# Patient Record
Sex: FEMALE | Race: WHITE | NOT HISPANIC OR LATINO
[De-identification: names, ages, dates, MRNs, and addresses within clinical notes are randomized per-mention and may not be internally consistent; named-entity substitution may affect disease eponyms.]

---

## 2021-12-06 PROBLEM — Z00.00 ENCOUNTER FOR PREVENTIVE HEALTH EXAMINATION: Status: ACTIVE | Noted: 2021-12-06

## 2021-12-14 ENCOUNTER — APPOINTMENT (OUTPATIENT)
Dept: OTOLARYNGOLOGY | Facility: CLINIC | Age: 56
End: 2021-12-14
Payer: COMMERCIAL

## 2021-12-14 VITALS
WEIGHT: 235 LBS | BODY MASS INDEX: 37.77 KG/M2 | OXYGEN SATURATION: 93 % | HEIGHT: 66 IN | HEART RATE: 108 BPM | SYSTOLIC BLOOD PRESSURE: 134 MMHG | DIASTOLIC BLOOD PRESSURE: 85 MMHG

## 2021-12-14 PROCEDURE — 99204 OFFICE O/P NEW MOD 45 MIN: CPT

## 2021-12-14 NOTE — PHYSICAL EXAM
[FreeTextEntry1] : ulcerated and endophytic lesion R post ced gingiva-post molar region and abuts 2nd premolar tooth and and base RMT post. There is some buccal induration w no lingual extent.  it is sl tender. no palp nodes [Normal] : no rashes

## 2021-12-14 NOTE — HISTORY OF PRESENT ILLNESS
[de-identified] : Pt rferered by Dr Julian Pinon.  Pt felt soreness R mandible about a month ago.  Worsened.  was referred to Dr Pinon who did a bx.  -well diff SCCA .  Pt is a smoker., 10-20 pk year.  No ETOH. no wt loss. no bleding\par \par Pt w high BP and cholesterol.  PCP is Dr Proctor. \par \par

## 2021-12-14 NOTE — CONSULT LETTER
[Dear  ___] : Dear  [unfilled], [Consult Letter:] : I had the pleasure of evaluating your patient, [unfilled]. [Please see my note below.] : Please see my note below. [Consult Closing:] : Thank you very much for allowing me to participate in the care of this patient.  If you have any questions, please do not hesitate to contact me. [Sincerely,] : Sincerely, [FreeTextEntry3] : Vic So MD, FACS\par \par    [facility]\par Associate Chair\par    Department of Otolaryngology\par \par Professor\par Otolaryngology & Molecular Medicine\par [employer]\par  [FreeTextEntry2] : Cristopher Pinon DDS ([place], [place])

## 2021-12-15 ENCOUNTER — NON-APPOINTMENT (OUTPATIENT)
Age: 56
End: 2021-12-15

## 2021-12-16 ENCOUNTER — RESULT REVIEW (OUTPATIENT)
Age: 56
End: 2021-12-16

## 2021-12-20 ENCOUNTER — APPOINTMENT (OUTPATIENT)
Dept: PLASTIC SURGERY | Facility: CLINIC | Age: 56
End: 2021-12-20
Payer: COMMERCIAL

## 2021-12-20 VITALS
WEIGHT: 230 LBS | HEIGHT: 65.5 IN | HEART RATE: 95 BPM | TEMPERATURE: 98.4 F | SYSTOLIC BLOOD PRESSURE: 143 MMHG | BODY MASS INDEX: 37.86 KG/M2 | DIASTOLIC BLOOD PRESSURE: 87 MMHG | OXYGEN SATURATION: 97 %

## 2021-12-20 PROCEDURE — 99215 OFFICE O/P EST HI 40 MIN: CPT

## 2021-12-21 NOTE — REASON FOR VISIT
[Consultation] : a consultation visit [FreeTextEntry1] : Pt is a 57 y/o F with PMHx of HTN and hypercholesterolemia who presents for evaluation of a right mandible lesion. Pt first noted soreness of her right mandible approximately 1 month ago that worsened over time. She had a bx, confirming SCC. Pt is a current every day smoker but has been trying to quit. Currently smoking 1-2 cigarettes daily.

## 2021-12-21 NOTE — HISTORY OF PRESENT ILLNESS
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 So.  Patient is a in the process of being completely evaluated but at this time potential for resection is either orbital floor with marginal mandibulectomy versus composite resection.  Patient presents today to discuss reconstruction options.

## 2021-12-21 NOTE — PHYSICAL EXAM
[NI] : Normal [de-identified] :  ulcerated and endophytic lesion R post ced gingiva-post molar region and abuts 2nd premolar tooth and and base RMT post. There is some buccal induration w no lingual extent. it is sl tender. no palp nodes.  [de-identified] : Right forearm marcia test positive

## 2021-12-22 ENCOUNTER — APPOINTMENT (OUTPATIENT)
Dept: RADIATION ONCOLOGY | Facility: CLINIC | Age: 56
End: 2021-12-22
Payer: COMMERCIAL

## 2021-12-22 VITALS
RESPIRATION RATE: 17 BRPM | DIASTOLIC BLOOD PRESSURE: 84 MMHG | SYSTOLIC BLOOD PRESSURE: 127 MMHG | HEIGHT: 65 IN | HEART RATE: 97 BPM | OXYGEN SATURATION: 93 % | TEMPERATURE: 97 F | BODY MASS INDEX: 38.32 KG/M2 | WEIGHT: 230 LBS

## 2021-12-22 DIAGNOSIS — Z63.5 DISRUPTION OF FAMILY BY SEPARATION AND DIVORCE: ICD-10-CM

## 2021-12-22 DIAGNOSIS — Z86.39 PERSONAL HISTORY OF OTHER ENDOCRINE, NUTRITIONAL AND METABOLIC DISEASE: ICD-10-CM

## 2021-12-22 DIAGNOSIS — Z86.79 PERSONAL HISTORY OF OTHER DISEASES OF THE CIRCULATORY SYSTEM: ICD-10-CM

## 2021-12-22 DIAGNOSIS — F17.200 NICOTINE DEPENDENCE, UNSPECIFIED, UNCOMPLICATED: ICD-10-CM

## 2021-12-22 DIAGNOSIS — Z87.09 PERSONAL HISTORY OF OTHER DISEASES OF THE RESPIRATORY SYSTEM: ICD-10-CM

## 2021-12-22 DIAGNOSIS — R79.89 OTHER SPECIFIED ABNORMAL FINDINGS OF BLOOD CHEMISTRY: ICD-10-CM

## 2021-12-22 PROCEDURE — 99205 OFFICE O/P NEW HI 60 MIN: CPT | Mod: 25

## 2021-12-22 SDOH — SOCIAL STABILITY - SOCIAL INSECURITY: DISRUPTION OF FAMILY BY SEPARATION AND DIVORCE: Z63.5

## 2021-12-22 NOTE — HISTORY OF PRESENT ILLNESS
[FreeTextEntry1] : 56 year old woman presents today for consultation regarding possible radiation therapy for head and neck cancer.\par \par She noted soreness in her right mandible in 11/2021, and initially went for evaluation of possible dentures.  She met with Dr. Cristopher Pinon (oral surgeon).\par \par 11/26/21 biopsy of the right mandibular gingiva showed well-differentiated squamous cell carcinoma, P16 negative.\par \par She met with Dr. So on 12/14/21, and with Dr. Evans on 12/20/21.  On exam, she was noted to have an ulcerated and endophytic lesion in the right posterior mandibular gingiva/post-molar region, abutting the 2nd premolar tooth and the base RMT posteriorly.  There was buccal induration, no lingual extension.\par \par She is planning to undergo high-res CT and PET tomorrow.\par \par She is scheduled for surgical resection in 1/2022.\par \par Currently, she reports pain in the right jaw, 8/10, and is using Advil with some relief.  She notes intermittent xerostomia and weight loss of 6lbs in the last month.  Denies dysgeusia, odynophagia, dysphagia or fatigue.

## 2021-12-22 NOTE — PHYSICAL EXAM
[Outer Ear] : the ears and nose were normal in appearance [Cervical Lymph Nodes Enlarged Posterior Bilaterally] : posterior cervical [Cervical Lymph Nodes Enlarged Anterior Bilaterally] : anterior cervical [Supraclavicular Lymph Nodes Enlarged Bilaterally] : supraclavicular [Normal] : no focal deficits [de-identified] : ulcerated lesion in the right posterior gingiva / retromolar region.

## 2021-12-22 NOTE — REVIEW OF SYSTEMS
[Negative] : Allergic/Immunologic [Dysphagia: Grade 0] : Dysphagia: Grade 0 [Edema Limbs: Grade 0] : Edema Limbs: Grade 0  [Fatigue: Grade 0] : Fatigue: Grade 0 [Localized Edema: Grade 0] : Localized Edema: Grade 0  [Neck Edema: Grade 0] : Neck Edema: Grade 0 [Oral Pain: Grade 2 - Moderate pain; limiting instrumental ADL] : Oral Pain: Grade 2 - Moderate pain; limiting instrumental ADL [Dysgeusia: Grade 0] : Dysgeusia: Grade 0

## 2021-12-22 NOTE — OB/GYN HISTORY
[History of Birth Control Pills] : Patient has a history of taking birth control pills [Currently In Menopause] : currently in menopause [___] : Living: [unfilled] [History of Hormone Replacement Therapy] : no history of hormone replacement therapy

## 2021-12-22 NOTE — LETTER CLOSING
[Consult Closing:] : Thank you for allowing me to participate in the care of this patient.  If you have any questions, please do not hesitate to contact me. [Sincerely yours,] : Sincerely yours, [FreeTextEntry3] : Jamie Lambert MD\par  of Radiation Medicine, Quality and Safety\par  of Radiation Medicine\par [facility] [employer] [facility]\par [facility]\par [facility]\par

## 2021-12-22 NOTE — VITALS
[Maximal Pain Intensity: 8/10] : 8/10 [Least Pain Intensity: 8/10] : 8/10 [Pain Description/Quality: ___] : Pain description/quality: [unfilled] [Pain Duration: ___] : Pain duration: [unfilled] [Pain Location: ___] : Pain Location: [unfilled] [Pain Interferes with ADLs] : Pain interferes with activities of daily living. [OTC] : OTC [80: Normal activity with effort; some signs or symptoms of disease.] : 80: Normal activity with effort; some signs or symptoms of disease.  [Date: ____________] : Patient's last distress assessment performed on [unfilled]. [8 - Distress Level] : Distress Level: 8

## 2021-12-22 NOTE — DISEASE MANAGEMENT
[Clinical] : TNM Stage: c [II] : II [FreeTextEntry4] : SCC oral cavity [TTNM] : 2 vs 4 [NTNM] : 0 [MTNM] : 0

## 2021-12-23 ENCOUNTER — APPOINTMENT (OUTPATIENT)
Dept: CT IMAGING | Facility: CLINIC | Age: 56
End: 2021-12-23
Payer: COMMERCIAL

## 2021-12-23 ENCOUNTER — APPOINTMENT (OUTPATIENT)
Dept: NUCLEAR MEDICINE | Facility: CLINIC | Age: 56
End: 2021-12-23
Payer: COMMERCIAL

## 2021-12-23 ENCOUNTER — OUTPATIENT (OUTPATIENT)
Dept: OUTPATIENT SERVICES | Facility: HOSPITAL | Age: 56
LOS: 1 days | End: 2021-12-23
Payer: COMMERCIAL

## 2021-12-23 DIAGNOSIS — C03.9 MALIGNANT NEOPLASM OF GUM, UNSPECIFIED: ICD-10-CM

## 2021-12-23 PROCEDURE — 70491 CT SOFT TISSUE NECK W/DYE: CPT | Mod: 26

## 2021-12-23 PROCEDURE — 78815 PET IMAGE W/CT SKULL-THIGH: CPT | Mod: 26,PI

## 2021-12-23 PROCEDURE — 70491 CT SOFT TISSUE NECK W/DYE: CPT

## 2021-12-23 PROCEDURE — 78815 PET IMAGE W/CT SKULL-THIGH: CPT

## 2021-12-23 PROCEDURE — 70487 CT MAXILLOFACIAL W/DYE: CPT

## 2021-12-23 PROCEDURE — 82565 ASSAY OF CREATININE: CPT

## 2021-12-23 PROCEDURE — 70487 CT MAXILLOFACIAL W/DYE: CPT | Mod: 26

## 2021-12-23 PROCEDURE — A9552: CPT

## 2021-12-27 ENCOUNTER — NON-APPOINTMENT (OUTPATIENT)
Age: 56
End: 2021-12-27

## 2021-12-28 ENCOUNTER — NON-APPOINTMENT (OUTPATIENT)
Age: 56
End: 2021-12-28

## 2021-12-28 ENCOUNTER — OUTPATIENT (OUTPATIENT)
Dept: OUTPATIENT SERVICES | Facility: HOSPITAL | Age: 56
LOS: 1 days | End: 2021-12-28
Payer: COMMERCIAL

## 2021-12-28 VITALS
TEMPERATURE: 97 F | HEIGHT: 65 IN | SYSTOLIC BLOOD PRESSURE: 128 MMHG | HEART RATE: 90 BPM | OXYGEN SATURATION: 97 % | WEIGHT: 231.93 LBS | DIASTOLIC BLOOD PRESSURE: 78 MMHG | RESPIRATION RATE: 16 BRPM

## 2021-12-28 DIAGNOSIS — Z85.818 PERSONAL HISTORY OF MALIGNANT NEOPLASM OF OTHER SITES OF LIP, ORAL CAVITY, AND PHARYNX: ICD-10-CM

## 2021-12-28 DIAGNOSIS — C03.9 MALIGNANT NEOPLASM OF GUM, UNSPECIFIED: ICD-10-CM

## 2021-12-28 DIAGNOSIS — Z90.711 ACQUIRED ABSENCE OF UTERUS WITH REMAINING CERVICAL STUMP: Chronic | ICD-10-CM

## 2021-12-28 DIAGNOSIS — T78.40XA ALLERGY, UNSPECIFIED, INITIAL ENCOUNTER: ICD-10-CM

## 2021-12-28 DIAGNOSIS — Z98.891 HISTORY OF UTERINE SCAR FROM PREVIOUS SURGERY: Chronic | ICD-10-CM

## 2021-12-28 DIAGNOSIS — R94.31 ABNORMAL ELECTROCARDIOGRAM [ECG] [EKG]: ICD-10-CM

## 2021-12-28 DIAGNOSIS — Z96.649 PRESENCE OF UNSPECIFIED ARTIFICIAL HIP JOINT: Chronic | ICD-10-CM

## 2021-12-28 LAB
ALBUMIN SERPL ELPH-MCNC: 4.6 G/DL — SIGNIFICANT CHANGE UP (ref 3.3–5)
ALP SERPL-CCNC: 67 U/L — SIGNIFICANT CHANGE UP (ref 40–120)
ALT FLD-CCNC: 16 U/L — SIGNIFICANT CHANGE UP (ref 4–33)
ANION GAP SERPL CALC-SCNC: 12 MMOL/L — SIGNIFICANT CHANGE UP (ref 7–14)
AST SERPL-CCNC: 12 U/L — SIGNIFICANT CHANGE UP (ref 4–32)
BILIRUB SERPL-MCNC: 0.3 MG/DL — SIGNIFICANT CHANGE UP (ref 0.2–1.2)
BLD GP AB SCN SERPL QL: NEGATIVE — SIGNIFICANT CHANGE UP
BUN SERPL-MCNC: 17 MG/DL — SIGNIFICANT CHANGE UP (ref 7–23)
CALCIUM SERPL-MCNC: 9.5 MG/DL — SIGNIFICANT CHANGE UP (ref 8.4–10.5)
CHLORIDE SERPL-SCNC: 100 MMOL/L — SIGNIFICANT CHANGE UP (ref 98–107)
CO2 SERPL-SCNC: 26 MMOL/L — SIGNIFICANT CHANGE UP (ref 22–31)
CREAT SERPL-MCNC: 0.62 MG/DL — SIGNIFICANT CHANGE UP (ref 0.5–1.3)
GLUCOSE SERPL-MCNC: 82 MG/DL — SIGNIFICANT CHANGE UP (ref 70–99)
HCT VFR BLD CALC: 41.2 % — SIGNIFICANT CHANGE UP (ref 34.5–45)
HGB BLD-MCNC: 13.7 G/DL — SIGNIFICANT CHANGE UP (ref 11.5–15.5)
MCHC RBC-ENTMCNC: 28.5 PG — SIGNIFICANT CHANGE UP (ref 27–34)
MCHC RBC-ENTMCNC: 33.3 GM/DL — SIGNIFICANT CHANGE UP (ref 32–36)
MCV RBC AUTO: 85.7 FL — SIGNIFICANT CHANGE UP (ref 80–100)
NRBC # BLD: 0 /100 WBCS — SIGNIFICANT CHANGE UP
NRBC # FLD: 0 K/UL — SIGNIFICANT CHANGE UP
PLATELET # BLD AUTO: 237 K/UL — SIGNIFICANT CHANGE UP (ref 150–400)
POTASSIUM SERPL-MCNC: 4.2 MMOL/L — SIGNIFICANT CHANGE UP (ref 3.5–5.3)
POTASSIUM SERPL-SCNC: 4.2 MMOL/L — SIGNIFICANT CHANGE UP (ref 3.5–5.3)
PROT SERPL-MCNC: 7.1 G/DL — SIGNIFICANT CHANGE UP (ref 6–8.3)
RBC # BLD: 4.81 M/UL — SIGNIFICANT CHANGE UP (ref 3.8–5.2)
RBC # FLD: 13.5 % — SIGNIFICANT CHANGE UP (ref 10.3–14.5)
RH IG SCN BLD-IMP: POSITIVE — SIGNIFICANT CHANGE UP
SODIUM SERPL-SCNC: 138 MMOL/L — SIGNIFICANT CHANGE UP (ref 135–145)
WBC # BLD: 9.24 K/UL — SIGNIFICANT CHANGE UP (ref 3.8–10.5)
WBC # FLD AUTO: 9.24 K/UL — SIGNIFICANT CHANGE UP (ref 3.8–10.5)

## 2021-12-28 PROCEDURE — 93010 ELECTROCARDIOGRAM REPORT: CPT

## 2021-12-28 NOTE — H&P PST ADULT - HISTORY OF PRESENT ILLNESS
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 So.

## 2021-12-28 NOTE — H&P PST ADULT - ASSESSMENT
55 yo female presents to PST unit with 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 So.

## 2021-12-28 NOTE — H&P PST ADULT - NSICDXPASTMEDICALHX_GEN_ALL_CORE_FT
PAST MEDICAL HISTORY:  H/O malignant neoplasm of gum     Hyperlipidemia     Hypertension     Mild asthma

## 2021-12-28 NOTE — H&P PST ADULT - PROBLEM SELECTOR PLAN 1
Scheduled for right glossectomy, right neck dissection, tracheostomy with Dr. So on 01/13/2021.  Verbal and written pre-op instructions provided to patient. Patient verbalized understanding and will call surgeons office for revised instructions if surgery is rescheduled.   Pepcid for GI prophylaxis provided.   Patient given verbal and written instruction with teach back on chlorhexidine shampoo, and the patient verbalized understanding with return demonstration.   Patient aware of need for COVID testing prior to  procedure at a [facility]  and advised to coordinate with surgeon to get tested within 72 hours of procedure.

## 2021-12-28 NOTE — H&P PST ADULT - LAST STRESS TEST
69 F w/ PMH of HTN, memory and balance problems, presents to the ED w/ stomach pain, no vomiting, no nausea, no shortness of breath, no fevers no chills, no pain w/ urination. Normal appetite. history provided by pts  and patient.   woke up at 430 AM w/ pain L sided abdominal discomfort per  pain was initially in the back and then stomach additionally pt scheduled for GB removal w/ Dr. Ambrose s/p ERCP  concern for kidney stone, intraabdominal infection cholecystitis, pancreatitis,will have labs, ekg and ct scan dispo pending results. Brenda Espinal MD 69 F w/ PMH of HTN, memory and balance problems, presents to the ED w/ stomach pain, no vomiting, no nausea, no shortness of breath, no fevers no chills, no pain w/ urination. Normal appetite. history provided by pts  and patient.   woke up at 430 AM w/ pain L sided abdominal discomfort per  pain was initially in the back and then stomach additionally pt scheduled for GB removal w/ Dr. Ambrose s/p ERCP, R upper quadrant and RLQ tendenress, ddx concern for kidney stone, intraabdominal infection cholecystitis, pancreatitis,will have labs, ekg and ct scan dispo pending results. denies

## 2021-12-28 NOTE — H&P PST ADULT - NSANTHOSAYNRD_GEN_A_CORE
No. MARKEL screening performed.  STOP BANG Legend: 0-2 = LOW Risk; 3-4 = INTERMEDIATE Risk; 5-8 = HIGH Risk

## 2021-12-28 NOTE — H&P PST ADULT - NSICDXPASTSURGICALHX_GEN_ALL_CORE_FT
PAST SURGICAL HISTORY:  H/O  section     History of hip replacement left hip    History of partial hysterectomy

## 2021-12-29 ENCOUNTER — NON-APPOINTMENT (OUTPATIENT)
Age: 56
End: 2021-12-29

## 2021-12-30 ENCOUNTER — APPOINTMENT (OUTPATIENT)
Dept: CT IMAGING | Facility: CLINIC | Age: 56
End: 2021-12-30
Payer: COMMERCIAL

## 2021-12-30 ENCOUNTER — NON-APPOINTMENT (OUTPATIENT)
Age: 56
End: 2021-12-30

## 2021-12-30 ENCOUNTER — OUTPATIENT (OUTPATIENT)
Dept: OUTPATIENT SERVICES | Facility: HOSPITAL | Age: 56
LOS: 1 days | End: 2021-12-30
Payer: COMMERCIAL

## 2021-12-30 DIAGNOSIS — Z90.711 ACQUIRED ABSENCE OF UTERUS WITH REMAINING CERVICAL STUMP: Chronic | ICD-10-CM

## 2021-12-30 DIAGNOSIS — Z00.8 ENCOUNTER FOR OTHER GENERAL EXAMINATION: ICD-10-CM

## 2021-12-30 DIAGNOSIS — Z96.649 PRESENCE OF UNSPECIFIED ARTIFICIAL HIP JOINT: Chronic | ICD-10-CM

## 2021-12-30 DIAGNOSIS — Z98.891 HISTORY OF UTERINE SCAR FROM PREVIOUS SURGERY: Chronic | ICD-10-CM

## 2021-12-30 PROBLEM — E78.5 HYPERLIPIDEMIA, UNSPECIFIED: Chronic | Status: ACTIVE | Noted: 2021-12-28

## 2021-12-30 PROBLEM — Z85.818 PERSONAL HISTORY OF MALIGNANT NEOPLASM OF OTHER SITES OF LIP, ORAL CAVITY, AND PHARYNX: Chronic | Status: ACTIVE | Noted: 2021-12-28

## 2021-12-30 PROBLEM — J45.909 UNSPECIFIED ASTHMA, UNCOMPLICATED: Chronic | Status: ACTIVE | Noted: 2021-12-28

## 2021-12-30 PROBLEM — I10 ESSENTIAL (PRIMARY) HYPERTENSION: Chronic | Status: ACTIVE | Noted: 2021-12-28

## 2021-12-30 PROCEDURE — 73706 CT ANGIO LWR EXTR W/O&W/DYE: CPT | Mod: 26,50

## 2021-12-30 PROCEDURE — 73706 CT ANGIO LWR EXTR W/O&W/DYE: CPT

## 2022-01-12 ENCOUNTER — TRANSCRIPTION ENCOUNTER (OUTPATIENT)
Age: 57
End: 2022-01-12

## 2022-01-12 ENCOUNTER — OUTPATIENT (OUTPATIENT)
Dept: OUTPATIENT SERVICES | Facility: HOSPITAL | Age: 57
LOS: 1 days | Discharge: HOME | End: 2022-01-12

## 2022-01-12 ENCOUNTER — APPOINTMENT (OUTPATIENT)
Dept: SPEECH THERAPY | Facility: CLINIC | Age: 57
End: 2022-01-12

## 2022-01-12 ENCOUNTER — NON-APPOINTMENT (OUTPATIENT)
Age: 57
End: 2022-01-12

## 2022-01-12 DIAGNOSIS — Z98.891 HISTORY OF UTERINE SCAR FROM PREVIOUS SURGERY: Chronic | ICD-10-CM

## 2022-01-12 DIAGNOSIS — Z90.711 ACQUIRED ABSENCE OF UTERUS WITH REMAINING CERVICAL STUMP: Chronic | ICD-10-CM

## 2022-01-12 DIAGNOSIS — Z96.649 PRESENCE OF UNSPECIFIED ARTIFICIAL HIP JOINT: Chronic | ICD-10-CM

## 2022-01-12 NOTE — ASU PATIENT PROFILE, ADULT - FALL HARM RISK - UNIVERSAL INTERVENTIONS
Bed in lowest position, wheels locked, appropriate side rails in place/Call bell, personal items and telephone in reach/Instruct patient to call for assistance before getting out of bed or chair/Non-slip footwear when patient is out of bed/[place] to call system/Physically safe environment - no spills, clutter or unnecessary equipment/Purposeful Proactive Rounding/Room/bathroom lighting operational, light cord in reach

## 2022-01-13 ENCOUNTER — RESULT REVIEW (OUTPATIENT)
Age: 57
End: 2022-01-13

## 2022-01-13 ENCOUNTER — INPATIENT (INPATIENT)
Facility: HOSPITAL | Age: 57
LOS: 8 days | Discharge: HOME HEALTH | End: 2022-01-22
Attending: OTOLARYNGOLOGY | Admitting: OTOLARYNGOLOGY
Payer: COMMERCIAL

## 2022-01-13 ENCOUNTER — APPOINTMENT (OUTPATIENT)
Dept: OTOLARYNGOLOGY | Facility: HOSPITAL | Age: 57
End: 2022-01-13

## 2022-01-13 ENCOUNTER — APPOINTMENT (OUTPATIENT)
Dept: PLASTIC SURGERY | Facility: HOSPITAL | Age: 57
End: 2022-01-13
Payer: COMMERCIAL

## 2022-01-13 VITALS
OXYGEN SATURATION: 96 % | WEIGHT: 231.93 LBS | DIASTOLIC BLOOD PRESSURE: 79 MMHG | RESPIRATION RATE: 16 BRPM | TEMPERATURE: 98 F | HEART RATE: 97 BPM | HEIGHT: 65 IN | SYSTOLIC BLOOD PRESSURE: 122 MMHG

## 2022-01-13 DIAGNOSIS — C03.9 MALIGNANT NEOPLASM OF GUM, UNSPECIFIED: ICD-10-CM

## 2022-01-13 DIAGNOSIS — Z90.711 ACQUIRED ABSENCE OF UTERUS WITH REMAINING CERVICAL STUMP: Chronic | ICD-10-CM

## 2022-01-13 DIAGNOSIS — Z96.649 PRESENCE OF UNSPECIFIED ARTIFICIAL HIP JOINT: Chronic | ICD-10-CM

## 2022-01-13 DIAGNOSIS — Z98.891 HISTORY OF UTERINE SCAR FROM PREVIOUS SURGERY: Chronic | ICD-10-CM

## 2022-01-13 LAB
ANION GAP SERPL CALC-SCNC: 10 MMOL/L — SIGNIFICANT CHANGE UP (ref 7–14)
APTT BLD: 21.2 SEC — LOW (ref 27–36.3)
BUN SERPL-MCNC: 20 MG/DL — SIGNIFICANT CHANGE UP (ref 7–23)
CALCIUM SERPL-MCNC: 8.2 MG/DL — LOW (ref 8.4–10.5)
CHLORIDE SERPL-SCNC: 102 MMOL/L — SIGNIFICANT CHANGE UP (ref 98–107)
CO2 SERPL-SCNC: 23 MMOL/L — SIGNIFICANT CHANGE UP (ref 22–31)
CREAT SERPL-MCNC: 0.5 MG/DL — SIGNIFICANT CHANGE UP (ref 0.5–1.3)
GAS PNL BLDA: SIGNIFICANT CHANGE UP
GAS PNL BLDA: SIGNIFICANT CHANGE UP
GLUCOSE BLDC GLUCOMTR-MCNC: 198 MG/DL — HIGH (ref 70–99)
GLUCOSE SERPL-MCNC: 217 MG/DL — HIGH (ref 70–99)
HCT VFR BLD CALC: 34.8 % — SIGNIFICANT CHANGE UP (ref 34.5–45)
HGB BLD-MCNC: 11.1 G/DL — LOW (ref 11.5–15.5)
INR BLD: 1.05 RATIO — SIGNIFICANT CHANGE UP (ref 0.88–1.16)
MAGNESIUM SERPL-MCNC: 1.6 MG/DL — SIGNIFICANT CHANGE UP (ref 1.6–2.6)
MCHC RBC-ENTMCNC: 28.7 PG — SIGNIFICANT CHANGE UP (ref 27–34)
MCHC RBC-ENTMCNC: 31.9 GM/DL — LOW (ref 32–36)
MCV RBC AUTO: 89.9 FL — SIGNIFICANT CHANGE UP (ref 80–100)
NRBC # BLD: 0 /100 WBCS — SIGNIFICANT CHANGE UP
NRBC # FLD: 0 K/UL — SIGNIFICANT CHANGE UP
PHOSPHATE SERPL-MCNC: 2.7 MG/DL — SIGNIFICANT CHANGE UP (ref 2.5–4.5)
PLATELET # BLD AUTO: 201 K/UL — SIGNIFICANT CHANGE UP (ref 150–400)
POTASSIUM SERPL-MCNC: 4.5 MMOL/L — SIGNIFICANT CHANGE UP (ref 3.5–5.3)
POTASSIUM SERPL-SCNC: 4.5 MMOL/L — SIGNIFICANT CHANGE UP (ref 3.5–5.3)
PROTHROM AB SERPL-ACNC: 12.1 SEC — SIGNIFICANT CHANGE UP (ref 10.6–13.6)
RBC # BLD: 3.87 M/UL — SIGNIFICANT CHANGE UP (ref 3.8–5.2)
RBC # FLD: 13.8 % — SIGNIFICANT CHANGE UP (ref 10.3–14.5)
SODIUM SERPL-SCNC: 135 MMOL/L — SIGNIFICANT CHANGE UP (ref 135–145)
WBC # BLD: 14.09 K/UL — HIGH (ref 3.8–10.5)
WBC # FLD AUTO: 14.09 K/UL — HIGH (ref 3.8–10.5)

## 2022-01-13 PROCEDURE — 20969 BONE/SKIN GRAFT MICROVASC: CPT

## 2022-01-13 PROCEDURE — 40840 RECONSTRUCTION OF MOUTH: CPT

## 2022-01-13 PROCEDURE — 97608 NEG PRS WND THER NDME>50SQCM: CPT | Mod: 59

## 2022-01-13 PROCEDURE — 88309 TISSUE EXAM BY PATHOLOGIST: CPT | Mod: 26

## 2022-01-13 PROCEDURE — 27707 OSTEOTOMY FIBULA: CPT

## 2022-01-13 PROCEDURE — 38724 REMOVAL OF LYMPH NODES NECK: CPT | Mod: 59,GC,62,RT

## 2022-01-13 PROCEDURE — 71045 X-RAY EXAM CHEST 1 VIEW: CPT | Mod: 26

## 2022-01-13 PROCEDURE — 88331 PATH CONSLTJ SURG 1 BLK 1SPC: CPT | Mod: 26

## 2022-01-13 PROCEDURE — 13132 CMPLX RPR F/C/C/M/N/AX/G/H/F: CPT | Mod: 59

## 2022-01-13 PROCEDURE — 88332 PATH CONSLTJ SURG EA ADD BLK: CPT | Mod: 26

## 2022-01-13 PROCEDURE — 88307 TISSUE EXAM BY PATHOLOGIST: CPT | Mod: 26

## 2022-01-13 PROCEDURE — 15100 SPLT AGRFT T/A/L 1ST 100SQCM: CPT

## 2022-01-13 PROCEDURE — 88311 DECALCIFY TISSUE: CPT | Mod: 26

## 2022-01-13 PROCEDURE — 41150 TONGUE MOUTH JAW SURGERY: CPT | Mod: RT,GC,62

## 2022-01-13 DEVICE — PLATE IPS TITANIUM 64MM: Type: IMPLANTABLE DEVICE | Status: FUNCTIONAL

## 2022-01-13 DEVICE — SCREW LOKG 2X15MM: Type: IMPLANTABLE DEVICE | Status: FUNCTIONAL

## 2022-01-13 DEVICE — SCREW EMERG CROSS DRIVE TI 2X9: Type: IMPLANTABLE DEVICE | Status: FUNCTIONAL

## 2022-01-13 DEVICE — GUIDE CUTTING RECON IPS X-SMALL: Type: IMPLANTABLE DEVICE | Status: FUNCTIONAL

## 2022-01-13 DEVICE — SCREW CR DRV 2.0X11MM: Type: IMPLANTABLE DEVICE | Status: FUNCTIONAL

## 2022-01-13 DEVICE — SCREW LOKG 2X7MM: Type: IMPLANTABLE DEVICE | Status: FUNCTIONAL

## 2022-01-13 DEVICE — CANNULA IMA 1MM BLUNT TIP: Type: IMPLANTABLE DEVICE | Status: FUNCTIONAL

## 2022-01-13 DEVICE — DOPPLER PROBE DISPOSABLE: Type: IMPLANTABLE DEVICE | Status: FUNCTIONAL

## 2022-01-13 DEVICE — GUIDE RECONSTRUCT IPS CUTTING: Type: IMPLANTABLE DEVICE | Status: FUNCTIONAL

## 2022-01-13 DEVICE — LIGATING CLIPS WECK HORIZON SMALL-WIDE (RED) 24: Type: IMPLANTABLE DEVICE | Status: FUNCTIONAL

## 2022-01-13 DEVICE — IMPLANTABLE DEVICE: Type: IMPLANTABLE DEVICE | Status: FUNCTIONAL

## 2022-01-13 DEVICE — LIGATING CLIPS WECK HORIZON MEDIUM (BLUE) 24: Type: IMPLANTABLE DEVICE | Status: FUNCTIONAL

## 2022-01-13 DEVICE — SURGICEL 2 X 14": Type: IMPLANTABLE DEVICE | Status: FUNCTIONAL

## 2022-01-13 DEVICE — COUPLER VESSEL ANASTOMOTIC 3.5MM: Type: IMPLANTABLE DEVICE | Status: FUNCTIONAL

## 2022-01-13 DEVICE — BONE WAX 2.5GM: Type: IMPLANTABLE DEVICE | Status: FUNCTIONAL

## 2022-01-13 DEVICE — CARTRIDGE MICROCLIP 30: Type: IMPLANTABLE DEVICE | Status: FUNCTIONAL

## 2022-01-13 DEVICE — GUIDE CUTTING RECON IPS X-LRG: Type: IMPLANTABLE DEVICE | Status: FUNCTIONAL

## 2022-01-13 DEVICE — SCREW MAXDRIVE 2.0X7MM: Type: IMPLANTABLE DEVICE | Status: FUNCTIONAL

## 2022-01-13 DEVICE — SCREW LOKG 2X11MM: Type: IMPLANTABLE DEVICE | Status: FUNCTIONAL

## 2022-01-13 DEVICE — SCREW LOCKING 2X13MM: Type: IMPLANTABLE DEVICE | Status: FUNCTIONAL

## 2022-01-13 DEVICE — TUBE TRACH SZ 6 CUFF FLEX REUSE: Type: IMPLANTABLE DEVICE | Status: FUNCTIONAL

## 2022-01-13 DEVICE — SCREW MAXDRIVE 1 LVL 2X7MM: Type: IMPLANTABLE DEVICE | Status: FUNCTIONAL

## 2022-01-13 RX ORDER — APREPITANT 80 MG/1
40 CAPSULE ORAL ONCE
Refills: 0 | Status: COMPLETED | OUTPATIENT
Start: 2022-01-13 | End: 2022-01-13

## 2022-01-13 RX ORDER — GABAPENTIN 400 MG/1
600 CAPSULE ORAL ONCE
Refills: 0 | Status: COMPLETED | OUTPATIENT
Start: 2022-01-13 | End: 2022-01-13

## 2022-01-13 RX ORDER — ALBUTEROL 90 UG/1
2 AEROSOL, METERED ORAL EVERY 6 HOURS
Refills: 0 | Status: DISCONTINUED | OUTPATIENT
Start: 2022-01-13 | End: 2022-01-22

## 2022-01-13 RX ORDER — SODIUM CHLORIDE 9 MG/ML
1000 INJECTION, SOLUTION INTRAVENOUS
Refills: 0 | Status: DISCONTINUED | OUTPATIENT
Start: 2022-01-13 | End: 2022-01-15

## 2022-01-13 RX ORDER — CHLORHEXIDINE GLUCONATE 213 G/1000ML
1 SOLUTION TOPICAL DAILY
Refills: 0 | Status: DISCONTINUED | OUTPATIENT
Start: 2022-01-13 | End: 2022-01-19

## 2022-01-13 RX ORDER — ENOXAPARIN SODIUM 100 MG/ML
40 INJECTION SUBCUTANEOUS DAILY
Refills: 0 | Status: DISCONTINUED | OUTPATIENT
Start: 2022-01-14 | End: 2022-01-22

## 2022-01-13 RX ORDER — ACETAMINOPHEN 500 MG
975 TABLET ORAL ONCE
Refills: 0 | Status: COMPLETED | OUTPATIENT
Start: 2022-01-13 | End: 2022-01-13

## 2022-01-13 RX ORDER — HYDROMORPHONE HYDROCHLORIDE 2 MG/ML
0.5 INJECTION INTRAMUSCULAR; INTRAVENOUS; SUBCUTANEOUS EVERY 4 HOURS
Refills: 0 | Status: DISCONTINUED | OUTPATIENT
Start: 2022-01-13 | End: 2022-01-14

## 2022-01-13 RX ORDER — SODIUM CHLORIDE 9 MG/ML
1000 INJECTION, SOLUTION INTRAVENOUS
Refills: 0 | Status: DISCONTINUED | OUTPATIENT
Start: 2022-01-13 | End: 2022-01-13

## 2022-01-13 RX ORDER — MAGNESIUM SULFATE 500 MG/ML
2 VIAL (ML) INJECTION ONCE
Refills: 0 | Status: COMPLETED | OUTPATIENT
Start: 2022-01-13 | End: 2022-01-13

## 2022-01-13 RX ORDER — AMPICILLIN SODIUM AND SULBACTAM SODIUM 250; 125 MG/ML; MG/ML
3 INJECTION, POWDER, FOR SUSPENSION INTRAMUSCULAR; INTRAVENOUS ONCE
Refills: 0 | Status: COMPLETED | OUTPATIENT
Start: 2022-01-13 | End: 2022-01-13

## 2022-01-13 RX ORDER — ONDANSETRON 8 MG/1
4 TABLET, FILM COATED ORAL ONCE
Refills: 0 | Status: COMPLETED | OUTPATIENT
Start: 2022-01-13 | End: 2022-01-13

## 2022-01-13 RX ORDER — AMPICILLIN SODIUM AND SULBACTAM SODIUM 250; 125 MG/ML; MG/ML
INJECTION, POWDER, FOR SUSPENSION INTRAMUSCULAR; INTRAVENOUS
Refills: 0 | Status: DISCONTINUED | OUTPATIENT
Start: 2022-01-13 | End: 2022-01-19

## 2022-01-13 RX ORDER — HYDROMORPHONE HYDROCHLORIDE 2 MG/ML
1 INJECTION INTRAMUSCULAR; INTRAVENOUS; SUBCUTANEOUS EVERY 4 HOURS
Refills: 0 | Status: DISCONTINUED | OUTPATIENT
Start: 2022-01-13 | End: 2022-01-14

## 2022-01-13 RX ORDER — CHLORHEXIDINE GLUCONATE 213 G/1000ML
15 SOLUTION TOPICAL
Refills: 0 | Status: DISCONTINUED | OUTPATIENT
Start: 2022-01-13 | End: 2022-01-13

## 2022-01-13 RX ORDER — AMPICILLIN SODIUM AND SULBACTAM SODIUM 250; 125 MG/ML; MG/ML
3 INJECTION, POWDER, FOR SUSPENSION INTRAMUSCULAR; INTRAVENOUS EVERY 6 HOURS
Refills: 0 | Status: DISCONTINUED | OUTPATIENT
Start: 2022-01-14 | End: 2022-01-19

## 2022-01-13 RX ORDER — INSULIN LISPRO 100/ML
VIAL (ML) SUBCUTANEOUS EVERY 6 HOURS
Refills: 0 | Status: DISCONTINUED | OUTPATIENT
Start: 2022-01-13 | End: 2022-01-15

## 2022-01-13 RX ORDER — ACETAMINOPHEN 500 MG
1000 TABLET ORAL EVERY 6 HOURS
Refills: 0 | Status: COMPLETED | OUTPATIENT
Start: 2022-01-13 | End: 2022-01-14

## 2022-01-13 RX ADMIN — Medication 400 MILLIGRAM(S): at 17:16

## 2022-01-13 RX ADMIN — GABAPENTIN 600 MILLIGRAM(S): 400 CAPSULE ORAL at 06:31

## 2022-01-13 RX ADMIN — CHLORHEXIDINE GLUCONATE 15 MILLILITER(S): 213 SOLUTION TOPICAL at 06:31

## 2022-01-13 RX ADMIN — ALBUTEROL 2 PUFF(S): 90 AEROSOL, METERED ORAL at 20:11

## 2022-01-13 RX ADMIN — Medication 1000 MILLIGRAM(S): at 18:12

## 2022-01-13 RX ADMIN — ONDANSETRON 4 MILLIGRAM(S): 8 TABLET, FILM COATED ORAL at 20:17

## 2022-01-13 RX ADMIN — APREPITANT 40 MILLIGRAM(S): 80 CAPSULE ORAL at 06:31

## 2022-01-13 RX ADMIN — HYDROMORPHONE HYDROCHLORIDE 1 MILLIGRAM(S): 2 INJECTION INTRAMUSCULAR; INTRAVENOUS; SUBCUTANEOUS at 22:45

## 2022-01-13 RX ADMIN — HYDROMORPHONE HYDROCHLORIDE 1 MILLIGRAM(S): 2 INJECTION INTRAMUSCULAR; INTRAVENOUS; SUBCUTANEOUS at 22:34

## 2022-01-13 RX ADMIN — Medication 25 GRAM(S): at 22:35

## 2022-01-13 RX ADMIN — Medication 975 MILLIGRAM(S): at 06:30

## 2022-01-13 RX ADMIN — SODIUM CHLORIDE 125 MILLILITER(S): 9 INJECTION, SOLUTION INTRAVENOUS at 17:14

## 2022-01-13 RX ADMIN — AMPICILLIN SODIUM AND SULBACTAM SODIUM 200 GRAM(S): 250; 125 INJECTION, POWDER, FOR SUSPENSION INTRAMUSCULAR; INTRAVENOUS at 20:17

## 2022-01-13 NOTE — BRIEF OPERATIVE NOTE - OPERATION/FINDINGS
L free fibula flap reconstruction of R mandibular and FOM defect
Right mandibulectomy, selective neck dissection, tracheostomy

## 2022-01-13 NOTE — BRIEF OPERATIVE NOTE - NSICDXBRIEFPROCEDURE_GEN_ALL_CORE_FT
PROCEDURES:  Mandibulectomy, with tracheostomy 13-Jan-2022 18:08:32  Neal Alexis  Selective neck dissection 13-Jan-2022 18:08:43  Neal Alexis  
PROCEDURES:  Free flap, fibula 13-Jan-2022 16:21:23  Junior Mars

## 2022-01-13 NOTE — CONSULT NOTE ADULT - ASSESSMENT
ASSESSMENT:  56y Female with a PMHx HTN, HLD, pre-op diagnosis of malignant neoplasm of gum scheduled for right glossectomy, right neck dissection, tracheostomy, left fibular free flap. SICU consulted for q1 hr flap checks and new trach    NEUROLOGIC   - Pain control: prn    RESPIRATORY   - Monitor SpO2 goal >92%  - Incentive spirometry   - trach collar.    CARDIOVASCULAR   - Monitor hemodynamics   - BP WNL  - hold home Amlodipine, Lisinopril, consider restart in AM.    GASTROINTESTINAL   - Diet: NPO  - tube feeds in AM    /RENAL   - IV fluids: LR @ 125cc/hr  - Strict I/Os  - Monitor BMP qd  - Maintain merchant catheter, strict Is/Os  - Monitor electrolytes, replete PRN    HEMATOLOGIC  - Monitor H/H   - DVT ppx: Lovenox in AM    INFECTIOUS DISEASE  - Unasyn  - Monitor fever / WBC    ENDOCRINE  - Monitor gluc    LINES  - A line ( x  )  - Merchant ( x  )  - PIV     DISPO: SICU  --------------------------------------------------------------------------------------    Critical Care Diagnoses: new free flap, new trach   ASSESSMENT:  56y Female with a PMHx HTN, HLD, pre-op diagnosis of malignant neoplasm of gum scheduled for right glossectomy, right neck dissection, tracheostomy, left fibular free flap. SICU consulted for q1 hr flap checks and new trach    NEUROLOGIC   - Pain control: prn    RESPIRATORY   - Monitor SpO2 goal >92%  - Incentive spirometry   - trach collar.    CARDIOVASCULAR   - Monitor hemodynamics   - BP WNL  - hold home Amlodipine, Lisinopril, consider restart in AM.    GASTROINTESTINAL   - Diet: NPO  - tube feeds in AM    /RENAL   - IV fluids: LR @ 125cc/hr  - Strict I/Os  - Monitor BMP qd  - Maintain merchant catheter, strict Is/Os  - Monitor electrolytes, replete PRN    HEMATOLOGIC  - Monitor H/H   - DVT ppx: Lovenox in AM    INFECTIOUS DISEASE  - Unasyn  - Monitor fever / WBC    ENDOCRINE  - Monitor gluc    LINES  - A line ( x  ), dc in AM  - Merchant ( x  ), dc in AM  - PIV     DISPO: SICU  --------------------------------------------------------------------------------------    Critical Care Diagnoses: new free flap, new trach

## 2022-01-13 NOTE — CONSULT NOTE ADULT - SUBJECTIVE AND OBJECTIVE BOX
=====================================================                                                             SICU Consultation Note                                                             =====================================================  Patient is a 56y old  Female who presents with a chief complaint of "I'm having oral surgery" (28 Dec 2021 17:46)    55 yo female presents to PST unit with 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 So.     Surgery Information     L free fibula flap reconstruction of R mandibular and FOM defect  Right mandibulectomy, selective neck dissection, tracheostomy    Allergies: morphine (Hives)    PAST MEDICAL & SURGICAL HISTORY:  H/O malignant neoplasm of gum  Hypertension  Hyperlipidemia  Mild asthma  H/O  section  History of hip replacement  left hip  History of partial hysterectomy    ALLERGIES  morphine (Hives)    HOME MEDICATIONS:   acetaminophen   IVPB .. 1000 milliGRAM(s) IV Intermittent every 6 hours  ampicillin/sulbactam  IVPB      ampicillin/sulbactam  IVPB 3 Gram(s) IV Intermittent once  chlorhexidine 4% Liquid 1 Application(s) Topical daily  lactated ringers. 1000 milliLiter(s) IV Continuous <Continuous>      CURRENT MEDICATIONS:   --------------------------------------------------------------------------------------  Neurologic Medications  acetaminophen   IVPB .. 1000 milliGRAM(s) IV Intermittent every 6 hours    Gastrointestinal Medications  lactated ringers. 1000 milliLiter(s) IV Continuous <Continuous>    Antimicrobial/Immunologic Medications  ampicillin/sulbactam  IVPB      ampicillin/sulbactam  IVPB 3 Gram(s) IV Intermittent once    Topical/Other Medications  chlorhexidine 4% Liquid 1 Application(s) Topical daily    --------------------------------------------------------------------------------------    VITAL SIGNS, INS/OUTS (last 24 hours):    T(C): 36.5 (22 @ 17:00), Max: 36.9 (22 @ 06:07)  HR: 93 (22 18:00) (90 - 97)  BP: 122/79 (22 06:07) (122/79 - 122/79)  ABP: 115/78 (22 18:00) (115/78 - 129/71)  ABP(mean): 96 (22 18:00) (95 - 96)  RR: 17 (22 18:00) (16 - 17)  SpO2: 95% (22 18:00) (94% - 96%)  --------------------------------------------------------------------------------------  ((Insert SICU Vitals / Is+Os here))    22 @ 07:  -  22 @ 07:00  --------------------------------------------------------  IN: 30 mL / OUT: 0 mL / NET: 30 mL    22 @ 07:  -  22 @ 18:46  --------------------------------------------------------  IN: 475 mL / OUT: 760 mL / NET: -285 mL      --------------------------------------------------------------------------------------    EXAM  Neuro: opens eyes, follows commands  HEENT: s/p R neck dissection, flap well perfused, incisions c/d/i, shelley neck with sanguinous output.  Respiratory: s/p trach, normal CW expansion  Cardiovascular: S1,S2, regular rate and rhythm.  Abdomen: soft, nondistended, NGT  Extremities: LLL w/ ace wrap in place and vac to suction intact.  Skin: normal skin turgor, intact.      LABS  --------------------------------------------------------------------------------------                        11.1   14.09 )-----------( 201       18:18 )             34.8   c    --------------------------------------------------------------------------------------

## 2022-01-13 NOTE — ASU PREOP CHECKLIST - COMMENTS
Sip of water with famotidine at 4am In ASU gabapentin, Tylenol and emend and mouth washat 6:30 am Sip of water with famotidine at 4am In ASU gabapentin, Tylenol and emend and mouth wash at 6:30 am

## 2022-01-14 LAB
A1C WITH ESTIMATED AVERAGE GLUCOSE RESULT: 5.9 % — HIGH (ref 4–5.6)
ALBUMIN SERPL ELPH-MCNC: 4.1 G/DL — SIGNIFICANT CHANGE UP (ref 3.3–5)
ALP SERPL-CCNC: 49 U/L — SIGNIFICANT CHANGE UP (ref 40–120)
ALT FLD-CCNC: 25 U/L — SIGNIFICANT CHANGE UP (ref 4–33)
ANION GAP SERPL CALC-SCNC: 11 MMOL/L — SIGNIFICANT CHANGE UP (ref 7–14)
AST SERPL-CCNC: 25 U/L — SIGNIFICANT CHANGE UP (ref 4–32)
BILIRUB SERPL-MCNC: 0.3 MG/DL — SIGNIFICANT CHANGE UP (ref 0.2–1.2)
BUN SERPL-MCNC: 15 MG/DL — SIGNIFICANT CHANGE UP (ref 7–23)
CALCIUM SERPL-MCNC: 8.3 MG/DL — LOW (ref 8.4–10.5)
CHLORIDE SERPL-SCNC: 100 MMOL/L — SIGNIFICANT CHANGE UP (ref 98–107)
CO2 SERPL-SCNC: 24 MMOL/L — SIGNIFICANT CHANGE UP (ref 22–31)
CREAT SERPL-MCNC: 0.47 MG/DL — LOW (ref 0.5–1.3)
ESTIMATED AVERAGE GLUCOSE: 123 — SIGNIFICANT CHANGE UP
GLUCOSE BLDC GLUCOMTR-MCNC: 118 MG/DL — HIGH (ref 70–99)
GLUCOSE BLDC GLUCOMTR-MCNC: 147 MG/DL — HIGH (ref 70–99)
GLUCOSE BLDC GLUCOMTR-MCNC: 163 MG/DL — HIGH (ref 70–99)
GLUCOSE SERPL-MCNC: 169 MG/DL — HIGH (ref 70–99)
HCT VFR BLD CALC: 34.1 % — LOW (ref 34.5–45)
HGB BLD-MCNC: 11.1 G/DL — LOW (ref 11.5–15.5)
MAGNESIUM SERPL-MCNC: 2.3 MG/DL — SIGNIFICANT CHANGE UP (ref 1.6–2.6)
MCHC RBC-ENTMCNC: 28.6 PG — SIGNIFICANT CHANGE UP (ref 27–34)
MCHC RBC-ENTMCNC: 32.6 GM/DL — SIGNIFICANT CHANGE UP (ref 32–36)
MCV RBC AUTO: 87.9 FL — SIGNIFICANT CHANGE UP (ref 80–100)
NRBC # BLD: 0 /100 WBCS — SIGNIFICANT CHANGE UP
NRBC # FLD: 0 K/UL — SIGNIFICANT CHANGE UP
PHOSPHATE SERPL-MCNC: 3.2 MG/DL — SIGNIFICANT CHANGE UP (ref 2.5–4.5)
PLATELET # BLD AUTO: 204 K/UL — SIGNIFICANT CHANGE UP (ref 150–400)
POTASSIUM SERPL-MCNC: 4.3 MMOL/L — SIGNIFICANT CHANGE UP (ref 3.5–5.3)
POTASSIUM SERPL-SCNC: 4.3 MMOL/L — SIGNIFICANT CHANGE UP (ref 3.5–5.3)
PROT SERPL-MCNC: 6 G/DL — SIGNIFICANT CHANGE UP (ref 6–8.3)
RBC # BLD: 3.88 M/UL — SIGNIFICANT CHANGE UP (ref 3.8–5.2)
RBC # FLD: 13.5 % — SIGNIFICANT CHANGE UP (ref 10.3–14.5)
SODIUM SERPL-SCNC: 135 MMOL/L — SIGNIFICANT CHANGE UP (ref 135–145)
WBC # BLD: 15.45 K/UL — HIGH (ref 3.8–10.5)
WBC # FLD AUTO: 15.45 K/UL — HIGH (ref 3.8–10.5)

## 2022-01-14 PROCEDURE — 99231 SBSQ HOSP IP/OBS SF/LOW 25: CPT | Mod: 24,GC

## 2022-01-14 RX ORDER — ONDANSETRON 8 MG/1
4 TABLET, FILM COATED ORAL ONCE
Refills: 0 | Status: COMPLETED | OUTPATIENT
Start: 2022-01-14 | End: 2022-01-14

## 2022-01-14 RX ORDER — METOCLOPRAMIDE HCL 10 MG
10 TABLET ORAL ONCE
Refills: 0 | Status: DISCONTINUED | OUTPATIENT
Start: 2022-01-14 | End: 2022-01-14

## 2022-01-14 RX ORDER — OXYCODONE HYDROCHLORIDE 5 MG/1
10 TABLET ORAL EVERY 6 HOURS
Refills: 0 | Status: DISCONTINUED | OUTPATIENT
Start: 2022-01-14 | End: 2022-01-18

## 2022-01-14 RX ORDER — ACETAMINOPHEN 500 MG
975 TABLET ORAL EVERY 6 HOURS
Refills: 0 | Status: DISCONTINUED | OUTPATIENT
Start: 2022-01-14 | End: 2022-01-19

## 2022-01-14 RX ORDER — HYDROMORPHONE HYDROCHLORIDE 2 MG/ML
0.5 INJECTION INTRAMUSCULAR; INTRAVENOUS; SUBCUTANEOUS ONCE
Refills: 0 | Status: DISCONTINUED | OUTPATIENT
Start: 2022-01-14 | End: 2022-01-14

## 2022-01-14 RX ORDER — OXYCODONE HYDROCHLORIDE 5 MG/1
5 TABLET ORAL EVERY 6 HOURS
Refills: 0 | Status: DISCONTINUED | OUTPATIENT
Start: 2022-01-14 | End: 2022-01-18

## 2022-01-14 RX ORDER — INFLUENZA VIRUS VACCINE 15; 15; 15; 15 UG/.5ML; UG/.5ML; UG/.5ML; UG/.5ML
0.5 SUSPENSION INTRAMUSCULAR ONCE
Refills: 0 | Status: COMPLETED | OUTPATIENT
Start: 2022-01-14 | End: 2022-01-14

## 2022-01-14 RX ORDER — ACETAMINOPHEN 500 MG
1000 TABLET ORAL ONCE
Refills: 0 | Status: DISCONTINUED | OUTPATIENT
Start: 2022-01-14 | End: 2022-01-14

## 2022-01-14 RX ADMIN — AMPICILLIN SODIUM AND SULBACTAM SODIUM 200 GRAM(S): 250; 125 INJECTION, POWDER, FOR SUSPENSION INTRAMUSCULAR; INTRAVENOUS at 01:08

## 2022-01-14 RX ADMIN — Medication 400 MILLIGRAM(S): at 11:42

## 2022-01-14 RX ADMIN — Medication 975 MILLIGRAM(S): at 18:53

## 2022-01-14 RX ADMIN — ONDANSETRON 4 MILLIGRAM(S): 8 TABLET, FILM COATED ORAL at 04:02

## 2022-01-14 RX ADMIN — Medication 1000 MILLIGRAM(S): at 11:59

## 2022-01-14 RX ADMIN — Medication 400 MILLIGRAM(S): at 00:14

## 2022-01-14 RX ADMIN — Medication 1000 MILLIGRAM(S): at 06:15

## 2022-01-14 RX ADMIN — HYDROMORPHONE HYDROCHLORIDE 0.5 MILLIGRAM(S): 2 INJECTION INTRAMUSCULAR; INTRAVENOUS; SUBCUTANEOUS at 13:30

## 2022-01-14 RX ADMIN — SODIUM CHLORIDE 125 MILLILITER(S): 9 INJECTION, SOLUTION INTRAVENOUS at 18:38

## 2022-01-14 RX ADMIN — Medication 400 MILLIGRAM(S): at 05:43

## 2022-01-14 RX ADMIN — OXYCODONE HYDROCHLORIDE 5 MILLIGRAM(S): 5 TABLET ORAL at 10:46

## 2022-01-14 RX ADMIN — CHLORHEXIDINE GLUCONATE 1 APPLICATION(S): 213 SOLUTION TOPICAL at 11:59

## 2022-01-14 RX ADMIN — ALBUTEROL 2 PUFF(S): 90 AEROSOL, METERED ORAL at 07:20

## 2022-01-14 RX ADMIN — HYDROMORPHONE HYDROCHLORIDE 0.5 MILLIGRAM(S): 2 INJECTION INTRAMUSCULAR; INTRAVENOUS; SUBCUTANEOUS at 13:32

## 2022-01-14 RX ADMIN — OXYCODONE HYDROCHLORIDE 5 MILLIGRAM(S): 5 TABLET ORAL at 11:59

## 2022-01-14 RX ADMIN — Medication 1: at 00:14

## 2022-01-14 RX ADMIN — ENOXAPARIN SODIUM 40 MILLIGRAM(S): 100 INJECTION SUBCUTANEOUS at 11:42

## 2022-01-14 RX ADMIN — AMPICILLIN SODIUM AND SULBACTAM SODIUM 200 GRAM(S): 250; 125 INJECTION, POWDER, FOR SUSPENSION INTRAMUSCULAR; INTRAVENOUS at 13:32

## 2022-01-14 RX ADMIN — SODIUM CHLORIDE 125 MILLILITER(S): 9 INJECTION, SOLUTION INTRAVENOUS at 19:44

## 2022-01-14 RX ADMIN — AMPICILLIN SODIUM AND SULBACTAM SODIUM 200 GRAM(S): 250; 125 INJECTION, POWDER, FOR SUSPENSION INTRAMUSCULAR; INTRAVENOUS at 09:08

## 2022-01-14 RX ADMIN — SODIUM CHLORIDE 125 MILLILITER(S): 9 INJECTION, SOLUTION INTRAVENOUS at 09:09

## 2022-01-14 RX ADMIN — AMPICILLIN SODIUM AND SULBACTAM SODIUM 200 GRAM(S): 250; 125 INJECTION, POWDER, FOR SUSPENSION INTRAMUSCULAR; INTRAVENOUS at 19:44

## 2022-01-14 RX ADMIN — OXYCODONE HYDROCHLORIDE 10 MILLIGRAM(S): 5 TABLET ORAL at 19:50

## 2022-01-14 RX ADMIN — HYDROMORPHONE HYDROCHLORIDE 1 MILLIGRAM(S): 2 INJECTION INTRAMUSCULAR; INTRAVENOUS; SUBCUTANEOUS at 06:27

## 2022-01-14 RX ADMIN — OXYCODONE HYDROCHLORIDE 10 MILLIGRAM(S): 5 TABLET ORAL at 19:36

## 2022-01-14 RX ADMIN — HYDROMORPHONE HYDROCHLORIDE 1 MILLIGRAM(S): 2 INJECTION INTRAMUSCULAR; INTRAVENOUS; SUBCUTANEOUS at 06:45

## 2022-01-14 RX ADMIN — Medication 1000 MILLIGRAM(S): at 00:30

## 2022-01-14 RX ADMIN — Medication 975 MILLIGRAM(S): at 19:35

## 2022-01-14 NOTE — PROGRESS NOTE ADULT - SUBJECTIVE AND OBJECTIVE BOX
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 So.     --------------------------------------------------------------------------------------  Allergies: morphine (Hives)      MEDICATIONS:   --------------------------------------------------------------------------------------  Neurologic Medications  acetaminophen   IVPB .. 1000 milliGRAM(s) IV Intermittent every 6 hours  HYDROmorphone  Injectable 1 milliGRAM(s) IV Push every 4 hours PRN Severe Pain (7 - 10)  HYDROmorphone  Injectable 0.5 milliGRAM(s) IV Push every 4 hours PRN Moderate Pain (4 - 6)    Respiratory Medications  ALBUTerol    90 MICROgram(s) HFA Inhaler 2 Puff(s) Inhalation every 6 hours PRN Shortness of Breath and/or Wheezing    Cardiovascular Medications    Gastrointestinal Medications  lactated ringers. 1000 milliLiter(s) IV Continuous <Continuous>    Genitourinary Medications    Hematologic/Oncologic Medications  enoxaparin Injectable 40 milliGRAM(s) SubCutaneous daily    Antimicrobial/Immunologic Medications  ampicillin/sulbactam  IVPB      ampicillin/sulbactam  IVPB 3 Gram(s) IV Intermittent every 6 hours    Endocrine/Metabolic Medications  insulin lispro (ADMELOG) corrective regimen sliding scale   SubCutaneous every 6 hours    Topical/Other Medications  chlorhexidine 4% Liquid 1 Application(s) Topical daily    --------------------------------------------------------------------------------------    VITAL SIGNS, INS/OUTS (last 24 hours):  --------------------------------------------------------------------------------------  T(C): 36.2 (01-14-22 @ 00:00), Max: 36.9 (01-13-22 @ 06:07)  HR: 90 (01-14-22 @ 01:00) (86 - 97)  BP: 102/51 (01-14-22 @ 01:00) (100/53 - 122/79)  BP(mean): 64 (01-14-22 @ 01:00) (64 - 78)  ABP: 98/82 (01-13-22 @ 20:00) (98/82 - 129/71)  ABP(mean): 90 (01-13-22 @ 20:00) (90 - 96)  RR: 17 (01-14-22 @ 01:00) (14 - 21)  SpO2: 96% (01-14-22 @ 01:00) (92% - 98%)  Wt(kg): --  CVP(mm Hg): --  CI: --  CAPILLARY BLOOD GLUCOSE      POCT Blood Glucose.: 163 mg/dL (14 Jan 2022 00:12)  POCT Blood Glucose.: 198 mg/dL (13 Jan 2022 20:11)   N/A      01-12 @ 07:01 - 01-13 @ 07:00  --------------------------------------------------------  IN:    Lactated Ringers: 30 mL  Total IN: 30 mL    OUT:  Total OUT: 0 mL    Total NET: 30 mL      01-13 @ 07:01 - 01-14 @ 02:41  --------------------------------------------------------  IN:    IV PiggyBack: 450 mL    Lactated Ringers: 1125 mL  Total IN: 1575 mL    OUT:    Drain (mL): 135 mL    Indwelling Catheter - Urethral (mL): 1550 mL    VAC (Vacuum Assisted Closure) System (mL): 100 mL  Total OUT: 1785 mL    Total NET: -210 mL        --------------------------------------------------------------------------------------    EXAM    NEUROLOGY  Exam: Normal, NAD, alert, oriented x3, no focal deficits.    HEENT  Exam: Normocephalic, EOMI; Right lateral and anterior neck with surgical site s/p staples with surrounding erythema and JESSEE drain with serosanguinous drainage; flap viable and CDI    RESPIRATORY  Exam: Lungs clear to auscultation, Normal expansion     CARDIOVASCULAR  Exam: S1, S2.  Regular rate and rhythm.      GI/NUTRITION  Exam: Abdomen soft, Non-tender, Non-distended.   Current Diet: Diet, NPO    METABOLIC/FLUIDS/ELECTROLYTES  lactated ringers. 1000 milliLiter(s) IV Continuous <Continuous>    HEMATOLOGIC  [x] VTE Prophylaxis: enoxaparin Injectable 40 milliGRAM(s) SubCutaneous daily      LABS  --------------------------------------------------------------------------------------  CBC (01-14 @ 01:30)                          11.1<L>                   15.45<H>  )--------------(  204        --    % Neuts, --    % Lymphs, ANC: --                              34.1<L>  CBC (01-13 @ 18:18)                          11.1<L>                   14.09<H>  )--------------(  201        --    % Neuts, --    % Lymphs, ANC: --                              34.8      BMP (01-14 @ 01:30)       135     |  100     |  15    			Ca++ --      Ca 8.3<L>       ---------------------------------( 169<H>		Mg 2.30         4.3     |  24      |  0.47<L>			Ph 3.2     BMP (01-13 @ 18:18)       135     |  102     |  20    			Ca++ --      Ca 8.2<L>       ---------------------------------( 217<H>		Mg 1.60         4.5     |  23      |  0.50  			Ph 2.7       LFTs (01-14 @ 01:30)      TPro 6.0 / Alb 4.1 / TBili 0.3 / DBili -- / AST 25 / ALT 25 / AlkPhos 49    Coags (01-13 @ 18:18)  aPTT 21.2<L> / INR 1.05 / PT 12.1      ABG (01-13 @ 12:21)     7.32<L> / 52<H> / 212<H> / 27 / 0.1 / 98.9<H>%     Lactate:    ABG (01-13 @ 08:53)     7.35 / 49<H> / 374<H> / 27 / 0.9 / 99.8<H>%     Lactate:            -------------------------------------------------------------------------------------- SICU Daily Progress Note  =====================================================  24 hour events  - L free fibula flap reconstruction of R mandibular and floor of mouth defect  - Asthmatic with wheezing started on alubuterol  - JESSEE clotted yesterday during day; stripped twice overnight; sang -> serosang  - MARIBETH tube in place; feeds to start 1/14  - Zofran for nausea x2  - [place] DCd  -discontinue SURY merchant  - f/u PT consult       57 yo female presents to PST unit with 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 So.     --------------------------------------------------------------------------------------  Allergies: morphine (Hives)      MEDICATIONS:   --------------------------------------------------------------------------------------  Neurologic Medications  acetaminophen   IVPB .. 1000 milliGRAM(s) IV Intermittent every 6 hours  HYDROmorphone  Injectable 1 milliGRAM(s) IV Push every 4 hours PRN Severe Pain (7 - 10)  HYDROmorphone  Injectable 0.5 milliGRAM(s) IV Push every 4 hours PRN Moderate Pain (4 - 6)    Respiratory Medications  ALBUTerol    90 MICROgram(s) HFA Inhaler 2 Puff(s) Inhalation every 6 hours PRN Shortness of Breath and/or Wheezing    Cardiovascular Medications    Gastrointestinal Medications  lactated ringers. 1000 milliLiter(s) IV Continuous <Continuous>    Genitourinary Medications    Hematologic/Oncologic Medications  enoxaparin Injectable 40 milliGRAM(s) SubCutaneous daily    Antimicrobial/Immunologic Medications  ampicillin/sulbactam  IVPB      ampicillin/sulbactam  IVPB 3 Gram(s) IV Intermittent every 6 hours    Endocrine/Metabolic Medications  insulin lispro (ADMELOG) corrective regimen sliding scale   SubCutaneous every 6 hours    Topical/Other Medications  chlorhexidine 4% Liquid 1 Application(s) Topical daily    --------------------------------------------------------------------------------------    VITAL SIGNS, INS/OUTS (last 24 hours):  --------------------------------------------------------------------------------------  T(C): 36.2 (01-14-22 @ 00:00), Max: 36.9 (01-13-22 @ 06:07)  HR: 90 (01-14-22 @ 01:00) (86 - 97)  BP: 102/51 (01-14-22 @ 01:00) (100/53 - 122/79)  BP(mean): 64 (01-14-22 @ 01:00) (64 - 78)  ABP: 98/82 (01-13-22 @ 20:00) (98/82 - 129/71)  ABP(mean): 90 (01-13-22 @ 20:00) (90 - 96)  RR: 17 (01-14-22 @ 01:00) (14 - 21)  SpO2: 96% (01-14-22 @ 01:00) (92% - 98%)  Wt(kg): --  CVP(mm Hg): --  CI: --  CAPILLARY BLOOD GLUCOSE      POCT Blood Glucose.: 163 mg/dL (14 Jan 2022 00:12)  POCT Blood Glucose.: 198 mg/dL (13 Jan 2022 20:11)   N/A      01-12 @ 07:01 - 01-13 @ 07:00  --------------------------------------------------------  IN:    Lactated Ringers: 30 mL  Total IN: 30 mL    OUT:  Total OUT: 0 mL    Total NET: 30 mL      01-13 @ 07:01 - 01-14 @ 02:41  --------------------------------------------------------  IN:    IV PiggyBack: 450 mL    Lactated Ringers: 1125 mL  Total IN: 1575 mL    OUT:    Drain (mL): 135 mL    Indwelling Catheter - Urethral (mL): 1550 mL    VAC (Vacuum Assisted Closure) System (mL): 100 mL  Total OUT: 1785 mL    Total NET: -210 mL        --------------------------------------------------------------------------------------    EXAM    NEUROLOGY  Exam: Normal, NAD, alert, oriented x3, no focal deficits.    HEENT  Exam: Normocephalic, EOMI; Right lateral and anterior neck with surgical site s/p staples with surrounding erythema and JESSEE drain with serosanguinous drainage; flap viable and CDI    RESPIRATORY  Exam: Lungs clear to auscultation, Normal expansion     CARDIOVASCULAR  Exam: S1, S2.  Regular rate and rhythm.      GI/NUTRITION  Exam: Abdomen soft, Non-tender, Non-distended.   Current Diet: Diet, NPO    METABOLIC/FLUIDS/ELECTROLYTES  lactated ringers. 1000 milliLiter(s) IV Continuous <Continuous>    HEMATOLOGIC  [x] VTE Prophylaxis: enoxaparin Injectable 40 milliGRAM(s) SubCutaneous daily      LABS  --------------------------------------------------------------------------------------  CBC (01-14 @ 01:30)                          11.1<L>                   15.45<H>  )--------------(  204        --    % Neuts, --    % Lymphs, ANC: --                              34.1<L>  CBC (01-13 @ 18:18)                          11.1<L>                   14.09<H>  )--------------(  201        --    % Neuts, --    % Lymphs, ANC: --                              34.8      BMP (01-14 @ 01:30)       135     |  100     |  15    			Ca++ --      Ca 8.3<L>       ---------------------------------( 169<H>		Mg 2.30         4.3     |  24      |  0.47<L>			Ph 3.2     BMP (01-13 @ 18:18)       135     |  102     |  20    			Ca++ --      Ca 8.2<L>       ---------------------------------( 217<H>		Mg 1.60         4.5     |  23      |  0.50  			Ph 2.7       LFTs (01-14 @ 01:30)      TPro 6.0 / Alb 4.1 / TBili 0.3 / DBili -- / AST 25 / ALT 25 / AlkPhos 49    Coags (01-13 @ 18:18)  aPTT 21.2<L> / INR 1.05 / PT 12.1      ABG (01-13 @ 12:21)     7.32<L> / 52<H> / 212<H> / 27 / 0.1 / 98.9<H>%     Lactate:    ABG (01-13 @ 08:53)     7.35 / 49<H> / 374<H> / 27 / 0.9 / 99.8<H>%     Lactate:            --------------------------------------------------------------------------------------

## 2022-01-14 NOTE — PROGRESS NOTE ADULT - ASSESSMENT
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 SICU consulted for q1 hr flap checks and new trach    NEUROLOGIC   - Pain control: prn  - Tylenol and Dilaudid    RESPIRATORY   - Monitor SpO2 goal >92%  - Incentive spirometry   - trach collar.  - Albuterol as needed    CARDIOVASCULAR   - Monitor hemodynamics   - BP WNL  - hold home Amlodipine, Lisinopril, consider restart in AM.    GASTROINTESTINAL   - Diet: NPO  - tube feeds in AM    /RENAL   - IV fluids: LR @ 125cc/hr  - Strict I/Os  - Monitor BMP qd  - Maintain merchant catheter, strict Is/Os  - Monitor electrolytes, replete PRN    HEMATOLOGIC  - Monitor H/H   - DVT ppx: Lovenox in AM  - Monitor drain output; SS; strip as needed    INFECTIOUS DISEASE  - Unasyn while drain in place  - Monitor fever / WBC  - C/W LLE VAC    ENDOCRINE  - Monitor gluc    LINES  - Merchant ( x  ), dc in AM  - PIV     DISPO: SICU  --------------------------------------------------------------------------------------    Critical Care Diagnoses: new free flap, new trach         56y Female with a PMHx HTN, HLD, 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 SICU consulted for q1 hr flap checks and new trach    NEUROLOGIC   - Pain control: prn  - Tylenol and Dilaudid    RESPIRATORY   - Monitor SpO2 goal >92%  - Incentive spirometry   - trach collar.  - Albuterol as needed  - F/u PT, encourage ambulation     CARDIOVASCULAR   - Monitor hemodynamics   - BP WNL  - hold home Amlodipine, Lisinopril, consider restart in AM.    GASTROINTESTINAL   - Diet: NPO  - tube feeds in AM 1/14    /RENAL   - IV fluids: LR @ 125cc/hr  - Strict I/Os  - Monitor BMP qd  - Discontinue Dobbs  - Monitor electrolytes, replete PRN    HEMATOLOGIC  - Monitor H/H   - DVT ppx: Lovenox in AM  - Monitor drain output; SS; strip as needed    INFECTIOUS DISEASE  - Unasyn while drain in place  - Monitor fever / WBC  - C/W LLE VAC    ENDOCRINE  - Monitor gluc    LINES  - Dobbs ( x  ), dc in AM  - PIV     DISPO: SICU  --------------------------------------------------------------------------------------    Critical Care Diagnoses: new free flap, new trach

## 2022-01-14 NOTE — PHYSICAL THERAPY INITIAL EVALUATION ADULT - DID THE PATIENT HAVE SURGERY?
Free flap, fibula, RIGHT SELECTIVE NECK DISSECTION, RIGHT COMPOSITE RESECTION, RIGHT FIBULA FREE FLAP RECONSTRUCTION, TRACHEOSTOMY/yes

## 2022-01-14 NOTE — PROGRESS NOTE ADULT - ASSESSMENT
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    - Monitor JESSEE  - cont unasyn   - PT consult, OOBTC   - d/c a-line, merchant   - start TF today  - routine trach care per ENT   - flap management per PRS   - Appreciate ENT/ PRS recs   - Appreciate excellent SICU care         [facility] – [place], [place]   #[id number]

## 2022-01-14 NOTE — PROGRESS NOTE ADULT - SUBJECTIVE AND OBJECTIVE BOX
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    Interval:  1/14: Examined at bedside this morning. HELEN BARCENAS-line TX'ed. Started on albuterol.    Vital Signs Last 24 Hrs  T(C): 36.2 (14 Jan 2022 04:00), Max: 36.5 (13 Jan 2022 17:00)  T(F): 97.2 (14 Jan 2022 04:00), Max: 97.7 (13 Jan 2022 17:00)  HR: 87 (14 Jan 2022 12:00) (83 - 97)  BP: 104/49 (14 Jan 2022 12:00) (99/51 - 120/61)  BP(mean): 63 (14 Jan 2022 12:00) (59 - 78)  RR: 15 (14 Jan 2022 12:00) (14 - 23)  SpO2: 100% (14 Jan 2022 12:00) (92% - 100%)    Physical Exam:  Alert, NAD  Flap warm and well perfused, strong triphasic signal, incisions c/d/i  Nonlabored Respirations RA      01-13-22 @ 07:01  -  01-14-22 @ 07:00  --------------------------------------------------------  IN: 2325 mL / OUT: 2740 mL / NET: -415 mL    01-14-22 @ 07:01  -  01-14-22 @ 13:07  --------------------------------------------------------  IN: 475 mL / OUT: 350 mL / NET: 125 mL                              11.1   15.45 )-----------( 204      ( 14 Jan 2022 01:30 )             34.1    01-14    135  |  100  |  15  ----------------------------<  169<H>  4.3   |  24  |  0.47<L>    Ca    8.3<L>      14 Jan 2022 01:30  Phos  3.2     01-14  Mg     2.30     01-14    TPro  6.0  /  Alb  4.1  /  TBili  0.3  /  DBili  x   /  AST  25  /  ALT  25  /  AlkPhos  49  01-14   PT/INR - ( 13 Jan 2022 18:18 )   PT: 12.1 sec;   INR: 1.05 ratio         PTT - ( 13 Jan 2022 18:18 )  PTT:21.2 sec

## 2022-01-14 NOTE — PROGRESS NOTE ADULT - ASSESSMENT
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    - dc merchant  - a-line dc'd  - OOBTC  - Start tube feeds  - Nutrition consult  - ERAS protocol  - monitor JESSEE output  - Trach care  - Flap care per OMFS  - OMFS recs appreciated  - Care per SICU

## 2022-01-14 NOTE — PATIENT PROFILE ADULT - FALL HARM RISK - RISK INTERVENTIONS
Assistance OOB with selected safe patient handling equipment/Assistance with ambulation/Communicate Fall Risk and Risk Factors to all staff, patient, and family/Discuss with provider need for PT consult/Monitor gait and stability/Reinforce activity limits and safety measures with patient and family/Review medications for side effects contributing to fall risk/Sit up slowly, dangle for a short time, stand at bedside before walking/Toileting schedule using arm’s reach rule for commode and bathroom/Use of alarms - bed, chair and/or voice tab/Visual Cue: Yellow wristband/Bed in lowest position, wheels locked, appropriate side rails in place/Call bell, personal items and telephone in reach/Instruct patient to call for assistance before getting out of bed or chair/Non-slip footwear when patient is out of bed/[place] to call system/Physically safe environment - no spills, clutter or unnecessary equipment/Purposeful Proactive Rounding/Room/bathroom lighting operational, light cord in reach

## 2022-01-14 NOTE — PROGRESS NOTE ADULT - SUBJECTIVE AND OBJECTIVE BOX
Plastic Surgery  Daily Progress Note     Subjective/24 Hour Events:  - Started on albuterol  - JESSEE clotted yesterday during day; stripped 2x  - MARIBETH tube in place; feeds to start today  - [place] DCd      Objective:    Vitals:  T(C): 36.2 (01-14-22 @ 04:00), Max: 36.5 (01-13-22 @ 17:00)  HR: 92 (01-14-22 @ 07:00) (83 - 97)  BP: 119/61 (01-14-22 @ 07:00) (100/53 - 120/61)  RR: 19 (01-14-22 @ 07:00) (14 - 23)  SpO2: 98% (01-14-22 @ 07:00) (92% - 98%)    I/O:     01-13-22 @ 07:01  -  01-14-22 @ 07:00  --------------------------------------------------------  IN: 2325 mL / OUT: 2740 mL / NET: -415 mL        Physical Exam:   - General: AAO x3, no apparent distress  - HEENT: Right lateral and anterior neck with surgical site s/p staples with surrounding erythema and JSESEE drain with serosanguinous drainage; flap viable, c/d/i  - Resp: Non-labored breathing  - Ext: FROM    Allergies:  morphine (Hives)      Meds:   acetaminophen   IVPB .. 1000 milliGRAM(s) IV Intermittent every 6 hours  ALBUTerol    90 MICROgram(s) HFA Inhaler 2 Puff(s) Inhalation every 6 hours PRN  ampicillin/sulbactam  IVPB      ampicillin/sulbactam  IVPB 3 Gram(s) IV Intermittent every 6 hours  chlorhexidine 4% Liquid 1 Application(s) Topical daily  enoxaparin Injectable 40 milliGRAM(s) SubCutaneous daily  HYDROmorphone  Injectable 1 milliGRAM(s) IV Push every 4 hours PRN  HYDROmorphone  Injectable 0.5 milliGRAM(s) IV Push every 4 hours PRN  influenza   Vaccine 0.5 milliLiter(s) IntraMuscular once  insulin lispro (ADMELOG) corrective regimen sliding scale   SubCutaneous every 6 hours  lactated ringers. 1000 milliLiter(s) IV Continuous <Continuous>          Assessment: 57 y/o Female with  PMH of HTN, HLD, with diagnosis of squamous cell carcinoma of gingiva now s/p right segmental mandibulectomy, right SND, tracheostomy, left fibular free flap.    Plan:  - Monitor JESSEE output  - Start TF today  - OOBTC   - Appreciate ENT/ OMFS recs   - Appreciate excellent SICU care     JERRELL PRS [id number]

## 2022-01-14 NOTE — PHYSICAL THERAPY INITIAL EVALUATION ADULT - SITTING BALANCE: STATIC
[FreeTextEntry1] : 37 yo female with stable pulmonary exam. The PFT abnormality is likely effort related. A repeat study will be performed in the future.She is to use albuterol MDI PRN as before.\par The candidate has been examined by me and is cleared to work as a . 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 The candidate is cleared to wear a gas mask. fair balance

## 2022-01-14 NOTE — PHYSICAL THERAPY INITIAL EVALUATION ADULT - ADDITIONAL COMMENTS
Patient lives with family in private home, 1 step to enter and no steps to bedroom/bathroom. Patient was independent in all ADL prior to admission. patient was not using assistive device prior to admission.

## 2022-01-15 LAB
ANION GAP SERPL CALC-SCNC: 8 MMOL/L — SIGNIFICANT CHANGE UP (ref 7–14)
BUN SERPL-MCNC: 12 MG/DL — SIGNIFICANT CHANGE UP (ref 7–23)
CALCIUM SERPL-MCNC: 8.4 MG/DL — SIGNIFICANT CHANGE UP (ref 8.4–10.5)
CHLORIDE SERPL-SCNC: 100 MMOL/L — SIGNIFICANT CHANGE UP (ref 98–107)
CO2 SERPL-SCNC: 28 MMOL/L — SIGNIFICANT CHANGE UP (ref 22–31)
CREAT SERPL-MCNC: 0.44 MG/DL — LOW (ref 0.5–1.3)
GLUCOSE BLDC GLUCOMTR-MCNC: 126 MG/DL — HIGH (ref 70–99)
GLUCOSE BLDC GLUCOMTR-MCNC: 156 MG/DL — HIGH (ref 70–99)
GLUCOSE BLDC GLUCOMTR-MCNC: 160 MG/DL — HIGH (ref 70–99)
GLUCOSE BLDC GLUCOMTR-MCNC: 199 MG/DL — HIGH (ref 70–99)
GLUCOSE SERPL-MCNC: 149 MG/DL — HIGH (ref 70–99)
HCT VFR BLD CALC: 30 % — LOW (ref 34.5–45)
HGB BLD-MCNC: 9.6 G/DL — LOW (ref 11.5–15.5)
MAGNESIUM SERPL-MCNC: 1.9 MG/DL — SIGNIFICANT CHANGE UP (ref 1.6–2.6)
MCHC RBC-ENTMCNC: 28.6 PG — SIGNIFICANT CHANGE UP (ref 27–34)
MCHC RBC-ENTMCNC: 32 GM/DL — SIGNIFICANT CHANGE UP (ref 32–36)
MCV RBC AUTO: 89.3 FL — SIGNIFICANT CHANGE UP (ref 80–100)
NRBC # BLD: 0 /100 WBCS — SIGNIFICANT CHANGE UP
NRBC # FLD: 0 K/UL — SIGNIFICANT CHANGE UP
PHOSPHATE SERPL-MCNC: 2.3 MG/DL — LOW (ref 2.5–4.5)
PLATELET # BLD AUTO: 180 K/UL — SIGNIFICANT CHANGE UP (ref 150–400)
POTASSIUM SERPL-MCNC: 4.2 MMOL/L — SIGNIFICANT CHANGE UP (ref 3.5–5.3)
POTASSIUM SERPL-SCNC: 4.2 MMOL/L — SIGNIFICANT CHANGE UP (ref 3.5–5.3)
RBC # BLD: 3.36 M/UL — LOW (ref 3.8–5.2)
RBC # FLD: 14.2 % — SIGNIFICANT CHANGE UP (ref 10.3–14.5)
SODIUM SERPL-SCNC: 136 MMOL/L — SIGNIFICANT CHANGE UP (ref 135–145)
WBC # BLD: 9.45 K/UL — SIGNIFICANT CHANGE UP (ref 3.8–10.5)
WBC # FLD AUTO: 9.45 K/UL — SIGNIFICANT CHANGE UP (ref 3.8–10.5)

## 2022-01-15 PROCEDURE — 99232 SBSQ HOSP IP/OBS MODERATE 35: CPT | Mod: 24,GC

## 2022-01-15 PROCEDURE — 71045 X-RAY EXAM CHEST 1 VIEW: CPT | Mod: 26

## 2022-01-15 RX ORDER — ATORVASTATIN CALCIUM 80 MG/1
10 TABLET, FILM COATED ORAL AT BEDTIME
Refills: 0 | Status: DISCONTINUED | OUTPATIENT
Start: 2022-01-15 | End: 2022-01-19

## 2022-01-15 RX ORDER — IPRATROPIUM/ALBUTEROL SULFATE 18-103MCG
3 AEROSOL WITH ADAPTER (GRAM) INHALATION ONCE
Refills: 0 | Status: COMPLETED | OUTPATIENT
Start: 2022-01-15 | End: 2022-01-15

## 2022-01-15 RX ORDER — FUROSEMIDE 40 MG
10 TABLET ORAL ONCE
Refills: 0 | Status: COMPLETED | OUTPATIENT
Start: 2022-01-15 | End: 2022-01-15

## 2022-01-15 RX ORDER — DEXTROSE MONOHYDRATE, SODIUM CHLORIDE, AND POTASSIUM CHLORIDE 50; .745; 4.5 G/1000ML; G/1000ML; G/1000ML
1000 INJECTION, SOLUTION INTRAVENOUS
Refills: 0 | Status: DISCONTINUED | OUTPATIENT
Start: 2022-01-15 | End: 2022-01-15

## 2022-01-15 RX ORDER — SENNA PLUS 8.6 MG/1
2 TABLET ORAL AT BEDTIME
Refills: 0 | Status: DISCONTINUED | OUTPATIENT
Start: 2022-01-15 | End: 2022-01-19

## 2022-01-15 RX ORDER — SODIUM,POTASSIUM PHOSPHATES 278-250MG
1 POWDER IN PACKET (EA) ORAL ONCE
Refills: 0 | Status: COMPLETED | OUTPATIENT
Start: 2022-01-15 | End: 2022-01-15

## 2022-01-15 RX ADMIN — Medication 1 PACKET(S): at 05:27

## 2022-01-15 RX ADMIN — Medication 10 MILLIGRAM(S): at 14:33

## 2022-01-15 RX ADMIN — AMPICILLIN SODIUM AND SULBACTAM SODIUM 200 GRAM(S): 250; 125 INJECTION, POWDER, FOR SUSPENSION INTRAMUSCULAR; INTRAVENOUS at 01:39

## 2022-01-15 RX ADMIN — AMPICILLIN SODIUM AND SULBACTAM SODIUM 200 GRAM(S): 250; 125 INJECTION, POWDER, FOR SUSPENSION INTRAMUSCULAR; INTRAVENOUS at 20:24

## 2022-01-15 RX ADMIN — OXYCODONE HYDROCHLORIDE 10 MILLIGRAM(S): 5 TABLET ORAL at 08:30

## 2022-01-15 RX ADMIN — AMPICILLIN SODIUM AND SULBACTAM SODIUM 200 GRAM(S): 250; 125 INJECTION, POWDER, FOR SUSPENSION INTRAMUSCULAR; INTRAVENOUS at 08:00

## 2022-01-15 RX ADMIN — Medication 3 MILLILITER(S): at 15:29

## 2022-01-15 RX ADMIN — OXYCODONE HYDROCHLORIDE 10 MILLIGRAM(S): 5 TABLET ORAL at 16:26

## 2022-01-15 RX ADMIN — Medication 975 MILLIGRAM(S): at 12:00

## 2022-01-15 RX ADMIN — Medication 1: at 05:27

## 2022-01-15 RX ADMIN — OXYCODONE HYDROCHLORIDE 10 MILLIGRAM(S): 5 TABLET ORAL at 22:27

## 2022-01-15 RX ADMIN — OXYCODONE HYDROCHLORIDE 10 MILLIGRAM(S): 5 TABLET ORAL at 07:59

## 2022-01-15 RX ADMIN — ENOXAPARIN SODIUM 40 MILLIGRAM(S): 100 INJECTION SUBCUTANEOUS at 12:33

## 2022-01-15 RX ADMIN — Medication 975 MILLIGRAM(S): at 00:21

## 2022-01-15 RX ADMIN — Medication 975 MILLIGRAM(S): at 01:40

## 2022-01-15 RX ADMIN — Medication 975 MILLIGRAM(S): at 17:46

## 2022-01-15 RX ADMIN — OXYCODONE HYDROCHLORIDE 10 MILLIGRAM(S): 5 TABLET ORAL at 01:55

## 2022-01-15 RX ADMIN — ATORVASTATIN CALCIUM 10 MILLIGRAM(S): 80 TABLET, FILM COATED ORAL at 22:24

## 2022-01-15 RX ADMIN — Medication 1: at 00:26

## 2022-01-15 RX ADMIN — OXYCODONE HYDROCHLORIDE 10 MILLIGRAM(S): 5 TABLET ORAL at 17:00

## 2022-01-15 RX ADMIN — OXYCODONE HYDROCHLORIDE 10 MILLIGRAM(S): 5 TABLET ORAL at 01:39

## 2022-01-15 RX ADMIN — OXYCODONE HYDROCHLORIDE 10 MILLIGRAM(S): 5 TABLET ORAL at 22:40

## 2022-01-15 RX ADMIN — Medication 975 MILLIGRAM(S): at 12:30

## 2022-01-15 RX ADMIN — Medication 975 MILLIGRAM(S): at 18:16

## 2022-01-15 RX ADMIN — CHLORHEXIDINE GLUCONATE 1 APPLICATION(S): 213 SOLUTION TOPICAL at 12:33

## 2022-01-15 RX ADMIN — AMPICILLIN SODIUM AND SULBACTAM SODIUM 200 GRAM(S): 250; 125 INJECTION, POWDER, FOR SUSPENSION INTRAMUSCULAR; INTRAVENOUS at 13:42

## 2022-01-15 RX ADMIN — Medication 975 MILLIGRAM(S): at 05:27

## 2022-01-15 NOTE — PROGRESS NOTE ADULT - SUBJECTIVE AND OBJECTIVE BOX
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    Interval:  1/14: Examined at bedside this morning. NAEON. A-line dc'ed. Started on albuterol.  1/15: Examined at bedside this morning. HELEN Dobbs dc'd, passed TOV    ICU Vital Signs Last 24 Hrs  T(C): 36.8 (15 Álvaro 2022 08:00), Max: 37.3 (15 Álvaro 2022 00:00)  T(F): 98.2 (15 Álvaro 2022 08:00), Max: 99.1 (15 Álvaro 2022 00:00)  HR: 92 (15 Álvaro 2022 09:00) (86 - 108)  BP: 111/52 (15 Álvaro 2022 09:00) (88/71 - 114/58)  BP(mean): 66 (15 Álvaro 2022 09:00) (58 - 88)  ABP: --  ABP(mean): --  RR: 16 (15 Álvaro 2022 09:00) (13 - 28)  SpO2: 95% (15 Álvaro 2022 09:00) (91% - 100%)      Physical Exam:  Alert, NAD  Neck right side warm with stable erythema  Trach 6LPC sutured  Flap warm and well perfused, strong triphasic signal, incisions c/d/i  Nonlabored Respirations RA

## 2022-01-15 NOTE — DIETITIAN INITIAL EVALUATION ADULT. - OTHER INFO
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 So.   S/P surgery on 1/13 for mandibulectomy w/trach, selective neck dissection, R glossectomy, L free fibula flap.

## 2022-01-15 NOTE — PROGRESS NOTE ADULT - SUBJECTIVE AND OBJECTIVE BOX
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    INTERVAL EVENTS/SUBJECTIVE:   -TEE  -Dilia d/c'd, pass TOV  -TF to goal    MEDICATIONS  (STANDING):  acetaminophen    Suspension .. 975 milliGRAM(s) Oral every 6 hours  ampicillin/sulbactam  IVPB      ampicillin/sulbactam  IVPB 3 Gram(s) IV Intermittent every 6 hours  chlorhexidine 4% Liquid 1 Application(s) Topical daily  enoxaparin Injectable 40 milliGRAM(s) SubCutaneous daily  influenza   Vaccine 0.5 milliLiter(s) IntraMuscular once    MEDICATIONS  (PRN):  ALBUTerol    90 MICROgram(s) HFA Inhaler 2 Puff(s) Inhalation every 6 hours PRN Shortness of Breath and/or Wheezing  oxyCODONE    Solution 5 milliGRAM(s) Enteral Tube every 6 hours PRN Moderate Pain (4 - 6)  oxyCODONE    Solution 10 milliGRAM(s) Enteral Tube every 6 hours PRN Severe Pain (7 - 10)      CAPILLARY BLOOD GLUCOSE      POCT Blood Glucose.: 156 mg/dL (15 Álvaro 2022 05:25)  POCT Blood Glucose.: 160 mg/dL (15 Álvaro 2022 00:24)  POCT Blood Glucose.: 118 mg/dL (14 Jan 2022 11:35)    I&O's Summary    14 Jan 2022 07:01  -  15 Álvaro 2022 07:00  --------------------------------------------------------  IN: 3105 mL / OUT: 2430 mL / NET: 675 mL        PHYSICAL EXAM:  Vital Signs Last 24 Hrs  T(C): 37.1 (15 Álvaro 2022 04:00), Max: 37.3 (15 Álvaro 2022 00:00)  T(F): 98.8 (15 Álvaro 2022 04:00), Max: 99.1 (15 Álvaro 2022 00:00)  HR: 97 (15 Álvaro 2022 07:00) (86 - 108)  BP: 106/58 (15 Álvaro 2022 07:00) (88/71 - 112/53)  BP(mean): 70 (15 Álvaro 2022 07:00) (58 - 88)  RR: 17 (15 Álvaro 2022 07:00) (13 - 28)  SpO2: 95% (15 Álvaro 2022 07:00) (91% - 100%)    Gen: NAD, laying in bed comfortably   EOE: R neck edema c/w surgery, soft. Neck incision hemostatic, mild bruising at suture line. + Sequence Design doppler. JPx1, SS output. Trach in place on collar. NGT R naris   IOE: Flap viable, appropriate color, well perfused. No signs of congestion.   Extremities: L leg wound vac holding suction   NERVOUS SYSTEM:  A&Ox3, no focal deficits     LABS:                        9.6    9.45  )-----------( 180      ( 15 Álvaro 2022 01:08 )             30.0     01-15    136  |  100  |  12  ----------------------------<  149<H>  4.2   |  28  |  0.44<L>    Ca    8.4      15 Álvaro 2022 01:08  Phos  2.3     01-15  Mg     1.90     01-15    TPro  6.0  /  Alb  4.1  /  TBili  0.3  /  DBili  x   /  AST  25  /  ALT  25  /  AlkPhos  49  01-14    PT/INR - ( 13 Jan 2022 18:18 )   PT: 12.1 sec;   INR: 1.05 ratio         PTT - ( 13 Jan 2022 18:18 )  PTT:21.2 sec

## 2022-01-15 NOTE — PROGRESS NOTE ADULT - ASSESSMENT
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

## 2022-01-15 NOTE — DIETITIAN INITIAL EVALUATION ADULT. - ENTERAL
Enteral nutrition to provide [id number] w/66gm protein, 968mL H2O. Suggest add 1 pack no carb prosource/d to increase protein intake to 81gms/d to meet current needs.

## 2022-01-15 NOTE — PROGRESS NOTE ADULT - ASSESSMENT
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 SICU consulted for q1 hr flap checks and new trach  - L free fibula flap reconstruction of R mandibular and floor of mouth defect POD 2    NEUROLOGIC   - Pain control: prn  - Tylenol and oxycodone    RESPIRATORY   - Monitor SpO2 goal >92%  - Incentive spirometry   - trach collar.  - Albuterol as needed  - F/u PT, encourage ambulation     CARDIOVASCULAR   - Monitor hemodynamics   - BP WNL  - hold home Amlodipine, Lisinopril, consider restart in AM.    GASTROINTESTINAL   - Diet: NPO  - tube feeds in AM 1/14 via MARIBETH     /RENAL   - IV fluids: LR @ 125cc/hr  - Strict I/Os  - Monitor BMP qd  - Monitor electrolytes, replete PRN    HEMATOLOGIC  - Monitor H/H   - DVT ppx: Lovenox in AM  - Monitor drain output; SS; strip as needed    INFECTIOUS DISEASE  - Unasyn while drain in place  - Monitor fever / WBC  - C/W LLE VAC    ENDOCRINE  - Monitor gluc    LINES  - PIV     DISPO: SICU  --------------------------------------------------------------------------------------    Critical Care Diagnoses: new free flap, new trach     56y Female with a PMHx HTN, HLD, 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 SICU consulted for q1 hr flap checks and new trach  - L free fibula flap reconstruction of R mandibular and floor of mouth defect POD 2    Interval events  - TOV passed  - Tube feeds advanced to goal   - DCd IV fluids  - H&H 11/34 -> 9.6/30 Monitor     Plan:  NEUROLOGIC   - Pain control: prn  - Tylenol and oxycodone    RESPIRATORY   - Monitor SpO2 goal >92%  - Incentive spirometry   - trach collar  - Albuterol as needed  - F/U PT, encourage ambulation today; patient OOB to chair yesterday    CARDIOVASCULAR   - Patient with history of hypertension, on amlodipine and lisinopril at home  - Monitor hemodynamics   - BP WNL  - Monitor BPs, can resume home meds if becomes hypertensive    GASTROINTESTINAL   - NPO/NGT  - tube feeds in AM 1/14 via MARIBETH     /RENAL   - Strict I/Os  - Monitor BMP qd  - Monitor electrolytes, replete PRN    HEMATOLOGIC  - Monitor H/H   - DVT ppx: Lovenox in AM  - Monitor drain output; SS; strip as needed    INFECTIOUS DISEASE  - Unasyn while drain in place  - Monitor fever / WBC  - C/W LLE VAC    ENDOCRINE  - Monitor gluc    LINES  - PIV     DISPO: SICU  --------------------------------------------------------------------------------------    --------------------------------------------------------------------------------------    Critical Care Diagnoses: new free flap, new trach

## 2022-01-15 NOTE — PROGRESS NOTE ADULT - ASSESSMENT
HPI: 55 y/o Female with  PMH of HTN, HLD, with diagnosis of squamous cell carcinoma of gingiva s/p R composite resection, SND I-IV, trach, L fibula 1/13.    - continue antibiotics in context of right neck erythema  - passed TOV  - PT/OOBTC  - continue tube feeds  - Nutrition consult  - ERAS protocol  - monitor JESSEE output  - Trach care  - Flap care per OMFS  - Care per SICU

## 2022-01-15 NOTE — DIETITIAN INITIAL EVALUATION ADULT. - NUTRITIONGOAL OUTCOME1
Suggest continue current TF regimen as tolerated.  Add protein supplement to increase protein intake

## 2022-01-15 NOTE — PROGRESS NOTE ADULT - ASSESSMENT
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    - Monitor JESSEE  - cont unasyn   - C/w OOBTC   - C/w TF   - routine trach care per ENT   - flap management per PRS   - Appreciate ENT/ PRS recs   - Appreciate excellent SICU care         [facility]   #[id number]

## 2022-01-15 NOTE — DIETITIAN INITIAL EVALUATION ADULT. - ADD RECOMMEND
1) Monitor weights, labs, BM's, skin integrity, tolerance to TF, FS; 2) Flush tube w/60mL H2O before and after each feeding to maintain tube patency; 3) Free H2O as needed to maintain hydration.

## 2022-01-15 NOTE — DIETITIAN INITIAL EVALUATION ADULT. - ORAL INTAKE PTA/DIET HISTORY
Pt asleep at time of visit.  S/P trach.  Pt continues on enteral nutrition at this time- initiated yesterday.  Tube feedings changed today to bolus method of feeding.  Extensive chart review conducted to obtain nutrition related information.  Pt noted with excellent po intake PTA as per chart.

## 2022-01-15 NOTE — PROGRESS NOTE ADULT - SUBJECTIVE AND OBJECTIVE BOX
SICU Daily Progress Note  =====================================================  24 Hour Interval/Overnight Events:      - TOV passed  - Tube feeds advanced to goal   - MIVF  - H&H 11/34 -> 9.6/30 Monitor        57 yo female presents to PST unit with 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 So.       --------------------------------------------------------------------------------------  Allergies: morphine (Hives)      MEDICATIONS:   --------------------------------------------------------------------------------------  Neurologic Medications  acetaminophen    Suspension .. 975 milliGRAM(s) Oral every 6 hours  oxyCODONE    Solution 5 milliGRAM(s) Enteral Tube every 6 hours PRN Moderate Pain (4 - 6)  oxyCODONE    Solution 10 milliGRAM(s) Enteral Tube every 6 hours PRN Severe Pain (7 - 10)    Respiratory Medications  ALBUTerol    90 MICROgram(s) HFA Inhaler 2 Puff(s) Inhalation every 6 hours PRN Shortness of Breath and/or Wheezing    Cardiovascular Medications    Gastrointestinal Medications    Genitourinary Medications    Hematologic/Oncologic Medications  enoxaparin Injectable 40 milliGRAM(s) SubCutaneous daily  influenza   Vaccine 0.5 milliLiter(s) IntraMuscular once    Antimicrobial/Immunologic Medications  ampicillin/sulbactam  IVPB      ampicillin/sulbactam  IVPB 3 Gram(s) IV Intermittent every 6 hours    Endocrine/Metabolic Medications  insulin lispro (ADMELOG) corrective regimen sliding scale   SubCutaneous every 6 hours    Topical/Other Medications  chlorhexidine 4% Liquid 1 Application(s) Topical daily    --------------------------------------------------------------------------------------    VITAL SIGNS, INS/OUTS (last 24 hours):  --------------------------------------------------------------------------------------  T(C): 37.3 (01-15-22 @ 00:00), Max: 37.3 (01-15-22 @ 00:00)  HR: 96 (01-15-22 @ 01:00) (83 - 108)  BP: 100/51 (01-15-22 @ 01:00) (88/71 - 119/61)  BP(mean): 63 (01-15-22 @ 01:00) (58 - 88)  ABP: --  ABP(mean): --  RR: 19 (01-15-22 @ 01:00) (13 - 28)  SpO2: 94% (01-15-22 @ 01:00) (91% - 100%)  Wt(kg): --  CVP(mm Hg): --  CI: --  CAPILLARY BLOOD GLUCOSE      POCT Blood Glucose.: 160 mg/dL (15 Álvaro 2022 00:24)  POCT Blood Glucose.: 118 mg/dL (14 Jan 2022 11:35)  POCT Blood Glucose.: 147 mg/dL (14 Jan 2022 05:42)   N/A      01-13 @ 07:01  -  01-14 @ 07:00  --------------------------------------------------------  IN:    IV PiggyBack: 450 mL    Lactated Ringers: 1875 mL  Total IN: 2325 mL    OUT:    Drain (mL): 215 mL    Indwelling Catheter - Urethral (mL): 2425 mL    VAC (Vacuum Assisted Closure) System (mL): 100 mL  Total OUT: 2740 mL    Total NET: -415 mL      01-14 @ 07:01  -  01-15 @ 01:59  --------------------------------------------------------  IN:    IV PiggyBack: 400 mL    Jevity 1.2: 205 mL    Lactated Ringers: 2250 mL  Total IN: 2855 mL    OUT:    Drain (mL): 75 mL    Indwelling Catheter - Urethral (mL): 350 mL    Voided (mL): 1800 mL  Total OUT: 2225 mL    Total NET: 630 mL        --------------------------------------------------------------------------------------    EXAM    NEUROLOGY  Exam: Normal, NAD, alert, oriented x3, no focal deficits.    HEENT  Exam: Normocephalic, EOMI; Right lateral and anterior neck with surgical site s/p staples with surrounding erythema and JESSEE drain with serosanguinous drainage; flap viable and CDI    RESPIRATORY  Exam: Lungs clear to auscultation, Normal expansion     CARDIOVASCULAR  Exam: S1, S2.  Regular rate and rhythm.      GI/NUTRITION  Exam: Abdomen soft, Non-tender, Non-distended.   Current Diet: Diet, NPO    METABOLIC/FLUIDS/ELECTROLYTES      HEMATOLOGIC  [x] VTE Prophylaxis: enoxaparin Injectable 40 milliGRAM(s) SubCutaneous daily      LABS  --------------------------------------------------------------------------------------  CBC (01-15 @ 01:08)                          9.6<L>                   9.45    )--------------(  180        --    % Neuts, --    % Lymphs, ANC: --                              30.0<L>  CBC (01-14 @ 01:30)                          11.1<L>                   15.45<H>  )--------------(  204        --    % Neuts, --    % Lymphs, ANC: --                              34.1<L>    BMP (01-14 @ 01:30)       135     |  100     |  15    			Ca++ --      Ca 8.3<L>       ---------------------------------( 169<H>		Mg 2.30         4.3     |  24      |  0.47<L>			Ph 3.2     BMP (01-13 @ 18:18)       135     |  102     |  20    			Ca++ --      Ca 8.2<L>       ---------------------------------( 217<H>		Mg 1.60         4.5     |  23      |  0.50  			Ph 2.7       LFTs (01-14 @ 01:30)      TPro 6.0 / Alb 4.1 / TBili 0.3 / DBili -- / AST 25 / ALT 25 / AlkPhos 49    Coags (01-13 @ 18:18)  aPTT 21.2<L> / INR 1.05 / PT 12.1              -------------------------------------------------------------------------------------- SICU Daily Progress Note  =====================================================  24 Hour Interval/Overnight Events:      - TOV passed  - Tube feeds advanced to goal   - DCd IV fluids  - H&H 11/34 -> 9.6/30 Monitor        55 yo female presents to PST unit with 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 So.       --------------------------------------------------------------------------------------  Allergies: morphine (Hives)      MEDICATIONS:   --------------------------------------------------------------------------------------  Neurologic Medications  acetaminophen    Suspension .. 975 milliGRAM(s) Oral every 6 hours  oxyCODONE    Solution 5 milliGRAM(s) Enteral Tube every 6 hours PRN Moderate Pain (4 - 6)  oxyCODONE    Solution 10 milliGRAM(s) Enteral Tube every 6 hours PRN Severe Pain (7 - 10)    Respiratory Medications  ALBUTerol    90 MICROgram(s) HFA Inhaler 2 Puff(s) Inhalation every 6 hours PRN Shortness of Breath and/or Wheezing    Cardiovascular Medications    Gastrointestinal Medications    Genitourinary Medications    Hematologic/Oncologic Medications  enoxaparin Injectable 40 milliGRAM(s) SubCutaneous daily  influenza   Vaccine 0.5 milliLiter(s) IntraMuscular once    Antimicrobial/Immunologic Medications  ampicillin/sulbactam  IVPB      ampicillin/sulbactam  IVPB 3 Gram(s) IV Intermittent every 6 hours    Endocrine/Metabolic Medications  insulin lispro (ADMELOG) corrective regimen sliding scale   SubCutaneous every 6 hours    Topical/Other Medications  chlorhexidine 4% Liquid 1 Application(s) Topical daily    --------------------------------------------------------------------------------------    VITAL SIGNS, INS/OUTS (last 24 hours):  --------------------------------------------------------------------------------------  T(C): 37.3 (01-15-22 @ 00:00), Max: 37.3 (01-15-22 @ 00:00)  HR: 96 (01-15-22 @ 01:00) (83 - 108)  BP: 100/51 (01-15-22 @ 01:00) (88/71 - 119/61)  BP(mean): 63 (01-15-22 @ 01:00) (58 - 88)  ABP: --  ABP(mean): --  RR: 19 (01-15-22 @ 01:00) (13 - 28)  SpO2: 94% (01-15-22 @ 01:00) (91% - 100%)  Wt(kg): --  CVP(mm Hg): --  CI: --  CAPILLARY BLOOD GLUCOSE      POCT Blood Glucose.: 160 mg/dL (15 Álvaro 2022 00:24)  POCT Blood Glucose.: 118 mg/dL (14 Jan 2022 11:35)  POCT Blood Glucose.: 147 mg/dL (14 Jan 2022 05:42)   N/A      01-13 @ 07:01  -  01-14 @ 07:00  --------------------------------------------------------  IN:    IV PiggyBack: 450 mL    Lactated Ringers: 1875 mL  Total IN: 2325 mL    OUT:    Drain (mL): 215 mL    Indwelling Catheter - Urethral (mL): 2425 mL    VAC (Vacuum Assisted Closure) System (mL): 100 mL  Total OUT: 2740 mL    Total NET: -415 mL      01-14 @ 07:01  -  01-15 @ 01:59  --------------------------------------------------------  IN:    IV PiggyBack: 400 mL    Jevity 1.2: 205 mL    Lactated Ringers: 2250 mL  Total IN: 2855 mL    OUT:    Drain (mL): 75 mL    Indwelling Catheter - Urethral (mL): 350 mL    Voided (mL): 1800 mL  Total OUT: 2225 mL    Total NET: 630 mL        --------------------------------------------------------------------------------------    EXAM    NEUROLOGY  Exam: Normal, NAD, alert, oriented x3, no focal deficits.    HEENT  Exam: Normocephalic, EOMI; Right lateral and anterior neck with surgical site s/p staples with surrounding erythema and JESSEE drain with serosanguinous drainage; flap viable and CDI    RESPIRATORY  Exam: Lungs clear to auscultation, Normal expansion     CARDIOVASCULAR  Exam: S1, S2.  Regular rate and rhythm.      GI/NUTRITION  Exam: Abdomen soft, Non-tender, Non-distended.   Current Diet: Diet, NPO    METABOLIC/FLUIDS/ELECTROLYTES      HEMATOLOGIC  [x] VTE Prophylaxis: enoxaparin Injectable 40 milliGRAM(s) SubCutaneous daily      LABS  --------------------------------------------------------------------------------------  CBC (01-15 @ 01:08)                          9.6<L>                   9.45    )--------------(  180        --    % Neuts, --    % Lymphs, ANC: --                              30.0<L>  CBC (01-14 @ 01:30)                          11.1<L>                   15.45<H>  )--------------(  204        --    % Neuts, --    % Lymphs, ANC: --                              34.1<L>    BMP (01-14 @ 01:30)       135     |  100     |  15    			Ca++ --      Ca 8.3<L>       ---------------------------------( 169<H>		Mg 2.30         4.3     |  24      |  0.47<L>			Ph 3.2     BMP (01-13 @ 18:18)       135     |  102     |  20    			Ca++ --      Ca 8.2<L>       ---------------------------------( 217<H>		Mg 1.60         4.5     |  23      |  0.50  			Ph 2.7       LFTs (01-14 @ 01:30)      TPro 6.0 / Alb 4.1 / TBili 0.3 / DBili -- / AST 25 / ALT 25 / AlkPhos 49    Coags (01-13 @ 18:18)  aPTT 21.2<L> / INR 1.05 / PT 12.1              --------------------------------------------------------------------------------------

## 2022-01-15 NOTE — PROGRESS NOTE ADULT - SUBJECTIVE AND OBJECTIVE BOX
Plastic Surgery Progress Note (pg [facility]: [id number], [facility]: [phone])    SUBJECTIVE  The patient was seen and examined. No acute events overnight.    OBJECTIVE  ___________________________________________________  VITAL SIGNS / I&O's   Vital Signs Last 24 Hrs  T(C): 37.1 (15 Álvaro 2022 04:00), Max: 37.3 (15 Álvaro 2022 00:00)  T(F): 98.8 (15 Álvaro 2022 04:00), Max: 99.1 (15 Álvaro 2022 00:00)  HR: 97 (15 Álvaro 2022 07:00) (86 - 108)  BP: 106/58 (15 Álvaro 2022 07:00) (88/71 - 112/53)  BP(mean): 70 (15 Álvaro 2022 07:00) (58 - 88)  RR: 20 (15 Álvaro 2022 08:05) (13 - 28)  SpO2: 97% (15 Álvaro 2022 08:05) (91% - 100%)      14 Jan 2022 07:01  -  15 Álvaro 2022 07:00  --------------------------------------------------------  IN:    IV PiggyBack: 400 mL    Jevity 1.2: 455 mL    Lactated Ringers: 2250 mL  Total IN: 3105 mL    OUT:    Drain (mL): 80 mL    Indwelling Catheter - Urethral (mL): 350 mL    VAC (Vacuum Assisted Closure) System (mL): 0 mL    Voided (mL): 2000 mL  Total OUT: 2430 mL    Total NET: 675 mL        ___________________________________________________  PHYSICAL EXAM    PHYSICAL EXAM    -- CONSTITUTIONAL: NAD, lying in bed  -- NEURO: Awake, alert  -- HEENT: Flap viable, well-perfused, appropriate color, with 2-3 second capillary refill and (+) cook Doppler signal, suture line intact  -- NECK:  J/p drains s/s  RLE:  dressing c/d/i  JESSEE s/s     ___________________________________________________  LABS                        9.6    9.45  )-----------( 180      ( 15 Álvaro 2022 01:08 )             30.0     15 Álvaro 2022 01:08    136    |  100    |  12     ----------------------------<  149    4.2     |  28     |  0.44     Ca    8.4        15 Álvaro 2022 01:08  Phos  2.3       15 Álvaro 2022 01:08  Mg     1.90      15 Álvaro 2022 01:08    TPro  6.0    /  Alb  4.1    /  TBili  0.3    /  DBili  x      /  AST  25     /  ALT  25     /  AlkPhos  49     14 Jan 2022 01:30    PT/INR - ( 13 Jan 2022 18:18 )   PT: 12.1 sec;   INR: 1.05 ratio         PTT - ( 13 Jan 2022 18:18 )  PTT:21.2 sec  CAPILLARY BLOOD GLUCOSE      POCT Blood Glucose.: 156 mg/dL (15 Álvaro 2022 05:25)  POCT Blood Glucose.: 160 mg/dL (15 Álvaro 2022 00:24)  POCT Blood Glucose.: 118 mg/dL (14 Jan 2022 11:35)          ___________________________________________________  MICRO  Recent Cultures:    ___________________________________________________  MEDICATIONS  (STANDING):  acetaminophen    Suspension .. 975 milliGRAM(s) Oral every 6 hours  ampicillin/sulbactam  IVPB      ampicillin/sulbactam  IVPB 3 Gram(s) IV Intermittent every 6 hours  chlorhexidine 4% Liquid 1 Application(s) Topical daily  enoxaparin Injectable 40 milliGRAM(s) SubCutaneous daily  influenza   Vaccine 0.5 milliLiter(s) IntraMuscular once    MEDICATIONS  (PRN):  ALBUTerol    90 MICROgram(s) HFA Inhaler 2 Puff(s) Inhalation every 6 hours PRN Shortness of Breath and/or Wheezing  oxyCODONE    Solution 5 milliGRAM(s) Enteral Tube every 6 hours PRN Moderate Pain (4 - 6)  oxyCODONE    Solution 10 milliGRAM(s) Enteral Tube every 6 hours PRN Severe Pain (7 - 10)  senna 2 Tablet(s) Oral at bedtime PRN Constipation

## 2022-01-16 LAB
ANION GAP SERPL CALC-SCNC: 8 MMOL/L — SIGNIFICANT CHANGE UP (ref 7–14)
BUN SERPL-MCNC: 14 MG/DL — SIGNIFICANT CHANGE UP (ref 7–23)
CA-I BLD-SCNC: 1.03 MMOL/L — LOW (ref 1.15–1.29)
CALCIUM SERPL-MCNC: 8.6 MG/DL — SIGNIFICANT CHANGE UP (ref 8.4–10.5)
CHLORIDE SERPL-SCNC: 100 MMOL/L — SIGNIFICANT CHANGE UP (ref 98–107)
CO2 SERPL-SCNC: 30 MMOL/L — SIGNIFICANT CHANGE UP (ref 22–31)
CREAT SERPL-MCNC: 0.41 MG/DL — LOW (ref 0.5–1.3)
GLUCOSE BLDC GLUCOMTR-MCNC: 119 MG/DL — HIGH (ref 70–99)
GLUCOSE BLDC GLUCOMTR-MCNC: 122 MG/DL — HIGH (ref 70–99)
GLUCOSE BLDC GLUCOMTR-MCNC: 137 MG/DL — HIGH (ref 70–99)
GLUCOSE BLDC GLUCOMTR-MCNC: 149 MG/DL — HIGH (ref 70–99)
GLUCOSE SERPL-MCNC: 118 MG/DL — HIGH (ref 70–99)
HCT VFR BLD CALC: 31 % — LOW (ref 34.5–45)
HGB BLD-MCNC: 9.7 G/DL — LOW (ref 11.5–15.5)
MAGNESIUM SERPL-MCNC: 1.9 MG/DL — SIGNIFICANT CHANGE UP (ref 1.6–2.6)
MCHC RBC-ENTMCNC: 28.7 PG — SIGNIFICANT CHANGE UP (ref 27–34)
MCHC RBC-ENTMCNC: 31.3 GM/DL — LOW (ref 32–36)
MCV RBC AUTO: 91.7 FL — SIGNIFICANT CHANGE UP (ref 80–100)
NRBC # BLD: 0 /100 WBCS — SIGNIFICANT CHANGE UP
NRBC # FLD: 0 K/UL — SIGNIFICANT CHANGE UP
PHOSPHATE SERPL-MCNC: 3 MG/DL — SIGNIFICANT CHANGE UP (ref 2.5–4.5)
PLATELET # BLD AUTO: 183 K/UL — SIGNIFICANT CHANGE UP (ref 150–400)
POTASSIUM SERPL-MCNC: 4 MMOL/L — SIGNIFICANT CHANGE UP (ref 3.5–5.3)
POTASSIUM SERPL-SCNC: 4 MMOL/L — SIGNIFICANT CHANGE UP (ref 3.5–5.3)
RBC # BLD: 3.38 M/UL — LOW (ref 3.8–5.2)
RBC # FLD: 14.1 % — SIGNIFICANT CHANGE UP (ref 10.3–14.5)
SODIUM SERPL-SCNC: 138 MMOL/L — SIGNIFICANT CHANGE UP (ref 135–145)
WBC # BLD: 9.47 K/UL — SIGNIFICANT CHANGE UP (ref 3.8–10.5)
WBC # FLD AUTO: 9.47 K/UL — SIGNIFICANT CHANGE UP (ref 3.8–10.5)

## 2022-01-16 PROCEDURE — 99232 SBSQ HOSP IP/OBS MODERATE 35: CPT | Mod: 24,GC

## 2022-01-16 RX ORDER — MAGNESIUM SULFATE 500 MG/ML
2 VIAL (ML) INJECTION ONCE
Refills: 0 | Status: COMPLETED | OUTPATIENT
Start: 2022-01-16 | End: 2022-01-16

## 2022-01-16 RX ORDER — FUROSEMIDE 40 MG
10 TABLET ORAL ONCE
Refills: 0 | Status: COMPLETED | OUTPATIENT
Start: 2022-01-16 | End: 2022-01-16

## 2022-01-16 RX ORDER — HYDROMORPHONE HYDROCHLORIDE 2 MG/ML
0.5 INJECTION INTRAMUSCULAR; INTRAVENOUS; SUBCUTANEOUS ONCE
Refills: 0 | Status: DISCONTINUED | OUTPATIENT
Start: 2022-01-16 | End: 2022-01-16

## 2022-01-16 RX ORDER — FUROSEMIDE 40 MG
10 TABLET ORAL DAILY
Refills: 0 | Status: DISCONTINUED | OUTPATIENT
Start: 2022-01-16 | End: 2022-01-16

## 2022-01-16 RX ADMIN — Medication 975 MILLIGRAM(S): at 13:50

## 2022-01-16 RX ADMIN — ENOXAPARIN SODIUM 40 MILLIGRAM(S): 100 INJECTION SUBCUTANEOUS at 13:57

## 2022-01-16 RX ADMIN — CHLORHEXIDINE GLUCONATE 1 APPLICATION(S): 213 SOLUTION TOPICAL at 18:55

## 2022-01-16 RX ADMIN — Medication 975 MILLIGRAM(S): at 00:28

## 2022-01-16 RX ADMIN — HYDROMORPHONE HYDROCHLORIDE 0.5 MILLIGRAM(S): 2 INJECTION INTRAMUSCULAR; INTRAVENOUS; SUBCUTANEOUS at 03:00

## 2022-01-16 RX ADMIN — Medication 25 GRAM(S): at 02:49

## 2022-01-16 RX ADMIN — OXYCODONE HYDROCHLORIDE 10 MILLIGRAM(S): 5 TABLET ORAL at 22:00

## 2022-01-16 RX ADMIN — Medication 975 MILLIGRAM(S): at 18:56

## 2022-01-16 RX ADMIN — OXYCODONE HYDROCHLORIDE 10 MILLIGRAM(S): 5 TABLET ORAL at 21:31

## 2022-01-16 RX ADMIN — Medication 10 MILLIGRAM(S): at 13:55

## 2022-01-16 RX ADMIN — Medication 975 MILLIGRAM(S): at 14:00

## 2022-01-16 RX ADMIN — OXYCODONE HYDROCHLORIDE 10 MILLIGRAM(S): 5 TABLET ORAL at 05:30

## 2022-01-16 RX ADMIN — AMPICILLIN SODIUM AND SULBACTAM SODIUM 200 GRAM(S): 250; 125 INJECTION, POWDER, FOR SUSPENSION INTRAMUSCULAR; INTRAVENOUS at 01:36

## 2022-01-16 RX ADMIN — OXYCODONE HYDROCHLORIDE 10 MILLIGRAM(S): 5 TABLET ORAL at 05:17

## 2022-01-16 RX ADMIN — HYDROMORPHONE HYDROCHLORIDE 0.5 MILLIGRAM(S): 2 INJECTION INTRAMUSCULAR; INTRAVENOUS; SUBCUTANEOUS at 02:49

## 2022-01-16 RX ADMIN — Medication 975 MILLIGRAM(S): at 20:14

## 2022-01-16 RX ADMIN — AMPICILLIN SODIUM AND SULBACTAM SODIUM 200 GRAM(S): 250; 125 INJECTION, POWDER, FOR SUSPENSION INTRAMUSCULAR; INTRAVENOUS at 20:31

## 2022-01-16 RX ADMIN — AMPICILLIN SODIUM AND SULBACTAM SODIUM 200 GRAM(S): 250; 125 INJECTION, POWDER, FOR SUSPENSION INTRAMUSCULAR; INTRAVENOUS at 13:28

## 2022-01-16 RX ADMIN — AMPICILLIN SODIUM AND SULBACTAM SODIUM 200 GRAM(S): 250; 125 INJECTION, POWDER, FOR SUSPENSION INTRAMUSCULAR; INTRAVENOUS at 20:43

## 2022-01-16 RX ADMIN — Medication 975 MILLIGRAM(S): at 05:17

## 2022-01-16 RX ADMIN — Medication 975 MILLIGRAM(S): at 23:13

## 2022-01-16 RX ADMIN — ATORVASTATIN CALCIUM 10 MILLIGRAM(S): 80 TABLET, FILM COATED ORAL at 22:13

## 2022-01-16 NOTE — PROGRESS NOTE ADULT - ASSESSMENT
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    - will downsize trach to 6CFS today  - continue antibiotics in context of right neck erythema  - PT/OOBTC  - continue tube feeds  - Nutrition consult  - ERAS protocol  - monitor JESSEE output  - Trach care  - Flap care per OMFS  - Care per SICU HPI: 57 y/o Female with  PMH of HTN, HLD, with diagnosis of squamous cell carcinoma of gingiva s/p R composite resection, SND I-IV, trach, L fibula 1/13.    - downsized trach to 6CFS today  - continue antibiotics in context of right neck erythema  - PT/OOBTC  - continue tube feeds  - Nutrition consult  - ERAS protocol  - monitor JESSEE output  - Trach care  - Flap care per OMFS  - Care per SICU

## 2022-01-16 NOTE — PROGRESS NOTE ADULT - ASSESSMENT
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 SICU consulted for q1 hr flap checks and new trach. L free fibula flap reconstruction of R mandibular and floor of mouth defect POD 3    Plan:  NEUROLOGIC   - Pain control: prn  - Tylenol and oxycodone    RESPIRATORY   - Monitor SpO2 goal >92%  - Incentive spirometry   - trach collar  - Albuterol as needed  - F/U PT, encourage ambulation today; patient OOB to chair yesterday    CARDIOVASCULAR   - Patient with history of hypertension, on amlodipine and lisinopril at home  - Monitor hemodynamics   - BP WNL  - Monitor BPs, can resume home meds if becomes hypertensive    GASTROINTESTINAL   - NPO/NGT  - tube feeds in AM 1/14 via MARIBETH     /RENAL   - Strict I/Os  - Monitor BMP qd  - Monitor electrolytes, replete PRN    HEMATOLOGIC  - Monitor H/H   - DVT ppx: Lovenox in AM  - Monitor drain output; SS; strip as needed    INFECTIOUS DISEASE  - Unasyn while drain in place  - Monitor fever / WBC  - C/W LLE VAC    ENDOCRINE  - Monitor gluc    LINES  - PIV     DISPO: SICU  --------------------------------------------------------------------------------------    --------------------------------------------------------------------------------------    Critical Care Diagnoses: new free flap, new trach 56y Female with a PMHx HTN, HLD, 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 SICU consulted for q1 hr flap checks and new trach. L free fibula flap reconstruction of R mandibular and floor of mouth defect POD 3    Plan:  NEUROLOGIC   - Pain control: prn  - Tylenol and oxycodone    RESPIRATORY   - Monitor SpO2 goal >92%  - Incentive spirometry   - trach collar  - Albuterol as needed  - F/U PT, encourage ambulation today; patient OOB to chair yesterday  - Monitor suction frquency    CARDIOVASCULAR   - Patient with history of hypertension, on amlodipine and lisinopril at home  - Monitor hemodynamics   - BP WNL  - Monitor BPs, can resume home meds if becomes hypertensive    GASTROINTESTINAL   - NPO/NGT  - tube feeds in AM 1/14 via MARIBETH     /RENAL   - Strict I/Os  - Monitor BMP qd  - Monitor electrolytes, replete PRN    HEMATOLOGIC  - Monitor H/H   - DVT ppx: Lovenox in AM  - Monitor drain output; SS; strip as needed    INFECTIOUS DISEASE  - Unasyn while drain in place  - Monitor fever / WBC  - C/W LLE VAC    ENDOCRINE  - Monitor gluc    LINES  - PIV     DISPO: SICU,conditional list for floor pending suctioning and respiratory status  --------------------------------------------------------------------------------------    Critical Care Diagnoses: new free flap, new trach

## 2022-01-16 NOTE — PROGRESS NOTE ADULT - SUBJECTIVE AND OBJECTIVE BOX
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    INTERVAL EVENTS/SUBJECTIVE:   -NAEON  -Placed on 6L NC  -New left lower lobe infiltrate noted on CXR      MEDICATIONS  (STANDING):  acetaminophen    Suspension .. 975 milliGRAM(s) Oral every 6 hours  ampicillin/sulbactam  IVPB      ampicillin/sulbactam  IVPB 3 Gram(s) IV Intermittent every 6 hours  atorvastatin 10 milliGRAM(s) Oral at bedtime  chlorhexidine 4% Liquid 1 Application(s) Topical daily  enoxaparin Injectable 40 milliGRAM(s) SubCutaneous daily  influenza   Vaccine 0.5 milliLiter(s) IntraMuscular once    MEDICATIONS  (PRN):  ALBUTerol    90 MICROgram(s) HFA Inhaler 2 Puff(s) Inhalation every 6 hours PRN Shortness of Breath and/or Wheezing  oxyCODONE    Solution 5 milliGRAM(s) Enteral Tube every 6 hours PRN Moderate Pain (4 - 6)  oxyCODONE    Solution 10 milliGRAM(s) Enteral Tube every 6 hours PRN Severe Pain (7 - 10)  senna 2 Tablet(s) Oral at bedtime PRN Constipation      CAPILLARY BLOOD GLUCOSE      POCT Blood Glucose.: 149 mg/dL (16 Jan 2022 05:21)  POCT Blood Glucose.: 119 mg/dL (16 Jan 2022 00:34)  POCT Blood Glucose.: 199 mg/dL (15 Álvaro 2022 17:45)  POCT Blood Glucose.: 126 mg/dL (15 Álvaro 2022 12:31)    I&O's Summary    15 Álvaro 2022 07:01  -  16 Jan 2022 07:00  --------------------------------------------------------  IN: 1376 mL / OUT: 2635 mL / NET: -1259 mL        PHYSICAL EXAM:  Vital Signs Last 24 Hrs  T(C): 37.1 (16 Jan 2022 04:00), Max: 37.6 (15 Álvaro 2022 12:00)  T(F): 98.8 (16 Jan 2022 04:00), Max: 99.6 (15 Álvaro 2022 12:00)  HR: 96 (16 Jan 2022 06:00) (87 - 107)  BP: 111/52 (16 Jan 2022 06:00) (102/57 - 129/56)  BP(mean): 66 (16 Jan 2022 06:00) (63 - 75)  RR: 16 (16 Jan 2022 06:00) (14 - 25)  SpO2: 97% (16 Jan 2022 06:00) (84% - 100%)    GENERAL: NAD, lying in bed comfortably  HEAD:  Atraumatic, Normocephalic  EYES: EOMI, PERRLA, conjunctiva and sclera clear  ENT: Moist mucous membranes  NECK: Supple, No JVD  CHEST/LUNG: Clear to auscultation bilaterally; No rales, rhonchi, wheezing, or rubs. Unlabored respirations  HEART: Regular rate and rhythm; No murmurs, rubs, or gallops  ABDOMEN: BSx4; Soft, nontender, nondistended  EXTREMITIES:  2+ Peripheral Pulses, brisk capillary refill. 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    INTERVAL EVENTS/SUBJECTIVE:   -NAEON  -New left lower lobe infiltrate noted on CXR      MEDICATIONS  (STANDING):  acetaminophen    Suspension .. 975 milliGRAM(s) Oral every 6 hours  ampicillin/sulbactam  IVPB      ampicillin/sulbactam  IVPB 3 Gram(s) IV Intermittent every 6 hours  atorvastatin 10 milliGRAM(s) Oral at bedtime  chlorhexidine 4% Liquid 1 Application(s) Topical daily  enoxaparin Injectable 40 milliGRAM(s) SubCutaneous daily  influenza   Vaccine 0.5 milliLiter(s) IntraMuscular once    MEDICATIONS  (PRN):  ALBUTerol    90 MICROgram(s) HFA Inhaler 2 Puff(s) Inhalation every 6 hours PRN Shortness of Breath and/or Wheezing  oxyCODONE    Solution 5 milliGRAM(s) Enteral Tube every 6 hours PRN Moderate Pain (4 - 6)  oxyCODONE    Solution 10 milliGRAM(s) Enteral Tube every 6 hours PRN Severe Pain (7 - 10)  senna 2 Tablet(s) Oral at bedtime PRN Constipation      CAPILLARY BLOOD GLUCOSE      POCT Blood Glucose.: 149 mg/dL (16 Jan 2022 05:21)  POCT Blood Glucose.: 119 mg/dL (16 Jan 2022 00:34)  POCT Blood Glucose.: 199 mg/dL (15 Álvaro 2022 17:45)  POCT Blood Glucose.: 126 mg/dL (15 Álvaro 2022 12:31)    I&O's Summary    15 Álvaro 2022 07:01  -  16 Jan 2022 07:00  --------------------------------------------------------  IN: 1376 mL / OUT: 2635 mL / NET: -1259 mL        PHYSICAL EXAM:  Vital Signs Last 24 Hrs  T(C): 37.1 (16 Jan 2022 04:00), Max: 37.6 (15 Álvaro 2022 12:00)  T(F): 98.8 (16 Jan 2022 04:00), Max: 99.6 (15 Álvaro 2022 12:00)  HR: 96 (16 Jan 2022 06:00) (87 - 107)  BP: 111/52 (16 Jan 2022 06:00) (102/57 - 129/56)  BP(mean): 66 (16 Jan 2022 06:00) (63 - 75)  RR: 16 (16 Jan 2022 06:00) (14 - 25)  SpO2: 97% (16 Jan 2022 06:00) (84% - 100%)    GENERAL: NAD, lying in bed comfortably  HEAD:  Atraumatic, Normocephalic  EYES: EOMI, PERRLA, conjunctiva and sclera clear  ENT: Moist mucous membranes  NECK: Supple, No JVD  CHEST/LUNG: Clear to auscultation bilaterally; No rales, rhonchi, wheezing, or rubs. Unlabored respirations  HEART: Regular rate and rhythm; No murmurs, rubs, or gallops  ABDOMEN: BSx4; Soft, nontender, nondistended  EXTREMITIES:  2+ Peripheral Pulses, brisk capillary refill. 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    INTERVAL EVENTS/SUBJECTIVE:   -NAEON  -New left lower lobe infiltrate noted on CXR      MEDICATIONS  (STANDING):  acetaminophen    Suspension .. 975 milliGRAM(s) Oral every 6 hours  ampicillin/sulbactam  IVPB      ampicillin/sulbactam  IVPB 3 Gram(s) IV Intermittent every 6 hours  atorvastatin 10 milliGRAM(s) Oral at bedtime  chlorhexidine 4% Liquid 1 Application(s) Topical daily  enoxaparin Injectable 40 milliGRAM(s) SubCutaneous daily  influenza   Vaccine 0.5 milliLiter(s) IntraMuscular once    MEDICATIONS  (PRN):  ALBUTerol    90 MICROgram(s) HFA Inhaler 2 Puff(s) Inhalation every 6 hours PRN Shortness of Breath and/or Wheezing  oxyCODONE    Solution 5 milliGRAM(s) Enteral Tube every 6 hours PRN Moderate Pain (4 - 6)  oxyCODONE    Solution 10 milliGRAM(s) Enteral Tube every 6 hours PRN Severe Pain (7 - 10)  senna 2 Tablet(s) Oral at bedtime PRN Constipation      CAPILLARY BLOOD GLUCOSE      POCT Blood Glucose.: 149 mg/dL (16 Jan 2022 05:21)  POCT Blood Glucose.: 119 mg/dL (16 Jan 2022 00:34)  POCT Blood Glucose.: 199 mg/dL (15 Álvaro 2022 17:45)  POCT Blood Glucose.: 126 mg/dL (15 Álvaro 2022 12:31)    I&O's Summary    15 Álvaro 2022 07:01  -  16 Jan 2022 07:00  --------------------------------------------------------  IN: 1376 mL / OUT: 2635 mL / NET: -1259 mL        PHYSICAL EXAM:  Vital Signs Last 24 Hrs  T(C): 37.1 (16 Jan 2022 04:00), Max: 37.6 (15 Álvaro 2022 12:00)  T(F): 98.8 (16 Jan 2022 04:00), Max: 99.6 (15 Álvaro 2022 12:00)  HR: 96 (16 Jan 2022 06:00) (87 - 107)  BP: 111/52 (16 Jan 2022 06:00) (102/57 - 129/56)  BP(mean): 66 (16 Jan 2022 06:00) (63 - 75)  RR: 16 (16 Jan 2022 06:00) (14 - 25)  SpO2: 97% (16 Jan 2022 06:00) (84% - 100%)    Gen: NAD, laying in bed comfortably   EOE: R neck edema c/w surgery, soft. Neck incision hemostatic, mild bruising at suture line. + EVIIVO doppler. JPx1, SS output. Trach in place on collar. NGT R naris   IOE: Flap viable, appropriate color, well perfused. No signs of congestion.   Extremities: L leg wound vac holding suction   NERVOUS SYSTEM:  A&Ox3, no focal deficits     LABS:                        9.7    9.47  )-----------( 183      ( 16 Jan 2022 01:03 )             31.0     01-16    138  |  100  |  14  ----------------------------<  118<H>  4.0   |  30  |  0.41<L>    Ca    8.6      16 Jan 2022 01:03  Phos  3.0     01-16  Mg     1.90     01-16

## 2022-01-16 NOTE — PROGRESS NOTE ADULT - SUBJECTIVE AND OBJECTIVE BOX
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    Interval:  1/14: Examined at bedside this morning. HELEN BARCENAS-line dc'ed. Started on albuterol.  1/15: Examined at bedside this morning. HELEN Dobbs dc'd, passed TO. OOBTC  1/16: Exmained at [facility]. TEE. OOBTC. still on IV abx. CXR overnight with posible pleural effuison s/p lasix     Vital Signs Last 24 Hrs  T(C): 37.1 (16 Jan 2022 04:00), Max: 37.6 (15 Álvaro 2022 12:00)  T(F): 98.8 (16 Jan 2022 04:00), Max: 99.6 (15 Álvaro 2022 12:00)  HR: 96 (16 Jan 2022 06:00) (87 - 107)  BP: 111/52 (16 Jan 2022 06:00) (102/57 - 129/56)  BP(mean): 66 (16 Jan 2022 06:00) (63 - 75)  RR: 16 (16 Jan 2022 06:00) (14 - 25)  SpO2: 97% (16 Jan 2022 06:00) (84% - 100%)      Physical Exam:  Alert, NAD  Neck right side warm with stable erythema  Trach 6LPC sutured  Flap warm and well perfused, strong triphasic signal, incisions c/d/i  Nonlabored Respirations RA      ENT Progress Note    HPI: 55 y/o Female with  PMH of HTN, HLD, with diagnosis of squamous cell carcinoma of gingiva s/p R composite resection, SND I-IV, trach, L fibula 1/13.    Interval:  1/14: Examined at bedside this morning. HELEN A-line dc'ed. Started on albuterol.  1/15: Examined at bedside this morning. HELEN Dobbs dc'd, passed TO. OOBTC  1/16: Exmained at [facility]. TEE. OOBTC. still on IV abx. CXR overnight with posible pleural effuison s/p lasix. Trach changed from 6LPC to 6CFS secured with soft trach tie.     Vital Signs Last 24 Hrs  T(C): 37.1 (16 Jan 2022 04:00), Max: 37.6 (15 Álvaro 2022 12:00)  T(F): 98.8 (16 Jan 2022 04:00), Max: 99.6 (15 Álvaro 2022 12:00)  HR: 96 (16 Jan 2022 06:00) (87 - 107)  BP: 111/52 (16 Jan 2022 06:00) (102/57 - 129/56)  BP(mean): 66 (16 Jan 2022 06:00) (63 - 75)  RR: 16 (16 Jan 2022 06:00) (14 - 25)  SpO2: 97% (16 Jan 2022 06:00) (84% - 100%)      Physical Exam:  Alert, NAD  Neck right side warm with stable erythema  Trach 6CFS secured with soft trach tie   Flap warm and well perfused, strong triphasic signal, incisions c/d/i  Nonlabored Respirations RA

## 2022-01-16 NOTE — PROGRESS NOTE ADULT - SUBJECTIVE AND OBJECTIVE BOX
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 So.     Interval/Overnight Events:       No acute events overnight  requiring 6 L NC  small pleural effusion  lasix 10 mg x 1 with good response      Allergies: morphine (Hives)    PAST MEDICAL & SURGICAL HISTORY:  H/O malignant neoplasm of gum  Hypertension  Hyperlipidemia  Mild asthma  H/O  section  History of hip replacement  History of partial hysterectomy  H/O malignant neoplasm of gum  Abnormal EKG  Free flap, fibula  Mandibulectomy, with tracheostomy  Selective neck dissection        MEDICATIONS:   --------------------------------------------------------------------------------------  Neurologic Medications  acetaminophen    Suspension .. 975 milliGRAM(s) Oral every 6 hours  oxyCODONE    Solution 5 milliGRAM(s) Enteral Tube every 6 hours PRN Moderate Pain (4 - 6)  oxyCODONE    Solution 10 milliGRAM(s) Enteral Tube every 6 hours PRN Severe Pain (7 - 10)    Respiratory Medications  ALBUTerol    90 MICROgram(s) HFA Inhaler 2 Puff(s) Inhalation every 6 hours PRN Shortness of Breath and/or Wheezing    Cardiovascular Medications    Gastrointestinal Medications  magnesium sulfate  IVPB 2 Gram(s) IV Intermittent once  senna 2 Tablet(s) Oral at bedtime PRN Constipation    Genitourinary Medications    Hematologic/Oncologic Medications  enoxaparin Injectable 40 milliGRAM(s) SubCutaneous daily  influenza   Vaccine 0.5 milliLiter(s) IntraMuscular once    Antimicrobial/Immunologic Medications  ampicillin/sulbactam  IVPB      ampicillin/sulbactam  IVPB 3 Gram(s) IV Intermittent every 6 hours    Endocrine/Metabolic Medications  atorvastatin 10 milliGRAM(s) Oral at bedtime    Topical/Other Medications  chlorhexidine 4% Liquid 1 Application(s) Topical daily    --------------------------------------------------------------------------------------    VITAL SIGNS, INS/OUTS (last 24 hours):  --------------------------------------------------------------------------------------  T(C): 37 (22 @ 00:00), Max: 37.6 (01-15-22 @ 12:00)  HR: 97 (22 @ 01:00) (90 - 108)  BP: 105/49 (22 @ 01:00) (100/52 - 129/56)  BP(mean): 63 (22 01:00) (63 - 75)  RR: 16 (22 @ 01:00) (14 - 25)  SpO2: 95% (22 01:00) (84% - 100%)  CAPILLARY BLOOD GLUCOSE    POCT Blood Glucose.: 119 mg/dL (2022 00:34)     @ 07:  -  01-15 @ 07:00  --------------------------------------------------------  IN:    IV PiggyBack: 400 mL    Jevity 1.2: 455 mL    Lactated Ringers: 2250 mL  Total IN: 3105 mL    OUT:    Drain (mL): 80 mL    Indwelling Catheter - Urethral (mL): 350 mL    VAC (Vacuum Assisted Closure) System (mL): 0 mL    Voided (mL): 2000 mL  Total OUT: 2430 mL    Total NET: 675 mL      01-15 @ 07: @ 02:24  --------------------------------------------------------  IN:    IV PiggyBack: 300 mL    Jevity 1.2: 776 mL  Total IN: 1076 mL    OUT:    Drain (mL): 30 mL    VAC (Vacuum Assisted Closure) System (mL): 0 mL    Voided (mL): 2350 mL  Total OUT: 2380 mL    Total NET: -1304 mL  --------------------------------------------------------------------------------------    EXAM:  NEUROLOGY  Exam: Normal, NAD, alert, oriented x3, no focal deficits.    HEENT  Exam: Normocephalic, EOMI; Right lateral and anterior neck with surgical site s/p staples with surrounding erythema and JESSEE drain with serosanguinous drainage; flap viable and CDI    RESPIRATORY  Exam: Lungs clear to auscultation, Normal expansion     CARDIOVASCULAR  Exam: S1, S2.  Regular rate and rhythm.      GI/NUTRITION  Exam: Abdomen soft, Non-tender, Non-distended.   Current Diet: Diet, NPO    METABOLIC/FLUIDS/ELECTROLYTES  magnesium sulfate  IVPB 2 Gram(s) IV Intermittent once      HEMATOLOGIC  [x] DVT Prophylaxis: enoxaparin Injectable 40 milliGRAM(s) SubCutaneous daily    Transfusions:	[] PRBC	[] Platelets		[] FFP	[] Cryoprecipitate    INFECTIOUS DISEASE  Antimicrobials/Immunologic Medications:  ampicillin/sulbactam  IVPB      ampicillin/sulbactam  IVPB 3 Gram(s) IV Intermittent every 6 hours  influenza   Vaccine 0.5 milliLiter(s) IntraMuscular once      Tubes/Lines/Drains ***  [x] Peripheral IV  [] Central Venous Line     	[] R	[] L	[] IJ	[] Fem	[] SC	Date Placed:   [] Arterial Line		[] R	[] L	[] Fem	[] Rad	[] Ax	Date Placed:   [] PICC		[] Midline		[] Mediport  [] Urinary Catheter		Date Placed:   [x] Necessity of urinary, arterial, and venous catheters discussed    VASCULAR  Exam: Extremities warm, pink, well-perfused.     MUSCULOSKELETAL  Exam: All extremities moving spontaneously without limitations.     SKIN  Exam: Good skin turgor, no skin breakdown.     LABS  --------------------------------------------------------------------------------------  CBC ( @ 01:03)                              9.7<L>                         9.47    )----------------(  183        --    % Neutrophils, --    % Lymphocytes, ANC: --                                  31.0<L>  CBC (01-15 @ 01:08)                              9.6<L>                         9.45    )----------------(  180        --    % Neutrophils, --    % Lymphocytes, ANC: --                                  30.0<L>    BMP ( @ 01:03)             138     |  100     |  14    		Ca++ 1.03<L>  Ca 8.6                ---------------------------------( 118<H>		Mg 1.90               4.0     |  30      |  0.41<L>			Ph 3.0     BMP (01-15 @ 01:08)             136     |  100     |  12    		Ca++ --      Ca 8.4                ---------------------------------( 149<H>		Mg 1.90               4.2     |  28      |  0.44<L>			Ph 2.3<L>  --------------------------------------------------------------------------------------    OTHER LABORATORY:     IMAGING STUDIES:   CXR:    SICU Daily Progress Note  =====================================================  HPI:   57 yo female presents to PST unit with 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 So.     Interval/Overnight Events:       No acute events overnight  discontinue BiPAP, put on NC 3L and monitor  small pleural effusion  q4 flap checks  monitor suctioning frequency with conditional listing for the floor   Zosyn for 14 days course        Allergies: morphine (Hives)    PAST MEDICAL & SURGICAL HISTORY:  H/O malignant neoplasm of gum  Hypertension  Hyperlipidemia  Mild asthma  H/O  section  History of hip replacement  History of partial hysterectomy  H/O malignant neoplasm of gum  Abnormal EKG  Free flap, fibula  Mandibulectomy, with tracheostomy  Selective neck dissection        MEDICATIONS:   --------------------------------------------------------------------------------------  Neurologic Medications  acetaminophen    Suspension .. 975 milliGRAM(s) Oral every 6 hours  oxyCODONE    Solution 5 milliGRAM(s) Enteral Tube every 6 hours PRN Moderate Pain (4 - 6)  oxyCODONE    Solution 10 milliGRAM(s) Enteral Tube every 6 hours PRN Severe Pain (7 - 10)    Respiratory Medications  ALBUTerol    90 MICROgram(s) HFA Inhaler 2 Puff(s) Inhalation every 6 hours PRN Shortness of Breath and/or Wheezing    Cardiovascular Medications    Gastrointestinal Medications  magnesium sulfate  IVPB 2 Gram(s) IV Intermittent once  senna 2 Tablet(s) Oral at bedtime PRN Constipation    Genitourinary Medications    Hematologic/Oncologic Medications  enoxaparin Injectable 40 milliGRAM(s) SubCutaneous daily  influenza   Vaccine 0.5 milliLiter(s) IntraMuscular once    Antimicrobial/Immunologic Medications  ampicillin/sulbactam  IVPB      ampicillin/sulbactam  IVPB 3 Gram(s) IV Intermittent every 6 hours    Endocrine/Metabolic Medications  atorvastatin 10 milliGRAM(s) Oral at bedtime    Topical/Other Medications  chlorhexidine 4% Liquid 1 Application(s) Topical daily    --------------------------------------------------------------------------------------    VITAL SIGNS, INS/OUTS (last 24 hours):  --------------------------------------------------------------------------------------  T(C): 37 (22 @ 00:00), Max: 37.6 (01-15-22 @ 12:00)  HR: 97 (22 @ 01:) (90 - 108)  BP: 105/49 (22 @ 01:00) (100/52 - 129/56)  BP(mean): 63 (22 @ 01:00) (63 - 75)  RR: 16 (22 @ 01:00) (14 - 25)  SpO2: 95% (22 @ 01:) (84% - 100%)  CAPILLARY BLOOD GLUCOSE    POCT Blood Glucose.: 119 mg/dL (2022 00:34)     @ 07:  -  01-15 @ 07:00  --------------------------------------------------------  IN:    IV PiggyBack: 400 mL    Jevity 1.2: 455 mL    Lactated Ringers: 2250 mL  Total IN: 3105 mL    OUT:    Drain (mL): 80 mL    Indwelling Catheter - Urethral (mL): 350 mL    VAC (Vacuum Assisted Closure) System (mL): 0 mL    Voided (mL): 2000 mL  Total OUT: 2430 mL    Total NET: 675 mL      01-15 @ 07: @ 02:24  --------------------------------------------------------  IN:    IV PiggyBack: 300 mL    Jevity 1.2: 776 mL  Total IN: 1076 mL    OUT:    Drain (mL): 30 mL    VAC (Vacuum Assisted Closure) System (mL): 0 mL    Voided (mL): 2350 mL  Total OUT: 2380 mL    Total NET: -1304 mL  --------------------------------------------------------------------------------------    EXAM:  NEUROLOGY  Exam: Normal, NAD, alert, oriented x3, no focal deficits.    HEENT  Exam: Normocephalic, EOMI; Right lateral and anterior neck with surgical site s/p staples with surrounding erythema and JESSEE drain with serosanguinous drainage; flap viable and CDI    RESPIRATORY  Exam: Lungs clear to auscultation, Normal expansion     CARDIOVASCULAR  Exam: S1, S2.  Regular rate and rhythm.      GI/NUTRITION  Exam: Abdomen soft, Non-tender, Non-distended.   Current Diet: Diet, NPO    METABOLIC/FLUIDS/ELECTROLYTES  magnesium sulfate  IVPB 2 Gram(s) IV Intermittent once      HEMATOLOGIC  [x] DVT Prophylaxis: enoxaparin Injectable 40 milliGRAM(s) SubCutaneous daily    Transfusions:	[] PRBC	[] Platelets		[] FFP	[] Cryoprecipitate    INFECTIOUS DISEASE  Antimicrobials/Immunologic Medications:  ampicillin/sulbactam  IVPB      ampicillin/sulbactam  IVPB 3 Gram(s) IV Intermittent every 6 hours  influenza   Vaccine 0.5 milliLiter(s) IntraMuscular once      Tubes/Lines/Drains ***  [x] Peripheral IV  [] Central Venous Line     	[] R	[] L	[] IJ	[] Fem	[] SC	Date Placed:   [] Arterial Line		[] R	[] L	[] Fem	[] Rad	[] Ax	Date Placed:   [] PICC		[] Midline		[] Mediport  [] Urinary Catheter		Date Placed:   [x] Necessity of urinary, arterial, and venous catheters discussed    VASCULAR  Exam: Extremities warm, pink, well-perfused.     MUSCULOSKELETAL  Exam: All extremities moving spontaneously without limitations.     SKIN  Exam: Good skin turgor, no skin breakdown.     LABS  --------------------------------------------------------------------------------------  CBC ( @ 01:03)                              9.7<L>                         9.47    )----------------(  183        --    % Neutrophils, --    % Lymphocytes, ANC: --                                  31.0<L>  CBC (01-15 @ 01:08)                              9.6<L>                         9.45    )----------------(  180        --    % Neutrophils, --    % Lymphocytes, ANC: --                                  30.0<L>    BMP ( @ 01:03)             138     |  100     |  14    		Ca++ 1.03<L>  Ca 8.6                ---------------------------------( 118<H>		Mg 1.90               4.0     |  30      |  0.41<L>			Ph 3.0     BMP (01-15 @ 01:08)             136     |  100     |  12    		Ca++ --      Ca 8.4                ---------------------------------( 149<H>		Mg 1.90               4.2     |  28      |  0.44<L>			Ph 2.3<L>  --------------------------------------------------------------------------------------    OTHER LABORATORY:     IMAGING STUDIES:   CXR:

## 2022-01-16 NOTE — PROGRESS NOTE ADULT - ASSESSMENT
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    - Monitor JESSEE  - cont unasyn   - C/w OOBTC   - C/w TF   - routine trach care per ENT   - flap management per PRS   - Appreciate ENT/ PRS recs   - Appreciate excellent SICU care         [facility] [place]   #[id number] 55 y/o Female with  PMH of HTN, HLD, with diagnosis of squamous cell carcinoma of gingiva now s/p right segmental mandibulectomy, right SND, tracheostomy, left fibular free flap (1/13).    - Monitor JESSEE  - d/c unasyn   - C/w OOBTC   - C/w Bolus TF   - routine trach care per ENT, possible downsize and capping   - flap management per PRS   - Appreciate ENT/ PRS recs   - Appreciate excellent SICU care       [facility] [place]   #[id number] 55 y/o Female with  PMH of HTN, HLD, with diagnosis of squamous cell carcinoma of gingiva now s/p right segmental mandibulectomy, right SND, tracheostomy, left fibular free flap (1/13).    - Monitor JESSEE  - C/w OOBTC   - C/w Bolus TF   - routine trach care per ENT, possible downsize and capping   - flap management per PRS   - Appreciate ENT/ PRS recs   - Appreciate excellent SICU care       [facility]   #[id number]

## 2022-01-17 LAB
ANION GAP SERPL CALC-SCNC: 6 MMOL/L — LOW (ref 7–14)
BUN SERPL-MCNC: 21 MG/DL — SIGNIFICANT CHANGE UP (ref 7–23)
CA-I BLD-SCNC: 0.97 MMOL/L — LOW (ref 1.15–1.29)
CALCIUM SERPL-MCNC: 9 MG/DL — SIGNIFICANT CHANGE UP (ref 8.4–10.5)
CHLORIDE SERPL-SCNC: 100 MMOL/L — SIGNIFICANT CHANGE UP (ref 98–107)
CO2 SERPL-SCNC: 33 MMOL/L — HIGH (ref 22–31)
CREAT SERPL-MCNC: 0.48 MG/DL — LOW (ref 0.5–1.3)
GLUCOSE BLDC GLUCOMTR-MCNC: 151 MG/DL — HIGH (ref 70–99)
GLUCOSE SERPL-MCNC: 140 MG/DL — HIGH (ref 70–99)
HCT VFR BLD CALC: 30.4 % — LOW (ref 34.5–45)
HGB BLD-MCNC: 9.6 G/DL — LOW (ref 11.5–15.5)
MAGNESIUM SERPL-MCNC: 2 MG/DL — SIGNIFICANT CHANGE UP (ref 1.6–2.6)
MCHC RBC-ENTMCNC: 28.2 PG — SIGNIFICANT CHANGE UP (ref 27–34)
MCHC RBC-ENTMCNC: 31.6 GM/DL — LOW (ref 32–36)
MCV RBC AUTO: 89.1 FL — SIGNIFICANT CHANGE UP (ref 80–100)
NRBC # BLD: 0 /100 WBCS — SIGNIFICANT CHANGE UP
NRBC # FLD: 0 K/UL — SIGNIFICANT CHANGE UP
PHOSPHATE SERPL-MCNC: 3.9 MG/DL — SIGNIFICANT CHANGE UP (ref 2.5–4.5)
PLATELET # BLD AUTO: 200 K/UL — SIGNIFICANT CHANGE UP (ref 150–400)
POTASSIUM SERPL-MCNC: 3.8 MMOL/L — SIGNIFICANT CHANGE UP (ref 3.5–5.3)
POTASSIUM SERPL-SCNC: 3.8 MMOL/L — SIGNIFICANT CHANGE UP (ref 3.5–5.3)
RBC # BLD: 3.41 M/UL — LOW (ref 3.8–5.2)
RBC # FLD: 13.9 % — SIGNIFICANT CHANGE UP (ref 10.3–14.5)
SODIUM SERPL-SCNC: 139 MMOL/L — SIGNIFICANT CHANGE UP (ref 135–145)
WBC # BLD: 7.86 K/UL — SIGNIFICANT CHANGE UP (ref 3.8–10.5)
WBC # FLD AUTO: 7.86 K/UL — SIGNIFICANT CHANGE UP (ref 3.8–10.5)

## 2022-01-17 PROCEDURE — 71045 X-RAY EXAM CHEST 1 VIEW: CPT | Mod: 26

## 2022-01-17 PROCEDURE — 99233 SBSQ HOSP IP/OBS HIGH 50: CPT | Mod: GC

## 2022-01-17 RX ORDER — POTASSIUM CHLORIDE 20 MEQ
20 PACKET (EA) ORAL ONCE
Refills: 0 | Status: COMPLETED | OUTPATIENT
Start: 2022-01-17 | End: 2022-01-17

## 2022-01-17 RX ORDER — CALCIUM GLUCONATE 100 MG/ML
2 VIAL (ML) INTRAVENOUS ONCE
Refills: 0 | Status: COMPLETED | OUTPATIENT
Start: 2022-01-17 | End: 2022-01-17

## 2022-01-17 RX ORDER — FUROSEMIDE 40 MG
20 TABLET ORAL ONCE
Refills: 0 | Status: COMPLETED | OUTPATIENT
Start: 2022-01-17 | End: 2022-01-17

## 2022-01-17 RX ADMIN — ENOXAPARIN SODIUM 40 MILLIGRAM(S): 100 INJECTION SUBCUTANEOUS at 12:12

## 2022-01-17 RX ADMIN — OXYCODONE HYDROCHLORIDE 10 MILLIGRAM(S): 5 TABLET ORAL at 08:43

## 2022-01-17 RX ADMIN — Medication 975 MILLIGRAM(S): at 23:12

## 2022-01-17 RX ADMIN — ATORVASTATIN CALCIUM 10 MILLIGRAM(S): 80 TABLET, FILM COATED ORAL at 21:32

## 2022-01-17 RX ADMIN — Medication 20 MILLIEQUIVALENT(S): at 06:18

## 2022-01-17 RX ADMIN — OXYCODONE HYDROCHLORIDE 10 MILLIGRAM(S): 5 TABLET ORAL at 16:00

## 2022-01-17 RX ADMIN — OXYCODONE HYDROCHLORIDE 10 MILLIGRAM(S): 5 TABLET ORAL at 21:32

## 2022-01-17 RX ADMIN — Medication 20 MILLIGRAM(S): at 09:54

## 2022-01-17 RX ADMIN — OXYCODONE HYDROCHLORIDE 10 MILLIGRAM(S): 5 TABLET ORAL at 23:33

## 2022-01-17 RX ADMIN — Medication 975 MILLIGRAM(S): at 18:47

## 2022-01-17 RX ADMIN — Medication 975 MILLIGRAM(S): at 06:18

## 2022-01-17 RX ADMIN — CHLORHEXIDINE GLUCONATE 1 APPLICATION(S): 213 SOLUTION TOPICAL at 12:13

## 2022-01-17 RX ADMIN — Medication 200 GRAM(S): at 09:54

## 2022-01-17 RX ADMIN — AMPICILLIN SODIUM AND SULBACTAM SODIUM 200 GRAM(S): 250; 125 INJECTION, POWDER, FOR SUSPENSION INTRAMUSCULAR; INTRAVENOUS at 02:39

## 2022-01-17 RX ADMIN — OXYCODONE HYDROCHLORIDE 10 MILLIGRAM(S): 5 TABLET ORAL at 14:05

## 2022-01-17 RX ADMIN — AMPICILLIN SODIUM AND SULBACTAM SODIUM 200 GRAM(S): 250; 125 INJECTION, POWDER, FOR SUSPENSION INTRAMUSCULAR; INTRAVENOUS at 08:00

## 2022-01-17 RX ADMIN — AMPICILLIN SODIUM AND SULBACTAM SODIUM 200 GRAM(S): 250; 125 INJECTION, POWDER, FOR SUSPENSION INTRAMUSCULAR; INTRAVENOUS at 16:27

## 2022-01-17 RX ADMIN — Medication 975 MILLIGRAM(S): at 23:32

## 2022-01-17 RX ADMIN — OXYCODONE HYDROCHLORIDE 10 MILLIGRAM(S): 5 TABLET ORAL at 10:04

## 2022-01-17 RX ADMIN — AMPICILLIN SODIUM AND SULBACTAM SODIUM 200 GRAM(S): 250; 125 INJECTION, POWDER, FOR SUSPENSION INTRAMUSCULAR; INTRAVENOUS at 21:56

## 2022-01-17 RX ADMIN — Medication 975 MILLIGRAM(S): at 17:05

## 2022-01-17 RX ADMIN — Medication 975 MILLIGRAM(S): at 12:12

## 2022-01-17 RX ADMIN — Medication 975 MILLIGRAM(S): at 13:30

## 2022-01-17 NOTE — PROGRESS NOTE ADULT - SUBJECTIVE AND OBJECTIVE BOX
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    INTERVAL EVENTS/SUBJECTIVE:   -NAEON  -R neck blanching erythema; plastics removed some staples, minimal drainage noted, wound culture sent, packed with aquacel, remains afebrile  -Downtrending WBC  -JESSEE drain 80>35>20 today.     ___________________________________________________________________________________  OBJECTIVE:   ICU Vital Signs Last 24 Hrs  T(C): 36 (17 Jan 2022 08:00), Max: 37.1 (16 Jan 2022 12:00)  T(F): 96.8 (17 Jan 2022 08:00), Max: 98.8 (16 Jan 2022 12:00)  HR: 96 (17 Jan 2022 08:00) (80 - 121)  BP: 113/62 (17 Jan 2022 08:00) (102/60 - 123/58)  BP(mean): 74 (17 Jan 2022 08:00) (65 - 82)  ABP: --  ABP(mean): --  RR: 20 (17 Jan 2022 08:00) (10 - 25)  SpO2: 98% (17 Jan 2022 08:00) (89% - 98%)    CAPILLARY BLOOD GLUCOSE      POCT Blood Glucose.: 151 mg/dL (17 Jan 2022 06:20)  POCT Blood Glucose.: 122 mg/dL (16 Jan 2022 18:58)  POCT Blood Glucose.: 137 mg/dL (16 Jan 2022 14:12)      I&O's Detail    16 Jan 2022 07:01  -  17 Jan 2022 07:00  --------------------------------------------------------  IN:    Jevity 1.2: 278 mL  Total IN: 278 mL    OUT:    Drain (mL): 20 mL    VAC (Vacuum Assisted Closure) System (mL): 0 mL    Voided (mL): 1900 mL  Total OUT: 1920 mL    Total NET: -1642 mL      PHYSICAL EXAM:  Gen: NAD, sitting upright in bed comfortably   EOE: R neck edema c/w surgery, soft. Neck incision hemostatic, mild bruising at suture line. + [employer] doppler. JPx1, SS output. Trach in place on collar. NGT R naris   IOE: Flap viable, appropriate color, well perfused. No signs of congestion.   Extremities: L leg wound vac holding suction   NERVOUS SYSTEM:  A&Ox3, no focal deficits     LABS:              CBC Full  -  ( 17 Jan 2022 02:51 )  WBC Count : 7.86 K/uL  RBC Count : 3.41 M/uL  Hemoglobin : 9.6 g/dL  Hematocrit : 30.4 %  Platelet Count - Automated : 200 K/uL  Mean Cell Volume : 89.1 fL  Mean Cell Hemoglobin : 28.2 pg  Mean Cell Hemoglobin Concentration : 31.6 gm/dL    01-17    139  |  100  |  21  ----------------------------<  140<H>  3.8   |  33<H>  |  0.48<L>    Ca    9.0      17 Jan 2022 02:51  Phos  3.9     01-17  Mg     2.00     01-17      MEDICATIONS  (STANDING):  acetaminophen    Suspension .. 975 milliGRAM(s) Oral every 6 hours  ampicillin/sulbactam  IVPB      ampicillin/sulbactam  IVPB 3 Gram(s) IV Intermittent every 6 hours  atorvastatin 10 milliGRAM(s) Oral at bedtime  chlorhexidine 4% Liquid 1 Application(s) Topical daily  enoxaparin Injectable 40 milliGRAM(s) SubCutaneous daily  influenza   Vaccine 0.5 milliLiter(s) IntraMuscular once    MEDICATIONS  (PRN):  ALBUTerol    90 MICROgram(s) HFA Inhaler 2 Puff(s) Inhalation every 6 hours PRN Shortness of Breath and/or Wheezing  oxyCODONE    Solution 5 milliGRAM(s) Enteral Tube every 6 hours PRN Moderate Pain (4 - 6)  oxyCODONE    Solution 10 milliGRAM(s) Enteral Tube every 6 hours PRN Severe Pain (7 - 10)  senna 2 Tablet(s) Oral at bedtime PRN Constipation

## 2022-01-17 NOTE — PROGRESS NOTE ADULT - SUBJECTIVE AND OBJECTIVE BOX
Plastic Surgery Progress Note (pg [facility]: [id number], [facility]: [phone])    SUBJECTIVE  Vital Signs Last 24 Hrs  T(C): 36.7 (17 Jan 2022 04:00), Max: 37.1 (16 Jan 2022 08:00)  T(F): 98.1 (17 Jan 2022 04:00), Max: 98.8 (16 Jan 2022 12:00)  HR: 92 (17 Jan 2022 04:00) (80 - 121)  BP: 111/54 (17 Jan 2022 04:00) (102/52 - 123/58)  BP(mean): 65 (17 Jan 2022 04:00) (63 - 82)  RR: 20 (17 Jan 2022 04:00) (10 - 25)  SpO2: 93% (17 Jan 2022 04:00) (89% - 98%)The patient was seen and examined. No acute events overnight.    I&O's Detail    16 Jan 2022 07:01  -  17 Jan 2022 07:00  --------------------------------------------------------  IN:    Jevity 1.2: 278 mL  Total IN: 278 mL    OUT:    Drain (mL): 20 mL    VAC (Vacuum Assisted Closure) System (mL): 0 mL    Voided (mL): 1900 mL  Total OUT: 1920 mL    Total NET: -1642 mL        ___________________________________________________    PHYSICAL EXAM    -- CONSTITUTIONAL: NAD, lying in bed  -- NEURO: Awake, alert  -- HEENT: Flap viable, well-perfused, appropriate color, with 2-3 second capillary refill and (+) cook Doppler signal, suture line intact  -- NECK: kely-incisional blanching erythema J/p drains s/s  RLE:  dressing c/d/i  JESSEE s/s     ___________________________________________________  LABS  01-17    139  |  100  |  21  ----------------------------<  140<H>  3.8   |  33<H>  |  0.48<L>    Ca    9.0      17 Jan 2022 02:51  Phos  3.9     01-17  Mg     2.00     01-17                            9.6    7.86  )-----------( 200      ( 17 Jan 2022 02:51 )             30.4         ___________________________________________________  MICRO  Recent Cultures:    ___________________________________________________  MEDICATIONS  (STANDING):  acetaminophen    Suspension .. 975 milliGRAM(s) Oral every 6 hours  ampicillin/sulbactam  IVPB      ampicillin/sulbactam  IVPB 3 Gram(s) IV Intermittent every 6 hours  chlorhexidine 4% Liquid 1 Application(s) Topical daily  enoxaparin Injectable 40 milliGRAM(s) SubCutaneous daily  influenza   Vaccine 0.5 milliLiter(s) IntraMuscular once    MEDICATIONS  (PRN):  ALBUTerol    90 MICROgram(s) HFA Inhaler 2 Puff(s) Inhalation every 6 hours PRN Shortness of Breath and/or Wheezing  oxyCODONE    Solution 5 milliGRAM(s) Enteral Tube every 6 hours PRN Moderate Pain (4 - 6)  oxyCODONE    Solution 10 milliGRAM(s) Enteral Tube every 6 hours PRN Severe Pain (7 - 10)  senna 2 Tablet(s) Oral at bedtime PRN Constipation

## 2022-01-17 NOTE — PROGRESS NOTE ADULT - ASSESSMENT
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    - Monitor JESSEE  - C/w OOBTC   - routine trach care per ENT, possible downsize and capping   - flap management per PRS   - Rest of care per primary team  - Appreciate excellent SICU care       [facility]   #[id number]

## 2022-01-17 NOTE — PROGRESS NOTE ADULT - ASSESSMENT
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    - can start clear liquid diet today   - Pt cleared to go to floor from ENT perspective   - PT/OOBTC  - continue tube feeds  - Nutrition consult  - ERAS protocol  - monitor JESSEE output  - Trach care  - Flap care per OMFS  - Care per SICU

## 2022-01-17 NOTE — PROGRESS NOTE ADULT - SUBJECTIVE AND OBJECTIVE BOX
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 So.  (28 Dec 2021 17:46)      PMH:  PAST MEDICAL & SURGICAL HISTORY:  H/O malignant neoplasm of gum    Hypertension    Hyperlipidemia    Mild asthma    H/O  section    History of hip replacement  left hip    History of partial hysterectomy        ALLERGIES:  morphine (Hives)      --------------------------------------------------------------------------------------    MEDICATIONS:    Neurologic Medications  acetaminophen    Suspension .. 975 milliGRAM(s) Oral every 6 hours  oxyCODONE    Solution 5 milliGRAM(s) Enteral Tube every 6 hours PRN Moderate Pain (4 - 6)  oxyCODONE    Solution 10 milliGRAM(s) Enteral Tube every 6 hours PRN Severe Pain (7 - 10)    Respiratory Medications  ALBUTerol    90 MICROgram(s) HFA Inhaler 2 Puff(s) Inhalation every 6 hours PRN Shortness of Breath and/or Wheezing    Cardiovascular Medications    Gastrointestinal Medications  senna 2 Tablet(s) Oral at bedtime PRN Constipation    Genitourinary Medications    Hematologic/Oncologic Medications  enoxaparin Injectable 40 milliGRAM(s) SubCutaneous daily  influenza   Vaccine 0.5 milliLiter(s) IntraMuscular once    Antimicrobial/Immunologic Medications  ampicillin/sulbactam  IVPB      ampicillin/sulbactam  IVPB 3 Gram(s) IV Intermittent every 6 hours    Endocrine/Metabolic Medications  atorvastatin 10 milliGRAM(s) Oral at bedtime    Topical/Other Medications  chlorhexidine 4% Liquid 1 Application(s) Topical daily    --------------------------------------------------------------------------------------    VITAL SIGNS:  ICU Vital Signs Last 24 Hrs  T(C): 36.5 (2022 00:00), Max: 37.1 (2022 04:00)  T(F): 97.7 (2022 00:00), Max: 98.8 (2022 04:00)  HR: 101 (:) (80 - 121)  BP: 109/54 (:) (102/52 - 123/58)  BP(mean): 68 () (63 - 82)  RR: 20 () (10 - 25)  SpO2: 97% () (89% - 98%)    --------------------------------------------------------------------------------------    INS AND OUTS:  I&O's Summary    15 Álvaro 2022 07:01  -  2022 07:00  --------------------------------------------------------  IN: 1376 mL / OUT: 2635 mL / NET: -1259 mL    2022 07:01  -  2022 01:44  --------------------------------------------------------  IN: 278 mL / OUT: 710 mL / NET: -432 mL      --------------------------------------------------------------------------------------          EXAM  NEUROLOGY  Exam: Normal, NAD, alert, oriented x3, no focal deficits.    HEENT  Exam: Normocephalic, EOMI; Right lateral and anterior neck with surgical site s/p staples with surrounding erythema and JESSEE drain with serosanguinous drainage; flap viable and CDI    RESPIRATORY  Exam: Lungs clear to auscultation, Normal expansion     CARDIOVASCULAR  Exam: S1, S2.  Regular rate and rhythm.      GI/NUTRITION  Exam: Abdomen soft, Non-tender, Non-distended.   Current Diet: Diet, NPO      METABOLIC / FLUIDS / ELECTROLYTES      HEMATOLOGIC  [x] VTE Prophylaxis: enoxaparin Injectable 40 milliGRAM(s) SubCutaneous daily      INFECTIOUS DISEASE  Antimicrobials/Immunologic Medications:  ampicillin/sulbactam  IVPB      ampicillin/sulbactam  IVPB 3 Gram(s) IV Intermittent every 6 hours  influenza   Vaccine 0.5 milliLiter(s) IntraMuscular once      TUBES / LINES / DRAINS***  [x] Peripheral IV  [] Central Venous Line     	[] R	[] L	[] IJ	[] Fem	[] SC	Date Placed:   [] Arterial Line		[] R	[] L	[] Fem	[] Rad	[] Ax	Date Placed:   [] PICC		[] Midline		[] Mediport  [] Urinary Catheter		Date Placed:   [x] Necessity of urinary, arterial, and venous catheters discussed    --------------------------------------------------------------------------------------    LABS      01-16    138  |  100  |  14  ----------------------------<  118<H>  4.0   |  30  |  0.41<L>    Ca    8.6      2022 01:03  Phos  3.0     -  Mg     1.90     -16        ASSESSMENT:  56y Female with a PMHx HTN, HLD, 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 SICU consulted for q1 hr flap checks and new trach. L free fibula flap reconstruction of R mandibular and floor of mouth defect POD 3    Plan:  NEUROLOGIC   - Pain control: prn  - Tylenol and oxycodone    RESPIRATORY   - Monitor SpO2 goal >92%  - Incentive spirometry   - trach collar  - Albuterol as needed  - F/U PT, encourage ambulation today; patient OOB to chair yesterday  - Monitor suction frquency    CARDIOVASCULAR   - Patient with history of hypertension, on amlodipine and lisinopril at home  - Monitor hemodynamics   - BP WNL  - Monitor BPs, can resume home meds if becomes hypertensive    GASTROINTESTINAL   - NPO/NGT  - tube feeds in AM  via MARIBETH     /RENAL   - Strict I/Os  - Monitor BMP qd  - Monitor electrolytes, replete PRN    HEMATOLOGIC  - Monitor H/H   - DVT ppx: Lovenox in AM  - Monitor drain output; SS; strip as needed    INFECTIOUS DISEASE  - Unasyn while drain in place  - Monitor fever / WBC  - C/W LLE VAC    ENDOCRINE  - Monitor gluc    LINES  - PIV     DISPO: SICU, conditional list for floor pending suctioning and respiratory status  --------------------------------------------------------------------------------------    Critical Care Diagnoses: new free flap, new trach   SICU Daily Progress Note  =====================================================  Interval/Overnight Events:       - R neck blanching erythema; plastics removed some staples, minimal drainage noted, wound culture sent, packed with aquacel, remains afebrile  - Increased O2 requirement , diuresed with lasix      HPI:  57 yo female presents to PST unit with 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 So.  (28 Dec 2021 17:46)      PMH:  PAST MEDICAL & SURGICAL HISTORY:  H/O malignant neoplasm of gum    Hypertension    Hyperlipidemia    Mild asthma    H/O  section    History of hip replacement  left hip    History of partial hysterectomy        ALLERGIES:  morphine (Hives)      --------------------------------------------------------------------------------------    MEDICATIONS:    Neurologic Medications  acetaminophen    Suspension .. 975 milliGRAM(s) Oral every 6 hours  oxyCODONE    Solution 5 milliGRAM(s) Enteral Tube every 6 hours PRN Moderate Pain (4 - 6)  oxyCODONE    Solution 10 milliGRAM(s) Enteral Tube every 6 hours PRN Severe Pain (7 - 10)    Respiratory Medications  ALBUTerol    90 MICROgram(s) HFA Inhaler 2 Puff(s) Inhalation every 6 hours PRN Shortness of Breath and/or Wheezing    Cardiovascular Medications    Gastrointestinal Medications  senna 2 Tablet(s) Oral at bedtime PRN Constipation    Genitourinary Medications    Hematologic/Oncologic Medications  enoxaparin Injectable 40 milliGRAM(s) SubCutaneous daily  influenza   Vaccine 0.5 milliLiter(s) IntraMuscular once    Antimicrobial/Immunologic Medications  ampicillin/sulbactam  IVPB      ampicillin/sulbactam  IVPB 3 Gram(s) IV Intermittent every 6 hours    Endocrine/Metabolic Medications  atorvastatin 10 milliGRAM(s) Oral at bedtime    Topical/Other Medications  chlorhexidine 4% Liquid 1 Application(s) Topical daily    --------------------------------------------------------------------------------------    VITAL SIGNS:  ICU Vital Signs Last 24 Hrs  T(C): 36.5 (2022 00:00), Max: 37.1 (2022 04:00)  T(F): 97.7 (2022 00:00), Max: 98.8 (2022 04:00)  HR: 101 (:) (80 - 121)  BP: 109/54 (:) (102/52 - 123/58)  BP(mean): 68 () (63 - 82)  RR: 20 () (10 - 25)  SpO2: 97% () (89% - 98%)    --------------------------------------------------------------------------------------    INS AND OUTS:  I&O's Summary    15 Álvaro 2022 07:01  -  2022 07:00  --------------------------------------------------------  IN: 1376 mL / OUT: 2635 mL / NET: -1259 mL    2022 07:01  -  2022 01:44  --------------------------------------------------------  IN: 278 mL / OUT: 710 mL / NET: -432 mL      --------------------------------------------------------------------------------------          EXAM  NEUROLOGY  Exam: Normal, NAD, alert, oriented x3, no focal deficits.    HEENT  Exam: Normocephalic, EOMI; Right lateral and anterior neck with surgical site s/p staples with surrounding erythema and JESSEE drain with serosanguinous drainage; flap viable and CDI    RESPIRATORY  Exam: Lungs clear to auscultation, Normal expansion     CARDIOVASCULAR  Exam: S1, S2.  Regular rate and rhythm.      GI/NUTRITION  Exam: Abdomen soft, Non-tender, Non-distended.   Current Diet: Diet, NPO      METABOLIC / FLUIDS / ELECTROLYTES      HEMATOLOGIC  [x] VTE Prophylaxis: enoxaparin Injectable 40 milliGRAM(s) SubCutaneous daily      INFECTIOUS DISEASE  Antimicrobials/Immunologic Medications:  ampicillin/sulbactam  IVPB      ampicillin/sulbactam  IVPB 3 Gram(s) IV Intermittent every 6 hours  influenza   Vaccine 0.5 milliLiter(s) IntraMuscular once      TUBES / LINES / DRAINS***  [x] Peripheral IV  [] Central Venous Line     	[] R	[] L	[] IJ	[] Fem	[] SC	Date Placed:   [] Arterial Line		[] R	[] L	[] Fem	[] Rad	[] Ax	Date Placed:   [] PICC		[] Midline		[] Mediport  [] Urinary Catheter		Date Placed:   [x] Necessity of urinary, arterial, and venous catheters discussed    --------------------------------------------------------------------------------------    LABS      -16    138  |  100  |  14  ----------------------------<  118<H>  4.0   |  30  |  0.41<L>    Ca    8.6      2022 01:03  Phos  3.0     -  Mg     1.90     -16        ASSESSMENT:  56y Female with a PMHx HTN, HLD, 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 SICU consulted for q1 hr flap checks and new trach. L free fibula flap reconstruction of R mandibular and floor of mouth defect POD 3    Plan:  NEUROLOGIC   - Pain control: prn  - Tylenol and oxycodone    RESPIRATORY   - Monitor SpO2 goal >92%  - Incentive spirometry   - trach collar  - Albuterol as needed  - F/U PT, encourage ambulation today; patient OOB to chair yesterday  - Monitor suction frquency    CARDIOVASCULAR   - Patient with history of hypertension, on amlodipine and lisinopril at home  - Monitor hemodynamics   - BP WNL  - Monitor BPs, can resume home meds if becomes hypertensive    GASTROINTESTINAL   - NPO/NGT  - tube feeds in AM  via MARIBETH     /RENAL   - Strict I/Os  - Monitor BMP qd  - Monitor electrolytes, replete PRN    HEMATOLOGIC  - Monitor H/H   - DVT ppx: Lovenox in AM  - Monitor drain output; SS; strip as needed    INFECTIOUS DISEASE  - Unasyn while drain in place  - Monitor fever / WBC  - C/W LLE VAC    ENDOCRINE  - Monitor gluc    LINES  - PIV     DISPO: SICU, conditional list for floor pending suctioning and respiratory status  --------------------------------------------------------------------------------------    Critical Care Diagnoses: new free flap, new trach   SICU Daily Progress Note  =====================================================  Interval/Overnight Events:       - R neck blanching erythema; plastics removed some staples, minimal drainage noted, wound culture sent, packed with aquacel, remains afebrile  - Increased O2 requirement , diuresed with lasix, on trach collar      HPI:  57 yo female presents to PST unit with 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 So.  (28 Dec 2021 17:46)      PMH:  PAST MEDICAL & SURGICAL HISTORY:  H/O malignant neoplasm of gum    Hypertension    Hyperlipidemia    Mild asthma    H/O  section    History of hip replacement  left hip    History of partial hysterectomy        ALLERGIES:  morphine (Hives)      --------------------------------------------------------------------------------------    MEDICATIONS:    Neurologic Medications  acetaminophen    Suspension .. 975 milliGRAM(s) Oral every 6 hours  oxyCODONE    Solution 5 milliGRAM(s) Enteral Tube every 6 hours PRN Moderate Pain (4 - 6)  oxyCODONE    Solution 10 milliGRAM(s) Enteral Tube every 6 hours PRN Severe Pain (7 - 10)    Respiratory Medications  ALBUTerol    90 MICROgram(s) HFA Inhaler 2 Puff(s) Inhalation every 6 hours PRN Shortness of Breath and/or Wheezing    Cardiovascular Medications    Gastrointestinal Medications  senna 2 Tablet(s) Oral at bedtime PRN Constipation    Genitourinary Medications    Hematologic/Oncologic Medications  enoxaparin Injectable 40 milliGRAM(s) SubCutaneous daily  influenza   Vaccine 0.5 milliLiter(s) IntraMuscular once    Antimicrobial/Immunologic Medications  ampicillin/sulbactam  IVPB      ampicillin/sulbactam  IVPB 3 Gram(s) IV Intermittent every 6 hours    Endocrine/Metabolic Medications  atorvastatin 10 milliGRAM(s) Oral at bedtime    Topical/Other Medications  chlorhexidine 4% Liquid 1 Application(s) Topical daily    --------------------------------------------------------------------------------------    VITAL SIGNS:  ICU Vital Signs Last 24 Hrs  T(C): 36.5 (2022 00:00), Max: 37.1 (2022 04:00)  T(F): 97.7 (2022 00:00), Max: 98.8 (2022 04:00)  HR: 101 () (80 - 121)  BP: 109/54 () (102/52 - 123/58)  BP(mean): 68 () (63 - 82)  RR: 20 () (10 - 25)  SpO2: 97% () (89% - 98%)    --------------------------------------------------------------------------------------    INS AND OUTS:  I&O's Summary    15 Álvaro 2022 07:01  -  2022 07:00  --------------------------------------------------------  IN: 1376 mL / OUT: 2635 mL / NET: -1259 mL    2022 07:01  -  2022 01:44  --------------------------------------------------------  IN: 278 mL / OUT: 710 mL / NET: -432 mL      --------------------------------------------------------------------------------------          EXAM  NEUROLOGY  Exam: Normal, NAD, alert, oriented x3, no focal deficits.    HEENT  Exam: Normocephalic, EOMI; Right lateral and anterior neck with surgical site s/p staples with surrounding erythema and JESSEE drain with serosanguinous drainage; flap viable and CDI    RESPIRATORY  Exam: Lungs clear to auscultation, Normal expansion, on trach collar    CARDIOVASCULAR  Exam: S1, S2.  Regular rate and rhythm.      GI/NUTRITION  Exam: Abdomen soft, Non-tender, Non-distended.   Current Diet: Diet, NPO      METABOLIC / FLUIDS / ELECTROLYTES      HEMATOLOGIC  [x] VTE Prophylaxis: enoxaparin Injectable 40 milliGRAM(s) SubCutaneous daily      INFECTIOUS DISEASE  Antimicrobials/Immunologic Medications:  ampicillin/sulbactam  IVPB      ampicillin/sulbactam  IVPB 3 Gram(s) IV Intermittent every 6 hours  influenza   Vaccine 0.5 milliLiter(s) IntraMuscular once      TUBES / LINES / DRAINS***  [x] Peripheral IV  [] Central Venous Line     	[] R	[] L	[] IJ	[] Fem	[] SC	Date Placed:   [] Arterial Line		[] R	[] L	[] Fem	[] Rad	[] Ax	Date Placed:   [] PICC		[] Midline		[] Mediport  [] Urinary Catheter		Date Placed:   [x] Necessity of urinary, arterial, and venous catheters discussed    --------------------------------------------------------------------------------------    LABS          138  |  100  |  14  ----------------------------<  118<H>  4.0   |  30  |  0.41<L>    Ca    8.6      2022 01:03  Phos  3.0     -16  Mg     1.90     16        ASSESSMENT:  56y Female with a PMHx HTN, HLD, 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 SICU consulted for q1 hr flap checks and new trach. L free fibula flap reconstruction of R mandibular and floor of mouth defect POD 3    Plan:  NEUROLOGIC   - Pain control: prn  - Tylenol and oxycodone    RESPIRATORY   - Monitor SpO2 goal >92%  - Incentive spirometry   - trach collar  - Albuterol as needed  - F/U PT, encourage ambulation today; patient OOB to chair yesterday  - Monitor suction frquency    CARDIOVASCULAR   - Patient with history of hypertension, on amlodipine and lisinopril at home  - Monitor hemodynamics   - BP WNL  - Monitor BPs, can resume home meds if becomes hypertensive    GASTROINTESTINAL   - NPO/NGT  - tube feeds in AM  via MARIBETH     /RENAL   - Strict I/Os  - Monitor BMP qd  - Monitor electrolytes, replete PRN    HEMATOLOGIC  - Monitor H/H   - DVT ppx: Lovenox in AM  - Monitor drain output; SS; strip as needed    INFECTIOUS DISEASE  - Unasyn while drain in place  - Monitor fever / WBC  - C/W LLE VAC    ENDOCRINE  - Monitor gluc    LINES  - PIV     DISPO: SICU, conditional list for floor pending suctioning and respiratory status  --------------------------------------------------------------------------------------    Critical Care Diagnoses: new free flap, new trach   SICU Daily Progress Note  =====================================================  Interval/Overnight Events:       - R neck blanching erythema; plastics removed some staples, minimal drainage noted, wound culture sent, packed with aquacel, remains afebrile  - Increased O2 requirement , diuresed with lasix, on trach collar  -monitor suctioning frequency with conditional listing for the floor   -Zosyn for 14 days course  - Lasix 1/17AM  - monitor secretions      HPI:  57 yo female presents to PST unit with 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 So.  (28 Dec 2021 17:46)      PMH:  PAST MEDICAL & SURGICAL HISTORY:  H/O malignant neoplasm of gum    Hypertension    Hyperlipidemia    Mild asthma    H/O  section    History of hip replacement  left hip    History of partial hysterectomy        ALLERGIES:  morphine (Hives)      --------------------------------------------------------------------------------------    MEDICATIONS:    Neurologic Medications  acetaminophen    Suspension .. 975 milliGRAM(s) Oral every 6 hours  oxyCODONE    Solution 5 milliGRAM(s) Enteral Tube every 6 hours PRN Moderate Pain (4 - 6)  oxyCODONE    Solution 10 milliGRAM(s) Enteral Tube every 6 hours PRN Severe Pain (7 - 10)    Respiratory Medications  ALBUTerol    90 MICROgram(s) HFA Inhaler 2 Puff(s) Inhalation every 6 hours PRN Shortness of Breath and/or Wheezing    Cardiovascular Medications    Gastrointestinal Medications  senna 2 Tablet(s) Oral at bedtime PRN Constipation    Genitourinary Medications    Hematologic/Oncologic Medications  enoxaparin Injectable 40 milliGRAM(s) SubCutaneous daily  influenza   Vaccine 0.5 milliLiter(s) IntraMuscular once    Antimicrobial/Immunologic Medications  ampicillin/sulbactam  IVPB      ampicillin/sulbactam  IVPB 3 Gram(s) IV Intermittent every 6 hours    Endocrine/Metabolic Medications  atorvastatin 10 milliGRAM(s) Oral at bedtime    Topical/Other Medications  chlorhexidine 4% Liquid 1 Application(s) Topical daily    --------------------------------------------------------------------------------------    VITAL SIGNS:  ICU Vital Signs Last 24 Hrs  T(C): 36.5 (2022 00:00), Max: 37.1 (2022 04:00)  T(F): 97.7 (2022 00:00), Max: 98.8 (2022 04:00)  HR: 101 (:00) (80 - 121)  BP: 109/54 (2022 01:00) (102/52 - 123/58)  BP(mean): 68 (:00) (63 - 82)  RR: 20 (2022 01:00) (10 - 25)  SpO2: 97% (:) (89% - 98%)    --------------------------------------------------------------------------------------    INS AND OUTS:  I&O's Summary    15 Álvaro 2022 07:01  -  2022 07:00  --------------------------------------------------------  IN: 1376 mL / OUT: 2635 mL / NET: -1259 mL    2022 07:01  -  2022 01:44  --------------------------------------------------------  IN: 278 mL / OUT: 710 mL / NET: -432 mL      --------------------------------------------------------------------------------------          EXAM  NEUROLOGY  Exam: Normal, NAD, alert, oriented x3, no focal deficits.    HEENT  Exam: Normocephalic, EOMI; Right lateral and anterior neck with surgical site s/p staples with surrounding erythema and JESSEE drain with serosanguinous drainage; flap viable and CDI    RESPIRATORY  Exam: Lungs clear to auscultation, Normal expansion, on trach collar    CARDIOVASCULAR  Exam: S1, S2.  Regular rate and rhythm.      GI/NUTRITION  Exam: Abdomen soft, Non-tender, Non-distended.   Current Diet: Diet, NPO      METABOLIC / FLUIDS / ELECTROLYTES      HEMATOLOGIC  [x] VTE Prophylaxis: enoxaparin Injectable 40 milliGRAM(s) SubCutaneous daily      INFECTIOUS DISEASE  Antimicrobials/Immunologic Medications:  ampicillin/sulbactam  IVPB      ampicillin/sulbactam  IVPB 3 Gram(s) IV Intermittent every 6 hours  influenza   Vaccine 0.5 milliLiter(s) IntraMuscular once      TUBES / LINES / DRAINS***  [x] Peripheral IV  [] Central Venous Line     	[] R	[] L	[] IJ	[] Fem	[] SC	Date Placed:   [] Arterial Line		[] R	[] L	[] Fem	[] Rad	[] Ax	Date Placed:   [] PICC		[] Midline		[] Mediport  [] Urinary Catheter		Date Placed:   [x] Necessity of urinary, arterial, and venous catheters discussed    --------------------------------------------------------------------------------------    LABS          138  |  100  |  14  ----------------------------<  118<H>  4.0   |  30  |  0.41<L>    Ca    8.6      2022 01:03  Phos  3.0       Mg     1.90             ASSESSMENT:  56y Female with a PMHx HTN, HLD, 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 SICU consulted for q1 hr flap checks and new trach. s/p L free fibula flap reconstruction of R mandibular and floor of mouth defect.    Plan:    NEUROLOGIC   - Pain control: prn  - Tylenol and oxycodone    RESPIRATORY   - Monitor SpO2 goal >92%  - Incentive spirometry   - trach collar  - Albuterol as needed  - F/U PT, encourage ambulation today; patient OOB to chair yesterday  - Monitor suction frequency    CARDIOVASCULAR   - Patient with history of hypertension, on amlodipine and lisinopril at home  - Monitor hemodynamics   - BP WNL  - Monitor BPs, can resume home meds if becomes hypertensive    GASTROINTESTINAL   - NPO/NGT  - tube feeds in AM  via MARIBETH     /RENAL   - Strict I/Os  - Monitor BMP qd  - Monitor electrolytes, replete PRN    HEMATOLOGIC  - Monitor H/H   - DVT ppx: Lovenox in AM  - Monitor drain output; SS; strip as needed    INFECTIOUS DISEASE  - Unasyn while drain in place  - Monitor fever / WBC  - C/W LLE VAC    ENDOCRINE  - Monitor gluc    LINES  - PIV     DISPO: SICU, conditional list for floor pending suctioning and respiratory status

## 2022-01-17 NOTE — PROGRESS NOTE ADULT - SUBJECTIVE AND OBJECTIVE BOX
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    Interval:  1/14: Examined at bedside this morning. HELEN A-line dc'ed. Started on albuterol.  1/15: Examined at bedside this morning. HELEN Dobbs dc'd, passed TO. OOBTC  1/16: Exmained at [facility]. TEE. OOBTC. still on IV abx. CXR overnight with posible pleural effuison s/p lasix. Trach changed from 6LPC to 6CFS secured with soft trach tie.   1/17: Pt seen and examined at the bedside. Pt's neck appeared to be erythematous yesterday and was opened up by PRS with serosanguinis fluid expressed. Packed with aquacel by PRS. Today, feeling well and doing well with 6 CFS. She tolerated finger occlusion and her trach was subsequently changed to a 4 FCS and was capped.     Vital Signs Last 24 Hrs  T(C): 36 (17 Jan 2022 08:00), Max: 37.1 (16 Jan 2022 12:00)  T(F): 96.8 (17 Jan 2022 08:00), Max: 98.8 (16 Jan 2022 12:00)  HR: 96 (17 Jan 2022 10:00) (88 - 121)  BP: 117/99 (17 Jan 2022 10:00) (102/60 - 123/58)  BP(mean): 103 (17 Jan 2022 10:00) (65 - 103)  RR: 21 (17 Jan 2022 10:00) (10 - 25)  SpO2: 93% (17 Jan 2022 10:00) (89% - 98%)      Physical Exam:  Alert, NAD  Neck right side warm with stable erythema  Trach 4CFS secured with soft trach tie and capped    Flap warm and well perfused, strong triphasic signal, incisions c/d/i  Nonlabored Respirations RA

## 2022-01-17 NOTE — PROGRESS NOTE ADULT - ASSESSMENT
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

## 2022-01-18 LAB
ANION GAP SERPL CALC-SCNC: 9 MMOL/L — SIGNIFICANT CHANGE UP (ref 7–14)
BUN SERPL-MCNC: 25 MG/DL — HIGH (ref 7–23)
CA-I BLD-SCNC: 1.12 MMOL/L — LOW (ref 1.15–1.29)
CALCIUM SERPL-MCNC: 9.1 MG/DL — SIGNIFICANT CHANGE UP (ref 8.4–10.5)
CHLORIDE SERPL-SCNC: 99 MMOL/L — SIGNIFICANT CHANGE UP (ref 98–107)
CO2 SERPL-SCNC: 33 MMOL/L — HIGH (ref 22–31)
CREAT SERPL-MCNC: 0.45 MG/DL — LOW (ref 0.5–1.3)
GLUCOSE BLDC GLUCOMTR-MCNC: 131 MG/DL — HIGH (ref 70–99)
GLUCOSE SERPL-MCNC: 141 MG/DL — HIGH (ref 70–99)
HCT VFR BLD CALC: 31.7 % — LOW (ref 34.5–45)
HGB BLD-MCNC: 10.1 G/DL — LOW (ref 11.5–15.5)
MAGNESIUM SERPL-MCNC: 1.9 MG/DL — SIGNIFICANT CHANGE UP (ref 1.6–2.6)
MCHC RBC-ENTMCNC: 29.3 PG — SIGNIFICANT CHANGE UP (ref 27–34)
MCHC RBC-ENTMCNC: 31.9 GM/DL — LOW (ref 32–36)
MCV RBC AUTO: 91.9 FL — SIGNIFICANT CHANGE UP (ref 80–100)
NRBC # BLD: 0 /100 WBCS — SIGNIFICANT CHANGE UP
NRBC # FLD: 0 K/UL — SIGNIFICANT CHANGE UP
PHOSPHATE SERPL-MCNC: 4.6 MG/DL — HIGH (ref 2.5–4.5)
PLATELET # BLD AUTO: 213 K/UL — SIGNIFICANT CHANGE UP (ref 150–400)
POTASSIUM SERPL-MCNC: 4.1 MMOL/L — SIGNIFICANT CHANGE UP (ref 3.5–5.3)
POTASSIUM SERPL-SCNC: 4.1 MMOL/L — SIGNIFICANT CHANGE UP (ref 3.5–5.3)
RBC # BLD: 3.45 M/UL — LOW (ref 3.8–5.2)
RBC # FLD: 14 % — SIGNIFICANT CHANGE UP (ref 10.3–14.5)
SODIUM SERPL-SCNC: 141 MMOL/L — SIGNIFICANT CHANGE UP (ref 135–145)
WBC # BLD: 6.28 K/UL — SIGNIFICANT CHANGE UP (ref 3.8–10.5)
WBC # FLD AUTO: 6.28 K/UL — SIGNIFICANT CHANGE UP (ref 3.8–10.5)

## 2022-01-18 RX ORDER — OXYCODONE HYDROCHLORIDE 5 MG/1
10 TABLET ORAL EVERY 4 HOURS
Refills: 0 | Status: DISCONTINUED | OUTPATIENT
Start: 2022-01-18 | End: 2022-01-19

## 2022-01-18 RX ORDER — CALCIUM GLUCONATE 100 MG/ML
2 VIAL (ML) INTRAVENOUS ONCE
Refills: 0 | Status: COMPLETED | OUTPATIENT
Start: 2022-01-18 | End: 2022-01-18

## 2022-01-18 RX ORDER — POLYETHYLENE GLYCOL 3350 17 G/17G
17 POWDER, FOR SOLUTION ORAL DAILY
Refills: 0 | Status: DISCONTINUED | OUTPATIENT
Start: 2022-01-18 | End: 2022-01-22

## 2022-01-18 RX ORDER — SODIUM CHLORIDE 9 MG/ML
4 INJECTION INTRAMUSCULAR; INTRAVENOUS; SUBCUTANEOUS EVERY 12 HOURS
Refills: 0 | Status: DISCONTINUED | OUTPATIENT
Start: 2022-01-18 | End: 2022-01-22

## 2022-01-18 RX ORDER — OXYCODONE HYDROCHLORIDE 5 MG/1
5 TABLET ORAL EVERY 4 HOURS
Refills: 0 | Status: DISCONTINUED | OUTPATIENT
Start: 2022-01-18 | End: 2022-01-19

## 2022-01-18 RX ORDER — ALBUTEROL 90 UG/1
2.5 AEROSOL, METERED ORAL EVERY 12 HOURS
Refills: 0 | Status: DISCONTINUED | OUTPATIENT
Start: 2022-01-18 | End: 2022-01-22

## 2022-01-18 RX ADMIN — Medication 975 MILLIGRAM(S): at 12:30

## 2022-01-18 RX ADMIN — OXYCODONE HYDROCHLORIDE 10 MILLIGRAM(S): 5 TABLET ORAL at 10:59

## 2022-01-18 RX ADMIN — OXYCODONE HYDROCHLORIDE 10 MILLIGRAM(S): 5 TABLET ORAL at 17:55

## 2022-01-18 RX ADMIN — AMPICILLIN SODIUM AND SULBACTAM SODIUM 200 GRAM(S): 250; 125 INJECTION, POWDER, FOR SUSPENSION INTRAMUSCULAR; INTRAVENOUS at 03:39

## 2022-01-18 RX ADMIN — OXYCODONE HYDROCHLORIDE 10 MILLIGRAM(S): 5 TABLET ORAL at 18:25

## 2022-01-18 RX ADMIN — POLYETHYLENE GLYCOL 3350 17 GRAM(S): 17 POWDER, FOR SOLUTION ORAL at 12:25

## 2022-01-18 RX ADMIN — AMPICILLIN SODIUM AND SULBACTAM SODIUM 200 GRAM(S): 250; 125 INJECTION, POWDER, FOR SUSPENSION INTRAMUSCULAR; INTRAVENOUS at 20:32

## 2022-01-18 RX ADMIN — Medication 975 MILLIGRAM(S): at 06:05

## 2022-01-18 RX ADMIN — AMPICILLIN SODIUM AND SULBACTAM SODIUM 200 GRAM(S): 250; 125 INJECTION, POWDER, FOR SUSPENSION INTRAMUSCULAR; INTRAVENOUS at 13:32

## 2022-01-18 RX ADMIN — OXYCODONE HYDROCHLORIDE 10 MILLIGRAM(S): 5 TABLET ORAL at 22:45

## 2022-01-18 RX ADMIN — ALBUTEROL 2.5 MILLIGRAM(S): 90 AEROSOL, METERED ORAL at 21:38

## 2022-01-18 RX ADMIN — Medication 200 GRAM(S): at 06:05

## 2022-01-18 RX ADMIN — OXYCODONE HYDROCHLORIDE 5 MILLIGRAM(S): 5 TABLET ORAL at 04:00

## 2022-01-18 RX ADMIN — ENOXAPARIN SODIUM 40 MILLIGRAM(S): 100 INJECTION SUBCUTANEOUS at 12:25

## 2022-01-18 RX ADMIN — Medication 975 MILLIGRAM(S): at 17:54

## 2022-01-18 RX ADMIN — CHLORHEXIDINE GLUCONATE 1 APPLICATION(S): 213 SOLUTION TOPICAL at 12:25

## 2022-01-18 RX ADMIN — Medication 975 MILLIGRAM(S): at 18:24

## 2022-01-18 RX ADMIN — Medication 975 MILLIGRAM(S): at 12:00

## 2022-01-18 RX ADMIN — Medication 975 MILLIGRAM(S): at 07:27

## 2022-01-18 RX ADMIN — OXYCODONE HYDROCHLORIDE 5 MILLIGRAM(S): 5 TABLET ORAL at 03:23

## 2022-01-18 RX ADMIN — OXYCODONE HYDROCHLORIDE 10 MILLIGRAM(S): 5 TABLET ORAL at 09:59

## 2022-01-18 RX ADMIN — AMPICILLIN SODIUM AND SULBACTAM SODIUM 200 GRAM(S): 250; 125 INJECTION, POWDER, FOR SUSPENSION INTRAMUSCULAR; INTRAVENOUS at 08:02

## 2022-01-18 RX ADMIN — SODIUM CHLORIDE 4 MILLILITER(S): 9 INJECTION INTRAMUSCULAR; INTRAVENOUS; SUBCUTANEOUS at 21:38

## 2022-01-18 RX ADMIN — OXYCODONE HYDROCHLORIDE 10 MILLIGRAM(S): 5 TABLET ORAL at 22:32

## 2022-01-18 NOTE — PROGRESS NOTE ADULT - ASSESSMENT
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    - clear liquid diet, if tolerating PO can dc NGT  - Pt cleared to go to floor from ENT perspective   - PT/OOBTC  - Nutrition consult  - ERAS protocol  - monitor JESSEE output  - Trach care  - Flap care per OMFS  - Care per SICU

## 2022-01-18 NOTE — PROGRESS NOTE ADULT - ASSESSMENT
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

## 2022-01-18 NOTE — PROGRESS NOTE ADULT - ASSESSMENT
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    - Monitor JESSEE  - C/w OOBTC   - possible advance to clears if tolerating w/ TF   - routine trach care per ENT, possible capping   - flap management per PRS   - Rest of care per primary team  - Appreciate excellent SICU care       [facility]   #[phone]

## 2022-01-18 NOTE — PROGRESS NOTE ADULT - SUBJECTIVE AND OBJECTIVE BOX
SICU Daily Progress Note  =====================================================  Interval/Overnight Events:       - Lasix M--> put out 600cc  - Trach initially capped but pt did not tolerate, uncapped again  -q4 flap checks  -monitor suctioning frequency with conditional listing for the floor (currently q2 suction)  -Possible trial trach cap       HPI:  57 yo female presents to PST unit with 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 So.  (28 Dec 2021 17:46)      PMH:  PAST MEDICAL & SURGICAL HISTORY:  H/O malignant neoplasm of gum    Hypertension    Hyperlipidemia    Mild asthma    H/O  section    History of hip replacement  left hip    History of partial hysterectomy        ALLERGIES:  morphine (Hives)      --------------------------------------------------------------------------------------    MEDICATIONS:    Neurologic Medications  acetaminophen    Suspension .. 975 milliGRAM(s) Oral every 6 hours  oxyCODONE    Solution 5 milliGRAM(s) Enteral Tube every 6 hours PRN Moderate Pain (4 - 6)  oxyCODONE    Solution 10 milliGRAM(s) Enteral Tube every 6 hours PRN Severe Pain (7 - 10)    Respiratory Medications  ALBUTerol    90 MICROgram(s) HFA Inhaler 2 Puff(s) Inhalation every 6 hours PRN Shortness of Breath and/or Wheezing    Cardiovascular Medications    Gastrointestinal Medications  senna 2 Tablet(s) Oral at bedtime PRN Constipation    Genitourinary Medications    Hematologic/Oncologic Medications  enoxaparin Injectable 40 milliGRAM(s) SubCutaneous daily  influenza   Vaccine 0.5 milliLiter(s) IntraMuscular once    Antimicrobial/Immunologic Medications  ampicillin/sulbactam  IVPB 3 Gram(s) IV Intermittent every 6 hours  ampicillin/sulbactam  IVPB        Endocrine/Metabolic Medications  atorvastatin 10 milliGRAM(s) Oral at bedtime    Topical/Other Medications  chlorhexidine 4% Liquid 1 Application(s) Topical daily    --------------------------------------------------------------------------------------    VITAL SIGNS:  ICU Vital Signs Last 24 Hrs  T(C): 36.5 (2022 23:00), Max: 36.8 (2022 19:00)  T(F): 97.7 (:00), Max: 98.2 (2022 19:00)  HR: 90 (:) (87 - 117)  BP: 137/67 (:00) (109/54 - 137/111)  BP(mean): 86 (:00) (65 - 117)  RR: 15 (:) (15 - 25)  SpO2: 97% (:) (91% - 98%)    --------------------------------------------------------------------------------------    INS AND OUTS:  I&O's Summary    2022 07:01  -  2022 07:00  --------------------------------------------------------  IN: 278 mL / OUT: 1920 mL / NET: -1642 mL    2022 07:01  -  2022 00:47  --------------------------------------------------------  IN: 750 mL / OUT: 1915 mL / NET: -1165 mL      --------------------------------------------------------------------------------------    EXAM  NEUROLOGY  Exam: Normal, NAD, alert, oriented x3, no focal deficits.    HEENT  Exam: Normocephalic, EOMI; Right lateral and anterior neck with surgical site s/p staples with surrounding erythema and JESSEE drain with serosanguinous drainage; flap viable and CDI    RESPIRATORY  Exam: Lungs clear to auscultation, Normal expansion, on trach collar    CARDIOVASCULAR  Exam: S1, S2.  Regular rate and rhythm.      GI/NUTRITION  Exam: Abdomen soft, Non-tender, Non-distended.   Current Diet: Diet, NPO        HEMATOLOGIC  [x] VTE Prophylaxis: enoxaparin Injectable 40 milliGRAM(s) SubCutaneous daily    INFECTIOUS DISEASE  Antimicrobials/Immunologic Medications:  ampicillin/sulbactam  IVPB 3 Gram(s) IV Intermittent every 6 hours  ampicillin/sulbactam  IVPB      influenza   Vaccine 0.5 milliLiter(s) IntraMuscular once      TUBES / LINES / DRAINS***  [x] Peripheral IV  [] Central Venous Line     	[] R	[] L	[] IJ	[] Fem	[] SC	Date Placed:   [] Arterial Line		[] R	[] L	[] Fem	[] Rad	[] Ax	Date Placed:   [] PICC		[] Midline		[] Mediport  [] Urinary Catheter		Date Placed:   [x] Necessity of urinary, arterial, and venous catheters discussed    --------------------------------------------------------------------------------------    LABS                                              9.6                   Neurophils% (auto):   x      ( @ 02:51):    7.86 )-----------(200          Lymphocytes% (auto):  x                                             30.4                   Eosinphils% (auto):   x        Manual%: Neutrophils x    ; Lymphocytes x    ; Eosinophils x    ; Bands%: x    ; Blasts x              139  |  100  |  21  ----------------------------<  140<H>  3.8   |  33<H>  |  0.48<L>    Ca    9.0      2022 02:51  Phos  3.9       Mg     2.00             ASSESSMENT:  56y Female with a PMHx HTN, HLD, 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 SICU consulted for q1 hr flap checks and new trach. s/p L free fibula flap reconstruction of R mandibular and floor of mouth defect.    Plan:  NEUROLOGIC   - Pain control: prn  - Tylenol and oxycodone    RESPIRATORY   - Monitor SpO2 goal >92%  - Incentive spirometry   - trach collar  - Albuterol as needed  - F/U PT, encourage ambulation today; patient OOB to chair yesterday  - Monitor suction frequency  - Possible trach cap     CARDIOVASCULAR   - Patient with history of hypertension, on amlodipine and lisinopril at home  - Monitor hemodynamics   - BP WNL  - Monitor BPs, can resume home meds if becomes hypertensive    GASTROINTESTINAL   - NPO/NGT  - tube feeds in AM 14 via MARIBETH     /RENAL   - Strict I/Os  - Monitor BMP qd  - Monitor electrolytes, replete PRN    HEMATOLOGIC  - Monitor H/H   - DVT ppx: Lovenox  - Monitor drain output; SS; strip as needed    INFECTIOUS DISEASE  - Unasyn while drains in place  - Monitor fever / WBC  - C/w LLE VAC    ENDOCRINE  - Monitor glc    LINES  - PIV     DISPO: SICU, conditional list for floor pending suctioning and respiratory status SICU Daily Progress Note  =====================================================  Interval/Overnight Events:       - Trach initially capped but pt did not tolerate, uncapped again  - q4 flap checks  - Possible trial trach cap   - Start home lisinopril  - Start humidified air for secretions      HPI:  57 yo female presents to PST unit with 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 So.  (28 Dec 2021 17:46)      PMH:  PAST MEDICAL & SURGICAL HISTORY:  H/O malignant neoplasm of gum    Hypertension    Hyperlipidemia    Mild asthma    H/O  section    History of hip replacement  left hip    History of partial hysterectomy        ALLERGIES:  morphine (Hives)      --------------------------------------------------------------------------------------    MEDICATIONS:    Neurologic Medications  acetaminophen    Suspension .. 975 milliGRAM(s) Oral every 6 hours  oxyCODONE    Solution 5 milliGRAM(s) Enteral Tube every 6 hours PRN Moderate Pain (4 - 6)  oxyCODONE    Solution 10 milliGRAM(s) Enteral Tube every 6 hours PRN Severe Pain (7 - 10)    Respiratory Medications  ALBUTerol    90 MICROgram(s) HFA Inhaler 2 Puff(s) Inhalation every 6 hours PRN Shortness of Breath and/or Wheezing    Cardiovascular Medications    Gastrointestinal Medications  senna 2 Tablet(s) Oral at bedtime PRN Constipation    Genitourinary Medications    Hematologic/Oncologic Medications  enoxaparin Injectable 40 milliGRAM(s) SubCutaneous daily  influenza   Vaccine 0.5 milliLiter(s) IntraMuscular once    Antimicrobial/Immunologic Medications  ampicillin/sulbactam  IVPB 3 Gram(s) IV Intermittent every 6 hours  ampicillin/sulbactam  IVPB        Endocrine/Metabolic Medications  atorvastatin 10 milliGRAM(s) Oral at bedtime    Topical/Other Medications  chlorhexidine 4% Liquid 1 Application(s) Topical daily    --------------------------------------------------------------------------------------    VITAL SIGNS:  ICU Vital Signs Last 24 Hrs  T(C): 36.5 (2022 23:00), Max: 36.8 (2022 19:00)  T(F): 97.7 (2022 23:00), Max: 98.2 (2022 19:00)  HR: 90 (:00) (87 - 117)  BP: 137/67 (:00) (109/54 - 137/111)  BP(mean): 86 (:00) (65 - 117)  RR: 15 (:) (15 - 25)  SpO2: 97% (:) (91% - 98%)    --------------------------------------------------------------------------------------    INS AND OUTS:  I&O's Summary    2022 07:01  -  2022 07:00  --------------------------------------------------------  IN: 278 mL / OUT: 1920 mL / NET: -1642 mL    2022 07:01  -  2022 00:47  --------------------------------------------------------  IN: 750 mL / OUT: 1915 mL / NET: -1165 mL      --------------------------------------------------------------------------------------    EXAM  NEUROLOGY  Exam: Normal, NAD, alert, oriented x3, no focal deficits.    HEENT  Exam: Normocephalic, EOMI; Right lateral and anterior neck with surgical site s/p staples with surrounding erythema and JESSEE drain with serosanguinous drainage; flap viable and CDI    RESPIRATORY  Exam: Lungs clear to auscultation, Normal expansion, on trach collar    CARDIOVASCULAR  Exam: S1, S2.  Regular rate and rhythm.      GI/NUTRITION  Exam: Abdomen soft, Non-tender, Non-distended.   Current Diet: Diet, NPO        HEMATOLOGIC  [x] VTE Prophylaxis: enoxaparin Injectable 40 milliGRAM(s) SubCutaneous daily    INFECTIOUS DISEASE  Antimicrobials/Immunologic Medications:  ampicillin/sulbactam  IVPB 3 Gram(s) IV Intermittent every 6 hours  ampicillin/sulbactam  IVPB      influenza   Vaccine 0.5 milliLiter(s) IntraMuscular once      TUBES / LINES / DRAINS***  [x] Peripheral IV  [] Central Venous Line     	[] R	[] L	[] IJ	[] Fem	[] SC	Date Placed:   [] Arterial Line		[] R	[] L	[] Fem	[] Rad	[] Ax	Date Placed:   [] PICC		[] Midline		[] Mediport  [] Urinary Catheter		Date Placed:   [x] Necessity of urinary, arterial, and venous catheters discussed    --------------------------------------------------------------------------------------    LABS                                              9.6                   Neurophils% (auto):   x      ( @ 02:51):    7.86 )-----------(200          Lymphocytes% (auto):  x                                             30.4                   Eosinphils% (auto):   x        Manual%: Neutrophils x    ; Lymphocytes x    ; Eosinophils x    ; Bands%: x    ; Blasts x              139  |  100  |  21  ----------------------------<  140<H>  3.8   |  33<H>  |  0.48<L>    Ca    9.0      2022 02:51  Phos  3.9       Mg     2.00             ASSESSMENT:  56y Female with a PMHx HTN, HLD, 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 SICU consulted for q1 hr flap checks and new trach. s/p L free fibula flap reconstruction of R mandibular and floor of mouth defect.    Interval events  - Trach initially capped but pt did not tolerate, uncapped again  - q4 flap checks  - Possible trial trach cap   - Start home lisinopril  - Start humidified air for secretions    NEUROLOGIC   - Pain control: prn  - Tylenol and oxycodone    RESPIRATORY   - Monitor SpO2 goal >92%  - Incentive spirometry   - trach collar  - Albuterol as needed  - F/U PT, encourage ambulation today; patient OOB to chair yesterday  - Possible trach cap   - Humidified air for secretions    CARDIOVASCULAR   - Patient with history of hypertension, on amlodipine and lisinopril at home  - Becoming hypertensive, will restart home lisinopril today    GASTROINTESTINAL   - NGT with Tube feeds  - Clear liquid diet    /RENAL   - Strict I/Os  - Monitor BMP qd  - Monitor electrolytes, replete PRN    HEMATOLOGIC  - Monitor H/H   - DVT ppx: Lovenox  - Monitor drain output; SS; strip as needed    INFECTIOUS DISEASE  - Unasyn while drains in place  - Monitor fever / WBC  - C/w LLE VAC    ENDOCRINE  - Monitor glc    LINES  - PIV     DISPO: SICU, conditional list for floor pending suctioning and respiratory status

## 2022-01-18 NOTE — PROGRESS NOTE ADULT - SUBJECTIVE AND OBJECTIVE BOX
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    INTERVAL EVENTS/SUBJECTIVE:   - Trach initially capped but pt did not tolerate, uncapped again  -q4 flap checks  -monitor suctioning frequency with conditional listing for the floor (currently q2 suction)  -Possible trial trach cap 1/18    ______________________________________________  OBJECTIVE:   T(C): 36.5 (01-18-22 @ 03:00), Max: 36.8 (01-17-22 @ 19:00)  HR: 104 (01-18-22 @ 07:00) (86 - 117)  BP: 119/66 (01-18-22 @ 07:00) (112/59 - 149/77)  RR: 19 (01-18-22 @ 07:00) (15 - 25)  SpO2: 96% (01-18-22 @ 07:00) (90% - 97%)  Wt(kg): --  CAPILLARY BLOOD GLUCOSE        I&O's Detail    17 Jan 2022 07:01  -  18 Jan 2022 07:00  --------------------------------------------------------  IN:    IV PiggyBack: 200 mL    Jevity 1.2: 1200 mL  Total IN: 1400 mL    OUT:    Drain (mL): 40 mL    VAC (Vacuum Assisted Closure) System (mL): 0 mL    Voided (mL): 2500 mL  Total OUT: 2540 mL    Total NET: -1140 mL      PHYSICAL EXAM:  Gen: NAD, sitting upright in bed comfortably   EOE: R neck edema c/w surgery, soft. Neck incision hemostatic, mild bruising at suture line. + [place] doppler. JPx1, SS output. Trach in place on collar. NGT R naris   IOE: Flap viable, appropriate color, well perfused. No signs of congestion.   Extremities: L leg wound vac holding suction   NERVOUS SYSTEM:  A&Ox3, no focal deficits   ______________________________________________  LABS:  CBC Full  -  ( 18 Jan 2022 03:25 )  WBC Count : 6.28 K/uL  RBC Count : 3.45 M/uL  Hemoglobin : 10.1 g/dL  Hematocrit : 31.7 %  Platelet Count - Automated : 213 K/uL  Mean Cell Volume : 91.9 fL  Mean Cell Hemoglobin : 29.3 pg  Mean Cell Hemoglobin Concentration : 31.9 gm/dL      01-18    141  |  99  |  25<H>  ----------------------------<  141<H>  4.1   |  33<H>  |  0.45<L>    Ca    9.1      18 Jan 2022 03:25  Phos  4.6     01-18  Mg     1.90     01-18        MEDICATIONS  (STANDING):  acetaminophen    Suspension .. 975 milliGRAM(s) Oral every 6 hours  ampicillin/sulbactam  IVPB      ampicillin/sulbactam  IVPB 3 Gram(s) IV Intermittent every 6 hours  atorvastatin 10 milliGRAM(s) Oral at bedtime  chlorhexidine 4% Liquid 1 Application(s) Topical daily  enoxaparin Injectable 40 milliGRAM(s) SubCutaneous daily  influenza   Vaccine 0.5 milliLiter(s) IntraMuscular once    MEDICATIONS  (PRN):  ALBUTerol    90 MICROgram(s) HFA Inhaler 2 Puff(s) Inhalation every 6 hours PRN Shortness of Breath and/or Wheezing  oxyCODONE    Solution 5 milliGRAM(s) Enteral Tube every 6 hours PRN Moderate Pain (4 - 6)  oxyCODONE    Solution 10 milliGRAM(s) Enteral Tube every 6 hours PRN Severe Pain (7 - 10)  senna 2 Tablet(s) Oral at bedtime PRN Constipation

## 2022-01-18 NOTE — PROGRESS NOTE ADULT - SUBJECTIVE AND OBJECTIVE BOX
Plastic Surgery  Daily Progress Note     Subjective/24 Hour Events:  Patient seen and examined on morning rounds.   Doing generally okay.     Objective:    Vitals:  T(C): 36.5 (01-18-22 @ 03:00), Max: 36.8 (01-17-22 @ 19:00)  HR: 96 (01-18-22 @ 06:27) (86 - 117)  BP: 124/64 (01-18-22 @ 06:00) (112/59 - 149/77)  RR: 22 (01-18-22 @ 06:27) (15 - 25)  SpO2: 90% (01-18-22 @ 06:27) (90% - 98%)    I/O:     01-17-22 @ 07:01  -  01-18-22 @ 07:00  --------------------------------------------------------  IN: 1400 mL / OUT: 2540 mL / NET: -1140 mL      Physical Exam:     -- CONSTITUTIONAL: NAD, lying in bed  -- NEURO: Awake, alert  -- HEENT: Flap viable, well-perfused, appropriate color, with 2-3 second capillary refill and (+) cook Doppler signal, suture line intact  -- NECK: kely-incisional blanching erythema J/p drains s/s  RLE:  dressing c/d/i  JESSEE s/s     LABS              10.1   6.28  )-----------( 213      ( 18 Jan 2022 03:25 )             31.7     01-18    141  |  99  |  25<H>  ----------------------------<  141<H>  4.1   |  33<H>  |  0.45<L>    Ca    9.1      18 Jan 2022 03:25  Phos  4.6     01-18  Mg     1.90     01-18            Allergies:  morphine (Hives)      Meds:   acetaminophen    Suspension .. 975 milliGRAM(s) Oral every 6 hours  ALBUTerol    90 MICROgram(s) HFA Inhaler 2 Puff(s) Inhalation every 6 hours PRN  ampicillin/sulbactam  IVPB 3 Gram(s) IV Intermittent every 6 hours  ampicillin/sulbactam  IVPB      atorvastatin 10 milliGRAM(s) Oral at bedtime  chlorhexidine 4% Liquid 1 Application(s) Topical daily  enoxaparin Injectable 40 milliGRAM(s) SubCutaneous daily  influenza   Vaccine 0.5 milliLiter(s) IntraMuscular once  oxyCODONE    Solution 5 milliGRAM(s) Enteral Tube every 6 hours PRN  oxyCODONE    Solution 10 milliGRAM(s) Enteral Tube every 6 hours PRN  senna 2 Tablet(s) Oral at bedtime PRN

## 2022-01-18 NOTE — PROGRESS NOTE ADULT - SUBJECTIVE AND OBJECTIVE BOX
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    Interval:  1/14: Examined at bedside this morning. HELEN BARCENAS-line dc'ed. Started on albuterol.  1/15: Examined at bedside this morning. HELEN Dobbs dc'd, passed TO. OOBTC  1/16: Exmained at [facility]. TEE. OOBTC. still on IV abx. CXR overnight with posible pleural effuison s/p lasix. Trach changed from 6LPC to 6CFS secured with soft trach tie.   1/17: Pt seen and examined at the bedside. Pt's neck appeared to be erythematous yesterday and was opened up by PRS with serosanguinis fluid expressed. Packed with aquacel by PRS. Today, feeling well and doing well with 6 CFS. She tolerated finger occlusion and her trach was subsequently changed to a 4 FCS and was capped.   1/18: sob with cap, cap removed, 4CFS still in place     Vital Signs Last 24 Hrs  T(C): 36.5 (18 Jan 2022 03:00), Max: 36.8 (17 Jan 2022 19:00)  T(F): 97.7 (18 Jan 2022 03:00), Max: 98.2 (17 Jan 2022 19:00)  HR: 96 (18 Jan 2022 06:27) (87 - 117)  BP: 127/66 (18 Jan 2022 04:00) (112/59 - 149/77)  BP(mean): 81 (18 Jan 2022 04:00) (73 - 117)  RR: 22 (18 Jan 2022 06:27) (15 - 25)  SpO2: 90% (18 Jan 2022 06:27) (90% - 98%)      Physical Exam:  Alert, NAD  Neck right side warm with stable erythema  Trach 4CFS secured with soft trach tie, not capped   Flap warm and well perfused, strong triphasic signal, incisions c/d/i  Nonlabored Respirations RA

## 2022-01-19 LAB
ANION GAP SERPL CALC-SCNC: 9 MMOL/L — SIGNIFICANT CHANGE UP (ref 7–14)
BUN SERPL-MCNC: 25 MG/DL — HIGH (ref 7–23)
CALCIUM SERPL-MCNC: 9 MG/DL — SIGNIFICANT CHANGE UP (ref 8.4–10.5)
CHLORIDE SERPL-SCNC: 100 MMOL/L — SIGNIFICANT CHANGE UP (ref 98–107)
CO2 SERPL-SCNC: 31 MMOL/L — SIGNIFICANT CHANGE UP (ref 22–31)
CREAT SERPL-MCNC: 0.45 MG/DL — LOW (ref 0.5–1.3)
GLUCOSE BLDC GLUCOMTR-MCNC: 134 MG/DL — HIGH (ref 70–99)
GLUCOSE BLDC GLUCOMTR-MCNC: 145 MG/DL — HIGH (ref 70–99)
GLUCOSE BLDC GLUCOMTR-MCNC: 165 MG/DL — HIGH (ref 70–99)
GLUCOSE SERPL-MCNC: 129 MG/DL — HIGH (ref 70–99)
HCT VFR BLD CALC: 30.7 % — LOW (ref 34.5–45)
HGB BLD-MCNC: 9.7 G/DL — LOW (ref 11.5–15.5)
MAGNESIUM SERPL-MCNC: 1.9 MG/DL — SIGNIFICANT CHANGE UP (ref 1.6–2.6)
MCHC RBC-ENTMCNC: 28.3 PG — SIGNIFICANT CHANGE UP (ref 27–34)
MCHC RBC-ENTMCNC: 31.6 GM/DL — LOW (ref 32–36)
MCV RBC AUTO: 89.5 FL — SIGNIFICANT CHANGE UP (ref 80–100)
NRBC # BLD: 0 /100 WBCS — SIGNIFICANT CHANGE UP
NRBC # FLD: 0 K/UL — SIGNIFICANT CHANGE UP
PHOSPHATE SERPL-MCNC: 4 MG/DL — SIGNIFICANT CHANGE UP (ref 2.5–4.5)
PLATELET # BLD AUTO: 243 K/UL — SIGNIFICANT CHANGE UP (ref 150–400)
POTASSIUM SERPL-MCNC: 4.5 MMOL/L — SIGNIFICANT CHANGE UP (ref 3.5–5.3)
POTASSIUM SERPL-SCNC: 4.5 MMOL/L — SIGNIFICANT CHANGE UP (ref 3.5–5.3)
RBC # BLD: 3.43 M/UL — LOW (ref 3.8–5.2)
RBC # FLD: 13.9 % — SIGNIFICANT CHANGE UP (ref 10.3–14.5)
SODIUM SERPL-SCNC: 140 MMOL/L — SIGNIFICANT CHANGE UP (ref 135–145)
SURGICAL PATHOLOGY STUDY: SIGNIFICANT CHANGE UP
WBC # BLD: 6.76 K/UL — SIGNIFICANT CHANGE UP (ref 3.8–10.5)
WBC # FLD AUTO: 6.76 K/UL — SIGNIFICANT CHANGE UP (ref 3.8–10.5)

## 2022-01-19 PROCEDURE — 74230 X-RAY XM SWLNG FUNCJ C+: CPT | Mod: 26

## 2022-01-19 PROCEDURE — 99233 SBSQ HOSP IP/OBS HIGH 50: CPT | Mod: GC

## 2022-01-19 RX ORDER — ATORVASTATIN CALCIUM 80 MG/1
10 TABLET, FILM COATED ORAL AT BEDTIME
Refills: 0 | Status: DISCONTINUED | OUTPATIENT
Start: 2022-01-19 | End: 2022-01-22

## 2022-01-19 RX ORDER — ONDANSETRON 8 MG/1
4 TABLET, FILM COATED ORAL ONCE
Refills: 0 | Status: COMPLETED | OUTPATIENT
Start: 2022-01-19 | End: 2022-01-19

## 2022-01-19 RX ORDER — OXYCODONE HYDROCHLORIDE 5 MG/1
5 TABLET ORAL EVERY 4 HOURS
Refills: 0 | Status: DISCONTINUED | OUTPATIENT
Start: 2022-01-19 | End: 2022-01-22

## 2022-01-19 RX ORDER — SENNA PLUS 8.6 MG/1
2 TABLET ORAL AT BEDTIME
Refills: 0 | Status: DISCONTINUED | OUTPATIENT
Start: 2022-01-19 | End: 2022-01-22

## 2022-01-19 RX ORDER — AMPICILLIN SODIUM AND SULBACTAM SODIUM 250; 125 MG/ML; MG/ML
3 INJECTION, POWDER, FOR SUSPENSION INTRAMUSCULAR; INTRAVENOUS EVERY 6 HOURS
Refills: 0 | Status: DISCONTINUED | OUTPATIENT
Start: 2022-01-19 | End: 2022-01-19

## 2022-01-19 RX ORDER — OXYCODONE HYDROCHLORIDE 5 MG/1
10 TABLET ORAL EVERY 4 HOURS
Refills: 0 | Status: DISCONTINUED | OUTPATIENT
Start: 2022-01-19 | End: 2022-01-22

## 2022-01-19 RX ADMIN — Medication 975 MILLIGRAM(S): at 12:27

## 2022-01-19 RX ADMIN — ATORVASTATIN CALCIUM 10 MILLIGRAM(S): 80 TABLET, FILM COATED ORAL at 00:13

## 2022-01-19 RX ADMIN — OXYCODONE HYDROCHLORIDE 10 MILLIGRAM(S): 5 TABLET ORAL at 06:36

## 2022-01-19 RX ADMIN — OXYCODONE HYDROCHLORIDE 10 MILLIGRAM(S): 5 TABLET ORAL at 02:36

## 2022-01-19 RX ADMIN — OXYCODONE HYDROCHLORIDE 5 MILLIGRAM(S): 5 TABLET ORAL at 16:00

## 2022-01-19 RX ADMIN — AMPICILLIN SODIUM AND SULBACTAM SODIUM 200 GRAM(S): 250; 125 INJECTION, POWDER, FOR SUSPENSION INTRAMUSCULAR; INTRAVENOUS at 02:25

## 2022-01-19 RX ADMIN — ONDANSETRON 4 MILLIGRAM(S): 8 TABLET, FILM COATED ORAL at 06:43

## 2022-01-19 RX ADMIN — ALBUTEROL 2.5 MILLIGRAM(S): 90 AEROSOL, METERED ORAL at 10:10

## 2022-01-19 RX ADMIN — ATORVASTATIN CALCIUM 10 MILLIGRAM(S): 80 TABLET, FILM COATED ORAL at 21:56

## 2022-01-19 RX ADMIN — SODIUM CHLORIDE 4 MILLILITER(S): 9 INJECTION INTRAMUSCULAR; INTRAVENOUS; SUBCUTANEOUS at 10:10

## 2022-01-19 RX ADMIN — Medication 975 MILLIGRAM(S): at 00:13

## 2022-01-19 RX ADMIN — OXYCODONE HYDROCHLORIDE 5 MILLIGRAM(S): 5 TABLET ORAL at 17:08

## 2022-01-19 RX ADMIN — OXYCODONE HYDROCHLORIDE 10 MILLIGRAM(S): 5 TABLET ORAL at 02:50

## 2022-01-19 RX ADMIN — OXYCODONE HYDROCHLORIDE 5 MILLIGRAM(S): 5 TABLET ORAL at 21:56

## 2022-01-19 RX ADMIN — OXYCODONE HYDROCHLORIDE 5 MILLIGRAM(S): 5 TABLET ORAL at 22:26

## 2022-01-19 RX ADMIN — Medication 975 MILLIGRAM(S): at 14:41

## 2022-01-19 RX ADMIN — ENOXAPARIN SODIUM 40 MILLIGRAM(S): 100 INJECTION SUBCUTANEOUS at 12:28

## 2022-01-19 RX ADMIN — OXYCODONE HYDROCHLORIDE 5 MILLIGRAM(S): 5 TABLET ORAL at 15:51

## 2022-01-19 RX ADMIN — POLYETHYLENE GLYCOL 3350 17 GRAM(S): 17 POWDER, FOR SOLUTION ORAL at 12:28

## 2022-01-19 RX ADMIN — OXYCODONE HYDROCHLORIDE 5 MILLIGRAM(S): 5 TABLET ORAL at 10:41

## 2022-01-19 RX ADMIN — Medication 975 MILLIGRAM(S): at 06:21

## 2022-01-19 NOTE — PROGRESS NOTE ADULT - SUBJECTIVE AND OBJECTIVE BOX
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    INTERVAL EVENTS/SUBJECTIVE:   - did not tolerate trach cap   - q4 flap checks    ______________________________________________  OBJECTIVE:   T(C): 36.3 (01-19-22 @ 00:00), Max: 37.1 (01-18-22 @ 08:00)  HR: 87 (01-19-22 @ 04:00) (84 - 111)  BP: 110/61 (01-19-22 @ 04:00) (103/85 - 150/78)  RR: 14 (01-19-22 @ 04:00) (14 - 25)  SpO2: 93% (01-19-22 @ 04:00) (89% - 97%)  Wt(kg): --  CAPILLARY BLOOD GLUCOSE      POCT Blood Glucose.: 134 mg/dL (19 Jan 2022 00:21)  POCT Blood Glucose.: 131 mg/dL (18 Jan 2022 12:23)    I&O's Detail    17 Jan 2022 07:01  -  18 Jan 2022 07:00  --------------------------------------------------------  IN:    IV PiggyBack: 200 mL    Jevity 1.2: 1200 mL  Total IN: 1400 mL    OUT:    Drain (mL): 40 mL    VAC (Vacuum Assisted Closure) System (mL): 0 mL    Voided (mL): 2500 mL  Total OUT: 2540 mL    Total NET: -1140 mL      18 Jan 2022 07:01  -  19 Jan 2022 06:10  --------------------------------------------------------  IN:    IV PiggyBack: 400 mL    Jevity 1.2: 900 mL    Oral Fluid: 120 mL  Total IN: 1420 mL    OUT:    Drain (mL): 5 mL    VAC (Vacuum Assisted Closure) System (mL): 0 mL    Voided (mL): 1600 mL  Total OUT: 1605 mL    Total NET: -185 mL      PHYSICAL EXAM:  Gen: NAD, sitting upright in bed comfortably   EOE: R neck edema c/w surgery, soft. Neck incision hemostatic, mild bruising at suture line. + [place] doppler. JPx1, SS output. Trach in place on collar. NGT R naris   IOE: Flap viable, appropriate color, well perfused. No signs of congestion.   Extremities: L leg wound vac holding suction   NERVOUS SYSTEM:  A&Ox3, no focal deficits   ______________________________________________  LABS:  CBC Full  -  ( 19 Jan 2022 01:14 )  WBC Count : 6.76 K/uL  RBC Count : 3.43 M/uL  Hemoglobin : 9.7 g/dL  Hematocrit : 30.7 %  Platelet Count - Automated : 243 K/uL  Mean Cell Volume : 89.5 fL  Mean Cell Hemoglobin : 28.3 pg  Mean Cell Hemoglobin Concentration : 31.6 gm/dL    01-19    140  |  100  |  25<H>  ----------------------------<  129<H>  4.5   |  31  |  0.45<L>    Ca    9.0      19 Jan 2022 01:14  Phos  4.0     01-19  Mg     1.90     01-19          MEDICATIONS  (STANDING):  acetaminophen    Suspension .. 975 milliGRAM(s) Oral every 6 hours  ampicillin/sulbactam  IVPB      ampicillin/sulbactam  IVPB 3 Gram(s) IV Intermittent every 6 hours  atorvastatin 10 milliGRAM(s) Oral at bedtime  chlorhexidine 4% Liquid 1 Application(s) Topical daily  enoxaparin Injectable 40 milliGRAM(s) SubCutaneous daily  influenza   Vaccine 0.5 milliLiter(s) IntraMuscular once    MEDICATIONS  (PRN):  ALBUTerol    90 MICROgram(s) HFA Inhaler 2 Puff(s) Inhalation every 6 hours PRN Shortness of Breath and/or Wheezing  oxyCODONE    Solution 5 milliGRAM(s) Enteral Tube every 6 hours PRN Moderate Pain (4 - 6)  oxyCODONE    Solution 10 milliGRAM(s) Enteral Tube every 6 hours PRN Severe Pain (7 - 10)  senna 2 Tablet(s) Oral at bedtime PRN Constipation

## 2022-01-19 NOTE — SPEAKING VALVE EVALUATION - DIAGNOSTIC IMPRESSIONS
Patient tolerated the One Way Speaking Valve (Passy Kinjal Valve) for 25 minutes with no significant change in oxygen saturation (88-92%). Patient tolerated the One Way Speaking Valve (Passy Kinjal Valve) for 25 minutes with no significant change in oxygen saturation (average of 90%).

## 2022-01-19 NOTE — SPEAKING VALVE EVALUATION - ADDITIONAL COMMENTS
SLP spoke with Nurse Bundy regarding results. One Way Speaking Valve remained in place for Nursing to continue monitoring Patient's tolerance.   SLP spoke with ENT SONDRA Rae regarding results. One Way Speaking Valve remained in place as Nursing will continue to monitor Patient's tolerance.

## 2022-01-19 NOTE — PROGRESS NOTE ADULT - SUBJECTIVE AND OBJECTIVE BOX
Plastic Surgery  Daily Progress Note     Subjective/24 Hour Events:  Patient seen and examined on morning rounds.   Doing generally okay.  Tried clears yesterday but fluid came out of trach.    Objective:    Vitals:  T(C): 36.5 (01-18-22 @ 03:00), Max: 36.8 (01-17-22 @ 19:00)  HR: 96 (01-18-22 @ 06:27) (86 - 117)  BP: 124/64 (01-18-22 @ 06:00) (112/59 - 149/77)  RR: 22 (01-18-22 @ 06:27) (15 - 25)  SpO2: 90% (01-18-22 @ 06:27) (90% - 98%)    I/O:     01-17-22 @ 07:01  -  01-18-22 @ 07:00  --------------------------------------------------------  IN: 1400 mL / OUT: 2540 mL / NET: -1140 mL      Physical Exam   CONSTITUTIONAL: NAD, lying in bed  NEURO: Awake, alert  HEENT: Flap viable, well-perfused, appropriate color, with 2-3 second capillary refill and (+) cook Doppler signal, suture line intact  NECK: kely-incisional blanching erythema improved J/p drains s/s  RLE:  dressing c/d/i  JESSEE s/s       LABS                         9.7    6.76  )-----------( 243      ( 19 Jan 2022 01:14 )             30.7     01-19    140  |  100  |  25<H>  ----------------------------<  129<H>  4.5   |  31  |  0.45<L>    Ca    9.0      19 Jan 2022 01:14  Phos  4.0     01-19  Mg     1.90     01-19        Allergies:  morphine (Hives)      Meds:   acetaminophen    Suspension .. 975 milliGRAM(s) Oral every 6 hours  ALBUTerol    90 MICROgram(s) HFA Inhaler 2 Puff(s) Inhalation every 6 hours PRN  ampicillin/sulbactam  IVPB 3 Gram(s) IV Intermittent every 6 hours  ampicillin/sulbactam  IVPB      atorvastatin 10 milliGRAM(s) Oral at bedtime  chlorhexidine 4% Liquid 1 Application(s) Topical daily  enoxaparin Injectable 40 milliGRAM(s) SubCutaneous daily  influenza   Vaccine 0.5 milliLiter(s) IntraMuscular once  oxyCODONE    Solution 5 milliGRAM(s) Enteral Tube every 6 hours PRN  oxyCODONE    Solution 10 milliGRAM(s) Enteral Tube every 6 hours PRN  senna 2 Tablet(s) Oral at bedtime PRN

## 2022-01-19 NOTE — SWALLOW VFSS/MBS ASSESSMENT ADULT - RECOMMENDED CONSISTENCY
1.) Puree with Thin Liquids  2.) Feeding/Swallowing Guidelines: Upright position, Puree via Teaspoon; Thin Liquids via small single straw sips. Two swallows per puree/thin liquids  3.) Aspiration Precautions  4.) Reflux Precautions  5.) Maintain Good Oral Hygiene Care

## 2022-01-19 NOTE — PROGRESS NOTE ADULT - ASSESSMENT
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    - C/w OOBTC    - routine trach care per ENT, possible capping trial   - flap management per PRS   - Rest of care per primary team  - Appreciate excellent SICU care       [facility] [place]   [place][id number]

## 2022-01-19 NOTE — SWALLOW VFSS/MBS ASSESSMENT ADULT - DIAGNOSTIC IMPRESSIONS
Patient presents with Mild to Moderate Oral Stage and Mild Pharyngeal Stage Dysphagia. The Oral Stage is characterized adequate oral containment,  reduced ability to strip/retrieve from utensil presentation due to reduced opening of the mandible on the right side therefore teaspoon presentation placed/inserted into the left side above the tongue to strip from utensil, slow bolus manipulation, slow tongue motion with slow anterior to posterior transfer of the bolus; piecemeal transfer (3-4x); with trace/mild oral clearance deficits located on the anterior buccal/tongue surface post swallow. The Pharyngeal Stage is characterized by mild delayed initiation of the pharyngeal swallow (Bolus head is at the vallecular for Thin Liquids), adequate laryngeal elevation, mildly reduced tongue base retraction resulting in trace/mild vallecular residue post primary swallow and adequate pharyngeal constriction. There is trace/mild pharyngeal clearance deficits located at the vallecular. Spontaneous reswallow/liquid wash benefits to assist with pharyngeal clearance.  There was Trace Laryngeal Penetration during the swallow for Thin Liquids with retrieval and airway protection maintained. There was No Aspiration observed before, during or after the swallow for puree/moderately thick liquids/mildly thick liquids. Patient presents with Mild to Moderate Oral Stage and Mild Pharyngeal Stage Dysphagia. The Oral Stage is characterized adequate oral containment,  reduced ability to strip/retrieve from utensil presentation due to reduced opening of the mandible on the right side therefore teaspoon presentation placed/inserted into the left side above the tongue to strip from utensil, slow bolus manipulation, slow tongue motion with slow anterior to posterior transfer of the bolus; piecemeal transfer (3-4x); with trace/mild oral clearance deficits located on the anterior buccal/tongue surface post swallow. The Pharyngeal Stage is characterized by mild delayed initiation of the pharyngeal swallow (Bolus head is at the vallecular for Thin Liquids), adequate laryngeal elevation, mildly reduced tongue base retraction resulting in trace/mild vallecular residue post primary swallow and adequate pharyngeal constriction. There is trace/mild pharyngeal clearance deficits located at the vallecular. Spontaneous reswallow/liquid wash benefits to assist with pharyngeal clearance.  There was Trace Laryngeal Penetration during the swallow for Thin Liquids with retrieval and airway protection maintained. There was No Aspiration observed before, during or after the swallow for puree/moderately thick liquids/mildly thick liquids.  Of Note: Patient with baseline cough. Patient coughed in absence of contrast of the airway after the swallow for Puree and Moderately Thick Liquids. Patient presents with Mild to Moderate Oral Stage and Mild Pharyngeal Stage Dysphagia. The Oral Stage is characterized adequate oral containment,  reduced ability to strip/retrieve from utensil presentation due to reduced opening of the mandible on the right side therefore teaspoon presentation placed/inserted into the left side above the tongue to strip from utensil, slow bolus manipulation, slow tongue motion with slow anterior to posterior transfer of the bolus; piecemeal transfer (3-4x); with trace/mild oral clearance deficits located on the anterior buccal/tongue surface post swallow. The Pharyngeal Stage is characterized by mild delayed initiation of the pharyngeal swallow (Bolus head is at the vallecular for Thin Liquids), adequate laryngeal elevation, mildly reduced tongue base retraction resulting in trace/mild vallecular residue post primary swallow and adequate pharyngeal constriction. There is trace/mild pharyngeal clearance deficits located at the vallecular. Spontaneous reswallow/liquid wash benefits to assist with pharyngeal clearance.  There was Trace Laryngeal Penetration during the swallow for Thin Liquids with retrieval and airway protection maintained. There was No Aspiration observed before, during or after the swallow for puree/moderately thick liquids/mildly thick liquids.  Of Note: Patient with baseline cough. Patient coughed in absence of contrast of the airway during the swallow study.

## 2022-01-19 NOTE — SWALLOW VFSS/MBS ASSESSMENT ADULT - ADDITIONAL INFORMATION
Of Note: NGTube in place; Mandibular Hardware, Surgical Staples and Clips. Tracheostomy on Trach Collar status.

## 2022-01-19 NOTE — PROGRESS NOTE ADULT - ATTENDING COMMENTS
I agree with the history, physical, and plan, which I have reviewed and edited where appropriate.  I agree with notes/assessment of health care providers on my service.  I have personally examined the patient.  I was physically present for the key portions of the evaluation and management (E/M) service provided.  I reviewed data and laboratory tests/x-rays and all pertinent electronic images.  The patient is a critical care patient with life threatening hemodynamic and metabolic instability in SICU.  Risk benefit analyses discussed.    The patient is in SICU with diagnosis mentioned in the note.    The plan is specified below.      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 SICU consulted for q1 hr flap checks and new trach. L free fibula flap reconstruction of R mandibular and floor of mouth. Remains in SICU for copious trach secretions, improved today.     Plan:  NEUROLOGIC: postoperative pain  - Oxycodone    RESPIRATORY: tracheostomy, improved secretions  - trach collar 30%  - Albuterol as needed    CARDIOVASCULAR : h/o hypertension, on amlodipine and lisinopril at home  - resume home meds if becomes hypertensive  - Atorvastatin     GASTROINTESTINAL   - tube feeds via MARIBETH   - Repeat cinesophogram, advance diet if appropriate   - miralax, senna      /RENAL   - Strict I/Os, voiding     HEMATOLOGIC  - DVT ppx: Lovenox     INFECTIOUS DISEASE: cellulitis over neck, improved  - Monitor off antibiotics     ENDOCRINE  - Monitor gluc.
still requiring supplemental oxygen support, frequent trach suctioning and has left pleural effusion    -additional lasix 10mg ivp today  -may transfer to regular bunch for ongoing care by primary surgical team if frequency of trach suctioning decreases
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 SICU consulted for q1 hr flap checks and new trach.    Care plan d/w Dr. So at bedside.    Airway patent and flap viable.    Keep on recovery pathway.    Switch to bolus tube feeds.  Monitor CBC and for signs of bleeding given acute blood loss anemia.
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 SICU consulted for q1 hr flap checks and new trach.    Care plan d/w Dr. So at bedside.    Airway patent and flap viable.    Keep on recovery pathway.    D/c ivf, d/c merchant, start tube feeds and convert to enteral pain meds.  PT for OOB/ambulation once fitted for boot.
I agree with the history, physical, and plan, which I have reviewed and edited where appropriate.  I agree with notes/assessment of health care providers on my service.  I have personally examined the patient.  I was physically present for the key portions of the evaluation and management (E/M) service provided.  I reviewed data and laboratory tests/x-rays and all pertinent electronic images.  The patient is a critical care patient with life threatening hemodynamic and metabolic instability in SICU.  Risk benefit analyses discussed.    The patient is in SICU with diagnosis mentioned in the note.    The plan is specified below.      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 SICU consulted for q1 hr flap checks and new trach. L free fibula flap reconstruction of R mandibular and floor of mouth. Remains in SICU for copious trach secretions and cellulitis near drain.     Plan:  NEUROLOGIC   - Tylenol and oxycodone    RESPIRATORY: tracheostomy, copious secretions requiring frequent suctioning  - trach collar 30%  - Albuterol as needed  - ambulate    CARDIOVASCULAR : h/o hypertension, on amlodipine and lisinopril at home  - resume home meds if becomes hypertensive    GASTROINTESTINAL   - tube feeds via MARIBETH     /RENAL   - Lasix 20 x 1 for copious secretions  - Strict I/Os    HEMATOLOGIC  - DVT ppx: Lovenox     INFECTIOUS DISEASE: cellulitis over neck  - Unasyn     ENDOCRINE  - Monitor gluc

## 2022-01-19 NOTE — SWALLOW VFSS/MBS ASSESSMENT ADULT - PHARYNGEAL PHASE COMMENTS
adequate initiation of the pharyngeal swallow, adequate laryngeal elevation, reduced tongue base retraction, adequate pharyngeal constriction delayed initiation of the pharyngeal swallow, adequate laryngeal elevation, adequate tongue base retraction, adequate pharyngeal constriction

## 2022-01-19 NOTE — SWALLOW VFSS/MBS ASSESSMENT ADULT - COMMENTS
ENT Note 1/19/2022 - 55 y/o Female with  PMH of HTN, HLD, with diagnosis of squamous cell carcinoma of gingiva s/p R composite resection, SND I-IV, trach, L fibula 1/13.    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    SICU 1/19/2022 - 56y Female with a PMHx HTN, HLD, 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 SICU consulted for q1 hr flap checks and new trach. s/p L free fibula flap reconstruction of R mandibular and floor of mouth defect.    Patient arrived to Radiology for Cinesophagram accompanied by SICU Nurse. Patient is transferred to a specialized seating unit with lateral view projection. Patient is with Tracheostomy Cuffless on Trach collar status. Patient is with baseline cough.

## 2022-01-19 NOTE — PROGRESS NOTE ADULT - ASSESSMENT
HPI: 55 y/o Female with  PMH of HTN, HLD, with diagnosis of squamous cell carcinoma of gingiva s/p R composite resection, SND I-IV, trach, L fibula 1/13.    - clear liquid diet  - F/u SLP for PMV and MBS  - Pt cleared to go to floor from ENT perspective   - PT/OOBTC  - Nutrition consult  - ERAS protocol  - monitor JESSEE output  - Trach care  - Flap care per OMFS  - Care per SICU

## 2022-01-19 NOTE — SPEAKING VALVE EVALUATION - RECOMMENDATIONS
1.) Consider Physician Order for speaking valve use for verbal. Please include the amount of time speaking valve should be on and off with appropriate oxygen saturation levels via oximetry. Monitor vitals closely for tolerance.   2.) all medical staff to monitor patient's oxygen saturation and tolerance when One Way Speaking Valve (Passy Kinjal Valve is in use for patient to verbally communicate.  3.) Continue process weaning process for decannulation at MD's discretion     Of Note: One Way Speaking Valve provides Patient ability to voice/communicate.   1.) After placing the one way speaking valve, observe patient to ensure adequate airway.  2.) Monitor patient's management of secretions  3.) Remove speaking valve with any difficulty breathing or increased work of breathing or excessive coughing  4.) Removed speaking valve during all medicated aerosol treatments.  5.) Remove when Sleeping. 1.) Consider Physician Order for speaking valve use for patient's verbal communication to staff/family. Please include the amount of time speaking valve should be on and off with appropriate oxygen saturation levels via oximetry. Monitor vitals closely for tolerance.   2.) all medical staff to monitor patient's oxygen saturation and tolerance when One Way Speaking Valve (Passy Kinjal Valve is in use for patient to verbally communicate.  3.) Continue process weaning process for decannulation at MD's discretion     Of Note: One Way Speaking Valve provides Patient ability to voice/communicate.   1.) After placing the one way speaking valve, observe patient to ensure adequate airway.  2.) Monitor patient's management of secretions  3.) Remove speaking valve with any difficulty breathing or increased work of breathing or excessive coughing  4.) Removed speaking valve during all medicated aerosol treatments.  5.) Remove when Sleeping. 1.) Consider Physician Order for speaking valve use for patient's verbal communication to staff/family. Please include the amount of time speaking valve should be on and off with appropriate oxygen saturation levels via oximetry. Monitor vitals closely for tolerance.   2.) All medical staff to monitor patient's oxygen saturation and tolerance when One Way Speaking Valve (Passy Kinjal Valve is in use for patient to verbally communicate.  3.) Continue process weaning process for decannulation at MD's discretion     Of Note: One Way Speaking Valve provides Patient ability to voice/communicate.   1.) After placing the one way speaking valve, observe patient to ensure adequate airway.  2.) Monitor patient's management of secretions  3.) Remove speaking valve with any difficulty breathing or increased work of breathing or excessive coughing  4.) Removed speaking valve during all medicated aerosol treatments.  5.) Remove when Sleeping.

## 2022-01-19 NOTE — SPEAKING VALVE EVALUATION - OBSERVATIONS
Patient seen at bedside, alert and oriented state. Patient is able to follow commands. Patient with Tracheostomy (Cuffless) on Trach Collar Status. Patient without voicing ability due to open tracheostomy; however when provided with gloved digital occlusion onto tracheostomy; patient exhibited adequate voicing ability.      Baseline Stats: HR 94-97 /61 SPO2 92% RR 23    Adequate oral secretion management. Nursing providing suctioning via tracheally per patient request prior to One Way Speaking Valve placement. Patient seen at bedside, alert and oriented state. Patient is able to follow commands. Patient with Tracheostomy (Cuffless) on Trach Collar Status. Patient without voicing ability due to open tracheostomy; however when provided with gloved digital occlusion onto tracheostomy hub; patient exhibited adequate voicing ability.      Baseline Stats:   HR 94-97 /61 SPO2 92% RR 23    Adequate oral secretion management. Nursing providing suctioning via tracheally per patient request prior to One Way Speaking Valve placement.

## 2022-01-19 NOTE — PROGRESS NOTE ADULT - ASSESSMENT
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

## 2022-01-19 NOTE — SPEAKING VALVE EVALUATION - COMMENTS
ENT Note 1/19/2022 - 55 y/o Female with  PMH of HTN, HLD, with diagnosis of squamous cell carcinoma of gingiva s/p R composite resection, SND I-IV, trach, L fibula 1/13.    SICU 1/19/2022 - 56y Female with a PMHx HTN, HLD, 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 SICU consulted for q1 hr flap checks and new trach. s/p L free fibula flap reconstruction of R mandibular and floor of mouth defect.    Of Note: Patient had a Cinesophagram completed this morning. Please see report for details/recommendations. ENT PA was informed of results/recommendations.     Patient seen at bedside, alert and oriented state. Patient is able to follow commands. Patient with Tracheostomy (Cuffless) on Trach Collar Status. Patient without voicing ability due to open tracheostomy; however when provided with gloved digital occlusion onto tracheostomy; patient exhibited adequate voicing ability.      Baseline Stats: HR 94-97  /61  SPO2 92%   RR 23 ENT Note 1/19/2022 - 57 y/o Female with  PMH of HTN, HLD, with diagnosis of squamous cell carcinoma of gingiva s/p R composite resection, SND I-IV, trach, L fibula 1/13.    SICU 1/19/2022 - 56y Female with a PMHx HTN, HLD, 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 SICU consulted for q1 hr flap checks and new trach. s/p L free fibula flap reconstruction of R mandibular and floor of mouth defect.    Of Note: Patient had a Cinesophagram completed this morning. Please see report for details/recommendations. ENT PA was informed of results/recommendations.     Patient seen at bedside, alert and oriented state. Patient is able to follow commands. Patient with Tracheostomy (Cuffless) on Trach Collar Status. Patient without voicing ability due to open tracheostomy; however when provided with gloved digital occlusion onto tracheostomy; patient exhibited adequate voicing ability.      Baseline Stats: HR 94-97 /61 SPO2 92% RR 23 ENT Note 1/19/2022 - 55 y/o Female with  PMH of HTN, HLD, with diagnosis of squamous cell carcinoma of gingiva s/p R composite resection, SND I-IV, trach, L fibula 1/13.    SICU 1/19/2022 - 56y Female with a PMHx HTN, HLD, 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 SICU consulted for q1 hr flap checks and new trach. s/p L free fibula flap reconstruction of R mandibular and floor of mouth defect.    Of Note: Patient had a Cinesophagram completed this morning. Please see report for details. ENT PA was informed of results/recommendations.     Patient seen at bedside, alert and oriented state. Patient is able to follow commands. Patient with Tracheostomy (Cuffless) on Trach Collar Status. Patient without voicing ability due to open tracheostomy; however when provided with gloved digital occlusion onto tracheostomy; patient exhibited adequate voicing ability.      Baseline Stats: HR 94-97 /61 SPO2 92% RR 23 ENT Note 1/19/2022 - 55 y/o Female with  PMH of HTN, HLD, with diagnosis of squamous cell carcinoma of gingiva s/p R composite resection, SND I-IV, trach, L fibula 1/13.    SICU 1/19/2022 - 56y Female with a PMHx HTN, HLD, 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 SICU consulted for q1 hr flap checks and new trach. s/p L free fibula flap reconstruction of R mandibular and floor of mouth defect.    Of Note: Patient had a Cinesophagram completed this morning. Please see report for details. ENT PA was informed of results/recommendations.     Patient seen at bedside, alert and oriented state. Patient is able to follow commands. Patient with Tracheostomy (Cuffless) on Trach Collar Status. Patient without voicing ability due to open tracheostomy; however when provided with gloved digital occlusion onto tracheostomy hub; patient exhibited adequate voicing ability.      Baseline Stats:   HR 94-97 /61 SPO2 92% RR 23 ENT Note 1/19/2022 - 55 y/o Female with  PMH of HTN, HLD, with diagnosis of squamous cell carcinoma of gingiva s/p R composite resection, SND I-IV, trach, L fibula 1/13.    SICU 1/19/2022 - 56y Female with a PMHx HTN, HLD, 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 SICU consulted for q1 hr flap checks and new trach. s/p L free fibula flap reconstruction of R mandibular and floor of mouth defect.    Of Note: Patient had a Cinesophagram completed this morning. Please see report for details. ENT PA was informed of results/recommendations.     Baseline Stats:   HR 94-97 /61 SPO2 92% RR 23

## 2022-01-19 NOTE — PROGRESS NOTE ADULT - SUBJECTIVE AND OBJECTIVE BOX
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    Interval:  1/14: Examined at bedside this morning. HELEN BARCENAS-line dc'ed. Started on albuterol.  1/15: Examined at bedside this morning. HELEN Dobbs dc'd, passed TO. OOBTC  1/16: Exmained at [facility]. TEE. OOBTC. still on IV abx. CXR overnight with posible pleural effuison s/p lasix. Trach changed from 6LPC to 6CFS secured with soft trach tie.   1/17: Pt seen and examined at the bedside. Pt's neck appeared to be erythematous yesterday and was opened up by PRS with serosanguinis fluid expressed. Packed with aquacel by PRS. Today, feeling well and doing well with 6 CFS. She tolerated finger occlusion and her trach was subsequently changed to a 4 FCS and was capped.   1/18: sob with cap, cap removed, 4CFS still in place   1/19: Did not tolerate cap, on trach collar overnight. Pt given juice yesterday that came through trach during suctioning. JESSEE removed this morning.    ICU Vital Signs Last 24 Hrs  T(C): 36.8 (19 Jan 2022 04:00), Max: 37.1 (18 Jan 2022 08:00)  T(F): 98.2 (19 Jan 2022 04:00), Max: 98.7 (18 Jan 2022 08:00)  HR: 99 (19 Jan 2022 07:00) (84 - 111)  BP: 113/69 (19 Jan 2022 07:00) (103/85 - 150/78)  BP(mean): 79 (19 Jan 2022 07:00) (69 - 97)  ABP: --  ABP(mean): --  RR: 20 (19 Jan 2022 07:00) (14 - 25)  SpO2: 91% (19 Jan 2022 07:00) (89% - 97%)    Physical Exam:  Alert, NAD  Neck right side warm with stable erythema, nontender, JP5cc SS removed this morning  Trach 4CFS secured with soft trach tie, not capped   Flap warm and well perfused, strong triphasic signal, incisions c/d/i  Nonlabored Respirations on trach collar

## 2022-01-19 NOTE — PROGRESS NOTE ADULT - SUBJECTIVE AND OBJECTIVE BOX
SICU Daily Progress Note  =====================================================  Interval/Overnight Events:       - did not tolerate trach cap   - thick secretions still present, on trach collar  - pending S&S  - Miralax  - q4 flap checks  - listed for floor      HPI:  55 yo female presents to PST unit with 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 So.  (28 Dec 2021 17:46)      PMH:  PAST MEDICAL & SURGICAL HISTORY:  H/O malignant neoplasm of gum    Hypertension    Hyperlipidemia    Mild asthma    H/O  section    History of hip replacement  left hip    History of partial hysterectomy        ALLERGIES:  morphine (Hives)      --------------------------------------------------------------------------------------    MEDICATIONS:    Neurologic Medications  acetaminophen    Suspension .. 975 milliGRAM(s) Oral every 6 hours  oxyCODONE    Solution 5 milliGRAM(s) Enteral Tube every 4 hours PRN Moderate Pain (4 - 6)  oxyCODONE    Solution 10 milliGRAM(s) Enteral Tube every 4 hours PRN Severe Pain (7 - 10)    Respiratory Medications  ALBUTerol    0.083% 2.5 milliGRAM(s) Nebulizer every 12 hours  ALBUTerol    90 MICROgram(s) HFA Inhaler 2 Puff(s) Inhalation every 6 hours PRN Shortness of Breath and/or Wheezing  sodium chloride 3%  Inhalation 4 milliLiter(s) Inhalation every 12 hours    Cardiovascular Medications    Gastrointestinal Medications  polyethylene glycol 3350 17 Gram(s) Oral daily  senna 2 Tablet(s) Oral at bedtime PRN Constipation    Genitourinary Medications    Hematologic/Oncologic Medications  enoxaparin Injectable 40 milliGRAM(s) SubCutaneous daily  influenza   Vaccine 0.5 milliLiter(s) IntraMuscular once    Antimicrobial/Immunologic Medications  ampicillin/sulbactam  IVPB      ampicillin/sulbactam  IVPB 3 Gram(s) IV Intermittent every 6 hours    Endocrine/Metabolic Medications  atorvastatin 10 milliGRAM(s) Oral at bedtime    Topical/Other Medications  chlorhexidine 4% Liquid 1 Application(s) Topical daily    --------------------------------------------------------------------------------------    VITAL SIGNS:  ICU Vital Signs Last 24 Hrs  T(C): 36.9 (2022 20:00), Max: 37.1 (2022 08:00)  T(F): 98.4 (2022 20:00), Max: 98.7 (2022 08:00)  HR: 94 (2022 00:00) (84 - 111)  BP: 108/92 (2022 00:00) (103/85 - 150/78)  BP(mean): 96 (2022 00:00) (69 - 97)  RR: 20 (2022 00:00) (16 - 25)  SpO2: 94% (2022 00:00) (89% - 97%)    --------------------------------------------------------------------------------------    INS AND OUTS:  I&O's Summary    2022 07:01  -  2022 07:00  --------------------------------------------------------  IN: 1400 mL / OUT: 2540 mL / NET: -1140 mL    2022 07:01  -  2022 00:26  --------------------------------------------------------  IN: 1020 mL / OUT: 1355 mL / NET: -335 mL      --------------------------------------------------------------------------------------          EXAM  NEUROLOGY  Exam: Normal, NAD, alert, oriented x3, no focal deficits.    HEENT  Exam: Normocephalic, EOMI; Right lateral and anterior neck with surgical site s/p staples with surrounding erythema and JESSEE drain with serosanguinous drainage; flap viable and CDI    RESPIRATORY  Exam: Lungs clear to auscultation, Normal expansion, on trach collar    CARDIOVASCULAR  Exam: S1, S2.  Regular rate and rhythm.      GI/NUTRITION  Exam: Abdomen soft, Non-tender, Non-distended.   Current Diet: Diet, NPO      HEMATOLOGIC  [x] VTE Prophylaxis: enoxaparin Injectable 40 milliGRAM(s) SubCutaneous daily      INFECTIOUS DISEASE  Antimicrobials/Immunologic Medications:  ampicillin/sulbactam  IVPB      ampicillin/sulbactam  IVPB 3 Gram(s) IV Intermittent every 6 hours  influenza   Vaccine 0.5 milliLiter(s) IntraMuscular once      TUBES / LINES / DRAINS***  [x] Peripheral IV  [] Central Venous Line     	[] R	[] L	[] IJ	[] Fem	[] SC	Date Placed:   [] Arterial Line		[] R	[] L	[] Fem	[] Rad	[] Ax	Date Placed:   [] PICC		[] Midline		[] Mediport  [] Urinary Catheter		Date Placed:   [x] Necessity of urinary, arterial, and venous catheters discussed    --------------------------------------------------------------------------------------    LABS                                              10.1                  Neurophils% (auto):   x      ( @ 03:25):    6.28 )-----------(213          Lymphocytes% (auto):  x                                             31.7                   Eosinphils% (auto):   x        Manual%: Neutrophils x    ; Lymphocytes x    ; Eosinophils x    ; Bands%: x    ; Blasts x              141  |  99  |  25<H>  ----------------------------<  141<H>  4.1   |  33<H>  |  0.45<L>    Ca    9.1      2022 03:25  Phos  4.6       Mg     1.90                 RECENT CULTURES:   @ 01:03 .Other neck wound     No growth to date.    --------------------------------------------------------------------------------------    ASSESSMENT:  56y Female with a PMHx HTN, HLD, 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 SICU consulted for q1 hr flap checks and new trach. s/p L free fibula flap reconstruction of R mandibular and floor of mouth defect.    NEUROLOGIC   - Pain control: prn  - Tylenol and oxycodone    RESPIRATORY   - Monitor SpO2 goal >92%  - Incentive spirometry   - trach collar  - Albuterol as needed  - F/U PT, encourage ambulation today; patient OOB to chair yesterday  - Did not tolerate trach cap  - Humidified air for secretions    CARDIOVASCULAR   - Patient with history of hypertension, on amlodipine and lisinopril at home  - Becoming hypertensive, will restart home lisinopril today    GASTROINTESTINAL   - NGT with Tube feeds  - Clear liquid diet  - Pending S&S    /RENAL   - Strict I/Os  - Monitor BMP qd  - Monitor electrolytes, replete PRN    HEMATOLOGIC  - Monitor H/H   - DVT ppx: Lovenox  - Monitor drain output; SS; strip as needed    INFECTIOUS DISEASE  - Unasyn while drains in place  - Monitor fever / WBC  - C/w LLE VAC    ENDOCRINE  - Monitor glc    LINES  - PIV     DISPO: Listed for floor   SICU Daily Progress Note  =====================================================  Interval/Overnight Events:  - Did not tolerate trach cap   - Thick secretions still present, on trach collar  - ENT: stop abx, will take out drains today  - Intermittently requiring suctioning more frequently than q4 per nursing  - Lab holiday on   - No BM since admission; Dulcolax today; Can use Mag citrate if needed  - DCd JESSEE drain       HPI:  57 yo female presents to PST unit with 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 So.  (28 Dec 2021 17:46)      PMH:  PAST MEDICAL & SURGICAL HISTORY:  H/O malignant neoplasm of gum    Hypertension    Hyperlipidemia    Mild asthma    H/O  section    History of hip replacement  left hip    History of partial hysterectomy        ALLERGIES:  morphine (Hives)      --------------------------------------------------------------------------------------    MEDICATIONS:    Neurologic Medications  acetaminophen    Suspension .. 975 milliGRAM(s) Oral every 6 hours  oxyCODONE    Solution 5 milliGRAM(s) Enteral Tube every 4 hours PRN Moderate Pain (4 - 6)  oxyCODONE    Solution 10 milliGRAM(s) Enteral Tube every 4 hours PRN Severe Pain (7 - 10)    Respiratory Medications  ALBUTerol    0.083% 2.5 milliGRAM(s) Nebulizer every 12 hours  ALBUTerol    90 MICROgram(s) HFA Inhaler 2 Puff(s) Inhalation every 6 hours PRN Shortness of Breath and/or Wheezing  sodium chloride 3%  Inhalation 4 milliLiter(s) Inhalation every 12 hours    Cardiovascular Medications    Gastrointestinal Medications  polyethylene glycol 3350 17 Gram(s) Oral daily  senna 2 Tablet(s) Oral at bedtime PRN Constipation    Genitourinary Medications    Hematologic/Oncologic Medications  enoxaparin Injectable 40 milliGRAM(s) SubCutaneous daily  influenza   Vaccine 0.5 milliLiter(s) IntraMuscular once    Antimicrobial/Immunologic Medications  ampicillin/sulbactam  IVPB      ampicillin/sulbactam  IVPB 3 Gram(s) IV Intermittent every 6 hours    Endocrine/Metabolic Medications  atorvastatin 10 milliGRAM(s) Oral at bedtime    Topical/Other Medications  chlorhexidine 4% Liquid 1 Application(s) Topical daily    --------------------------------------------------------------------------------------    VITAL SIGNS:  ICU Vital Signs Last 24 Hrs  T(C): 36.9 (2022 20:00), Max: 37.1 (2022 08:00)  T(F): 98.4 (2022 20:00), Max: 98.7 (2022 08:00)  HR: 94 (2022 00:00) (84 - 111)  BP: 108/92 (2022 00:00) (103/85 - 150/78)  BP(mean): 96 (2022 00:00) (69 - 97)  RR: 20 (2022 00:00) (16 - 25)  SpO2: 94% (2022 00:00) (89% - 97%)    --------------------------------------------------------------------------------------    INS AND OUTS:  I&O's Summary    2022 07:01  -  2022 07:00  --------------------------------------------------------  IN: 1400 mL / OUT: 2540 mL / NET: -1140 mL    2022 07:01  -  2022 00:26  --------------------------------------------------------  IN: 1020 mL / OUT: 1355 mL / NET: -335 mL      --------------------------------------------------------------------------------------          EXAM  NEUROLOGY  Exam: Normal, NAD, alert, oriented x3, no focal deficits.    HEENT  Exam: Normocephalic, EOMI; Right lateral and anterior neck with surgical site s/p staples with surrounding erythema and JESSEE drain with serosanguinous drainage; flap viable and CDI    RESPIRATORY  Exam: Lungs clear to auscultation, Normal expansion, on trach collar    CARDIOVASCULAR  Exam: S1, S2.  Regular rate and rhythm.      GI/NUTRITION  Exam: Abdomen soft, Non-tender, Non-distended.   Current Diet: Diet, NPO      HEMATOLOGIC  [x] VTE Prophylaxis: enoxaparin Injectable 40 milliGRAM(s) SubCutaneous daily      INFECTIOUS DISEASE  Antimicrobials/Immunologic Medications:  ampicillin/sulbactam  IVPB      ampicillin/sulbactam  IVPB 3 Gram(s) IV Intermittent every 6 hours  influenza   Vaccine 0.5 milliLiter(s) IntraMuscular once      TUBES / LINES / DRAINS***  [x] Peripheral IV  [] Central Venous Line     	[] R	[] L	[] IJ	[] Fem	[] SC	Date Placed:   [] Arterial Line		[] R	[] L	[] Fem	[] Rad	[] Ax	Date Placed:   [] PICC		[] Midline		[] Mediport  [] Urinary Catheter		Date Placed:   [x] Necessity of urinary, arterial, and venous catheters discussed    --------------------------------------------------------------------------------------    LABS                                              10.1                  Neurophils% (auto):   x      ( @ 03:25):    6.28 )-----------(213          Lymphocytes% (auto):  x                                             31.7                   Eosinphils% (auto):   x        Manual%: Neutrophils x    ; Lymphocytes x    ; Eosinophils x    ; Bands%: x    ; Blasts x              141  |  99  |  25<H>  ----------------------------<  141<H>  4.1   |  33<H>  |  0.45<L>    Ca    9.1      2022 03:25  Phos  4.6       Mg     1.90                 RECENT CULTURES:   @ 01:03 .Other neck wound     No growth to date.    --------------------------------------------------------------------------------------    ASSESSMENT:  56y Female with a PMHx HTN, HLD, 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 SICU consulted for q1 hr flap checks and new trach. s/p L free fibula flap reconstruction of R mandibular and floor of mouth defect.    Interval Events:  - Did not tolerate trach cap   - Thick secretions still present, on trach collar  - ENT: stop abx, will take out drains today  - Intermittently requiring suctioning more frequently than q4 per nursing  - Lab holiday on   - No BM since admission; Dulcolax today; Can use Mag citrate if needed  - DCd JESSEE drain       NEUROLOGIC   - Pain control w/ PRN Oxycodone and Tylenol  - Q4 flap checks    RESPIRATORY   - S/P Right glossectomy, neck dissection, and tracheostomy  - Failed trach cap, remains on trach collar w/ humidified air for secretions  - Monitor SpO2 goal >92%  - Incentive spirometry   - Albuterol as needed  - PT, encourage ambulation  - Needs suctioning intermittently more frequently than q4 per nursing    CARDIOVASCULAR   - Patient with history of hypertension, on amlodipine and lisinopril at home  - Pressures controlled on home lisinopril  - Continue to hold home amlodipine; can resume if hypertensive    GASTROINTESTINAL   - Diet: Pureed  - Modified Barium Swallow   - F/U Speech and Swallow eval and MBS read  - No BM this admission; dulcolax suppository and then Mag citrate if needed    /RENAL   - Creatinine stable with adequate UOP  - Strict I/Os  - Monitor BMP daily; Replete Electrolytes PRN    HEMATOLOGIC  - Stable H/H  - DVT ppx: Lovenox    INFECTIOUS DISEASE  - DCd Unasyn   - Patient afebrile and without leukocytosis  - Monitor off Abx  - Continue LLE VAC    ENDOCRINE  - Patient with glucose well controlled  - No insulin requirements    LINES  - PIV   - LLE VAC      DISPO: Listed for floor

## 2022-01-20 ENCOUNTER — TRANSCRIPTION ENCOUNTER (OUTPATIENT)
Age: 57
End: 2022-01-20

## 2022-01-20 RX ADMIN — ENOXAPARIN SODIUM 40 MILLIGRAM(S): 100 INJECTION SUBCUTANEOUS at 12:38

## 2022-01-20 RX ADMIN — ALBUTEROL 2.5 MILLIGRAM(S): 90 AEROSOL, METERED ORAL at 10:09

## 2022-01-20 RX ADMIN — POLYETHYLENE GLYCOL 3350 17 GRAM(S): 17 POWDER, FOR SOLUTION ORAL at 12:37

## 2022-01-20 RX ADMIN — OXYCODONE HYDROCHLORIDE 10 MILLIGRAM(S): 5 TABLET ORAL at 18:15

## 2022-01-20 RX ADMIN — ATORVASTATIN CALCIUM 10 MILLIGRAM(S): 80 TABLET, FILM COATED ORAL at 21:22

## 2022-01-20 RX ADMIN — OXYCODONE HYDROCHLORIDE 10 MILLIGRAM(S): 5 TABLET ORAL at 10:10

## 2022-01-20 RX ADMIN — OXYCODONE HYDROCHLORIDE 10 MILLIGRAM(S): 5 TABLET ORAL at 09:18

## 2022-01-20 RX ADMIN — OXYCODONE HYDROCHLORIDE 10 MILLIGRAM(S): 5 TABLET ORAL at 22:59

## 2022-01-20 RX ADMIN — ALBUTEROL 2.5 MILLIGRAM(S): 90 AEROSOL, METERED ORAL at 20:16

## 2022-01-20 RX ADMIN — OXYCODONE HYDROCHLORIDE 10 MILLIGRAM(S): 5 TABLET ORAL at 13:44

## 2022-01-20 RX ADMIN — SODIUM CHLORIDE 4 MILLILITER(S): 9 INJECTION INTRAMUSCULAR; INTRAVENOUS; SUBCUTANEOUS at 20:16

## 2022-01-20 RX ADMIN — OXYCODONE HYDROCHLORIDE 10 MILLIGRAM(S): 5 TABLET ORAL at 02:10

## 2022-01-20 RX ADMIN — OXYCODONE HYDROCHLORIDE 10 MILLIGRAM(S): 5 TABLET ORAL at 22:29

## 2022-01-20 NOTE — DISCHARGE NOTE PROVIDER - NSDCFUADDINST_GEN_ALL_CORE_FT
DRESSING CHANGES:  Skin graft donor site (thigh): xeroform to remain in place  Skin graft site (lateral lower leg): place xeroform over skin graft site. Cover with abdominal bad and wrap with ACE bandage. Change dressing every other day. DRESSING CHANGES:  Skin graft donor site (thigh): xeroform to remain in place  Skin graft site (lateral lower leg): place xeroform over skin graft site. Cover with abdominal bad and wrap with ACE bandage. Change dressing every other day.    Pain:  Start to decrease the amount of opioid pain medications you are taking.  Start with 2 extra strength tylenol every 6 hours. Add ibuprofen (motrin) every 6 hours if needed.  If pain not controlled with both tylenol and ibuprofen, add oxycodone 5mL. DRESSING CHANGES:  Skin graft donor site (thigh): xeroform to remain in place  Skin graft site (lateral lower leg): place xeroform over skin graft site. Cover with abdominal bad and wrap with ACE bandage. Change dressing every other day.  Trach stoma: change gauze/tape daily    Pain:  Start to decrease the amount of opioid pain medications you are taking.  Start with 2 extra strength tylenol every 6 hours. Add ibuprofen (motrin) every 6 hours if needed.  If pain not controlled with both tylenol and ibuprofen, add oxycodone 5mL.

## 2022-01-20 NOTE — DISCHARGE NOTE PROVIDER - HOSPITAL COURSE
S/P mandibulectomy, Left fibula free flap, tracheostomy tube and neck dissection on 1/13/22. Patient had free flap monitored in SICU and was started on NG tube feeding. Tracheostomy tube was changed and then capped. SLP evaluated patient and cleared for a puree diet. NGT was removed. Patient was transferred to [address] [place] [address]. S/P mandibulectomy, Left fibula free flap, tracheostomy tube and neck dissection on 1/13/22. Patient had free flap monitored in SICU and was started on NG tube feeding. Tracheostomy tube was changed and then capped. SLP evaluated patient and cleared for a puree diet. NGT was removed. Patient was transferred to [address] [place] [address] without incident. She tolerated trach capping and was decannulated. She was cleared by PT for home with no PT needs and rolling walker was delivered to the bedside. On day of discharge, pt deemed stable and ready to return home with plan to follow up as an outpatient with [facility] services for dressing changes.

## 2022-01-20 NOTE — PROGRESS NOTE ADULT - SUBJECTIVE AND OBJECTIVE BOX
Plastic Surgery  Daily Progress Note     Subjective/24 Hour Events:  Patient now on floor. Trach has been capped by ENT.  Patient seen and examined on morning rounds.   Doing generally okay.      Objective:  Vital Signs Last 24 Hrs  T(C): 36.9 (20 Jan 2022 02:00), Max: 36.9 (20 Jan 2022 02:00)  T(F): 98.5 (20 Jan 2022 02:00), Max: 98.5 (20 Jan 2022 02:00)  HR: 99 (20 Jan 2022 02:00) (89 - 102)  BP: 107/50 (20 Jan 2022 02:00) (107/50 - 128/68)  BP(mean): 81 (19 Jan 2022 16:00) (64 - 81)  RR: 18 (20 Jan 2022 02:00) (17 - 24)  SpO2: 90% (20 Jan 2022 02:00) (90% - 99%)      Physical Exam   CONSTITUTIONAL: NAD, lying in bed  NEURO: Awake, alert  HEENT: Flap viable, well-perfused, appropriate color, with 2-3 second capillary refill and (+) cook Doppler signal, suture line intact  NECK: kely-incisional blanching erythema improved   RLE:dressing c/d/i        LABS                         9.7    6.76  )-----------( 243      ( 19 Jan 2022 01:14 )             30.7       140  |  100  |  25<H>  ----------------------------<  129<H>  4.5   |  31  |  0.45<L>    Ca    9.0      19 Jan 2022 01:14  Phos  4.0     01-19  Mg     1.90     01-19

## 2022-01-20 NOTE — PROGRESS NOTE ADULT - SUBJECTIVE AND OBJECTIVE BOX
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    INTERVAL EVENTS/SUBJECTIVE:   - trach PMV   - doppler cut   - NGT d/c'd    - started puree diet   ______________________________________________  OBJECTIVE:   T(C): 36.9 (01-20-22 @ 02:00), Max: 36.9 (01-20-22 @ 02:00)  HR: 99 (01-20-22 @ 02:00) (89 - 102)  BP: 107/50 (01-20-22 @ 02:00) (107/50 - 128/68)  RR: 18 (01-20-22 @ 02:00) (17 - 24)  SpO2: 90% (01-20-22 @ 02:00) (90% - 99%)  Wt(kg): --  CAPILLARY BLOOD GLUCOSE      POCT Blood Glucose.: 145 mg/dL (19 Jan 2022 14:59)    I&O's Detail    19 Jan 2022 07:01  -  20 Jan 2022 07:00  --------------------------------------------------------  IN:    Oral Fluid: 670 mL  Total IN: 670 mL    OUT:    VAC (Vacuum Assisted Closure) System (mL): 0 mL    Voided (mL): 1100 mL  Total OUT: 1100 mL    Total NET: -430 mL      PHYSICAL EXAM:  Gen: NAD, sitting upright in bed comfortably   EOE: R neck edema c/w surgery, soft. Neck incision hemostatic, mild bruising at suture line. Trach in place on collar.  IOE: Flap viable, appropriate color, well perfused. No signs of congestion.   Extremities: L leg wound vac holding suction   NERVOUS SYSTEM:  A&Ox3, no focal deficits   ______________________________________________  LABS:  CBC Full  -  ( 19 Jan 2022 01:14 )  WBC Count : 6.76 K/uL  RBC Count : 3.43 M/uL  Hemoglobin : 9.7 g/dL  Hematocrit : 30.7 %  Platelet Count - Automated : 243 K/uL  Mean Cell Volume : 89.5 fL  Mean Cell Hemoglobin : 28.3 pg  Mean Cell Hemoglobin Concentration : 31.6 gm/dL    01-19    140  |  100  |  25<H>  ----------------------------<  129<H>  4.5   |  31  |  0.45<L>    Ca    9.0      19 Jan 2022 01:14  Phos  4.0     01-19  Mg     1.90     01-19    MEDICATIONS  (STANDING):  acetaminophen    Suspension .. 975 milliGRAM(s) Oral every 6 hours  ampicillin/sulbactam  IVPB      ampicillin/sulbactam  IVPB 3 Gram(s) IV Intermittent every 6 hours  atorvastatin 10 milliGRAM(s) Oral at bedtime  chlorhexidine 4% Liquid 1 Application(s) Topical daily  enoxaparin Injectable 40 milliGRAM(s) SubCutaneous daily  influenza   Vaccine 0.5 milliLiter(s) IntraMuscular once    MEDICATIONS  (PRN):  ALBUTerol    90 MICROgram(s) HFA Inhaler 2 Puff(s) Inhalation every 6 hours PRN Shortness of Breath and/or Wheezing  oxyCODONE    Solution 5 milliGRAM(s) Enteral Tube every 6 hours PRN Moderate Pain (4 - 6)  oxyCODONE    Solution 10 milliGRAM(s) Enteral Tube every 6 hours PRN Severe Pain (7 - 10)  senna 2 Tablet(s) Oral at bedtime PRN Constipation

## 2022-01-20 NOTE — DISCHARGE NOTE PROVIDER - CARE PROVIDER_API CALL
Vic So)  [facility]; Otolaryngology  [address] [place] [id number]  Phone: [phone]  Fax: [phone]  Follow Up Time:

## 2022-01-20 NOTE — PROGRESS NOTE ADULT - ASSESSMENT
57 y/o Female with  PMH of HTN, HLD, with diagnosis of squamous cell carcinoma of gingiva now s/p right segmental mandibulectomy, right SND, tracheostomy, left fibular free flap      Plan:  -Cook doppler discontinued  -Jaw staples removed (2 remaining)  -LLE Vac removed  -OOB as tolerated with boot  -Appreciate primary team care    [facility] PRS  [id number]

## 2022-01-20 NOTE — PROGRESS NOTE ADULT - SUBJECTIVE AND OBJECTIVE BOX
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    Interval:  1/14: Examined at bedside this morning. HELEN BARCENAS-line dc'ed. Started on albuterol.  1/15: Examined at bedside this morning. HELEN Dobbs dc'd, passed TO. OOBTC  1/16: Exmained at [facility]. TEE. OOBTC. still on IV abx. CXR overnight with posible pleural effuison s/p lasix. Trach changed from 6LPC to 6CFS secured with soft trach tie.   1/17: Pt seen and examined at the bedside. Pt's neck appeared to be erythematous yesterday and was opened up by PRS with serosanguinis fluid expressed. Packed with aquacel by PRS. Today, feeling well and doing well with 6 CFS. She tolerated finger occlusion and her trach was subsequently changed to a 4 FCS and was capped.   1/18: sob with cap, cap removed, 4CFS still in place   1/19: Did not tolerate cap, on trach collar overnight. Pt given juice yesterday that came through trach during suctioning. JESSEE removed this morning.  1/20: Transferred to floor. On trach collar overnight. Tolerates PMV. Pt states that she tolerates trach capping despite O2 desat to low 90s while capped. She states that this is normal for her at home preop.    Vital Signs Last 24 Hrs  T(C): 36.7 (20 Jan 2022 10:00), Max: 36.9 (20 Jan 2022 02:00)  T(F): 98.1 (20 Jan 2022 10:00), Max: 98.5 (20 Jan 2022 02:00)  HR: 90 (20 Jan 2022 10:14) (89 - 102)  BP: 130/71 (20 Jan 2022 10:00) (107/50 - 130/71)  BP(mean): 81 (19 Jan 2022 16:00) (64 - 81)  RR: 18 (20 Jan 2022 10:00) (17 - 21)  SpO2: 90% (20 Jan 2022 10:14) (90% - 99%)    Physical Exam:  Alert, NAD  Neck right side warm with stable erythema, nontender  Trach 4CFS secured with soft trach tie, not capped, tolerating PMV   Flap warm and well perfused, incisions c/d/i  Nonlabored Respirations on trach collar

## 2022-01-20 NOTE — PROGRESS NOTE ADULT - ASSESSMENT
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    - C/w OOBTC    - routine trach care per ENT   - flap management per PRS   - Rest of care per primary team  - Appreciate excellent SICU care       [facility] [place]   #[id number]

## 2022-01-20 NOTE — DISCHARGE NOTE PROVIDER - NSDCMRMEDTOKEN_GEN_ALL_CORE_FT
albuterol 90 mcg/inh inhalation powder: 2 puff(s) inhaled every 6 hours, As Needed  amLODIPine 10 mg oral tablet: 1 tab(s) orally once a day PM  aspirin 81 mg oral tablet: orally once a day LD1/7/22  atorvastatin 10 mg oral tablet: 1 tab(s) orally once a day PM  lisinopril 10 mg oral tablet: 1 tab(s) orally once a day PM  Vitamin D3 1250 mcg (50,000 intl units) oral capsule: 1 cap(s) orally once a week   albuterol 90 mcg/inh inhalation powder: 2 puff(s) inhaled every 6 hours, As Needed  amLODIPine 10 mg oral tablet: 1 tab(s) orally once a day PM  aspirin 81 mg oral tablet: orally once a day LD1/7/22  atorvastatin 10 mg oral tablet: 1 tab(s) orally once a day PM  lisinopril 10 mg oral tablet: 1 tab(s) orally once a day PM  rolling walker : 1 application   Vitamin D3 1250 mcg (50,000 intl units) oral capsule: 1 cap(s) orally once a week   albuterol 90 mcg/inh inhalation powder: 2 puff(s) inhaled every 6 hours, As Needed  amLODIPine 10 mg oral tablet: 1 tab(s) orally once a day PM  aspirin 81 mg oral tablet: orally once a day LD1/7/22  atorvastatin 10 mg oral tablet: 1 tab(s) orally once a day PM  lisinopril 10 mg oral tablet: 1 tab(s) orally once a day PM  oxyCODONE 5 mg/5 mL oral solution: 5 milliliter(s) orally every 6 hours, As Needed -Severe pain MDD:20mL  polyethylene glycol 3350 oral powder for reconstitution: 17 gram(s) orally once a day  rolling walker : 1 application   senna oral tablet: 2 tab(s) orally once a day (at bedtime), As needed, Constipation  Vitamin D3 1250 mcg (50,000 intl units) oral capsule: 1 cap(s) orally once a week

## 2022-01-20 NOTE — DISCHARGE NOTE PROVIDER - NSDCCPCAREPLAN_GEN_ALL_CORE_FT
PRINCIPAL DISCHARGE DIAGNOSIS  Diagnosis: Cancer of mandible  Assessment and Plan of Treatment:

## 2022-01-20 NOTE — PROGRESS NOTE ADULT - ASSESSMENT
HPI: 55 y/o Female with  PMH of HTN, HLD, with diagnosis of squamous cell carcinoma of gingiva s/p R composite resection, SND I-IV, trach, L fibula 1/13.    - Puree diet  - PT/OOBTC  - Trach cap, plan to decannulate when ready  - Nutrition consult  - ERAS protocol  - Trach care  - Flap care per OMFS

## 2022-01-21 ENCOUNTER — TRANSCRIPTION ENCOUNTER (OUTPATIENT)
Age: 57
End: 2022-01-21

## 2022-01-21 LAB
CULTURE RESULTS: NO GROWTH — SIGNIFICANT CHANGE UP
SPECIMEN SOURCE: SIGNIFICANT CHANGE UP

## 2022-01-21 RX ADMIN — OXYCODONE HYDROCHLORIDE 10 MILLIGRAM(S): 5 TABLET ORAL at 16:42

## 2022-01-21 RX ADMIN — OXYCODONE HYDROCHLORIDE 10 MILLIGRAM(S): 5 TABLET ORAL at 03:09

## 2022-01-21 RX ADMIN — OXYCODONE HYDROCHLORIDE 10 MILLIGRAM(S): 5 TABLET ORAL at 02:39

## 2022-01-21 RX ADMIN — OXYCODONE HYDROCHLORIDE 10 MILLIGRAM(S): 5 TABLET ORAL at 12:05

## 2022-01-21 RX ADMIN — OXYCODONE HYDROCHLORIDE 10 MILLIGRAM(S): 5 TABLET ORAL at 12:35

## 2022-01-21 RX ADMIN — OXYCODONE HYDROCHLORIDE 10 MILLIGRAM(S): 5 TABLET ORAL at 17:12

## 2022-01-21 RX ADMIN — ALBUTEROL 2.5 MILLIGRAM(S): 90 AEROSOL, METERED ORAL at 09:20

## 2022-01-21 RX ADMIN — ATORVASTATIN CALCIUM 10 MILLIGRAM(S): 80 TABLET, FILM COATED ORAL at 22:09

## 2022-01-21 RX ADMIN — OXYCODONE HYDROCHLORIDE 10 MILLIGRAM(S): 5 TABLET ORAL at 08:14

## 2022-01-21 RX ADMIN — ALBUTEROL 2.5 MILLIGRAM(S): 90 AEROSOL, METERED ORAL at 20:51

## 2022-01-21 RX ADMIN — SODIUM CHLORIDE 4 MILLILITER(S): 9 INJECTION INTRAMUSCULAR; INTRAVENOUS; SUBCUTANEOUS at 09:35

## 2022-01-21 RX ADMIN — SODIUM CHLORIDE 4 MILLILITER(S): 9 INJECTION INTRAMUSCULAR; INTRAVENOUS; SUBCUTANEOUS at 20:51

## 2022-01-21 RX ADMIN — OXYCODONE HYDROCHLORIDE 10 MILLIGRAM(S): 5 TABLET ORAL at 07:44

## 2022-01-21 RX ADMIN — OXYCODONE HYDROCHLORIDE 10 MILLIGRAM(S): 5 TABLET ORAL at 22:38

## 2022-01-21 RX ADMIN — POLYETHYLENE GLYCOL 3350 17 GRAM(S): 17 POWDER, FOR SOLUTION ORAL at 12:07

## 2022-01-21 RX ADMIN — ENOXAPARIN SODIUM 40 MILLIGRAM(S): 100 INJECTION SUBCUTANEOUS at 12:06

## 2022-01-21 RX ADMIN — OXYCODONE HYDROCHLORIDE 10 MILLIGRAM(S): 5 TABLET ORAL at 22:08

## 2022-01-21 NOTE — PROGRESS NOTE ADULT - SUBJECTIVE AND OBJECTIVE BOX
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    Interval:  1/14: Examined at bedside this morning. EHLEN BARCENAS-line dc'ed. Started on albuterol.  1/15: Examined at bedside this morning. HELEN Dobbs dc'd, passed TO. OOBTC  1/16: Exmained at [facility]. TEE. OOBTC. still on IV abx. CXR overnight with posible pleural effuison s/p lasix. Trach changed from 6LPC to 6CFS secured with soft trach tie.   1/17: Pt seen and examined at the bedside. Pt's neck appeared to be erythematous yesterday and was opened up by PRS with serosanguinis fluid expressed. Packed with aquacel by PRS. Today, feeling well and doing well with 6 CFS. She tolerated finger occlusion and her trach was subsequently changed to a 4 FCS and was capped.   1/18: sob with cap, cap removed, 4CFS still in place   1/19: Did not tolerate cap, on trach collar overnight. Pt given juice yesterday that came through trach during suctioning. JESSEE removed this morning.  1/20: Transferred to floor. On trach collar overnight. Tolerates PMV. Pt states that she tolerates trach capping despite O2 desat to low 90s while capped. She states that this is normal for her at home preop.  1/21: Examined at bedside this morning. Trach collar overnight, desat to 80s with trach cap. Patient states that she feels well despite desats.     Vital Signs Last 24 Hrs  T(C): 36.6 (21 Jan 2022 05:31), Max: 37.1 (20 Jan 2022 17:35)  T(F): 97.9 (21 Jan 2022 05:31), Max: 98.8 (20 Jan 2022 22:08)  HR: 82 (21 Jan 2022 05:31) (78 - 102)  BP: 126/66 (21 Jan 2022 05:31) (117/60 - 130/71)  BP(mean): --  RR: 18 (21 Jan 2022 05:31) (17 - 18)  SpO2: 92% (21 Jan 2022 05:31) (90% - 97%)    Physical Exam:  Alert, NAD  Neck right side warm with stable erythema, nontender  Trach 4CFS secured with soft trach tie, not capped, tolerating PMV   Flap soft, warm and well perfused, incisions c/d/i  Nonlabored Respirations on trach collar

## 2022-01-21 NOTE — PROGRESS NOTE ADULT - ASSESSMENT
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 Recovering well.      Plan:  -Flap check q4hrs by clinical exam  -Jaw staples removed (2 remaining)  -Keep LLE  dressing on  -OOB as tolerated with boot  -Care per primary team care    [facility] PRS  [id number]

## 2022-01-21 NOTE — PROGRESS NOTE ADULT - SUBJECTIVE AND OBJECTIVE BOX
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    INTERVAL EVENTS/SUBJECTIVE:   - Not tolerating trach capping  - tolerating diet  - NAEON   ______________________________________________  OBJECTIVE:   ICU Vital Signs Last 24 Hrs  T(C): 36.6 (21 Jan 2022 05:31), Max: 37.1 (20 Jan 2022 17:35)  T(F): 97.9 (21 Jan 2022 05:31), Max: 98.8 (20 Jan 2022 22:08)  HR: 82 (21 Jan 2022 05:31) (78 - 102)  BP: 126/66 (21 Jan 2022 05:31) (117/60 - 130/71)  BP(mean): --  ABP: --  ABP(mean): --  RR: 18 (21 Jan 2022 05:31) (17 - 18)  SpO2: 92% (21 Jan 2022 05:31) (90% - 97%)    I&O's Detail    20 Jan 2022 07:01  -  21 Jan 2022 07:00  --------------------------------------------------------  IN:    Oral Fluid: 1340 mL  Total IN: 1340 mL    OUT:    Voided (mL): 550 mL  Total OUT: 550 mL    Total NET: 790 mL      PHYSICAL EXAM:  Gen: NAD, sitting upright in bed comfortably   EOE: R neck edema c/w surgery, soft, unchanged from yesterday. Neck incision hemostatic, mild bruising at suture line. Trach in place on collar.  IOE: Flap viable, appropriate color, well perfused, soft. No signs of congestion.   Extremities: L leg wound vac holding suction       ______________________________________________  LABS:  CBC Full  -  ( 19 Jan 2022 01:14 )  WBC Count : 6.76 K/uL  RBC Count : 3.43 M/uL  Hemoglobin : 9.7 g/dL  Hematocrit : 30.7 %  Platelet Count - Automated : 243 K/uL  Mean Cell Volume : 89.5 fL  Mean Cell Hemoglobin : 28.3 pg  Mean Cell Hemoglobin Concentration : 31.6 gm/dL    01-19    140  |  100  |  25<H>  ----------------------------<  129<H>  4.5   |  31  |  0.45<L>    Ca    9.0      19 Jan 2022 01:14  Phos  4.0     01-19  Mg     1.90     01-19    MEDICATIONS  (STANDING):  ALBUTerol    0.083% 2.5 milliGRAM(s) Nebulizer every 12 hours  atorvastatin 10 milliGRAM(s) Oral at bedtime  enoxaparin Injectable 40 milliGRAM(s) SubCutaneous daily  influenza   Vaccine 0.5 milliLiter(s) IntraMuscular once  polyethylene glycol 3350 17 Gram(s) Oral daily  sodium chloride 3%  Inhalation 4 milliLiter(s) Inhalation every 12 hours    MEDICATIONS  (PRN):  ALBUTerol    90 MICROgram(s) HFA Inhaler 2 Puff(s) Inhalation every 6 hours PRN Shortness of Breath and/or Wheezing  oxyCODONE    Solution 5 milliGRAM(s) Oral every 4 hours PRN Moderate Pain (4 - 6)  oxyCODONE    Solution 10 milliGRAM(s) Oral every 4 hours PRN Severe Pain (7 - 10)  senna 2 Tablet(s) Oral at bedtime PRN Constipation

## 2022-01-21 NOTE — DISCHARGE NOTE NURSING/CASE MANAGEMENT/SOCIAL WORK - PATIENT PORTAL LINK FT
You can access the FollowMyHealth Patient Portal offered by [facility] by registering at the following website: [email or web address]. By joining [facility]’s FollowMyHealth portal, you will also be able to view your health information using other applications (apps) compatible with our system.

## 2022-01-21 NOTE — PROGRESS NOTE ADULT - ASSESSMENT
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    - C/w OOBTC    - routine trach care per ENT   - flap management per PRS   - Rest of care per primary team  - Appreciate excellent SICU care       [facility] [place]   #[id number]

## 2022-01-21 NOTE — PROGRESS NOTE ADULT - ASSESSMENT
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    - Puree diet  - PT/OOBTC  - Trach cap as tolerates  - continuous pulse ox  - Lovenox  - Nutrition consult  - ERAS protocol  - Trach care  - Flap care per OMFS  - Dispo: possible home with trach and outpatient f/u for decannulation

## 2022-01-21 NOTE — PROGRESS NOTE ADULT - SUBJECTIVE AND OBJECTIVE BOX
PLASTIC SURGERY PROGRESS NOTE      SUBJECTIVE/ROS:   No acute events overnight  Patient feels generally well  Has been walking to bathroom         OBJECTIVE:    Vital Signs Last 24 Hrs  T(C): 36.6 (21 Jan 2022 05:31), Max: 37.1 (20 Jan 2022 17:35)  T(F): 97.9 (21 Jan 2022 05:31), Max: 98.8 (20 Jan 2022 22:08)  HR: 82 (21 Jan 2022 05:31) (78 - 102)  BP: 126/66 (21 Jan 2022 05:31) (117/60 - 130/71)  BP(mean): --  RR: 18 (21 Jan 2022 05:31) (17 - 18)  SpO2: 92% (21 Jan 2022 05:31) (90% - 97%)    PHYSICAL EXAM:  Constitutional:  NAD, pleasant as always  Neuro: AOx3  HEENT: Flap viable, well-perfused, appropriate color, with 2-3 second capillary refill  NECK: kely-incisional blanching erythema resolved, suture line intact  RLE: dressing c/d/i        I&Os:  I&O's Detail    20 Jan 2022 07:01  -  21 Jan 2022 07:00  --------------------------------------------------------  IN:    Oral Fluid: 1340 mL  Total IN: 1340 mL    OUT:    Voided (mL): 550 mL  Total OUT: 550 mL    Total NET: 790 mL

## 2022-01-22 VITALS — OXYGEN SATURATION: 100 %

## 2022-01-22 RX ORDER — SENNA PLUS 8.6 MG/1
2 TABLET ORAL
Qty: 0 | Refills: 0 | DISCHARGE
Start: 2022-01-22

## 2022-01-22 RX ORDER — POLYETHYLENE GLYCOL 3350 17 G/17G
17 POWDER, FOR SOLUTION ORAL
Qty: 0 | Refills: 0 | DISCHARGE
Start: 2022-01-22

## 2022-01-22 RX ORDER — OXYCODONE HYDROCHLORIDE 5 MG/1
5 TABLET ORAL
Qty: 100 | Refills: 0
Start: 2022-01-22 | End: 2022-01-26

## 2022-01-22 RX ADMIN — OXYCODONE HYDROCHLORIDE 10 MILLIGRAM(S): 5 TABLET ORAL at 03:12

## 2022-01-22 RX ADMIN — ENOXAPARIN SODIUM 40 MILLIGRAM(S): 100 INJECTION SUBCUTANEOUS at 12:04

## 2022-01-22 RX ADMIN — POLYETHYLENE GLYCOL 3350 17 GRAM(S): 17 POWDER, FOR SOLUTION ORAL at 12:04

## 2022-01-22 RX ADMIN — OXYCODONE HYDROCHLORIDE 10 MILLIGRAM(S): 5 TABLET ORAL at 12:45

## 2022-01-22 RX ADMIN — SODIUM CHLORIDE 4 MILLILITER(S): 9 INJECTION INTRAMUSCULAR; INTRAVENOUS; SUBCUTANEOUS at 10:46

## 2022-01-22 RX ADMIN — ALBUTEROL 2.5 MILLIGRAM(S): 90 AEROSOL, METERED ORAL at 10:46

## 2022-01-22 RX ADMIN — OXYCODONE HYDROCHLORIDE 10 MILLIGRAM(S): 5 TABLET ORAL at 03:42

## 2022-01-22 RX ADMIN — OXYCODONE HYDROCHLORIDE 10 MILLIGRAM(S): 5 TABLET ORAL at 07:49

## 2022-01-22 RX ADMIN — OXYCODONE HYDROCHLORIDE 10 MILLIGRAM(S): 5 TABLET ORAL at 12:05

## 2022-01-22 NOTE — PROGRESS NOTE ADULT - ASSESSMENT
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    - C/w OOBTC    - routine trach care per ENT   - flap management per PRS   - Rest of care per primary team      [facility] [place]   #[id number]

## 2022-01-22 NOTE — PROGRESS NOTE ADULT - SUBJECTIVE AND OBJECTIVE BOX
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    Interval:  1/14: Examined at bedside this morning. HELEN BARCENAS-line dc'ed. Started on albuterol.  1/15: Examined at bedside this morning. HELEN Dobbs dc'd, passed TO. OOBTC  1/16: Exmained at [facility]. TEE. OOBTC. still on IV abx. CXR overnight with posible pleural effuison s/p lasix. Trach changed from 6LPC to 6CFS secured with soft trach tie.   1/17: Pt seen and examined at the bedside. Pt's neck appeared to be erythematous yesterday and was opened up by PRS with serosanguinis fluid expressed. Packed with aquacel by PRS. Today, feeling well and doing well with 6 CFS. She tolerated finger occlusion and her trach was subsequently changed to a 4 FCS and was capped.   1/18: sob with cap, cap removed, 4CFS still in place   1/19: Did not tolerate cap, on trach collar overnight. Pt given juice yesterday that came through trach during suctioning. JESSEE removed this morning.  1/20: Transferred to floor. On trach collar overnight. Tolerates PMV. Pt states that she tolerates trach capping despite O2 desat to low 90s while capped. She states that this is normal for her at home preop.  1/21: Examined at bedside this morning. Trach collar overnight, desat to 80s with trach cap. Patient states that she feels well despite desats.   1/22: Seen and examined at bedside this AM. Decannulated, trach stoma covered with gauze dressing. Feeling well, tolerating PO.     Vital Signs Last 24 Hrs  T(C): 36.1 (22 Jan 2022 05:44), Max: 37.3 (21 Jan 2022 10:00)  T(F): 97 (22 Jan 2022 05:44), Max: 99.2 (21 Jan 2022 10:00)  HR: 95 (22 Jan 2022 05:44) (90 - 109)  BP: 133/79 (22 Jan 2022 05:44) (107/75 - 135/74)  BP(mean): --  RR: 19 (22 Jan 2022 05:44) (17 - 20)  SpO2: 94% (22 Jan 2022 05:44) (90% - 100%)    Physical Exam:  Alert, NAD  Neck right side warm with stable erythema, nontender  Stoma covered with gauze/tape  Flap soft, warm and well perfused, incisions c/d/i  Nonlabored Respirations on RA

## 2022-01-22 NOTE — PROGRESS NOTE ADULT - ASSESSMENT
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    - Puree diet  - PT/OOBTC  - Lovenox  - Nutrition consult  - ERAS protocol  - Trach care  - Flap care per OMFS  - Dispo: possible home with trach and outpatient f/u for decannulation

## 2022-01-22 NOTE — PROGRESS NOTE ADULT - SUBJECTIVE AND OBJECTIVE BOX
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    INTERVAL EVENTS/SUBJECTIVE:   - Pt decannulated  - NAEON  - Pt examined at chairside  - breathing comfortably this AM  ______________________________________________  OBJECTIVE:   ICU Vital Signs Last 24 Hrs  T(C): 36.1 (22 Jan 2022 05:44), Max: 37.3 (21 Jan 2022 10:00)  T(F): 97 (22 Jan 2022 05:44), Max: 99.2 (21 Jan 2022 10:00)  HR: 95 (22 Jan 2022 05:44) (90 - 109)  BP: 133/79 (22 Jan 2022 05:44) (107/75 - 135/74)  BP(mean): --  ABP: --  ABP(mean): --  RR: 19 (22 Jan 2022 05:44) (17 - 20)  SpO2: 94% (22 Jan 2022 05:44) (90% - 100%)    I&O's Detail    21 Jan 2022 07:01  -  22 Jan 2022 07:00  --------------------------------------------------------  IN:    Oral Fluid: 960 mL  Total IN: 960 mL    OUT:    Voided (mL): 0 mL  Total OUT: 0 mL    Total NET: 960 mL        PHYSICAL EXAM:  Gen: NAD, sitting upright in chair comfortably   EOE: R neck edema c/w surgery, soft, unchanged from yesterday. Neck incision hemostatic, mild bruising at suture line. Trach in place on collar.  IOE: Flap viable, appropriate color, well perfused, soft. No signs of congestion.   Extremities: L leg wound vac holding suction       ______________________________________________  LABS:  CBC Full  -  ( 19 Jan 2022 01:14 )  WBC Count : 6.76 K/uL  RBC Count : 3.43 M/uL  Hemoglobin : 9.7 g/dL  Hematocrit : 30.7 %  Platelet Count - Automated : 243 K/uL  Mean Cell Volume : 89.5 fL  Mean Cell Hemoglobin : 28.3 pg  Mean Cell Hemoglobin Concentration : 31.6 gm/dL    01-19    140  |  100  |  25<H>  ----------------------------<  129<H>  4.5   |  31  |  0.45<L>    Ca    9.0      19 Jan 2022 01:14  Phos  4.0     01-19  Mg     1.90     01-19    MEDICATIONS  (STANDING):  ALBUTerol    0.083% 2.5 milliGRAM(s) Nebulizer every 12 hours  atorvastatin 10 milliGRAM(s) Oral at bedtime  enoxaparin Injectable 40 milliGRAM(s) SubCutaneous daily  influenza   Vaccine 0.5 milliLiter(s) IntraMuscular once  polyethylene glycol 3350 17 Gram(s) Oral daily  sodium chloride 3%  Inhalation 4 milliLiter(s) Inhalation every 12 hours    MEDICATIONS  (PRN):  ALBUTerol    90 MICROgram(s) HFA Inhaler 2 Puff(s) Inhalation every 6 hours PRN Shortness of Breath and/or Wheezing  oxyCODONE    Solution 5 milliGRAM(s) Oral every 4 hours PRN Moderate Pain (4 - 6)  oxyCODONE    Solution 10 milliGRAM(s) Oral every 4 hours PRN Severe Pain (7 - 10)  senna 2 Tablet(s) Oral at bedtime PRN Constipation

## 2022-01-22 NOTE — PROGRESS NOTE ADULT - PROVIDER SPECIALTY LIST ADULT
ENT
Plastic Surgery
SICU
SICU
ENT
OMFS
OMFS
Plastic Surgery
SICU
SICU
ENT
OMFS
Plastic Surgery
SICU
SICU
OMFS

## 2022-01-24 DIAGNOSIS — R13.12 DYSPHAGIA, OROPHARYNGEAL PHASE: ICD-10-CM

## 2022-01-25 ENCOUNTER — APPOINTMENT (OUTPATIENT)
Dept: PLASTIC SURGERY | Facility: CLINIC | Age: 57
End: 2022-01-25
Payer: COMMERCIAL

## 2022-01-25 VITALS
WEIGHT: 230 LBS | TEMPERATURE: 97.7 F | HEART RATE: 102 BPM | OXYGEN SATURATION: 92 % | HEIGHT: 65 IN | BODY MASS INDEX: 38.32 KG/M2

## 2022-01-25 PROCEDURE — 99024 POSTOP FOLLOW-UP VISIT: CPT

## 2022-01-27 ENCOUNTER — OUTPATIENT (OUTPATIENT)
Dept: OUTPATIENT SERVICES | Facility: HOSPITAL | Age: 57
LOS: 1 days | Discharge: HOME | End: 2022-01-27

## 2022-01-27 ENCOUNTER — APPOINTMENT (OUTPATIENT)
Dept: RADIATION ONCOLOGY | Facility: CLINIC | Age: 57
End: 2022-01-27
Payer: COMMERCIAL

## 2022-01-27 ENCOUNTER — NON-APPOINTMENT (OUTPATIENT)
Age: 57
End: 2022-01-27

## 2022-01-27 ENCOUNTER — OUTPATIENT (OUTPATIENT)
Dept: OUTPATIENT SERVICES | Facility: HOSPITAL | Age: 57
LOS: 1 days | Discharge: HOME | End: 2022-01-27
Payer: COMMERCIAL

## 2022-01-27 VITALS
RESPIRATION RATE: 16 BRPM | WEIGHT: 225 LBS | SYSTOLIC BLOOD PRESSURE: 117 MMHG | HEART RATE: 95 BPM | DIASTOLIC BLOOD PRESSURE: 64 MMHG | OXYGEN SATURATION: 91 % | BODY MASS INDEX: 37.44 KG/M2

## 2022-01-27 DIAGNOSIS — Z96.649 PRESENCE OF UNSPECIFIED ARTIFICIAL HIP JOINT: Chronic | ICD-10-CM

## 2022-01-27 DIAGNOSIS — Z98.891 HISTORY OF UTERINE SCAR FROM PREVIOUS SURGERY: Chronic | ICD-10-CM

## 2022-01-27 DIAGNOSIS — Z90.711 ACQUIRED ABSENCE OF UTERUS WITH REMAINING CERVICAL STUMP: Chronic | ICD-10-CM

## 2022-01-27 DIAGNOSIS — C80.1 MALIGNANT (PRIMARY) NEOPLASM, UNSPECIFIED: ICD-10-CM

## 2022-01-27 PROCEDURE — 99215 OFFICE O/P EST HI 40 MIN: CPT | Mod: 25

## 2022-01-27 NOTE — HISTORY OF PRESENT ILLNESS
[FreeTextEntry1] : 56 year old woman presents today for reevaluation regarding adjuvant therapy for head and neck cancer.\par \par Recall, she noted soreness in her right mandible in 11/2021, and initially went for evaluation of possible dentures. She met with Dr. Cristopher Pinon (oral surgeon).\par \par 11/26/21 biopsy of the right mandibular gingiva showed well-differentiated squamous cell carcinoma, P16 negative.\par \par She met with Dr. So on 12/14/21, and with Dr. Evans on 12/20/21. On exam, she was noted to have an ulcerated and endophytic lesion in the right posterior mandibular gingiva/post-molar region, abutting the 2nd premolar tooth and the base RMT posteriorly. There was buccal induration, no lingual extension.\par \par 12/23/21 PET/CT showed \par 1. FDG avid soft tissue lesion along the right posterior mandibular gingiva and gingivobuccal sulcus as seen on contrast-enhanced CT compatible with newly diagnosed squamous cell cancer. FDG avidity extends towards the lingual surface.\par 2. FDG avid right level Ib cervical lymph nodes likely metastatic. FDG avid right level IIA and IIB lymph nodes are indeterminate.\par 3. FDG avid focus within the enlarged right lobe, likely corresponding to the vague 1.1 cm nodule on contrast enhanced CT. Differentials include benign and malignant etiologies. \par 4. Nonspecific focal FDG avidity mapping to opacification in the right vallecula. \par \par 12/23/21 CT neck/face:\par Enhancing abnormal soft tissue lesion centered along the right posterior mandibular gingiva and gingivobuccal sulcus with suspected bony involvement of the adjacent alveolar ridge and also the lingual mandibular gingiva. \par Right-sided pathologic cervical lymphadenopathy at right level Ib and right level II.  Minimally hazy margins adjacent to the right level Ib lymph node. \par 1.1 cm right-sided thyroid upper pole nodule. \par \par 1/13/22 underwent right composite resection (segmental mandibulectomy with a partial right buccal soft  tissue and floor of mouth excision), right neck dissection levels I-IV.\par \par Pathology showed invasive squamous cell carcinoma, well-differentiated, measuring 1.7 cm.  Depth of invasion 0.6 cm.  No LVI/PNI.  There was microscopic evidence of tumor invasion into underlying bone (pT4a).  Tumor margin from medial soft tissue was 0.4 cm; from the lateral soft tissue was 0.1 cm.  Positive lymph nodes were identified in right level 1b and 2a; total 4/18; largest 1.5 cm, no LEONIE.  pT4a N2b.\par \par Currently reports xerostomia, dysphagia on pureed diet.  Neck/oral pain rated 9/10, though managed with pain medications.  Denies dysgeusia or odynophagia.  Receiving home care, and will get speech language pathology at home.

## 2022-01-27 NOTE — REVIEW OF SYSTEMS
[Dysphagia] : dysphagia [Skin Wound] : skin wound [Negative] : Allergic/Immunologic [Dysphagia: Grade 2 - Symptomatic and altered eating/swallowing] : Dysphagia: Grade 2 - Symptomatic and altered eating/swallowing [Neck Edema: Grade 2 - Moderate neck edema; slight obliteration of anatomic landmarks; limiting instrumental ADL] : Neck Edema: Grade 2 - Moderate neck edema; slight obliteration of anatomic landmarks; limiting instrumental ADL [FreeTextEntry6] : airway [de-identified] : healing scar neck [FreeTextEntry1] : airway

## 2022-01-27 NOTE — PHYSICAL EXAM
[Normal] : oriented to person, place and time, the affect was normal, the mood was normal and not anxious [Sclera] : the sclera and conjunctiva were normal [Extraocular Movements] : extraocular movements were intact [Bowel Sounds] : normal bowel sounds [Abdomen Soft] : soft [Nondistended] : nondistended [Abdomen Tenderness] : non-tender [de-identified] : postoperative changes in oral cavity, with intraoral incision well approximated [de-identified] : right neck well-healing, surrounding edema [de-identified] : LLE in walking boot

## 2022-01-27 NOTE — VITALS
[Maximal Pain Intensity: 9/10] : 9/10 [Least Pain Intensity: 9/10] : 9/10 [Pain Description/Quality: ___] : Pain description/quality: [unfilled] [Pain Duration: ___] : Pain duration: [unfilled] [Pain Location: ___] : Pain Location: [unfilled] [Pain Interferes with ADLs] : Pain interferes with activities of daily living. [Opioid] : opioid [70: Cares for self; unalbe to carry on normal activity or do active work.] : 70: Cares for self; unable to carry on normal activity or do active work.

## 2022-01-28 ENCOUNTER — APPOINTMENT (OUTPATIENT)
Dept: HEMATOLOGY ONCOLOGY | Facility: CLINIC | Age: 57
End: 2022-01-28
Payer: COMMERCIAL

## 2022-01-28 ENCOUNTER — RESULT REVIEW (OUTPATIENT)
Age: 57
End: 2022-01-28

## 2022-01-28 VITALS
OXYGEN SATURATION: 92 % | BODY MASS INDEX: 35.99 KG/M2 | HEIGHT: 65 IN | WEIGHT: 216 LBS | SYSTOLIC BLOOD PRESSURE: 121 MMHG | DIASTOLIC BLOOD PRESSURE: 78 MMHG | HEART RATE: 86 BPM

## 2022-01-28 LAB
BASOPHILS # BLD AUTO: 0.1 K/UL — SIGNIFICANT CHANGE UP (ref 0–0.2)
BASOPHILS NFR BLD AUTO: 1.2 % — SIGNIFICANT CHANGE UP (ref 0–2)
EOSINOPHIL # BLD AUTO: 0.3 K/UL — SIGNIFICANT CHANGE UP (ref 0–0.5)
EOSINOPHIL NFR BLD AUTO: 4.2 % — SIGNIFICANT CHANGE UP (ref 0–6)
HCT VFR BLD CALC: 33.1 % — LOW (ref 34.5–45)
HGB BLD-MCNC: 10.8 G/DL — LOW (ref 11.5–15.5)
LYMPHOCYTES # BLD AUTO: 1.5 K/UL — SIGNIFICANT CHANGE UP (ref 1–3.3)
LYMPHOCYTES # BLD AUTO: 23.5 % — SIGNIFICANT CHANGE UP (ref 13–44)
MCHC RBC-ENTMCNC: 28.9 PG — SIGNIFICANT CHANGE UP (ref 27–34)
MCHC RBC-ENTMCNC: 32.5 G/DL — SIGNIFICANT CHANGE UP (ref 32–36)
MCV RBC AUTO: 89 FL — SIGNIFICANT CHANGE UP (ref 80–100)
MONOCYTES # BLD AUTO: 0.6 K/UL — SIGNIFICANT CHANGE UP (ref 0–0.9)
MONOCYTES NFR BLD AUTO: 9.1 % — SIGNIFICANT CHANGE UP (ref 2–14)
NEUTROPHILS # BLD AUTO: 4 K/UL — SIGNIFICANT CHANGE UP (ref 1.8–7.4)
NEUTROPHILS NFR BLD AUTO: 61.9 % — SIGNIFICANT CHANGE UP (ref 43–77)
PLATELET # BLD AUTO: 354 K/UL — SIGNIFICANT CHANGE UP (ref 150–400)
RBC # BLD: 3.72 M/UL — LOW (ref 3.8–5.2)
RBC # FLD: 12.4 % — SIGNIFICANT CHANGE UP (ref 10.3–14.5)
WBC # BLD: 6.4 K/UL — SIGNIFICANT CHANGE UP (ref 3.8–10.5)
WBC # FLD AUTO: 6.4 K/UL — SIGNIFICANT CHANGE UP (ref 3.8–10.5)

## 2022-01-28 PROCEDURE — 99205 OFFICE O/P NEW HI 60 MIN: CPT

## 2022-01-31 LAB
ALBUMIN SERPL ELPH-MCNC: 4 G/DL
ALP BLD-CCNC: 93 U/L
ALT SERPL-CCNC: 26 U/L
ANION GAP SERPL CALC-SCNC: 12 MMOL/L
AST SERPL-CCNC: 15 U/L
BILIRUB SERPL-MCNC: 0.3 MG/DL
BUN SERPL-MCNC: 11 MG/DL
CALCIUM SERPL-MCNC: 9.3 MG/DL
CHLORIDE SERPL-SCNC: 103 MMOL/L
CO2 SERPL-SCNC: 27 MMOL/L
CREAT SERPL-MCNC: 0.59 MG/DL
GLUCOSE SERPL-MCNC: 109 MG/DL
HAV IGM SER QL: NONREACTIVE
HBV CORE IGM SER QL: NONREACTIVE
HBV SURFACE AG SER QL: NONREACTIVE
HCV AB SER QL: NONREACTIVE
HCV S/CO RATIO: 0.14 S/CO
INR PPP: 1.04 RATIO
MAGNESIUM SERPL-MCNC: 1.8 MG/DL
POTASSIUM SERPL-SCNC: 4.3 MMOL/L
PROT SERPL-MCNC: 6.1 G/DL
PT BLD: 12.2 SEC
SODIUM SERPL-SCNC: 141 MMOL/L

## 2022-02-01 ENCOUNTER — NON-APPOINTMENT (OUTPATIENT)
Age: 57
End: 2022-02-01

## 2022-02-01 ENCOUNTER — APPOINTMENT (OUTPATIENT)
Dept: OTOLARYNGOLOGY | Facility: CLINIC | Age: 57
End: 2022-02-01
Payer: COMMERCIAL

## 2022-02-01 VITALS
SYSTOLIC BLOOD PRESSURE: 103 MMHG | HEART RATE: 89 BPM | HEIGHT: 65 IN | TEMPERATURE: 98.3 F | BODY MASS INDEX: 37.32 KG/M2 | DIASTOLIC BLOOD PRESSURE: 69 MMHG | WEIGHT: 224 LBS

## 2022-02-01 PROCEDURE — 99024 POSTOP FOLLOW-UP VISIT: CPT

## 2022-02-01 NOTE — REASON FOR VISIT
[Subsequent Evaluation] : a subsequent evaluation for [FreeTextEntry2] : s/p Right composite resection, which consisted of a segmental mandibulectomy with a partial right buccal soft  tissue and FOM excision, tracheostomy, right neck dissection of levels I through IV,  on 1/13/2022

## 2022-02-01 NOTE — CONSULT LETTER
[Dear  ___] : Dear  [unfilled], [Consult Letter:] : I had the pleasure of evaluating your patient, [unfilled]. [Please see my note below.] : Please see my note below. [Consult Closing:] : Thank you very much for allowing me to participate in the care of this patient.  If you have any questions, please do not hesitate to contact me. [Sincerely,] : Sincerely, [FreeTextEntry2] : Cristopher Pinon DDS ([place], [place]) [FreeTextEntry3] : Vic So MD, FACS\par \par [facility] [place]\par Associate Chair\par    Department of Otolaryngology\par \par Professor\par Otolaryngology & Molecular Medicine\par [employer]\par

## 2022-02-01 NOTE — HISTORY OF PRESENT ILLNESS
[de-identified] : 56 year female presents for follow-up s/p Right composite resection, which consisted of a segmental mandibulectomy with a partial right buccal soft  tissue and FOM excision, tracheostomy, right neck dissection of levels I through IV,  on 1/13/2022 for Squamous cell carcinoma of the right mandibular gingiva  metastatic to the right neck. \par Has followed up with Dr. Evans, Dr. Lambert and Dr. Pappas is managing her pain. Will be following up with SLP. Reports she is on a soft diet and tolerating well--has lost 17 pounds since surgery. Reports she has stopped smoking since one week before surgery. Patient denies dysphagia, odynophagia, dyspnea. Denies recent fevers or infections.

## 2022-02-02 ENCOUNTER — APPOINTMENT (OUTPATIENT)
Dept: OTOLARYNGOLOGY | Facility: CLINIC | Age: 57
End: 2022-02-02

## 2022-02-02 NOTE — RESULTS/DATA
[FreeTextEntry1] : 1/13/22 Pathology:\par Lymph nodes, right level 1b, neck dissection: 3 lymph nodes positive for metastatic carcinoma (3/3).  Largest metastatic deposit measures 1.5 cm.  Submandibular gland negative for carcinoma\par Lymph nodes, level 1a, neck dissection: 2 lymph nodes negative for metastatic carcinoma (0/2)\par Oral cavity, right mandible segment, resection: Invasive squamous cell carcinoma, well-differentiated. Carcinoma is present 1 mm from the lateral soft tissue margin and 2\par mm from the medial soft tissue margin\par Lymph nodes, right level 4, neck dissection: 3 lymph nodes negative for metastatic carcinoma (0/3)\par Lymph nodes, right level 3, neck dissection: 6 lymph nodes negative for metastatic carcinoma (0/6)\par Lymph nodes, right level 2a, neck dissection: 1 out of 2 lymph nodes positive for metastatic carcinoma (1/2)\par Lymph nodes, right level 2b, neck dissection: 2 lymph nodes negative for metastatic carcinoma (0/2)\par Synoptic Summary\par SPECIMEN:  Partial mandibulectomy\par TUMOR\par Tumor Focality:  Unifocal\par Multiple Primary Sites:   Not applicable (no additional primary site(s) present\par Tumor Site:  Oral cavity\par +Tumor Subsite:  Lower gingiva\par Tumor Laterality:  Right\par Tumor Size:  Greatest dimension in Centimeters (cm):   1.7 x 0.8 x 0.7  cm\par Histologic Type:  Squamous cell carcinoma, conventional\par Histologic Grade:  G1, well differentiated\par Tumor Depth of Invasion (DOI):   6 mm\par Lymphovascular Invasion:   Not identified\par Perineural Invasion:   Not identified\par MARGINS\par All specimen margins negative for invasive tumor\par Distance from Invasive Tumor to Closest Specimen Margin:    Carcinoma is present 1 mm from the lateral soft tissue margin and 2 mm from the medial soft tissue margin\par Specimen Margin Status for Noninvasive Tumor:    All specimen margins negative for high-grade dysplasia / in situ disease\par REGIONAL LYMPH NODES\par Number of Lymph Nodes with Tumor: 4\par Laterality of Lymph Node(s) with Tumor:   Ipsilateral\par Size of Largest Joby Metastatic Deposit:    At least 1.5 cm\par Extranodal Extension (LEONIE):   Not identified\par Number of Lymph Nodes Examined: 18\par DISTANT METASTASIS : Not applicable\par PATHOLOGIC STAGE CLASSIFICATION (pTNM, AJCC 8th Edition)\par Pathologic Stage Classification\par pT Category: pT2: Tumor less than or equal to 2 cm with DOI greater than 5 mm or tumor greater than 2 cm and less than or equal to 4 cm with DOI less than or equal to 10 mm\par pN Category: pN2b: Metastases in multiple ipsilateral nodes, none larger than 6 cm in greatest dimension and LEONIE(-) \par pM Category:  Not applicable - pM cannot be determined from the submitted specimen(s)\par ADDITIONAL FINDINGS:   None identified\par \par 12/23/21 PET:\par FDG avid soft tissue lesion along the right posterior mandibular gingiva and gingivobuccal sulcus as seen on contrast-enhanced CT compatible with newly diagnosed squamous cell cancer. FDG avidity extends towards the lingual surface. FDG avid right level Ib cervical lymph nodes likely metastatic. FDG avid right level IIA and IIB lymph nodes are indeterminate and may be further evaluated with ultrasound. FDG avid focus within the enlarged right lobe, likely corresponding to the vague 1.1 cm nodule on contrast enhanced CT. Differentials include benign and malignant etiologies. Ultrasound and percutaneous possible FNA biopsy may be performed. Nonspecific focal FDG avidity mapping to opacification in the right vallecula. Please correlate clinically or with direct visualization to exclude any lesion in this region. Approximately symmetric hypermetabolism of bilateral palatine tonsils, physiologic versus inflammatory.\par \par 12/23/21 CT Neck/Maxillofacial:\par Enhancing abnormal soft tissue lesion centered along the right posterior mandibular gingiva and gingivobuccal sulcus with suspected bony involvement of the adjacent alveolar ridge and also the lingual mandibular gingiva. Findings are compatible with the patient's biopsy-proven squamous cell carcinoma.\par Right-sided pathologic cervical lymphadenopathy at right level Ib and right level II concerning for metastatic disease. Minimally hazy margins adjacent to the right level Ib lymph node. Correlate with physical exam to assess for mobile or fixed state.\par 1.1 cm right-sided thyroid upper pole nodule. Recommend ultrasound correlation as neoplasm is not excluded.\par \par 12/16/21 Pathology:\par Mandibular gingiva, RIGHT, lower, biopsy:  Invasive squamous carcinoma well differentiated

## 2022-02-02 NOTE — ASSESSMENT
[FreeTextEntry1] : 56 year old woman diagnosed with oral cancer s/p resection with right neck dissection, [id number] \par \par # Oral Cancer \par - I explained to the patient the natural history of head and neck cancer, and explained the role of chemotherapy as part of adjuvant treatment to be given with radiation given high risk features such as multiple lymph node involvement, and large tumor. I also explained the risks and benefits of giving chemotherapy weekly or every 3 weeks. The patient was agreeable to treatment with cisplatin to be given weekly.  I consented the patient for treatment.\par - start cisplatin 40 mg/m2 weekly. \par - nutrition consult \par \par # Tobacco Abuse\par - now in remission\par

## 2022-02-02 NOTE — HISTORY OF PRESENT ILLNESS
[de-identified] : Patient is a 55 yo F with h/o HTN, HLD and asthma who was diagnosed with SCC of the gingival mucosa.\par \par She saw oral surgeon, Dr. Cristopher Pinon, c/o soreness in the right jaw.  A biopsy of the right mandibular gingiva on 11/26/21 showed well-differentiated squamous cell carcinoma, P16 negative.\par \par She next saw Dr. James So. On exam, she was noted to have an ulcerated and endophytic lesion in the right posterior mandibular gingiva/post-molar region, abutting the 2nd premolar tooth and the base RMT posteriorly. There was buccal induration, no lingual extension.\par \par Staging CT neck on 12/23/21 showed an enhancing abnormal soft tissue lesion centered along the right posterior mandibular gingiva and gingivobuccal sulcus with suspected bony involvement of the adjacent alveolar ridge and also the lingual mandibular gingiva.  Right-sided pathologic cervical lymphadenopathy at right level Ib and right level II. Minimally hazy margins adjacent to the right level Ib lymph node. Staging PET on the same day showed FDG avid soft tissue lesion along the right posterior mandibular gingiva and gingivobuccal sulcus as seen on contrast-enhanced CT compatible with newly diagnosed squamous cell cancer. FDG avidity extends towards the lingual surface. FDG avid right level Ib cervical lymph nodes likely metastatic. FDG avid right level IIA and IIB lymph nodes are indeterminate. FDG avid focus within the enlarged right lobe, likely corresponding to the vague 1.1 cm nodule on contrast enhanced CT.\par \par On 1/13/22 she underwent right composite resection (segmental mandibulectomy with a partial right buccal soft tissue and floor of mouth excision), right neck dissection levels I-IV with Dr So.  Pathology showed invasive squamous cell carcinoma, well-differentiated, measuring 1.7 cm. Depth of invasion 0.6 cm. No LVI/PNI. There was microscopic evidence of tumor invasion into underlying bone (pT4a). Tumor margin from medial soft tissue was 0.4 cm; from the lateral soft tissue was 0.1 cm. Positive lymph nodes were identified in right level 1b and 2a; total 4/18; largest 1.5 cm, no LEONIE. pT4a N2b.\par \par  [de-identified] : The patient has significant pain on right mandible and left lower extremity.  The patient is taking liquid pain medications with some relief, but is still needing to take pain medications. She denies nausea, vomiting, drowsiness. She is taking tylenol and advil. The patient denies chest pain, SOB. The patietn denies numbness, tingling, and tinnitus at baseline.

## 2022-02-02 NOTE — PHYSICAL EXAM
[Fully active, able to carry on all pre-disease performance without restriction] : Status 0 - Fully active, able to carry on all pre-disease performance without restriction [Normal] : affect appropriate [de-identified] : right neck fullness and erythema post operatively.

## 2022-02-03 PROCEDURE — 77263 THER RADIOLOGY TX PLNG CPLX: CPT

## 2022-02-04 ENCOUNTER — NON-APPOINTMENT (OUTPATIENT)
Age: 57
End: 2022-02-04

## 2022-02-06 ENCOUNTER — INPATIENT (INPATIENT)
Facility: HOSPITAL | Age: 57
LOS: 4 days | Discharge: HOME HEALTH | End: 2022-02-11
Attending: OTOLARYNGOLOGY | Admitting: OTOLARYNGOLOGY
Payer: COMMERCIAL

## 2022-02-06 VITALS
TEMPERATURE: 97 F | DIASTOLIC BLOOD PRESSURE: 83 MMHG | OXYGEN SATURATION: 96 % | RESPIRATION RATE: 16 BRPM | SYSTOLIC BLOOD PRESSURE: 143 MMHG | HEART RATE: 108 BPM | HEIGHT: 65 IN

## 2022-02-06 DIAGNOSIS — Z98.891 HISTORY OF UTERINE SCAR FROM PREVIOUS SURGERY: Chronic | ICD-10-CM

## 2022-02-06 DIAGNOSIS — Z96.649 PRESENCE OF UNSPECIFIED ARTIFICIAL HIP JOINT: Chronic | ICD-10-CM

## 2022-02-06 DIAGNOSIS — L02.11 CUTANEOUS ABSCESS OF NECK: ICD-10-CM

## 2022-02-06 DIAGNOSIS — Z90.711 ACQUIRED ABSENCE OF UTERUS WITH REMAINING CERVICAL STUMP: Chronic | ICD-10-CM

## 2022-02-06 LAB
ALBUMIN SERPL ELPH-MCNC: 3.9 G/DL — SIGNIFICANT CHANGE UP (ref 3.3–5)
ALP SERPL-CCNC: 86 U/L — SIGNIFICANT CHANGE UP (ref 40–120)
ALT FLD-CCNC: 8 U/L — SIGNIFICANT CHANGE UP (ref 4–33)
ANION GAP SERPL CALC-SCNC: 11 MMOL/L — SIGNIFICANT CHANGE UP (ref 7–14)
APTT BLD: 34.3 SEC — SIGNIFICANT CHANGE UP (ref 27–36.3)
AST SERPL-CCNC: 7 U/L — SIGNIFICANT CHANGE UP (ref 4–32)
BASE EXCESS BLDV CALC-SCNC: 0.2 MMOL/L — SIGNIFICANT CHANGE UP (ref -2–3)
BASOPHILS # BLD AUTO: 0.03 K/UL — SIGNIFICANT CHANGE UP (ref 0–0.2)
BASOPHILS NFR BLD AUTO: 0.3 % — SIGNIFICANT CHANGE UP (ref 0–2)
BILIRUB SERPL-MCNC: 0.3 MG/DL — SIGNIFICANT CHANGE UP (ref 0.2–1.2)
BLD GP AB SCN SERPL QL: NEGATIVE — SIGNIFICANT CHANGE UP
BLOOD GAS VENOUS COMPREHENSIVE RESULT: SIGNIFICANT CHANGE UP
BUN SERPL-MCNC: 16 MG/DL — SIGNIFICANT CHANGE UP (ref 7–23)
CALCIUM SERPL-MCNC: 9.3 MG/DL — SIGNIFICANT CHANGE UP (ref 8.4–10.5)
CHLORIDE BLDV-SCNC: SIGNIFICANT CHANGE UP MMOL/L (ref 96–108)
CHLORIDE SERPL-SCNC: 104 MMOL/L — SIGNIFICANT CHANGE UP (ref 98–107)
CO2 BLDV-SCNC: 27.9 MMOL/L — HIGH (ref 22–26)
CO2 SERPL-SCNC: 25 MMOL/L — SIGNIFICANT CHANGE UP (ref 22–31)
CREAT SERPL-MCNC: 0.62 MG/DL — SIGNIFICANT CHANGE UP (ref 0.5–1.3)
EOSINOPHIL # BLD AUTO: 0.34 K/UL — SIGNIFICANT CHANGE UP (ref 0–0.5)
EOSINOPHIL NFR BLD AUTO: 3.8 % — SIGNIFICANT CHANGE UP (ref 0–6)
GAS PNL BLDV: 138 MMOL/L — SIGNIFICANT CHANGE UP (ref 136–145)
GLUCOSE BLDV-MCNC: 125 MG/DL — HIGH (ref 70–99)
GLUCOSE SERPL-MCNC: 137 MG/DL — HIGH (ref 70–99)
HCO3 BLDV-SCNC: 26 MMOL/L — SIGNIFICANT CHANGE UP (ref 22–29)
HCT VFR BLD CALC: 33.5 % — LOW (ref 34.5–45)
HCT VFR BLDA CALC: 32 % — LOW (ref 34.5–46.5)
HGB BLD CALC-MCNC: 10.8 G/DL — LOW (ref 11.5–15.5)
HGB BLD-MCNC: 10.7 G/DL — LOW (ref 11.5–15.5)
IANC: 6.2 K/UL — SIGNIFICANT CHANGE UP (ref 1.5–8.5)
IMM GRANULOCYTES NFR BLD AUTO: 0.3 % — SIGNIFICANT CHANGE UP (ref 0–1.5)
INR BLD: 1.04 RATIO — SIGNIFICANT CHANGE UP (ref 0.88–1.16)
LACTATE BLDV-MCNC: 1.4 MMOL/L — SIGNIFICANT CHANGE UP (ref 0.5–2)
LYMPHOCYTES # BLD AUTO: 1.44 K/UL — SIGNIFICANT CHANGE UP (ref 1–3.3)
LYMPHOCYTES # BLD AUTO: 16.2 % — SIGNIFICANT CHANGE UP (ref 13–44)
MCHC RBC-ENTMCNC: 27.8 PG — SIGNIFICANT CHANGE UP (ref 27–34)
MCHC RBC-ENTMCNC: 31.9 GM/DL — LOW (ref 32–36)
MCV RBC AUTO: 87 FL — SIGNIFICANT CHANGE UP (ref 80–100)
MONOCYTES # BLD AUTO: 0.84 K/UL — SIGNIFICANT CHANGE UP (ref 0–0.9)
MONOCYTES NFR BLD AUTO: 9.5 % — SIGNIFICANT CHANGE UP (ref 2–14)
NEUTROPHILS # BLD AUTO: 6.2 K/UL — SIGNIFICANT CHANGE UP (ref 1.8–7.4)
NEUTROPHILS NFR BLD AUTO: 69.9 % — SIGNIFICANT CHANGE UP (ref 43–77)
NRBC # BLD: 0 /100 WBCS — SIGNIFICANT CHANGE UP
NRBC # FLD: 0 K/UL — SIGNIFICANT CHANGE UP
PCO2 BLDV: 49 MMHG — HIGH (ref 39–42)
PH BLDV: 7.34 — SIGNIFICANT CHANGE UP (ref 7.32–7.43)
PLATELET # BLD AUTO: 325 K/UL — SIGNIFICANT CHANGE UP (ref 150–400)
PO2 BLDV: 39 MMHG — SIGNIFICANT CHANGE UP
POTASSIUM BLDV-SCNC: 3.7 MMOL/L — SIGNIFICANT CHANGE UP (ref 3.5–5.1)
POTASSIUM SERPL-MCNC: 3.6 MMOL/L — SIGNIFICANT CHANGE UP (ref 3.5–5.3)
POTASSIUM SERPL-SCNC: 3.6 MMOL/L — SIGNIFICANT CHANGE UP (ref 3.5–5.3)
PROT SERPL-MCNC: 6.9 G/DL — SIGNIFICANT CHANGE UP (ref 6–8.3)
PROTHROM AB SERPL-ACNC: 11.8 SEC — SIGNIFICANT CHANGE UP (ref 10.6–13.6)
RBC # BLD: 3.85 M/UL — SIGNIFICANT CHANGE UP (ref 3.8–5.2)
RBC # FLD: 13.5 % — SIGNIFICANT CHANGE UP (ref 10.3–14.5)
RH IG SCN BLD-IMP: POSITIVE — SIGNIFICANT CHANGE UP
SAO2 % BLDV: 68 % — SIGNIFICANT CHANGE UP
SARS-COV-2 RNA SPEC QL NAA+PROBE: SIGNIFICANT CHANGE UP
SODIUM SERPL-SCNC: 140 MMOL/L — SIGNIFICANT CHANGE UP (ref 135–145)
WBC # BLD: 8.88 K/UL — SIGNIFICANT CHANGE UP (ref 3.8–10.5)
WBC # FLD AUTO: 8.88 K/UL — SIGNIFICANT CHANGE UP (ref 3.8–10.5)

## 2022-02-06 PROCEDURE — 70491 CT SOFT TISSUE NECK W/DYE: CPT | Mod: 26,QQ

## 2022-02-06 PROCEDURE — 99223 1ST HOSP IP/OBS HIGH 75: CPT

## 2022-02-06 PROCEDURE — 99285 EMERGENCY DEPT VISIT HI MDM: CPT

## 2022-02-06 RX ORDER — ACETAMINOPHEN 500 MG
650 TABLET ORAL EVERY 6 HOURS
Refills: 0 | Status: DISCONTINUED | OUTPATIENT
Start: 2022-02-06 | End: 2022-02-11

## 2022-02-06 RX ORDER — AMPICILLIN SODIUM AND SULBACTAM SODIUM 250; 125 MG/ML; MG/ML
3 INJECTION, POWDER, FOR SUSPENSION INTRAMUSCULAR; INTRAVENOUS ONCE
Refills: 0 | Status: COMPLETED | OUTPATIENT
Start: 2022-02-06 | End: 2022-02-06

## 2022-02-06 RX ORDER — HEPARIN SODIUM 5000 [USP'U]/ML
5000 INJECTION INTRAVENOUS; SUBCUTANEOUS EVERY 12 HOURS
Refills: 0 | Status: DISCONTINUED | OUTPATIENT
Start: 2022-02-06 | End: 2022-02-11

## 2022-02-06 RX ORDER — SODIUM CHLORIDE 9 MG/ML
1000 INJECTION INTRAMUSCULAR; INTRAVENOUS; SUBCUTANEOUS ONCE
Refills: 0 | Status: COMPLETED | OUTPATIENT
Start: 2022-02-06 | End: 2022-02-06

## 2022-02-06 RX ORDER — IBUPROFEN 200 MG
600 TABLET ORAL EVERY 6 HOURS
Refills: 0 | Status: DISCONTINUED | OUTPATIENT
Start: 2022-02-06 | End: 2022-02-11

## 2022-02-06 RX ORDER — ATORVASTATIN CALCIUM 80 MG/1
10 TABLET, FILM COATED ORAL AT BEDTIME
Refills: 0 | Status: DISCONTINUED | OUTPATIENT
Start: 2022-02-06 | End: 2022-02-11

## 2022-02-06 RX ORDER — SENNA PLUS 8.6 MG/1
2 TABLET ORAL AT BEDTIME
Refills: 0 | Status: DISCONTINUED | OUTPATIENT
Start: 2022-02-06 | End: 2022-02-11

## 2022-02-06 RX ORDER — PIPERACILLIN AND TAZOBACTAM 4; .5 G/20ML; G/20ML
3.38 INJECTION, POWDER, LYOPHILIZED, FOR SOLUTION INTRAVENOUS EVERY 8 HOURS
Refills: 0 | Status: DISCONTINUED | OUTPATIENT
Start: 2022-02-06 | End: 2022-02-11

## 2022-02-06 RX ORDER — ALBUTEROL 90 UG/1
2 AEROSOL, METERED ORAL EVERY 6 HOURS
Refills: 0 | Status: DISCONTINUED | OUTPATIENT
Start: 2022-02-06 | End: 2022-02-11

## 2022-02-06 RX ORDER — AMLODIPINE BESYLATE 2.5 MG/1
10 TABLET ORAL AT BEDTIME
Refills: 0 | Status: DISCONTINUED | OUTPATIENT
Start: 2022-02-06 | End: 2022-02-08

## 2022-02-06 RX ORDER — VANCOMYCIN HCL 1 G
1000 VIAL (EA) INTRAVENOUS EVERY 12 HOURS
Refills: 0 | Status: DISCONTINUED | OUTPATIENT
Start: 2022-02-06 | End: 2022-02-08

## 2022-02-06 RX ORDER — ACETAMINOPHEN 500 MG
1000 TABLET ORAL ONCE
Refills: 0 | Status: COMPLETED | OUTPATIENT
Start: 2022-02-06 | End: 2022-02-06

## 2022-02-06 RX ORDER — INFLUENZA VIRUS VACCINE 15; 15; 15; 15 UG/.5ML; UG/.5ML; UG/.5ML; UG/.5ML
0.5 SUSPENSION INTRAMUSCULAR ONCE
Refills: 0 | Status: DISCONTINUED | OUTPATIENT
Start: 2022-02-06 | End: 2022-02-11

## 2022-02-06 RX ORDER — LISINOPRIL 2.5 MG/1
10 TABLET ORAL AT BEDTIME
Refills: 0 | Status: DISCONTINUED | OUTPATIENT
Start: 2022-02-06 | End: 2022-02-09

## 2022-02-06 RX ORDER — AMPICILLIN SODIUM AND SULBACTAM SODIUM 250; 125 MG/ML; MG/ML
3 INJECTION, POWDER, FOR SUSPENSION INTRAMUSCULAR; INTRAVENOUS EVERY 6 HOURS
Refills: 0 | Status: DISCONTINUED | OUTPATIENT
Start: 2022-02-06 | End: 2022-02-06

## 2022-02-06 RX ORDER — OXYCODONE HYDROCHLORIDE 5 MG/1
5 TABLET ORAL EVERY 4 HOURS
Refills: 0 | Status: DISCONTINUED | OUTPATIENT
Start: 2022-02-06 | End: 2022-02-11

## 2022-02-06 RX ADMIN — SODIUM CHLORIDE 1000 MILLILITER(S): 9 INJECTION INTRAMUSCULAR; INTRAVENOUS; SUBCUTANEOUS at 06:22

## 2022-02-06 RX ADMIN — LISINOPRIL 10 MILLIGRAM(S): 2.5 TABLET ORAL at 21:25

## 2022-02-06 RX ADMIN — Medication 600 MILLIGRAM(S): at 11:46

## 2022-02-06 RX ADMIN — PIPERACILLIN AND TAZOBACTAM 25 GRAM(S): 4; .5 INJECTION, POWDER, LYOPHILIZED, FOR SOLUTION INTRAVENOUS at 19:14

## 2022-02-06 RX ADMIN — ATORVASTATIN CALCIUM 10 MILLIGRAM(S): 80 TABLET, FILM COATED ORAL at 21:25

## 2022-02-06 RX ADMIN — AMPICILLIN SODIUM AND SULBACTAM SODIUM 200 GRAM(S): 250; 125 INJECTION, POWDER, FOR SUSPENSION INTRAMUSCULAR; INTRAVENOUS at 06:22

## 2022-02-06 RX ADMIN — OXYCODONE HYDROCHLORIDE 5 MILLIGRAM(S): 5 TABLET ORAL at 21:55

## 2022-02-06 RX ADMIN — Medication 250 MILLIGRAM(S): at 21:58

## 2022-02-06 RX ADMIN — OXYCODONE HYDROCHLORIDE 5 MILLIGRAM(S): 5 TABLET ORAL at 13:00

## 2022-02-06 RX ADMIN — SODIUM CHLORIDE 1000 MILLILITER(S): 9 INJECTION INTRAMUSCULAR; INTRAVENOUS; SUBCUTANEOUS at 07:30

## 2022-02-06 RX ADMIN — HEPARIN SODIUM 5000 UNIT(S): 5000 INJECTION INTRAVENOUS; SUBCUTANEOUS at 17:13

## 2022-02-06 RX ADMIN — OXYCODONE HYDROCHLORIDE 5 MILLIGRAM(S): 5 TABLET ORAL at 17:13

## 2022-02-06 RX ADMIN — AMLODIPINE BESYLATE 10 MILLIGRAM(S): 2.5 TABLET ORAL at 21:25

## 2022-02-06 RX ADMIN — AMPICILLIN SODIUM AND SULBACTAM SODIUM 200 GRAM(S): 250; 125 INJECTION, POWDER, FOR SUSPENSION INTRAMUSCULAR; INTRAVENOUS at 13:33

## 2022-02-06 RX ADMIN — OXYCODONE HYDROCHLORIDE 5 MILLIGRAM(S): 5 TABLET ORAL at 21:25

## 2022-02-06 RX ADMIN — AMPICILLIN SODIUM AND SULBACTAM SODIUM 3 GRAM(S): 250; 125 INJECTION, POWDER, FOR SUSPENSION INTRAMUSCULAR; INTRAVENOUS at 06:53

## 2022-02-06 RX ADMIN — Medication 400 MILLIGRAM(S): at 08:03

## 2022-02-06 NOTE — ED PROVIDER NOTE - CLINICAL SUMMARY MEDICAL DECISION MAKING FREE TEXT BOX
Alfredo Estevez MD (PGY-2): 55 y/o Female with pmhx of HTN, HLD, with diagnosis of squamous cell carcinoma of gingiva now s/p right segmental mandibulectomy, right SND, tracheostomy, left fibular free flap coming in with purulent/bloody drainage from R neck. Surgery done by ENT (Dr. Vic So) at [facility] 01/13/21. Most likely has post-op neck abscess. Will consult ENT. IVF, abx, pre-op labs, ekg, ct neck soft tissue with IV contrast. Pt well appearing. Most likely will be admitted.

## 2022-02-06 NOTE — ED ADULT NURSE NOTE - NSIMPLEMENTINTERV_GEN_ALL_ED
Implemented All Fall Risk Interventions:  [place] to call system. Call bell, personal items and telephone within reach. Instruct patient to call for assistance. Room bathroom lighting operational. Non-slip footwear when patient is off stretcher. Physically safe environment: no spills, clutter or unnecessary equipment. Stretcher in lowest position, wheels locked, appropriate side rails in place. Provide visual cue, wrist band, yellow gown, etc. Monitor gait and stability. Monitor for mental status changes and reorient to person, place, and time. Review medications for side effects contributing to fall risk. Reinforce activity limits and safety measures with patient and family.

## 2022-02-06 NOTE — CONSULT NOTE ADULT - ASSESSMENT
55 y/o Female with  PMH of HTN, HLD, with diagnosis of squamous cell carcinoma of gingiva now s/p right segmental mandibulectomy, right SND, tracheostomy, left fibular free flap    - C/w abx and packing  - To be seen by Dr. Evans tomorrow for possible bedside I&D  - C/w xeroform/ace to skin graft site, leave donor site open to air for now  - appreciate ENT care    Plastic surgery [id number]

## 2022-02-06 NOTE — H&P ADULT - ASSESSMENT
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 abscess. CT w/ two thin fluid collections in R mandibular postoperative bed w/ kely/submandibular subcutaneous fat stranding. Area of infection was debrided with purulance expressed and packed. WBC 8.9, stable, nontoxic.    - Admit to ENT  - IV abx  - Packing changes  - Regular diet  - C/w home meds  - F/u cx  - F/u PRS recs

## 2022-02-06 NOTE — ED ADULT TRIAGE NOTE - CHIEF COMPLAINT QUOTE
alert oriented s/p mouth surgery for cancer at [facility] 1/13/21 has  purulent drainage from JESSEE site under chin  no c/o fever   started at 0300   hx mouth cancer  HTN

## 2022-02-06 NOTE — ED ADULT NURSE NOTE - CHIEF COMPLAINT QUOTE
alert oriented s/p mouth surgery for cancer at [facility] 1/13/21 has  purulent drainage from JESSEE site under chin  no c/o fever   started at 0300   hx mouth cancer  HTN

## 2022-02-06 NOTE — ED ADULT NURSE NOTE - NS ED NOTE  TALK SOMEONE YN
Patient:   Nellie Monge            MRN: [id number]            FIN: [id number]              Age:   79 years     Sex:  FEMALE     :  25   Associated Diagnoses:   None   Author:   [facility]  [facility]  Daily Progress Note      Impression and Plan   IMPRESSION: 79 y.o female recently discharged from [facility] (Community aquired pneumonia and  E coli sepsis) who presented from rehab with increasing dyspnea  Acute hypercapneic respiratory failure  Acute on chronic diastolic CHF  Severe pulmonary HTN   PA systolic pressure 73 mm Hg. Senile COPD  Kyphosis  Large Hiatal hernia  HTN  Dyslipidemia  RECOMMENDATIONS:  Was on room air this AM, not sure if she feels improved  Diuretics held due to renal insufficiency  Nebs / Steroids per Pulm  No events on tele  Daily Weights  Bipap as needed  Marielle Cough SEVERINO Berrios F.A.C.C.  [facility]        Subjective   Chief complaint Notes reviewed. Pt. seen and examined. .     On room air this AM, not sure if she feels better. Denies fevers, chills or cough. Diuretics held due to acute kidney injury. No events on tele. Health Status   Allergies: Allergic Reactions (All)  Severity Not Documented  Sulfa drugs- No reactions were documented.    Current medications:    Medications (16) Active  Scheduled: (6)  Albuterol-ipratropium 2.5-0.5 mg/3 mL nebulizer soln  3 mL, Nebulizer, Q6H Awake x3  Heparin 5,000 unit/1 mL inj  5,000 unit 1 mL, Subcutaneous, Q8H  Insulin human lispro 1 unit/0.01 mL inj  1-6 units, Subcutaneous, Q6H  Melatonin 3 mg tab  3 mg 1 tab, Oral, Q Bedtime  Pantoprazole 40 mg DR tab  40 mg 1 tab, Oral, Daily  PredniSONE 20 mg tab  40 mg 2 tab, Oral, Daily [with breakfast]  Continuous: (0)  PRN: (10)  Acetaminophen 325 mg tab  650 mg 2 tab, Oral, Q6H  Albuterol 0.083% 2.5 mg/3 mL nebulizer soln  2.5 mg 3 mL, Nebulizer, Q4H  ALPRAZolam 0.25 mg tab  0.25 mg 1 tab, Oral, BID  Dextrose (glucose) 40% 15 gm/37.5 gm oral gel UD  15 gm, Oral, As Directed PRN  Dextrose (glucose) 50% 25 gm/50 mL syringe  12.5 gm 25 mL, IV Push, As Directed PRN  Glucagon 1 mg/1 mL emergency kit SDV  1 mg 1 mL, IM, As Directed PRN  Ondansetron 4 mg/2 mL inj SDV  4 mg 2 mL, Slow IV Push, Q8H  Polyethylene glycol 3350 oral recon powder 17 gm packet UD  17 gm 1 packet, Oral, Daily  Senna-docusate sodium 8.6-50 mg tab  1 tab, Oral, Q Bedtime  Sodium chloride PF 0.9% flush inj 3 mL  3 mL, Flush, As Directed PRN    Problem list:    Medical  Abnormal gait / 77640631 / Confirmed  Age related macular degeneration / 979747848 / Confirmed  Bladder incontinence / 9736200625 / Confirmed  Cataract / 333851046 / Confirmed  COPD - Chronic obstructive pulmonary disease / 531284743 / Confirmed  Coronary arteriosclerosis / 30203200 / Confirmed  Elevated cholesterol/high density lipoprotein ratio / 2469738546 / Confirmed  H/O: blood transfusion / 972101909 / Confirmed  Hearing problem / 856985463 / Confirmed  Hearing problem / 950956464 / Confirmed  Hiatal hernia / 031351446 / Confirmed  History of anesthesia problem / 2881766203 / Confirmed  Hypertension / 72742260 / Confirmed  Osteoporosis / 885773270 / Confirmed   Allergies (1) Active Reaction  sulfa drugs None Documented  Home Medications (19) Active  acetaminophen oral 325 mg tablet (Tylenol) 650 mg = 2 tab, PRN, Oral, Q6H  albuterol inhalation 0.083% 2.5 mg/3 mL solution (Proventil) 2.5 mg = 3 mL, PRN, Nebulizer, Q4H  albuterol-ipratropium inhalation 2.5-0.5 mg/3 mL solution (DuoNeb).  3 mL, Nebulizer, BID  biotin 5000 mcg oral capsule 5,000 mcg = 1 cap, Oral, Daily  calcium (as carbonate)-vitamin D 600 mg-200 intl units oral tablet 1 tab, Oral, Daily  Centrum Silver oral tablet 1 tab, Oral, Daily  cholecalciferol 2000 intl units oral tablet 2,000 unit = 1 tab, Oral, Daily  Cipro 500 mg oral tablet 500 mg = 1 tab, Oral, BID  docusate-senna oral 50-8.6 mg tablet 1 tab, PRN, Oral, Q No Bedtime  furosemide oral 20 mg tablet 20 mg = 1 tab, Oral, Daily  hydrALAZINE oral 25 mg tablet (Apresoline) 25 mg = 1 tab, PRN, Oral, Q6H  irbesartan 300 mg oral tablet 300 mg = 1 tab, Oral, Daily  Lopressor (tartrate) oral 50 mg tablet 50 mg = 1 tab, Oral, Q12H  Melatonin 5 mg oral tablet 10 mg = 2 tab, Oral, Q Bedtime  MiraLax oral powder for solution 17 gm, PRN, Oral, Daily  PreserVision AREDS 2 oral capsule 1 cap, Oral, BID  Protonix oral 40 mg DR tablet 40 mg = 1 tab, Oral, Daily  Super B Complex oral tablet 1 tab, Oral, Daily  Vitamin B12 Methylcobalamin 5000 mcg sublingual tablet 5,000 mcg = 1 tab, SL, Daily  Medications (16) Active  Scheduled: (6)  Albuterol-ipratropium 2.5-0.5 mg/3 mL nebulizer soln  3 mL, Nebulizer, Q6H Awake x3  Heparin 5,000 unit/1 mL inj  5,000 unit 1 mL, Subcutaneous, Q8H  Insulin human lispro 1 unit/0.01 mL inj  1-6 units, Subcutaneous, Q6H  Melatonin 3 mg tab  3 mg 1 tab, Oral, Q Bedtime  Pantoprazole 40 mg DR tab  40 mg 1 tab, Oral, Daily  PredniSONE 20 mg tab  40 mg 2 tab, Oral, Daily [with breakfast]  Continuous: (0)  PRN: (10)  Acetaminophen 325 mg tab  650 mg 2 tab, Oral, Q6H  Albuterol 0.083% 2.5 mg/3 mL nebulizer soln  2.5 mg 3 mL, Nebulizer, Q4H  ALPRAZolam 0.25 mg tab  0.25 mg 1 tab, Oral, BID  Dextrose (glucose) 40% 15 gm/37.5 gm oral gel UD  15 gm, Oral, As Directed PRN  Dextrose (glucose) 50% 25 gm/50 mL syringe  12.5 gm 25 mL, IV Push, As Directed PRN  Glucagon 1 mg/1 mL emergency kit SDV  1 mg 1 mL, IM, As Directed PRN  Ondansetron 4 mg/2 mL inj SDV  4 mg 2 mL, Slow IV Push, Q8H  Polyethylene glycol 3350 oral recon powder 17 gm packet UD  17 gm 1 packet, Oral, Daily  Senna-docusate sodium 8.6-50 mg tab  1 tab, Oral, Q Bedtime  Sodium chloride PF 0.9% flush inj 3 mL  3 mL, Flush, As Directed PRN       Objective    TELEMETRY:    NSR  Vitals between:   19-JAN-2020 13:47:21   TO   20-JAN-2020 13:47:21                   LAST RESULT MINIMUM MAXIMUM  Temperature 36.5 36.3 36.6  Heart Rate 66 51 73  Respiratory Rate 32 22 34  NISBP           106 82 143  NIDBP           55 45 88  NIMBP           70 57 98  SpO2                    100 99 100  I & O between:  19-JAN-2020 13:47 TO 20-JAN-2020 13:47  Med Dosing Weight:  71.2  kg   13-JAN-2020  24 Hour Intake:   305.70  ( 4.29 mL/kg )  24 Hour Output:   1030.00           24 Hour Urine/Stool Output:   0.0  24 Hour Balance:   -724.30           24 Hour Urine Output:   1030.00  ( 0.60 mL/kg/hr )                    Stool Count:  2.00       Dosing Weight:   71.2 kg    01/13/2020 03:53  Most Recent Clinical Weight:   70.7  kg    01/19/2020 00:00    Previous Clinical Weight:   70.6  kg 01/18/2020 00:00  Changed:   +   %0.14      VS/Measurements     Vitals between:   19-JAN-2020 13:47:21   TO   20-JAN-2020 13:47:21                   LAST RESULT MINIMUM MAXIMUM  Temperature 36.5 36.3 36.6  Heart Rate 66 51 73  Respiratory Rate 32 22 34  NISBP           106 82 143  NIDBP           55 45 88  NIMBP           70 57 98  SpO2                    100 99 100    General:  Alert and oriented, No acute distress, Still tachypneic . HENT:  Normocephalic. Neck:  No carotid bruit. Respiratory:  Decreased breath sounds throughout. No wheezes at present. Tachypneic. Cardiovascular:  Normal rate, Regular rhythm, No edema. Systolic murmur: Intensity ( Grade II ). Gastrointestinal:  Soft, Non-tender. Integumentary:  Warm. Neurologic:  Alert, Oriented. Psychiatric:  Cooperative, Appropriate mood & affect. Results Review   Labs between:  19-JAN-2020 13:47 to 20-JAN-2020 13:47  POC GLU:                 Latest Result  Latest Date  Minimum  Min Date  Maximum  Max Date                             (H) 193  20-JAN-2020 (H) 193  20-JAN-2020 (H) 173  19-JAN-2020                   ECHO 1/13/20  1. Left ventricle: The cavity size is normal. Wall thickness is normal.    Systolic function is normal. The estimated ejection fraction is 55-60%. Doppler parameters are consistent with abnormal left ventricular    relaxation (grade 1 diastolic dysfunction). 2. Aortic valve: The annulus is mildly calcified. The valve is trileaflet. The leaflets are mildly calcified. Trivial regurgitation. The mean    systolic gradient is 5mm Hg. 3. Mitral valve: The annulus is mildly calcified. The leaflets are    moderately calcified. Mild regurgitation. 4. Pulmonary arteries: Systolic pressure is severely increased, estimated    to be 73mm Hg. 5. Pericardium, extracardiac: There is no pericardial effusion.

## 2022-02-06 NOTE — PATIENT PROFILE ADULT - FALL HARM RISK - HARM RISK INTERVENTIONS

## 2022-02-06 NOTE — ED ADULT NURSE NOTE - CHIEF COMPLAINT
Entered into duplicate message   Please see other encounter  mchan The patient is a 56y Female complaining of

## 2022-02-06 NOTE — ED PROVIDER NOTE - PROGRESS NOTE DETAILS
Sign-out received from Dr. Estevez. Patient awaiting CT and evaluation from ENT. On reassessment, patient c/o of leg pain at site where graft was taken. Allergy to morphine. Will give IV tylenol and reassess - Morena Weinberg MD (PGY1) Patient evaluated by ENT. Will admit under their service. Continue current management. - Morena Weinberg MD (PGY1)

## 2022-02-06 NOTE — CONSULT NOTE ADULT - ASSESSMENT
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 Says that last night she noticed a "rotten smell" from her neck. She also noticed purulent/bloody discharge coming from a hole in the R side of her neck under the chin (not from surgical incision site). She was admitted for concern of R neck abscess and cellulitis and started on IV empiric antibiotics. ID was consulted for further recommendations.    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 Says that last night she noticed a "rotten smell" from her neck. She also noticed purulent/bloody discharge coming from a hole in the R side of her neck under the chin (not from surgical incision site). She was admitted for concern of R neck abscess and cellulitis and started on IV empiric antibiotics. ID was consulted for further recommendations.    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 Says that last night she noticed a "rotten smell" from her neck. She also noticed purulent/bloody discharge coming from a hole in the R side of her neck under the chin (not from surgical incision site). She was admitted for concern of R neck abscess and cellulitis and started on IV empiric antibiotics. ID was consulted for further recommendations.    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 Says that last night she noticed a "rotten smell" from her neck. She also noticed purulent/bloody discharge coming from a hole in the R side of her neck under the chin (not from surgical incision site). She was admitted for concern of R neck abscess and cellulitis and started on IV empiric antibiotics. ID was consulted for further recommendations.    Plan:  # R neck surgical site infection   - start IV vancomycin 1 g 12 hours and IV zosyn 3.375 q8 hours  - check vancomycin trough before fourth dose   - discontinue IV unasyn  - f/u BCx x 2 w/ sensitivities  - check wound cultures if planned I&D  - Plastic surgery f/u   - monitor fever curve  - wound care  - follow white count and renal function daily  - ID will continue to follow    Patient seen w/ attending MD Lj Vega MD PGY-5  Fellow, Infectious Diseases   Pager: [phone]  If no response, after 5pm and on weekends: Call [phone]

## 2022-02-06 NOTE — CONSULT NOTE ADULT - SUBJECTIVE AND OBJECTIVE BOX
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 No immediate post-op complications, stayed in hospital for about 10 days, had drain in place which was removed at discharge. Says that last night she noticed a "rotten smell" from her neck. About 3 hours ago (3:00am) pt noticed purulent/bloody discharge coming from a hole in the R side of her neck under the chin (not from surgical incision site). Pt denies any fever, neck pain, new neck swelling. Says that her surgical incisions are healing well. Pt not on chemo or radiation therapy right now. Has been taking oxycodone at home for post-op pain. Allergic to morphine.    Pt was seen and examined at bedside. R submental hole opened and area was massaged. Moderate purulent secretions followed by SS output. Purulance was taken for culture and area was packed with .25 inch packing.    PAST MEDICAL & SURGICAL HISTORY:  H/O malignant neoplasm of gum    Hypertension    Hyperlipidemia    Mild asthma    H/O  section    History of hip replacement  left hip    History of partial hysterectomy      morphine (Hives)    MEDICATIONS  (STANDING):    MEDICATIONS  (PRN):      Objective    ICU Vital Signs Last 24 Hrs  T(C): 36.7 (2022 06:28), Max: 36.7 (2022 06:28)  T(F): 98 (2022 06:28), Max: 98 (2022 06:28)  HR: 65 (2022 06:28) (65 - 108)  BP: 100/62 (2022 06:28) (100/62 - 143/83)  BP(mean): --  ABP: --  ABP(mean): --  RR: 17 (2022 06:28) (16 - 17)  SpO2: 95% (2022 06:28) (95% - 96%)      PHYSICAL EXAM:    CONSTITUTIONAL: Well nourished, well developed, NON-DYSMORPHIC, and in no acute distress.  .  ORAL CAVITY/OROPHARYNX: R intraoral flap healing well, no intraoral dehiscence, suture line intact, no obvious fistula, FOM soft/flat  NECK: R submental pinpoint hole with purulent drainage  RESPIRATORY: Respirations unlabored, no increased work of breathing with use of accessory muscles and retractions. No stridor.  CARDIAC: Warm extremities, no cyanosis.                           10.7   8.88  )-----------( 325      ( 2022 06:56 )             33.5         140  |  104  |  16  ----------------------------<  137<H>  3.6   |  25  |  0.62    Ca    9.3      2022 06:56    TPro  6.9  /  Alb  3.9  /  TBili  0.3  /  DBili  x   /  AST  7   /  ALT  8   /  AlkPhos  86        I&O's Summary    CT NECK:    IMPRESSION:  Interval partial right mandibulectomy with fibular free flap reconstruction and right neck lymph node dissection. Two thin fluid collections containing gas in the right mandibular postoperative bed which may represent residual postoperative seromas, however, presence of air and communication with the skin of the more superior collection raises concern for superimposed infection. Mary Jo and submandibular subcutaneous fat stranding which could represent residual postsurgical change or be reactive in the setting of cellulitis.     (2022 10:59)      OBJECTIVE:   ** PHYSICAL EXAM **    -- CONSTITUTIONAL: AOx3. NAD.   -- HEENT: s/p R mandibuletomy and reconstruction with free fibula, intraoral incisions intact, area of erythema, induration, and edema over right mandible/cheek with small area draining foul smelling purulence  -- CARDIOVASCULAR: RRR  -- RESPIRATORY: Equal chest rise  -- NEUROLOGICAL: Awake and alert  -- EXT: Motor and sensory are grossly intact in bilateral upper and lower extremities.    ** VITAL SIGNS / I&O's **    T(C): 36.4 (22 @ 12:10), Max: 36.7 (22 @ 06:28)  T(F): 97.6 (22 @ 12:10), Max: 98 (22 @ 06:28)  HR: 99 (22 @ 12:10) (65 - 108)  BP: 110/72 (22 @ 12:10) (100/62 - 143/83)  RR: 16 (22 @ 12:10) (16 - 17)  SpO2: 96% (22 @ 12:10) (95% - 96%)          ** LABS **                 10.7   8.88   )----------(  325       ( 2022 06:56 )               33.5      140    |  104    |  16     ----------------------------<  137        ( 2022 06:56 )  3.6     |  25     |  0.62     Ca    9.3        ( 2022 06:56 )    TPro  6.9    /  Alb  3.9    /  TBili  0.3    /  DBili  x      /  AST  7      /  ALT  8      /  AlkPhos  86     ( 2022 06:56 )    PT/INR -  11.8 sec / 1.04 ratio   ( 2022 06:56 )       PTT -  34.3 sec   ( 2022 06:56 )  CAPILLARY BLOOD GLUCOSE

## 2022-02-06 NOTE — ED PROVIDER NOTE - PHYSICAL EXAMINATION
Gen: NAD. A&Ox4. Non-toxic appearing.  Neck: +Diffuse swelling of R neck/jaw. Well healing surgical incision sites that are c/d/i. 1mm. X 1mm. circumferential hole in R neck under chin that is expressing mixture of purulent and serosanguinous bloody fluid (1cm X 1cm area of fluctuance felt around hole expressing fluid).     Oropharynx: No purulent drainage. No tonsillar erythema/edema. No uvular deviation.  HEENT: Normocephalic and atraumatic. PERRL, EOMI, no nasal discharge, mucous membranes moist, no scleral icterus.  CV: Regular rate and rhythm, +S1/S2, no M/R/G. No significant lower extremity edema. Radial and DP pulses present and symmetrical. Capillary refill less than 2 seconds.  Resp: Normal effort and rate. CTAB, no rales, rhonchi, or wheezes.  GI: Abdomen soft, non-distended, non tender to palpation. No masses appreciated. Bowel sounds present.  MSK: No CVAT bilaterally. No midline spinal tenderness.  Neuro: Following commands, speaking in full sentences, moving extremities spontaneously  Psych: Appropriate mood, cooperative

## 2022-02-06 NOTE — ED PROVIDER NOTE - NS ED ROS FT
GENERAL: No fever or chills  EYES: No change in vision  HEENT: +neck purulent/bloody discharge  CARDIAC: No chest pain  PULMONARY: No cough or SOB  GI: No abdominal pain, no nausea or no vomiting, no diarrhea or constipation  : No changes in urination  SKIN: No rashes  NEURO: No headache, no numbness  MSK: No joint pain  Otherwise as HPI or negative.

## 2022-02-06 NOTE — ED PROVIDER NOTE - OBJECTIVE STATEMENT
55 y/o Female with pmhx of HTN, HLD, with diagnosis of squamous cell carcinoma of gingiva now s/p right segmental mandibulectomy, right SND, tracheostomy, left fibular free flap coming in with purulent/bloody drainage from R neck. Surgery done by ENT (Dr. Vic So) at [facility] 01/13/21. No immediate post-op complications, stayed in hospital for about 10 days, had drain in place which was removed at discharge. Says that last night she noticed a "rotten smell" from her neck. About 3 hours ago (3:00am) pt noticed purulent/bloody discharge coming from a hole in the R side of her neck under the chin (not from surgical incision site). Pt denies any fever, neck pain, new neck swelling. Says that her surgical incisions are healing well. Pt not on chemo or radiation therapy right now. Has been taking oxycodone at home for post-op pain. Allergic to morphine.

## 2022-02-06 NOTE — ED ADULT NURSE NOTE - OBJECTIVE STATEMENT
Pt received in rm 15. Pt a/ox4, ambulatory with a walker at baseline. Pt c/o serosanguinous/bloody drainage from the hole in right side of neck, under the chin. Pt reports she woke up to a foul smell around 3 am and noted drainage from neck. Pt denies difficulty breathing, no stridor noted. Neck appears swollen, red, no active bleeding noted. Pt denies any chest pain, SOB, headache, dizziness, N/V/D, fever, chills.  Respirations even and unlabored, normal work of breathing, no accessory muscle use, speaking in full clear uninterrupted sentences.  ABD is soft, non tender, non distended. PMH  squamous cell carcinoma of gingiva, s/p right segmental mandibulectomy, right SND, last sx 3 weeks ago, no post op complications.   20 IVG placed on left ac, labs drawn and sent, medications given as ordered. Vital signs reassessed as noted. Awaiting ct.

## 2022-02-06 NOTE — H&P ADULT - HISTORY OF PRESENT ILLNESS
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 No immediate post-op complications, stayed in hospital for about 10 days, had drain in place which was removed at discharge. Says that last night she noticed a "rotten smell" from her neck. About 3 hours ago (3:00am) pt noticed purulent/bloody discharge coming from a hole in the R side of her neck under the chin (not from surgical incision site). Pt denies any fever, neck pain, new neck swelling. Says that her surgical incisions are healing well. Pt not on chemo or radiation therapy right now. Has been taking oxycodone at home for post-op pain. Allergic to morphine.    Pt was seen and examined at bedside. R submental hole opened and area was massaged. Moderate purulent secretions followed by SS output. Purulance was taken for culture and area was packed with .25 inch packing.    PAST MEDICAL & SURGICAL HISTORY:  H/O malignant neoplasm of gum    Hypertension    Hyperlipidemia    Mild asthma    H/O  section    History of hip replacement  left hip    History of partial hysterectomy      morphine (Hives)    MEDICATIONS  (STANDING):    MEDICATIONS  (PRN):      Objective    ICU Vital Signs Last 24 Hrs  T(C): 36.7 (2022 06:28), Max: 36.7 (2022 06:28)  T(F): 98 (2022 06:28), Max: 98 (2022 06:28)  HR: 65 (2022 06:28) (65 - 108)  BP: 100/62 (2022 06:28) (100/62 - 143/83)  BP(mean): --  ABP: --  ABP(mean): --  RR: 17 (2022 06:28) (16 - 17)  SpO2: 95% (2022 06:28) (95% - 96%)      PHYSICAL EXAM:    CONSTITUTIONAL: Well nourished, well developed, NON-DYSMORPHIC, and in no acute distress.  .  ORAL CAVITY/OROPHARYNX: R intraoral flap healing well, no intraoral dehiscence, suture line intact, no obvious fistula, FOM soft/flat  NECK: R submental pinpoint hole with purulent drainage  RESPIRATORY: Respirations unlabored, no increased work of breathing with use of accessory muscles and retractions. No stridor.  CARDIAC: Warm extremities, no cyanosis.                           10.7   8.88  )-----------( 325      ( 2022 06:56 )             33.5     02-    140  |  104  |  16  ----------------------------<  137<H>  3.6   |  25  |  0.62    Ca    9.3      2022 06:56    TPro  6.9  /  Alb  3.9  /  TBili  0.3  /  DBili  x   /  AST  7   /  ALT  8   /  AlkPhos  86  02-06      I&O's Summary    CT NECK:    IMPRESSION:  Interval partial right mandibulectomy with fibular free flap reconstruction and right neck lymph node dissection. Two thin fluid collections containing gas in the right mandibular postoperative bed which may represent residual postoperative seromas, however, presence of air and communication with the skin of the more superior collection raises concern for superimposed infection. Mary Jo and submandibular subcutaneous fat stranding which could represent residual postsurgical change or be reactive in the setting of cellulitis.

## 2022-02-06 NOTE — ED PROVIDER NOTE - ATTENDING CONTRIBUTION TO CARE
I performed a face-to-face evaluation of the patient and performed a history and physical examination along with the resident or ACP, and/or medical student above.  I agree with the history and physical examination as documented by the resident or ACP, and/or medical student above.  [place]:  57yo F w/ pmh as above, including SCC of gingiva s/p R segmental mandibulectomy, R SND, trach, L fibular free flap reconstruction (pt of Dr. Vic So of ENT), p/w foul smelling purulent drainage from R anterior upper neck x few hours associated w/ erythema/edema and ttp. Denies sob or difficulty breathing. Arrives afebrile, but tachycardic. Currently maintaining airway. Sepsis labs, CT imaging, abx, ent consult.

## 2022-02-06 NOTE — CONSULT NOTE ADULT - SUBJECTIVE AND OBJECTIVE BOX
Patient is a 56 year old female who presents with a chief complaint of neck discharge.   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 Says that last night she noticed a "rotten smell" from her neck. She also noticed purulent/bloody discharge coming from a hole in the R side of her neck under the chin (not from surgical incision site). She denies any fever, neck pain w/ associated new neck swelling. She says that her surgical incisions are healing well. She denies being on chemotherapy or radiation therapy right now. She has been taking oxycodone at home for post-op pain w/ morphine allergy. She had an I&D of the R submental area w/ moderate purulent secretions noted w/ OR cultures sent and packing placed.     Labs were unremarkable w/ normal renal and liver function. CT of the neck w/ IV contrast showed an interval partial right mandibulectomy with fibular free flap reconstruction and right neck lymph node dissection. COVID-19 PCR was negative. Two thin fluid collections containing gas in the right mandibular postoperative bed which may represent residual postoperative seromas, however, presence of air and communication with the skin of the more superior collection raises concern for superimposed infection. Mary Jo and submandibular subcutaneous fat stranding which could represent residual postsurgical change or be reactive in the setting of cellulitis. BCx x 2 are in process. She was started on IV unasyn. ID was consulted for antibiotic recommendations.       PAST MEDICAL & SURGICAL HISTORY:  H/O malignant neoplasm of gum  Hypertension  Hyperlipidemia  Mild asthma  H/O  section  History of hip replacement left hip  History of partial hysterectomy      prior hospital charts reviewed [x]  primary team notes reviewed [x]  other consultant notes reviewed [x]        Allergies  morphine (Hives)    ANTIMICROBIALS (past 90 days)  MEDICATIONS  (STANDING):  ampicillin/sulbactam  IVPB   200 mL/Hr IV Intermittent (22 @ 06:22)        ampicillin/sulbactam  IVPB 3 every 6 hours    OTHER MEDS: MEDICATIONS  (STANDING):  acetaminophen     Tablet .. 650 every 6 hours PRN  ALBUTerol    90 MICROgram(s) HFA Inhaler 2 every 6 hours PRN  amLODIPine   Tablet 10 at bedtime  atorvastatin 10 at bedtime  heparin   Injectable 5000 every 12 hours  ibuprofen  Tablet. 600 every 6 hours PRN  lisinopril 10 at bedtime  senna 2 at bedtime PRN    SOCIAL HISTORY:   does not smoke, drink alcohol, or use recreational drugs     FAMILY HISTORY: unknown     REVIEW OF SYSTEMS  [  ] ROS unobtainable because:    [x] All other systems negative except as noted below:	    Constitutional:  [ ] fever [ ] chills  [ ] weight loss  [ ] weakness  Skin:  [x] rash [ ] phlebitis	  Eyes: [ ] icterus [ ] pain  [ ] discharge	  ENMT: [ ] sore throat  [ ] thrush [ ] ulcers [ ] exudates  Respiratory: [ ] dyspnea [ ] hemoptysis [ ] cough [ ] sputum	  Cardiovascular:  [ ] chest pain [ ] palpitations [ ] edema	  Gastrointestinal:  [ ] nausea [ ] vomiting [ ] diarrhea [ ] constipation [ ] pain	  Genitourinary:  [ ] dysuria [ ] frequency [ ] hematuria [ ] discharge [ ] flank pain  [ ] incontinence  Musculoskeletal:  [ ] myalgias [ ] arthralgias [ ] arthritis  [ ] back pain  Neurological:  [ ] headache [ ] seizures  [ ] confusion/altered mental status  Psychiatric:  [ ] anxiety [ ] depression	  Hematology/Lymphatics:  [ ] lymphadenopathy  Endocrine:  [ ] adrenal [ ] thyroid  Allergic/Immunologic:	 [ ] transplant [ ] seasonal    Vital Signs Last 24 Hrs  T(F): 97.6 (22 @ 12:10), Max: 98 (22 @ 06:28)  Vital Signs Last 24 Hrs  HR: 99 (22 @ 12:10) (65 - 108)  BP: 110/72 (22 @ 12:10) (100/62 - 143/83)  RR: 16 (22 @ 12:10)  SpO2: 96% (22 @ 12:10) (95% - 96%)  Wt(kg): --    PHYSICAL EXAM:  Constitutional: non-toxic, no distress  HEAD/EYES: anicteric, no conjunctival injection  ENT:  + R neck erythema and edema w/ tenderness to palpation   Cardiovascular:   normal S1, S2, no murmur, no edema  Respiratory:  clear BS bilaterally, no wheezes, no rales  GI:  soft, non-tender, normal bowel sounds  :  no merchant  Musculoskeletal:  normal ROM  Neurologic: awake and alert, normal strength, no focal findings  Skin:  + R upper neck surgical wound   Heme/Onc: no lymphadenopathy   Psychiatric:  awake, alert, appropriate mood                            10.7   8.88  )-----------( 325      ( 2022 06:56 )             33.5       140  |  104  |  16  ----------------------------<  137<H>  3.6   |  25  |  0.62    Ca    9.3      2022 06:56    TPro  6.9  /  Alb  3.9  /  TBili  0.3  /  DBili  x   /  AST  7   /  ALT  8   /  AlkPhos  86      MICROBIOLOGY:    BCx x 2 in process    Prior microbiology    22 HAV IgM -, HBV cAb -, HBV sAg - , HCV Ab -  21 Abscess Cx negative           RADIOLOGY:    < from: CT Neck Soft Tissue w/ IV Cont (22 @ 08:54) >  IMPRESSION:  Interval partial right mandibulectomy with fibular free flap   reconstruction and right neck lymph node dissection. Two thin fluid   collections containing gas in the rightmandibular postoperative bed   which may represent residual postoperative seromas, however, presence of   air and communication with the skin of the more superior collection   raises concern for superimposed infection. Mary Jo and submandibular   subcutaneous fat stranding which could represent residual postsurgical   change or be reactive in the setting of cellulitis.    --- End of Report ---        < end of copied text >   Patient is a 56 year old female who presents with a chief complaint of neck discharge.   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 Says that last night she noticed a "rotten smell" from her neck. She also noticed purulent/bloody discharge coming from a hole in the R side of her neck under the chin (not from surgical incision site). She denies any fever, neck pain w/ associated new neck swelling. She says that her surgical incisions are healing well. She denies being on chemotherapy or radiation therapy right now. She has been taking oxycodone at home for post-op pain w/ morphine allergy. She had an I&D of the R submental area w/ moderate purulent secretions noted w/ OR cultures sent and packing placed.     Labs were unremarkable w/ normal renal and liver function. CT of the neck w/ IV contrast showed an interval partial right mandibulectomy with fibular free flap reconstruction and right neck lymph node dissection. COVID-19 PCR was negative. Two thin fluid collections containing gas in the right mandibular postoperative bed which may represent residual postoperative seromas, however, presence of air and communication with the skin of the more superior collection raises concern for superimposed infection. Mary Jo and submandibular subcutaneous fat stranding which could represent residual postsurgical change or be reactive in the setting of cellulitis. BCx x 2 are in process. She was started on IV unasyn. ID was consulted for antibiotic recommendations.       PAST MEDICAL & SURGICAL HISTORY:  H/O malignant neoplasm of gum  Hypertension  Hyperlipidemia  Mild asthma  H/O  section  History of hip replacement left hip  History of partial hysterectomy      prior hospital charts reviewed [x]  primary team notes reviewed [x]  other consultant notes reviewed [x]        Allergies  morphine (Hives)    ANTIMICROBIALS (past 90 days)  MEDICATIONS  (STANDING):  ampicillin/sulbactam  IVPB   200 mL/Hr IV Intermittent (22 @ 06:22)        ampicillin/sulbactam  IVPB 3 every 6 hours    OTHER MEDS: MEDICATIONS  (STANDING):  acetaminophen     Tablet .. 650 every 6 hours PRN  ALBUTerol    90 MICROgram(s) HFA Inhaler 2 every 6 hours PRN  amLODIPine   Tablet 10 at bedtime  atorvastatin 10 at bedtime  heparin   Injectable 5000 every 12 hours  ibuprofen  Tablet. 600 every 6 hours PRN  lisinopril 10 at bedtime  senna 2 at bedtime PRN    SOCIAL HISTORY:  former smoker 1 PPD x 20 years, denies alcohol or recreational drug use     FAMILY HISTORY: unknown     REVIEW OF SYSTEMS  [  ] ROS unobtainable because:    [x] All other systems negative except as noted below:	    Constitutional:  [ ] fever [ ] chills  [ ] weight loss  [x] weakness  Skin:  [x] rash [ ] phlebitis [x] wound 	  Eyes: [ ] icterus [ ] pain  [ ] discharge	  ENMT: [ ] sore throat  [ ] thrush [ ] ulcers [ ] exudates  Respiratory: [ ] dyspnea [ ] hemoptysis [ ] cough [ ] sputum	  Cardiovascular:  [ ] chest pain [ ] palpitations [ ] edema	  Gastrointestinal:  [ ] nausea [ ] vomiting [ ] diarrhea [ ] constipation [ ] pain	  Genitourinary:  [ ] dysuria [ ] frequency [ ] hematuria [ ] discharge [ ] flank pain  [ ] incontinence  Musculoskeletal:  [ ] myalgias [ ] arthralgias [ ] arthritis  [ ] back pain  Neurological:  [ ] headache [ ] seizures  [ ] confusion/altered mental status  Psychiatric:  [ ] anxiety [ ] depression	  Hematology/Lymphatics:  [ ] lymphadenopathy  Endocrine:  [ ] adrenal [ ] thyroid  Allergic/Immunologic:	 [ ] transplant [ ] seasonal    Vital Signs Last 24 Hrs  T(F): 97.6 (22 @ 12:10), Max: 98 (22 @ 06:28)  Vital Signs Last 24 Hrs  HR: 99 (22 @ 12:10) (65 - 108)  BP: 110/72 (22 @ 12:10) (100/62 - 143/83)  RR: 16 (22 @ 12:10)  SpO2: 96% (22 @ 12:10) (95% - 96%)  Wt(kg): --    PHYSICAL EXAM:  Constitutional: non-toxic, no distress  HEAD/EYES: anicteric, no conjunctival injection  ENT:  + R neck erythema and edema w/ tenderness to palpation   Cardiovascular:   normal S1, S2, no murmur, no edema  Respiratory:  clear BS bilaterally, no wheezes, no rales  GI:  soft, non-tender, normal bowel sounds  :  no merchant  Musculoskeletal:  normal ROM  Neurologic: awake and alert, normal strength, no focal findings  Skin:  + R upper neck surgical wound, + left lower extremity wound w/o surrounding cellulitis   Heme/Onc: no lymphadenopathy   Psychiatric:  awake, alert, appropriate mood                            10.7   8.88  )-----------( 325      ( 2022 06:56 )             33.5       140  |  104  |  16  ----------------------------<  137<H>  3.6   |  25  |  0.62    Ca    9.3      2022 06:56    TPro  6.9  /  Alb  3.9  /  TBili  0.3  /  DBili  x   /  AST  7   /  ALT  8   /  AlkPhos  86      MICROBIOLOGY:    BCx x 2 in process    Prior microbiology    22 HAV IgM -, HBV cAb -, HBV sAg - , HCV Ab -  21 Abscess Cx negative           RADIOLOGY:    < from: CT Neck Soft Tissue w/ IV Cont (22 @ 08:54) >  IMPRESSION:  Interval partial right mandibulectomy with fibular free flap   reconstruction and right neck lymph node dissection. Two thin fluid   collections containing gas in the rightmandibular postoperative bed   which may represent residual postoperative seromas, however, presence of   air and communication with the skin of the more superior collection   raises concern for superimposed infection. Mary Jo and submandibular   subcutaneous fat stranding which could represent residual postsurgical   change or be reactive in the setting of cellulitis.    --- End of Report ---        < end of copied text >   Patient is a 56 year old female who presents with a chief complaint of neck discharge.   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 Says that last night she noticed a "rotten smell" from her neck. She also noticed purulent/bloody discharge coming from a hole in the R side of her neck under the chin (not from surgical incision site). She denies any fever, neck pain w/ associated new neck swelling. She says that her surgical incisions are healing well. She denies being on chemotherapy or radiation therapy right now. She has been taking oxycodone at home for post-op pain w/ morphine allergy. She is for planned I&D by plastic surgery of the R submental area. She was noted to have moderate purulent secretions.      Labs were unremarkable w/ normal renal and liver function. CT of the neck w/ IV contrast showed an interval partial right mandibulectomy with fibular free flap reconstruction and right neck lymph node dissection. COVID-19 PCR was negative. Two thin fluid collections containing gas in the right mandibular postoperative bed which may represent residual postoperative seromas, however, presence of air and communication with the skin of the more superior collection raises concern for superimposed infection. Mary Jo and submandibular subcutaneous fat stranding which could represent residual postsurgical change or be reactive in the setting of cellulitis. BCx x 2 are in process. She was started on IV unasyn. ID was consulted for antibiotic recommendations.       PAST MEDICAL & SURGICAL HISTORY:  H/O malignant neoplasm of gum  Hypertension  Hyperlipidemia  Mild asthma  H/O  section  History of hip replacement left hip  History of partial hysterectomy      prior hospital charts reviewed [x]  primary team notes reviewed [x]  other consultant notes reviewed [x]        Allergies  morphine (Hives)    ANTIMICROBIALS (past 90 days)  MEDICATIONS  (STANDING):  ampicillin/sulbactam  IVPB   200 mL/Hr IV Intermittent (22 @ 06:22)        ampicillin/sulbactam  IVPB 3 every 6 hours    OTHER MEDS: MEDICATIONS  (STANDING):  acetaminophen     Tablet .. 650 every 6 hours PRN  ALBUTerol    90 MICROgram(s) HFA Inhaler 2 every 6 hours PRN  amLODIPine   Tablet 10 at bedtime  atorvastatin 10 at bedtime  heparin   Injectable 5000 every 12 hours  ibuprofen  Tablet. 600 every 6 hours PRN  lisinopril 10 at bedtime  senna 2 at bedtime PRN    SOCIAL HISTORY:  former smoker 1 PPD x 20 years, denies alcohol or recreational drug use     FAMILY HISTORY: unknown     REVIEW OF SYSTEMS  [  ] ROS unobtainable because:    [x] All other systems negative except as noted below:	    Constitutional:  [ ] fever [ ] chills  [ ] weight loss  [x] weakness  Skin:  [x] rash [ ] phlebitis [x] wound 	  Eyes: [ ] icterus [ ] pain  [ ] discharge	  ENMT: [ ] sore throat  [ ] thrush [ ] ulcers [ ] exudates  Respiratory: [ ] dyspnea [ ] hemoptysis [ ] cough [ ] sputum	  Cardiovascular:  [ ] chest pain [ ] palpitations [ ] edema	  Gastrointestinal:  [ ] nausea [ ] vomiting [ ] diarrhea [ ] constipation [ ] pain	  Genitourinary:  [ ] dysuria [ ] frequency [ ] hematuria [ ] discharge [ ] flank pain  [ ] incontinence  Musculoskeletal:  [ ] myalgias [ ] arthralgias [ ] arthritis  [ ] back pain  Neurological:  [ ] headache [ ] seizures  [ ] confusion/altered mental status  Psychiatric:  [ ] anxiety [ ] depression	  Hematology/Lymphatics:  [ ] lymphadenopathy  Endocrine:  [ ] adrenal [ ] thyroid  Allergic/Immunologic:	 [ ] transplant [ ] seasonal    Vital Signs Last 24 Hrs  T(F): 97.6 (22 @ 12:10), Max: 98 (22 @ 06:28)  Vital Signs Last 24 Hrs  HR: 99 (22 @ 12:10) (65 - 108)  BP: 110/72 (22 @ 12:10) (100/62 - 143/83)  RR: 16 (22 @ 12:10)  SpO2: 96% (22 @ 12:10) (95% - 96%)  Wt(kg): --    PHYSICAL EXAM:  Constitutional: non-toxic, no distress  HEAD/EYES: anicteric, no conjunctival injection  ENT:  + R neck erythema and edema w/ tenderness to palpation   Cardiovascular:   normal S1, S2, no murmur, no edema  Respiratory:  clear BS bilaterally, no wheezes, no rales  GI:  soft, non-tender, normal bowel sounds  :  no merchant  Musculoskeletal:  normal ROM  Neurologic: awake and alert, normal strength, no focal findings  Skin:  + R upper neck surgical wound, + left lower extremity wound w/o surrounding cellulitis   Heme/Onc: no lymphadenopathy   Psychiatric:  awake, alert, appropriate mood                            10.7   8.88  )-----------( 325      ( 2022 06:56 )             33.5       140  |  104  |  16  ----------------------------<  137<H>  3.6   |  25  |  0.62    Ca    9.3      2022 06:56    TPro  6.9  /  Alb  3.9  /  TBili  0.3  /  DBili  x   /  AST  7   /  ALT  8   /  AlkPhos  86  -    MICROBIOLOGY:    BCx x 2 in process    Prior microbiology    22 HAV IgM -, HBV cAb -, HBV sAg - , HCV Ab -  21 Abscess Cx negative           RADIOLOGY:    < from: CT Neck Soft Tissue w/ IV Cont (22 @ 08:54) >  IMPRESSION:  Interval partial right mandibulectomy with fibular free flap   reconstruction and right neck lymph node dissection. Two thin fluid   collections containing gas in the rightmandibular postoperative bed   which may represent residual postoperative seromas, however, presence of   air and communication with the skin of the more superior collection   raises concern for superimposed infection. Mary Jo and submandibular   subcutaneous fat stranding which could represent residual postsurgical   change or be reactive in the setting of cellulitis.    --- End of Report ---        < end of copied text >

## 2022-02-07 LAB
ANION GAP SERPL CALC-SCNC: 14 MMOL/L — SIGNIFICANT CHANGE UP (ref 7–14)
BUN SERPL-MCNC: 11 MG/DL — SIGNIFICANT CHANGE UP (ref 7–23)
CALCIUM SERPL-MCNC: 9.2 MG/DL — SIGNIFICANT CHANGE UP (ref 8.4–10.5)
CHLORIDE SERPL-SCNC: 100 MMOL/L — SIGNIFICANT CHANGE UP (ref 98–107)
CO2 SERPL-SCNC: 24 MMOL/L — SIGNIFICANT CHANGE UP (ref 22–31)
CREAT SERPL-MCNC: 0.56 MG/DL — SIGNIFICANT CHANGE UP (ref 0.5–1.3)
GLUCOSE SERPL-MCNC: 121 MG/DL — HIGH (ref 70–99)
HCT VFR BLD CALC: 31.7 % — LOW (ref 34.5–45)
HGB BLD-MCNC: 10.3 G/DL — LOW (ref 11.5–15.5)
MAGNESIUM SERPL-MCNC: 1.7 MG/DL — SIGNIFICANT CHANGE UP (ref 1.6–2.6)
MCHC RBC-ENTMCNC: 27.9 PG — SIGNIFICANT CHANGE UP (ref 27–34)
MCHC RBC-ENTMCNC: 32.5 GM/DL — SIGNIFICANT CHANGE UP (ref 32–36)
MCV RBC AUTO: 85.9 FL — SIGNIFICANT CHANGE UP (ref 80–100)
NRBC # BLD: 0 /100 WBCS — SIGNIFICANT CHANGE UP
NRBC # FLD: 0 K/UL — SIGNIFICANT CHANGE UP
PHOSPHATE SERPL-MCNC: 3.8 MG/DL — SIGNIFICANT CHANGE UP (ref 2.5–4.5)
PLATELET # BLD AUTO: 287 K/UL — SIGNIFICANT CHANGE UP (ref 150–400)
POTASSIUM SERPL-MCNC: 3.4 MMOL/L — LOW (ref 3.5–5.3)
POTASSIUM SERPL-SCNC: 3.4 MMOL/L — LOW (ref 3.5–5.3)
RBC # BLD: 3.69 M/UL — LOW (ref 3.8–5.2)
RBC # FLD: 13.3 % — SIGNIFICANT CHANGE UP (ref 10.3–14.5)
SODIUM SERPL-SCNC: 138 MMOL/L — SIGNIFICANT CHANGE UP (ref 135–145)
WBC # BLD: 6.33 K/UL — SIGNIFICANT CHANGE UP (ref 3.8–10.5)
WBC # FLD AUTO: 6.33 K/UL — SIGNIFICANT CHANGE UP (ref 3.8–10.5)

## 2022-02-07 PROCEDURE — 99232 SBSQ HOSP IP/OBS MODERATE 35: CPT

## 2022-02-07 RX ORDER — CALCIUM CARBONATE 500(1250)
1 TABLET ORAL EVERY 4 HOURS
Refills: 0 | Status: DISCONTINUED | OUTPATIENT
Start: 2022-02-07 | End: 2022-02-11

## 2022-02-07 RX ORDER — POTASSIUM CHLORIDE 20 MEQ
40 PACKET (EA) ORAL ONCE
Refills: 0 | Status: COMPLETED | OUTPATIENT
Start: 2022-02-07 | End: 2022-02-07

## 2022-02-07 RX ORDER — PETROLATUM,WHITE
1 JELLY (GRAM) TOPICAL DAILY
Refills: 0 | Status: DISCONTINUED | OUTPATIENT
Start: 2022-02-07 | End: 2022-02-11

## 2022-02-07 RX ADMIN — OXYCODONE HYDROCHLORIDE 5 MILLIGRAM(S): 5 TABLET ORAL at 11:09

## 2022-02-07 RX ADMIN — OXYCODONE HYDROCHLORIDE 5 MILLIGRAM(S): 5 TABLET ORAL at 15:30

## 2022-02-07 RX ADMIN — OXYCODONE HYDROCHLORIDE 5 MILLIGRAM(S): 5 TABLET ORAL at 05:50

## 2022-02-07 RX ADMIN — ATORVASTATIN CALCIUM 10 MILLIGRAM(S): 80 TABLET, FILM COATED ORAL at 22:34

## 2022-02-07 RX ADMIN — Medication 1 APPLICATION(S): at 11:41

## 2022-02-07 RX ADMIN — OXYCODONE HYDROCHLORIDE 5 MILLIGRAM(S): 5 TABLET ORAL at 10:39

## 2022-02-07 RX ADMIN — OXYCODONE HYDROCHLORIDE 5 MILLIGRAM(S): 5 TABLET ORAL at 01:56

## 2022-02-07 RX ADMIN — OXYCODONE HYDROCHLORIDE 5 MILLIGRAM(S): 5 TABLET ORAL at 15:00

## 2022-02-07 RX ADMIN — PIPERACILLIN AND TAZOBACTAM 25 GRAM(S): 4; .5 INJECTION, POWDER, LYOPHILIZED, FOR SOLUTION INTRAVENOUS at 17:13

## 2022-02-07 RX ADMIN — PIPERACILLIN AND TAZOBACTAM 25 GRAM(S): 4; .5 INJECTION, POWDER, LYOPHILIZED, FOR SOLUTION INTRAVENOUS at 08:57

## 2022-02-07 RX ADMIN — OXYCODONE HYDROCHLORIDE 5 MILLIGRAM(S): 5 TABLET ORAL at 23:39

## 2022-02-07 RX ADMIN — Medication 250 MILLIGRAM(S): at 17:13

## 2022-02-07 RX ADMIN — Medication 40 MILLIEQUIVALENT(S): at 10:39

## 2022-02-07 RX ADMIN — LISINOPRIL 10 MILLIGRAM(S): 2.5 TABLET ORAL at 22:33

## 2022-02-07 RX ADMIN — HEPARIN SODIUM 5000 UNIT(S): 5000 INJECTION INTRAVENOUS; SUBCUTANEOUS at 05:49

## 2022-02-07 RX ADMIN — Medication 250 MILLIGRAM(S): at 09:00

## 2022-02-07 RX ADMIN — OXYCODONE HYDROCHLORIDE 5 MILLIGRAM(S): 5 TABLET ORAL at 01:26

## 2022-02-07 RX ADMIN — AMLODIPINE BESYLATE 10 MILLIGRAM(S): 2.5 TABLET ORAL at 22:33

## 2022-02-07 RX ADMIN — PIPERACILLIN AND TAZOBACTAM 25 GRAM(S): 4; .5 INJECTION, POWDER, LYOPHILIZED, FOR SOLUTION INTRAVENOUS at 01:26

## 2022-02-07 RX ADMIN — OXYCODONE HYDROCHLORIDE 5 MILLIGRAM(S): 5 TABLET ORAL at 19:35

## 2022-02-07 RX ADMIN — OXYCODONE HYDROCHLORIDE 5 MILLIGRAM(S): 5 TABLET ORAL at 23:09

## 2022-02-07 RX ADMIN — OXYCODONE HYDROCHLORIDE 5 MILLIGRAM(S): 5 TABLET ORAL at 06:20

## 2022-02-07 RX ADMIN — Medication 1 TABLET(S): at 23:56

## 2022-02-07 RX ADMIN — HEPARIN SODIUM 5000 UNIT(S): 5000 INJECTION INTRAVENOUS; SUBCUTANEOUS at 17:14

## 2022-02-07 RX ADMIN — OXYCODONE HYDROCHLORIDE 5 MILLIGRAM(S): 5 TABLET ORAL at 19:05

## 2022-02-07 NOTE — PROGRESS NOTE ADULT - ASSESSMENT
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 abscess. CT w/ two thin fluid collections in R mandibular postoperative bed w/ kely/submandibular subcutaneous fat stranding. Area of infection was debrided with purulance expressed and packed. WBC 8.9, stable, nontoxic.    - Regular diet  - IV abx Vanc/zosyn (2/6- )  - Vanc trough before 4th dose (2/8 am)  - Packing changes  - C/w home meds  - F/u cx  - Possible bedside I&D with PRS today

## 2022-02-07 NOTE — PROGRESS NOTE ADULT - ASSESSMENT
57 y/o Female with  PMH of HTN, HLD, with diagnosis of squamous cell carcinoma of gingiva now s/p right segmental mandibulectomy, right SND, tracheostomy, left fibular free flap    - C/w abx and packing  - C/w xeroform/ace to skin graft site, apply aquaphor A+D w/ dressing change every other day, leave donor site open to air for now  - appreciate ENT care    Plastic surgery [id number]

## 2022-02-07 NOTE — PROGRESS NOTE ADULT - ASSESSMENT
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, SND I-IV, s/p trach  removed in January 2022, L fibula flap on 1/13/21 w/o immediate post-op complications, stayed in hospital for about 10 days, had drain in place which was removed at discharge  No fevers, no leukocytosis  1/13 mandibulectomy, neck dissection, fibula flap  Now presents with wound infection and discharge from site  Overall,  1) New Wound infection  - Local wound infection at prior OR site, concern for possible hospital type organisms  - Vanco 1g q 12 (monitor levels)  - Zosyn 3.375g q 8  - F/U BCXs  - I+D planning per surgical team, please send bacterial cultures  - F/U wound culture  - Check MRSA PCR  2) Post operative state  - Wound care per primary team    Mark Greco MD  Contact on TEAMS messaging from 9am - 5pm  From 5pm-9am, and on weekends call [phone]

## 2022-02-08 DIAGNOSIS — C44.92 SQUAMOUS CELL CARCINOMA OF SKIN, UNSPECIFIED: ICD-10-CM

## 2022-02-08 DIAGNOSIS — E78.5 HYPERLIPIDEMIA, UNSPECIFIED: ICD-10-CM

## 2022-02-08 DIAGNOSIS — Z98.890 OTHER SPECIFIED POSTPROCEDURAL STATES: ICD-10-CM

## 2022-02-08 DIAGNOSIS — I10 ESSENTIAL (PRIMARY) HYPERTENSION: ICD-10-CM

## 2022-02-08 DIAGNOSIS — T14.8XXA OTHER INJURY OF UNSPECIFIED BODY REGION, INITIAL ENCOUNTER: ICD-10-CM

## 2022-02-08 DIAGNOSIS — Z29.9 ENCOUNTER FOR PROPHYLACTIC MEASURES, UNSPECIFIED: ICD-10-CM

## 2022-02-08 LAB
ANION GAP SERPL CALC-SCNC: 11 MMOL/L — SIGNIFICANT CHANGE UP (ref 7–14)
BUN SERPL-MCNC: 17 MG/DL — SIGNIFICANT CHANGE UP (ref 7–23)
CALCIUM SERPL-MCNC: 9.3 MG/DL — SIGNIFICANT CHANGE UP (ref 8.4–10.5)
CHLORIDE SERPL-SCNC: 101 MMOL/L — SIGNIFICANT CHANGE UP (ref 98–107)
CO2 SERPL-SCNC: 26 MMOL/L — SIGNIFICANT CHANGE UP (ref 22–31)
CREAT SERPL-MCNC: 0.71 MG/DL — SIGNIFICANT CHANGE UP (ref 0.5–1.3)
GLUCOSE SERPL-MCNC: 112 MG/DL — HIGH (ref 70–99)
HCT VFR BLD CALC: 29.9 % — LOW (ref 34.5–45)
HGB BLD-MCNC: 10 G/DL — LOW (ref 11.5–15.5)
MAGNESIUM SERPL-MCNC: 1.8 MG/DL — SIGNIFICANT CHANGE UP (ref 1.6–2.6)
MCHC RBC-ENTMCNC: 28.4 PG — SIGNIFICANT CHANGE UP (ref 27–34)
MCHC RBC-ENTMCNC: 33.4 GM/DL — SIGNIFICANT CHANGE UP (ref 32–36)
MCV RBC AUTO: 84.9 FL — SIGNIFICANT CHANGE UP (ref 80–100)
NRBC # BLD: 0 /100 WBCS — SIGNIFICANT CHANGE UP
NRBC # FLD: 0 K/UL — SIGNIFICANT CHANGE UP
PHOSPHATE SERPL-MCNC: 4.2 MG/DL — SIGNIFICANT CHANGE UP (ref 2.5–4.5)
PLATELET # BLD AUTO: 275 K/UL — SIGNIFICANT CHANGE UP (ref 150–400)
POTASSIUM SERPL-MCNC: 3.9 MMOL/L — SIGNIFICANT CHANGE UP (ref 3.5–5.3)
POTASSIUM SERPL-SCNC: 3.9 MMOL/L — SIGNIFICANT CHANGE UP (ref 3.5–5.3)
RBC # BLD: 3.52 M/UL — LOW (ref 3.8–5.2)
RBC # FLD: 13.3 % — SIGNIFICANT CHANGE UP (ref 10.3–14.5)
SODIUM SERPL-SCNC: 138 MMOL/L — SIGNIFICANT CHANGE UP (ref 135–145)
VANCOMYCIN TROUGH SERPL-MCNC: 10 UG/ML — SIGNIFICANT CHANGE UP (ref 10–20)
WBC # BLD: 6.3 K/UL — SIGNIFICANT CHANGE UP (ref 3.8–10.5)
WBC # FLD AUTO: 6.3 K/UL — SIGNIFICANT CHANGE UP (ref 3.8–10.5)

## 2022-02-08 PROCEDURE — 99232 SBSQ HOSP IP/OBS MODERATE 35: CPT

## 2022-02-08 PROCEDURE — 99223 1ST HOSP IP/OBS HIGH 75: CPT

## 2022-02-08 RX ORDER — VANCOMYCIN HCL 1 G
VIAL (EA) INTRAVENOUS
Refills: 0 | Status: DISCONTINUED | OUTPATIENT
Start: 2022-02-08 | End: 2022-02-08

## 2022-02-08 RX ORDER — VANCOMYCIN HCL 1 G
1250 VIAL (EA) INTRAVENOUS ONCE
Refills: 0 | Status: DISCONTINUED | OUTPATIENT
Start: 2022-02-08 | End: 2022-02-08

## 2022-02-08 RX ORDER — VANCOMYCIN HCL 1 G
1000 VIAL (EA) INTRAVENOUS EVERY 12 HOURS
Refills: 0 | Status: DISCONTINUED | OUTPATIENT
Start: 2022-02-08 | End: 2022-02-11

## 2022-02-08 RX ORDER — MAGNESIUM OXIDE 400 MG ORAL TABLET 241.3 MG
400 TABLET ORAL ONCE
Refills: 0 | Status: COMPLETED | OUTPATIENT
Start: 2022-02-08 | End: 2022-02-08

## 2022-02-08 RX ADMIN — HEPARIN SODIUM 5000 UNIT(S): 5000 INJECTION INTRAVENOUS; SUBCUTANEOUS at 06:00

## 2022-02-08 RX ADMIN — OXYCODONE HYDROCHLORIDE 5 MILLIGRAM(S): 5 TABLET ORAL at 15:49

## 2022-02-08 RX ADMIN — LISINOPRIL 10 MILLIGRAM(S): 2.5 TABLET ORAL at 21:30

## 2022-02-08 RX ADMIN — OXYCODONE HYDROCHLORIDE 5 MILLIGRAM(S): 5 TABLET ORAL at 20:02

## 2022-02-08 RX ADMIN — HEPARIN SODIUM 5000 UNIT(S): 5000 INJECTION INTRAVENOUS; SUBCUTANEOUS at 19:07

## 2022-02-08 RX ADMIN — Medication 1 APPLICATION(S): at 11:36

## 2022-02-08 RX ADMIN — ATORVASTATIN CALCIUM 10 MILLIGRAM(S): 80 TABLET, FILM COATED ORAL at 21:30

## 2022-02-08 RX ADMIN — PIPERACILLIN AND TAZOBACTAM 25 GRAM(S): 4; .5 INJECTION, POWDER, LYOPHILIZED, FOR SOLUTION INTRAVENOUS at 00:02

## 2022-02-08 RX ADMIN — MAGNESIUM OXIDE 400 MG ORAL TABLET 400 MILLIGRAM(S): 241.3 TABLET ORAL at 11:33

## 2022-02-08 RX ADMIN — OXYCODONE HYDROCHLORIDE 5 MILLIGRAM(S): 5 TABLET ORAL at 07:16

## 2022-02-08 RX ADMIN — Medication 250 MILLIGRAM(S): at 21:26

## 2022-02-08 RX ADMIN — PIPERACILLIN AND TAZOBACTAM 25 GRAM(S): 4; .5 INJECTION, POWDER, LYOPHILIZED, FOR SOLUTION INTRAVENOUS at 11:23

## 2022-02-08 RX ADMIN — OXYCODONE HYDROCHLORIDE 5 MILLIGRAM(S): 5 TABLET ORAL at 12:50

## 2022-02-08 RX ADMIN — PIPERACILLIN AND TAZOBACTAM 25 GRAM(S): 4; .5 INJECTION, POWDER, LYOPHILIZED, FOR SOLUTION INTRAVENOUS at 19:01

## 2022-02-08 RX ADMIN — OXYCODONE HYDROCHLORIDE 5 MILLIGRAM(S): 5 TABLET ORAL at 11:33

## 2022-02-08 RX ADMIN — OXYCODONE HYDROCHLORIDE 5 MILLIGRAM(S): 5 TABLET ORAL at 03:09

## 2022-02-08 RX ADMIN — OXYCODONE HYDROCHLORIDE 5 MILLIGRAM(S): 5 TABLET ORAL at 03:39

## 2022-02-08 RX ADMIN — OXYCODONE HYDROCHLORIDE 5 MILLIGRAM(S): 5 TABLET ORAL at 07:46

## 2022-02-08 NOTE — CONSULT NOTE ADULT - PROBLEM SELECTOR RECOMMENDATION 4
SBP ~100s  -Recommend holidng amlodipine   -C/w Lisinopril 10mg qD  -Monitor BP SBP ~100s  -Recommend holding amlodipine   -C/w Lisinopril 10mg qD  -Monitor BP

## 2022-02-08 NOTE — CONSULT NOTE ADULT - ASSESSMENT
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 abscess. CT w/ two thin fluid collections in R mandibular postoperative bed w/ kely/submandibular subcutaneous fat stranding. Area of infection was debrided with purulence expressed and packed by primary team

## 2022-02-08 NOTE — CONSULT NOTE ADULT - PROBLEM SELECTOR RECOMMENDATION 9
Local wound infection at prior OR site, concern for possible hospital type organisms  -C/w Vanco 1g q12 and Zosyn 3.375g q 8  -blood cx NGTD  -wound cx NGTD   -Wound care per ENT/plastics    -Check MRSA PCR   -No plan for I&D per ENT    -ID following

## 2022-02-08 NOTE — CONSULT NOTE ADULT - SUBJECTIVE AND OBJECTIVE BOX
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 No immediate post-op complications, stayed in hospital for about 10 days, had drain in place which was removed at discharge. Says that last night she noticed a "rotten smell" from her neck. About 3 hours ago (3:00am) pt noticed purulent/bloody discharge coming from a hole in the R side of her neck under the chin (not from surgical incision site). Pt denies any fever, neck pain, new neck swelling. Says that her surgical incisions are healing well. Pt not on chemo or radiation therapy right now. Has been taking oxycodone at home for post-op pain. Allergic to morphine.    Pt was seen and examined at bedside. R submental hole opened and area was massaged. Moderate purulent secretions followed by SS output. Purulance was taken for culture and area was packed with .25 inch packing.    PAST MEDICAL & SURGICAL HISTORY:  H/O malignant neoplasm of gum    Hypertension    Hyperlipidemia    Mild asthma    H/O  section    History of hip replacement  left hip    History of partial hysterectomy      morphine (Hives)    MEDICATIONS  (STANDING):    MEDICATIONS  (PRN):      Objective    ICU Vital Signs Last 24 Hrs  T(C): 36.7 (2022 06:28), Max: 36.7 (2022 06:28)  T(F): 98 (2022 06:28), Max: 98 (2022 06:28)  HR: 65 (2022 06:28) (65 - 108)  BP: 100/62 (2022 06:28) (100/62 - 143/83)  BP(mean): --  ABP: --  ABP(mean): --  RR: 17 (2022 06:28) (16 - 17)  SpO2: 95% (2022 06:28) (95% - 96%)      PHYSICAL EXAM:    CONSTITUTIONAL: Well nourished, well developed, NON-DYSMORPHIC, and in no acute distress.  .  ORAL CAVITY/OROPHARYNX: R intraoral flap healing well, no intraoral dehiscence, suture line intact, no obvious fistula, FOM soft/flat  NECK: R submental pinpoint hole with purulent drainage  RESPIRATORY: Respirations unlabored, no increased work of breathing with use of accessory muscles and retractions. No stridor.  CARDIAC: Warm extremities, no cyanosis.                           10.7   8.88  )-----------( 325      ( 2022 06:56 )             33.5     02-    140  |  104  |  16  ----------------------------<  137<H>  3.6   |  25  |  0.62    Ca    9.3      2022 06:56    TPro  6.9  /  Alb  3.9  /  TBili  0.3  /  DBili  x   /  AST  7   /  ALT  8   /  AlkPhos  86  02-06      I&O's Summary    CT NECK:    IMPRESSION:  Interval partial right mandibulectomy with fibular free flap reconstruction and right neck lymph node dissection. Two thin fluid collections containing gas in the right mandibular postoperative bed which may represent residual postoperative seromas, however, presence of air and communication with the skin of the more superior collection raises concern for superimposed infection. Mary Jo and submandibular subcutaneous fat stranding which could represent residual postsurgical change or be reactive in the setting of cellulitis.     (2022 10:59)      Pain Symptoms if applicable:             	                           none	    mild         moderate         severe  	                            0	     1-3	      4-6	          7-10  Pain:  Location:	  Modifying factors:	  Associated symptoms:	    Allergies    morphine (Hives)    Intolerances        HOME MEDICATIONS: [x] Reviewed    MEDICATIONS  (STANDING):  amLODIPine   Tablet 10 milliGRAM(s) Oral at bedtime  AQUAPHOR (petrolatum Ointment) 1 Application(s) Topical daily  atorvastatin 10 milliGRAM(s) Oral at bedtime  heparin   Injectable 5000 Unit(s) SubCutaneous every 12 hours  influenza   Vaccine 0.5 milliLiter(s) IntraMuscular once  lisinopril 10 milliGRAM(s) Oral at bedtime  magnesium oxide 400 milliGRAM(s) Oral once  piperacillin/tazobactam IVPB.. 3.375 Gram(s) IV Intermittent every 8 hours  vancomycin  IVPB        MEDICATIONS  (PRN):  acetaminophen     Tablet .. 650 milliGRAM(s) Oral every 6 hours PRN Mild Pain (1 - 3)  ALBUTerol    90 MICROgram(s) HFA Inhaler 2 Puff(s) Inhalation every 6 hours PRN Shortness of Breath and/or Wheezing  calcium carbonate    500 mG (Tums) Chewable 1 Tablet(s) Chew every 4 hours PRN Heartburn  ibuprofen  Tablet. 600 milliGRAM(s) Oral every 6 hours PRN Moderate Pain (4 - 6)  oxyCODONE    IR 5 milliGRAM(s) Oral every 4 hours PRN Severe Pain (7 - 10)  senna 2 Tablet(s) Oral at bedtime PRN Constipation      PAST MEDICAL & SURGICAL HISTORY:  H/O malignant neoplasm of gum    Hypertension    Hyperlipidemia    Mild asthma    H/O  section    History of hip replacement  left hip    History of partial hysterectomy    [ ] Reviewed     SOCIAL HISTORY:  [x] Substance abuse:   [x] Tobacco:   [x] Alcohol use:     FAMILY HISTORY:  [x] No pertinent family history in first degree relatives     REVIEW OF SYSTEMS:    [x] All other ROS negative  [  ] Unable to obtain due to poor mental status    Vital Signs Last 24 Hrs  T(C): 36.6 (2022 09:49), Max: 37 (2022 13:29)  T(F): 97.9 (2022 09:49), Max: 98.6 (2022 13:29)  HR: 87 (2022 09:49) (87 - 96)  BP: 102/57 (2022 09:49) (101/62 - 107/54)  BP(mean): --  RR: 18 (2022 09:49) (16 - 18)  SpO2: 93% (2022 09:49) (93% - 100%)    PHYSICAL EXAM:    GENERAL: NAD, well-groomed, well-developed  HEAD:  Atraumatic, Normocephalic  EYES: EOMI, PERRLA, conjunctiva and sclera clear  ENMT: Moist mucous membranes  NECK: Supple, No JVD  RESPIRATORY: Clear to auscultation bilaterally; No rales, rhonchi, wheezing, or rubs  CARDIOVASCULAR: Regular rate and rhythm; No murmurs, rubs, or gallops  GASTROINTESTINAL: Soft, Nontender, Nondistended; Bowel sounds present  GENITOURINARY: Not examined  EXTREMITIES:  2+ Peripheral Pulses, No clubbing, cyanosis, or edema  NERVOUS SYSTEM:  Alert & Oriented X3; Moving all 4 extremities; No gross sensory deficits  HEME/LYMPH: No lymphadenopathy noted  SKIN: No rashes or lesions; Incisions C/D/I    LABS:                        10.0   6.30  )-----------( 275      ( 2022 07:36 )             29.9     02-08    138  |  101  |  17  ----------------------------<  112<H>  3.9   |  26  |  0.71    Ca    9.3      2022 07:36  Phos  4.2     02-08  Mg     1.80     02-08          CAPILLARY BLOOD GLUCOSE          RADIOLOGY & ADDITIONAL STUDIES:    EKG:   Personally Reviewed:  [x] YES     Imaging:   Personally Reviewed:  [x] YES               [ ] Consultant(s) Notes Reviewed  [x] Care Discussed with Consultants/Other Providers: ENT PA - discussed CHIEF COMPLAINT: Patient is a 56y old  Female who presents with a chief complaint of R neck cellulitis/abscess (2022 12:21)      HPI: HPI:  "HPI: 55 y/o Female with  PMH of HTN, HLD, with 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 No immediate post-op complications, stayed in hospital for about 10 days, had drain in place which was removed at discharge. Says that last night she noticed a "rotten smell" from her neck. About 3 hours ago (3:00am) pt noticed purulent/bloody discharge coming from a hole in the R side of her neck under the chin (not from surgical incision site). Pt denies any fever, neck pain, new neck swelling. Says that her surgical incisions are healing well. Pt not on chemo or radiation therapy right now. Has been taking oxycodone at home for post-op pain. Allergic to morphine.    Pt was seen and examined at bedside. R submental hole opened and area was massaged. Moderate purulent secretions followed by SS output. Purulance was taken for culture and area was packed with .25 inch packing."    Pt seen at bedside, feels ok, reports LLE pain, due for oxycodone.     Pain Symptoms if applicable: moderate 3-4, LLE pain     Allergies    morphine (Hives)    Intolerances        HOME MEDICATIONS: [x] Reviewed    MEDICATIONS  (STANDING):  amLODIPine   Tablet 10 milliGRAM(s) Oral at bedtime  AQUAPHOR (petrolatum Ointment) 1 Application(s) Topical daily  atorvastatin 10 milliGRAM(s) Oral at bedtime  heparin   Injectable 5000 Unit(s) SubCutaneous every 12 hours  influenza   Vaccine 0.5 milliLiter(s) IntraMuscular once  lisinopril 10 milliGRAM(s) Oral at bedtime  magnesium oxide 400 milliGRAM(s) Oral once  piperacillin/tazobactam IVPB.. 3.375 Gram(s) IV Intermittent every 8 hours  vancomycin  IVPB        MEDICATIONS  (PRN):  acetaminophen     Tablet .. 650 milliGRAM(s) Oral every 6 hours PRN Mild Pain (1 - 3)  ALBUTerol    90 MICROgram(s) HFA Inhaler 2 Puff(s) Inhalation every 6 hours PRN Shortness of Breath and/or Wheezing  calcium carbonate    500 mG (Tums) Chewable 1 Tablet(s) Chew every 4 hours PRN Heartburn  ibuprofen  Tablet. 600 milliGRAM(s) Oral every 6 hours PRN Moderate Pain (4 - 6)  oxyCODONE    IR 5 milliGRAM(s) Oral every 4 hours PRN Severe Pain (7 - 10)  senna 2 Tablet(s) Oral at bedtime PRN Constipation      PAST MEDICAL & SURGICAL HISTORY:  H/O malignant neoplasm of gum    Hypertension    Hyperlipidemia    Mild asthma    H/O  section    History of hip replacement  left hip    History of partial hysterectomy    [x ] Reviewed     SOCIAL HISTORY:  [x] Substance abuse: denies  [x] Tobacco: current every day smoker  [x] Alcohol use: denies     FAMILY HISTORY:  [x] No pertinent family history in first degree relatives     REVIEW OF SYSTEMS:    [x] All other ROS negative  [  ] Unable to obtain due to poor mental status    Vital Signs Last 24 Hrs  T(C): 36.6 (2022 09:49), Max: 37 (2022 13:29)  T(F): 97.9 (2022 09:49), Max: 98.6 (2022 13:29)  HR: 87 (2022 09:49) (87 - 96)  BP: 102/57 (2022 09:49) (101/62 - 107/54)  BP(mean): --  RR: 18 (2022 09:49) (16 - 18)  SpO2: 93% (2022 09:49) (93% - 100%)    PHYSICAL EXAM:    GENERAL: NAD, well-groomed, well-developed  HEAD:  Atraumatic, Normocephalic  EYES: EOMI, PERRLA, conjunctiva and sclera clear  ENMT: Moist mucous membranes  NECK: Supple, No JVD  RESPIRATORY: Clear to auscultation bilaterally; No rales, rhonchi, wheezing, or rubs  CARDIOVASCULAR: Regular rate and rhythm; No murmurs, rubs, or gallops  GASTROINTESTINAL: Soft, Nontender, Nondistended; Bowel sounds present  GENITOURINARY: Not examined  EXTREMITIES:  2+ Peripheral Pulses, No clubbing, cyanosis, or edema  NERVOUS SYSTEM:  Alert & Oriented X3; Moving all 4 extremities; No gross sensory deficits  HEME/LYMPH: No lymphadenopathy noted  SKIN: No rashes or lesions; Incisions C/D/I    LABS:                        10.0   6.30  )-----------( 275      ( 2022 07:36 )             29.9     02-08    138  |  101  |  17  ----------------------------<  112<H>  3.9   |  26  |  0.71    Ca    9.3      2022 07:36  Phos  4.2     02-08  Mg     1.80     -08          CAPILLARY BLOOD GLUCOSE          RADIOLOGY & ADDITIONAL STUDIES:    EKG:   Personally Reviewed:  [x] YES     Imaging:   Personally Reviewed:  [x] YES               [ ] Consultant(s) Notes Reviewed  [x] Care Discussed with Consultants/Other Providers: ENT PA - discussed

## 2022-02-08 NOTE — CONSULT NOTE ADULT - ASSESSMENT
Interventional Radiology    Evaluate for Procedure: Mediport placement for chemotherapy    HPI: 56y Female with PMH HTN, HLD, squamous cell carcinoma of gingiva s/p resection with right segmental mandibulectomy, right SND, left fibular free flap, and tracheostomy on 1/13, now with submental cellulitis vs abscess. IR consulted for Mediport placement to initiate chemotherapy. Patient originally scheduled for presurgical testing today and port placement outpatient on 2/15. Patient sees oncologists Dr. Tran and Dr. Rosales in [place].    Allergies: morphine (Hives)    Medications (Abx/Cardiac/Anticoagulation/Blood Products)  amLODIPine   Tablet: 10 milliGRAM(s) Oral (02-07 @ 22:33)  ampicillin/sulbactam  IVPB: 200 mL/Hr IV Intermittent (02-06 @ 13:33)  heparin   Injectable: 5000 Unit(s) SubCutaneous (02-08 @ 06:00)  lisinopril: 10 milliGRAM(s) Oral (02-07 @ 22:33)  piperacillin/tazobactam IVPB..: 25 mL/Hr IV Intermittent (02-08 @ 00:02)  vancomycin  IVPB: 250 mL/Hr IV Intermittent (02-07 @ 17:13)    Data:    T(C): 36.6  HR: 87  BP: 102/57  RR: 18  SpO2: 93%    -WBC 6.30 / HgB 10.0 / Hct 29.9 / Plt 275  -Na 138 / Cl 101 / BUN 17 / Glucose 112  -K 3.9 / CO2 26 / Cr 0.71  -ALT -- / Alk Phos -- / T.Bili --  -INR 1.04 / PTT 34.3      Radiology:     Assessment/Plan:     IN PROGRESS    -- IR will plan to perform   -- NPO at midnight on   -- hold a.m. anticoagulation on  -- please place IR procedure request order under   Interventional Radiology    Evaluate for Procedure: Mediport placement for chemotherapy    HPI: 56y Female with PMH HTN, HLD, squamous cell carcinoma of gingiva s/p resection with right segmental mandibulectomy, right SND, left fibular free flap, and tracheostomy on 1/13, now with submental cellulitis vs abscess. IR consulted for Mediport placement to initiate chemotherapy. Patient originally scheduled for presurgical testing today and port placement outpatient on 2/15. Patient sees oncologists Dr. Tran and Dr. Rosales in [place].    Allergies: morphine (Hives)    Medications (Abx/Cardiac/Anticoagulation/Blood Products)  amLODIPine   Tablet: 10 milliGRAM(s) Oral (02-07 @ 22:33)  ampicillin/sulbactam  IVPB: 200 mL/Hr IV Intermittent (02-06 @ 13:33)  heparin   Injectable: 5000 Unit(s) SubCutaneous (02-08 @ 06:00)  lisinopril: 10 milliGRAM(s) Oral (02-07 @ 22:33)  piperacillin/tazobactam IVPB..: 25 mL/Hr IV Intermittent (02-08 @ 00:02)  vancomycin  IVPB: 250 mL/Hr IV Intermittent (02-07 @ 17:13)    Data:    T(C): 36.6  HR: 87  BP: 102/57  RR: 18  SpO2: 93%    -WBC 6.30 / HgB 10.0 / Hct 29.9 / Plt 275  -Na 138 / Cl 101 / BUN 17 / Glucose 112  -K 3.9 / CO2 26 / Cr 0.71  -ALT -- / Alk Phos -- / T.Bili --  -INR 1.04 / PTT 34.3      Radiology:     Assessment/Plan:     -- As there is currently no plan to begin chemotherapy while inpatient, will defer Mediport placement until after discharge, given increased risk of infection with inpatient port placement. Additionally, port placement should not delay patient discharge.  -- Discussed plan with provider Kory Wong on 2/8/22 at 11:35 AM.  -- Please call IR with any further questions or concerns. Interventional Radiology    Evaluate for Procedure: Mediport placement for chemotherapy    HPI: 56y Female with PMH HTN, HLD, squamous cell carcinoma of gingiva s/p resection with right segmental mandibulectomy, right SND, left fibular free flap, and tracheostomy on 1/13, now with submental cellulitis vs abscess. IR consulted for Mediport placement to initiate chemotherapy. Patient originally scheduled for presurgical testing today and port placement outpatient on 2/15. Patient sees oncologists Dr. Tran and Dr. Rosales in [place].    Allergies: morphine (Hives)    Medications (Abx/Cardiac/Anticoagulation/Blood Products)  amLODIPine   Tablet: 10 milliGRAM(s) Oral (02-07 @ 22:33)  ampicillin/sulbactam  IVPB: 200 mL/Hr IV Intermittent (02-06 @ 13:33)  heparin   Injectable: 5000 Unit(s) SubCutaneous (02-08 @ 06:00)  lisinopril: 10 milliGRAM(s) Oral (02-07 @ 22:33)  piperacillin/tazobactam IVPB..: 25 mL/Hr IV Intermittent (02-08 @ 00:02)  vancomycin  IVPB: 250 mL/Hr IV Intermittent (02-07 @ 17:13)    Data:    T(C): 36.6  HR: 87  BP: 102/57  RR: 18  SpO2: 93%    -WBC 6.30 / HgB 10.0 / Hct 29.9 / Plt 275  -Na 138 / Cl 101 / BUN 17 / Glucose 112  -K 3.9 / CO2 26 / Cr 0.71  -ALT -- / Alk Phos -- / T.Bili --  -INR 1.04 / PTT 34.3      Radiology:     Assessment/Plan:     -- As there is currently no plan to begin chemotherapy while inpatient, will defer Mediport placement until after discharge, given increased risk of infection with inpatient port placement. Additionally, port placement should not delay patient discharge.  -- Discussed plan with provider Kory Wong on 2/8/22 at 11:35 AM.  -- Please call IR with any further questions or concerns.  -- Can schedule outpatient port with IR scheduling office - [phone] Interventional Radiology    Evaluate for Procedure: Mediport placement for chemotherapy    HPI: 56y Female with PMH HTN, HLD, squamous cell carcinoma of gingiva s/p resection with right segmental mandibulectomy, right SND, left fibular free flap, and tracheostomy on 1/13, now with submental cellulitis vs abscess. IR consulted for Mediport placement to initiate chemotherapy. Patient originally scheduled for presurgical testing today and port placement outpatient on 2/15. Patient sees oncologists Dr. Tran and Dr. Rosales in [place].    Allergies: morphine (Hives)    Medications (Abx/Cardiac/Anticoagulation/Blood Products)  amLODIPine   Tablet: 10 milliGRAM(s) Oral (02-07 @ 22:33)  ampicillin/sulbactam  IVPB: 200 mL/Hr IV Intermittent (02-06 @ 13:33)  heparin   Injectable: 5000 Unit(s) SubCutaneous (02-08 @ 06:00)  lisinopril: 10 milliGRAM(s) Oral (02-07 @ 22:33)  piperacillin/tazobactam IVPB..: 25 mL/Hr IV Intermittent (02-08 @ 00:02)  vancomycin  IVPB: 250 mL/Hr IV Intermittent (02-07 @ 17:13)    Data:    T(C): 36.6  HR: 87  BP: 102/57  RR: 18  SpO2: 93%    -WBC 6.30 / HgB 10.0 / Hct 29.9 / Plt 275  -Na 138 / Cl 101 / BUN 17 / Glucose 112  -K 3.9 / CO2 26 / Cr 0.71  -ALT -- / Alk Phos -- / T.Bili --  -INR 1.04 / PTT 34.3      Radiology:     Assessment/Plan:     -- As there is currently no plan to begin chemotherapy while inpatient, will defer Mediport placement until after discharge, given increased risk of infection with inpatient port placement. Additionally, port placement should not delay patient discharge.  -- Discussed plan with provider Kory Wong on 2/8/22 at 11:35 AM.  -- Additionally patient is Vanc/zosyn for right mandibular postoperative bed infection, would not recommend port placement while patient is actively being treated for an infection.   -- Please call IR with any further questions or concerns.  -- Can schedule outpatient port with IR scheduling office once antibiotic course is complete- [phone]

## 2022-02-08 NOTE — PROGRESS NOTE ADULT - ASSESSMENT
55 y/o Female with  PMH of HTN, HLD, with diagnosis of squamous cell carcinoma of gingiva now s/p right segmental mandibulectomy, right SND, tracheostomy, left fibular free flap    - C/w abx and packing  - C/w xeroform/ace to skin graft site, apply aquaphor A+D w/ dressing change every other day, leave donor site open to air for now  - appreciate ENT care    Plastic surgery [id number] 55 y/o Female with  PMH of HTN, HLD, with diagnosis of squamous cell carcinoma of gingiva now s/p right segmental mandibulectomy, right SND, tracheostomy, left fibular free flap    - C/w abx and packing BID  - no additional opening  - C/w xeroform/ace to skin graft site, apply aquaphor A+D w/ dressing change every other day, leave donor site open to air for now  - appreciate ENT care    Plastic surgery [id number] 55 y/o Female with  PMH of HTN, HLD, with diagnosis of squamous cell carcinoma of gingiva now s/p right segmental mandibulectomy, right SND, tracheostomy, left fibular free flap    - C/w abx and packing BID  - no additional opening  - C/w xeroform/ace to skin graft site, apply aquaphor A+D w/ dressing change every other day, leave donor site open to air for now  - appreciate ENT care  - fu clx    Plastic surgery [id number]

## 2022-02-08 NOTE — PROGRESS NOTE ADULT - ASSESSMENT
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 abscess. CT w/ two thin fluid collections in R mandibular postoperative bed w/ kely/submandibular subcutaneous fat stranding. Area of infection was debrided with purulance expressed and packed. WBC 8.9, stable, nontoxic.    - Regular diet  - IV abx Vanc/zosyn (2/6- )  - Vanc trough before 4th dose (2/8 am)  - Packing changes per PRS  - C/w home meds  - F/u cx  - No plan for I&D at this time

## 2022-02-08 NOTE — PROGRESS NOTE ADULT - ASSESSMENT
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, SND I-IV, s/p trach  removed in January 2022, L fibula flap on 1/13/21 w/o immediate post-op complications, stayed in hospital for about 10 days, had drain in place which was removed at discharge  No fevers, no leukocytosis  1/13 mandibulectomy, neck dissection, fibula flap  Now presents with wound infection and discharge from site  Overall,  1) New Wound infection  - Local wound infection at prior OR site, concern for possible hospital type organisms  - Vanco 1g q 12 (Trough 10 adequate, continue present dose)  - Zosyn 3.375g q 8  - F/U BCXs  - I+D planning per surgical team  - F/U wound culture  - Check MRSA PCR  2) Post operative state  - Wound care per primary team    My colleagues will be covering this patient on 2/9/22, I will return 2/10. Please call [phone] or on call fellow with any questions or change in status.     Mark Greco MD  Contact on TEAMS messaging from 9am - 5pm  From 5pm-9am, and on weekends call [phone]

## 2022-02-09 LAB
ANION GAP SERPL CALC-SCNC: 9 MMOL/L — SIGNIFICANT CHANGE UP (ref 7–14)
BUN SERPL-MCNC: 17 MG/DL — SIGNIFICANT CHANGE UP (ref 7–23)
CALCIUM SERPL-MCNC: 9.2 MG/DL — SIGNIFICANT CHANGE UP (ref 8.4–10.5)
CHLORIDE SERPL-SCNC: 100 MMOL/L — SIGNIFICANT CHANGE UP (ref 98–107)
CO2 SERPL-SCNC: 28 MMOL/L — SIGNIFICANT CHANGE UP (ref 22–31)
CREAT SERPL-MCNC: 0.7 MG/DL — SIGNIFICANT CHANGE UP (ref 0.5–1.3)
CULTURE RESULTS: SIGNIFICANT CHANGE UP
GLUCOSE SERPL-MCNC: 122 MG/DL — HIGH (ref 70–99)
HCT VFR BLD CALC: 31.9 % — LOW (ref 34.5–45)
HGB BLD-MCNC: 10.6 G/DL — LOW (ref 11.5–15.5)
MAGNESIUM SERPL-MCNC: 1.9 MG/DL — SIGNIFICANT CHANGE UP (ref 1.6–2.6)
MCHC RBC-ENTMCNC: 28.3 PG — SIGNIFICANT CHANGE UP (ref 27–34)
MCHC RBC-ENTMCNC: 33.2 GM/DL — SIGNIFICANT CHANGE UP (ref 32–36)
MCV RBC AUTO: 85.1 FL — SIGNIFICANT CHANGE UP (ref 80–100)
NRBC # BLD: 0 /100 WBCS — SIGNIFICANT CHANGE UP
NRBC # FLD: 0 K/UL — SIGNIFICANT CHANGE UP
PHOSPHATE SERPL-MCNC: 4 MG/DL — SIGNIFICANT CHANGE UP (ref 2.5–4.5)
PLATELET # BLD AUTO: 256 K/UL — SIGNIFICANT CHANGE UP (ref 150–400)
POTASSIUM SERPL-MCNC: 3.9 MMOL/L — SIGNIFICANT CHANGE UP (ref 3.5–5.3)
POTASSIUM SERPL-SCNC: 3.9 MMOL/L — SIGNIFICANT CHANGE UP (ref 3.5–5.3)
RBC # BLD: 3.75 M/UL — LOW (ref 3.8–5.2)
RBC # FLD: 13.2 % — SIGNIFICANT CHANGE UP (ref 10.3–14.5)
SODIUM SERPL-SCNC: 137 MMOL/L — SIGNIFICANT CHANGE UP (ref 135–145)
SPECIMEN SOURCE: SIGNIFICANT CHANGE UP
WBC # BLD: 5.38 K/UL — SIGNIFICANT CHANGE UP (ref 3.8–10.5)
WBC # FLD AUTO: 5.38 K/UL — SIGNIFICANT CHANGE UP (ref 3.8–10.5)

## 2022-02-09 PROCEDURE — 99233 SBSQ HOSP IP/OBS HIGH 50: CPT

## 2022-02-09 RX ORDER — LISINOPRIL 2.5 MG/1
5 TABLET ORAL DAILY
Refills: 0 | Status: DISCONTINUED | OUTPATIENT
Start: 2022-02-09 | End: 2022-02-11

## 2022-02-09 RX ORDER — OXYCODONE HYDROCHLORIDE 5 MG/1
5 TABLET ORAL EVERY 6 HOURS
Refills: 0 | Status: DISCONTINUED | OUTPATIENT
Start: 2022-02-09 | End: 2022-02-11

## 2022-02-09 RX ADMIN — HEPARIN SODIUM 5000 UNIT(S): 5000 INJECTION INTRAVENOUS; SUBCUTANEOUS at 17:34

## 2022-02-09 RX ADMIN — OXYCODONE HYDROCHLORIDE 5 MILLIGRAM(S): 5 TABLET ORAL at 05:41

## 2022-02-09 RX ADMIN — HEPARIN SODIUM 5000 UNIT(S): 5000 INJECTION INTRAVENOUS; SUBCUTANEOUS at 05:41

## 2022-02-09 RX ADMIN — PIPERACILLIN AND TAZOBACTAM 25 GRAM(S): 4; .5 INJECTION, POWDER, LYOPHILIZED, FOR SOLUTION INTRAVENOUS at 00:19

## 2022-02-09 RX ADMIN — OXYCODONE HYDROCHLORIDE 5 MILLIGRAM(S): 5 TABLET ORAL at 06:11

## 2022-02-09 RX ADMIN — Medication 250 MILLIGRAM(S): at 19:04

## 2022-02-09 RX ADMIN — PIPERACILLIN AND TAZOBACTAM 25 GRAM(S): 4; .5 INJECTION, POWDER, LYOPHILIZED, FOR SOLUTION INTRAVENOUS at 17:33

## 2022-02-09 RX ADMIN — ATORVASTATIN CALCIUM 10 MILLIGRAM(S): 80 TABLET, FILM COATED ORAL at 22:15

## 2022-02-09 RX ADMIN — OXYCODONE HYDROCHLORIDE 5 MILLIGRAM(S): 5 TABLET ORAL at 14:26

## 2022-02-09 RX ADMIN — OXYCODONE HYDROCHLORIDE 5 MILLIGRAM(S): 5 TABLET ORAL at 00:49

## 2022-02-09 RX ADMIN — OXYCODONE HYDROCHLORIDE 5 MILLIGRAM(S): 5 TABLET ORAL at 18:29

## 2022-02-09 RX ADMIN — OXYCODONE HYDROCHLORIDE 5 MILLIGRAM(S): 5 TABLET ORAL at 00:19

## 2022-02-09 RX ADMIN — OXYCODONE HYDROCHLORIDE 5 MILLIGRAM(S): 5 TABLET ORAL at 22:15

## 2022-02-09 RX ADMIN — Medication 250 MILLIGRAM(S): at 09:14

## 2022-02-09 RX ADMIN — OXYCODONE HYDROCHLORIDE 5 MILLIGRAM(S): 5 TABLET ORAL at 19:00

## 2022-02-09 RX ADMIN — PIPERACILLIN AND TAZOBACTAM 25 GRAM(S): 4; .5 INJECTION, POWDER, LYOPHILIZED, FOR SOLUTION INTRAVENOUS at 08:53

## 2022-02-09 RX ADMIN — OXYCODONE HYDROCHLORIDE 5 MILLIGRAM(S): 5 TABLET ORAL at 10:41

## 2022-02-09 RX ADMIN — Medication 1 APPLICATION(S): at 11:33

## 2022-02-09 RX ADMIN — OXYCODONE HYDROCHLORIDE 5 MILLIGRAM(S): 5 TABLET ORAL at 10:11

## 2022-02-09 RX ADMIN — OXYCODONE HYDROCHLORIDE 5 MILLIGRAM(S): 5 TABLET ORAL at 15:00

## 2022-02-09 NOTE — PROGRESS NOTE ADULT - ASSESSMENT
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 abscess. CT w/ two thin fluid collections in R mandibular postoperative bed w/ kely/submandibular subcutaneous fat stranding. Area of infection was debrided with purulance expressed and packed. WBC 8.9, stable, nontoxic.    - Regular diet  - IV abx Vanc/zosyn (2/6- )  - Vanc trough (2/10 am)  - Packing changes per PRS  - C/w home meds  - F/u cx  - No plan for I&D at this time

## 2022-02-09 NOTE — PROGRESS NOTE ADULT - ASSESSMENT
57 y/o Female with  PMH of HTN, HLD, with diagnosis of squamous cell carcinoma of gingiva now s/p right segmental mandibulectomy, right SND, tracheostomy, left fibular free flap    - C/w abx and packing BID  - no additional opening  - C/w xeroform/ace to skin graft site, apply aquaphor A+D w/ dressing change daily  - appreciate ENT care  - fu clx    Plastic surgery [id number]

## 2022-02-09 NOTE — DISCHARGE NOTE NURSING/CASE MANAGEMENT/SOCIAL WORK - NSDCPEFALRISK_GEN_ALL_CORE
Yes...
For information on Fall & Injury Prevention, visit: [email or web address] OR  [email or web address] OR  https://www.cdc.gov/steadi/patient.html

## 2022-02-10 ENCOUNTER — TRANSCRIPTION ENCOUNTER (OUTPATIENT)
Age: 57
End: 2022-02-10

## 2022-02-10 LAB
CULTURE RESULTS: SIGNIFICANT CHANGE UP
HCT VFR BLD CALC: 32.3 % — LOW (ref 34.5–45)
HGB BLD-MCNC: 10.2 G/DL — LOW (ref 11.5–15.5)
MCHC RBC-ENTMCNC: 27.5 PG — SIGNIFICANT CHANGE UP (ref 27–34)
MCHC RBC-ENTMCNC: 31.6 GM/DL — LOW (ref 32–36)
MCV RBC AUTO: 87.1 FL — SIGNIFICANT CHANGE UP (ref 80–100)
MRSA PCR RESULT.: SIGNIFICANT CHANGE UP
NRBC # BLD: 0 /100 WBCS — SIGNIFICANT CHANGE UP
NRBC # FLD: 0 K/UL — SIGNIFICANT CHANGE UP
PLATELET # BLD AUTO: 262 K/UL — SIGNIFICANT CHANGE UP (ref 150–400)
RBC # BLD: 3.71 M/UL — LOW (ref 3.8–5.2)
RBC # FLD: 13.3 % — SIGNIFICANT CHANGE UP (ref 10.3–14.5)
S AUREUS DNA NOSE QL NAA+PROBE: DETECTED
SPECIMEN SOURCE: SIGNIFICANT CHANGE UP
VANCOMYCIN TROUGH SERPL-MCNC: 13.6 UG/ML — SIGNIFICANT CHANGE UP (ref 10–20)
WBC # BLD: 5.44 K/UL — SIGNIFICANT CHANGE UP (ref 3.8–10.5)
WBC # FLD AUTO: 5.44 K/UL — SIGNIFICANT CHANGE UP (ref 3.8–10.5)

## 2022-02-10 PROCEDURE — 99222 1ST HOSP IP/OBS MODERATE 55: CPT

## 2022-02-10 PROCEDURE — 93306 TTE W/DOPPLER COMPLETE: CPT | Mod: 26

## 2022-02-10 PROCEDURE — 99232 SBSQ HOSP IP/OBS MODERATE 35: CPT

## 2022-02-10 RX ADMIN — Medication 250 MILLIGRAM(S): at 07:01

## 2022-02-10 RX ADMIN — OXYCODONE HYDROCHLORIDE 5 MILLIGRAM(S): 5 TABLET ORAL at 10:57

## 2022-02-10 RX ADMIN — PIPERACILLIN AND TAZOBACTAM 25 GRAM(S): 4; .5 INJECTION, POWDER, LYOPHILIZED, FOR SOLUTION INTRAVENOUS at 09:43

## 2022-02-10 RX ADMIN — OXYCODONE HYDROCHLORIDE 5 MILLIGRAM(S): 5 TABLET ORAL at 19:11

## 2022-02-10 RX ADMIN — HEPARIN SODIUM 5000 UNIT(S): 5000 INJECTION INTRAVENOUS; SUBCUTANEOUS at 17:57

## 2022-02-10 RX ADMIN — OXYCODONE HYDROCHLORIDE 5 MILLIGRAM(S): 5 TABLET ORAL at 14:50

## 2022-02-10 RX ADMIN — OXYCODONE HYDROCHLORIDE 5 MILLIGRAM(S): 5 TABLET ORAL at 18:41

## 2022-02-10 RX ADMIN — PIPERACILLIN AND TAZOBACTAM 25 GRAM(S): 4; .5 INJECTION, POWDER, LYOPHILIZED, FOR SOLUTION INTRAVENOUS at 17:56

## 2022-02-10 RX ADMIN — OXYCODONE HYDROCHLORIDE 5 MILLIGRAM(S): 5 TABLET ORAL at 02:30

## 2022-02-10 RX ADMIN — OXYCODONE HYDROCHLORIDE 5 MILLIGRAM(S): 5 TABLET ORAL at 10:27

## 2022-02-10 RX ADMIN — OXYCODONE HYDROCHLORIDE 5 MILLIGRAM(S): 5 TABLET ORAL at 15:20

## 2022-02-10 RX ADMIN — OXYCODONE HYDROCHLORIDE 5 MILLIGRAM(S): 5 TABLET ORAL at 22:22

## 2022-02-10 RX ADMIN — HEPARIN SODIUM 5000 UNIT(S): 5000 INJECTION INTRAVENOUS; SUBCUTANEOUS at 07:01

## 2022-02-10 RX ADMIN — OXYCODONE HYDROCHLORIDE 5 MILLIGRAM(S): 5 TABLET ORAL at 06:15

## 2022-02-10 RX ADMIN — Medication 250 MILLIGRAM(S): at 17:56

## 2022-02-10 RX ADMIN — LISINOPRIL 5 MILLIGRAM(S): 2.5 TABLET ORAL at 07:01

## 2022-02-10 RX ADMIN — PIPERACILLIN AND TAZOBACTAM 25 GRAM(S): 4; .5 INJECTION, POWDER, LYOPHILIZED, FOR SOLUTION INTRAVENOUS at 02:29

## 2022-02-10 RX ADMIN — ATORVASTATIN CALCIUM 10 MILLIGRAM(S): 80 TABLET, FILM COATED ORAL at 22:22

## 2022-02-10 NOTE — PROGRESS NOTE ADULT - ASSESSMENT
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, SND I-IV, s/p trach  removed in January 2022, L fibula flap on 1/13/21 w/o immediate post-op complications, stayed in hospital for about 10 days, had drain in place which was removed at discharge  No fevers, no leukocytosis  1/13 mandibulectomy, neck dissection, fibula flap  Now presents with wound infection and discharge from site  Overall,  1) Wound infection  - Local wound infection at prior OR site, concern for possible hospital type organisms  - Vanco 1g q 12 (Trough 10 adequate, continue present dose)  - Zosyn 3.375g q 8  - When DC planning, plan for Doxycyline 100mg q 12 and Augmentin 875mg po q 12 to complete 14 days total (including IV days)  - F/U BCXs  - Any role I+D per surgical team  - F/U wound culture  2) Post operative state  - Wound care per primary team    Mark Greco MD  Contact on TEAMS messaging from 9am - 5pm  From 5pm-9am, and on weekends call [phone]

## 2022-02-10 NOTE — DISCHARGE NOTE PROVIDER - HOSPITAL COURSE
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 Patient is s/p I&D by plastic surgery, treated with IV antibiotics per infectious disease recommendation and the wound is packed with aquacel. Patient is not on chemotherapy at this time. Hospitalist and cardiology consult called for pre procedural clearance for mediport placement. Patient was cleared for discharge home by Dr. So on... All prescriptions were sent to a pharmacy that was agreed on with the patient.

## 2022-02-10 NOTE — DISCHARGE NOTE NURSING/CASE MANAGEMENT/SOCIAL WORK - NSSCNAMETXT_GEN_ALL_CORE
Left a detailed voicemail for Martha advising of message below and to call back to schedule appointment for patient as he is overdue for med check.  Joi Herndon, VIKYN, RN  [facility]     You were referred to [facility] for wound management. The visiting RN will call to schedule a time to visit.

## 2022-02-10 NOTE — DISCHARGE NOTE PROVIDER - NSDCCPCAREPLAN_GEN_ALL_CORE_FT
PRINCIPAL DISCHARGE DIAGNOSIS  Diagnosis: Neck abscess  Assessment and Plan of Treatment: - Please follow up with Dr. oS as scheduled

## 2022-02-10 NOTE — DISCHARGE NOTE PROVIDER - NSDCMRMEDTOKEN_GEN_ALL_CORE_FT
albuterol 90 mcg/inh inhalation powder: 2 puff(s) inhaled every 6 hours, As Needed  amLODIPine 10 mg oral tablet: 1 tab(s) orally once a day PM  aspirin 81 mg oral tablet: orally once a day LD1/7/22  atorvastatin 10 mg oral tablet: 1 tab(s) orally once a day PM  lisinopril 10 mg oral tablet: 1 tab(s) orally once a day PM  oxyCODONE 5 mg/5 mL oral solution: 5 milliliter(s) orally every 6 hours, As Needed -Severe pain MDD:20mL  polyethylene glycol 3350 oral powder for reconstitution: 17 gram(s) orally once a day  rolling walker : 1 application   senna oral tablet: 2 tab(s) orally once a day (at bedtime), As needed, Constipation  Vitamin D3 1250 mcg (50,000 intl units) oral capsule: 1 cap(s) orally once a week   albuterol 90 mcg/inh inhalation powder: 2 puff(s) inhaled every 6 hours, As Needed  amoxicillin-clavulanate 875 mg-125 mg oral tablet: 1 tab(s) orally 2 times a day   aspirin 81 mg oral tablet: orally once a day LD1/7/22  atorvastatin 10 mg oral tablet: 1 tab(s) orally once a day PM  doxycycline monohydrate 100 mg oral capsule: 1 cap(s) orally 2 times a day   oxyCODONE 5 mg/5 mL oral solution: 5 milliliter(s) orally every 6 hours, As Needed -Severe pain MDD:20mL  petrolatum topical ointment: 1 application topically once a day  polyethylene glycol 3350 oral powder for reconstitution: 17 gram(s) orally once a day  rolling walker : 1 application   senna oral tablet: 2 tab(s) orally once a day (at bedtime), As needed, Constipation  Vitamin D3 1250 mcg (50,000 intl units) oral capsule: 1 cap(s) orally once a week  Zestril 5 mg oral tablet: 1 tab(s) orally once a day

## 2022-02-10 NOTE — PROGRESS NOTE ADULT - ASSESSMENT
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

## 2022-02-10 NOTE — DISCHARGE NOTE PROVIDER - CARE PROVIDER_API CALL
Vic So)  [facility]; Otolaryngology  [address] [place] [address]  Phone: [phone]  Fax: [phone]  Follow Up Time:

## 2022-02-10 NOTE — CONSULT NOTE ADULT - ASSESSMENT
56F with Squamous cell carcinoma of gingiva s/p R composite resection with trach removed in 01/22, mandibulectomy  and neck disection w/ L fibula flap 1/13 coming in with R submental cellulitis debridement currently on Vancomycin and zosyn.  56F with Squamous cell carcinoma of gingiva s/p R composite resection with trach removed in 01/22, mandibulectomy  and neck disection w/ L fibula flap 1/13 coming in with R submental cellulitis debridement currently on Vancomycin and zosyn. Cardiology consulted for preoperative risk assessment for PORT placement. The patient reports no cardiac history, she is a former smoker, has HTN, has HLD. She reports excellent exercise tolerance prior to her hospitalization Reports recent ECHO in past month with no abl  no concern for ACS/Arrhythmia/Valvular disease/HF.  Pending no change in clinical status, patient requires no further cardiac work up prior to port placement. Abx completion for ENT, ID, and IR prior to procedure.  56F with Squamous cell carcinoma of gingiva s/p R composite resection with trach removed in 01/22, mandibulectomy  and neck disection w/ L fibula flap 1/13 coming in with R submental cellulitis debridement currently on Vancomycin and zosyn. Cardiology consulted for preoperative risk assessment for PORT placement. The patient reports no cardiac history, she is a former smoker, has HTN, has HLD. She reports excellent exercise tolerance prior to her hospitalization Reports recent ECHO in past month ACS/Arrhythmia/HF.  Pending no change in clinical status, would want inpatient TTE prior to clearance for port placement. Abx completion for ENT, ID, and IR prior to procedure.

## 2022-02-10 NOTE — CONSULT NOTE ADULT - CONSULT REASON
medical comanagement
Mediport placement for chemotherapy
Port clearance
s/p free fibula with infection
R neck cellulitis/abscess

## 2022-02-10 NOTE — CONSULT NOTE ADULT - ATTENDING COMMENTS
Exam suggests moderate aortic stenosis (estimated GERSON 1.2-1.4 sqcm). May still safely proceed with planned procedure, but would check 2D echo to evaluate AV more fully.
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, SND I-IV, s/p trach  removed in January 2022, L fibula flap on 1/13/21 w/o immediate post-op complications, stayed in hospital for about 10 days, had drain in place which was removed at discharge  No fevers, no leukocytosis  1/13 mandibulectomy, neck dissection, fibula flap  Now presents with wound infection and discharge from site  Overall,  1) New Wound infection  - Local wound infection at prior OR site, concern for possible hospital type organisms  - Vanco 1g q 12 (monitor levels)  - Zosyn 3.375g q 8  - DC Unasyn  - F/U BCXs  - I+D planning per surgical team, please send bacterial cultures  2) Post operative state  - Wound care per primary team    Mark Greco MD  Contact on TEAMS messaging from 9am - 5pm  From 5pm-9am, and on weekends call [phone]    I was physically present for the key portions of the evaluation and management service provided. I saw and examined the patient. I agree with the above history, physical, and plan except for any discrepancies which I have documented in “Attending Statements.” Please refer to “Attending Statements” for final plan.

## 2022-02-10 NOTE — DISCHARGE NOTE PROVIDER - NSDCFUADDAPPT_GEN_ALL_CORE_FT
- Please call [phone] for appointment with Dr. So  - Please follow up with your hematology/oncologist after discharge

## 2022-02-10 NOTE — DISCHARGE NOTE PROVIDER - NSDCFUSCHEDAPPT_GEN_ALL_CORE_FT
PARUL HERNANDEZ ; 02/10/2022 ; [facility]  PARUL HERNANDEZ ; 02/15/2022 ; [facility]  PARUL HERNANDEZ ; 02/17/2022 ; [facility]  PARUL HERNANDEZ ; 02/22/2022 ; [facility] [address] [place] [address]  PARUL HERNANDEZ ; 03/22/2022 ; [facility] [address] [place] [address] PARUL HERNANDEZ ; 02/15/2022 ; [facility]  PARUL HERNANDEZ ; 02/17/2022 ; [facility]  PARUL HERNANDEZ ; 02/22/2022 ; [facility]  PARUL HERNANDEZ ; 03/22/2022 ; [facility] Otolaryng [address] [place] [address]

## 2022-02-10 NOTE — DISCHARGE NOTE NURSING/CASE MANAGEMENT/SOCIAL WORK - PATIENT PORTAL LINK FT
You can access the FollowMyHealth Patient Portal offered by [facility] by registering at the following website: [email or web address]. By joining [employer]’s FollowMyHealth portal, you will also be able to view your health information using other applications (apps) compatible with our system. Statement Selected

## 2022-02-10 NOTE — DISCHARGE NOTE NURSING/CASE MANAGEMENT/SOCIAL WORK - NSDCFUADDAPPT_GEN_ALL_CORE_FT
- Please call [phone] for appointment with Dr. So  - Please follow up with your hematology/oncologist after discharge

## 2022-02-10 NOTE — PROGRESS NOTE ADULT - ASSESSMENT
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 abscess. CT w/ two thin fluid collections in R mandibular postoperative bed w/ kely/submandibular subcutaneous fat stranding. Area of infection was debrided with purulance expressed and packed. WBC 8.9, stable, nontoxic.    - Regular diet  - IV abx Vanc/zosyn (2/6- )  - Vanc trough (2/10 am)  - ID PO abx recs  - Packing changes per PRS  - C/w home meds  - F/u cx  - Arrange VNS for BID packing changes  - Likely dc home tomorrow

## 2022-02-10 NOTE — CONSULT NOTE ADULT - SUBJECTIVE AND OBJECTIVE BOX
Penny Ramirez MD  Cardiology Fellow  [phone]  All Cardiology service information can be found  on [email or web address], password: [email or web address]    Patient seen and evaluated at bedside    Chief Complaint:    HPI:  ENT H&P    HPI: 57 y/o Female with  PMH of HTN, HLD, with 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 No immediate post-op complications, stayed in hospital for about 10 days, had drain in place which was removed at discharge. Says that last night she noticed a "rotten smell" from her neck. About 3 hours ago (3:00am) pt noticed purulent/bloody discharge coming from a hole in the R side of her neck under the chin (not from surgical incision site). Pt denies any fever, neck pain, new neck swelling. Says that her surgical incisions are healing well. Pt not on chemo or radiation therapy right now. Has been taking oxycodone at home for post-op pain. Allergic to morphine.    Pt was seen and examined at bedside. R submental hole opened and area was massaged. Moderate purulent secretions followed by SS output. Purulance was taken for culture and area was packed with .25 inch packing.    : Examined at bedside this morning. NAEON. Submental packing removed with expression of purulent drainage. Replaced with new /4" packing.   : NAEON. Packing replaced with aquacel by plastic surgery. No plan for I&D. Vanc trough scheduled for this morning.  : NAEON. Aquacel rpacking replaced this morning. Improving on vanc/zosyn. No other issues                        10.7   8.88  )-----------( 325      ( 2022 06:56 )             33.5     02-    140  |  104  |  16  ----------------------------<  137<H>  3.6   |  25  |  0.62    Ca    9.3      2022 06:56    TPro  6.9  /  Alb  3.9  /  TBili  0.3  /  DBili  x   /  AST  7   /  ALT  8   /  AlkPhos  86  02-06      I&O's Summary    CT NECK:    IMPRESSION:  Interval partial right mandibulectomy with fibular free flap reconstruction and right neck lymph node dissection. Two thin fluid collections containing gas in the right mandibular postoperative bed which may represent residual postoperative seromas, however, presence of air and communication with the skin of the more superior collection raises concern for superimposed infection. Mary Jo and submandibular subcutaneous fat stranding which could represent residual postsurgical change or be reactive in the setting of cellulitis.    (2022 10:59)      PMHx:   H/O malignant neoplasm of gum  Hypertension  Hyperlipidemia  Mild asthma    PSHx:   H/O  section  History of hip replacement  History of partial hysterectomy    Allergies:  morphine (Hives)      Home Meds:  Current Medications:   acetaminophen     Tablet .. 650 milliGRAM(s) Oral every 6 hours PRN  ALBUTerol    90 MICROgram(s) HFA Inhaler 2 Puff(s) Inhalation every 6 hours PRN  AQUAPHOR (petrolatum Ointment) 1 Application(s) Topical daily  atorvastatin 10 milliGRAM(s) Oral at bedtime  calcium carbonate    500 mG (Tums) Chewable 1 Tablet(s) Chew every 4 hours PRN  heparin   Injectable 5000 Unit(s) SubCutaneous every 12 hours  ibuprofen  Tablet. 600 milliGRAM(s) Oral every 6 hours PRN  influenza   Vaccine 0.5 milliLiter(s) IntraMuscular once  lisinopril 5 milliGRAM(s) Oral daily  oxyCODONE    IR 5 milliGRAM(s) Oral every 4 hours PRN  oxyCODONE    IR 5 milliGRAM(s) Oral every 6 hours PRN  piperacillin/tazobactam IVPB.. 3.375 Gram(s) IV Intermittent every 8 hours  senna 2 Tablet(s) Oral at bedtime PRN  vancomycin  IVPB 1000 milliGRAM(s) IV Intermittent every 12 hours      FAMILY HISTORY:    REVIEW OF SYSTEMS:  CONSTITUTIONAL: No weakness, fevers or chills  EYES/ENT: No visual changes;  No dysphagia  NECK: No pain or stiffness  RESPIRATORY: No cough, wheezing, hemoptysis; No shortness of breath  CARDIOVASCULAR: No chest pain or palpitations; No lower extremity edema  GASTROINTESTINAL: No abdominal or epigastric pain. No nausea, vomiting, or hematemesis; No diarrhea or constipation. No melena or hematochezia.  BACK: No back pain  GENITOURINARY: No dysuria, frequency or hematuria  NEUROLOGICAL: No numbness or weakness  SKIN: No itching, burning, rashes, or lesions   All other review of systems is negative unless indicated above.    Physical Exam:  T(F): 98.5 (-10), Max: 98.5 (02-10)  HR: 81 (-10) (72 - 88)  BP: 105/63 (-10) (105/63 - 129/62)  RR: 17 (-10)  SpO2: 94% (02-10)  GENERAL: No acute distress, well-developed  HEAD:  Atraumatic, Normocephalic  ENT: EOMI, PERRLA, conjunctiva and sclera clear, Neck supple, No JVD, moist mucosa  CHEST/LUNG: Clear to auscultation bilaterally; No wheeze, equal breath sounds bilaterally   BACK: No spinal tenderness  HEART: Regular rate and rhythm; No murmurs, rubs, or gallops  ABDOMEN: Soft, Nontender, Nondistended; Bowel sounds present  EXTREMITIES:  No clubbing, cyanosis, or edema  PSYCH: Nl behavior, nl affect  NEUROLOGY: AAOx3, non-focal, cranial nerves intact  SKIN: Normal color, No rashes or lesions  LINES:    Cardiovascular Diagnostic Testing:    ECG:   NSR       Imaging:    CXR: Personally reviewed    Labs: Personally reviewed                        10.2   5.44  )-----------( 262      ( 10 Feb 2022 07:56 )             32.3         137  |  100  |  17  ----------------------------<  122<H>  3.9   |  28  |  0.70    Ca    9.2      2022 07:29  Phos  4.0       Mg     1.90                                Penny Ramirez MD  Cardiology Fellow  [phone]  All Cardiology service information can be found  on [email or web address], password: [email or web address]    Patient seen and evaluated at bedside    Chief Complaint:    HPI:  ENT H&P    HPI: 57 y/o Female with  PMH of HTN, HLD, with 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 No immediate post-op complications, stayed in hospital for about 10 days, had drain in place which was removed at discharge. Says that last night she noticed a "rotten smell" from her neck. About 3 hours ago (3:00am) pt noticed purulent/bloody discharge coming from a hole in the R side of her neck under the chin (not from surgical incision site). Pt denies any fever, neck pain, new neck swelling. Says that her surgical incisions are healing well. Pt not on chemo or radiation therapy right now. Has been taking oxycodone at home for post-op pain. Allergic to morphine.    Pt was seen and examined at bedside. R submental hole opened and area was massaged. Moderate purulent secretions followed by SS output. Purulance was taken for culture and area was packed with .25 inch packing.    : Examined at bedside this morning. NAEON. Submental packing removed with expression of purulent drainage. Replaced with new /4" packing.   : NAEON. Packing replaced with aquacel by plastic surgery. No plan for I&D. Vanc trough scheduled for this morning.  : NAEON. Aquacel rpacking replaced this morning. Improving on vanc/zosyn. No other issues                        10.7   8.88  )-----------( 325      ( 2022 06:56 )             33.5     02-    140  |  104  |  16  ----------------------------<  137<H>  3.6   |  25  |  0.62    Ca    9.3      2022 06:56    TPro  6.9  /  Alb  3.9  /  TBili  0.3  /  DBili  x   /  AST  7   /  ALT  8   /  AlkPhos  86  02-06      I&O's Summary    CT NECK:    IMPRESSION:  Interval partial right mandibulectomy with fibular free flap reconstruction and right neck lymph node dissection. Two thin fluid collections containing gas in the right mandibular postoperative bed which may represent residual postoperative seromas, however, presence of air and communication with the skin of the more superior collection raises concern for superimposed infection. Mary Jo and submandibular subcutaneous fat stranding which could represent residual postsurgical change or be reactive in the setting of cellulitis.    (2022 10:59)      PMHx:   H/O malignant neoplasm of gum  Hypertension  Hyperlipidemia  Mild asthma    PSHx:   H/O  section  History of hip replacement  History of partial hysterectomy    Allergies:  morphine (Hives)      Home Meds:  Current Medications:   acetaminophen     Tablet .. 650 milliGRAM(s) Oral every 6 hours PRN  ALBUTerol    90 MICROgram(s) HFA Inhaler 2 Puff(s) Inhalation every 6 hours PRN  AQUAPHOR (petrolatum Ointment) 1 Application(s) Topical daily  atorvastatin 10 milliGRAM(s) Oral at bedtime  calcium carbonate    500 mG (Tums) Chewable 1 Tablet(s) Chew every 4 hours PRN  heparin   Injectable 5000 Unit(s) SubCutaneous every 12 hours  ibuprofen  Tablet. 600 milliGRAM(s) Oral every 6 hours PRN  influenza   Vaccine 0.5 milliLiter(s) IntraMuscular once  lisinopril 5 milliGRAM(s) Oral daily  oxyCODONE    IR 5 milliGRAM(s) Oral every 4 hours PRN  oxyCODONE    IR 5 milliGRAM(s) Oral every 6 hours PRN  piperacillin/tazobactam IVPB.. 3.375 Gram(s) IV Intermittent every 8 hours  senna 2 Tablet(s) Oral at bedtime PRN  vancomycin  IVPB 1000 milliGRAM(s) IV Intermittent every 12 hours      FAMILY HISTORY:    REVIEW OF SYSTEMS:  No chest pain, dyspnea, or palpitations     Physical Exam:  T(F): 98.5 (02-10), Max: 98.5 (-10)  HR: 81 (02-10) (72 - 88)  BP: 105/63 (02-10) (105/63 - 129/62)  RR: 17 (02-10)  SpO2: 94% (-10)  GENERAL: No acute distress, well-developed  ENT: Wound present with packing, JVD unable to assess   CHEST/LUNG: Clear to auscultation bilaterally; patient has upper airway stridor   HEART: Regular rate and rhythm; No murmurs, rubs, or gallops  ABDOMEN: Soft, Nontender, Nondistended; Bowel sounds present  EXTREMITIES:  No clubbing, cyanosis, or edema  PSYCH: Nl behavior, nl affect  NEUROLOGY: AAOx3, non-focal, cranial nerves intact  SKIN: Normal color, No rashes or lesions  LINES:    Cardiovascular Diagnostic Testing:    ECG:   NSR       Imaging:    CXR: Personally reviewed    Labs: Personally reviewed                        10.2   5.44  )-----------( 262      ( 10 Feb 2022 07:56 )             32.3     02-    137  |  100  |  17  ----------------------------<  122<H>  3.9   |  28  |  0.70    Ca    9.2      2022 07:29  Phos  4.0     -  Mg     1.90                                Penny Ramirez MD  Cardiology Fellow  [phone]  All Cardiology service information can be found  on [email or web address], password: [email or web address]    Patient seen and evaluated at bedside    Chief Complaint:    HPI:  ENT H&P    HPI: 55 y/o Female with  PMH of HTN, HLD, with 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 No immediate post-op complications, stayed in hospital for about 10 days, had drain in place which was removed at discharge. Says that last night she noticed a "rotten smell" from her neck. About 3 hours ago (3:00am) pt noticed purulent/bloody discharge coming from a hole in the R side of her neck under the chin (not from surgical incision site). Pt denies any fever, neck pain, new neck swelling. Says that her surgical incisions are healing well. Pt not on chemo or radiation therapy right now. Has been taking oxycodone at home for post-op pain. Allergic to morphine.    Pt was seen and examined at bedside. R submental hole opened and area was massaged. Moderate purulent secretions followed by SS output. Purulance was taken for culture and area was packed with .25 inch packing.    : Examined at bedside this morning. NAEON. Submental packing removed with expression of purulent drainage. Replaced with new /4" packing.   : NAEON. Packing replaced with aquacel by plastic surgery. No plan for I&D. Vanc trough scheduled for this morning.  : NAEON. Aquacel rpacking replaced this morning. Improving on vanc/zosyn. No other issues                        10.7   8.88  )-----------( 325      ( 2022 06:56 )             33.5     02-    140  |  104  |  16  ----------------------------<  137<H>  3.6   |  25  |  0.62    Ca    9.3      2022 06:56    TPro  6.9  /  Alb  3.9  /  TBili  0.3  /  DBili  x   /  AST  7   /  ALT  8   /  AlkPhos  86  02-06      I&O's Summary    CT NECK:    IMPRESSION:  Interval partial right mandibulectomy with fibular free flap reconstruction and right neck lymph node dissection. Two thin fluid collections containing gas in the right mandibular postoperative bed which may represent residual postoperative seromas, however, presence of air and communication with the skin of the more superior collection raises concern for superimposed infection. Mary Jo and submandibular subcutaneous fat stranding which could represent residual postsurgical change or be reactive in the setting of cellulitis.    (2022 10:59)      PMHx:   H/O malignant neoplasm of gum  Hypertension  Hyperlipidemia  Mild asthma    PSHx:   H/O  section  History of hip replacement  History of partial hysterectomy    Allergies:  morphine (Hives)      Home Meds:  Current Medications:   acetaminophen     Tablet .. 650 milliGRAM(s) Oral every 6 hours PRN  ALBUTerol    90 MICROgram(s) HFA Inhaler 2 Puff(s) Inhalation every 6 hours PRN  AQUAPHOR (petrolatum Ointment) 1 Application(s) Topical daily  atorvastatin 10 milliGRAM(s) Oral at bedtime  calcium carbonate    500 mG (Tums) Chewable 1 Tablet(s) Chew every 4 hours PRN  heparin   Injectable 5000 Unit(s) SubCutaneous every 12 hours  ibuprofen  Tablet. 600 milliGRAM(s) Oral every 6 hours PRN  influenza   Vaccine 0.5 milliLiter(s) IntraMuscular once  lisinopril 5 milliGRAM(s) Oral daily  oxyCODONE    IR 5 milliGRAM(s) Oral every 4 hours PRN  oxyCODONE    IR 5 milliGRAM(s) Oral every 6 hours PRN  piperacillin/tazobactam IVPB.. 3.375 Gram(s) IV Intermittent every 8 hours  senna 2 Tablet(s) Oral at bedtime PRN  vancomycin  IVPB 1000 milliGRAM(s) IV Intermittent every 12 hours      FAMILY HISTORY:    REVIEW OF SYSTEMS:  No chest pain, dyspnea, or palpitations     Physical Exam:  T(F): 98.5 (02-10), Max: 98.5 (-10)  HR: 81 (02-10) (72 - 88)  BP: 105/63 (02-10) (105/63 - 129/62)  RR: 17 (02-10)  SpO2: 94% (-10)  GENERAL: No acute distress, well-developed  ENT: Wound present with packing, JVD unable to assess   CHEST/LUNG: Clear to auscultation bilaterally; patient has upper airway stridor   HEART: Regular rate and rhythm; murmur +   ABDOMEN: Soft, Nontender, Nondistended; Bowel sounds present  EXTREMITIES:  No clubbing, cyanosis, or edema  PSYCH: Nl behavior, nl affect  NEUROLOGY: AAOx3, non-focal, cranial nerves intact  SKIN: Normal color, No rashes or lesions  LINES:    Cardiovascular Diagnostic Testing:    ECG:   NSR       Imaging:    CXR: Personally reviewed    Labs: Personally reviewed                        10.2   5.44  )-----------( 262      ( 10 Feb 2022 07:56 )             32.3     02-    137  |  100  |  17  ----------------------------<  122<H>  3.9   |  28  |  0.70    Ca    9.2      2022 07:29  Phos  4.0     -  Mg     1.90

## 2022-02-10 NOTE — DISCHARGE NOTE NURSING/CASE MANAGEMENT/SOCIAL WORK - NSDCPEFALRISK_GEN_ALL_CORE
For information on Fall & Injury Prevention, visit: [email or web address] OR  [email or web address] OR  https://www.cdc.gov/steadi/patient.html

## 2022-02-10 NOTE — DISCHARGE NOTE PROVIDER - NSDCFUADDINST_GEN_ALL_CORE_FT
- Wound Care  1. Right submental wound: packing change with aquacel daily  2. Left lower leg: Keep lower extremity covered with xeroform, then aquaphor gauze and then abdominal pack dressing. Change every 48 hours. Use boot for ambulation and ambulate at least 4 times a day.  3. Left thigh graft site: wet to dry dressing daily.  4. Weight bearing as tolerated for lower extremities    For left arm, place Xeroform over skin graft site. Then, place telfa or gauze over Xeroform, followed by wrapping in ACE bandage or cling wrap. - Wound Care  1. Right submental wound: packing change with aquacel daily  2. Left lower leg: Keep lower extremity covered with xeroform, then aquaphor gauze and then abdominal pack dressing. Change every 48 hours.   3. Left thigh graft site: wet to dry dressing daily.  4. Weight bearing as tolerated for lower extremities    For left arm, place Xeroform over skin graft site. Then, place telfa or gauze over Xeroform, followed by wrapping in ACE bandage or cling wrap. - Wound Care  1. Right submental wound: packing change with Aquacel daily  2. Left lower leg: Keep lower extremity covered with xeroform, then Aquaphor gauze and then abdominal pack dressing. Change every 48 hours.   3. Left thigh graft site: wet to dry dressing daily.  4. Weight bearing as tolerated for lower extremities

## 2022-02-11 VITALS
OXYGEN SATURATION: 94 % | HEART RATE: 83 BPM | RESPIRATION RATE: 18 BRPM | SYSTOLIC BLOOD PRESSURE: 105 MMHG | DIASTOLIC BLOOD PRESSURE: 56 MMHG | TEMPERATURE: 98 F

## 2022-02-11 LAB
CULTURE RESULTS: SIGNIFICANT CHANGE UP
SPECIMEN SOURCE: SIGNIFICANT CHANGE UP

## 2022-02-11 PROCEDURE — 99232 SBSQ HOSP IP/OBS MODERATE 35: CPT

## 2022-02-11 PROCEDURE — 99024 POSTOP FOLLOW-UP VISIT: CPT

## 2022-02-11 RX ORDER — LISINOPRIL 2.5 MG/1
1 TABLET ORAL
Qty: 0 | Refills: 0 | DISCHARGE

## 2022-02-11 RX ORDER — AMLODIPINE BESYLATE 2.5 MG/1
1 TABLET ORAL
Qty: 0 | Refills: 0 | DISCHARGE

## 2022-02-11 RX ORDER — LISINOPRIL 2.5 MG/1
1 TABLET ORAL
Qty: 30 | Refills: 0
Start: 2022-02-11 | End: 2022-03-12

## 2022-02-11 RX ORDER — PETROLATUM,WHITE
1 JELLY (GRAM) TOPICAL
Qty: 30 | Refills: 0
Start: 2022-02-11 | End: 2022-03-12

## 2022-02-11 RX ADMIN — OXYCODONE HYDROCHLORIDE 5 MILLIGRAM(S): 5 TABLET ORAL at 08:12

## 2022-02-11 RX ADMIN — OXYCODONE HYDROCHLORIDE 5 MILLIGRAM(S): 5 TABLET ORAL at 01:51

## 2022-02-11 RX ADMIN — PIPERACILLIN AND TAZOBACTAM 25 GRAM(S): 4; .5 INJECTION, POWDER, LYOPHILIZED, FOR SOLUTION INTRAVENOUS at 01:48

## 2022-02-11 RX ADMIN — OXYCODONE HYDROCHLORIDE 5 MILLIGRAM(S): 5 TABLET ORAL at 02:30

## 2022-02-11 RX ADMIN — OXYCODONE HYDROCHLORIDE 5 MILLIGRAM(S): 5 TABLET ORAL at 10:24

## 2022-02-11 RX ADMIN — PIPERACILLIN AND TAZOBACTAM 25 GRAM(S): 4; .5 INJECTION, POWDER, LYOPHILIZED, FOR SOLUTION INTRAVENOUS at 09:17

## 2022-02-11 RX ADMIN — LISINOPRIL 5 MILLIGRAM(S): 2.5 TABLET ORAL at 05:50

## 2022-02-11 RX ADMIN — HEPARIN SODIUM 5000 UNIT(S): 5000 INJECTION INTRAVENOUS; SUBCUTANEOUS at 05:49

## 2022-02-11 RX ADMIN — OXYCODONE HYDROCHLORIDE 5 MILLIGRAM(S): 5 TABLET ORAL at 05:49

## 2022-02-11 RX ADMIN — Medication 250 MILLIGRAM(S): at 05:49

## 2022-02-11 RX ADMIN — OXYCODONE HYDROCHLORIDE 5 MILLIGRAM(S): 5 TABLET ORAL at 09:54

## 2022-02-11 NOTE — PROGRESS NOTE ADULT - ASSESSMENT
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, SND I-IV, s/p trach  removed in January 2022, L fibula flap on 1/13/21 w/o immediate post-op complications, stayed in hospital for about 10 days, had drain in place which was removed at discharge  No fevers, no leukocytosis  1/13 mandibulectomy, neck dissection, fibula flap  Now presents with wound infection and discharge from site  Overall,  1) Wound infection  - Local wound infection at prior OR site, concern for possible hospital type organisms  - Vanco 1g q 12 (Trough 10 adequate, continue present dose)  - Zosyn 3.375g q 8  - When DC planning, plan for Doxycyline 100mg q 12 and Augmentin 875mg po q 12 to complete 14 days total (including IV days)  - F/U BCXs  - Any role I+D per surgical team  - F/U wound culture  2) Post operative state  - Wound care per primary team    Follow up with me in ID clinic in 3-4 weeks, [phone]--discussed with patient    Mark Greco MD  Contact on TEAMS messaging from 9am - 5pm  From 5pm-9am, and on weekends call [phone]

## 2022-02-11 NOTE — PROGRESS NOTE ADULT - ASSESSMENT
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 abscess. CT w/ two thin fluid collections in R mandibular postoperative bed w/ kely/submandibular subcutaneous fat stranding. Area of infection was debrided with purulance expressed and packed. WBC 8.9, stable, nontoxic.    - Regular diet  - dc IV abx Vanc/zosyn (2/6- 2/11)  - Transition to PO augmentin and doxycycline per ID  - Packing changes daily  - C/w home meds  - Cx neative  - VNS arranged for packing changes  - Dc home today

## 2022-02-11 NOTE — PROGRESS NOTE ADULT - PROBLEM SELECTOR PLAN 6
HSQ    Dispo: Likely home pending wound culture data and abx regimen.
HSQ    Dispo: Home today w/wound care services per primary team. Outpt ENT followup.
HSQ    Dispo: Likely home pending tomorrow on PO abx and wound care services per primary team

## 2022-02-11 NOTE — PROGRESS NOTE ADULT - PROBLEM SELECTOR PLAN 3
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  -Mediport scheduled for 2/17.   -Outpt followup with Dr. Tran and Dr. Rosales in [place].  -Clearance per cardiology
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  -Outpt followup with Dr. Tran and Dr. Rosales in [place].
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  -Mediport scheduled for 2/17.   -Outpt followup with Dr. Tran and Dr. Rosales in [place].  -Clearance per cardiology

## 2022-02-11 NOTE — PROGRESS NOTE ADULT - ASSESSMENT
57 y/o Female with  PMH of HTN, HLD, with diagnosis of squamous cell carcinoma of gingiva now s/p right segmental mandibulectomy, right SND, tracheostomy, left fibular free flap    - C/w abx and packing BID  - no additional opening  - C/w xeroform/ace to skin graft site, apply aquaphor A+D w/ dressing change every other day  - appreciate ENT care  - fu clx - currently NGTD  - appreciate ID recs    Plastic surgery [id number]

## 2022-02-11 NOTE — PROGRESS NOTE ADULT - PROBLEM SELECTOR PLAN 2
Wound care per primary team  Pain control w/PO Oxycodone
Wound care per primary team  Pain control w/PO Oxy. Would add breakthrough w/additional PO Oxycodone 5mg PRN
Wound care per primary team  Pain control w/PO Oxycodone

## 2022-02-11 NOTE — PROGRESS NOTE ADULT - PROBLEM SELECTOR PLAN 4
-120s   -Recommend holding amlodipine   -Recommend decreasing Lisinopril to 5mg from 10mg qD  -Monitor BP.
-120s  -Recommend holding amlodipine   -C/w decreased dose of Lisinopril 5mg.   -For discharge, would send new rx for Lisinopril 5mg. D/w patient, can followup with PCP in 1 week for BP check and to resume amlodipine or increase lisinopril if needed.
-120s  -Recommend holding amlodipine   -C/w decreased dose of Lisinopril 5mg.   -For discharge, send new rx for Lisinopril 5mg. D/w patient, can followup with PCP in 1 week for BP check and to resume amlodipine or increase lisinopril if needed.

## 2022-02-11 NOTE — PROGRESS NOTE ADULT - PROVIDER SPECIALTY LIST ADULT
Plastic Surgery
ENT
ENT
Infectious Disease
Plastic Surgery
ENT
Infectious Disease
Plastic Surgery
Hospitalist

## 2022-02-11 NOTE — PROGRESS NOTE ADULT - PROBLEM SELECTOR PLAN 1
Local wound infection at prior OR site, concern for possible hospital type organisms  -C/w Vanco 1g q12 (trough reviewed) and Zosyn 3.375g q 8  -blood cx NGTD  -wound cx NGTD   -Wound care per ENT/plastics    -ID following, recommended Doxycyline 100mg q 12 and Augmentin 875mg po q 12 to complete 14 days total (including IV days) upon discharge
Local wound infection at prior OR site, concern for possible hospital type organisms  -C/w Vanco 1g q12 (trough reviewed) and Zosyn 3.375g q 8  -ID following, transition to PO Doxycyline 100mg q 12 and Augmentin 875mg po q 12 to complete 14 days total (including IV days) for dc   -blood cx NGTD  -wound cx NGTD   -DC planning to home per primary team w/wound care services
Local wound infection at prior OR site, concern for possible hospital type organisms  -C/w Vanco 1g q12 and Zosyn 3.375g q 8  -blood cx NGTD  -wound cx NGTD   -Wound care per ENT/plastics    -Check MRSA PCR   -ID following

## 2022-02-11 NOTE — PROGRESS NOTE ADULT - SUBJECTIVE AND OBJECTIVE BOX
CC: F/U for Wound infection    Saw/spoke to patient. Doing well. No new complaints.    Allergies  morphine (Hives)    ANTIMICROBIALS:  piperacillin/tazobactam IVPB.. 3.375 every 8 hours  vancomycin  IVPB 1000 every 12 hours    PE:    Vital Signs Last 24 Hrs  T(C): 36.9 (10 Feb 2022 09:42), Max: 36.9 (10 Feb 2022 02:08)  T(F): 98.5 (10 Feb 2022 09:42), Max: 98.5 (10 Feb 2022 09:42)  HR: 81 (10 Feb 2022 09:42) (72 - 88)  BP: 105/63 (10 Feb 2022 09:42) (105/63 - 129/62)  RR: 17 (10 Feb 2022 09:42) (17 - 18)  SpO2: 94% (10 Feb 2022 09:42) (94% - 98%)    Gen: AOx3, NAD, non-toxic, pleasant  Neck: No purulence, minimal surrounding redness, packing in place    LABS:                        10.2   5.44  )-----------( 262      ( 10 Feb 2022 07:56 )             32.3     02-09    137  |  100  |  17  ----------------------------<  122<H>  3.9   |  28  |  0.70    Ca    9.2      09 Feb 2022 07:29  Phos  4.0     02-09  Mg     1.90     02-09    MICROBIOLOGY:  Vancomycin Level, Trough: 13.6 ug/mL (02-10-22 @ 06:22)    .Nose  02-09-22   No staphylococcus aureus isolated.  "PCR is more Sensitive for identifying MRSA/MSSA."  --  --    .Nose  02-07-22   No staphylococcus aureus isolated.  "PCR is more Sensitive for identifying MRSA/MSSA."  --  --    .Abscess Abscess  02-06-22   No growth to date.     (otherwise reviewed)    RADIOLOGY:    2/6 CT:    IMPRESSION:  Interval partial right mandibulectomy with fibular free flap   reconstruction and right neck lymph node dissection. Two thin fluid   collections containing gas in the right mandibular postoperative bed   which may represent residual postoperative seromas, however, presence of   air and communication with the skin of the more superior collection   raises concern for superimposed infection. Mary Jo and submandibular   subcutaneous fat stranding which could represent residual postsurgical   change or be reactive in the setting of cellulitis.
CC: F/U for Wound infection    Saw/spoke to patient. No fevers, no chills. No new complaints.    Allergies  morphine (Hives)    ANTIMICROBIALS:  piperacillin/tazobactam IVPB.. 3.375 every 8 hours  vancomycin  IVPB 1000 every 12 hours    PE:    Vital Signs Last 24 Hrs  T(C): 36.6 (11 Feb 2022 09:30), Max: 37 (10 Feb 2022 13:47)  T(F): 97.9 (11 Feb 2022 09:30), Max: 98.6 (10 Feb 2022 13:47)  HR: 83 (11 Feb 2022 09:30) (83 - 88)  BP: 105/56 (11 Feb 2022 09:30) (100/60 - 121/71)  RR: 18 (11 Feb 2022 09:30) (18 - 20)  SpO2: 94% (11 Feb 2022 09:30) (94% - 96%)    Gen: AOx3, NAD, non-toxic  Neck: Packing in place, no surrounding erythema, no purulence    LABS:                        10.2   5.44  )-----------( 262      ( 10 Feb 2022 07:56 )             32.3     MICROBIOLOGY:    .Nose  02-09-22   No staphylococcus aureus isolated.  "PCR is more Sensitive for identifying MRSA/MSSA."  --  --    .Nose  02-07-22   No staphylococcus aureus isolated.  "PCR is more Sensitive for identifying MRSA/MSSA."  --  --    .Abscess Abscess  02-06-22   No growth to date.  --  --    .Blood Blood-Peripheral  02-06-22   No Growth Final  --  --    RADIOLOGY:    2/6  CT:    IMPRESSION:  Interval partial right mandibulectomy with fibular free flap   reconstruction and right neck lymph node dissection. Two thin fluid   collections containing gas in the right mandibular postoperative bed   which may represent residual postoperative seromas, however, presence of   air and communication with the skin of the more superior collection   raises concern for superimposed infection. Mary Jo and submandibular   subcutaneous fat stranding which could represent residual postsurgical   change or be reactive in the setting of cellulitis.
CHIEF COMPLAINT: f/u     SUBJECTIVE / OVERNIGHT EVENTS: Patient seen and examined. No events overnight. Pt reports pain at packing site, oxycodone minimally helps. Pain worse post-expression by plastics and ENT team this morning.       MEDICATIONS  (STANDING):  AQUAPHOR (petrolatum Ointment) 1 Application(s) Topical daily  atorvastatin 10 milliGRAM(s) Oral at bedtime  heparin   Injectable 5000 Unit(s) SubCutaneous every 12 hours  influenza   Vaccine 0.5 milliLiter(s) IntraMuscular once  lisinopril 10 milliGRAM(s) Oral at bedtime  piperacillin/tazobactam IVPB.. 3.375 Gram(s) IV Intermittent every 8 hours  vancomycin  IVPB 1000 milliGRAM(s) IV Intermittent every 12 hours    MEDICATIONS  (PRN):  acetaminophen     Tablet .. 650 milliGRAM(s) Oral every 6 hours PRN Mild Pain (1 - 3)  ALBUTerol    90 MICROgram(s) HFA Inhaler 2 Puff(s) Inhalation every 6 hours PRN Shortness of Breath and/or Wheezing  calcium carbonate    500 mG (Tums) Chewable 1 Tablet(s) Chew every 4 hours PRN Heartburn  ibuprofen  Tablet. 600 milliGRAM(s) Oral every 6 hours PRN Moderate Pain (4 - 6)  oxyCODONE    IR 5 milliGRAM(s) Oral every 4 hours PRN Severe Pain (7 - 10)  oxyCODONE    IR 5 milliGRAM(s) Oral every 6 hours PRN Breakthrough pain  senna 2 Tablet(s) Oral at bedtime PRN Constipation      VITALS:  T(F): 98.1 (02-09-22 @ 09:39), Max: 98.2 (02-08-22 @ 17:36)  HR: 89 (02-09-22 @ 09:39) (73 - 91)  BP: 97/59 (02-09-22 @ 09:39) (97/59 - 123/68)  RR: 18 (02-09-22 @ 09:39) (18 - 18)  SpO2: 94% (02-09-22 @ 09:39)  Wt(kg): --      CAPILLARY BLOOD GLUCOSE      PHYSICAL EXAM:  GENERAL: NAD, nontoxic   HEENT: EOMI, R submental pinpoint hole w/packing   CHEST/LUNG: Clear to auscultation bilaterally; No wheeze  HEART: Regular rate and rhythm; No murmurs, rubs, or gallops  ABDOMEN: Soft, Nontender, Nondistended; Bowel sounds present  EXTREMITIES: warm, LLE ACE wrapped   PSYCH: calm, cooperative   NEUROLOGY: non-focal  SKIN: No rashes or lesions    LABS:              10.6                 137  | 28   | 17           5.38  >-----------< 256     ------------------------< 122                   31.9                 3.9  | 100  | 0.70                                         Ca 9.2   Mg 1.90  Ph 4.0                        MICROBIOLOGY:        RADIOLOGY & ADDITIONAL TESTS:  Imaging Personally Reviewed: [ ] Yes    [x] Consultant(s) Notes Reviewed:  [x] Care Discussed with Consultants/Other Providers: ENT PA - discussed    
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 No immediate post-op complications, stayed in hospital for about 10 days, had drain in place which was removed at discharge. Says that last night she noticed a "rotten smell" from her neck. About 3 hours ago (3:00am) pt noticed purulent/bloody discharge coming from a hole in the R side of her neck under the chin (not from surgical incision site). Pt denies any fever, neck pain, new neck swelling. Says that her surgical incisions are healing well. Pt not on chemo or radiation therapy right now. Has been taking oxycodone at home for post-op pain. Allergic to morphine.    Pt was seen and examined at bedside. R submental hole opened and area was massaged. Moderate purulent secretions followed by SS output. Purulence was taken for culture and area was packed with .25 inch packing.    Interval:  2/7: Examined at bedside this morning. NAEON. Submental packing removed with expression of purulent drainage. Replaced with new 1/4" packing.   2/8: NAEON. Packinf replaced with aquacel by plastic surgery. No plan for I&D. Vanc trough scheduled for this morning.    Vital Signs Last 24 Hrs  T(C): 37 (08 Feb 2022 06:18), Max: 37 (07 Feb 2022 13:29)  T(F): 98.6 (08 Feb 2022 06:18), Max: 98.6 (07 Feb 2022 13:29)  HR: 96 (08 Feb 2022 06:18) (85 - 96)  BP: 107/54 (08 Feb 2022 06:18) (101/62 - 110/60)  BP(mean): --  RR: 18 (08 Feb 2022 06:18) (16 - 18)  SpO2: 100% (08 Feb 2022 06:18) (94% - 100%)    Awake, NAD  OC/OP: R intraoral flap healing well, no intraoral dehiscence, suture line intact, no obvious fistula, FOM soft/flat  NECK: R submental pinpoint hole with purulent drainage  RESPIRATORY: Respirations unlabored, no increased work of breathing with use of accessory muscles and retractions. No stridor.  
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 No immediate post-op complications, stayed in hospital for about 10 days, had drain in place which was removed at discharge. Says that last night she noticed a "rotten smell" from her neck. About 3 hours ago (3:00am) pt noticed purulent/bloody discharge coming from a hole in the R side of her neck under the chin (not from surgical incision site). Pt denies any fever, neck pain, new neck swelling. Says that her surgical incisions are healing well. Pt not on chemo or radiation therapy right now. Has been taking oxycodone at home for post-op pain. Allergic to morphine.    Pt was seen and examined at bedside. R submental hole opened and area was massaged. Moderate purulent secretions followed by SS output. Purulence was taken for culture and area was packed with .25 inch packing.    Interval:  2/7: Examined at bedside this morning. NAEON. Submental packing removed with expression of purulent drainage. Replaced with new 1/4" packing.   2/8: NAEON. Packing replaced with aquacel by plastic surgery. No plan for I&D. Vanc trough scheduled for this morning.  2/9: NAEON. Aquacel rpacking replaced this morning. Improving on vanc/zosyn. No other issues    Vital Signs Last 24 Hrs  T(C): 36.7 (09 Feb 2022 05:44), Max: 36.8 (08 Feb 2022 17:36)  T(F): 98 (09 Feb 2022 05:44), Max: 98.2 (08 Feb 2022 17:36)  HR: 84 (09 Feb 2022 05:44) (73 - 91)  BP: 123/68 (09 Feb 2022 05:44) (100/60 - 123/68)  BP(mean): --  RR: 18 (09 Feb 2022 05:44) (18 - 18)  SpO2: 98% (09 Feb 2022 05:44) (93% - 98%)    Awake, NAD  OC/OP: R intraoral flap healing well, no intraoral dehiscence, suture line intact, no obvious fistula, FOM soft/flat  NECK: R submental pinpoint hole with decreased purulent drainage  RESPIRATORY: Respirations unlabored, no increased work of breathing with use of accessory muscles and retractions. No stridor.  
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 No immediate post-op complications, stayed in hospital for about 10 days, had drain in place which was removed at discharge. Says that last night she noticed a "rotten smell" from her neck. About 3 hours ago (3:00am) pt noticed purulent/bloody discharge coming from a hole in the R side of her neck under the chin (not from surgical incision site). Pt denies any fever, neck pain, new neck swelling. Says that her surgical incisions are healing well. Pt not on chemo or radiation therapy right now. Has been taking oxycodone at home for post-op pain. Allergic to morphine.    Pt was seen and examined at bedside. R submental hole opened and area was massaged. Moderate purulent secretions followed by SS output. Purulence was taken for culture and area was packed with .25 inch packing.    Interval:  2/7: Examined at bedside this morning. NAEON. Submental packing removed with expression of purulent drainage. Replaced with new 1/4" packing.   2/8: NAEON. Packing replaced with aquacel by plastic surgery. No plan for I&D. Vanc trough scheduled for this morning.  2/9: NAEON. Aquacel rpacking replaced this morning. Improving on vanc/zosyn. No other issues  2/10: NAEON. Packing changes by PRS. No purulent drainage.  2/11: NAEON. Packing replaced this morning. No drainage.     Vital Signs Last 24 Hrs  T(C): 36.3 (11 Feb 2022 05:18), Max: 37 (10 Feb 2022 13:47)  T(F): 97.3 (11 Feb 2022 05:18), Max: 98.6 (10 Feb 2022 13:47)  HR: 83 (11 Feb 2022 05:18) (81 - 88)  BP: 106/65 (11 Feb 2022 05:18) (100/60 - 121/71)  BP(mean): --  RR: 18 (11 Feb 2022 05:18) (17 - 20)  SpO2: 94% (11 Feb 2022 05:18) (94% - 96%)    Awake, NAD  OC/OP: R intraoral flap healing well, no intraoral dehiscence, suture line intact, no obvious fistula, FOM soft/flat  NECK: No purulence from submental pocket, packing replaced with minimal tenderness  RESPIRATORY: Respirations unlabored, no increased work of breathing with use of accessory muscles and retractions. No stridor.  
Plastic Surgery Progress Note:    HPI: No acute overnight events. Pt seen and examined at bedside. Pt comfortable with no acute complaints:    PAST MEDICAL & SURGICAL HISTORY:  H/O malignant neoplasm of gum    Hypertension    Hyperlipidemia    Mild asthma    H/O  section    History of hip replacement  left hip    History of partial hysterectomy      morphine (Hives)    MEDICATIONS  (STANDING):    MEDICATIONS  (PRN):    Vital Signs Last 24 Hrs  T(C): 36.7 (2022 05:44), Max: 36.8 (2022 17:36)  T(F): 98 (2022 05:44), Max: 98.2 (2022 17:36)  HR: 84 (2022 05:44) (73 - 91)  BP: 123/68 (2022 05:44) (100/60 - 123/68)  BP(mean): --  RR: 18 (2022 05:44) (18 - 18)  SpO2: 98% (2022 05:44) (93% - 98%)      PHYSICAL EXAM:    CONSTITUTIONAL: Well nourished, well developed, NON-DYSMORPHIC, and in no acute distress.  .  ORAL CAVITY/OROPHARYNX: R intraoral flap healing well, no intraoral dehiscence, suture line intact, no obvious fistula, FOM soft/flat  NECK: R submental pinpoint hole with purulent drainage  RESPIRATORY: Respirations unlabored, no increased work of breathing with use of accessory muscles and retractions. No stridor.  CARDIAC: Warm extremities, no cyanosis.             LABS:  cret                        10.0   6.30  )-----------( 275      ( 2022 07:36 )             29.9     -08    138  |  101  |  17  ----------------------------<  112<H>  3.9   |  26  |  0.71    Ca    9.3      2022 07:36  Phos  4.2     02-08  Mg     1.80     08                              
Plastic Surgery Progress Note:    HPI: No acute overnight events. Pt seen and examined at bedside. Pt comfortable with no acute complaints:    PAST MEDICAL & SURGICAL HISTORY:  H/O malignant neoplasm of gum    Hypertension    Hyperlipidemia    Mild asthma    H/O  section    History of hip replacement  left hip    History of partial hysterectomy      morphine (Hives)    MEDICATIONS  (STANDING):    MEDICATIONS  (PRN):    Vital Signs Last 24 Hrs  T(C): 36.8 (10 Feb 2022 06:30), Max: 36.9 (10 Feb 2022 02:08)  T(F): 98.2 (10 Feb 2022 06:30), Max: 98.4 (10 Feb 2022 02:08)  HR: 83 (10 Feb 2022 06:30) (72 - 89)  BP: 126/60 (10 Feb 2022 06:30) (97/59 - 129/62)  BP(mean): --  RR: 18 (10 Feb 2022 06:30) (17 - 18)  SpO2: 94% (10 Feb 2022 06:30) (94% - 98%)    PHYSICAL EXAM:    CONSTITUTIONAL: Well nourished, well developed, NON-DYSMORPHIC, and in no acute distress.  .  ORAL CAVITY/OROPHARYNX: R intraoral flap healing well, no intraoral dehiscence, suture line intact, no obvious fistula, FOM soft/flat  NECK: R submental pinpoint hole with purulent drainage, packing changed  RESPIRATORY: Respirations unlabored, no increased work of breathing with use of accessory muscles and retractions. No stridor.  CARDIAC: Warm extremities, no cyanosis.           LABS:  cret                        10.6   5.38  )-----------( 256      ( 2022 07:29 )             31.9         137  |  100  |  17  ----------------------------<  122<H>  3.9   |  28  |  0.70    Ca    9.2      2022 07:29  Phos  4.0       Mg     1.90                   
CC: F/U for Wound infection    Saw/spoke to patient. Unchanged. No new complaints.    Allergies  morphine (Hives)    ANTIMICROBIALS:  piperacillin/tazobactam IVPB.. 3.375 every 8 hours  vancomycin  IVPB    vancomycin  IVPB 1250 once    PE:    Vital Signs Last 24 Hrs  T(C): 36.6 (08 Feb 2022 09:49), Max: 37 (08 Feb 2022 06:18)  T(F): 97.9 (08 Feb 2022 09:49), Max: 98.6 (08 Feb 2022 06:18)  HR: 87 (08 Feb 2022 09:49) (87 - 96)  BP: 102/57 (08 Feb 2022 09:49) (101/62 - 107/54)  RR: 18 (08 Feb 2022 09:49) (17 - 18)  SpO2: 93% (08 Feb 2022 09:49) (93% - 100%)    Gen: AOx3, NAD, non-toxic  HEENT: Minimal surrounding redness at neck wound, dressing in place  Ext: LLE oozing scab at prior wound site    LABS:                        10.0   6.30  )-----------( 275      ( 08 Feb 2022 07:36 )             29.9     02-08    138  |  101  |  17  ----------------------------<  112<H>  3.9   |  26  |  0.71    Ca    9.3      08 Feb 2022 07:36  Phos  4.2     02-08  Mg     1.80     02-08    MICROBIOLOGY:  Vancomycin Level, Trough: 10.0 ug/mL (02-08-22 @ 07:36)    .Abscess Abscess  02-06-22   No growth to date.     .Blood Blood-Peripheral  02-06-22   No growth to date.     .Other neck wound  01-16-22   No growth    RADIOLOGY:    2/6    IMPRESSION:  Interval partial right mandibulectomy with fibular free flap   reconstruction and right neck lymph node dissection. Two thin fluid   collections containing gas in the right mandibular postoperative bed   which may represent residual postoperative seromas, however, presence of   air and communication with the skin of the more superior collection   raises concern for superimposed infection. Mary Jo and submandibular   subcutaneous fat stranding which could represent residual postsurgical   change or be reactive in the setting of cellulitis.  
CC: F/U wound infection    Saw/spoke to patient. No fevers, no chills. No new complaints. Some discharge from wound on LLE.    Allergies  morphine (Hives)    ANTIMICROBIALS:  piperacillin/tazobactam IVPB.. 3.375 every 8 hours  vancomycin  IVPB 1000 every 12 hours    PE:    Vital Signs Last 24 Hrs  T(C): 37 (07 Feb 2022 13:29), Max: 37 (07 Feb 2022 13:29)  T(F): 98.6 (07 Feb 2022 13:29), Max: 98.6 (07 Feb 2022 13:29)  HR: 87 (07 Feb 2022 13:29) (85 - 89)  BP: 106/59 (07 Feb 2022 13:29) (106/59 - 120/69)  RR: 16 (07 Feb 2022 13:29) (16 - 18)  SpO2: 94% (07 Feb 2022 13:29) (94% - 98%)    Gen: AOx3, NAD, non-toxic, pleasant  HEENT: Redness at neck still present, stable  Ext: LLE wound unchanged, some scabbing    LABS:                        10.3   6.33  )-----------( 287      ( 07 Feb 2022 06:28 )             31.7     02-07    138  |  100  |  11  ----------------------------<  121<H>  3.4<L>   |  24  |  0.56    Ca    9.2      07 Feb 2022 06:28  Phos  3.8     02-07  Mg     1.70     02-07    TPro  6.9  /  Alb  3.9  /  TBili  0.3  /  DBili  x   /  AST  7   /  ALT  8   /  AlkPhos  86  02-06    MICROBIOLOGY:    .Blood Blood-Peripheral  02-06-22   No growth to date.      .Other neck wound  01-16-22   No growth      RADIOLOGY:    2/6 CT:    IMPRESSION:  Interval partial right mandibulectomy with fibular free flap   reconstruction and right neck lymph node dissection. Two thin fluid   collections containing gas in the right mandibular postoperative bed   which may represent residual postoperative seromas, however, presence of   air and communication with the skin of the more superior collection   raises concern for superimposed infection. Mary Jo and submandibular   subcutaneous fat stranding which could represent residual postsurgical   change or be reactive in the setting of cellulitis.  
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 No immediate post-op complications, stayed in hospital for about 10 days, had drain in place which was removed at discharge. Says that last night she noticed a "rotten smell" from her neck. About 3 hours ago (3:00am) pt noticed purulent/bloody discharge coming from a hole in the R side of her neck under the chin (not from surgical incision site). Pt denies any fever, neck pain, new neck swelling. Says that her surgical incisions are healing well. Pt not on chemo or radiation therapy right now. Has been taking oxycodone at home for post-op pain. Allergic to morphine.    Pt was seen and examined at bedside. R submental hole opened and area was massaged. Moderate purulent secretions followed by SS output. Purulence was taken for culture and area was packed with .25 inch packing.    Interval:  2/7: Examined at bedside this morning. NAEON. Submental packing removed with expression of purulent drainage. Replaced with new 1/4" packing.   2/8: NAEON. Packing replaced with aquacel by plastic surgery. No plan for I&D. Vanc trough scheduled for this morning.  2/9: NAEON. Aquacel rpacking replaced this morning. Improving on vanc/zosyn. No other issues  2/10: NAEON. Packing changes by PRS. No purulent drainage.    Vital Signs Last 24 Hrs  T(C): 37 (10 Feb 2022 13:47), Max: 37 (10 Feb 2022 13:47)  T(F): 98.6 (10 Feb 2022 13:47), Max: 98.6 (10 Feb 2022 13:47)  HR: 88 (10 Feb 2022 13:47) (81 - 88)  BP: 102/58 (10 Feb 2022 13:47) (102/58 - 126/60)  BP(mean): --  RR: 18 (10 Feb 2022 13:47) (17 - 18)  SpO2: 95% (10 Feb 2022 13:47) (94% - 95%)    Awake, NAD  OC/OP: R intraoral flap healing well, no intraoral dehiscence, suture line intact, no obvious fistula, FOM soft/flat  NECK: No purulence from submental pocket  RESPIRATORY: Respirations unlabored, no increased work of breathing with use of accessory muscles and retractions. No stridor.  
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 No immediate post-op complications, stayed in hospital for about 10 days, had drain in place which was removed at discharge. Says that last night she noticed a "rotten smell" from her neck. About 3 hours ago (3:00am) pt noticed purulent/bloody discharge coming from a hole in the R side of her neck under the chin (not from surgical incision site). Pt denies any fever, neck pain, new neck swelling. Says that her surgical incisions are healing well. Pt not on chemo or radiation therapy right now. Has been taking oxycodone at home for post-op pain. Allergic to morphine.    Pt was seen and examined at bedside. R submental hole opened and area was massaged. Moderate purulent secretions followed by SS output. Purulence was taken for culture and area was packed with .25 inch packing.    Interval:  2/7: Examined at bedside this morning. NAEON. Submental packing removed with expression of purulent drainage. Replaced with new 1/4" packing.     Vital Signs Last 24 Hrs  T(C): 36.9 (07 Feb 2022 06:16), Max: 36.9 (07 Feb 2022 06:16)  T(F): 98.5 (07 Feb 2022 06:16), Max: 98.5 (07 Feb 2022 06:16)  HR: 88 (07 Feb 2022 06:16) (69 - 99)  BP: 107/63 (07 Feb 2022 06:16) (107/58 - 120/69)  BP(mean): --  RR: 18 (07 Feb 2022 06:16) (16 - 18)  SpO2: 98% (07 Feb 2022 06:16) (94% - 99%)    Awake, NAD  OC/OP: R intraoral flap healing well, no intraoral dehiscence, suture line intact, no obvious fistula, FOM soft/flat  NECK: R submental pinpoint hole with purulent drainage  RESPIRATORY: Respirations unlabored, no increased work of breathing with use of accessory muscles and retractions. No stridor.  
Plastic Surgery    SUBJECTIVE: Pt seen and examined on rounds with team. NAEON.      VITALS  T(C): 36.3 (02-11-22 @ 05:18), Max: 37 (02-10-22 @ 13:47)  HR: 83 (02-11-22 @ 05:18) (81 - 88)  BP: 106/65 (02-11-22 @ 05:18) (100/60 - 121/71)  RR: 18 (02-11-22 @ 05:18) (17 - 20)  SpO2: 94% (02-11-22 @ 05:18) (94% - 96%)  CAPILLARY BLOOD GLUCOSE          Is/Os        MEDICATIONS (STANDING): atorvastatin 10 milliGRAM(s) Oral at bedtime  heparin   Injectable 5000 Unit(s) SubCutaneous every 12 hours  influenza   Vaccine 0.5 milliLiter(s) IntraMuscular once  lisinopril 5 milliGRAM(s) Oral daily  piperacillin/tazobactam IVPB.. 3.375 Gram(s) IV Intermittent every 8 hours  vancomycin  IVPB 1000 milliGRAM(s) IV Intermittent every 12 hours    MEDICATIONS (PRN):acetaminophen     Tablet .. 650 milliGRAM(s) Oral every 6 hours PRN Mild Pain (1 - 3)  ALBUTerol    90 MICROgram(s) HFA Inhaler 2 Puff(s) Inhalation every 6 hours PRN Shortness of Breath and/or Wheezing  calcium carbonate    500 mG (Tums) Chewable 1 Tablet(s) Chew every 4 hours PRN Heartburn  ibuprofen  Tablet. 600 milliGRAM(s) Oral every 6 hours PRN Moderate Pain (4 - 6)  oxyCODONE    IR 5 milliGRAM(s) Oral every 6 hours PRN Breakthrough pain  oxyCODONE    IR 5 milliGRAM(s) Oral every 4 hours PRN Severe Pain (7 - 10)  senna 2 Tablet(s) Oral at bedtime PRN Constipation      LABS  CBC (02-10 @ 07:56)                              10.2<L>                         5.44    )----------------(  262        --    % Neutrophils, --    % Lymphocytes, ANC: --                                  32.3<L>                  IMAGING STUDIES    
Plastic Surgery Progress Note:    HPI: No acute overnight events. Pt seen and examined at bedside. Pt comfortable with no acute complaints:    PAST MEDICAL & SURGICAL HISTORY:  H/O malignant neoplasm of gum    Hypertension    Hyperlipidemia    Mild asthma    H/O  section    History of hip replacement  left hip    History of partial hysterectomy      morphine (Hives)    MEDICATIONS  (STANDING):    MEDICATIONS  (PRN):    Vital Signs Last 24 Hrs  T(C): 36.9 (2022 06:16), Max: 36.9 (2022 06:16)  T(F): 98.5 (2022 06:16), Max: 98.5 (2022 06:16)  HR: 88 (2022 06:16) (69 - 99)  BP: 107/63 (2022 06:16) (107/58 - 120/69)  BP(mean): --  RR: 18 (2022 06:16) (16 - 18)  SpO2: 98% (2022 06:16) (94% - 99%)      PHYSICAL EXAM:    CONSTITUTIONAL: Well nourished, well developed, NON-DYSMORPHIC, and in no acute distress.  .  ORAL CAVITY/OROPHARYNX: R intraoral flap healing well, no intraoral dehiscence, suture line intact, no obvious fistula, FOM soft/flat  NECK: R submental pinpoint hole with purulent drainage  RESPIRATORY: Respirations unlabored, no increased work of breathing with use of accessory muscles and retractions. No stridor.  CARDIAC: Warm extremities, no cyanosis.         LABS:  cret                        10.3   6.33  )-----------( 287      ( 2022 06:28 )             31.7     02-07    138  |  100  |  11  ----------------------------<  121<H>  3.4<L>   |  24  |  0.56    Ca    9.2      2022 06:28  Phos  3.8     02-07  Mg     1.70     -    TPro  6.9  /  Alb  3.9  /  TBili  0.3  /  DBili  x   /  AST  7   /  ALT  8   /  AlkPhos  86  02-06    PT/INR - ( 2022 06:56 )   PT: 11.8 sec;   INR: 1.04 ratio         PTT - ( 2022 06:56 )  PTT:34.3 sec        
Plastic Surgery Progress Note:    HPI: No acute overnight events. Pt seen and examined at bedside. Pt comfortable with no acute complaints:    PAST MEDICAL & SURGICAL HISTORY:  H/O malignant neoplasm of gum    Hypertension    Hyperlipidemia    Mild asthma    H/O  section    History of hip replacement  left hip    History of partial hysterectomy      morphine (Hives)    MEDICATIONS  (STANDING):    MEDICATIONS  (PRN):    Vital Signs Last 24 Hrs  T(C): 37 (2022 06:18), Max: 37 (2022 13:29)  T(F): 98.6 (2022 06:18), Max: 98.6 (2022 13:29)  HR: 96 (2022 06:18) (85 - 96)  BP: 107/54 (2022 06:18) (101/62 - 110/60)  BP(mean): --  RR: 18 (2022 06:18) (16 - 18)  SpO2: 100% (2022 06:18) (94% - 100%)      PHYSICAL EXAM:    CONSTITUTIONAL: Well nourished, well developed, NON-DYSMORPHIC, and in no acute distress.  .  ORAL CAVITY/OROPHARYNX: R intraoral flap healing well, no intraoral dehiscence, suture line intact, no obvious fistula, FOM soft/flat  NECK: R submental pinpoint hole with purulent drainage  RESPIRATORY: Respirations unlabored, no increased work of breathing with use of accessory muscles and retractions. No stridor.  CARDIAC: Warm extremities, no cyanosis.           LABS:  cret                        10.3   6.33  )-----------( 287      ( 2022 06:28 )             31.7     02-07    138  |  100  |  11  ----------------------------<  121<H>  3.4<L>   |  24  |  0.56    Ca    9.2      2022 06:28  Phos  3.8     02-07  Mg     1.70     -    TPro  6.9  /  Alb  3.9  /  TBili  0.3  /  DBili  x   /  AST  7   /  ALT  8   /  AlkPhos  86  02-06    PT/INR - ( 2022 06:56 )   PT: 11.8 sec;   INR: 1.04 ratio         PTT - ( 2022 06:56 )  PTT:34.3 sec                
CHIEF COMPLAINT: f/u     SUBJECTIVE / OVERNIGHT EVENTS: Patient seen and examined. No acute events overnight. Pt feels better today, reports less pain at packing site.     MEDICATIONS  (STANDING):  AQUAPHOR (petrolatum Ointment) 1 Application(s) Topical daily  atorvastatin 10 milliGRAM(s) Oral at bedtime  heparin   Injectable 5000 Unit(s) SubCutaneous every 12 hours  influenza   Vaccine 0.5 milliLiter(s) IntraMuscular once  lisinopril 5 milliGRAM(s) Oral daily  piperacillin/tazobactam IVPB.. 3.375 Gram(s) IV Intermittent every 8 hours  vancomycin  IVPB 1000 milliGRAM(s) IV Intermittent every 12 hours    MEDICATIONS  (PRN):  acetaminophen     Tablet .. 650 milliGRAM(s) Oral every 6 hours PRN Mild Pain (1 - 3)  ALBUTerol    90 MICROgram(s) HFA Inhaler 2 Puff(s) Inhalation every 6 hours PRN Shortness of Breath and/or Wheezing  calcium carbonate    500 mG (Tums) Chewable 1 Tablet(s) Chew every 4 hours PRN Heartburn  ibuprofen  Tablet. 600 milliGRAM(s) Oral every 6 hours PRN Moderate Pain (4 - 6)  oxyCODONE    IR 5 milliGRAM(s) Oral every 4 hours PRN Severe Pain (7 - 10)  oxyCODONE    IR 5 milliGRAM(s) Oral every 6 hours PRN Breakthrough pain  senna 2 Tablet(s) Oral at bedtime PRN Constipation      VITALS:  T(F): 98.6 (02-10-22 @ 13:47), Max: 98.6 (02-10-22 @ 13:47)  HR: 88 (02-10-22 @ 13:47) (72 - 88)  BP: 102/58 (02-10-22 @ 13:47) (102/58 - 129/62)  RR: 18 (02-10-22 @ 13:47) (17 - 18)  SpO2: 95% (02-10-22 @ 13:47)  Wt(kg): --      CAPILLARY BLOOD GLUCOSE      PHYSICAL EXAM:  GENERAL: NAD, well-appearing   HEENT: EOMI, R submental packing  CHEST/LUNG: Clear to auscultation bilaterally; No wheeze  HEART: Regular rate and rhythm; No murmurs, rubs, or gallops  ABDOMEN: Soft, Nontender, Nondistended; Bowel sounds present  EXTREMITIES: warm, LLE ACE wrapped   PSYCH: calm, cooperative   NEUROLOGY: non-focal  SKIN: No rashes or lesions    LABS:              10.2                 x    | x    | x            5.44  >-----------< 262     ------------------------< x                     32.3                 x    | x    | x                                            Ca x     Mg x     Ph x                          MICROBIOLOGY:        RADIOLOGY & ADDITIONAL TESTS:  Imaging Personally Reviewed: [x ] Yes    [ ] Consultant(s) Notes Reviewed:  [x] Care Discussed with Consultants/Other Providers: ENT PA - discussed    
CHIEF COMPLAINT: f/u     SUBJECTIVE / OVERNIGHT EVENTS: Patient seen and examined. Pt feels well today, pain from I&D controlled on PO Oxy.      MEDICATIONS  (STANDING):  AQUAPHOR (petrolatum Ointment) 1 Application(s) Topical daily  atorvastatin 10 milliGRAM(s) Oral at bedtime  heparin   Injectable 5000 Unit(s) SubCutaneous every 12 hours  influenza   Vaccine 0.5 milliLiter(s) IntraMuscular once  lisinopril 5 milliGRAM(s) Oral daily  piperacillin/tazobactam IVPB.. 3.375 Gram(s) IV Intermittent every 8 hours  vancomycin  IVPB 1000 milliGRAM(s) IV Intermittent every 12 hours    MEDICATIONS  (PRN):  acetaminophen     Tablet .. 650 milliGRAM(s) Oral every 6 hours PRN Mild Pain (1 - 3)  ALBUTerol    90 MICROgram(s) HFA Inhaler 2 Puff(s) Inhalation every 6 hours PRN Shortness of Breath and/or Wheezing  calcium carbonate    500 mG (Tums) Chewable 1 Tablet(s) Chew every 4 hours PRN Heartburn  ibuprofen  Tablet. 600 milliGRAM(s) Oral every 6 hours PRN Moderate Pain (4 - 6)  oxyCODONE    IR 5 milliGRAM(s) Oral every 6 hours PRN Breakthrough pain  oxyCODONE    IR 5 milliGRAM(s) Oral every 4 hours PRN Severe Pain (7 - 10)  senna 2 Tablet(s) Oral at bedtime PRN Constipation      VITALS:  T(F): 97.9 (02-11-22 @ 09:30), Max: 98.6 (02-10-22 @ 13:47)  HR: 83 (02-11-22 @ 09:30) (83 - 88)  BP: 105/56 (02-11-22 @ 09:30) (100/60 - 121/71)  RR: 18 (02-11-22 @ 09:30) (18 - 20)  SpO2: 94% (02-11-22 @ 09:30)  Wt(kg): --      CAPILLARY BLOOD GLUCOSE      PHYSICAL EXAM:  GENERAL: NAD, well-appearing   HEENT: EOMI, R submental packing  CHEST/LUNG: Clear to auscultation bilaterally; No wheeze  HEART: Regular rate and rhythm; No murmurs, rubs, or gallops  ABDOMEN: Soft, Nontender, Nondistended; Bowel sounds present  EXTREMITIES: warm, LLE ACE wrapped   PSYCH: calm, cooperative   NEUROLOGY: non-focal  SKIN: No rashes or lesions    LABS:              10.2                 x    | x    | x            5.44  >-----------< 262     ------------------------< x                     32.3                 x    | x    | x                                            Ca x     Mg x     Ph x                          MICROBIOLOGY:      < from: Transthoracic Echocardiogram (02.10.22 @ 16:57) >  ------------------------------------------------------------------------  CONCLUSIONS:  1. Aortic valve not well visualized; appears calcified.  Peak transaortic valve gradient equals 25 mm Hg, mean  transaortic valve gradient equals 14 mm Hg, consistent with  mild aortic stenosis.  2. Increased relative wall thickness with normal left  ventricular mass index, consistent with concentric left  ventricular remodeling.  3. Normal left ventricular systolic function. No segmental  wall motion abnormalities.  4. Normal right ventricular size and function.  ------------------------------------------------------------------------  Confirmed on  2/10/2022 - 22:42:09 by PAZ Velasquez  ------------------------------------------------------------------------    < end of copied text >      [ ] Consultant(s) Notes Reviewed:  [x] Care Discussed with Consultants/Other Providers: ENT PA - discussed

## 2022-02-15 PROCEDURE — 77300 RADIATION THERAPY DOSE PLAN: CPT | Mod: 26

## 2022-02-15 PROCEDURE — 77301 RADIOTHERAPY DOSE PLAN IMRT: CPT | Mod: 26

## 2022-02-15 PROCEDURE — 77338 DESIGN MLC DEVICE FOR IMRT: CPT | Mod: 26

## 2022-02-17 ENCOUNTER — APPOINTMENT (OUTPATIENT)
Dept: HEMATOLOGY ONCOLOGY | Facility: CLINIC | Age: 57
End: 2022-02-17

## 2022-02-17 ENCOUNTER — NON-APPOINTMENT (OUTPATIENT)
Age: 57
End: 2022-02-17

## 2022-02-18 ENCOUNTER — OUTPATIENT (OUTPATIENT)
Dept: OUTPATIENT SERVICES | Facility: HOSPITAL | Age: 57
LOS: 1 days | End: 2022-02-18
Payer: COMMERCIAL

## 2022-02-18 VITALS
OXYGEN SATURATION: 98 % | WEIGHT: 212.75 LBS | HEART RATE: 93 BPM | HEIGHT: 65 IN | DIASTOLIC BLOOD PRESSURE: 76 MMHG | TEMPERATURE: 97 F | RESPIRATION RATE: 18 BRPM | SYSTOLIC BLOOD PRESSURE: 110 MMHG

## 2022-02-18 DIAGNOSIS — Z29.9 ENCOUNTER FOR PROPHYLACTIC MEASURES, UNSPECIFIED: ICD-10-CM

## 2022-02-18 DIAGNOSIS — Z98.891 HISTORY OF UTERINE SCAR FROM PREVIOUS SURGERY: Chronic | ICD-10-CM

## 2022-02-18 DIAGNOSIS — I10 ESSENTIAL (PRIMARY) HYPERTENSION: ICD-10-CM

## 2022-02-18 DIAGNOSIS — C03.9 MALIGNANT NEOPLASM OF GUM, UNSPECIFIED: ICD-10-CM

## 2022-02-18 DIAGNOSIS — Z01.818 ENCOUNTER FOR OTHER PREPROCEDURAL EXAMINATION: ICD-10-CM

## 2022-02-18 DIAGNOSIS — Z98.890 OTHER SPECIFIED POSTPROCEDURAL STATES: Chronic | ICD-10-CM

## 2022-02-18 DIAGNOSIS — Z91.89 OTHER SPECIFIED PERSONAL RISK FACTORS, NOT ELSEWHERE CLASSIFIED: ICD-10-CM

## 2022-02-18 DIAGNOSIS — Z90.711 ACQUIRED ABSENCE OF UTERUS WITH REMAINING CERVICAL STUMP: Chronic | ICD-10-CM

## 2022-02-18 DIAGNOSIS — Z96.649 PRESENCE OF UNSPECIFIED ARTIFICIAL HIP JOINT: Chronic | ICD-10-CM

## 2022-02-18 LAB
ALBUMIN SERPL ELPH-MCNC: 4.3 G/DL — SIGNIFICANT CHANGE UP (ref 3.3–5.2)
ALP SERPL-CCNC: 74 U/L — SIGNIFICANT CHANGE UP (ref 40–120)
ALT FLD-CCNC: 20 U/L — SIGNIFICANT CHANGE UP
ANION GAP SERPL CALC-SCNC: 11 MMOL/L — SIGNIFICANT CHANGE UP (ref 5–17)
AST SERPL-CCNC: 11 U/L — SIGNIFICANT CHANGE UP
BASOPHILS # BLD AUTO: 0.08 K/UL — SIGNIFICANT CHANGE UP (ref 0–0.2)
BASOPHILS NFR BLD AUTO: 1 % — SIGNIFICANT CHANGE UP (ref 0–2)
BILIRUB SERPL-MCNC: 0.3 MG/DL — LOW (ref 0.4–2)
BUN SERPL-MCNC: 19.6 MG/DL — SIGNIFICANT CHANGE UP (ref 8–20)
CALCIUM SERPL-MCNC: 9.5 MG/DL — SIGNIFICANT CHANGE UP (ref 8.6–10.2)
CHLORIDE SERPL-SCNC: 102 MMOL/L — SIGNIFICANT CHANGE UP (ref 98–107)
CO2 SERPL-SCNC: 27 MMOL/L — SIGNIFICANT CHANGE UP (ref 22–29)
CREAT SERPL-MCNC: 0.59 MG/DL — SIGNIFICANT CHANGE UP (ref 0.5–1.3)
EOSINOPHIL # BLD AUTO: 0.29 K/UL — SIGNIFICANT CHANGE UP (ref 0–0.5)
EOSINOPHIL NFR BLD AUTO: 3.5 % — SIGNIFICANT CHANGE UP (ref 0–6)
GLUCOSE SERPL-MCNC: 107 MG/DL — HIGH (ref 70–99)
HCT VFR BLD CALC: 36.8 % — SIGNIFICANT CHANGE UP (ref 34.5–45)
HGB BLD-MCNC: 12 G/DL — SIGNIFICANT CHANGE UP (ref 11.5–15.5)
IMM GRANULOCYTES NFR BLD AUTO: 0.5 % — SIGNIFICANT CHANGE UP (ref 0–1.5)
LYMPHOCYTES # BLD AUTO: 1.82 K/UL — SIGNIFICANT CHANGE UP (ref 1–3.3)
LYMPHOCYTES # BLD AUTO: 22.1 % — SIGNIFICANT CHANGE UP (ref 13–44)
MCHC RBC-ENTMCNC: 28 PG — SIGNIFICANT CHANGE UP (ref 27–34)
MCHC RBC-ENTMCNC: 32.6 GM/DL — SIGNIFICANT CHANGE UP (ref 32–36)
MCV RBC AUTO: 85.8 FL — SIGNIFICANT CHANGE UP (ref 80–100)
MONOCYTES # BLD AUTO: 0.56 K/UL — SIGNIFICANT CHANGE UP (ref 0–0.9)
MONOCYTES NFR BLD AUTO: 6.8 % — SIGNIFICANT CHANGE UP (ref 2–14)
NEUTROPHILS # BLD AUTO: 5.44 K/UL — SIGNIFICANT CHANGE UP (ref 1.8–7.4)
NEUTROPHILS NFR BLD AUTO: 66.1 % — SIGNIFICANT CHANGE UP (ref 43–77)
PLATELET # BLD AUTO: 345 K/UL — SIGNIFICANT CHANGE UP (ref 150–400)
POTASSIUM SERPL-MCNC: 4.3 MMOL/L — SIGNIFICANT CHANGE UP (ref 3.5–5.3)
POTASSIUM SERPL-SCNC: 4.3 MMOL/L — SIGNIFICANT CHANGE UP (ref 3.5–5.3)
PROT SERPL-MCNC: 6.8 G/DL — SIGNIFICANT CHANGE UP (ref 6.6–8.7)
RBC # BLD: 4.29 M/UL — SIGNIFICANT CHANGE UP (ref 3.8–5.2)
RBC # FLD: 14.1 % — SIGNIFICANT CHANGE UP (ref 10.3–14.5)
SODIUM SERPL-SCNC: 140 MMOL/L — SIGNIFICANT CHANGE UP (ref 135–145)
WBC # BLD: 8.23 K/UL — SIGNIFICANT CHANGE UP (ref 3.8–10.5)
WBC # FLD AUTO: 8.23 K/UL — SIGNIFICANT CHANGE UP (ref 3.8–10.5)

## 2022-02-18 PROCEDURE — 36415 COLL VENOUS BLD VENIPUNCTURE: CPT

## 2022-02-18 PROCEDURE — 85025 COMPLETE CBC W/AUTO DIFF WBC: CPT

## 2022-02-18 PROCEDURE — G0463: CPT

## 2022-02-18 PROCEDURE — 80053 COMPREHEN METABOLIC PANEL: CPT

## 2022-02-18 RX ORDER — CHOLECALCIFEROL (VITAMIN D3) 125 MCG
1 CAPSULE ORAL
Qty: 0 | Refills: 0 | DISCHARGE

## 2022-02-18 RX ORDER — SODIUM CHLORIDE 9 MG/ML
3 INJECTION INTRAMUSCULAR; INTRAVENOUS; SUBCUTANEOUS ONCE
Refills: 0 | Status: DISCONTINUED | OUTPATIENT
Start: 2022-02-25 | End: 2022-03-11

## 2022-02-18 NOTE — H&P PST ADULT - ASSESSMENT
CAPRINI SCORE    AGE RELATED RISK FACTORS                                                             [ ] Age 41-60 years                                            (1 Point)  [ ] Age: 61-74 years                                           (2 Points)                 [ ] Age= 75 years                                                (3 Points)             DISEASE RELATED RISK FACTORS                                                       [ ] Edema in the lower extremities                 (1 Point)                     [ ] Varicose veins                                               (1 Point)                                 [ ] BMI > 25 Kg/m2                                            (1 Point)                                  [ ] Serious infection (ie PNA)                            (1 Point)                     [ ] Lung disease ( COPD, Emphysema)            (1 Point)                                                                          [ ] Acute myocardial infarction                         (1 Point)                  [ ] Congestive heart failure (in the previous month)  (1 Point)         [ ] Inflammatory bowel disease                            (1 Point)                  [ ] Central venous access, PICC or Port               (2 points)       (within the last month)                                                                [ ] Stroke (in the previous month)                        (5 Points)    [ ] Previous or present malignancy                       (2 points)                                                                                                                                                         HEMATOLOGY RELATED FACTORS                                                         [ ] Prior episodes of VTE                                     (3 Points)                     [ ] Positive family history for VTE                      (3 Points)                  [ ] Prothrombin 40938 A                                     (3 Points)                     [ ] Factor V Leiden                                                (3 Points)                        [ ] Lupus anticoagulants                                      (3 Points)                                                           [ ] Anticardiolipin antibodies                              (3 Points)                                                       [ ] High homocysteine in the blood                   (3 Points)                                             [ ] Other congenital or acquired thrombophilia      (3 Points)                                                [ ] Heparin induced thrombocytopenia                  (3 Points)                                        MOBILITY RELATED FACTORS  [ ] Bed rest                                                         (1 Point)  [ ] Plaster cast                                                    (2 points)  [ ] Bed bound for more than 72 hours           (2 Points)    GENDER SPECIFIC FACTORS  [ ] Pregnancy or had a baby within the last month   (1 Point)  [ ] Post-partum < 6 weeks                                   (1 Point)  [ ] Hormonal therapy  or oral contraception   (1 Point)  [ ] History of pregnancy complications              (1 point)  [ ] Unexplained or recurrent              (1 Point)    OTHER RISK FACTORS                                           (1 Point)  [ ] BMI >40, smoking, diabetes requiring insulin, chemotherapy  blood transfusions and length of surgery over 2 hours    SURGERY RELATED RISK FACTORS  [ ]  Section within the last month     (1 Point)  [ ] Minor surgery                                                  (1 Point)  [ ] Arthroscopic surgery                                       (2 Points)  [ ] Planned major surgery lasting more            (2 Points)      than 45 minutes     [ ] Elective hip or knee joint replacement       (5 points)       surgery                                                TRAUMA RELATED RISK FACTORS  [ ] Fracture of the hip, pelvis, or leg                       (5 Points)  [ ] Spinal cord injury resulting in paralysis             (5 points)       (in the previous month)    [ ] Paralysis  (less than 1 month)                             (5 Points)  [ ] Multiple Trauma within 1 month                        (5 Points)    Total Score [        ]    Caprini Score 0-2: Low Risk, NO VTE prophylaxis required for most patients, encourage ambulation  Caprini Score 3-6: Moderate Risk , pharmacologic VTE prophylaxis is indicated for most patients (in the absence of contraindications)  Caprini Score Greater than or =7: High risk, pharmocologic VTE prophylaxis indicated for most patients (in the absence of contraindications)                OPIOID RISK TOOL    YADY EACH BOX THAT APPLIES AND ADD TOTALS AT THE END    FAMILY HISTORY OF SUBSTANCE ABUSE                 FEMALE         MALE                                                Alcohol                             [  ]1 pt          [  ]3pts                                               Illegal Durgs                     [  ]2 pts        [  ]3pts                                               Rx Drugs                           [  ]4 pts        [  ]4 pts    PERSONAL HISTORY OF SUBSTANCE ABUSE                                                                                          Alcohol                             [  ]3 pts       [  ]3 pts                                               Illegal Durgs                     [  ]4 pts        [  ]4 pts                                               Rx Drugs                           [  ]5 pts        [  ]5 pts    AGE BETWEEN 16-45 YEARS                                      [  ]1 pt         [  ]1 pt    HISTORY OF PREADOLESCENT   SEXUAL ABUSE                                                             [  ]3 pts        [  ]0pts    PSYCHOLOGICAL DISEASE                     ADD, OCD, Bipolar, Schizophrenia        [  ]2 pts         [  ]2 pts                      Depression                                               [  ]1 pt           [  ]1 pt           SCORING TOTAL   (add numbers and type here)              (***)                                     A score of 3 or lower indicated LOW risk for future opiod abuse  A score of 4 to 7 indicated moderate risk for future opiod abuse  A score of 8 or higher indicates a high risk for opiod abuse CAPRINI SCORE    AGE RELATED RISK FACTORS                                                             [x ] Age 41-60 years                                            (1 Point)  [ ] Age: 61-74 years                                           (2 Points)                 [ ] Age= 75 years                                                (3 Points)             DISEASE RELATED RISK FACTORS                                                       [ ] Edema in the lower extremities                 (1 Point)                     [ ] Varicose veins                                               (1 Point)                                 [x ] BMI > 25 Kg/m2                                            (1 Point)                                  [ ] Serious infection (ie PNA)                            (1 Point)                     [ ] Lung disease ( COPD, Emphysema)            (1 Point)                                                                          [ ] Acute myocardial infarction                         (1 Point)                  [ ] Congestive heart failure (in the previous month)  (1 Point)         [ ] Inflammatory bowel disease                            (1 Point)                  [ ] Central venous access, PICC or Port               (2 points)       (within the last month)                                                                [ ] Stroke (in the previous month)                        (5 Points)    [x ] Previous or present malignancy                       (2 points)                                                                                                                                                         HEMATOLOGY RELATED FACTORS                                                         [ ] Prior episodes of VTE                                     (3 Points)                     [ ] Positive family history for VTE                      (3 Points)                  [ ] Prothrombin 54038 A                                     (3 Points)                     [ ] Factor V Leiden                                                (3 Points)                        [ ] Lupus anticoagulants                                      (3 Points)                                                           [ ] Anticardiolipin antibodies                              (3 Points)                                                       [ ] High homocysteine in the blood                   (3 Points)                                             [ ] Other congenital or acquired thrombophilia      (3 Points)                                                [ ] Heparin induced thrombocytopenia                  (3 Points)                                        MOBILITY RELATED FACTORS  [ ] Bed rest                                                         (1 Point)  [ ] Plaster cast                                                    (2 points)  [ ] Bed bound for more than 72 hours           (2 Points)    GENDER SPECIFIC FACTORS  [ ] Pregnancy or had a baby within the last month   (1 Point)  [ ] Post-partum < 6 weeks                                   (1 Point)  [ ] Hormonal therapy  or oral contraception   (1 Point)  [ ] History of pregnancy complications              (1 point)  [ ] Unexplained or recurrent              (1 Point)    OTHER RISK FACTORS                                           (1 Point)  [ ] BMI >40, smoking, diabetes requiring insulin, chemotherapy  blood transfusions and length of surgery over 2 hours    SURGERY RELATED RISK FACTORS  [ ]  Section within the last month     (1 Point)  [x ] Minor surgery                                                  (1 Point)  [ ] Arthroscopic surgery                                       (2 Points)  [ ] Planned major surgery lasting more            (2 Points)      than 45 minutes     [ ] Elective hip or knee joint replacement       (5 points)       surgery                                                TRAUMA RELATED RISK FACTORS  [ ] Fracture of the hip, pelvis, or leg                       (5 Points)  [ ] Spinal cord injury resulting in paralysis             (5 points)       (in the previous month)    [ ] Paralysis  (less than 1 month)                             (5 Points)  [ ] Multiple Trauma within 1 month                        (5 Points)    Total Score [    5    ]    Caprini Score 0-2: Low Risk, NO VTE prophylaxis required for most patients, encourage ambulation  Caprini Score 3-6: Moderate Risk , pharmacologic VTE prophylaxis is indicated for most patients (in the absence of contraindications)  Caprini Score Greater than or =7: High risk, pharmocologic VTE prophylaxis indicated for most patients (in the absence of contraindications)                OPIOID RISK TOOL    YADY EACH BOX THAT APPLIES AND ADD TOTALS AT THE END    FAMILY HISTORY OF SUBSTANCE ABUSE                 FEMALE         MALE                                                Alcohol                             [  ]1 pt          [  ]3pts                                               Illegal Durgs                     [  ]2 pts        [  ]3pts                                               Rx Drugs                           [  ]4 pts        [  ]4 pts    PERSONAL HISTORY OF SUBSTANCE ABUSE                                                                                          Alcohol                             [  ]3 pts       [  ]3 pts                                               Illegal Durgs                     [  ]4 pts        [  ]4 pts                                               Rx Drugs                           [  ]5 pts        [  ]5 pts    AGE BETWEEN 16-45 YEARS                                      [  ]1 pt         [  ]1 pt    HISTORY OF PREADOLESCENT   SEXUAL ABUSE                                                             [  ]3 pts        [  ]0pts    PSYCHOLOGICAL DISEASE                     ADD, OCD, Bipolar, Schizophrenia        [  ]2 pts         [  ]2 pts                      Depression                                               [  ]1 pt           [  ]1 pt           SCORING TOTAL   0                                    A score of 3 or lower indicated LOW risk for future opiod abuse  A score of 4 to 7 indicated moderate risk for future opiod abuse  A score of 8 or higher indicates a high risk for opiod abuse    56 year old female with a pmhx of HTN, 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 Patient was recently admitted to [facility] 22 for purulent discharge coming from area of her right neck distal to jaw (not from surgical incision), CT of neck found two thin fluid collections containing gas in the right mandibular post operative bed, concern for superimposed infection. Mary Jo and submandibular fat stranding which could represent post surgical changes or be reactive in the setting of cellulitis, blood cultures were negative, patient discharged home on oral augmentin and doxycycline, as per patient she has 3 days remaining.  Patient presents to PST today with dressing in place to right neck, dressing was changed this morning with packing, patient reports tan colored drainage, denies any foul smelling odor.  Erythema + swelling ot neck, no foul odor, no visible drainage, dressing is intact with packing, changed this AM by home care RN, pt request not to remove, neck is non tender, incision well approximated. Patient is now scheduled for port placement in IR with Dr North on 22. Patient will see Dr So ENT prior to procedure to ensure resolution of neck infection, assisted with appt patient will see him in [place] on 22 at 10:45AM.

## 2022-02-18 NOTE — H&P PST ADULT - NECK DETAILS
Statement Selected
healed trach site, trachea is midline, no carotid bruits, neck is erythmatous, non tender, healing incision, area of infection with dressing and packing, no drainage noted, +swelling of right side of neck/supple/no JVD/normal thyroid gland

## 2022-02-18 NOTE — H&P PST ADULT - PROBLEM SELECTOR PLAN 4
pt reports low bp with home care RN this AM (90/60), on arrival to PST was 80/60 with RN, reports vomitting x1 yesterday and not drinking enough fluids, denies dizziness. Repeat BP left arm manually was 110/76, pt encouraged to increase fluids, will obtain home BP monitor, advised to call her PCP if she continues to get low marcelle or if symptomatic to discuss BP regimen.

## 2022-02-18 NOTE — ASU PATIENT PROFILE, ADULT - FALL HARM RISK - UNIVERSAL INTERVENTIONS
Bed in lowest position, wheels locked, appropriate side rails in place/Call bell, personal items and telephone in reach/Instruct patient to call for assistance before getting out of bed or chair/Non-slip footwear when patient is out of bed/[place] to call system/Physically safe environment - no spills, clutter or unnecessary equipment/Purposeful Proactive Rounding/Room/bathroom lighting operational, light cord in reach

## 2022-02-18 NOTE — H&P PST ADULT - BRAND OF FIRST COVID-19 BOOSTER
"Pharmacy Chemotherapy Verification    Patient Name: Albaro Lala     Dx: ALL   Cycle:  Consolidation, Day 50   Previous treatment:   Days 31 and 32, Dec 30-31, 2016 @ [facility]  Day 39 given @ [facility] 1/6/17 per provided therapy roadmap.  Day 43 - 1/10/17 @ [facility]    Protocol: [facility] [place] [id number] - VHR standard arm -Consolidation  Cyclophosphamide (CPM) 1000mg/m2/dose IV over 30 min Day 1 & 29  Cytarabine (ARAC) 75mg/m2/dose IV or SubQ Days 1-4, 8-11, 29-32 & 36-39  Mercatptopurine (MP)  60mg/m2/dose po days 1-14 & 29-42  VinCRIStine (VCR) 1.5mg/m2/dose (MAX dose 2mg) IV Days 15, 22, 43 & 50   PEG-asparaginase (PEG-ASP) 2500 units/m2/dose IM Days 15 & 43  Intrathecal Methotrexate (IT MTX) fixed dose for age 3-8.98 yo = 12 mg IT on days 1, 8, 15, & 22  Consolidation is 8 weeks (56 days)     Start consolidation on Day 36 (7 days following Day 29 LP) or when peripheral counts recover with ANC >/= 750/uL and Plts>/= 75k/uL (whichever occurs later). Patients who received Ext Induction should start Consolidation as soon as the Day 43 MRD status is known and the above blood count parameters are met. Patients with testicular disease at diagnosis & continued clinical evidence of testicular disease at end-induction will receive testicular XRT.     Allergies: Review of patient's allergies indicates no known allergies.  /64 mmHg  Pulse 109  Temp(Src) 36.8 °C (98.2 °F)  Resp 26  Ht 1.05 m (3' 5.34\")  Wt 18.9 kg (41 lb 10.7 oz)  BMI 17.14 kg/m2  SpO2 99% Body surface area is 0.74 meters squared.      Protocol Parameters:  Dosing Wt = 20.4 kg Ht = 103.5 cm  BSA = 0.77 m2     Labs 1/10/17  ANC~ 190 Plt = 66k   Hgb = 9.2   SCr = <0.2mg/dL   LFT's = WNL TBili = 0.9.     Vincristine 1.5 mg/m2 x 0.77 m2 = 1.15 mg              <5% difference, OK to treat with final dose = 1.2 mg IV    Claire Aguilera, PharmD             " [employer]

## 2022-02-18 NOTE — H&P PST ADULT - ENT GEN HX ROS MEA POS PC
difficulty chewing on right side,  jaw pain, no sensation in right ear difficulty chewing on right side,  jaw pain, no sensation in right ear, swelling of right mouth (right lower)/recurrent cold sores

## 2022-02-18 NOTE — H&P PST ADULT - HISTORY OF PRESENT ILLNESS
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 Patient was recently admitted to [facility] 2/6/22 for purulent discharge coming from area of her right neck distal to jaw (not from surgical incision), CT of neck found two thin fluid collections containing gas in the right mandibular post operative bed, concern for superimposed infection. Mary Jo and submandibular fat stranding which could represent post surgical changes or be reactive in the setting of cellulitis, blood cultures were negative, patient discharged home on oral augmentin and doxycycline, as per patient she has 3 days remaining.  Patient presents to PST today with dressing in place to right neck, dressing was changed this morning with packing, patient reports tan colored drainage, denies any foul smelling odor.  Patient states wound care nurse was concerned that the neck area looked more red than the 2 days prior.  Patient reports warmth to the site.  Patient denies fever, chills, or malaise.  Reports her BP was low and HR elevated with home care RN.  Denies dizziness or lightheadedness. Patient reports left leg pain at site of graft, repotrs pain is burning and tingling. Reports she vomited yesterday after taking oxycodone for left leg pain, today she denies any nausea, vomiting, diarrhea or constipation. States she tolerated breakfast well, tolerating diet, reports mild jaw pain with prolonged chewing. Plan is to start chemotherapy and radiation.  Patient is now scheduled for port placement in IR with Dr North on 2/25/22. Patient will see Dr So ENT prior to procedure to ensure resolution of neck infection.

## 2022-02-18 NOTE — H&P PST ADULT - PROBLEM SELECTOR PLAN 1
Patient is now scheduled for port placement in IR with Dr North on 2/25/22. Patient will see Dr So ENT prior to procedure to ensure resolution of neck infection.

## 2022-02-18 NOTE — H&P PST ADULT - NSICDXPASTSURGICALHX_GEN_ALL_CORE_FT
PAST SURGICAL HISTORY:  H/O  section     History of hip replacement left hip    History of partial hysterectomy      PAST SURGICAL HISTORY:  H/O  section     H/O neck surgery partial right mandibulectomy with fibular free flap reconstriction and right neck lymphnode dissection 22    History of hip replacement left hip    History of partial hysterectomy

## 2022-02-18 NOTE — H&P PST ADULT - EXTREMITIES COMMENTS
right lower leg trace to +1 edema of shin, dressing with ace wrap to left shin at graft site, not removed as per patient request, changed by home care RN today 2/18/22

## 2022-02-18 NOTE — H&P PST ADULT - NSICDXFAMILYHX_GEN_ALL_CORE_FT
FAMILY HISTORY:  Mother  Still living? Unknown  Family history of CVA, Age at diagnosis: Age Unknown  FH: thyroid disease, Age at diagnosis: Age Unknown

## 2022-02-22 ENCOUNTER — APPOINTMENT (OUTPATIENT)
Dept: PLASTIC SURGERY | Facility: CLINIC | Age: 57
End: 2022-02-22

## 2022-02-22 ENCOUNTER — OUTPATIENT (OUTPATIENT)
Dept: OUTPATIENT SERVICES | Facility: HOSPITAL | Age: 57
LOS: 1 days | End: 2022-02-22
Payer: COMMERCIAL

## 2022-02-22 ENCOUNTER — APPOINTMENT (OUTPATIENT)
Dept: OTOLARYNGOLOGY | Facility: CLINIC | Age: 57
End: 2022-02-22

## 2022-02-22 DIAGNOSIS — Z98.891 HISTORY OF UTERINE SCAR FROM PREVIOUS SURGERY: Chronic | ICD-10-CM

## 2022-02-22 DIAGNOSIS — Z96.649 PRESENCE OF UNSPECIFIED ARTIFICIAL HIP JOINT: Chronic | ICD-10-CM

## 2022-02-22 DIAGNOSIS — Z20.828 CONTACT WITH AND (SUSPECTED) EXPOSURE TO OTHER VIRAL COMMUNICABLE DISEASES: ICD-10-CM

## 2022-02-22 DIAGNOSIS — Z98.890 OTHER SPECIFIED POSTPROCEDURAL STATES: Chronic | ICD-10-CM

## 2022-02-22 DIAGNOSIS — Z90.711 ACQUIRED ABSENCE OF UTERUS WITH REMAINING CERVICAL STUMP: Chronic | ICD-10-CM

## 2022-02-22 LAB — SARS-COV-2 RNA SPEC QL NAA+PROBE: SIGNIFICANT CHANGE UP

## 2022-02-22 PROCEDURE — U0005: CPT

## 2022-02-22 PROCEDURE — U0003: CPT

## 2022-02-23 ENCOUNTER — APPOINTMENT (OUTPATIENT)
Dept: PLASTIC SURGERY | Facility: CLINIC | Age: 57
End: 2022-02-23
Payer: COMMERCIAL

## 2022-02-23 VITALS
BODY MASS INDEX: 36 KG/M2 | TEMPERATURE: 98.1 F | HEART RATE: 85 BPM | SYSTOLIC BLOOD PRESSURE: 111 MMHG | DIASTOLIC BLOOD PRESSURE: 74 MMHG | OXYGEN SATURATION: 98 % | WEIGHT: 216.06 LBS | HEIGHT: 65 IN

## 2022-02-23 PROCEDURE — 99024 POSTOP FOLLOW-UP VISIT: CPT

## 2022-02-24 NOTE — REASON FOR VISIT
[Post Op: _________] : a [unfilled] post op visit [FreeTextEntry1] : s/p right mandibular osteocutaneous fibula free flap on 1/13/22. pt is doing well, better with time.

## 2022-02-25 ENCOUNTER — RESULT REVIEW (OUTPATIENT)
Age: 57
End: 2022-02-25

## 2022-02-25 ENCOUNTER — APPOINTMENT (OUTPATIENT)
Dept: HEMATOLOGY ONCOLOGY | Facility: CLINIC | Age: 57
End: 2022-02-25

## 2022-02-25 ENCOUNTER — OUTPATIENT (OUTPATIENT)
Dept: OUTPATIENT SERVICES | Facility: HOSPITAL | Age: 57
LOS: 1 days | Discharge: HOME | End: 2022-02-25

## 2022-02-25 ENCOUNTER — OUTPATIENT (OUTPATIENT)
Dept: OUTPATIENT SERVICES | Facility: HOSPITAL | Age: 57
LOS: 1 days | End: 2022-02-25
Payer: COMMERCIAL

## 2022-02-25 VITALS
HEIGHT: 65 IN | WEIGHT: 212.75 LBS | DIASTOLIC BLOOD PRESSURE: 81 MMHG | HEART RATE: 88 BPM | OXYGEN SATURATION: 94 % | TEMPERATURE: 98 F | RESPIRATION RATE: 17 BRPM | SYSTOLIC BLOOD PRESSURE: 137 MMHG

## 2022-02-25 DIAGNOSIS — Z90.711 ACQUIRED ABSENCE OF UTERUS WITH REMAINING CERVICAL STUMP: Chronic | ICD-10-CM

## 2022-02-25 DIAGNOSIS — C80.1 MALIGNANT (PRIMARY) NEOPLASM, UNSPECIFIED: ICD-10-CM

## 2022-02-25 DIAGNOSIS — Z98.890 OTHER SPECIFIED POSTPROCEDURAL STATES: Chronic | ICD-10-CM

## 2022-02-25 DIAGNOSIS — Z96.649 PRESENCE OF UNSPECIFIED ARTIFICIAL HIP JOINT: Chronic | ICD-10-CM

## 2022-02-25 DIAGNOSIS — Z98.891 HISTORY OF UTERINE SCAR FROM PREVIOUS SURGERY: Chronic | ICD-10-CM

## 2022-02-25 DIAGNOSIS — C03.9 MALIGNANT NEOPLASM OF GUM, UNSPECIFIED: ICD-10-CM

## 2022-02-25 PROCEDURE — 36561 INSERT TUNNELED CV CATH: CPT

## 2022-02-25 PROCEDURE — 76937 US GUIDE VASCULAR ACCESS: CPT | Mod: 26

## 2022-02-25 PROCEDURE — C1769: CPT

## 2022-02-25 PROCEDURE — 77001 FLUOROGUIDE FOR VEIN DEVICE: CPT | Mod: 26

## 2022-02-25 PROCEDURE — 77001 FLUOROGUIDE FOR VEIN DEVICE: CPT

## 2022-02-25 PROCEDURE — C1751: CPT

## 2022-02-25 PROCEDURE — 76942 ECHO GUIDE FOR BIOPSY: CPT

## 2022-02-25 RX ORDER — ACETAMINOPHEN 500 MG
650 TABLET ORAL ONCE
Refills: 0 | Status: DISCONTINUED | OUTPATIENT
Start: 2022-02-25 | End: 2022-03-11

## 2022-02-25 NOTE — BRIEF OPERATIVE NOTE - NSICDXBRIEFPROCEDURE_GEN_ALL_CORE_FT
PROCEDURES:  Insertion, central venous catheter, tunneled, with port, age 5 years or older 25-Feb-2022 12:10:55  Vic Be

## 2022-02-25 NOTE — CHART NOTE - NSCHARTNOTEFT_GEN_A_CORE
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 Patient was recently admitted to [facility] 22 for purulent discharge coming from area of her right neck distal to jaw (not from surgical incision), CT of neck found two thin fluid collections containing gas in the right mandibular post operative bed, concern for superimposed infection. Mary Jo and submandibular fat stranding which could represent post surgical changes or be reactive in the setting of cellulitis, blood cultures were negative, patient discharged home on oral augmentin and doxycycline, as per patient she has 3 days remaining. Pt recently seen by Plastic Surgery which confirmed good healing of the skin graft and no infection. Therefore safe to insert the chest port.  Plan is to start chemotherapy and radiation.    PAST MEDICAL HISTORY:  H/O malignant neoplasm of gum     Hyperlipidemia     Hypertension     Mild asthma.     PAST SURGICAL HISTORY:  H/O  section     H/O neck surgery partial right mandibulectomy with fibular free flap reconstriction and right neck lymphnode dissection 22    History of hip replacement left hip    History of partial hysterectomy.         Allergies: morphine (Hives)      Medications (Abx/Cardiac/Anticoagulation/Blood Products)  · 	amoxicillin-clavulanate 875 mg-125 mg oral tablet: Last Dose Taken:  , 1 tab(s) orally 2 times a day   · 	doxycycline monohydrate 100 mg oral capsule: Last Dose Taken:  , 1 cap(s) orally 2 times a day   · 	petrolatum topical ointment: Last Dose Taken:  , 1 application topically once a day  · 	oxyCODONE 5 mg/5 mL oral solution: Last Dose Taken:  , 5 milliliter(s) orally every 6 hours, As Needed -Severe pain MDD:20mL  · 	atorvastatin 10 mg oral tablet: Last Dose Taken:  , 1 tab(s) orally once a day PM  · 	aspirin 81 mg oral tablet: Last Dose Taken:  , orally once a day   · 	albuterol 90 mcg/inh inhalation powder: Last Dose Taken:  , 2 puff(s) inhaled every 6 hours, As Needed  · 	lisinopril 5 mg oral tablet: Last Dose Taken:  , 1 tab(s) orally once a day  · 	atorvastatin 10 mg oral tablet: Last Dose Taken:  , 1 tab(s) orally once a day          Data:  165.1  96.5    T(C): 36.8  HR: 88  BP: 137/81  RR: 17  SpO2: 94%    plts: 345  INR: 1.04      Imaging: ___NA____    Plan:   -56y Female presents for ___chest port insertion____  -Risks/Benefits/alternatives explained with the patient and witnessed informed consent obtained.

## 2022-02-25 NOTE — ASU DISCHARGE PLAN (ADULT/PEDIATRIC) - NS MD DC FALL RISK RISK
For information on Fall & Injury Prevention, visit: [email or web address] OR  [email or web address] OR  https://www.cdc.gov/steadi/patient.html

## 2022-02-28 ENCOUNTER — RESULT REVIEW (OUTPATIENT)
Age: 57
End: 2022-02-28

## 2022-02-28 ENCOUNTER — APPOINTMENT (OUTPATIENT)
Age: 57
End: 2022-02-28

## 2022-02-28 LAB
BASOPHILS # BLD AUTO: 0.1 K/UL — SIGNIFICANT CHANGE UP (ref 0–0.2)
BASOPHILS NFR BLD AUTO: 1.4 % — SIGNIFICANT CHANGE UP (ref 0–2)
EOSINOPHIL # BLD AUTO: 0.3 K/UL — SIGNIFICANT CHANGE UP (ref 0–0.5)
EOSINOPHIL NFR BLD AUTO: 5.2 % — SIGNIFICANT CHANGE UP (ref 0–6)
HCT VFR BLD CALC: 38.1 % — SIGNIFICANT CHANGE UP (ref 34.5–45)
HGB BLD-MCNC: 12.5 G/DL — SIGNIFICANT CHANGE UP (ref 11.5–15.5)
LYMPHOCYTES # BLD AUTO: 1.4 K/UL — SIGNIFICANT CHANGE UP (ref 1–3.3)
LYMPHOCYTES # BLD AUTO: 22.2 % — SIGNIFICANT CHANGE UP (ref 13–44)
MCHC RBC-ENTMCNC: 28.7 PG — SIGNIFICANT CHANGE UP (ref 27–34)
MCHC RBC-ENTMCNC: 32.7 G/DL — SIGNIFICANT CHANGE UP (ref 32–36)
MCV RBC AUTO: 87.8 FL — SIGNIFICANT CHANGE UP (ref 80–100)
MONOCYTES # BLD AUTO: 0.5 K/UL — SIGNIFICANT CHANGE UP (ref 0–0.9)
MONOCYTES NFR BLD AUTO: 7.3 % — SIGNIFICANT CHANGE UP (ref 2–14)
NEUTROPHILS # BLD AUTO: 4 K/UL — SIGNIFICANT CHANGE UP (ref 1.8–7.4)
NEUTROPHILS NFR BLD AUTO: 63.9 % — SIGNIFICANT CHANGE UP (ref 43–77)
PLATELET # BLD AUTO: 219 K/UL — SIGNIFICANT CHANGE UP (ref 150–400)
RBC # BLD: 4.34 M/UL — SIGNIFICANT CHANGE UP (ref 3.8–5.2)
RBC # FLD: 13.7 % — SIGNIFICANT CHANGE UP (ref 10.3–14.5)
WBC # BLD: 6.2 K/UL — SIGNIFICANT CHANGE UP (ref 3.8–10.5)
WBC # FLD AUTO: 6.2 K/UL — SIGNIFICANT CHANGE UP (ref 3.8–10.5)

## 2022-02-28 PROCEDURE — 77387B: CUSTOM | Mod: 26

## 2022-02-28 RX ORDER — ATORVASTATIN CALCIUM 80 MG/1
1 TABLET, FILM COATED ORAL
Qty: 0 | Refills: 0 | DISCHARGE

## 2022-03-01 ENCOUNTER — APPOINTMENT (OUTPATIENT)
Age: 57
End: 2022-03-01

## 2022-03-01 DIAGNOSIS — E86.0 DEHYDRATION: ICD-10-CM

## 2022-03-01 DIAGNOSIS — C03.9 MALIGNANT NEOPLASM OF GUM, UNSPECIFIED: ICD-10-CM

## 2022-03-01 DIAGNOSIS — R11.2 NAUSEA WITH VOMITING, UNSPECIFIED: ICD-10-CM

## 2022-03-01 DIAGNOSIS — Z51.11 ENCOUNTER FOR ANTINEOPLASTIC CHEMOTHERAPY: ICD-10-CM

## 2022-03-01 DIAGNOSIS — C76.0 MALIGNANT NEOPLASM OF HEAD, FACE AND NECK: ICD-10-CM

## 2022-03-01 PROCEDURE — 77387B: CUSTOM | Mod: 26

## 2022-03-02 PROCEDURE — 77387B: CUSTOM | Mod: 26

## 2022-03-03 ENCOUNTER — NON-APPOINTMENT (OUTPATIENT)
Age: 57
End: 2022-03-03

## 2022-03-03 ENCOUNTER — APPOINTMENT (OUTPATIENT)
Dept: HEMATOLOGY ONCOLOGY | Facility: CLINIC | Age: 57
End: 2022-03-03

## 2022-03-03 VITALS
HEIGHT: 65 IN | WEIGHT: 215 LBS | RESPIRATION RATE: 16 BRPM | OXYGEN SATURATION: 95 % | BODY MASS INDEX: 35.82 KG/M2 | HEART RATE: 120 BPM | SYSTOLIC BLOOD PRESSURE: 120 MMHG | DIASTOLIC BLOOD PRESSURE: 72 MMHG

## 2022-03-03 PROCEDURE — 77014: CPT | Mod: 26

## 2022-03-03 NOTE — DISEASE MANAGEMENT
[Pathological] : TNM Stage: p [SANFORD] : SANFORD [FreeTextEntry4] : SCC oral cavity [TTNM] : 4a [NTNM] : 2b [MTNM] : 0 [de-identified] : 533 [de-identified] : 0765 [de-identified] : gingiva/neck

## 2022-03-03 NOTE — PHYSICAL EXAM
[Normal] : oriented to person, place and time, the affect was normal, the mood was normal and not anxious [de-identified] : edema right neck

## 2022-03-03 NOTE — REVIEW OF SYSTEMS
[Dysphagia: Grade 0] : Dysphagia: Grade 0 [Edema Limbs: Grade 0] : Edema Limbs: Grade 0  [Fatigue: Grade 0] : Fatigue: Grade 0 [Localized Edema: Grade 2 - Moderate localized edema and intervention indicated; limiting instrumental ADL] : Localized Edema: Grade 2 - Moderate localized edema and intervention indicated; limiting instrumental ADL [Neck Edema: Grade 2 - Moderate neck edema; slight obliteration of anatomic landmarks; limiting instrumental ADL] : Neck Edema: Grade 2 - Moderate neck edema; slight obliteration of anatomic landmarks; limiting instrumental ADL [Skin Hyperpigmentation: Grade 0] : Skin Hyperpigmentation: Grade 0

## 2022-03-04 ENCOUNTER — RESULT REVIEW (OUTPATIENT)
Age: 57
End: 2022-03-04

## 2022-03-04 ENCOUNTER — APPOINTMENT (OUTPATIENT)
Dept: HEMATOLOGY ONCOLOGY | Facility: CLINIC | Age: 57
End: 2022-03-04

## 2022-03-04 LAB
BASOPHILS # BLD AUTO: 0.1 K/UL — SIGNIFICANT CHANGE UP (ref 0–0.2)
BASOPHILS NFR BLD AUTO: 0.9 % — SIGNIFICANT CHANGE UP (ref 0–2)
EOSINOPHIL # BLD AUTO: 0.2 K/UL — SIGNIFICANT CHANGE UP (ref 0–0.5)
EOSINOPHIL NFR BLD AUTO: 2.4 % — SIGNIFICANT CHANGE UP (ref 0–6)
HCT VFR BLD CALC: 40.4 % — SIGNIFICANT CHANGE UP (ref 34.5–45)
HGB BLD-MCNC: 12.9 G/DL — SIGNIFICANT CHANGE UP (ref 11.5–15.5)
LYMPHOCYTES # BLD AUTO: 1.5 K/UL — SIGNIFICANT CHANGE UP (ref 1–3.3)
LYMPHOCYTES # BLD AUTO: 19.3 % — SIGNIFICANT CHANGE UP (ref 13–44)
MCHC RBC-ENTMCNC: 28.5 PG — SIGNIFICANT CHANGE UP (ref 27–34)
MCHC RBC-ENTMCNC: 32 G/DL — SIGNIFICANT CHANGE UP (ref 32–36)
MCV RBC AUTO: 89 FL — SIGNIFICANT CHANGE UP (ref 80–100)
MONOCYTES # BLD AUTO: 0.7 K/UL — SIGNIFICANT CHANGE UP (ref 0–0.9)
MONOCYTES NFR BLD AUTO: 9.6 % — SIGNIFICANT CHANGE UP (ref 2–14)
NEUTROPHILS # BLD AUTO: 5.2 K/UL — SIGNIFICANT CHANGE UP (ref 1.8–7.4)
NEUTROPHILS NFR BLD AUTO: 67.8 % — SIGNIFICANT CHANGE UP (ref 43–77)
PLATELET # BLD AUTO: 224 K/UL — SIGNIFICANT CHANGE UP (ref 150–400)
RBC # BLD: 4.53 M/UL — SIGNIFICANT CHANGE UP (ref 3.8–5.2)
RBC # FLD: 13.5 % — SIGNIFICANT CHANGE UP (ref 10.3–14.5)
WBC # BLD: 7.7 K/UL — SIGNIFICANT CHANGE UP (ref 3.8–10.5)
WBC # FLD AUTO: 7.7 K/UL — SIGNIFICANT CHANGE UP (ref 3.8–10.5)

## 2022-03-04 PROCEDURE — 77427 RADIATION TX MANAGEMENT X5: CPT

## 2022-03-04 PROCEDURE — 77387B: CUSTOM | Mod: 26

## 2022-03-07 ENCOUNTER — APPOINTMENT (OUTPATIENT)
Age: 57
End: 2022-03-07

## 2022-03-07 LAB
ALBUMIN SERPL ELPH-MCNC: 4.3 G/DL
ALP BLD-CCNC: 71 U/L
ALT SERPL-CCNC: 17 U/L
ANION GAP SERPL CALC-SCNC: 14 MMOL/L
AST SERPL-CCNC: 12 U/L
BILIRUB SERPL-MCNC: 0.3 MG/DL
BUN SERPL-MCNC: 23 MG/DL
CALCIUM SERPL-MCNC: 9.8 MG/DL
CHLORIDE SERPL-SCNC: 98 MMOL/L
CO2 SERPL-SCNC: 27 MMOL/L
CREAT SERPL-MCNC: 0.85 MG/DL
EGFR: 80 ML/MIN/1.73M2
GLUCOSE SERPL-MCNC: 196 MG/DL
MAGNESIUM SERPL-MCNC: 1.8 MG/DL
POTASSIUM SERPL-SCNC: 4.9 MMOL/L
PROT SERPL-MCNC: 6.3 G/DL
SODIUM SERPL-SCNC: 139 MMOL/L

## 2022-03-07 PROCEDURE — 77387B: CUSTOM | Mod: 26

## 2022-03-08 ENCOUNTER — APPOINTMENT (OUTPATIENT)
Age: 57
End: 2022-03-08

## 2022-03-08 PROCEDURE — 77387B: CUSTOM | Mod: 26

## 2022-03-09 ENCOUNTER — APPOINTMENT (OUTPATIENT)
Dept: PLASTIC SURGERY | Facility: CLINIC | Age: 57
End: 2022-03-09
Payer: COMMERCIAL

## 2022-03-09 VITALS
OXYGEN SATURATION: 94 % | TEMPERATURE: 97.5 F | BODY MASS INDEX: 36.02 KG/M2 | DIASTOLIC BLOOD PRESSURE: 81 MMHG | HEIGHT: 64 IN | WEIGHT: 211 LBS | HEART RATE: 93 BPM | SYSTOLIC BLOOD PRESSURE: 132 MMHG

## 2022-03-09 PROCEDURE — 77387B: CUSTOM | Mod: 26

## 2022-03-09 PROCEDURE — 99024 POSTOP FOLLOW-UP VISIT: CPT

## 2022-03-10 ENCOUNTER — NON-APPOINTMENT (OUTPATIENT)
Age: 57
End: 2022-03-10

## 2022-03-10 PROCEDURE — 77014: CPT | Mod: 26

## 2022-03-10 NOTE — PHYSICAL EXAM
[Normal] : oriented to person, place and time, the affect was normal, the mood was normal and not anxious [Extraocular Movements] : extraocular movements were intact [de-identified] : lichen planus, well-healing flap [de-identified] : edema right neck/submental area

## 2022-03-10 NOTE — DISEASE MANAGEMENT
[Pathological] : TNM Stage: p [SANFORD] : SANFORD [FreeTextEntry4] : SCC oral cavity [TTNM] : 4a [NTNM] : 2b [MTNM] : 0 [de-identified] : 9817 [de-identified] : 4037 [de-identified] : gingiva/neck

## 2022-03-10 NOTE — REVIEW OF SYSTEMS
[Dysphagia: Grade 0] : Dysphagia: Grade 0 [Edema Limbs: Grade 0] : Edema Limbs: Grade 0  [Fatigue: Grade 0] : Fatigue: Grade 0 [Localized Edema: Grade 2 - Moderate localized edema and intervention indicated; limiting instrumental ADL] : Localized Edema: Grade 2 - Moderate localized edema and intervention indicated; limiting instrumental ADL [Neck Edema: Grade 2 - Moderate neck edema; slight obliteration of anatomic landmarks; limiting instrumental ADL] : Neck Edema: Grade 2 - Moderate neck edema; slight obliteration of anatomic landmarks; limiting instrumental ADL [Skin Hyperpigmentation: Grade 1 - Hyperpigmentation covering <10% BSA; no psychosocial impact] : Skin Hyperpigmentation: Grade 1 - Hyperpigmentation covering <10% BSA; no psychosocial impact [Mucositis Oral: Grade 0] : Mucositis Oral: Grade 0  [Xerostomia: Grade 1 - Symptomatic (e.g., dry or thick saliva) without significant dietary alteration; unstimulated saliva flow >0.2 ml/min] : Xerostomia: Grade 1 - Symptomatic (e.g., dry or thick saliva) without significant dietary alteration; unstimulated saliva flow >0.2 ml/min [Oral Pain: Grade 0] : Oral Pain: Grade 0 [Salivary duct inflammation: Grade 0] : Salivary duct inflammation: Grade 0 [Dysgeusia: Grade 2 - Altered taste with change in diet (e.g., oral supplements); noxious or unpleasant taste; loss of taste] : Dysgeusia: Grade 2 - Altered taste with change in diet (e.g., oral supplements); noxious or unpleasant taste; loss of taste [Dermatitis Radiation: Grade 1 - Faint erythema or dry desquamation] : Dermatitis Radiation: Grade 1 - Faint erythema or dry desquamation

## 2022-03-11 ENCOUNTER — RESULT REVIEW (OUTPATIENT)
Age: 57
End: 2022-03-11

## 2022-03-11 ENCOUNTER — APPOINTMENT (OUTPATIENT)
Dept: HEMATOLOGY ONCOLOGY | Facility: CLINIC | Age: 57
End: 2022-03-11

## 2022-03-11 LAB
BASOPHILS # BLD AUTO: 0.1 K/UL — SIGNIFICANT CHANGE UP (ref 0–0.2)
BASOPHILS NFR BLD AUTO: 0.9 % — SIGNIFICANT CHANGE UP (ref 0–2)
EOSINOPHIL # BLD AUTO: 0.1 K/UL — SIGNIFICANT CHANGE UP (ref 0–0.5)
EOSINOPHIL NFR BLD AUTO: 2.2 % — SIGNIFICANT CHANGE UP (ref 0–6)
HCT VFR BLD CALC: 39.3 % — SIGNIFICANT CHANGE UP (ref 34.5–45)
HGB BLD-MCNC: 12.3 G/DL — SIGNIFICANT CHANGE UP (ref 11.5–15.5)
LYMPHOCYTES # BLD AUTO: 0.8 K/UL — LOW (ref 1–3.3)
LYMPHOCYTES # BLD AUTO: 14 % — SIGNIFICANT CHANGE UP (ref 13–44)
MCHC RBC-ENTMCNC: 28.2 PG — SIGNIFICANT CHANGE UP (ref 27–34)
MCHC RBC-ENTMCNC: 31.2 G/DL — LOW (ref 32–36)
MCV RBC AUTO: 90.2 FL — SIGNIFICANT CHANGE UP (ref 80–100)
MONOCYTES # BLD AUTO: 0.6 K/UL — SIGNIFICANT CHANGE UP (ref 0–0.9)
MONOCYTES NFR BLD AUTO: 10.3 % — SIGNIFICANT CHANGE UP (ref 2–14)
NEUTROPHILS # BLD AUTO: 4.2 K/UL — SIGNIFICANT CHANGE UP (ref 1.8–7.4)
NEUTROPHILS NFR BLD AUTO: 72.5 % — SIGNIFICANT CHANGE UP (ref 43–77)
PLATELET # BLD AUTO: 192 K/UL — SIGNIFICANT CHANGE UP (ref 150–400)
RBC # BLD: 4.36 M/UL — SIGNIFICANT CHANGE UP (ref 3.8–5.2)
RBC # FLD: 13.6 % — SIGNIFICANT CHANGE UP (ref 10.3–14.5)
WBC # BLD: 5.8 K/UL — SIGNIFICANT CHANGE UP (ref 3.8–10.5)
WBC # FLD AUTO: 5.8 K/UL — SIGNIFICANT CHANGE UP (ref 3.8–10.5)

## 2022-03-11 PROCEDURE — 77427 RADIATION TX MANAGEMENT X5: CPT

## 2022-03-11 PROCEDURE — 77387B: CUSTOM | Mod: 26

## 2022-03-14 ENCOUNTER — APPOINTMENT (OUTPATIENT)
Age: 57
End: 2022-03-14

## 2022-03-14 LAB
ALBUMIN SERPL ELPH-MCNC: 4.3 G/DL
ALP BLD-CCNC: 75 U/L
ALT SERPL-CCNC: 20 U/L
ANION GAP SERPL CALC-SCNC: 14 MMOL/L
AST SERPL-CCNC: 20 U/L
BILIRUB SERPL-MCNC: 0.2 MG/DL
BUN SERPL-MCNC: 26 MG/DL
CALCIUM SERPL-MCNC: 9.5 MG/DL
CHLORIDE SERPL-SCNC: 101 MMOL/L
CO2 SERPL-SCNC: 25 MMOL/L
CREAT SERPL-MCNC: 1.06 MG/DL
EGFR: 61 ML/MIN/1.73M2
GLUCOSE SERPL-MCNC: 125 MG/DL
MAGNESIUM SERPL-MCNC: 1.9 MG/DL
POTASSIUM SERPL-SCNC: 5.1 MMOL/L
PROT SERPL-MCNC: 6 G/DL
SODIUM SERPL-SCNC: 140 MMOL/L

## 2022-03-14 PROCEDURE — 77387B: CUSTOM | Mod: 26

## 2022-03-15 ENCOUNTER — APPOINTMENT (OUTPATIENT)
Age: 57
End: 2022-03-15

## 2022-03-15 ENCOUNTER — APPOINTMENT (OUTPATIENT)
Dept: PLASTIC SURGERY | Facility: CLINIC | Age: 57
End: 2022-03-15

## 2022-03-15 PROCEDURE — 77387B: CUSTOM | Mod: 26

## 2022-03-16 PROCEDURE — 77387B: CUSTOM | Mod: 26

## 2022-03-17 ENCOUNTER — NON-APPOINTMENT (OUTPATIENT)
Age: 57
End: 2022-03-17

## 2022-03-17 VITALS
DIASTOLIC BLOOD PRESSURE: 70 MMHG | WEIGHT: 203 LBS | SYSTOLIC BLOOD PRESSURE: 100 MMHG | BODY MASS INDEX: 34.85 KG/M2 | OXYGEN SATURATION: 93 % | HEART RATE: 105 BPM

## 2022-03-17 PROCEDURE — 77014: CPT | Mod: 26

## 2022-03-17 NOTE — VITALS
[80: Normal activity with effort; some signs or symptoms of disease.] : 80: Normal activity with effort; some signs or symptoms of disease.  [Maximal Pain Intensity: 4/10] : 4/10 [Least Pain Intensity: 4/10] : 4/10 [Pain Description/Quality: ___] : Pain description/quality: [unfilled] [Pain Duration: ___] : Pain duration: [unfilled] [Pain Location: ___] : Pain Location: [unfilled] [Pain Interferes with ADLs] : Pain interferes with activities of daily living. [Opioid] : opioid

## 2022-03-17 NOTE — DISEASE MANAGEMENT
[Pathological] : TNM Stage: p [SANFORD] : SANFORD [FreeTextEntry4] : SCC oral cavity [TTNM] : 4a [NTNM] : 2b [MTNM] : 0 [de-identified] : 6754 [de-identified] : 0241 [de-identified] : gingiva/neck

## 2022-03-17 NOTE — REVIEW OF SYSTEMS
[Dysphagia: Grade 0] : Dysphagia: Grade 0 [Edema Limbs: Grade 0] : Edema Limbs: Grade 0  [Fatigue: Grade 0] : Fatigue: Grade 0 [Localized Edema: Grade 2 - Moderate localized edema and intervention indicated; limiting instrumental ADL] : Localized Edema: Grade 2 - Moderate localized edema and intervention indicated; limiting instrumental ADL [Neck Edema: Grade 2 - Moderate neck edema; slight obliteration of anatomic landmarks; limiting instrumental ADL] : Neck Edema: Grade 2 - Moderate neck edema; slight obliteration of anatomic landmarks; limiting instrumental ADL [Xerostomia: Grade 1 - Symptomatic (e.g., dry or thick saliva) without significant dietary alteration; unstimulated saliva flow >0.2 ml/min] : Xerostomia: Grade 1 - Symptomatic (e.g., dry or thick saliva) without significant dietary alteration; unstimulated saliva flow >0.2 ml/min [Salivary duct inflammation: Grade 0] : Salivary duct inflammation: Grade 0 [Dysgeusia: Grade 2 - Altered taste with change in diet (e.g., oral supplements); noxious or unpleasant taste; loss of taste] : Dysgeusia: Grade 2 - Altered taste with change in diet (e.g., oral supplements); noxious or unpleasant taste; loss of taste [Skin Hyperpigmentation: Grade 1 - Hyperpigmentation covering <10% BSA; no psychosocial impact] : Skin Hyperpigmentation: Grade 1 - Hyperpigmentation covering <10% BSA; no psychosocial impact [Dermatitis Radiation: Grade 1 - Faint erythema or dry desquamation] : Dermatitis Radiation: Grade 1 - Faint erythema or dry desquamation [Mucositis Oral: Grade 2 - Moderate pain; not interfering with oral intake; modified diet indicated] : Mucositis Oral: Grade 2 - Moderate pain; not interfering with oral intake; modified diet indicated [Oral Pain: Grade 1 - Mild pain] : Oral Pain: Grade 1 - Mild pain

## 2022-03-17 NOTE — PHYSICAL EXAM
[Extraocular Movements] : extraocular movements were intact [Normal] : oriented to person, place and time, the affect was normal, the mood was normal and not anxious [de-identified] : lichen planus, well-healing flap grade 1-2 mucositis, mucositis of oral cavity [de-identified] : edema right neck/submental area

## 2022-03-18 ENCOUNTER — RESULT REVIEW (OUTPATIENT)
Age: 57
End: 2022-03-18

## 2022-03-18 ENCOUNTER — APPOINTMENT (OUTPATIENT)
Dept: HEMATOLOGY ONCOLOGY | Facility: CLINIC | Age: 57
End: 2022-03-18
Payer: COMMERCIAL

## 2022-03-18 VITALS
OXYGEN SATURATION: 94 % | BODY MASS INDEX: 34.66 KG/M2 | HEIGHT: 64 IN | SYSTOLIC BLOOD PRESSURE: 109 MMHG | WEIGHT: 203.01 LBS | DIASTOLIC BLOOD PRESSURE: 73 MMHG | HEART RATE: 90 BPM

## 2022-03-18 LAB
BASOPHILS # BLD AUTO: 0.1 K/UL — SIGNIFICANT CHANGE UP (ref 0–0.2)
BASOPHILS NFR BLD AUTO: 1.2 % — SIGNIFICANT CHANGE UP (ref 0–2)
EOSINOPHIL # BLD AUTO: 0.1 K/UL — SIGNIFICANT CHANGE UP (ref 0–0.5)
EOSINOPHIL NFR BLD AUTO: 2.1 % — SIGNIFICANT CHANGE UP (ref 0–6)
HCT VFR BLD CALC: 36.9 % — SIGNIFICANT CHANGE UP (ref 34.5–45)
HGB BLD-MCNC: 11.8 G/DL — SIGNIFICANT CHANGE UP (ref 11.5–15.5)
LYMPHOCYTES # BLD AUTO: 0.4 K/UL — LOW (ref 1–3.3)
LYMPHOCYTES # BLD AUTO: 7.6 % — LOW (ref 13–44)
MCHC RBC-ENTMCNC: 27.9 PG — SIGNIFICANT CHANGE UP (ref 27–34)
MCHC RBC-ENTMCNC: 32 G/DL — SIGNIFICANT CHANGE UP (ref 32–36)
MCV RBC AUTO: 87.1 FL — SIGNIFICANT CHANGE UP (ref 80–100)
MONOCYTES # BLD AUTO: 0.7 K/UL — SIGNIFICANT CHANGE UP (ref 0–0.9)
MONOCYTES NFR BLD AUTO: 13.2 % — SIGNIFICANT CHANGE UP (ref 2–14)
NEUTROPHILS # BLD AUTO: 3.9 K/UL — SIGNIFICANT CHANGE UP (ref 1.8–7.4)
NEUTROPHILS NFR BLD AUTO: 75.8 % — SIGNIFICANT CHANGE UP (ref 43–77)
PLATELET # BLD AUTO: 160 K/UL — SIGNIFICANT CHANGE UP (ref 150–400)
RBC # BLD: 4.23 M/UL — SIGNIFICANT CHANGE UP (ref 3.8–5.2)
RBC # FLD: 13.8 % — SIGNIFICANT CHANGE UP (ref 10.3–14.5)
WBC # BLD: 5.2 K/UL — SIGNIFICANT CHANGE UP (ref 3.8–10.5)
WBC # FLD AUTO: 5.2 K/UL — SIGNIFICANT CHANGE UP (ref 3.8–10.5)

## 2022-03-18 PROCEDURE — 77427 RADIATION TX MANAGEMENT X5: CPT

## 2022-03-18 PROCEDURE — 99214 OFFICE O/P EST MOD 30 MIN: CPT

## 2022-03-18 PROCEDURE — 77387B: CUSTOM | Mod: 26

## 2022-03-18 RX ORDER — AMLODIPINE BESYLATE 10 MG/1
10 TABLET ORAL
Refills: 0 | Status: DISCONTINUED | COMMUNITY
End: 2022-03-18

## 2022-03-18 RX ORDER — CHLORHEXIDINE GLUCONATE, 0.12% ORAL RINSE 1.2 MG/ML
0.12 SOLUTION DENTAL
Qty: 1 | Refills: 0 | Status: DISCONTINUED | COMMUNITY
Start: 2022-01-25 | End: 2022-03-18

## 2022-03-21 ENCOUNTER — APPOINTMENT (OUTPATIENT)
Age: 57
End: 2022-03-21

## 2022-03-21 PROCEDURE — 77387B: CUSTOM | Mod: 26

## 2022-03-22 ENCOUNTER — APPOINTMENT (OUTPATIENT)
Dept: OTOLARYNGOLOGY | Facility: CLINIC | Age: 57
End: 2022-03-22

## 2022-03-22 ENCOUNTER — APPOINTMENT (OUTPATIENT)
Age: 57
End: 2022-03-22

## 2022-03-22 LAB
ALBUMIN SERPL ELPH-MCNC: 4.1 G/DL
ALP BLD-CCNC: 76 U/L
ALT SERPL-CCNC: 23 U/L
ANION GAP SERPL CALC-SCNC: 15 MMOL/L
AST SERPL-CCNC: 14 U/L
BILIRUB SERPL-MCNC: 0.2 MG/DL
BUN SERPL-MCNC: 29 MG/DL
CALCIUM SERPL-MCNC: 9.5 MG/DL
CHLORIDE SERPL-SCNC: 99 MMOL/L
CO2 SERPL-SCNC: 24 MMOL/L
CREAT SERPL-MCNC: 1.5 MG/DL
EGFR: 40 ML/MIN/1.73M2
GLUCOSE SERPL-MCNC: 104 MG/DL
MAGNESIUM SERPL-MCNC: 1.9 MG/DL
POTASSIUM SERPL-SCNC: 4.3 MMOL/L
PROT SERPL-MCNC: 6 G/DL
SODIUM SERPL-SCNC: 138 MMOL/L

## 2022-03-22 PROCEDURE — 77387B: CUSTOM | Mod: 26

## 2022-03-23 ENCOUNTER — RESULT REVIEW (OUTPATIENT)
Age: 57
End: 2022-03-23

## 2022-03-23 ENCOUNTER — APPOINTMENT (OUTPATIENT)
Age: 57
End: 2022-03-23

## 2022-03-23 PROCEDURE — 77387B: CUSTOM | Mod: 26

## 2022-03-23 NOTE — PHYSICAL EXAM
[Fully active, able to carry on all pre-disease performance without restriction] : Status 0 - Fully active, able to carry on all pre-disease performance without restriction [Normal] : affect appropriate [de-identified] : right neck fullness and erythema post operatively.

## 2022-03-23 NOTE — ASSESSMENT
[FreeTextEntry1] : 56 year old woman diagnosed with oral cancer s/p resection with right neck dissection, [id number] \par \par # Oral Cancer \par - C3D5 of CRT with weekly Cisplatin\par - GEMA Hobbs following closely given significant weight loss. patient is "trying the best she can"\par - advised proper fluid intake of 2L of liquid daily\par - advised salt + baking soda rinse for mucositis and continue MMW and oxycodone for odynophagia \par - continue aquaphor for erythematous skin around neck, can consider silvadene for worsening symptoms\par - advised mucinex for thick oral secretions \par - RTC weekly for Cisplatin, daily for RT and Q2 weeks for MD/PA follow up \par \par # Tobacco Abuse\par - denies any cigarette use x 2 months and "will never smoke again."\par

## 2022-03-23 NOTE — HISTORY OF PRESENT ILLNESS
"PCP clearance from Dr. Reddy: "She is medically optimized and cleared for Left shoulder replacement at minimal risk for perioperative complications."    Dental clearance scanned in media.    Frederick Gonzalez PA-C  2/18/2020  " [T: ___] : T[unfilled] [N: ___] : N[unfilled] [M: ___] : M[unfilled] [de-identified] : Patient is a 55 yo F with h/o HTN, HLD and asthma who was diagnosed with SCC of the gingival mucosa.\par \par She saw oral surgeon, Dr. Cristopher Pinon, c/o soreness in the right jaw.  A biopsy of the right mandibular gingiva on 11/26/21 showed well-differentiated squamous cell carcinoma, P16 negative.\par \par She next saw Dr. James So. On exam, she was noted to have an ulcerated and endophytic lesion in the right posterior mandibular gingiva/post-molar region, abutting the 2nd premolar tooth and the base RMT posteriorly. There was buccal induration, no lingual extension.\par \par Staging CT neck on 12/23/21 showed an enhancing abnormal soft tissue lesion centered along the right posterior mandibular gingiva and gingivobuccal sulcus with suspected bony involvement of the adjacent alveolar ridge and also the lingual mandibular gingiva.  Right-sided pathologic cervical lymphadenopathy at right level Ib and right level II. Minimally hazy margins adjacent to the right level Ib lymph node. Staging PET on the same day showed FDG avid soft tissue lesion along the right posterior mandibular gingiva and gingivobuccal sulcus as seen on contrast-enhanced CT compatible with newly diagnosed squamous cell cancer. FDG avidity extends towards the lingual surface. FDG avid right level Ib cervical lymph nodes likely metastatic. FDG avid right level IIA and IIB lymph nodes are indeterminate. FDG avid focus within the enlarged right lobe, likely corresponding to the vague 1.1 cm nodule on contrast enhanced CT.\par \par On 1/13/22 she underwent right composite resection (segmental mandibulectomy with a partial right buccal soft tissue and floor of mouth excision), right neck dissection levels I-IV with Dr So.  Pathology showed invasive squamous cell carcinoma, well-differentiated, measuring 1.7 cm. Depth of invasion 0.6 cm. No LVI/PNI. There was microscopic evidence of tumor invasion into underlying bone (pT4a). Tumor margin from medial soft tissue was 0.4 cm; from the lateral soft tissue was 0.1 cm. Positive lymph nodes were identified in right level 1b and 2a; total 4/18; largest 1.5 cm, no LEONIE. pT4a N2b. \par \par The patient was started on cRT with weekly cisplatin. \par \par  [de-identified] : Patient presents on C3D5 CRT with weekly Cisplatin for SCC of the gingival mucosa.\par + Odynophagia, no dysphagia. + Severe oral pain with associated mucositis. + N/V. + Excessive oral secretions. + Xerostomia + R ear numbness, no pain. + Weight loss, 15% since initial visit. + Dry cough. Pain at LLE skin graft site is slowly improving.  No fevers or SOB. Has not smoked in 2 months.

## 2022-03-24 ENCOUNTER — NON-APPOINTMENT (OUTPATIENT)
Age: 57
End: 2022-03-24

## 2022-03-24 LAB
ALBUMIN SERPL ELPH-MCNC: 3.7 G/DL — SIGNIFICANT CHANGE UP (ref 3.3–5)
ALP SERPL-CCNC: 66 U/L — SIGNIFICANT CHANGE UP (ref 40–120)
ALT FLD-CCNC: 18 U/L — SIGNIFICANT CHANGE UP (ref 10–45)
ANION GAP SERPL CALC-SCNC: 18 MMOL/L — HIGH (ref 5–17)
AST SERPL-CCNC: 19 U/L — SIGNIFICANT CHANGE UP (ref 10–40)
BILIRUB SERPL-MCNC: <0.2 MG/DL — SIGNIFICANT CHANGE UP (ref 0.2–1.2)
BUN SERPL-MCNC: 29 MG/DL — HIGH (ref 7–23)
CALCIUM SERPL-MCNC: 9.3 MG/DL — SIGNIFICANT CHANGE UP (ref 8.4–10.5)
CHLORIDE SERPL-SCNC: 101 MMOL/L — SIGNIFICANT CHANGE UP (ref 96–108)
CO2 SERPL-SCNC: 20 MMOL/L — LOW (ref 22–31)
CREAT SERPL-MCNC: 1.31 MG/DL — HIGH (ref 0.5–1.3)
EGFR: 48 ML/MIN/1.73M2 — LOW
GLUCOSE SERPL-MCNC: 87 MG/DL — SIGNIFICANT CHANGE UP (ref 70–99)
MAGNESIUM SERPL-MCNC: 2.2 MG/DL — SIGNIFICANT CHANGE UP (ref 1.6–2.6)
POTASSIUM SERPL-MCNC: 3.6 MMOL/L — SIGNIFICANT CHANGE UP (ref 3.5–5.3)
POTASSIUM SERPL-SCNC: 3.6 MMOL/L — SIGNIFICANT CHANGE UP (ref 3.5–5.3)
PROT SERPL-MCNC: 6.4 G/DL — SIGNIFICANT CHANGE UP (ref 6–8.3)
SODIUM SERPL-SCNC: 139 MMOL/L — SIGNIFICANT CHANGE UP (ref 135–145)

## 2022-03-24 PROCEDURE — 77014: CPT | Mod: 26

## 2022-03-24 NOTE — PHYSICAL EXAM
[Extraocular Movements] : extraocular movements were intact [Normal] : oriented to person, place and time, the affect was normal, the mood was normal and not anxious [de-identified] : well-healing flap; grade 2 oral mucositis [de-identified] : edema right neck/submental area [de-identified] : moderate erythema of the right neck; no desquamation

## 2022-03-24 NOTE — DISEASE MANAGEMENT
[Pathological] : TNM Stage: p [SANFORD] : SANFORD [FreeTextEntry4] : SCC oral cavity [TTNM] : 4a [NTNM] : 2b [MTNM] : 0 [de-identified] : 8949 [de-identified] : 1585 [de-identified] : gingiva/neck

## 2022-03-24 NOTE — REVIEW OF SYSTEMS
[Dysphagia: Grade 0] : Dysphagia: Grade 0 [Edema Limbs: Grade 0] : Edema Limbs: Grade 0  [Fatigue: Grade 0] : Fatigue: Grade 0 [Neck Edema: Grade 2 - Moderate neck edema; slight obliteration of anatomic landmarks; limiting instrumental ADL] : Neck Edema: Grade 2 - Moderate neck edema; slight obliteration of anatomic landmarks; limiting instrumental ADL [Localized Edema: Grade 2 - Moderate localized edema and intervention indicated; limiting instrumental ADL] : Localized Edema: Grade 2 - Moderate localized edema and intervention indicated; limiting instrumental ADL [Mucositis Oral: Grade 2 - Moderate pain; not interfering with oral intake; modified diet indicated] : Mucositis Oral: Grade 2 - Moderate pain; not interfering with oral intake; modified diet indicated [Xerostomia: Grade 1 - Symptomatic (e.g., dry or thick saliva) without significant dietary alteration; unstimulated saliva flow >0.2 ml/min] : Xerostomia: Grade 1 - Symptomatic (e.g., dry or thick saliva) without significant dietary alteration; unstimulated saliva flow >0.2 ml/min [Oral Pain: Grade 1 - Mild pain] : Oral Pain: Grade 1 - Mild pain [Salivary duct inflammation: Grade 0] : Salivary duct inflammation: Grade 0 [Dysgeusia: Grade 2 - Altered taste with change in diet (e.g., oral supplements); noxious or unpleasant taste; loss of taste] : Dysgeusia: Grade 2 - Altered taste with change in diet (e.g., oral supplements); noxious or unpleasant taste; loss of taste [Skin Hyperpigmentation: Grade 1 - Hyperpigmentation covering <10% BSA; no psychosocial impact] : Skin Hyperpigmentation: Grade 1 - Hyperpigmentation covering <10% BSA; no psychosocial impact [Vomiting: Grade 1 - 1 - 2 episodes ( by 5 minutes) in 24 hrs] : Vomiting: Grade 1 - 1 - 2 episodes ( by 5 minutes) in 24 hrs [Dermatitis Radiation: Grade 2 - Moderate to brisk erythema; patchy moist desquamation, mostly confined to skin folds and creases; moderate edema] : Dermatitis Radiation: Grade 2 - Moderate to brisk erythema; patchy moist desquamation, mostly confined to skin folds and creases; moderate edema

## 2022-03-25 ENCOUNTER — APPOINTMENT (OUTPATIENT)
Dept: PHYSICAL MEDICINE AND REHAB | Facility: CLINIC | Age: 57
End: 2022-03-25
Payer: COMMERCIAL

## 2022-03-25 ENCOUNTER — RESULT REVIEW (OUTPATIENT)
Age: 57
End: 2022-03-25

## 2022-03-25 ENCOUNTER — APPOINTMENT (OUTPATIENT)
Dept: HEMATOLOGY ONCOLOGY | Facility: CLINIC | Age: 57
End: 2022-03-25

## 2022-03-25 DIAGNOSIS — G25.89 OTHER SPECIFIED EXTRAPYRAMIDAL AND MOVEMENT DISORDERS: ICD-10-CM

## 2022-03-25 DIAGNOSIS — M25.511 PAIN IN RIGHT SHOULDER: ICD-10-CM

## 2022-03-25 LAB
BASOPHILS # BLD AUTO: 0 K/UL — SIGNIFICANT CHANGE UP (ref 0–0.2)
BASOPHILS NFR BLD AUTO: 1.1 % — SIGNIFICANT CHANGE UP (ref 0–2)
EOSINOPHIL # BLD AUTO: 0.1 K/UL — SIGNIFICANT CHANGE UP (ref 0–0.5)
EOSINOPHIL NFR BLD AUTO: 1.6 % — SIGNIFICANT CHANGE UP (ref 0–6)
HCT VFR BLD CALC: 34.2 % — LOW (ref 34.5–45)
HGB BLD-MCNC: 11.3 G/DL — LOW (ref 11.5–15.5)
LYMPHOCYTES # BLD AUTO: 0.3 K/UL — LOW (ref 1–3.3)
LYMPHOCYTES # BLD AUTO: 7.3 % — LOW (ref 13–44)
MCHC RBC-ENTMCNC: 27.8 PG — SIGNIFICANT CHANGE UP (ref 27–34)
MCHC RBC-ENTMCNC: 32.9 G/DL — SIGNIFICANT CHANGE UP (ref 32–36)
MCV RBC AUTO: 84.4 FL — SIGNIFICANT CHANGE UP (ref 80–100)
MONOCYTES # BLD AUTO: 0.5 K/UL — SIGNIFICANT CHANGE UP (ref 0–0.9)
MONOCYTES NFR BLD AUTO: 11.9 % — SIGNIFICANT CHANGE UP (ref 2–14)
NEUTROPHILS # BLD AUTO: 3.2 K/UL — SIGNIFICANT CHANGE UP (ref 1.8–7.4)
NEUTROPHILS NFR BLD AUTO: 78.1 % — HIGH (ref 43–77)
PLATELET # BLD AUTO: 150 K/UL — SIGNIFICANT CHANGE UP (ref 150–400)
RBC # BLD: 4.05 M/UL — SIGNIFICANT CHANGE UP (ref 3.8–5.2)
RBC # FLD: 13.4 % — SIGNIFICANT CHANGE UP (ref 10.3–14.5)
WBC # BLD: 4.1 K/UL — SIGNIFICANT CHANGE UP (ref 3.8–10.5)
WBC # FLD AUTO: 4.1 K/UL — SIGNIFICANT CHANGE UP (ref 3.8–10.5)

## 2022-03-25 PROCEDURE — 99204 OFFICE O/P NEW MOD 45 MIN: CPT

## 2022-03-25 PROCEDURE — 77387B: CUSTOM | Mod: 26

## 2022-03-25 PROCEDURE — 77427 RADIATION TX MANAGEMENT X5: CPT

## 2022-03-25 NOTE — REASON FOR VISIT
[Initial Evaluation] : an initial evaluation [FreeTextEntry1] : Mouth pain, edema, shoulder dysfunction

## 2022-03-25 NOTE — PHYSICAL EXAM
[FreeTextEntry1] : Gen: Patient is A&O x 3, NAD\par HEENT: EOMI, hearing grossly normal\par Resp: regular, non - labored\par CV: pulses regular\par Skin: no rashes, erythema\par Lymph: +Right cervical edema \par Inspection: Right lateral scapula winging \par ROM: Right shoulder abduction limited to ~110 degrees  \par Palpation:no tenderness to palpation\par Sensation: intact to light touch\par Reflexes: 1+ and symmetric throughout\par Strength: 5/5 throughout\par Special tests: +Hawkin's sign \par Gait: normal, non-antalgic\par \par

## 2022-03-25 NOTE — REVIEW OF SYSTEMS
[Negative] : Psychiatric [FreeTextEntry9] : +Decreased right shoulder ROM [de-identified] : +Mouth pain [de-identified] : +Edema

## 2022-03-25 NOTE — ASSESSMENT
[FreeTextEntry1] : 56 year old female presenting for evaluation.\par \par #Cancer associated pain\par -Continue oxycodone 5 mg nightly\par -Start gabapentin 300 mg twice daily\par \par #Lymphedema\par -Lymphedema education provided to patient\par -Start complete decongestive therapy\par \par #Right shoulder pain\par -Likely secondary to scapular winging\par -Obtain right shoulder x-ray to evaluate\par -Start physical therapy for scapular stabilization exercises.\par \par Follow-up in 1 month.

## 2022-03-25 NOTE — HISTORY OF PRESENT ILLNESS
[FreeTextEntry1] : Ms. Dsouza is a 56 year old female with squamous cell carcinoma of the oral cavity, status post right composite resection (segmental mandibulectomy with a partial right buccal soft tissue and floor of mouth excision), right neck dissection levels I-IV on 1/13/22 now undergoing adjuvant chemoradiation with weekly cisplatin.\par \par She reports she is having mouth pain since beginning radiation.  Currently taking oxycodone 5 mg nightly liquid form.  Reports makes her tired during the day.  She also reports she is noted some swelling in her right cervical region.  Also reports having difficulty with her right shoulder function.  Denies any overt right shoulder pain.\par

## 2022-03-27 ENCOUNTER — OUTPATIENT (OUTPATIENT)
Dept: OUTPATIENT SERVICES | Facility: HOSPITAL | Age: 57
LOS: 1 days | Discharge: HOME | End: 2022-03-27

## 2022-03-27 DIAGNOSIS — Z96.649 PRESENCE OF UNSPECIFIED ARTIFICIAL HIP JOINT: Chronic | ICD-10-CM

## 2022-03-27 DIAGNOSIS — Z98.890 OTHER SPECIFIED POSTPROCEDURAL STATES: Chronic | ICD-10-CM

## 2022-03-27 DIAGNOSIS — Z98.891 HISTORY OF UTERINE SCAR FROM PREVIOUS SURGERY: Chronic | ICD-10-CM

## 2022-03-27 DIAGNOSIS — Z90.711 ACQUIRED ABSENCE OF UTERUS WITH REMAINING CERVICAL STUMP: Chronic | ICD-10-CM

## 2022-03-27 DIAGNOSIS — C03.9 MALIGNANT NEOPLASM OF GUM, UNSPECIFIED: ICD-10-CM

## 2022-03-28 ENCOUNTER — RESULT REVIEW (OUTPATIENT)
Age: 57
End: 2022-03-28

## 2022-03-28 ENCOUNTER — NON-APPOINTMENT (OUTPATIENT)
Age: 57
End: 2022-03-28

## 2022-03-28 ENCOUNTER — APPOINTMENT (OUTPATIENT)
Age: 57
End: 2022-03-28

## 2022-03-28 VITALS — WEIGHT: 198 LBS | BODY MASS INDEX: 33.99 KG/M2

## 2022-03-28 LAB
ALBUMIN SERPL ELPH-MCNC: 3.8 G/DL
ALP BLD-CCNC: 71 U/L
ALT SERPL-CCNC: 17 U/L
ANION GAP SERPL CALC-SCNC: 16 MMOL/L
AST SERPL-CCNC: 18 U/L
BASOPHILS # BLD AUTO: 0 K/UL — SIGNIFICANT CHANGE UP (ref 0–0.2)
BASOPHILS NFR BLD AUTO: 1 % — SIGNIFICANT CHANGE UP (ref 0–2)
BILIRUB SERPL-MCNC: 0.2 MG/DL
BUN SERPL-MCNC: 19 MG/DL
CALCIUM SERPL-MCNC: 9.4 MG/DL
CHLORIDE SERPL-SCNC: 101 MMOL/L
CO2 SERPL-SCNC: 24 MMOL/L
CREAT SERPL-MCNC: 1.32 MG/DL
EGFR: 47 ML/MIN/1.73M2
EOSINOPHIL # BLD AUTO: 0.1 K/UL — SIGNIFICANT CHANGE UP (ref 0–0.5)
EOSINOPHIL NFR BLD AUTO: 2.5 % — SIGNIFICANT CHANGE UP (ref 0–6)
GLUCOSE SERPL-MCNC: 99 MG/DL
HCT VFR BLD CALC: 33.6 % — LOW (ref 34.5–45)
HGB BLD-MCNC: 11.1 G/DL — LOW (ref 11.5–15.5)
LYMPHOCYTES # BLD AUTO: 0.2 K/UL — LOW (ref 1–3.3)
LYMPHOCYTES # BLD AUTO: 6.2 % — LOW (ref 13–44)
MAGNESIUM SERPL-MCNC: 1.7 MG/DL
MCHC RBC-ENTMCNC: 27.8 PG — SIGNIFICANT CHANGE UP (ref 27–34)
MCHC RBC-ENTMCNC: 33 G/DL — SIGNIFICANT CHANGE UP (ref 32–36)
MCV RBC AUTO: 84.3 FL — SIGNIFICANT CHANGE UP (ref 80–100)
MONOCYTES # BLD AUTO: 0.4 K/UL — SIGNIFICANT CHANGE UP (ref 0–0.9)
MONOCYTES NFR BLD AUTO: 12.2 % — SIGNIFICANT CHANGE UP (ref 2–14)
NEUTROPHILS # BLD AUTO: 2.7 K/UL — SIGNIFICANT CHANGE UP (ref 1.8–7.4)
NEUTROPHILS NFR BLD AUTO: 78.2 % — HIGH (ref 43–77)
PLATELET # BLD AUTO: 131 K/UL — LOW (ref 150–400)
POTASSIUM SERPL-SCNC: 4.1 MMOL/L
PROT SERPL-MCNC: 5.9 G/DL
RBC # BLD: 3.99 M/UL — SIGNIFICANT CHANGE UP (ref 3.8–5.2)
RBC # FLD: 13.6 % — SIGNIFICANT CHANGE UP (ref 10.3–14.5)
SODIUM SERPL-SCNC: 142 MMOL/L
WBC # BLD: 3.4 K/UL — LOW (ref 3.8–10.5)
WBC # FLD AUTO: 3.4 K/UL — LOW (ref 3.8–10.5)

## 2022-03-28 PROCEDURE — 77387B: CUSTOM | Mod: 26

## 2022-03-28 NOTE — VITALS
[Least Pain Intensity: 4/10] : 4/10 [Maximal Pain Intensity: 4/10] : 4/10 [Pain Description/Quality: ___] : Pain description/quality: [unfilled] [Pain Duration: ___] : Pain duration: [unfilled] [Pain Location: ___] : Pain Location: [unfilled] [Pain Interferes with ADLs] : Pain interferes with activities of daily living. [Opioid] : opioid [80: Normal activity with effort; some signs or symptoms of disease.] : 80: Normal activity with effort; some signs or symptoms of disease.

## 2022-03-29 ENCOUNTER — APPOINTMENT (OUTPATIENT)
Age: 57
End: 2022-03-29

## 2022-03-29 ENCOUNTER — NON-APPOINTMENT (OUTPATIENT)
Age: 57
End: 2022-03-29

## 2022-03-29 DIAGNOSIS — Z51.11 ENCOUNTER FOR ANTINEOPLASTIC CHEMOTHERAPY: ICD-10-CM

## 2022-03-29 DIAGNOSIS — R11.2 NAUSEA WITH VOMITING, UNSPECIFIED: ICD-10-CM

## 2022-03-29 PROCEDURE — 77387B: CUSTOM | Mod: 26

## 2022-03-30 DIAGNOSIS — E86.0 DEHYDRATION: ICD-10-CM

## 2022-03-30 PROCEDURE — 77387B: CUSTOM | Mod: 26

## 2022-03-31 ENCOUNTER — NON-APPOINTMENT (OUTPATIENT)
Age: 57
End: 2022-03-31

## 2022-03-31 PROCEDURE — 77014: CPT | Mod: 26

## 2022-03-31 NOTE — REVIEW OF SYSTEMS
[Vomiting: Grade 1 - 1 - 2 episodes ( by 5 minutes) in 24 hrs] : Vomiting: Grade 1 - 1 - 2 episodes ( by 5 minutes) in 24 hrs [Edema Limbs: Grade 0] : Edema Limbs: Grade 0  [Localized Edema: Grade 2 - Moderate localized edema and intervention indicated; limiting instrumental ADL] : Localized Edema: Grade 2 - Moderate localized edema and intervention indicated; limiting instrumental ADL [Neck Edema: Grade 2 - Moderate neck edema; slight obliteration of anatomic landmarks; limiting instrumental ADL] : Neck Edema: Grade 2 - Moderate neck edema; slight obliteration of anatomic landmarks; limiting instrumental ADL [Mucositis Oral: Grade 2 - Moderate pain; not interfering with oral intake; modified diet indicated] : Mucositis Oral: Grade 2 - Moderate pain; not interfering with oral intake; modified diet indicated [Xerostomia: Grade 1 - Symptomatic (e.g., dry or thick saliva) without significant dietary alteration; unstimulated saliva flow >0.2 ml/min] : Xerostomia: Grade 1 - Symptomatic (e.g., dry or thick saliva) without significant dietary alteration; unstimulated saliva flow >0.2 ml/min [Dysgeusia: Grade 2 - Altered taste with change in diet (e.g., oral supplements); noxious or unpleasant taste; loss of taste] : Dysgeusia: Grade 2 - Altered taste with change in diet (e.g., oral supplements); noxious or unpleasant taste; loss of taste [Skin Hyperpigmentation: Grade 1 - Hyperpigmentation covering <10% BSA; no psychosocial impact] : Skin Hyperpigmentation: Grade 1 - Hyperpigmentation covering <10% BSA; no psychosocial impact [Dermatitis Radiation: Grade 2 - Moderate to brisk erythema; patchy moist desquamation, mostly confined to skin folds and creases; moderate edema] : Dermatitis Radiation: Grade 2 - Moderate to brisk erythema; patchy moist desquamation, mostly confined to skin folds and creases; moderate edema [Dysphagia: Grade 2 - Symptomatic and altered eating/swallowing] : Dysphagia: Grade 2 - Symptomatic and altered eating/swallowing [Fatigue: Grade 1 - Fatigue relieved by rest] : Fatigue: Grade 1 - Fatigue relieved by rest [Oral Pain: Grade 2 - Moderate pain; limiting instrumental ADL] : Oral Pain: Grade 2 - Moderate pain; limiting instrumental ADL [Salivary duct inflammation: Grade 2 - Thick, ropy, sticky saliva; markedly altered taste; alteration in diet indicated; secretion-induced symptoms; limiting instrumental ADL] : Salivary duct inflammation: Grade 2 - Thick, ropy, sticky saliva; markedly altered taste; alteration in diet indicated; secretion-induced symptoms; limiting instrumental ADL

## 2022-03-31 NOTE — DISEASE MANAGEMENT
[Pathological] : TNM Stage: p [SANFORD] : SANFORD [FreeTextEntry4] : SCC oral cavity [TTNM] : 4a [NTNM] : 2b [MTNM] : 0 [de-identified] : 7404 [de-identified] : [id number] [de-identified] : gingiva/neck

## 2022-03-31 NOTE — PHYSICAL EXAM
[Extraocular Movements] : extraocular movements were intact [Normal] : oriented to person, place and time, the affect was normal, the mood was normal and not anxious [de-identified] : well-healing flap; grade 2 oral mucositis [de-identified] : edema right neck/submental area [de-identified] : moderate erythema of the right neck; no desquamation

## 2022-04-01 ENCOUNTER — RESULT REVIEW (OUTPATIENT)
Age: 57
End: 2022-04-01

## 2022-04-01 ENCOUNTER — APPOINTMENT (OUTPATIENT)
Dept: HEMATOLOGY ONCOLOGY | Facility: CLINIC | Age: 57
End: 2022-04-01
Payer: COMMERCIAL

## 2022-04-01 VITALS
BODY MASS INDEX: 33.63 KG/M2 | HEART RATE: 86 BPM | HEIGHT: 64 IN | OXYGEN SATURATION: 93 % | DIASTOLIC BLOOD PRESSURE: 69 MMHG | WEIGHT: 197 LBS | SYSTOLIC BLOOD PRESSURE: 105 MMHG

## 2022-04-01 LAB
ALBUMIN SERPL ELPH-MCNC: 3.6 G/DL
ALP BLD-CCNC: 59 U/L
ALT SERPL-CCNC: 9 U/L
ANION GAP SERPL CALC-SCNC: 16 MMOL/L
ANISOCYTOSIS BLD QL: SLIGHT — SIGNIFICANT CHANGE UP
AST SERPL-CCNC: 11 U/L
BASOPHILS # BLD AUTO: 0.1 K/UL — SIGNIFICANT CHANGE UP (ref 0–0.2)
BILIRUB SERPL-MCNC: 0.2 MG/DL
BUN SERPL-MCNC: 24 MG/DL
CALCIUM SERPL-MCNC: 9 MG/DL
CHLORIDE SERPL-SCNC: 98 MMOL/L
CO2 SERPL-SCNC: 26 MMOL/L
CREAT SERPL-MCNC: 1.57 MG/DL
EGFR: 38 ML/MIN/1.73M2
EOSINOPHIL # BLD AUTO: 0 K/UL — SIGNIFICANT CHANGE UP (ref 0–0.5)
EOSINOPHIL NFR BLD AUTO: 2 % — SIGNIFICANT CHANGE UP (ref 0–6)
GIANT PLATELETS BLD QL SMEAR: PRESENT — SIGNIFICANT CHANGE UP
GLUCOSE SERPL-MCNC: 117 MG/DL
HCT VFR BLD CALC: 33.3 % — LOW (ref 34.5–45)
HGB BLD-MCNC: 10.6 G/DL — LOW (ref 11.5–15.5)
LG PLATELETS BLD QL AUTO: SLIGHT — SIGNIFICANT CHANGE UP
LYMPHOCYTES # BLD AUTO: 0.1 K/UL — LOW (ref 1–3.3)
LYMPHOCYTES # BLD AUTO: 6 % — LOW (ref 13–44)
MACROCYTES BLD QL: SLIGHT — SIGNIFICANT CHANGE UP
MAGNESIUM SERPL-MCNC: 1.8 MG/DL
MCHC RBC-ENTMCNC: 27.7 PG — SIGNIFICANT CHANGE UP (ref 27–34)
MCHC RBC-ENTMCNC: 31.7 G/DL — LOW (ref 32–36)
MCV RBC AUTO: 87.3 FL — SIGNIFICANT CHANGE UP (ref 80–100)
MICROCYTES BLD QL: SLIGHT — SIGNIFICANT CHANGE UP
MONOCYTES # BLD AUTO: 0.3 K/UL — SIGNIFICANT CHANGE UP (ref 0–0.9)
MONOCYTES NFR BLD AUTO: 15 % — HIGH (ref 2–14)
NEUTROPHILS # BLD AUTO: 1.4 K/UL — LOW (ref 1.8–7.4)
NEUTROPHILS NFR BLD AUTO: 74 % — SIGNIFICANT CHANGE UP (ref 43–77)
NEUTS BAND # BLD: 3 % — SIGNIFICANT CHANGE UP (ref 0–8)
PLAT MORPH BLD: NORMAL — SIGNIFICANT CHANGE UP
PLATELET # BLD AUTO: 165 K/UL — SIGNIFICANT CHANGE UP (ref 150–400)
POIKILOCYTOSIS BLD QL AUTO: SLIGHT — SIGNIFICANT CHANGE UP
POTASSIUM SERPL-SCNC: 3.6 MMOL/L
PROT SERPL-MCNC: 5.7 G/DL
RBC # BLD: 3.82 M/UL — SIGNIFICANT CHANGE UP (ref 3.8–5.2)
RBC # FLD: 14.1 % — SIGNIFICANT CHANGE UP (ref 10.3–14.5)
RBC BLD AUTO: SIGNIFICANT CHANGE UP
SODIUM SERPL-SCNC: 139 MMOL/L
WBC # BLD: 1.9 K/UL — LOW (ref 3.8–10.5)
WBC # FLD AUTO: 1.9 K/UL — LOW (ref 3.8–10.5)

## 2022-04-01 PROCEDURE — 77427 RADIATION TX MANAGEMENT X5: CPT

## 2022-04-01 PROCEDURE — 99214 OFFICE O/P EST MOD 30 MIN: CPT

## 2022-04-01 PROCEDURE — 77387B: CUSTOM | Mod: 26

## 2022-04-04 ENCOUNTER — APPOINTMENT (OUTPATIENT)
Age: 57
End: 2022-04-04

## 2022-04-04 PROCEDURE — 77387B: CUSTOM | Mod: 26

## 2022-04-05 ENCOUNTER — APPOINTMENT (OUTPATIENT)
Age: 57
End: 2022-04-05

## 2022-04-05 ENCOUNTER — APPOINTMENT (OUTPATIENT)
Dept: PLASTIC SURGERY | Facility: CLINIC | Age: 57
End: 2022-04-05
Payer: COMMERCIAL

## 2022-04-05 VITALS
HEART RATE: 103 BPM | DIASTOLIC BLOOD PRESSURE: 75 MMHG | WEIGHT: 196 LBS | HEIGHT: 64 IN | BODY MASS INDEX: 33.46 KG/M2 | SYSTOLIC BLOOD PRESSURE: 119 MMHG | OXYGEN SATURATION: 97 % | TEMPERATURE: 97.3 F

## 2022-04-05 PROCEDURE — 99024 POSTOP FOLLOW-UP VISIT: CPT

## 2022-04-05 PROCEDURE — 77387B: CUSTOM | Mod: 26

## 2022-04-06 PROCEDURE — 77387B: CUSTOM | Mod: 26

## 2022-04-06 NOTE — ASSESSMENT
[FreeTextEntry1] : 56 year old woman diagnosed with oral cancer s/p resection with right neck dissection, [id number] \par \par # Oral Cancer \par - C4D5 of CRT with weekly Cisplatin\par - C4 delayed by elevated creatinine now improved s/p several days of IVF\par - RD Faby following closely given significant weight loss. patient is "trying the best she can."  Advised to increase calorie intake any way possible, adding calories to shakes/smoothies with peanut butter or protein powder.\par - advised proper fluid intake of 2L of liquid daily.  Will continue D2 IVF\par - advised salt + baking soda rinse for mucositis and continue MMW and oxycodone for odynophagia.  MMW renewed today at increased dose. \par - continue aquaphor for erythematous skin around neck, can consider silvadene for worsening symptoms\par - scopolamine ordered by RT for thick oral secretions \par - advised zofran and compazine ATC for N/V.  Will consider adding ativan if no improvement \par - RTC weekly for Cisplatin, daily for RT and Q2 weeks for MD/PA follow up \par \par # Tobacco Abuse\par - denies any cigarette use x 2 months and "will never smoke again."\par

## 2022-04-06 NOTE — PHYSICAL EXAM
[Fully active, able to carry on all pre-disease performance without restriction] : Status 0 - Fully active, able to carry on all pre-disease performance without restriction [Normal] : affect appropriate [de-identified] : right neck fullness and erythema post operatively.

## 2022-04-06 NOTE — HISTORY OF PRESENT ILLNESS
[T: ___] : T[unfilled] [N: ___] : N[unfilled] [M: ___] : M[unfilled] [de-identified] : Patient is a 55 yo F with h/o HTN, HLD and asthma who was diagnosed with SCC of the gingival mucosa.\par \par She saw oral surgeon, Dr. Cristopher Pinon, c/o soreness in the right jaw.  A biopsy of the right mandibular gingiva on 11/26/21 showed well-differentiated squamous cell carcinoma, P16 negative.\par \par She next saw Dr. James So. On exam, she was noted to have an ulcerated and endophytic lesion in the right posterior mandibular gingiva/post-molar region, abutting the 2nd premolar tooth and the base RMT posteriorly. There was buccal induration, no lingual extension.\par \par Staging CT neck on 12/23/21 showed an enhancing abnormal soft tissue lesion centered along the right posterior mandibular gingiva and gingivobuccal sulcus with suspected bony involvement of the adjacent alveolar ridge and also the lingual mandibular gingiva.  Right-sided pathologic cervical lymphadenopathy at right level Ib and right level II. Minimally hazy margins adjacent to the right level Ib lymph node. Staging PET on the same day showed FDG avid soft tissue lesion along the right posterior mandibular gingiva and gingivobuccal sulcus as seen on contrast-enhanced CT compatible with newly diagnosed squamous cell cancer. FDG avidity extends towards the lingual surface. FDG avid right level Ib cervical lymph nodes likely metastatic. FDG avid right level IIA and IIB lymph nodes are indeterminate. FDG avid focus within the enlarged right lobe, likely corresponding to the vague 1.1 cm nodule on contrast enhanced CT.\par \par On 1/13/22 she underwent right composite resection (segmental mandibulectomy with a partial right buccal soft tissue and floor of mouth excision), right neck dissection levels I-IV with Dr So.  Pathology showed invasive squamous cell carcinoma, well-differentiated, measuring 1.7 cm. Depth of invasion 0.6 cm. No LVI/PNI. There was microscopic evidence of tumor invasion into underlying bone (pT4a). Tumor margin from medial soft tissue was 0.4 cm; from the lateral soft tissue was 0.1 cm. Positive lymph nodes were identified in right level 1b and 2a; total 4/18; largest 1.5 cm, no LEONIE. pT4a N2b. \par \par The patient was started on cRT with weekly cisplatin. \par \par  [de-identified] : Patient presents on C4D5 CRT with weekly Cisplatin for SCC of the gingival mucosa.  C4 delayed by elevated creatinine now s/p several days of IVF\par + Odynophagia, worse but still no dysphagia. + Severe oral pain with associated mucositis, worse. + Minimal dysgeusia. + N/V, 2-3x/day. + Excessive oral secretions, improved with scopolamine. + Xerostomia, worse. + R ear numbness unchanged, still no pain. + Weight loss, 15-20% since initial visit. + Dry cough, improved with claritin. + Pain at LLE skin graft site is slowly improving. No fevers or SOB. Has not smoked in 2 months.

## 2022-04-07 ENCOUNTER — APPOINTMENT (OUTPATIENT)
Age: 57
End: 2022-04-07

## 2022-04-07 ENCOUNTER — NON-APPOINTMENT (OUTPATIENT)
Age: 57
End: 2022-04-07

## 2022-04-07 VITALS
BODY MASS INDEX: 33.3 KG/M2 | OXYGEN SATURATION: 94 % | WEIGHT: 194 LBS | HEART RATE: 105 BPM | SYSTOLIC BLOOD PRESSURE: 109 MMHG | DIASTOLIC BLOOD PRESSURE: 74 MMHG | RESPIRATION RATE: 16 BRPM

## 2022-04-07 PROCEDURE — 77014: CPT | Mod: 26

## 2022-04-07 NOTE — PHYSICAL EXAM
[Extraocular Movements] : extraocular movements were intact [Normal] : oriented to person, place and time, the affect was normal, the mood was normal and not anxious [de-identified] : grade 3 oral mucositis [de-identified] : edema right neck/submental area [de-identified] : moderate erythema of the neck, right>left; no desquamation

## 2022-04-07 NOTE — REVIEW OF SYSTEMS
[Dysphagia: Grade 2 - Symptomatic and altered eating/swallowing] : Dysphagia: Grade 2 - Symptomatic and altered eating/swallowing [Vomiting: Grade 1 - 1 - 2 episodes ( by 5 minutes) in 24 hrs] : Vomiting: Grade 1 - 1 - 2 episodes ( by 5 minutes) in 24 hrs [Edema Limbs: Grade 0] : Edema Limbs: Grade 0  [Fatigue: Grade 1 - Fatigue relieved by rest] : Fatigue: Grade 1 - Fatigue relieved by rest [Localized Edema: Grade 2 - Moderate localized edema and intervention indicated; limiting instrumental ADL] : Localized Edema: Grade 2 - Moderate localized edema and intervention indicated; limiting instrumental ADL [Neck Edema: Grade 2 - Moderate neck edema; slight obliteration of anatomic landmarks; limiting instrumental ADL] : Neck Edema: Grade 2 - Moderate neck edema; slight obliteration of anatomic landmarks; limiting instrumental ADL [Xerostomia: Grade 1 - Symptomatic (e.g., dry or thick saliva) without significant dietary alteration; unstimulated saliva flow >0.2 ml/min] : Xerostomia: Grade 1 - Symptomatic (e.g., dry or thick saliva) without significant dietary alteration; unstimulated saliva flow >0.2 ml/min [Salivary duct inflammation: Grade 2 - Thick, ropy, sticky saliva; markedly altered taste; alteration in diet indicated; secretion-induced symptoms; limiting instrumental ADL] : Salivary duct inflammation: Grade 2 - Thick, ropy, sticky saliva; markedly altered taste; alteration in diet indicated; secretion-induced symptoms; limiting instrumental ADL [Dysgeusia: Grade 2 - Altered taste with change in diet (e.g., oral supplements); noxious or unpleasant taste; loss of taste] : Dysgeusia: Grade 2 - Altered taste with change in diet (e.g., oral supplements); noxious or unpleasant taste; loss of taste [Skin Hyperpigmentation: Grade 1 - Hyperpigmentation covering <10% BSA; no psychosocial impact] : Skin Hyperpigmentation: Grade 1 - Hyperpigmentation covering <10% BSA; no psychosocial impact [Dermatitis Radiation: Grade 2 - Moderate to brisk erythema; patchy moist desquamation, mostly confined to skin folds and creases; moderate edema] : Dermatitis Radiation: Grade 2 - Moderate to brisk erythema; patchy moist desquamation, mostly confined to skin folds and creases; moderate edema [Oral Pain: Grade 3 - Severe pain; limiting self care ADL] : Oral Pain: Grade 3 - Severe pain; limiting self care ADL [Mucositis Oral: Grade 3 - Severe pain; interfering with oral intake] : Mucositis Oral: Grade 3 - Severe pain; interfering with oral intake

## 2022-04-07 NOTE — VITALS
[Pain Description/Quality: ___] : Pain description/quality: [unfilled] [Pain Duration: ___] : Pain duration: [unfilled] [Pain Location: ___] : Pain Location: [unfilled] [Pain Interferes with ADLs] : Pain interferes with activities of daily living. [Opioid] : opioid [Maximal Pain Intensity: 6/10] : 6/10 [Least Pain Intensity: 6/10] : 6/10 [70: Cares for self; unalbe to carry on normal activity or do active work.] : 70: Cares for self; unable to carry on normal activity or do active work.

## 2022-04-07 NOTE — DISEASE MANAGEMENT
[Pathological] : TNM Stage: p [SANFORD] : SANFORD [FreeTextEntry4] : SCC oral cavity [TTNM] : 4a [NTNM] : 2b [MTNM] : 0 [de-identified] : 5309 [de-identified] : [id number] [de-identified] : oral cavity/neck

## 2022-04-08 ENCOUNTER — RESULT REVIEW (OUTPATIENT)
Age: 57
End: 2022-04-08

## 2022-04-08 ENCOUNTER — APPOINTMENT (OUTPATIENT)
Dept: HEMATOLOGY ONCOLOGY | Facility: CLINIC | Age: 57
End: 2022-04-08

## 2022-04-08 LAB
BASOPHILS # BLD AUTO: 0 K/UL — SIGNIFICANT CHANGE UP (ref 0–0.2)
BASOPHILS NFR BLD AUTO: 1 % — SIGNIFICANT CHANGE UP (ref 0–2)
EOSINOPHIL # BLD AUTO: 0 K/UL — SIGNIFICANT CHANGE UP (ref 0–0.5)
EOSINOPHIL NFR BLD AUTO: 1.2 % — SIGNIFICANT CHANGE UP (ref 0–6)
HCT VFR BLD CALC: 33.1 % — LOW (ref 34.5–45)
HGB BLD-MCNC: 10.5 G/DL — LOW (ref 11.5–15.5)
LYMPHOCYTES # BLD AUTO: 0.1 K/UL — LOW (ref 1–3.3)
LYMPHOCYTES # BLD AUTO: 4.7 % — LOW (ref 13–44)
MCHC RBC-ENTMCNC: 27.2 PG — SIGNIFICANT CHANGE UP (ref 27–34)
MCHC RBC-ENTMCNC: 31.6 G/DL — LOW (ref 32–36)
MCV RBC AUTO: 86 FL — SIGNIFICANT CHANGE UP (ref 80–100)
MONOCYTES # BLD AUTO: 0.4 K/UL — SIGNIFICANT CHANGE UP (ref 0–0.9)
MONOCYTES NFR BLD AUTO: 13.5 % — SIGNIFICANT CHANGE UP (ref 2–14)
NEUTROPHILS # BLD AUTO: 2.4 K/UL — SIGNIFICANT CHANGE UP (ref 1.8–7.4)
NEUTROPHILS NFR BLD AUTO: 79.6 % — HIGH (ref 43–77)
PLATELET # BLD AUTO: 256 K/UL — SIGNIFICANT CHANGE UP (ref 150–400)
RBC # BLD: 3.85 M/UL — SIGNIFICANT CHANGE UP (ref 3.8–5.2)
RBC # FLD: 14.6 % — HIGH (ref 10.3–14.5)
WBC # BLD: 3 K/UL — LOW (ref 3.8–10.5)
WBC # FLD AUTO: 3 K/UL — LOW (ref 3.8–10.5)

## 2022-04-08 PROCEDURE — 77427 RADIATION TX MANAGEMENT X5: CPT

## 2022-04-08 PROCEDURE — 77470 SPECIAL RADIATION TREATMENT: CPT | Mod: 26

## 2022-04-08 PROCEDURE — 77387B: CUSTOM | Mod: 26

## 2022-04-08 NOTE — HISTORY OF PRESENT ILLNESS
[Follow up with Radiation MD in _____] : Follow up with Radiation MD in [unfilled] [Follow up with Oncologist in _____] : Follow up with Oncologist in [unfilled] [Follow up with Surgeon in _____] : Follow up with Surgeon in [unfilled] [Primary care physician] : primary care physician [Pain] : pain [Diet] : Diet [FreeTextEntry8] : Ruchi Ann

## 2022-04-11 ENCOUNTER — APPOINTMENT (OUTPATIENT)
Age: 57
End: 2022-04-11

## 2022-04-11 ENCOUNTER — APPOINTMENT (OUTPATIENT)
Dept: HEMATOLOGY ONCOLOGY | Facility: CLINIC | Age: 57
End: 2022-04-11
Payer: COMMERCIAL

## 2022-04-11 VITALS
HEART RATE: 85 BPM | SYSTOLIC BLOOD PRESSURE: 111 MMHG | WEIGHT: 195.5 LBS | OXYGEN SATURATION: 85 % | HEIGHT: 64 IN | BODY MASS INDEX: 33.38 KG/M2 | DIASTOLIC BLOOD PRESSURE: 74 MMHG

## 2022-04-11 LAB
ALBUMIN SERPL ELPH-MCNC: 3.6 G/DL
ALP BLD-CCNC: 63 U/L
ALT SERPL-CCNC: 10 U/L
ANION GAP SERPL CALC-SCNC: 17 MMOL/L
AST SERPL-CCNC: 10 U/L
BILIRUB SERPL-MCNC: <0.2 MG/DL
BUN SERPL-MCNC: 23 MG/DL
CALCIUM SERPL-MCNC: 9.1 MG/DL
CHLORIDE SERPL-SCNC: 96 MMOL/L
CO2 SERPL-SCNC: 30 MMOL/L
CREAT SERPL-MCNC: 1.88 MG/DL
EGFR: 31 ML/MIN/1.73M2
GLUCOSE SERPL-MCNC: 117 MG/DL
MAGNESIUM SERPL-MCNC: 2.2 MG/DL
POTASSIUM SERPL-SCNC: 3.4 MMOL/L
PROT SERPL-MCNC: 5.9 G/DL
SODIUM SERPL-SCNC: 142 MMOL/L

## 2022-04-11 PROCEDURE — 99214 OFFICE O/P EST MOD 30 MIN: CPT

## 2022-04-12 ENCOUNTER — APPOINTMENT (OUTPATIENT)
Age: 57
End: 2022-04-12

## 2022-04-13 NOTE — ASSESSMENT
TRANSFERRING OUT - [facility] all medical records are ready for pu in [place].   [FreeTextEntry1] : 56 year old woman diagnosed with oral cancer s/p resection with right neck dissection, [id number] \par \par # Oral Cancer \par - s/p C5 of CRT with weekly Cisplatin\par - C4 delayed by elevated creatinine, improved with IVF and received C5 on 4/4.\par - creatinine remains elevated, receiving IVF only, no further Cisplatin\par - RT completed today, 4/11\par - GEMA Hobbs following closely, weight has been stable recently \par - advised proper fluid intake of 2L of liquid daily.  Will continue IVF today and tomorrow and consider additional days pending today's lab results \par - continue MMW and oxycodone for odynophagia.  Viscous lidocaine for oral pain.\par - 25 mcg fentanyl patch ordered today\par - continue aquaphor for erythematous skin around neck, discussed silvadene today but patient denies pain\par - scopolamine ordered by RT for thick oral secretions but patient states she is no longer using it \par - continue zofran and compazine PRN for N/V.  \par - Dr. Zamora evaluation ASAP\par - MD/PA follow up in 2-3 weeks for residual side effect check and labs\par - post treatment PET ordered for 7/11/22\par \par # Tobacco Abuse\par - denies any cigarette use x 2 months and "will never smoke again."\par

## 2022-04-13 NOTE — HISTORY OF PRESENT ILLNESS
[T: ___] : T[unfilled] [N: ___] : N[unfilled] [M: ___] : M[unfilled] [de-identified] : Patient is a 55 yo F with h/o HTN, HLD and asthma who was diagnosed with SCC of the gingival mucosa.\par \par She saw oral surgeon, Dr. Cristopher Pinon, c/o soreness in the right jaw.  A biopsy of the right mandibular gingiva on 11/26/21 showed well-differentiated squamous cell carcinoma, P16 negative.\par \par She next saw Dr. James So. On exam, she was noted to have an ulcerated and endophytic lesion in the right posterior mandibular gingiva/post-molar region, abutting the 2nd premolar tooth and the base RMT posteriorly. There was buccal induration, no lingual extension.\par \par Staging CT neck on 12/23/21 showed an enhancing abnormal soft tissue lesion centered along the right posterior mandibular gingiva and gingivobuccal sulcus with suspected bony involvement of the adjacent alveolar ridge and also the lingual mandibular gingiva.  Right-sided pathologic cervical lymphadenopathy at right level Ib and right level II. Minimally hazy margins adjacent to the right level Ib lymph node. Staging PET on the same day showed FDG avid soft tissue lesion along the right posterior mandibular gingiva and gingivobuccal sulcus as seen on contrast-enhanced CT compatible with newly diagnosed squamous cell cancer. FDG avidity extends towards the lingual surface. FDG avid right level Ib cervical lymph nodes likely metastatic. FDG avid right level IIA and IIB lymph nodes are indeterminate. FDG avid focus within the enlarged right lobe, likely corresponding to the vague 1.1 cm nodule on contrast enhanced CT.\par \par On 1/13/22 she underwent right composite resection (segmental mandibulectomy with a partial right buccal soft tissue and floor of mouth excision), right neck dissection levels I-IV with Dr So.  Pathology showed invasive squamous cell carcinoma, well-differentiated, measuring 1.7 cm. Depth of invasion 0.6 cm. No LVI/PNI. There was microscopic evidence of tumor invasion into underlying bone (pT4a). Tumor margin from medial soft tissue was 0.4 cm; from the lateral soft tissue was 0.1 cm. Positive lymph nodes were identified in right level 1b and 2a; total 4/18; largest 1.5 cm, no LEONIE. pT4a N2b. \par \par The patient was started on cRT with weekly cisplatin. \par \par  [de-identified] : Patient presents s/p C5 CRT with weekly Cisplatin for SCC of the gingival mucosa.  C4 delayed by elevated creatinine, improved with IVF and received C5 on 4/4. Completed RT today.  + Odynophagia, again worse but no dysphagia. + Severe oral pain with associated mucositis, again more severe. + Dysgeusia, worse. + N/V, slightly improved. + Excessive oral secretions, unchanged. + Xerostomia, unchanged. + R ear numbness unchanged, still no pain. + Weight loss, 15-20% since initial visit, but stable recently. + Dry cough, improved with claritin. + Pain at LLE skin graft site improving. + Significant erythema right neck but denies any pain. No fevers or SOB. Has not smoked in 2 months.

## 2022-04-13 NOTE — PHYSICAL EXAM
[Fully active, able to carry on all pre-disease performance without restriction] : Status 0 - Fully active, able to carry on all pre-disease performance without restriction [Normal] : affect appropriate [de-identified] : right neck fullness and erythema post operatively.

## 2022-04-17 ENCOUNTER — INPATIENT (INPATIENT)
Facility: HOSPITAL | Age: 57
LOS: 4 days | Discharge: HOME | DRG: 640 | End: 2022-04-22
Attending: STUDENT IN AN ORGANIZED HEALTH CARE EDUCATION/TRAINING PROGRAM | Admitting: INTERNAL MEDICINE
Payer: COMMERCIAL

## 2022-04-17 ENCOUNTER — TRANSCRIPTION ENCOUNTER (OUTPATIENT)
Age: 57
End: 2022-04-17

## 2022-04-17 VITALS
WEIGHT: 145.06 LBS | RESPIRATION RATE: 16 BRPM | HEART RATE: 105 BPM | SYSTOLIC BLOOD PRESSURE: 79 MMHG | TEMPERATURE: 98 F | OXYGEN SATURATION: 92 % | DIASTOLIC BLOOD PRESSURE: 58 MMHG | HEIGHT: 65 IN

## 2022-04-17 DIAGNOSIS — Z90.711 ACQUIRED ABSENCE OF UTERUS WITH REMAINING CERVICAL STUMP: Chronic | ICD-10-CM

## 2022-04-17 DIAGNOSIS — Z96.649 PRESENCE OF UNSPECIFIED ARTIFICIAL HIP JOINT: Chronic | ICD-10-CM

## 2022-04-17 DIAGNOSIS — E87.6 HYPOKALEMIA: ICD-10-CM

## 2022-04-17 DIAGNOSIS — Z98.890 OTHER SPECIFIED POSTPROCEDURAL STATES: Chronic | ICD-10-CM

## 2022-04-17 DIAGNOSIS — Z98.891 HISTORY OF UTERINE SCAR FROM PREVIOUS SURGERY: Chronic | ICD-10-CM

## 2022-04-17 LAB
AGGLUTINATION: PRESENT — SIGNIFICANT CHANGE UP
ALBUMIN SERPL ELPH-MCNC: 2.9 G/DL — LOW (ref 3.3–5.2)
ALP SERPL-CCNC: 69 U/L — SIGNIFICANT CHANGE UP (ref 40–120)
ALT FLD-CCNC: 13 U/L — SIGNIFICANT CHANGE UP
ANION GAP SERPL CALC-SCNC: 17 MMOL/L — SIGNIFICANT CHANGE UP (ref 5–17)
ANISOCYTOSIS BLD QL: SLIGHT — SIGNIFICANT CHANGE UP
AST SERPL-CCNC: 16 U/L — SIGNIFICANT CHANGE UP
BASOPHILS # BLD AUTO: 0 K/UL — SIGNIFICANT CHANGE UP (ref 0–0.2)
BASOPHILS NFR BLD AUTO: 0 % — SIGNIFICANT CHANGE UP (ref 0–2)
BILIRUB SERPL-MCNC: 0.3 MG/DL — LOW (ref 0.4–2)
BUN SERPL-MCNC: 28.2 MG/DL — HIGH (ref 8–20)
CALCIUM SERPL-MCNC: 9.2 MG/DL — SIGNIFICANT CHANGE UP (ref 8.6–10.2)
CHLORIDE SERPL-SCNC: 91 MMOL/L — LOW (ref 98–107)
CO2 SERPL-SCNC: 32 MMOL/L — HIGH (ref 22–29)
CREAT SERPL-MCNC: 2.27 MG/DL — HIGH (ref 0.5–1.3)
EGFR: 25 ML/MIN/1.73M2 — LOW
ELLIPTOCYTES BLD QL SMEAR: SLIGHT — SIGNIFICANT CHANGE UP
EOSINOPHIL # BLD AUTO: 0.05 K/UL — SIGNIFICANT CHANGE UP (ref 0–0.5)
EOSINOPHIL NFR BLD AUTO: 0.9 % — SIGNIFICANT CHANGE UP (ref 0–6)
GLUCOSE SERPL-MCNC: 182 MG/DL — HIGH (ref 70–99)
HCT VFR BLD CALC: 29.8 % — LOW (ref 34.5–45)
HGB BLD-MCNC: 9.8 G/DL — LOW (ref 11.5–15.5)
LYMPHOCYTES # BLD AUTO: 0.05 K/UL — LOW (ref 1–3.3)
LYMPHOCYTES # BLD AUTO: 0.9 % — LOW (ref 13–44)
MACROCYTES BLD QL: SLIGHT — SIGNIFICANT CHANGE UP
MAGNESIUM SERPL-MCNC: 2 MG/DL — SIGNIFICANT CHANGE UP (ref 1.8–2.6)
MANUAL SMEAR VERIFICATION: SIGNIFICANT CHANGE UP
MCHC RBC-ENTMCNC: 27.4 PG — SIGNIFICANT CHANGE UP (ref 27–34)
MCHC RBC-ENTMCNC: 32.9 GM/DL — SIGNIFICANT CHANGE UP (ref 32–36)
MCV RBC AUTO: 83.2 FL — SIGNIFICANT CHANGE UP (ref 80–100)
MICROCYTES BLD QL: SLIGHT — SIGNIFICANT CHANGE UP
MONOCYTES # BLD AUTO: 0.47 K/UL — SIGNIFICANT CHANGE UP (ref 0–0.9)
MONOCYTES NFR BLD AUTO: 7.8 % — SIGNIFICANT CHANGE UP (ref 2–14)
NEUTROPHILS # BLD AUTO: 5.38 K/UL — SIGNIFICANT CHANGE UP (ref 1.8–7.4)
NEUTROPHILS NFR BLD AUTO: 87.8 % — HIGH (ref 43–77)
NEUTS BAND # BLD: 1.7 % — SIGNIFICANT CHANGE UP (ref 0–8)
OVALOCYTES BLD QL SMEAR: SLIGHT — SIGNIFICANT CHANGE UP
PLAT MORPH BLD: NORMAL — SIGNIFICANT CHANGE UP
PLATELET # BLD AUTO: 222 K/UL — SIGNIFICANT CHANGE UP (ref 150–400)
POIKILOCYTOSIS BLD QL AUTO: SLIGHT — SIGNIFICANT CHANGE UP
POTASSIUM SERPL-MCNC: 2.2 MMOL/L — CRITICAL LOW (ref 3.5–5.3)
POTASSIUM SERPL-SCNC: 2.2 MMOL/L — CRITICAL LOW (ref 3.5–5.3)
PROT SERPL-MCNC: 6.1 G/DL — LOW (ref 6.6–8.7)
RBC # BLD: 3.58 M/UL — LOW (ref 3.8–5.2)
RBC # FLD: 16 % — HIGH (ref 10.3–14.5)
RBC BLD AUTO: ABNORMAL
SARS-COV-2 RNA SPEC QL NAA+PROBE: SIGNIFICANT CHANGE UP
SODIUM SERPL-SCNC: 140 MMOL/L — SIGNIFICANT CHANGE UP (ref 135–145)
STOMATOCYTES BLD QL SMEAR: SLIGHT — SIGNIFICANT CHANGE UP
TROPONIN T SERPL-MCNC: 0.01 NG/ML — SIGNIFICANT CHANGE UP (ref 0–0.06)
VARIANT LYMPHS # BLD: 0.9 % — SIGNIFICANT CHANGE UP (ref 0–6)
WBC # BLD: 6.01 K/UL — SIGNIFICANT CHANGE UP (ref 3.8–10.5)
WBC # FLD AUTO: 6.01 K/UL — SIGNIFICANT CHANGE UP (ref 3.8–10.5)

## 2022-04-17 PROCEDURE — 71045 X-RAY EXAM CHEST 1 VIEW: CPT | Mod: 26

## 2022-04-17 PROCEDURE — 73610 X-RAY EXAM OF ANKLE: CPT | Mod: 26,RT

## 2022-04-17 PROCEDURE — 93010 ELECTROCARDIOGRAM REPORT: CPT | Mod: 77

## 2022-04-17 PROCEDURE — 70450 CT HEAD/BRAIN W/O DYE: CPT | Mod: 26

## 2022-04-17 PROCEDURE — 99223 1ST HOSP IP/OBS HIGH 75: CPT

## 2022-04-17 PROCEDURE — 29515 APPLICATION SHORT LEG SPLINT: CPT

## 2022-04-17 PROCEDURE — 99285 EMERGENCY DEPT VISIT HI MDM: CPT | Mod: 25

## 2022-04-17 PROCEDURE — 93010 ELECTROCARDIOGRAM REPORT: CPT

## 2022-04-17 PROCEDURE — 72125 CT NECK SPINE W/O DYE: CPT | Mod: 26

## 2022-04-17 RX ORDER — POTASSIUM CHLORIDE 20 MEQ
40 PACKET (EA) ORAL
Refills: 0 | Status: COMPLETED | OUTPATIENT
Start: 2022-04-17 | End: 2022-04-18

## 2022-04-17 RX ORDER — KETOROLAC TROMETHAMINE 30 MG/ML
15 SYRINGE (ML) INJECTION ONCE
Refills: 0 | Status: DISCONTINUED | OUTPATIENT
Start: 2022-04-17 | End: 2022-04-17

## 2022-04-17 RX ORDER — SODIUM CHLORIDE 9 MG/ML
1000 INJECTION INTRAMUSCULAR; INTRAVENOUS; SUBCUTANEOUS ONCE
Refills: 0 | Status: COMPLETED | OUTPATIENT
Start: 2022-04-17 | End: 2022-04-17

## 2022-04-17 RX ORDER — MAGNESIUM SULFATE 500 MG/ML
1 VIAL (ML) INJECTION ONCE
Refills: 0 | Status: COMPLETED | OUTPATIENT
Start: 2022-04-17 | End: 2022-04-17

## 2022-04-17 RX ORDER — POTASSIUM CHLORIDE 20 MEQ
20 PACKET (EA) ORAL ONCE
Refills: 0 | Status: COMPLETED | OUTPATIENT
Start: 2022-04-17 | End: 2022-04-17

## 2022-04-17 RX ORDER — POTASSIUM CHLORIDE 20 MEQ
40 PACKET (EA) ORAL EVERY 4 HOURS
Refills: 0 | Status: DISCONTINUED | OUTPATIENT
Start: 2022-04-17 | End: 2022-04-17

## 2022-04-17 RX ORDER — OXYCODONE HYDROCHLORIDE 5 MG/1
5 TABLET ORAL EVERY 6 HOURS
Refills: 0 | Status: DISCONTINUED | OUTPATIENT
Start: 2022-04-17 | End: 2022-04-21

## 2022-04-17 RX ORDER — ASPIRIN/CALCIUM CARB/MAGNESIUM 324 MG
0 TABLET ORAL
Qty: 0 | Refills: 0 | DISCHARGE

## 2022-04-17 RX ORDER — DIPHENHYDRAMINE HYDROCHLORIDE AND LIDOCAINE HYDROCHLORIDE AND ALUMINUM HYDROXIDE AND MAGNESIUM HYDRO
15 KIT
Refills: 0 | Status: DISCONTINUED | OUTPATIENT
Start: 2022-04-17 | End: 2022-04-18

## 2022-04-17 RX ORDER — ACETAMINOPHEN 500 MG
650 TABLET ORAL EVERY 6 HOURS
Refills: 0 | Status: DISCONTINUED | OUTPATIENT
Start: 2022-04-17 | End: 2022-04-21

## 2022-04-17 RX ORDER — SODIUM CHLORIDE 9 MG/ML
1000 INJECTION, SOLUTION INTRAVENOUS ONCE
Refills: 0 | Status: COMPLETED | OUTPATIENT
Start: 2022-04-17 | End: 2022-04-17

## 2022-04-17 RX ORDER — METOPROLOL TARTRATE 50 MG
12.5 TABLET ORAL
Refills: 0 | Status: DISCONTINUED | OUTPATIENT
Start: 2022-04-17 | End: 2022-04-21

## 2022-04-17 RX ORDER — POTASSIUM CHLORIDE 20 MEQ
10 PACKET (EA) ORAL
Refills: 0 | Status: COMPLETED | OUTPATIENT
Start: 2022-04-17 | End: 2022-04-17

## 2022-04-17 RX ORDER — ONDANSETRON 8 MG/1
4 TABLET, FILM COATED ORAL EVERY 8 HOURS
Refills: 0 | Status: DISCONTINUED | OUTPATIENT
Start: 2022-04-17 | End: 2022-04-22

## 2022-04-17 RX ORDER — ATORVASTATIN CALCIUM 80 MG/1
10 TABLET, FILM COATED ORAL AT BEDTIME
Refills: 0 | Status: DISCONTINUED | OUTPATIENT
Start: 2022-04-17 | End: 2022-04-21

## 2022-04-17 RX ORDER — POTASSIUM CHLORIDE 20 MEQ
40 PACKET (EA) ORAL ONCE
Refills: 0 | Status: COMPLETED | OUTPATIENT
Start: 2022-04-17 | End: 2022-04-17

## 2022-04-17 RX ORDER — HEPARIN SODIUM 5000 [USP'U]/ML
5000 INJECTION INTRAVENOUS; SUBCUTANEOUS EVERY 12 HOURS
Refills: 0 | Status: DISCONTINUED | OUTPATIENT
Start: 2022-04-17 | End: 2022-04-19

## 2022-04-17 RX ORDER — SODIUM CHLORIDE 9 MG/ML
1000 INJECTION, SOLUTION INTRAVENOUS
Refills: 0 | Status: DISCONTINUED | OUTPATIENT
Start: 2022-04-17 | End: 2022-04-19

## 2022-04-17 RX ORDER — ASPIRIN/CALCIUM CARB/MAGNESIUM 324 MG
81 TABLET ORAL DAILY
Refills: 0 | Status: DISCONTINUED | OUTPATIENT
Start: 2022-04-17 | End: 2022-04-21

## 2022-04-17 RX ADMIN — Medication 100 MILLIEQUIVALENT(S): at 17:23

## 2022-04-17 RX ADMIN — Medication 100 MILLIEQUIVALENT(S): at 16:16

## 2022-04-17 RX ADMIN — Medication 40 MILLIEQUIVALENT(S): at 19:23

## 2022-04-17 RX ADMIN — Medication 40 MILLIEQUIVALENT(S): at 16:17

## 2022-04-17 RX ADMIN — SODIUM CHLORIDE 1000 MILLILITER(S): 9 INJECTION INTRAMUSCULAR; INTRAVENOUS; SUBCUTANEOUS at 12:51

## 2022-04-17 RX ADMIN — Medication 50 MILLIEQUIVALENT(S): at 22:40

## 2022-04-17 RX ADMIN — SODIUM CHLORIDE 1000 MILLILITER(S): 9 INJECTION, SOLUTION INTRAVENOUS at 20:06

## 2022-04-17 RX ADMIN — Medication 40 MILLIEQUIVALENT(S): at 22:40

## 2022-04-17 RX ADMIN — OXYCODONE HYDROCHLORIDE 5 MILLIGRAM(S): 5 TABLET ORAL at 23:00

## 2022-04-17 RX ADMIN — Medication 100 GRAM(S): at 16:24

## 2022-04-17 RX ADMIN — Medication 100 MILLIEQUIVALENT(S): at 19:23

## 2022-04-17 NOTE — ED ADULT TRIAGE NOTE - NS ED NURSE DIRECT TO ROOM YN
pulmonary input appreciated, continue bipap, meds as per pulmonary pulmonary input appreciated, continue bipap, meds as per pulmonary pulmonary input appreciated, continue bipap, meds as per pulmonary pulmonary input appreciated, continue bipap, meds as per pulmonary pulmonary input appreciated, continue bipap, meds as per pulmonary pulmonary input appreciated, continue bipap, meds as per pulmonary Lasix on hold  Lisinopril on hold continue Duoneb  continue Symbicort Yes

## 2022-04-17 NOTE — ED ADULT NURSE NOTE - OBJECTIVE STATEMENT
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 She had pains in her mouth and has been using magic mouth wash. She report decreased oral intake for the past 3 weeks. no fever. no chest pain or sob. no abdominal pain. She report her BP is normally on the "low side".

## 2022-04-17 NOTE — DISCHARGE NOTE PROVIDER - NSDCFUADDINST_GEN_ALL_CORE_FT
ORTHOPEDIC FOLLOW UP RECOMMENDATIONS: Non weight bearing of the right lower extremity. Elevated as tolerated. Maintain splint at all times and do not get wet. Follow up with Dr. Hough in the office within 1 week to discuss further management.

## 2022-04-17 NOTE — DISCHARGE NOTE PROVIDER - ATTENDING DISCHARGE PHYSICAL EXAMINATION:
Vital Signs Last 24 Hrs  T(F): 98.5 (22 Apr 2022 10:20), Max: 98.7 (21 Apr 2022 20:19)  HR: 77 (22 Apr 2022 10:20) (72 - 77)  BP: 123/80 (22 Apr 2022 10:20) (110/73 - 129/76)  RR: 18 (22 Apr 2022 10:20) (18 - 18)  SpO2: 96% (22 Apr 2022 10:20) (94% - 96%)    Physical Exam:  Constitutional: NAD  HEENT: NC/AT, PERRL, EOMI, trachea midline, no JVD  Respiratory: CTA bilaterally, symmetrical chest rise  Cardiovascular: RRR, no m/g/r  Gastrointestinal: +PEG, NT/ND, BS+  Vascular: 2+ peripheral pulses  Neurological: A/O x 3, no focal neurological deficits  Psych: Fair mood/affect  Musculoskeletal: No edema, cyanosis, deformities. ROM normal  Skin: No obvious rash, lesions. No jaundice.

## 2022-04-17 NOTE — ED PROVIDER NOTE - PHYSICAL EXAMINATION
VITAL SIGNS: I have reviewed nursing notes and confirm.  CONSTITUTIONAL:  in no acute distress.  SKIN: Skin exam is warm and dry, no acute rash.  HEAD: Normocephalic; atraumatic.  EYES: PERRL, EOM intact; conjunctiva and sclera clear.  ENT: No nasal discharge; airway clear. Throat clear. (+) diffuse white plaque on gingiva, phyranx, and tongue  NECK: Supple; non tender. (+) chronic erythema of skin from skin graft and radiation   CARD: Regular rate and rhythm.  RESP: No wheezes,  no rales or rhonchi.   ABD:  soft; non-distended; non-tender;   EXT: Normal ROM. No clubbing,. (+) right lateral ankle swelling and tenderness   NEURO: Alert, oriented. Grossly unremarkable. No focal deficits.  moves all extremities,   PSYCH: Cooperative, appropriate.

## 2022-04-17 NOTE — DISCHARGE NOTE PROVIDER - CARE PROVIDERS DIRECT ADDRESSES
[email or web address]

## 2022-04-17 NOTE — DISCHARGE NOTE PROVIDER - HOSPITAL COURSE
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 Patient states she has been able to tolerate oral diet well since the procedure for last ~3 month and has been on clear liquid diet. For the last 2-3 weeks, pt cant tolerate anything by mouth. Pt reports finishing chemo and radiation last week. Pt underwent PEG placement and is now tolerating feeds. Pt medically stable for discharge with appropriate outpatient follow up.     All electrolyte abnormalities were monitored carefully and repleted as necessary during this hospitalization. At the time of discharge patient was hemodynamically stable and amenable to all terms of discharge.

## 2022-04-17 NOTE — DISCHARGE NOTE PROVIDER - NSDCMRMEDTOKEN_GEN_ALL_CORE_FT
albuterol 90 mcg/inh inhalation powder: 2 puff(s) inhaled every 6 hours, As Needed  aspirin 81 mg oral delayed release tablet: 1 tab(s) orally once a day  atorvastatin 10 mg oral tablet: 1 tab(s) orally once a day PM  chlorhexidine 0.12% mucous membrane liquid: 15 milliliter(s) mucous membrane 2 times a day  Compazine 10 mg oral tablet: orally every 6 hours, As Needed  lisinopril 5 mg oral tablet: 1 tab(s) orally once a day  oxyCODONE 5 mg/5 mL oral solution: 5 milliliter(s) orally every 6 hours, As Needed -Severe pain MDD:20mL  Zofran 8 mg oral tablet: orally every 8 hours, As Needed   albuterol 90 mcg/inh inhalation powder: 2 puff(s) inhaled every 6 hours, As Needed  aspirin 81 mg oral delayed release tablet: 1 tab(s) orally once a day  atorvastatin 10 mg oral tablet: 1 tab(s) orally once a day PM  chlorhexidine 0.12% mucous membrane liquid: 15 milliliter(s) mucous membrane 2 times a day  Compazine 10 mg oral tablet: orally every 6 hours, As Needed  lisinopril 5 mg oral tablet: 1 tab(s) orally once a day  oxyCODONE 5 mg/5 mL oral solution: 5 milliliter(s) orally every 6 hours, As Needed -Severe pain MDD:20mL  Rolling Walker and Wheelchair for debility/ ataxia ICD: R54: 1 application buccal once a day   Tube Feeds: Glucerna 1.5, Total Volume for 24 hours: 960 bolus, Total Volume of BOlus (mL): 240, Total Number fo Feeds: 5, Tube Feed Frequency; Every 6 hours, Bolus Feed Rate (mL per hour): 60, Bolus Feed Duration (Hours): 24, Free Water Flushh 250mL Q8hr: 1 application by gastrostomy tube once a day   Zofran 8 mg oral tablet: orally every 8 hours, As Needed   albuterol 90 mcg/inh inhalation powder: 2 puff(s) inhaled every 6 hours, As Needed  ascorbic acid 500 mg oral tablet: 1 tab(s) orally once a day  aspirin 81 mg oral delayed release tablet: 1 tab(s) orally once a day  atorvastatin 10 mg oral tablet: 1 tab(s) orally once a day PM  Compazine 10 mg oral tablet: orally every 6 hours, As Needed  esomeprazole 40 mg oral delayed release capsule: 1 cap(s) orally once a day   lisinopril 5 mg oral tablet: 1 tab(s) orally once a day  Multiple Vitamins oral tablet: 1 tab(s) orally once a day  nystatin 100,000 units/mL oral suspension: 5 milliliter(s) orally 4 times a day  oxyCODONE 5 mg/5 mL oral solution: 5 milliliter(s) orally every 6 hours, As Needed -Severe pain MDD:20mL  polyethylene glycol 3350 oral powder for reconstitution: 17 gram(s) orally 2 times a day, As needed, Constipation  Rolling Walker and Wheelchair for debility/ ataxia ICD: R54: 1 application buccal once a day   senna oral tablet: 2 tab(s) orally once a day (at bedtime)  Tube Feeds: Glucerna 1.5, Total Volume for 24 hours: 960 bolus, Total Volume of BOlus (mL): 240, Total Number fo Feeds: 5, Tube Feed Frequency; Every 6 hours, Bolus Feed Rate (mL per hour): 60, Bolus Feed Duration (Hours): 24, Free Water Flushh 250mL Q8hr: 1 application by gastrostomy tube once a day   Zofran 8 mg oral tablet: orally every 8 hours, As Needed   bus

## 2022-04-17 NOTE — H&P ADULT - ASSESSMENT
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 patient states she has been onable to tolerate oral diet well since the procedure for last ~3 month and has been on clear liquid diet. but for the last 2-3 weeks she cant tolerate anything by mouth. she gets pain in the oral cavity, nausea anorexia. patient report finishing chemo and radiation last week.    impression:    Syncope:  secondary to hypovolemia secondary to dehydration  s/p fluid bolus in ed  continue iv maintenance fluid  2 episode of NSVT likely related to electrolyte imbalance,   trop negative trend trop, order echo, low dose bb    severe hypovolemia:  k 2.2  repleted  continue iv KCL 20 f/b oral kcl 40 *3  trend BMP  monitor telemetry    Decreased oral intake: oral thrush + radiation induced mucositis  start on nystatin swiss and spit BID  clear liquid diet  speech and swallow if fails then would have to place NG tube and discuss with ent and GI for possible G tube insertion    Rt buccal cancer:  squamous cell carcinoma of gingiva s/p R composite resection, SND I-IV, s/p trach removed in January 2022, s/p mandibulectomy, neck dissection, L fibula flap on 1/13   rt chest wall chemo port  patient completed chemo radiation last week.  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 patient states she has been onable to tolerate oral diet well since the procedure for last ~3 month and has been on clear liquid diet. but for the last 2-3 weeks she cant tolerate anything by mouth. she gets pain in the oral cavity, nausea anorexia. patient report finishing chemo and radiation last week.    impression:    Syncope:  secondary to hypovolemia secondary to dehydration  s/p fluid bolus in ed  continue iv maintenance fluid  2 episode of NSVT likely related to electrolyte imbalance,   trop negative trend trop, order echo, low dose bb    severe hypovolemia:  k 2.2  repleted  continue iv KCL 20 f/b oral kcl 40 *3  trend BMP  monitor telemetry    Decreased oral intake: oral thrush + radiation induced mucositis  dc magic mouthwash for now, start on only nystatin oral  swiss and spit BID  clear liquid diet and advance as tolerated  speech and swallow if fails then would have to place NG tube for feeding and discuss with ent and GI for possible G tube insertion    Rt buccal cancer:  squamous cell carcinoma of gingiva s/p R composite resection, SND I-IV, s/p trach removed in January 2022, s/p mandibulectomy, neck dissection, L fibula flap on 1/13   rt chest wall chemo port  patient completed chemo radiation last week.  outpatient follow up with oncology and surgery    HTN:  hold lisinorpil  start on lopressor 12.5 bid    DVT ppx  clear diet    swallow eval

## 2022-04-17 NOTE — ED ADULT NURSE REASSESSMENT NOTE - NS ED NURSE REASSESS COMMENT FT1
Assumed care of pt at 19:15 as stated in report from SHASHANK Turcios. Charting as noted. Patient A&O x4, denies pain/discomfort, denies CP/SOB. Updated on the plan of care. Call bell within reach, bed locked in lowest position. IV site flushed w/ NS. No redness, swelling or pain noted to site. No signs of acute distress noted, safety maintained. Pt remains on CM in NSR w/ 3rd K+ rider infusing.

## 2022-04-17 NOTE — ED PROVIDER NOTE - CARE PLAN
1 Principal Discharge DX:	Syncope   Principal Discharge DX:	Hypokalemia  Secondary Diagnosis:	Dehydration

## 2022-04-17 NOTE — DISCHARGE NOTE PROVIDER - NSDCFUSCHEDAPPT_GEN_ALL_CORE_FT
[facility] ; 04/18/2022 ; NPP [facility]  [facility] ; 04/19/2022 ; NPP PlastSurg [address] [facility] ; 04/20/2022 ; NPP [facility] In[facility]  [facility] ; 04/22/2022 ; NPP PhysMed [address] [facility] ; 04/22/2022 ; NPP Otolaryng [address] [facility] ; 04/26/2022 ; NPP [facility] ; 05/09/2022 ; NPP Rad Med [address] [facility] PARUL HERNANDEZ ; 05/09/2022 ; [facility] ; 07/18/[facility]22 ; JARED Francis  Silvia

## 2022-04-17 NOTE — ED PROVIDER NOTE - NS ED ROS FT
Review of Systems  •	CONSTITUTIONAL - no  fever, no diaphoresis, no weight change  •	SKIN - no rash  •	HEMATOLOGIC - no bleeding, no bruising  •	EYES - no eye pain, no blurred vision  •	ENT - no change in hearing, (+) mouth pain  •	RESPIRATORY - no shortness of breath, no cough  •	CARDIAC - no chest pain, no palpitations (+) syncope  •	GI - no abd pain, no nausea, no vomiting, no diarrhea, no constipation, no bleeding  •	GENITO-URINARY - no discharge, no dysuria; no hematuria,   •	ENDO - no polydipsia, no polyuria, no heat/no cold intolerance  •	MUSCULOSKELETAL - no joint pain, no swelling, no redness  •	NEUROLOGIC - no weakness, no headache, no anesthesia, no paresthesias  •	PSYCH - no anxiety, non suicidal, non homicidal, no hallucination, no depression

## 2022-04-17 NOTE — ED PROVIDER NOTE - PROGRESS NOTE DETAILS
monitor tech record 2 episode of non-sustained Vtach. self resolving. no distress. likely due to electrotype abnormality. right ankle fracture, splint applied. pending CT head and cervical spine. Given the arrythmia, will admit for further monitoring. patient admitted to hospitalist.

## 2022-04-17 NOTE — H&P ADULT - HISTORY OF PRESENT ILLNESS
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 patient states she has been onable to tolerate oral diet well since the procedure for last ~3 month and has been on clear liquid diet. but for the last 2-3 weeks she cant tolerate anything by mouth. she gets pain in the oral cavity, nausea anorexia. patient report finishing chemo and radiation last week.  yesterday she was at the store and states that her leg gave away and she fell. she did not lose consciousness.  no fever. no chest pain or sob. no abdominal pain.   She report her BP is normally on the "low side.    in the ED patient was found to be hypotensive, hypokalemic.  rt distal fibular fracture s/p splint application in ed  2 episode of NSVT on tele.  ekg non ischemic

## 2022-04-17 NOTE — DISCHARGE NOTE PROVIDER - PROVIDER TOKENS
PROVIDER:[TOKEN:[[id number]]] PROVIDER:[TOKEN:[[id number]],FOLLOWUP:[1 week]],PROVIDER:[TOKEN:[[id number]],FOLLOWUP:[1 week]],FREE:[LAST:[PCP],PHONE:[(   )    -],FAX:[(   )    -],FOLLOWUP:[1 week]]

## 2022-04-17 NOTE — ED PROVIDER NOTE - CLINICAL SUMMARY MEDICAL DECISION MAKING FREE TEXT BOX
57 yo F hx of gingival carcinoma s/p chemo and radiation p/w syncope and hypotension likely due to dehydration. patient had decreased Po intake due to mouth pain. EKG without ischemic changes. no focal neurological deficit. will get blood work, IV hydration, xray of right ankle for ankle injury during syncope. toradol for pain. 57 yo F hx of gingival carcinoma s/p chemo and radiation p/w syncope and hypotension likely due to dehydration. patient had decreased Po intake due to mouth pain. EKG without ischemic changes. no focal neurological deficit. will get blood work, IV hydration, xray of right ankle for ankle injury during syncope.

## 2022-04-17 NOTE — ED PROVIDER NOTE - OBJECTIVE STATEMENT
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 patient report finishing chemo and radiation last week. She had pains in her mouth and has been using magic mouth wash. She report decreased oral intake for the past 3 weeks. no fever. no chest pain or sob. no abdominal pain. She report her BP is normally on the "low side".

## 2022-04-17 NOTE — ED ADULT NURSE NOTE - NSICDXPASTSURGICALHX_GEN_ALL_CORE_FT
PAST SURGICAL HISTORY:  H/O  section     H/O neck surgery partial right mandibulectomy with fibular free flap reconstriction and right neck lymphnode dissection 22    History of hip replacement left hip    History of partial hysterectomy

## 2022-04-17 NOTE — CONSULT NOTE ADULT - SUBJECTIVE AND OBJECTIVE BOX
Pt Name: PARUL HERNANDEZ    MRN: [id number]      Patient is a 56y Female presenting to the emergency department with a chief complaint of right ankle pain s/p syncopal fall today. Pt denies numbness/tingling and has no other complaints at this time. Pt found to have a right distal fibula fx, splinted by ED staff prior to admission.    REVIEW OF SYSTEMS    General: Alert, responsive, in NAD    Skin/Breast: No rashes, no pruritis   	  Ophthalmologic: No visual changes. No redness.   	  ENMT:	No discharge. No swelling.    Respiratory and Thorax: No difficulty breathing. No cough.  	   Cardiovascular:	No chest pain. No palpitations.    Gastrointestinal:	 No abdominal pain. No diarrhea.     Genitourinary: No dysuria. No bleeding.    Musculoskeletal: SEE HPI.    Neurological: No sensory or motor changes.     Psychiatric: No anxiety or depression.    Hematology/Lymphatics: No swelling.    Endocrine: No Hx of diabetes.    ROS is otherwise negative.    PAST MEDICAL & SURGICAL HISTORY:  PAST MEDICAL & SURGICAL HISTORY:  H/O malignant neoplasm of gum    Hypertension    Hyperlipidemia    Mild asthma    H/O  section    History of hip replacement  left hip    History of partial hysterectomy    H/O neck surgery  partial right mandibulectomy with fibular free flap reconstriction and right neck lymphnode dissection 22        Allergies: morphine (Hives)      Medications: acetaminophen     Tablet .. 650 milliGRAM(s) Oral every 6 hours PRN  aspirin enteric coated 81 milliGRAM(s) Oral daily  atorvastatin 10 milliGRAM(s) Oral at bedtime  FIRST- Mouthwash  BLM 15 milliLiter(s) Swish and Spit two times a day  heparin   Injectable 5000 Unit(s) SubCutaneous every 12 hours  lactated ringers. 1000 milliLiter(s) IV Continuous <Continuous>  metoprolol tartrate 12.5 milliGRAM(s) Oral two times a day  ondansetron Injectable 4 milliGRAM(s) IV Push every 8 hours PRN  oxyCODONE    Solution 5 milliGRAM(s) Oral every 6 hours PRN  potassium chloride   Powder 40 milliEquivalent(s) Oral every 2 hours      FAMILY HISTORY:  Family history of CVA (Mother)    FH: thyroid disease (Mother)    : non-contributory    Social History: denies etoh abuse    Ambulation: Walking independently [ x ] With Cane [ ] With Walker [ ]  Bedbound [ ]                           9.8    6.01  )-----------( 222      ( 2022 13:00 )             29.8       04-    140  |  91<L>  |  28.2<H>  ----------------------------<  182<H>  2.2<LL>   |  32.0<H>  |  2.27<H>    Ca    9.2      2022 13:00  Mg     2.0         TPro  6.1<L>  /  Alb  2.9<L>  /  TBili  0.3<L>  /  DBili  x   /  AST  16  /  ALT  13  /  AlkPhos  69        Vital Signs Last 24 Hrs  T(C): 37.2 (2022 19:44), Max: 37.2 (2022 15:42)  T(F): 98.9 (2022 19:44), Max: 99 (2022 15:42)  HR: 92 (2022 19:44) (92 - 107)  BP: 119/76 (2022 19:44) (79/58 - 184/79)  BP(mean): --  RR: 20 (2022 19:44) (16 - 20)  SpO2: 98% (2022 19:44) (92% - 98%)    Daily Height in cm: 165.1 (2022 12:19)    Daily       PHYSICAL EXAM:      Appearance: Alert, responsive, in no acute distress.    RIGHT LOWER EXTREMITY: RLE splint removed. Sensation is grossly intact to light touch. no rash on visible skin. Skin is clean, dry and intact. No bleeding. No abrasions. No ulcerations. 2+ distal pulses. Cap refill < 2 sec. No signs of venous insufficiency or stasis. No extremity ulcerations. No cyanosis. + EHL/FHL intact. Compartments soft and compressible. No proximal fibula TTP.    Imaging Studies: All imaging reviewed by myself and Dr. Morton    Procedure: SPLINTING   PROCEDURE NOTE: Splinting    Performed by: Lebron Jacobson PA-C     Indication: right distal fibula fx    The RLE was appropriately positioned. A well padded plaster splint was applied. Distally, the extremity was neurovascular intact following the procedure. The patient tolerated the procedure well.     A/P:  Pt is a  56y Female with a minimally displaced right distal fibula fx.    PLAN d/w Dr. Morton:   * No immediate orthopedic surgical intervention necessary at this time  * Maintain splint at all times, do not get wet  * Elevate RLE as tolerated  * Follow up with Dr. Hough in the office within 1 week of discharge  * Post splint xrays ordered  * Ortho stable Pt Name: PARUL HERNANDEZ    MRN: [id number]      Patient is a 56y Female presenting to the emergency department with a chief complaint of right ankle pain s/p syncopal fall today. Pt denies numbness/tingling and has no other complaints at this time. Pt found to have a right distal fibula fx, splinted by ED staff prior to admission.    REVIEW OF SYSTEMS    General: Alert, responsive, in NAD    Skin/Breast: No rashes, no pruritis   	  Ophthalmologic: No visual changes. No redness.   	  ENMT:	No discharge. No swelling.    Respiratory and Thorax: No difficulty breathing. No cough.  	   Cardiovascular:	No chest pain. No palpitations.    Gastrointestinal:	 No abdominal pain. No diarrhea.     Genitourinary: No dysuria. No bleeding.    Musculoskeletal: SEE HPI.    Neurological: No sensory or motor changes.     Psychiatric: No anxiety or depression.    Hematology/Lymphatics: No swelling.    Endocrine: No Hx of diabetes.    ROS is otherwise negative.    PAST MEDICAL & SURGICAL HISTORY:  PAST MEDICAL & SURGICAL HISTORY:  H/O malignant neoplasm of gum    Hypertension    Hyperlipidemia    Mild asthma    H/O  section    History of hip replacement  left hip    History of partial hysterectomy    H/O neck surgery  partial right mandibulectomy with fibular free flap reconstriction and right neck lymphnode dissection 22        Allergies: morphine (Hives)      Medications: acetaminophen     Tablet .. 650 milliGRAM(s) Oral every 6 hours PRN  aspirin enteric coated 81 milliGRAM(s) Oral daily  atorvastatin 10 milliGRAM(s) Oral at bedtime  FIRST- Mouthwash  BLM 15 milliLiter(s) Swish and Spit two times a day  heparin   Injectable 5000 Unit(s) SubCutaneous every 12 hours  lactated ringers. 1000 milliLiter(s) IV Continuous <Continuous>  metoprolol tartrate 12.5 milliGRAM(s) Oral two times a day  ondansetron Injectable 4 milliGRAM(s) IV Push every 8 hours PRN  oxyCODONE    Solution 5 milliGRAM(s) Oral every 6 hours PRN  potassium chloride   Powder 40 milliEquivalent(s) Oral every 2 hours      FAMILY HISTORY:  Family history of CVA (Mother)    FH: thyroid disease (Mother)    : non-contributory    Social History: denies etoh abuse    Ambulation: Walking independently [ x ] With Cane [ ] With Walker [ ]  Bedbound [ ]                           9.8    6.01  )-----------( 222      ( 2022 13:00 )             29.8       04-    140  |  91<L>  |  28.2<H>  ----------------------------<  182<H>  2.2<LL>   |  32.0<H>  |  2.27<H>    Ca    9.2      2022 13:00  Mg     2.0         TPro  6.1<L>  /  Alb  2.9<L>  /  TBili  0.3<L>  /  DBili  x   /  AST  16  /  ALT  13  /  AlkPhos  69        Vital Signs Last 24 Hrs  T(C): 37.2 (2022 19:44), Max: 37.2 (2022 15:42)  T(F): 98.9 (2022 19:44), Max: 99 (2022 15:42)  HR: 92 (2022 19:44) (92 - 107)  BP: 119/76 (2022 19:44) (79/58 - 184/79)  BP(mean): --  RR: 20 (2022 19:44) (16 - 20)  SpO2: 98% (2022 19:44) (92% - 98%)    Daily Height in cm: 165.1 (2022 12:19)    Daily       PHYSICAL EXAM:      Appearance: Alert, responsive, in no acute distress.    RIGHT LOWER EXTREMITY: RLE splint removed. Sensation is grossly intact to light touch. no rash on visible skin. Skin is clean, dry and intact. No bleeding. No abrasions. No ulcerations. 2+ distal pulses. Cap refill < 2 sec. No signs of venous insufficiency or stasis. No extremity ulcerations. No cyanosis. + EHL/FHL intact. Compartments soft and compressible. No proximal fibula TTP.    Imaging Studies: All imaging reviewed by myself and Dr. Morton    Procedure: SPLINTING   PROCEDURE NOTE: Splinting    Performed by: Lebron Jacobson PA-C     Indication: right distal fibula fx    The RLE was appropriately positioned. A well padded plaster splint was applied. Distally, the extremity was neurovascular intact following the procedure. The patient tolerated the procedure well.     A/P:  Pt is a  56y Female with a minimally displaced right distal fibula fx.    PLAN d/w Dr. Morton:   * No immediate orthopedic surgical intervention necessary at this time  * Maintain splint at all times, do not get wet  * Elevate RLE as tolerated  * NWB RLE  * Follow up with Dr. Hough in the office within 1 week of discharge  * Post splint xrays ordered  * Ortho stable

## 2022-04-17 NOTE — ED ADULT TRIAGE NOTE - CHIEF COMPLAINT QUOTE
pt states dizzy and weak since this morning states had 2 off balance falls this morning  pt with low bp brought back to critical for immediate evaluation   pt blood pressure low at triage brought to be evaluated

## 2022-04-17 NOTE — ED ADULT NURSE NOTE - NSIMPLEMENTINTERV_GEN_ALL_ED
Implemented All Fall with Harm Risk Interventions:  [place] to call system. Call bell, personal items and telephone within reach. Instruct patient to call for assistance. Room bathroom lighting operational. Non-slip footwear when patient is off stretcher. Physically safe environment: no spills, clutter or unnecessary equipment. Stretcher in lowest position, wheels locked, appropriate side rails in place. Provide visual cue, wrist band, yellow gown, etc. Monitor gait and stability. Monitor for mental status changes and reorient to person, place, and time. Review medications for side effects contributing to fall risk. Reinforce activity limits and safety measures with patient and family. Provide visual clues: red socks.

## 2022-04-17 NOTE — DISCHARGE NOTE PROVIDER - CARE PROVIDER_API CALL
Mark Hough)  Orthopaedic Surgery  [address] [place] [address]  Phone: [phone]  Fax: [phone]  Follow Up Time:    Mark Hough)  Orthopaedic Surgery  [address] [place] [address]  Phone: [phone]  Fax: [phone]  Follow Up Time: 1 week    Ezra Chou)  Gastroenterology; Internal Medicine  [address] [facility], [address]  [place] [address]  Phone: [phone]  Fax: [phone]  Follow Up Time: 1 week    PCP,   Phone: (   )    -  Fax: (   )    -  Follow Up Time: 1 week

## 2022-04-17 NOTE — DISCHARGE NOTE PROVIDER - NSDCCPCAREPLAN_GEN_ALL_CORE_FT
PRINCIPAL DISCHARGE DIAGNOSIS  Diagnosis: Syncope  Assessment and Plan of Treatment: - Resolved  - Likely 2/2 dehydration      SECONDARY DISCHARGE DIAGNOSES  Diagnosis: Dysphagia  Assessment and Plan of Treatment: - Continue with PEG feeds as directed   - Due to oral thursh and radiation induced mucositis   - Nystatin as directed  - Follow up with GI in 1 week    Diagnosis: Cancer of buccal mucosa  Assessment and Plan of Treatment: - Outpatient follow up with Oncology and Surgery as per routine    Diagnosis: Fibula fracture  Assessment and Plan of Treatment: - Continue with CAM boot  - NWB RLE  - Follow up with Ortho in 1 week

## 2022-04-18 ENCOUNTER — APPOINTMENT (OUTPATIENT)
Age: 57
End: 2022-04-18

## 2022-04-18 LAB
A1C WITH ESTIMATED AVERAGE GLUCOSE RESULT: 6.4 % — HIGH (ref 4–5.6)
ALBUMIN SERPL ELPH-MCNC: 2.8 G/DL — LOW (ref 3.3–5.2)
ALP SERPL-CCNC: 60 U/L — SIGNIFICANT CHANGE UP (ref 40–120)
ALT FLD-CCNC: 11 U/L — SIGNIFICANT CHANGE UP
ANION GAP SERPL CALC-SCNC: 12 MMOL/L — SIGNIFICANT CHANGE UP (ref 5–17)
ANION GAP SERPL CALC-SCNC: 13 MMOL/L — SIGNIFICANT CHANGE UP (ref 5–17)
ANION GAP SERPL CALC-SCNC: 14 MMOL/L — SIGNIFICANT CHANGE UP (ref 5–17)
AST SERPL-CCNC: 18 U/L — SIGNIFICANT CHANGE UP
BASOPHILS # BLD AUTO: 0.02 K/UL — SIGNIFICANT CHANGE UP (ref 0–0.2)
BASOPHILS NFR BLD AUTO: 0.7 % — SIGNIFICANT CHANGE UP (ref 0–2)
BILIRUB SERPL-MCNC: 0.3 MG/DL — LOW (ref 0.4–2)
BUN SERPL-MCNC: 22 MG/DL — HIGH (ref 8–20)
BUN SERPL-MCNC: 23.6 MG/DL — HIGH (ref 8–20)
BUN SERPL-MCNC: 30.8 MG/DL — HIGH (ref 8–20)
CALCIUM SERPL-MCNC: 8.4 MG/DL — LOW (ref 8.6–10.2)
CALCIUM SERPL-MCNC: 8.6 MG/DL — SIGNIFICANT CHANGE UP (ref 8.6–10.2)
CALCIUM SERPL-MCNC: 8.7 MG/DL — SIGNIFICANT CHANGE UP (ref 8.6–10.2)
CHLORIDE SERPL-SCNC: 102 MMOL/L — SIGNIFICANT CHANGE UP (ref 98–107)
CHLORIDE SERPL-SCNC: 97 MMOL/L — LOW (ref 98–107)
CHLORIDE SERPL-SCNC: 98 MMOL/L — SIGNIFICANT CHANGE UP (ref 98–107)
CHOLEST SERPL-MCNC: 170 MG/DL — SIGNIFICANT CHANGE UP
CO2 SERPL-SCNC: 23 MMOL/L — SIGNIFICANT CHANGE UP (ref 22–29)
CO2 SERPL-SCNC: 29 MMOL/L — SIGNIFICANT CHANGE UP (ref 22–29)
CO2 SERPL-SCNC: 31 MMOL/L — HIGH (ref 22–29)
CREAT SERPL-MCNC: 1.2 MG/DL — SIGNIFICANT CHANGE UP (ref 0.5–1.3)
CREAT SERPL-MCNC: 1.28 MG/DL — SIGNIFICANT CHANGE UP (ref 0.5–1.3)
CREAT SERPL-MCNC: 1.45 MG/DL — HIGH (ref 0.5–1.3)
EGFR: 42 ML/MIN/1.73M2 — LOW
EGFR: 49 ML/MIN/1.73M2 — LOW
EGFR: 53 ML/MIN/1.73M2 — LOW
EOSINOPHIL # BLD AUTO: 0.02 K/UL — SIGNIFICANT CHANGE UP (ref 0–0.5)
EOSINOPHIL NFR BLD AUTO: 0.7 % — SIGNIFICANT CHANGE UP (ref 0–6)
ESTIMATED AVERAGE GLUCOSE: 137 MG/DL — HIGH (ref 68–114)
GLUCOSE SERPL-MCNC: 107 MG/DL — HIGH (ref 70–99)
GLUCOSE SERPL-MCNC: 143 MG/DL — HIGH (ref 70–99)
GLUCOSE SERPL-MCNC: 144 MG/DL — HIGH (ref 70–99)
HCT VFR BLD CALC: 25.8 % — LOW (ref 34.5–45)
HCV AB S/CO SERPL IA: 0.12 S/CO — SIGNIFICANT CHANGE UP (ref 0–0.99)
HCV AB SERPL-IMP: SIGNIFICANT CHANGE UP
HDLC SERPL-MCNC: 41 MG/DL — LOW
HGB BLD-MCNC: 8.2 G/DL — LOW (ref 11.5–15.5)
IMM GRANULOCYTES NFR BLD AUTO: 1 % — SIGNIFICANT CHANGE UP (ref 0–1.5)
INR BLD: 1.44 RATIO — HIGH (ref 0.88–1.16)
LIPID PNL WITH DIRECT LDL SERPL: 99 MG/DL — SIGNIFICANT CHANGE UP
LYMPHOCYTES # BLD AUTO: 0.12 K/UL — LOW (ref 1–3.3)
LYMPHOCYTES # BLD AUTO: 4.1 % — LOW (ref 13–44)
MCHC RBC-ENTMCNC: 27.2 PG — SIGNIFICANT CHANGE UP (ref 27–34)
MCHC RBC-ENTMCNC: 31.8 GM/DL — LOW (ref 32–36)
MCV RBC AUTO: 85.4 FL — SIGNIFICANT CHANGE UP (ref 80–100)
MONOCYTES # BLD AUTO: 0.37 K/UL — SIGNIFICANT CHANGE UP (ref 0–0.9)
MONOCYTES NFR BLD AUTO: 12.7 % — SIGNIFICANT CHANGE UP (ref 2–14)
NEUTROPHILS # BLD AUTO: 2.35 K/UL — SIGNIFICANT CHANGE UP (ref 1.8–7.4)
NEUTROPHILS NFR BLD AUTO: 80.8 % — HIGH (ref 43–77)
NON HDL CHOLESTEROL: 129 MG/DL — SIGNIFICANT CHANGE UP
NT-PROBNP SERPL-SCNC: 597 PG/ML — HIGH (ref 0–300)
PLATELET # BLD AUTO: 200 K/UL — SIGNIFICANT CHANGE UP (ref 150–400)
POTASSIUM SERPL-MCNC: 2.5 MMOL/L — CRITICAL LOW (ref 3.5–5.3)
POTASSIUM SERPL-MCNC: 2.5 MMOL/L — CRITICAL LOW (ref 3.5–5.3)
POTASSIUM SERPL-MCNC: 3.2 MMOL/L — LOW (ref 3.5–5.3)
POTASSIUM SERPL-MCNC: 3.8 MMOL/L — SIGNIFICANT CHANGE UP (ref 3.5–5.3)
POTASSIUM SERPL-SCNC: 2.5 MMOL/L — CRITICAL LOW (ref 3.5–5.3)
POTASSIUM SERPL-SCNC: 2.5 MMOL/L — CRITICAL LOW (ref 3.5–5.3)
POTASSIUM SERPL-SCNC: 3.2 MMOL/L — LOW (ref 3.5–5.3)
POTASSIUM SERPL-SCNC: 3.8 MMOL/L — SIGNIFICANT CHANGE UP (ref 3.5–5.3)
PROT SERPL-MCNC: 5.6 G/DL — LOW (ref 6.6–8.7)
PROTHROM AB SERPL-ACNC: 16.8 SEC — HIGH (ref 10.5–13.4)
RBC # BLD: 3.02 M/UL — LOW (ref 3.8–5.2)
RBC # FLD: 16.2 % — HIGH (ref 10.3–14.5)
SODIUM SERPL-SCNC: 138 MMOL/L — SIGNIFICANT CHANGE UP (ref 135–145)
SODIUM SERPL-SCNC: 139 MMOL/L — SIGNIFICANT CHANGE UP (ref 135–145)
SODIUM SERPL-SCNC: 141 MMOL/L — SIGNIFICANT CHANGE UP (ref 135–145)
TRIGL SERPL-MCNC: 152 MG/DL — HIGH
TROPONIN T SERPL-MCNC: <0.01 NG/ML — SIGNIFICANT CHANGE UP (ref 0–0.06)
WBC # BLD: 2.91 K/UL — LOW (ref 3.8–10.5)
WBC # FLD AUTO: 2.91 K/UL — LOW (ref 3.8–10.5)

## 2022-04-18 PROCEDURE — 73610 X-RAY EXAM OF ANKLE: CPT | Mod: 26,RT

## 2022-04-18 PROCEDURE — 99233 SBSQ HOSP IP/OBS HIGH 50: CPT

## 2022-04-18 PROCEDURE — 74230 X-RAY XM SWLNG FUNCJ C+: CPT | Mod: 26

## 2022-04-18 RX ORDER — POTASSIUM CHLORIDE 20 MEQ
10 PACKET (EA) ORAL
Refills: 0 | Status: COMPLETED | OUTPATIENT
Start: 2022-04-18 | End: 2022-04-18

## 2022-04-18 RX ORDER — POTASSIUM CHLORIDE 20 MEQ
40 PACKET (EA) ORAL EVERY 4 HOURS
Refills: 0 | Status: COMPLETED | OUTPATIENT
Start: 2022-04-18 | End: 2022-04-19

## 2022-04-18 RX ORDER — MAGNESIUM SULFATE 500 MG/ML
1 VIAL (ML) INJECTION ONCE
Refills: 0 | Status: COMPLETED | OUTPATIENT
Start: 2022-04-18 | End: 2022-04-18

## 2022-04-18 RX ORDER — POTASSIUM CHLORIDE 20 MEQ
10 PACKET (EA) ORAL
Refills: 0 | Status: DISCONTINUED | OUTPATIENT
Start: 2022-04-18 | End: 2022-04-18

## 2022-04-18 RX ORDER — POTASSIUM CHLORIDE 20 MEQ
40 PACKET (EA) ORAL
Refills: 0 | Status: DISCONTINUED | OUTPATIENT
Start: 2022-04-18 | End: 2022-04-18

## 2022-04-18 RX ORDER — POTASSIUM CHLORIDE 20 MEQ
40 PACKET (EA) ORAL EVERY 4 HOURS
Refills: 0 | Status: COMPLETED | OUTPATIENT
Start: 2022-04-18 | End: 2022-04-18

## 2022-04-18 RX ORDER — NYSTATIN 500MM UNIT
500000 POWDER (EA) MISCELLANEOUS
Refills: 0 | Status: DISCONTINUED | OUTPATIENT
Start: 2022-04-18 | End: 2022-04-21

## 2022-04-18 RX ADMIN — Medication 500000 UNIT(S): at 17:25

## 2022-04-18 RX ADMIN — Medication 100 MILLIEQUIVALENT(S): at 16:02

## 2022-04-18 RX ADMIN — Medication 100 MILLIEQUIVALENT(S): at 09:48

## 2022-04-18 RX ADMIN — Medication 100 MILLIEQUIVALENT(S): at 17:25

## 2022-04-18 RX ADMIN — OXYCODONE HYDROCHLORIDE 5 MILLIGRAM(S): 5 TABLET ORAL at 14:06

## 2022-04-18 RX ADMIN — Medication 100 GRAM(S): at 02:09

## 2022-04-18 RX ADMIN — Medication 40 MILLIEQUIVALENT(S): at 04:29

## 2022-04-18 RX ADMIN — Medication 500000 UNIT(S): at 01:36

## 2022-04-18 RX ADMIN — ATORVASTATIN CALCIUM 10 MILLIGRAM(S): 80 TABLET, FILM COATED ORAL at 21:29

## 2022-04-18 RX ADMIN — Medication 81 MILLIGRAM(S): at 13:56

## 2022-04-18 RX ADMIN — Medication 100 MILLIEQUIVALENT(S): at 18:30

## 2022-04-18 RX ADMIN — HEPARIN SODIUM 5000 UNIT(S): 5000 INJECTION INTRAVENOUS; SUBCUTANEOUS at 17:25

## 2022-04-18 RX ADMIN — Medication 40 MILLIEQUIVALENT(S): at 23:26

## 2022-04-18 RX ADMIN — Medication 100 MILLIEQUIVALENT(S): at 11:10

## 2022-04-18 RX ADMIN — Medication 40 MILLIEQUIVALENT(S): at 01:34

## 2022-04-18 RX ADMIN — Medication 40 MILLIEQUIVALENT(S): at 16:02

## 2022-04-18 RX ADMIN — Medication 12.5 MILLIGRAM(S): at 17:25

## 2022-04-18 RX ADMIN — Medication 100 MILLIEQUIVALENT(S): at 08:20

## 2022-04-18 RX ADMIN — Medication 40 MILLIEQUIVALENT(S): at 17:25

## 2022-04-18 RX ADMIN — HEPARIN SODIUM 5000 UNIT(S): 5000 INJECTION INTRAVENOUS; SUBCUTANEOUS at 06:26

## 2022-04-18 RX ADMIN — Medication 12.5 MILLIGRAM(S): at 06:26

## 2022-04-18 RX ADMIN — OXYCODONE HYDROCHLORIDE 5 MILLIGRAM(S): 5 TABLET ORAL at 15:06

## 2022-04-18 RX ADMIN — ONDANSETRON 4 MILLIGRAM(S): 8 TABLET, FILM COATED ORAL at 08:36

## 2022-04-18 RX ADMIN — SODIUM CHLORIDE 125 MILLILITER(S): 9 INJECTION, SOLUTION INTRAVENOUS at 14:06

## 2022-04-18 RX ADMIN — Medication 500000 UNIT(S): at 13:56

## 2022-04-18 RX ADMIN — Medication 500000 UNIT(S): at 06:25

## 2022-04-18 RX ADMIN — Medication 500000 UNIT(S): at 23:26

## 2022-04-18 NOTE — SWALLOW VFSS/MBS ASSESSMENT ADULT - SLP GENERAL OBSERVATIONS
Pt recd awake/upright in stretcher in radiology, A&A Ox3, 0/10 pain pre/post, tolerating RA no overt distress, accompanied by RN Kiki, reduced speech intelligibility, hypophonia

## 2022-04-18 NOTE — SWALLOW VFSS/MBS ASSESSMENT ADULT - ESOPHAGEAL STAGE
trace retention/retrograde progression of PO noted at and below the level of the pyriforms, reoccupying hypopharynx, though no airway entry noted trace retention/retrograde progression of PO noted below the level of the pyriforms, reoccupying hypopharynx, though no airway entry noted

## 2022-04-18 NOTE — SWALLOW VFSS/MBS ASSESSMENT ADULT - COMMENTS
Pt s/p initial MBSV in 01/2022, rx made for puree w/thin liquids, see full report for details, completed at [facility]. Pt reporting reduced tolerance of PO since discharge, stating significant weight loss in previous x ~1 month. C/o significant pain in oral cavity w/both solids & liquids (due to mucositis). Additional nausea & at times emesis w/meals. Had not initiated dysphagia tx since diagnosis & surgical treatment in January. Trach decannulated shortly after discharge from [facility], no complications reported.

## 2022-04-18 NOTE — SWALLOW VFSS/MBS ASSESSMENT ADULT - ORAL PHASE COMMENTS
piecemeal deglutition Pt reporting significant pain in oral cavity due to mucositis, trials therefore discontinued

## 2022-04-18 NOTE — PROGRESS NOTE ADULT - SUBJECTIVE AND OBJECTIVE BOX
Chief complaint: Syncope    Patient seen and examined at bedside. No acute overnight events reported. No fever, chills, cough, chest pain, nausea or vomiting.     Vital Signs Last 24 Hrs  T(F): 98 (18 Apr 2022 05:08), Max: 99 (17 Apr 2022 15:42)  HR: 80 (18 Apr 2022 05:08) (79 - 107)  BP: 127/76 (18 Apr 2022 05:08) (79/58 - 184/79)  RR: 18 (18 Apr 2022 05:08) (16 - 20)  SpO2: 92% (18 Apr 2022 05:08) (92% - 98%)    Physical Exam:  Constitutional: alert and oriented, in no acute distress   Neck: Soft and supple  Respiratory: Clear to auscultation bilaterally, no wheezes or crackles  Cardiovascular: Regular rate and rhyhtm, no murmurs, gallops, rubs  Gastrointestinal: Soft, non-tender to palpation, +bs  Vascular: 2+ peripheral pulses  Neurological: A/O x 3  Musculoskeletal: no lower extremity edema bilaterally    Labs:                        8.2    2.91  )-----------( 200      ( 18 Apr 2022 06:45 )             25.8   04-18    141  |  97<L>  |  23.6<H>  ----------------------------<  107<H>  2.5<LL>   |  31.0<H>  |  1.45<H>    Ca    8.7      18 Apr 2022 06:45  Mg     2.0     04-17    TPro  5.6<L>  /  Alb  2.8<L>  /  TBili  0.3<L>  /  DBili  x   /  AST  18  /  ALT  11  /  AlkPhos  60  04-18

## 2022-04-18 NOTE — SWALLOW VFSS/MBS ASSESSMENT ADULT - ORAL PHASE
piecemeal deglutition/within functional limits/Incomplete tongue to palate contact/Uncontrolled bolus / spillover in taiwo-pharynx within functional limits/Uncontrolled bolus / spillover in taiwo-pharynx/Uncontrolled bolus / spillover in hypopharynx/Laryngeal penetration before swallow - silent within functional limits/Incomplete tongue to palate contact/Uncontrolled bolus / spillover in taiwo-pharynx

## 2022-04-18 NOTE — PATIENT PROFILE ADULT - FALL HARM RISK - HARM RISK INTERVENTIONS

## 2022-04-18 NOTE — SWALLOW VFSS/MBS ASSESSMENT ADULT - DIAGNOSTIC IMPRESSIONS
Pt presenting with mild-moderate oropharyngeal dysphagia. Oral stage of swallow negatively impacted upon by mucositis w/report of pain intermittently throughout trials, significant with minced/moist, improved/tolerance w/puree & thins. Further characterized by reduced bolus formation/cohesion/propulsion, w/subsequent premature pharyngeal entry to the valleculae w/foods, further variably between the taiwo & hypopharynx w/thins, as well as piecemeal deglutition across all trials, resulting from decreased mandibular/lingual/buccal strength/ROM (c/w dx & sx history). No anterior loss or oral stasis visualized.    Pharyngeal phase of swallow marked by reduced BOT retraction, decreased hyo-laryngeal elevation/excursion, incomplete epiglottic deflection & upper airway protection. Trace-min stasis on the BOT, in the valleculae & pyriforms w/all trials (increasing amounts w/consistencies of increased viscosity). Successful clearance w/cued subsequent dry swallow. +Penetration contacting the vocal cords without clearance w/thin liquids, during the swallow head neutral. Additional penetration above the cords without clearance, before & during the swallow head neutral, as well as during the swallow chin tuck & R head turn. Compensatory posture via LEFT head turn does serve as adequate airway protection, with no further penetration noted.     Trace retention/retrograde progression of foods observed on fluoro, at & below the level of the pyriforms, which at times reoccupy hypopharynx. However, no anterior movement noted to laryngeal vestibule.

## 2022-04-18 NOTE — SWALLOW VFSS/MBS ASSESSMENT ADULT - RECOMMENDED FEEDING/EATING TECHNIQUES
allow for swallow between intakes/alternate food with liquid/crush medication (when feasible)/hard swallow w/ each bite or sip/maintain upright posture during/after eating for 30 mins/no straws/oral hygiene/position upright (90 degrees)/provide rest periods between swallows/small sips/bites/turn head left

## 2022-04-18 NOTE — SWALLOW VFSS/MBS ASSESSMENT ADULT - ROSENBEK'S PENETRATION ASPIRATION SCALE
(1) no aspiration, contrast does not enter airway during the swallow head neutral; 3- penetration above the cords without clearance both before & during the swallow head neutral, during the swallow chin tuck & R head turn; 1- no penetration or aspiration noted with use of L head turn/(5) contrast contacts vocal cords, visible residue remains (penetration)

## 2022-04-18 NOTE — SWALLOW VFSS/MBS ASSESSMENT ADULT - SLP PERTINENT HISTORY OF CURRENT PROBLEM
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 patient states she has been onable to tolerate oral diet well since the procedure for last ~3 month and has been on clear liquid diet. but for the last 2-3 weeks she cant tolerate anything by mouth. she gets pain in the oral cavity, nausea anorexia. patient report finishing chemo and radiation last week".

## 2022-04-18 NOTE — SWALLOW VFSS/MBS ASSESSMENT ADULT - ADDITIONAL RECOMMENDATIONS
Consider calorie count to monitor if Pt to be with adequate caloric intake to maintain nutrition/hydration, concern due to significant weight loss in past 1-2 months  F/u with GI re: potential need for enteral feeding if Pt to be without adequate oral intake  SLP to follow for dysphagia tx   Pt should continue with dysphagia tx upon d/c

## 2022-04-19 ENCOUNTER — APPOINTMENT (OUTPATIENT)
Dept: PLASTIC SURGERY | Facility: CLINIC | Age: 57
End: 2022-04-19

## 2022-04-19 DIAGNOSIS — R13.12 DYSPHAGIA, OROPHARYNGEAL PHASE: ICD-10-CM

## 2022-04-19 LAB
ANION GAP SERPL CALC-SCNC: 12 MMOL/L — SIGNIFICANT CHANGE UP (ref 5–17)
BUN SERPL-MCNC: 18.9 MG/DL — SIGNIFICANT CHANGE UP (ref 8–20)
CALCIUM SERPL-MCNC: 8.3 MG/DL — LOW (ref 8.6–10.2)
CHLORIDE SERPL-SCNC: 101 MMOL/L — SIGNIFICANT CHANGE UP (ref 98–107)
CO2 SERPL-SCNC: 29 MMOL/L — SIGNIFICANT CHANGE UP (ref 22–29)
CREAT SERPL-MCNC: 0.94 MG/DL — SIGNIFICANT CHANGE UP (ref 0.5–1.3)
EGFR: 71 ML/MIN/1.73M2 — SIGNIFICANT CHANGE UP
GLUCOSE SERPL-MCNC: 109 MG/DL — HIGH (ref 70–99)
HCT VFR BLD CALC: 24.8 % — LOW (ref 34.5–45)
HGB BLD-MCNC: 7.9 G/DL — LOW (ref 11.5–15.5)
MCHC RBC-ENTMCNC: 27.1 PG — SIGNIFICANT CHANGE UP (ref 27–34)
MCHC RBC-ENTMCNC: 31.9 GM/DL — LOW (ref 32–36)
MCV RBC AUTO: 84.9 FL — SIGNIFICANT CHANGE UP (ref 80–100)
PLATELET # BLD AUTO: 169 K/UL — SIGNIFICANT CHANGE UP (ref 150–400)
POTASSIUM SERPL-MCNC: 3.6 MMOL/L — SIGNIFICANT CHANGE UP (ref 3.5–5.3)
POTASSIUM SERPL-SCNC: 3.6 MMOL/L — SIGNIFICANT CHANGE UP (ref 3.5–5.3)
RBC # BLD: 2.92 M/UL — LOW (ref 3.8–5.2)
RBC # FLD: 16.3 % — HIGH (ref 10.3–14.5)
SODIUM SERPL-SCNC: 142 MMOL/L — SIGNIFICANT CHANGE UP (ref 135–145)
WBC # BLD: 2.8 K/UL — LOW (ref 3.8–10.5)
WBC # FLD AUTO: 2.8 K/UL — LOW (ref 3.8–10.5)

## 2022-04-19 PROCEDURE — 99233 SBSQ HOSP IP/OBS HIGH 50: CPT

## 2022-04-19 RX ORDER — POLYETHYLENE GLYCOL 3350 17 G/17G
17 POWDER, FOR SOLUTION ORAL
Refills: 0 | Status: DISCONTINUED | OUTPATIENT
Start: 2022-04-19 | End: 2022-04-21

## 2022-04-19 RX ORDER — POLYETHYLENE GLYCOL 3350 17 G/17G
17 POWDER, FOR SOLUTION ORAL DAILY
Refills: 0 | Status: DISCONTINUED | OUTPATIENT
Start: 2022-04-19 | End: 2022-04-19

## 2022-04-19 RX ORDER — CEFAZOLIN SODIUM 1 G
1000 VIAL (EA) INJECTION ONCE
Refills: 0 | Status: COMPLETED | OUTPATIENT
Start: 2022-04-20 | End: 2022-04-20

## 2022-04-19 RX ORDER — POTASSIUM CHLORIDE 20 MEQ
10 PACKET (EA) ORAL
Refills: 0 | Status: COMPLETED | OUTPATIENT
Start: 2022-04-19 | End: 2022-04-19

## 2022-04-19 RX ADMIN — POLYETHYLENE GLYCOL 3350 17 GRAM(S): 17 POWDER, FOR SOLUTION ORAL at 17:32

## 2022-04-19 RX ADMIN — Medication 500000 UNIT(S): at 23:58

## 2022-04-19 RX ADMIN — HEPARIN SODIUM 5000 UNIT(S): 5000 INJECTION INTRAVENOUS; SUBCUTANEOUS at 05:24

## 2022-04-19 RX ADMIN — Medication 500000 UNIT(S): at 17:32

## 2022-04-19 RX ADMIN — Medication 500000 UNIT(S): at 12:51

## 2022-04-19 RX ADMIN — Medication 12.5 MILLIGRAM(S): at 05:24

## 2022-04-19 RX ADMIN — Medication 100 MILLIEQUIVALENT(S): at 12:50

## 2022-04-19 RX ADMIN — ATORVASTATIN CALCIUM 10 MILLIGRAM(S): 80 TABLET, FILM COATED ORAL at 21:25

## 2022-04-19 RX ADMIN — Medication 40 MILLIEQUIVALENT(S): at 02:36

## 2022-04-19 RX ADMIN — Medication 100 MILLIEQUIVALENT(S): at 11:00

## 2022-04-19 RX ADMIN — POLYETHYLENE GLYCOL 3350 17 GRAM(S): 17 POWDER, FOR SOLUTION ORAL at 12:50

## 2022-04-19 RX ADMIN — Medication 500000 UNIT(S): at 05:24

## 2022-04-19 RX ADMIN — Medication 100 MILLIEQUIVALENT(S): at 09:00

## 2022-04-19 RX ADMIN — Medication 81 MILLIGRAM(S): at 12:53

## 2022-04-19 RX ADMIN — Medication 12.5 MILLIGRAM(S): at 17:31

## 2022-04-19 NOTE — PHYSICAL THERAPY INITIAL EVALUATION ADULT - DISCHARGE DISPOSITION, PT EVAL
Home with assist if family willing & able to provide vs. GAURAV. Pt with difficulties maintaining NWB of Right LE, will likely have to be w/c level at this time.

## 2022-04-19 NOTE — CONSULT NOTE ADULT - ASSESSMENT
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 GI consulted for PEG placement evaluation.

## 2022-04-19 NOTE — PHYSICAL THERAPY INITIAL EVALUATION ADULT - ADDITIONAL COMMENTS
Pt reports living in private home with son and daughter in law, daughter in law does not work home all the time able to assist. Pt has no steps to enter/inside. Pt independent prior to admission. Owns no DME.

## 2022-04-19 NOTE — PHYSICAL THERAPY INITIAL EVALUATION ADULT - PERTINENT HX OF CURRENT PROBLEM, REHAB EVAL
Pt presents emergency department with a chief complaint of right ankle pain s/p syncopal fall today.

## 2022-04-19 NOTE — PROGRESS NOTE ADULT - SUBJECTIVE AND OBJECTIVE BOX
Chief complaint: Syncope     Patient seen and examined at bedside. No acute overnight events reported. No fever, chills, cough, chest pain, nausea or vomiting.     Vital Signs Last 24 Hrs  T(F): 98.6 (19 Apr 2022 04:48), Max: 98.6 (18 Apr 2022 10:58)  HR: 79 (19 Apr 2022 04:48) (76 - 79)  BP: 126/77 (19 Apr 2022 04:48) (104/66 - 126/77)  RR: 18 (19 Apr 2022 04:48) (18 - 18)  SpO2: 89% (19 Apr 2022 04:48) (89% - 90%)    Physical Exam:  Constitutional: alert and oriented, in no acute distress   Neck: Soft and supple  Respiratory: Clear to auscultation bilaterally, no wheezes or crackles  Cardiovascular: Regular rate and rhyhtm, no murmurs, gallops, rubs  Gastrointestinal: Soft, non-tender to palpation, +bs  Vascular: 2+ peripheral pulses  Neurological: A/O x 3, no focal neurological deficits    Labs:                        7.9    2.80  )-----------( 169      ( 19 Apr 2022 07:35 )             24.8   04-19    142  |  101  |  18.9  ----------------------------<  109<H>  3.6   |  29.0  |  0.94    Ca    8.3<L>      19 Apr 2022 07:35  Mg     2.0     04-17    TPro  5.6<L>  /  Alb  2.8<L>  /  TBili  0.3<L>  /  DBili  x   /  AST  18  /  ALT  11  /  AlkPhos  60  04-18

## 2022-04-19 NOTE — PHYSICAL THERAPY INITIAL EVALUATION ADULT - GENERAL OBSERVATIONS, REHAB EVAL
Pt received in bed, + IV Loc, +Tele/, + Right LE CAM boot, breathing on RA in NAD, in 7/10 right ankle pain, agreeable to PT evaluation

## 2022-04-19 NOTE — PROGRESS NOTE ADULT - ASSESSMENT
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 patient states she has been onable to tolerate oral diet well since the procedure for last ~3 month and has been on clear liquid diet. but for the last 2-3 weeks she cant tolerate anything by mouth. she gets pain in the oral cavity, nausea anorexia. patient report finishing chemo and radiation last week.    Syncope secondary to hypovalemia and electrolyte imbalances  - s/p fluid bolus  - IVF discontinued  - Telemetry monitoring  - TTE noted    Hypokalemia  - Improved  - K 3.6  - Repleted  - Monitor BMP  - Continue telemetry monitoring     Anemia  - likely dilutional in addition to chemo/radiation  - No active bleeding  - Monitor CBC    Decreased oral intake  - Due to oral thursh and radiation induced mucositis   - Nystatin oral swish and spit : oral thrush + radiation induced mucositis  - Pureed diet  - Speech and swallow evaluation noted  - Barium [place] noted  - GI consult for possible peg    Right buccal cancer  - Squamous cell carcinoma of gingiva s/p R composite resection, SND I-IV, s/p trach removed in January 2022, s/p mandibulectomy, neck dissection, L fibula flap on 1/13   - Right chest wall chemo port in place   - Completed chemo/radiation last week   - Outpatient follow up with Oncology and Surgery    Displaced right distal fibula fx  - ortho consult appreciated  - patient given CAM boot  - f/u outpatient    HTN  - Lisinopril on hold  - Lopressor 12.5mg BID    DVT ppx  - Heparin SQ    Dispo: likely dc tomorrow, PT consult 55 yo female with PMH of HTN, HLD, with diagnosis 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 patient states she has been onable to tolerate oral diet well since the procedure for last ~3 month and has been on clear liquid diet. but for the last 2-3 weeks she cant tolerate anything by mouth. she gets pain in the oral cavity, nausea anorexia. patient report finishing chemo and radiation last week.    Syncope secondary to hypovalemia and electrolyte imbalances  - s/p fluid bolus  - IVF discontinued  - Telemetry monitoring  - TTE noted    Hypokalemia  - Improved  - K 3.6  - Repleted  - Monitor BMP  - Continue telemetry monitoring     Anemia  - likely dilutional in addition to chemo/radiation  - No active bleeding  - Monitor CBC    Decreased oral intake  - Due to oral thursh and radiation induced mucositis   - Nystatin oral swish and spit : oral thrush + radiation induced mucositis  - Pureed diet  - Speech and swallow evaluation noted  - Barium [place] noted  - GI consulted, scheduled for peg placement tomorrow    Right buccal cancer  - Squamous cell carcinoma of gingiva s/p R composite resection, SND I-IV, s/p trach removed in January 2022, s/p mandibulectomy, neck dissection, L fibula flap on 1/13   - Right chest wall chemo port in place   - Completed chemo/radiation last week   - Outpatient follow up with Oncology and Surgery    Displaced right distal fibula fx  - ortho consult appreciated  - patient given CAM boot  - f/u outpatient    HTN  - Lisinopril on hold  - Lopressor 12.5mg BID    DVT ppx  - Heparin SQ    Dispo: peg placement tomorrow

## 2022-04-19 NOTE — CONSULT NOTE ADULT - NS ATTEND AMEND GEN_ALL_CORE FT
56 F  smoker  oral cancer, s/p extensive surgery with L leg bone graft  finished XRT and chemo last week  40 lb weight loss in under 4 mo  unable to tolerate PO  mouth looks v raw - whitish exudate along R mandible and R surface of tongue  on nystatin qID  Will need PEG, on schedule for tomorrow  INR 1.44, COVID -ve 4/17

## 2022-04-19 NOTE — PHYSICAL THERAPY INITIAL EVALUATION ADULT - RANGE OF MOTION EXAMINATION, REHAB EVAL
Right Ankle ROM not assessed - splinted/in CAM boot/bilateral upper extremity ROM was WNL (within normal limits)

## 2022-04-19 NOTE — CONSULT NOTE ADULT - SUBJECTIVE AND OBJECTIVE BOX
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 Patient states she has been unable to tolerate oral diet well since the procedure for last ~3 month. Has been on clear liquid diet but for the last 2-3 weeks she cant tolerate anything by mouth. Reports a weight loss of about 40lbs since January. States she feels burning pain in the oral cavity with associated nausea when eating. Patient report finishing chemo and radiation last week. Patient states that day prior to admission she was at the store and states that her leg gave away and she fell. she did not lose consciousness. no fever. no chest pain or sob. no abdominal pain. She report her BP is normally on the "low side. in the ED patient was found to be hypotensive, hypokalemic. Right distal fibular fracture s/p splint application in ed. GI consulted for further evaluation and possible PEG tube placement. Patient seen and evaluated at bedside, reporting no complaints. Pt s/p initial MBSV in 2022, with recs made for puree w/thin liquids, completed at [facility]. Repeat Speech & swallow eval  recommending Puree w/thin liquids with a potential need for enteral feeding. Currently on Aspirin 81mg. Denies any AC therapy. History of  section. Denies any additional abdominal surgeries.       PAST MEDICAL & SURGICAL HISTORY:  H/O malignant neoplasm of gum    Hypertension    Hyperlipidemia    Mild asthma    H/O  section    History of hip replacement  left hip    History of partial hysterectomy    H/O neck surgery  partial right mandibulectomy with fibular free flap reconstriction and right neck lymphnode dissection 22        Allergies    morphine (Hives)    Intolerances        MEDICATIONS  (STANDING):  aspirin enteric coated 81 milliGRAM(s) Oral daily  atorvastatin 10 milliGRAM(s) Oral at bedtime  heparin   Injectable 5000 Unit(s) SubCutaneous every 12 hours  metoprolol tartrate 12.5 milliGRAM(s) Oral two times a day  nystatin    Suspension 478799 Unit(s) Oral four times a day  potassium chloride  10 mEq/100 mL IVPB 10 milliEquivalent(s) IV Intermittent every 1 hour    MEDICATIONS  (PRN):  acetaminophen     Tablet .. 650 milliGRAM(s) Oral every 6 hours PRN Temp greater or equal to 38C (100.4F), Mild Pain (1 - 3)  ondansetron Injectable 4 milliGRAM(s) IV Push every 8 hours PRN Nausea and/or Vomiting  oxyCODONE    Solution 5 milliGRAM(s) Oral every 6 hours PRN Moderate Pain (4 - 6)      Social History:    Marital Status:  (   )    (   ) Single    (   )    (  )   Occupation:   Lives with: (  ) alone  (  ) children   (  ) spouse   (  ) parents  (  ) other    Substance Use (street drugs): (  ) never used  (  ) other:  Tobacco Usage:  (   ) never smoked   ( Quit-2022 ) former smoker   (   ) current smoker  (     ) pack year  (        ) last cigarette date  Alcohol Usage:  Sexual History:     Family History   IBD (  ) Yes   (  ) No  GI Malignancy (  )  Yes    (  ) No    Health Management     Last Colonoscopy -      (     ) Advanced Directives: (     ) None    (      ) DNR    (     ) DNI    (     ) Health Care Proxy:     Review of Systems:    General:  No wt loss, fevers, chills, night sweats, fatigue,   CV:  No pain, palpitations, hypo/hypertension  Resp:  No dyspnea, cough, tachypnea, wheezing  GI:  See HPI  :  No pain, bleeding, incontinence, nocturia  Muscle:  No pain, weakness  Neuro:  No weakness, tingling, memory problems  Psych:  No fatigue, insomnia, mood problems, depression  Endocrine:  No polyuria, polydypsia, cold/heat intolerance  Heme:  No petechiae, ecchymosis, easy bruisability  Skin:  No rash, tattoos, scars, edema      Vital Signs Last 24 Hrs  T(C): 37 (2022 04:48), Max: 37 (2022 10:58)  T(F): 98.6 (2022 04:48), Max: 98.6 (2022 10:58)  HR: 79 (2022 04:48) (76 - 79)  BP: 126/77 (2022 04:48) (104/66 - 126/77)  BP(mean): --  RR: 18 (2022 04:48) (18 - 18)  SpO2: 89% (2022 04:48) (89% - 90%)    PHYSICAL EXAM:    Constitutional: NAD  Neck: Redness, No LAD, supple  Oral cavity: Thrush, erythema noted.   Respiratory: CTA and P  Cardiovascular: S1 and S2, RRR, no M  Gastrointestinal: BS+, soft, NT/ND, neg HSM,  Extremities: Right leg splint noted, No peripheral edema, neg clubbing, cyanosis  Vascular: 2+ peripheral pulses  Neurological: A/O x 3, no focal deficits  Psychiatric: Normal mood, normal affect  Skin: No rashes         LABS:                        7.9    2.80  )-----------( 169      ( 2022 07:35 )             24.8     04-19    142  |  101  |  18.9  ----------------------------<  109<H>  3.6   |  29.0  |  0.94    Ca    8.3<L>      2022 07:35  Mg     2.0     04-17    TPro  5.6<L>  /  Alb  2.8<L>  /  TBili  0.3<L>  /  DBili  x   /  AST  18  /  ALT  11  /  AlkPhos  60  04-18    PT/INR - ( 2022 06:45 )   PT: 16.8 sec;   INR: 1.44 ratio             LIVER FUNCTIONS - ( 2022 06:45 )  Alb: 2.8 g/dL / Pro: 5.6 g/dL / ALK PHOS: 60 U/L / ALT: 11 U/L / AST: 18 U/L / GGT: x             RADIOLOGY & ADDITIONAL TESTS:         Patient is a 56y old  Female who presents with a chief complaint of Syncope (2022 11:06)      HPI:  55 yo female with PMH of HTN, HLD, with diagnosis 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 Patient states she has been unable to tolerate oral diet well since the procedure for last ~3 month. Has been on clear liquid diet but for the last 2-3 weeks she can't tolerate anything by mouth. Reports a weight loss of about 40lbs since January. States she feels burning pain in the oral cavity with associated nausea when eating. Patient reports finishing chemo and radiation last week. Patient states that day prior to admission she was at the store and states that her leg gave away and she fell. she did not lose consciousness. no fever. no chest pain or sob. no abdominal pain. She report her BP is normally on the "low side. in the ED patient was found to be hypotensive, hypokalemic. Right distal fibular fracture s/p splint application in ed. GI consulted for further evaluation and possible PEG tube placement. Patient seen and evaluated at bedside, reporting no complaints. Pt s/p initial MBSV in 2022, with recs made for puree w/thin liquids, completed at [facility]. Repeat Speech & swallow eval  recommending Puree w/thin liquids with a potential need for enteral feeding. Currently on Aspirin 81mg. Denies any AC therapy. History of  section. Denies any additional abdominal surgeries.       PAST MEDICAL & SURGICAL HISTORY:  H/O malignant neoplasm of gum    Hypertension    Hyperlipidemia    Mild asthma    H/O  section    History of hip replacement  left hip    History of partial hysterectomy    H/O neck surgery  partial right mandibulectomy with fibular free flap reconstriction and right neck lymphnode dissection 22        Allergies    morphine (Hives)    Intolerances        MEDICATIONS  (STANDING):  aspirin enteric coated 81 milliGRAM(s) Oral daily  atorvastatin 10 milliGRAM(s) Oral at bedtime  heparin   Injectable 5000 Unit(s) SubCutaneous every 12 hours  metoprolol tartrate 12.5 milliGRAM(s) Oral two times a day  nystatin    Suspension [id number] Unit(s) Oral four times a day  potassium chloride  10 mEq/100 mL IVPB 10 milliEquivalent(s) IV Intermittent every 1 hour    MEDICATIONS  (PRN):  acetaminophen     Tablet .. 650 milliGRAM(s) Oral every 6 hours PRN Temp greater or equal to 38C (100.4F), Mild Pain (1 - 3)  ondansetron Injectable 4 milliGRAM(s) IV Push every 8 hours PRN Nausea and/or Vomiting  oxyCODONE    Solution 5 milliGRAM(s) Oral every 6 hours PRN Moderate Pain (4 - 6)      Social History:    Marital Status:  (   )    (   ) Single    (   )    (  )   Occupation:   Lives with: (  ) alone  (  ) children   (  ) spouse   (  ) parents  (  ) other    Substance Use (street drugs): (  ) never used  (  ) other:  Tobacco Usage:  (   ) never smoked   ( Quit-2022 ) former smoker   (   ) current smoker  (     ) pack year  (        ) last cigarette date  Alcohol Usage:  Sexual History:     Family History   IBD (  ) Yes   (  ) No  GI Malignancy (  )  Yes    (  ) No    Health Management     Last Colonoscopy -      (     ) Advanced Directives: (     ) None    (      ) DNR    (     ) DNI    (     ) Health Care Proxy:     Review of Systems:    General:  No wt loss, fevers, chills, night sweats, fatigue,   CV:  No pain, palpitations, hypo/hypertension  Resp:  No dyspnea, cough, tachypnea, wheezing  GI:  See HPI  :  No pain, bleeding, incontinence, nocturia  Muscle:  No pain, weakness  Neuro:  No weakness, tingling, memory problems  Psych:  No fatigue, insomnia, mood problems, depression  Endocrine:  No polyuria, polydypsia, cold/heat intolerance  Heme:  No petechiae, ecchymosis, easy bruisability  Skin:  No rash, tattoos, scars, edema      Vital Signs Last 24 Hrs  T(C): 37 (2022 04:48), Max: 37 (2022 10:58)  T(F): 98.6 (2022 04:48), Max: 98.6 (2022 10:58)  HR: 79 (2022 04:48) (76 - 79)  BP: 126/77 (2022 04:48) (104/66 - 126/77)  BP(mean): --  RR: 18 (2022 04:48) (18 - 18)  SpO2: 89% (2022 04:48) (89% - 90%)    PHYSICAL EXAM:    Constitutional: NAD  Neck: Redness, No LAD, supple  Oral cavity: Thrush, erythema noted.   Respiratory: CTA and P  Cardiovascular: S1 and S2, RRR, no M  Gastrointestinal: BS+, soft, NT/ND, neg HSM,  Extremities: Right leg splint noted, No peripheral edema, neg clubbing, cyanosis  Vascular: 2+ peripheral pulses  Neurological: A/O x 3, no focal deficits  Psychiatric: Normal mood, normal affect  Skin: No rashes         LABS:                        7.9    2.80  )-----------( 169      ( 2022 07:35 )             24.8     04-19    142  |  101  |  18.9  ----------------------------<  109<H>  3.6   |  29.0  |  0.94    Ca    8.3<L>      2022 07:35  Mg     2.0     04-17    TPro  5.6<L>  /  Alb  2.8<L>  /  TBili  0.3<L>  /  DBili  x   /  AST  18  /  ALT  11  /  AlkPhos  60  04-18    PT/INR - ( 2022 06:45 )   PT: 16.8 sec;   INR: 1.44 ratio             LIVER FUNCTIONS - ( 2022 06:45 )  Alb: 2.8 g/dL / Pro: 5.6 g/dL / ALK PHOS: 60 U/L / ALT: 11 U/L / AST: 18 U/L / GGT: x             RADIOLOGY & ADDITIONAL TESTS:

## 2022-04-19 NOTE — CONSULT NOTE ADULT - PROBLEM SELECTOR RECOMMENDATION 9
Reports reduced tolerance of PO since January likely due to thrush, radiation induced mucositis. Also with significant weight loss of 40lbs since January. C/o significant burning pain in oral cavity w/both solids & liquids (due to mucositis).    Plan:  PEG placement discussed with patient who is in agreement to procedure at this time.   Will plan for PEG tomorrow.   NPO after midnight.   AM labs ordered.   Will order Ancef on hold for tomorrow.

## 2022-04-20 ENCOUNTER — TRANSCRIPTION ENCOUNTER (OUTPATIENT)
Age: 57
End: 2022-04-20

## 2022-04-20 ENCOUNTER — APPOINTMENT (OUTPATIENT)
Age: 57
End: 2022-04-20

## 2022-04-20 LAB
ANION GAP SERPL CALC-SCNC: 13 MMOL/L — SIGNIFICANT CHANGE UP (ref 5–17)
APTT BLD: 29.3 SEC — SIGNIFICANT CHANGE UP (ref 27.5–35.5)
BLD GP AB SCN SERPL QL: SIGNIFICANT CHANGE UP
BUN SERPL-MCNC: 17.1 MG/DL — SIGNIFICANT CHANGE UP (ref 8–20)
CALCIUM SERPL-MCNC: 8.6 MG/DL — SIGNIFICANT CHANGE UP (ref 8.6–10.2)
CHLORIDE SERPL-SCNC: 100 MMOL/L — SIGNIFICANT CHANGE UP (ref 98–107)
CO2 SERPL-SCNC: 29 MMOL/L — SIGNIFICANT CHANGE UP (ref 22–29)
CREAT SERPL-MCNC: 0.88 MG/DL — SIGNIFICANT CHANGE UP (ref 0.5–1.3)
EGFR: 77 ML/MIN/1.73M2 — SIGNIFICANT CHANGE UP
GLUCOSE SERPL-MCNC: 115 MG/DL — HIGH (ref 70–99)
HCT VFR BLD CALC: 25.4 % — LOW (ref 34.5–45)
HGB BLD-MCNC: 8.1 G/DL — LOW (ref 11.5–15.5)
INR BLD: 1.29 RATIO — HIGH (ref 0.88–1.16)
MCHC RBC-ENTMCNC: 27.1 PG — SIGNIFICANT CHANGE UP (ref 27–34)
MCHC RBC-ENTMCNC: 31.9 GM/DL — LOW (ref 32–36)
MCV RBC AUTO: 84.9 FL — SIGNIFICANT CHANGE UP (ref 80–100)
PLATELET # BLD AUTO: 171 K/UL — SIGNIFICANT CHANGE UP (ref 150–400)
POTASSIUM SERPL-MCNC: 3.9 MMOL/L — SIGNIFICANT CHANGE UP (ref 3.5–5.3)
POTASSIUM SERPL-SCNC: 3.9 MMOL/L — SIGNIFICANT CHANGE UP (ref 3.5–5.3)
PROTHROM AB SERPL-ACNC: 15 SEC — HIGH (ref 10.5–13.4)
RBC # BLD: 2.99 M/UL — LOW (ref 3.8–5.2)
RBC # FLD: 16.5 % — HIGH (ref 10.3–14.5)
SODIUM SERPL-SCNC: 141 MMOL/L — SIGNIFICANT CHANGE UP (ref 135–145)
WBC # BLD: 4.12 K/UL — SIGNIFICANT CHANGE UP (ref 3.8–10.5)
WBC # FLD AUTO: 4.12 K/UL — SIGNIFICANT CHANGE UP (ref 3.8–10.5)

## 2022-04-20 PROCEDURE — 99233 SBSQ HOSP IP/OBS HIGH 50: CPT

## 2022-04-20 PROCEDURE — 43246 EGD PLACE GASTROSTOMY TUBE: CPT

## 2022-04-20 DEVICE — PUSH KIT ENDOSCOPY GASTRSTMY STANDARD 20 FR.: Type: IMPLANTABLE DEVICE | Status: FUNCTIONAL

## 2022-04-20 RX ORDER — PANTOPRAZOLE SODIUM 20 MG/1
40 TABLET, DELAYED RELEASE ORAL DAILY
Refills: 0 | Status: DISCONTINUED | OUTPATIENT
Start: 2022-04-20 | End: 2022-04-21

## 2022-04-20 RX ADMIN — Medication 100 MILLIGRAM(S): at 22:05

## 2022-04-20 RX ADMIN — Medication 12.5 MILLIGRAM(S): at 16:35

## 2022-04-20 RX ADMIN — Medication 500000 UNIT(S): at 16:35

## 2022-04-20 NOTE — PROGRESS NOTE ADULT - ASSESSMENT
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 patient states she has been onable to tolerate oral diet well since the procedure for last ~3 month and has been on clear liquid diet. but for the last 2-3 weeks she cant tolerate anything by mouth. she gets pain in the oral cavity, nausea anorexia. patient report finishing chemo and radiation last week.    Syncope secondary to hypovalemia and electrolyte imbalances  - s/p fluid bolus  - IVF discontinued  - Telemetry monitoring  - TTE noted    Hypokalemia  - Improved  - Repleted  - Monitor BMP  - Continue telemetry monitoring     Anemia  - likely dilutional in addition to chemo/radiation  - No active bleeding  - Monitor CBC    Decreased oral intake  - Due to oral thursh and radiation induced mucositis   - Nystatin oral swish and spit : oral thrush + radiation induced mucositis  - Speech and swallow evaluation noted  - Barium swallow noted  - GI consult appreciated  - Peg placed today    Right buccal cancer  - Squamous cell carcinoma of gingiva s/p R composite resection, SND I-IV, s/p trach removed in January 2022, s/p mandibulectomy, neck dissection, L fibula flap on 1/13   - Right chest wall chemo port in place   - Completed chemo/radiation last week   - Outpatient follow up with Oncology and Surgery    Displaced right distal fibula fx  - ortho consult appreciated  - patient given CAM boot  - f/u outpatient    HTN  - Lisinopril on hold  - Lopressor 12.5mg BID    DVT ppx  - Heparin SQ    Dispo: 2-3 days pending clinical course

## 2022-04-20 NOTE — PROGRESS NOTE ADULT - SUBJECTIVE AND OBJECTIVE BOX
chief complaint: syncope    Patient seen and examined at bedside. No acute overnight events reported. No fever, chills, cough, chest pain, nausea or vomiting.     Vital Signs Last 24 Hrs  T(F): 98.5 (20 Apr 2022 10:44), Max: 98.7 (19 Apr 2022 20:10)  HR: 87 (20 Apr 2022 10:44) (59 - 87)  BP: 125/76 (20 Apr 2022 10:44) (125/76 - 148/83)  RR: 18 (20 Apr 2022 10:44) (18 - 18)  SpO2: 96% (20 Apr 2022 10:44) (94% - 96%)    Physical Exam:  Constitutional: alert and oriented, in no acute distress   Neck: Soft and supple  Respiratory: Clear to auscultation bilaterally, no wheezes or crackles  Cardiovascular: Regular rate and rhyhtm, no murmurs, gallops, rubs  Gastrointestinal: Soft, non-tender to palpation, +bs  Vascular: 2+ peripheral pulses  Neurological: A/O x 3, no focal neurological deficits  Musculoskeletal: 5/5 strength b/l upper and lower extremities, no lower extremity edema bilaterally    Labs:                        8.1    4.12  )-----------( 171      ( 20 Apr 2022 07:15 )             25.4   04-20    141  |  100  |  17.1  ----------------------------<  115<H>  3.9   |  29.0  |  0.88    Ca    8.6      20 Apr 2022 07:15

## 2022-04-21 LAB
ANION GAP SERPL CALC-SCNC: 14 MMOL/L — SIGNIFICANT CHANGE UP (ref 5–17)
BUN SERPL-MCNC: 16.5 MG/DL — SIGNIFICANT CHANGE UP (ref 8–20)
CALCIUM SERPL-MCNC: 8.2 MG/DL — LOW (ref 8.6–10.2)
CHLORIDE SERPL-SCNC: 96 MMOL/L — LOW (ref 98–107)
CO2 SERPL-SCNC: 29 MMOL/L — SIGNIFICANT CHANGE UP (ref 22–29)
CREAT SERPL-MCNC: 0.83 MG/DL — SIGNIFICANT CHANGE UP (ref 0.5–1.3)
EGFR: 82 ML/MIN/1.73M2 — SIGNIFICANT CHANGE UP
GLUCOSE SERPL-MCNC: 96 MG/DL — SIGNIFICANT CHANGE UP (ref 70–99)
HCT VFR BLD CALC: 24.7 % — LOW (ref 34.5–45)
HGB BLD-MCNC: 8.1 G/DL — LOW (ref 11.5–15.5)
MCHC RBC-ENTMCNC: 26.9 PG — LOW (ref 27–34)
MCHC RBC-ENTMCNC: 32.8 GM/DL — SIGNIFICANT CHANGE UP (ref 32–36)
MCV RBC AUTO: 82.1 FL — SIGNIFICANT CHANGE UP (ref 80–100)
PLATELET # BLD AUTO: 167 K/UL — SIGNIFICANT CHANGE UP (ref 150–400)
POTASSIUM SERPL-MCNC: 2.9 MMOL/L — CRITICAL LOW (ref 3.5–5.3)
POTASSIUM SERPL-MCNC: 3.7 MMOL/L — SIGNIFICANT CHANGE UP (ref 3.5–5.3)
POTASSIUM SERPL-SCNC: 2.9 MMOL/L — CRITICAL LOW (ref 3.5–5.3)
POTASSIUM SERPL-SCNC: 3.7 MMOL/L — SIGNIFICANT CHANGE UP (ref 3.5–5.3)
RBC # BLD: 3.01 M/UL — LOW (ref 3.8–5.2)
RBC # FLD: 16.5 % — HIGH (ref 10.3–14.5)
SODIUM SERPL-SCNC: 139 MMOL/L — SIGNIFICANT CHANGE UP (ref 135–145)
WBC # BLD: 4.01 K/UL — SIGNIFICANT CHANGE UP (ref 3.8–10.5)
WBC # FLD AUTO: 4.01 K/UL — SIGNIFICANT CHANGE UP (ref 3.8–10.5)

## 2022-04-21 PROCEDURE — 99233 SBSQ HOSP IP/OBS HIGH 50: CPT

## 2022-04-21 PROCEDURE — 99232 SBSQ HOSP IP/OBS MODERATE 35: CPT

## 2022-04-21 RX ORDER — METOPROLOL TARTRATE 50 MG
12.5 TABLET ORAL
Refills: 0 | Status: DISCONTINUED | OUTPATIENT
Start: 2022-04-21 | End: 2022-04-22

## 2022-04-21 RX ORDER — POTASSIUM CHLORIDE 20 MEQ
40 PACKET (EA) ORAL ONCE
Refills: 0 | Status: COMPLETED | OUTPATIENT
Start: 2022-04-21 | End: 2022-04-21

## 2022-04-21 RX ORDER — POTASSIUM CHLORIDE 20 MEQ
10 PACKET (EA) ORAL
Refills: 0 | Status: COMPLETED | OUTPATIENT
Start: 2022-04-21 | End: 2022-04-21

## 2022-04-21 RX ORDER — ASCORBIC ACID 60 MG
500 TABLET,CHEWABLE ORAL DAILY
Refills: 0 | Status: DISCONTINUED | OUTPATIENT
Start: 2022-04-21 | End: 2022-04-22

## 2022-04-21 RX ORDER — ACETAMINOPHEN 500 MG
650 TABLET ORAL EVERY 6 HOURS
Refills: 0 | Status: DISCONTINUED | OUTPATIENT
Start: 2022-04-21 | End: 2022-04-22

## 2022-04-21 RX ORDER — NYSTATIN 500MM UNIT
500000 POWDER (EA) MISCELLANEOUS
Refills: 0 | Status: DISCONTINUED | OUTPATIENT
Start: 2022-04-21 | End: 2022-04-22

## 2022-04-21 RX ORDER — ATORVASTATIN CALCIUM 80 MG/1
10 TABLET, FILM COATED ORAL AT BEDTIME
Refills: 0 | Status: DISCONTINUED | OUTPATIENT
Start: 2022-04-21 | End: 2022-04-22

## 2022-04-21 RX ORDER — OXYCODONE HYDROCHLORIDE 5 MG/1
5 TABLET ORAL EVERY 6 HOURS
Refills: 0 | Status: DISCONTINUED | OUTPATIENT
Start: 2022-04-21 | End: 2022-04-22

## 2022-04-21 RX ORDER — ASPIRIN/CALCIUM CARB/MAGNESIUM 324 MG
81 TABLET ORAL DAILY
Refills: 0 | Status: DISCONTINUED | OUTPATIENT
Start: 2022-04-21 | End: 2022-04-21

## 2022-04-21 RX ORDER — POLYETHYLENE GLYCOL 3350 17 G/17G
17 POWDER, FOR SOLUTION ORAL
Refills: 0 | Status: DISCONTINUED | OUTPATIENT
Start: 2022-04-21 | End: 2022-04-22

## 2022-04-21 RX ORDER — ASPIRIN/CALCIUM CARB/MAGNESIUM 324 MG
81 TABLET ORAL DAILY
Refills: 0 | Status: DISCONTINUED | OUTPATIENT
Start: 2022-04-21 | End: 2022-04-22

## 2022-04-21 RX ORDER — PANTOPRAZOLE SODIUM 20 MG/1
40 TABLET, DELAYED RELEASE ORAL
Refills: 0 | Status: DISCONTINUED | OUTPATIENT
Start: 2022-04-22 | End: 2022-04-22

## 2022-04-21 RX ADMIN — Medication 500000 UNIT(S): at 19:09

## 2022-04-21 RX ADMIN — Medication 100 MILLIEQUIVALENT(S): at 09:58

## 2022-04-21 RX ADMIN — Medication 81 MILLIGRAM(S): at 11:56

## 2022-04-21 RX ADMIN — Medication 100 MILLIEQUIVALENT(S): at 10:53

## 2022-04-21 RX ADMIN — Medication 12.5 MILLIGRAM(S): at 19:22

## 2022-04-21 RX ADMIN — Medication 500 MILLIGRAM(S): at 11:45

## 2022-04-21 RX ADMIN — Medication 40 MILLIEQUIVALENT(S): at 11:56

## 2022-04-21 RX ADMIN — POLYETHYLENE GLYCOL 3350 17 GRAM(S): 17 POWDER, FOR SOLUTION ORAL at 11:47

## 2022-04-21 RX ADMIN — ATORVASTATIN CALCIUM 10 MILLIGRAM(S): 80 TABLET, FILM COATED ORAL at 21:23

## 2022-04-21 RX ADMIN — Medication 500000 UNIT(S): at 11:47

## 2022-04-21 RX ADMIN — PANTOPRAZOLE SODIUM 40 MILLIGRAM(S): 20 TABLET, DELAYED RELEASE ORAL at 11:46

## 2022-04-21 RX ADMIN — Medication 1 TABLET(S): at 11:45

## 2022-04-21 RX ADMIN — Medication 100 MILLIEQUIVALENT(S): at 08:56

## 2022-04-21 NOTE — PROGRESS NOTE ADULT - SUBJECTIVE AND OBJECTIVE BOX
[facility] Division of Hospital Medicine  Dillan Fink MD    Chief Complaint:  Patient is a 56y old  Female who presents with a chief complaint of Hypokalemia     (21 Apr 2022 09:49)      SUBJECTIVE / OVERNIGHT EVENTS:  Patient seen and examined at bedside. No acute events reported overnight. S/p PEG yesterday. No new complaints.    Patient denies chest pain, SOB, abd pain, N/V, fever, chills, dysuria or any other complaints. All remainder ROS negative.     MEDICATIONS  (STANDING):  aspirin enteric coated 81 milliGRAM(s) Oral daily  atorvastatin 10 milliGRAM(s) Oral at bedtime  metoprolol tartrate 12.5 milliGRAM(s) Oral two times a day  nystatin    Suspension 168997 Unit(s) Oral four times a day  pantoprazole  Injectable 40 milliGRAM(s) IV Push daily  potassium chloride   Powder 40 milliEquivalent(s) Oral once  potassium chloride  10 mEq/100 mL IVPB 10 milliEquivalent(s) IV Intermittent every 1 hour    MEDICATIONS  (PRN):  acetaminophen     Tablet .. 650 milliGRAM(s) Oral every 6 hours PRN Temp greater or equal to 38C (100.4F), Mild Pain (1 - 3)  ondansetron Injectable 4 milliGRAM(s) IV Push every 8 hours PRN Nausea and/or Vomiting  oxyCODONE    Solution 5 milliGRAM(s) Oral every 6 hours PRN Moderate Pain (4 - 6)  polyethylene glycol 3350 17 Gram(s) Oral two times a day PRN Constipation        I&O's Summary      PHYSICAL EXAM:  Vital Signs Last 24 Hrs  T(C): 36.8 (21 Apr 2022 10:09), Max: 37.1 (20 Apr 2022 15:41)  T(F): 98.3 (21 Apr 2022 10:09), Max: 98.7 (20 Apr 2022 15:41)  HR: 85 (21 Apr 2022 10:09) (81 - 93)  BP: 113/70 (21 Apr 2022 10:09) (113/70 - 129/73)  BP(mean): --  RR: 18 (21 Apr 2022 10:09) (18 - 18)  SpO2: 96% (21 Apr 2022 10:09) (93% - 96%)        CONSTITUTIONAL: NAD  HEENT: NC/AT, PERRL, no JVD  RESPIRATORY: CTA bilaterally, normal effort  CARDIOVASCULAR: RRR, S1/S2+, no m/g/r  ABDOMEN: +PEG  MUSCULOSKELETAL: LLE in brace  PSYCH: Calm, affect appropriate.  NEUROLOGY: Awake, alert, no focal neurological deficits.   SKIN: No rashes; no palpable lesions  VASC: Distal pulses palpable    LABS:                        8.1    4.01  )-----------( 167      ( 21 Apr 2022 07:29 )             24.7     04-21    139  |  96<L>  |  16.5  ----------------------------<  96  2.9<LL>   |  29.0  |  0.83    Ca    8.2<L>      21 Apr 2022 07:29      PT/INR - ( 20 Apr 2022 07:15 )   PT: 15.0 sec;   INR: 1.29 ratio         PTT - ( 20 Apr 2022 07:15 )  PTT:29.3 sec          CAPILLARY BLOOD GLUCOSE            RADIOLOGY & ADDITIONAL TESTS:  Results Reviewed:   Imaging Personally Reviewed:  Electrocardiogram Personally Reviewed:

## 2022-04-21 NOTE — PROGRESS NOTE ADULT - ASSESSMENT
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 patient states she has been onable to tolerate oral diet well since the procedure for last ~3 month and has been on clear liquid diet. but for the last 2-3 weeks she cant tolerate anything by mouth. she gets pain in the oral cavity, nausea anorexia. patient report finishing chemo and radiation last week.    Syncope secondary to hypovolemia and electrolyte imbalances  - s/p fluid bolus  - IVF discontinued  - Telemetry monitoring  - TTE noted    Hypokalemia  - Repletion ordered  - Monitor BMP  - Continue telemetry monitoring     Anemia  - likely dilutional in addition to chemo/radiation  - No active bleeding  - Monitor CBC    Decreased oral intake  - Due to oral thursh and radiation induced mucositis   - Nystatin oral swish and spit : oral thrush + radiation induced mucositis  - Speech and swallow evaluation noted  - Barium swallow noted  - GI consult appreciated  - S/p PEG, will start feeds based on Nutrition evaluation    Right buccal cancer  - Squamous cell carcinoma of gingiva s/p R composite resection, SND I-IV, s/p trach removed in January 2022, s/p mandibulectomy, neck dissection, L fibula flap on 1/13   - Right chest wall chemo port in place   - Completed chemo/radiation last week   - Outpatient follow up with Oncology and Surgery    Displaced right distal fibula fx  - ortho consult appreciated  - patient given CAM boot  - f/u outpatient    HTN  - Lisinopril on hold  - Lopressor 12.5mg BID    DVT ppx  - Heparin SQ    Will start feeds and titrate up to goal rate. PT rec home vs [facility], pt prefers home. Will need to coordinate supplies and demonstrate ability to operate feeds

## 2022-04-21 NOTE — PROGRESS NOTE ADULT - SUBJECTIVE AND OBJECTIVE BOX
Patient is a 56y old  Female who presents with a chief complaint of Syncope (21 Apr 2022 10:16)      INTERVAL HPI/OVERNIGHT EVENTS: Patient seen and evaluated at bedside, reporting no complaints, no overnight events, s/p EGD/ Peg tube placement revealing Hiatal hernia, petechiae erythema and tiny erosion noted in the antrum and fundus, and normal duodenum.  Denies nausea, vomiting, abdominal pain, chest pain, shortness of breath, hematemesis, hematochezia, melena.      MEDICATIONS  (STANDING):  ascorbic acid 500 milliGRAM(s) Oral daily  aspirin  chewable 81 milliGRAM(s) Oral daily  atorvastatin 10 milliGRAM(s) Oral at bedtime  metoprolol tartrate 12.5 milliGRAM(s) Oral two times a day  multivitamin 1 Tablet(s) Oral daily  nystatin    Suspension 057174 Unit(s) Oral four times a day  pantoprazole  Injectable 40 milliGRAM(s) IV Push daily  potassium chloride   Powder 40 milliEquivalent(s) Oral once    MEDICATIONS  (PRN):  acetaminophen     Tablet .. 650 milliGRAM(s) Oral every 6 hours PRN Temp greater or equal to 38C (100.4F), Mild Pain (1 - 3)  ondansetron Injectable 4 milliGRAM(s) IV Push every 8 hours PRN Nausea and/or Vomiting  oxyCODONE    Solution 5 milliGRAM(s) Oral every 6 hours PRN Moderate Pain (4 - 6)  polyethylene glycol 3350 17 Gram(s) Oral two times a day PRN Constipation      Allergies    morphine (Hives)    Intolerances    Review of Systems:  · ENMT: negative  · Respiratory and Thorax: negative  · Cardiovascular: negative  · Gastrointestinal: see above.  · Genitourinary:	negative  · Musculoskeletal: negative  · Neurological: negative  · Psychiatric: negative  · Hematology/Lymphatics: negative  · Endocrine: negative        Vital Signs Last 24 Hrs  T(C): 36.8 (21 Apr 2022 10:09), Max: 37.1 (20 Apr 2022 15:41)  T(F): 98.3 (21 Apr 2022 10:09), Max: 98.7 (20 Apr 2022 15:41)  HR: 85 (21 Apr 2022 10:09) (81 - 93)  BP: 113/70 (21 Apr 2022 10:09) (113/70 - 129/73)  BP(mean): --  RR: 18 (21 Apr 2022 10:09) (18 - 18)  SpO2: 96% (21 Apr 2022 10:09) (93% - 96%)    PHYSICAL EXAM:  · Constitutional: Sitting comfortably afebrile woman, in no acute distress.   · Eyes: EOMI; PERRL; no drainage or redness  · ENMT: No oral lesions; no gross abnormalities  · Neck:	No bruits; no thyromegaly or nodules  · Back:	No deformity or limitation of movement  · Respiratory: Breath Sounds equal & clear to percussion & auscultation, no accessory muscle use  · Cardiovascular: Regular rate & rhythm, normal S1, S2; no murmurs, gallops or rubs; no S3, S4  · Gastrointestinal: Soft, non-tender, no hepatosplenomegaly, normal bowel sounds. Peg tube in place.       LABS:                        8.1    4.01  )-----------( 167      ( 21 Apr 2022 07:29 )             24.7     04-21    139  |  96<L>  |  16.5  ----------------------------<  96  2.9<LL>   |  29.0  |  0.83    Ca    8.2<L>      21 Apr 2022 07:29      PT/INR - ( 20 Apr 2022 07:15 )   PT: 15.0 sec;   INR: 1.29 ratio         PTT - ( 20 Apr 2022 07:15 )  PTT:29.3 sec    LIVER FUNCTIONS - ( 18 Apr 2022 06:45 )  Alb: 2.8 g/dL / Pro: 5.6 g/dL / ALK PHOS: 60 U/L / ALT: 11 U/L / AST: 18 U/L / GGT: x             RADIOLOGY & ADDITIONAL TESTS:   Patient is a 56y old  Female who presents with a chief complaint of Syncope (21 Apr 2022 10:16)      INTERVAL HPI/OVERNIGHT EVENTS: Patient seen and evaluated at bedside, reporting no complaints, no overnight events, s/p EGD/ Peg tube placement revealing Hiatal hernia, petechiae erythema and tiny erosion noted in the antrum and fundus, and normal duodenum.  Denies nausea, vomiting, abdominal pain, chest pain, shortness of breath, hematemesis, hematochezia, melena.      MEDICATIONS  (STANDING):  ascorbic acid 500 milliGRAM(s) Oral daily  aspirin  chewable 81 milliGRAM(s) Oral daily  atorvastatin 10 milliGRAM(s) Oral at bedtime  metoprolol tartrate 12.5 milliGRAM(s) Oral two times a day  multivitamin 1 Tablet(s) Oral daily  nystatin    Suspension 642267 Unit(s) Oral four times a day  pantoprazole  Injectable 40 milliGRAM(s) IV Push daily  potassium chloride   Powder 40 milliEquivalent(s) Oral once    MEDICATIONS  (PRN):  acetaminophen     Tablet .. 650 milliGRAM(s) Oral every 6 hours PRN Temp greater or equal to 38C (100.4F), Mild Pain (1 - 3)  ondansetron Injectable 4 milliGRAM(s) IV Push every 8 hours PRN Nausea and/or Vomiting  oxyCODONE    Solution 5 milliGRAM(s) Oral every 6 hours PRN Moderate Pain (4 - 6)  polyethylene glycol 3350 17 Gram(s) Oral two times a day PRN Constipation      Allergies    morphine (Hives)    Intolerances    Review of Systems:  · ENMT: negative  · Respiratory and Thorax: negative  · Cardiovascular: negative  · Gastrointestinal: see above.  · Genitourinary:	negative  · Musculoskeletal: negative  · Neurological: negative  · Psychiatric: negative  · Hematology/Lymphatics: negative  · Endocrine: negative        Vital Signs Last 24 Hrs  T(C): 36.8 (21 Apr 2022 10:09), Max: 37.1 (20 Apr 2022 15:41)  T(F): 98.3 (21 Apr 2022 10:09), Max: 98.7 (20 Apr 2022 15:41)  HR: 85 (21 Apr 2022 10:09) (81 - 93)  BP: 113/70 (21 Apr 2022 10:09) (113/70 - 129/73)  BP(mean): --  RR: 18 (21 Apr 2022 10:09) (18 - 18)  SpO2: 96% (21 Apr 2022 10:09) (93% - 96%)    PHYSICAL EXAM:  · Constitutional: Sitting comfortably afebrile woman, in no acute distress.   · Eyes: EOMI; PERRL; no drainage or redness  · ENMT: No oral lesions; no gross abnormalities  · Neck:	No bruits; no thyromegaly or nodules  · Back:	No deformity or limitation of movement  · Respiratory: Breath Sounds equal & clear to percussion & auscultation, no accessory muscle use  · Cardiovascular: Regular rate & rhythm, normal S1, S2; no murmurs, gallops or rubs; no S3, S4  · Gastrointestinal: Soft, non-tender, no hepatosplenomegaly, normal bowel sounds. Peg tube in place.       LABS:                        8.1    4.01  )-----------( 167      ( 21 Apr 2022 07:29 )             24.7     04-21    139  |  96<L>  |  16.5  ----------------------------<  96  2.9<LL>   |  29.0  |  0.83    Ca    8.2<L>      21 Apr 2022 07:29      PT/INR - ( 20 Apr 2022 07:15 )   PT: 15.0 sec;   INR: 1.29 ratio         PTT - ( 20 Apr 2022 07:15 )  PTT:29.3 sec    LIVER FUNCTIONS - ( 18 Apr 2022 06:45 )  Alb: 2.8 g/dL / Pro: 5.6 g/dL / ALK PHOS: 60 U/L / ALT: 11 U/L / AST: 18 U/L / GGT: x             RADIOLOGY & ADDITIONAL TESTS:

## 2022-04-21 NOTE — DIETITIAN INITIAL EVALUATION ADULT - ETIOLOGY
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

## 2022-04-21 NOTE — PROGRESS NOTE ADULT - NS ATTEND AMEND GEN_ALL_CORE FT
56F  oral cancer s/p mandibulotomy  thrush in mouth on nystatin  might also suggest adding lidocaine gel because mouth is burning - heike at PO trials  impacted, performed partial disimpaction - pt could not tolerate more manipulation but thought would be able to evacuate rest on her own  will need bowel regimen for home  agree with NP note, please start PEG tube feeds

## 2022-04-21 NOTE — DIETITIAN INITIAL EVALUATION ADULT - ENTERAL
Initiate enteral feeds via PEG; Glucerna 1.5 @ 20ml/hr increase by 10ml every 6 hours as tolerated to goal rate =60ml/hr (x20 hours) 1200ml/day;  1800kcal, 100gm protein as tolerated.   If bolus regime warranted: 5 cans (240ml) Glucerna 1.5/day; 1200ml/day; 1800kcal, 100gm protein

## 2022-04-21 NOTE — DIETITIAN INITIAL EVALUATION ADULT - OTHER INFO
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 patient states she has been unable to tolerate oral diet well since the procedure for last ~3 month and has been on clear liquid diet. but for the last 2-3 weeks she cant tolerate anything by mouth. she gets pain in the oral cavity, nausea anorexia. patient report finishing chemo and radiation last week. Pt had swallow evaluation; + dysphagia noted; (Puree with thin liquids). Pt now with PEG placement.  NPO status maintained at this time.

## 2022-04-21 NOTE — DIETITIAN INITIAL EVALUATION ADULT - SIGNS/SYMPTOMS
As evidenced by physical signs of mod/sev muscle/fat loss,40lb(27%) weight loss, < 75% est needs>1mo

## 2022-04-21 NOTE — DIETITIAN INITIAL EVALUATION ADULT - PERTINENT MEDS FT
MEDICATIONS  (STANDING):  aspirin enteric coated 81 milliGRAM(s) Oral daily  atorvastatin 10 milliGRAM(s) Oral at bedtime  metoprolol tartrate 12.5 milliGRAM(s) Oral two times a day  nystatin    Suspension 370424 Unit(s) Oral four times a day  pantoprazole  Injectable 40 milliGRAM(s) IV Push daily  potassium chloride   Powder 40 milliEquivalent(s) Oral once  potassium chloride  10 mEq/100 mL IVPB 10 milliEquivalent(s) IV Intermittent every 1 hour    MEDICATIONS  (PRN):  acetaminophen     Tablet .. 650 milliGRAM(s) Oral every 6 hours PRN Temp greater or equal to 38C (100.4F), Mild Pain (1 - 3)  ondansetron Injectable 4 milliGRAM(s) IV Push every 8 hours PRN Nausea and/or Vomiting  oxyCODONE    Solution 5 milliGRAM(s) Oral every 6 hours PRN Moderate Pain (4 - 6)  polyethylene glycol 3350 17 Gram(s) Oral two times a day PRN Constipation

## 2022-04-21 NOTE — DIETITIAN INITIAL EVALUATION ADULT - ORAL INTAKE PTA/DIET HISTORY
Pt reports having little to no PO intake x 4 weeks PTA secondary to inability to tolerate PO. Pt with severe pain and thrush in oral cavity. Pt with 40lb weight loss over past 4 weeks noted. Pt reports taking 1-2 Ensure supplements at home daily.

## 2022-04-21 NOTE — DIETITIAN INITIAL EVALUATION ADULT - PERTINENT LABORATORY DATA
04-21    139  |  96<L>  |  16.5  ----------------------------<  96  2.9<LL>   |  29.0  |  0.83    Ca    8.2<L>      21 Apr 2022 07:29    A1C with Estimated Average Glucose Result: 6.4 % (04-18-22 @ 06:45)  A1C with Estimated Average Glucose Result: 5.9 % (01-14-22 @ 01:30)

## 2022-04-22 ENCOUNTER — APPOINTMENT (OUTPATIENT)
Dept: OTOLARYNGOLOGY | Facility: CLINIC | Age: 57
End: 2022-04-22

## 2022-04-22 ENCOUNTER — APPOINTMENT (OUTPATIENT)
Dept: PHYSICAL MEDICINE AND REHAB | Facility: CLINIC | Age: 57
End: 2022-04-22

## 2022-04-22 ENCOUNTER — TRANSCRIPTION ENCOUNTER (OUTPATIENT)
Age: 57
End: 2022-04-22

## 2022-04-22 VITALS
HEART RATE: 71 BPM | TEMPERATURE: 98 F | OXYGEN SATURATION: 98 % | SYSTOLIC BLOOD PRESSURE: 107 MMHG | DIASTOLIC BLOOD PRESSURE: 64 MMHG | RESPIRATION RATE: 16 BRPM

## 2022-04-22 LAB
ALBUMIN SERPL ELPH-MCNC: 2.9 G/DL — LOW (ref 3.3–5.2)
ALP SERPL-CCNC: 67 U/L — SIGNIFICANT CHANGE UP (ref 40–120)
ALT FLD-CCNC: 18 U/L — SIGNIFICANT CHANGE UP
ANION GAP SERPL CALC-SCNC: 14 MMOL/L — SIGNIFICANT CHANGE UP (ref 5–17)
AST SERPL-CCNC: 30 U/L — SIGNIFICANT CHANGE UP
BILIRUB SERPL-MCNC: 0.3 MG/DL — LOW (ref 0.4–2)
BUN SERPL-MCNC: 19.8 MG/DL — SIGNIFICANT CHANGE UP (ref 8–20)
CALCIUM SERPL-MCNC: 8.6 MG/DL — SIGNIFICANT CHANGE UP (ref 8.6–10.2)
CHLORIDE SERPL-SCNC: 97 MMOL/L — LOW (ref 98–107)
CO2 SERPL-SCNC: 28 MMOL/L — SIGNIFICANT CHANGE UP (ref 22–29)
CREAT SERPL-MCNC: 0.86 MG/DL — SIGNIFICANT CHANGE UP (ref 0.5–1.3)
EGFR: 79 ML/MIN/1.73M2 — SIGNIFICANT CHANGE UP
GLUCOSE SERPL-MCNC: 138 MG/DL — HIGH (ref 70–99)
HCT VFR BLD CALC: 27.2 % — LOW (ref 34.5–45)
HGB BLD-MCNC: 8.8 G/DL — LOW (ref 11.5–15.5)
MCHC RBC-ENTMCNC: 27.2 PG — SIGNIFICANT CHANGE UP (ref 27–34)
MCHC RBC-ENTMCNC: 32.4 GM/DL — SIGNIFICANT CHANGE UP (ref 32–36)
MCV RBC AUTO: 84.2 FL — SIGNIFICANT CHANGE UP (ref 80–100)
PLATELET # BLD AUTO: 189 K/UL — SIGNIFICANT CHANGE UP (ref 150–400)
POTASSIUM SERPL-MCNC: 3.2 MMOL/L — LOW (ref 3.5–5.3)
POTASSIUM SERPL-SCNC: 3.2 MMOL/L — LOW (ref 3.5–5.3)
PROT SERPL-MCNC: 5.8 G/DL — LOW (ref 6.6–8.7)
RBC # BLD: 3.23 M/UL — LOW (ref 3.8–5.2)
RBC # FLD: 16.9 % — HIGH (ref 10.3–14.5)
SODIUM SERPL-SCNC: 139 MMOL/L — SIGNIFICANT CHANGE UP (ref 135–145)
WBC # BLD: 3.97 K/UL — SIGNIFICANT CHANGE UP (ref 3.8–10.5)
WBC # FLD AUTO: 3.97 K/UL — SIGNIFICANT CHANGE UP (ref 3.8–10.5)

## 2022-04-22 PROCEDURE — 85730 THROMBOPLASTIN TIME PARTIAL: CPT

## 2022-04-22 PROCEDURE — 72125 CT NECK SPINE W/O DYE: CPT | Mod: MA

## 2022-04-22 PROCEDURE — 84484 ASSAY OF TROPONIN QUANT: CPT

## 2022-04-22 PROCEDURE — 93306 TTE W/DOPPLER COMPLETE: CPT

## 2022-04-22 PROCEDURE — 83735 ASSAY OF MAGNESIUM: CPT

## 2022-04-22 PROCEDURE — 73610 X-RAY EXAM OF ANKLE: CPT

## 2022-04-22 PROCEDURE — 99285 EMERGENCY DEPT VISIT HI MDM: CPT

## 2022-04-22 PROCEDURE — 85025 COMPLETE CBC W/AUTO DIFF WBC: CPT

## 2022-04-22 PROCEDURE — U0005: CPT

## 2022-04-22 PROCEDURE — 86900 BLOOD TYPING SEROLOGIC ABO: CPT

## 2022-04-22 PROCEDURE — 83036 HEMOGLOBIN GLYCOSYLATED A1C: CPT

## 2022-04-22 PROCEDURE — U0003: CPT

## 2022-04-22 PROCEDURE — 97110 THERAPEUTIC EXERCISES: CPT

## 2022-04-22 PROCEDURE — 70450 CT HEAD/BRAIN W/O DYE: CPT | Mod: MA

## 2022-04-22 PROCEDURE — 86850 RBC ANTIBODY SCREEN: CPT

## 2022-04-22 PROCEDURE — L8699: CPT

## 2022-04-22 PROCEDURE — 99233 SBSQ HOSP IP/OBS HIGH 50: CPT

## 2022-04-22 PROCEDURE — 71045 X-RAY EXAM CHEST 1 VIEW: CPT

## 2022-04-22 PROCEDURE — 85027 COMPLETE CBC AUTOMATED: CPT

## 2022-04-22 PROCEDURE — 93005 ELECTROCARDIOGRAM TRACING: CPT

## 2022-04-22 PROCEDURE — 96374 THER/PROPH/DIAG INJ IV PUSH: CPT | Mod: XU

## 2022-04-22 PROCEDURE — 83880 ASSAY OF NATRIURETIC PEPTIDE: CPT

## 2022-04-22 PROCEDURE — 36415 COLL VENOUS BLD VENIPUNCTURE: CPT

## 2022-04-22 PROCEDURE — 80061 LIPID PANEL: CPT

## 2022-04-22 PROCEDURE — 84132 ASSAY OF SERUM POTASSIUM: CPT

## 2022-04-22 PROCEDURE — 74230 X-RAY XM SWLNG FUNCJ C+: CPT

## 2022-04-22 PROCEDURE — 80053 COMPREHEN METABOLIC PANEL: CPT

## 2022-04-22 PROCEDURE — 97530 THERAPEUTIC ACTIVITIES: CPT

## 2022-04-22 PROCEDURE — 86803 HEPATITIS C AB TEST: CPT

## 2022-04-22 PROCEDURE — 96375 TX/PRO/DX INJ NEW DRUG ADDON: CPT

## 2022-04-22 PROCEDURE — 99239 HOSP IP/OBS DSCHRG MGMT >30: CPT

## 2022-04-22 PROCEDURE — 80048 BASIC METABOLIC PNL TOTAL CA: CPT

## 2022-04-22 PROCEDURE — 86901 BLOOD TYPING SEROLOGIC RH(D): CPT

## 2022-04-22 PROCEDURE — 85610 PROTHROMBIN TIME: CPT

## 2022-04-22 PROCEDURE — 96376 TX/PRO/DX INJ SAME DRUG ADON: CPT | Mod: XU

## 2022-04-22 PROCEDURE — 82962 GLUCOSE BLOOD TEST: CPT

## 2022-04-22 RX ORDER — ASCORBIC ACID 60 MG
1 TABLET,CHEWABLE ORAL
Qty: 30 | Refills: 0
Start: 2022-04-22 | End: 2022-05-21

## 2022-04-22 RX ORDER — POTASSIUM CHLORIDE 20 MEQ
40 PACKET (EA) ORAL ONCE
Refills: 0 | Status: COMPLETED | OUTPATIENT
Start: 2022-04-22 | End: 2022-04-22

## 2022-04-22 RX ORDER — SENNA PLUS 8.6 MG/1
2 TABLET ORAL AT BEDTIME
Refills: 0 | Status: DISCONTINUED | OUTPATIENT
Start: 2022-04-22 | End: 2022-04-22

## 2022-04-22 RX ORDER — ESOMEPRAZOLE MAGNESIUM 40 MG/1
1 CAPSULE, DELAYED RELEASE ORAL
Qty: 30 | Refills: 0
Start: 2022-04-22 | End: 2022-05-21

## 2022-04-22 RX ORDER — NYSTATIN 500MM UNIT
5 POWDER (EA) MISCELLANEOUS
Qty: 100 | Refills: 0
Start: 2022-04-22 | End: 2022-04-26

## 2022-04-22 RX ORDER — CHLORHEXIDINE GLUCONATE 213 G/1000ML
15 SOLUTION TOPICAL
Qty: 0 | Refills: 0 | DISCHARGE

## 2022-04-22 RX ORDER — SENNA PLUS 8.6 MG/1
2 TABLET ORAL
Qty: 60 | Refills: 0
Start: 2022-04-22 | End: 2022-05-21

## 2022-04-22 RX ORDER — POTASSIUM CHLORIDE 20 MEQ
40 PACKET (EA) ORAL ONCE
Refills: 0 | Status: DISCONTINUED | OUTPATIENT
Start: 2022-04-22 | End: 2022-04-22

## 2022-04-22 RX ORDER — POLYETHYLENE GLYCOL 3350 17 G/17G
17 POWDER, FOR SOLUTION ORAL
Qty: 1020 | Refills: 0
Start: 2022-04-22 | End: 2022-05-21

## 2022-04-22 RX ORDER — POTASSIUM CHLORIDE 20 MEQ
10 PACKET (EA) ORAL
Refills: 0 | Status: COMPLETED | OUTPATIENT
Start: 2022-04-22 | End: 2022-04-22

## 2022-04-22 RX ADMIN — ATORVASTATIN CALCIUM 10 MILLIGRAM(S): 80 TABLET, FILM COATED ORAL at 18:09

## 2022-04-22 RX ADMIN — Medication 40 MILLIEQUIVALENT(S): at 18:09

## 2022-04-22 RX ADMIN — Medication 1 TABLET(S): at 12:30

## 2022-04-22 RX ADMIN — Medication 12.5 MILLIGRAM(S): at 18:09

## 2022-04-22 RX ADMIN — Medication 500 MILLIGRAM(S): at 12:30

## 2022-04-22 RX ADMIN — PANTOPRAZOLE SODIUM 40 MILLIGRAM(S): 20 TABLET, DELAYED RELEASE ORAL at 05:38

## 2022-04-22 RX ADMIN — Medication 81 MILLIGRAM(S): at 12:30

## 2022-04-22 RX ADMIN — Medication 100 MILLIEQUIVALENT(S): at 12:29

## 2022-04-22 RX ADMIN — Medication 12.5 MILLIGRAM(S): at 05:38

## 2022-04-22 RX ADMIN — Medication 500000 UNIT(S): at 12:30

## 2022-04-22 RX ADMIN — Medication 100 MILLIEQUIVALENT(S): at 13:20

## 2022-04-22 RX ADMIN — Medication 500000 UNIT(S): at 05:38

## 2022-04-22 RX ADMIN — OXYCODONE HYDROCHLORIDE 5 MILLIGRAM(S): 5 TABLET ORAL at 18:06

## 2022-04-22 RX ADMIN — Medication 40 MILLIEQUIVALENT(S): at 12:29

## 2022-04-22 NOTE — PROGRESS NOTE ADULT - PROBLEM SELECTOR PLAN 1
S/p EGD/ Peg tube placement revealing Hiatal hernia, petechiae erythema and tiny erosion noted in the antrum and fundus, and normal duodenum.   Please start peg tube feeding  PPI for cytoprotection
S/p EGD/ Peg tube placement revealing Hiatal hernia, petechiae erythema and tiny erosion noted in the antrum and fundus, and normal duodenum.   Tolerating bolus feeds. Abdominal exam benign   Continue Protonix po daily  No further intervention from GI standpoint, therefore we will sign off. Please call as needed.

## 2022-04-22 NOTE — PROGRESS NOTE ADULT - SUBJECTIVE AND OBJECTIVE BOX
Patient is a 56y old  Female who presents with a chief complaint of Syncope (2022 10:16)    INTERVAL HPI/OVERNIGHT EVENTS: Patient seen and evaluated at bedside, reporting no complaints, no overnight events, s/p EGD/ Peg tube placement revealing Hiatal hernia, petechiae erythema and tiny erosion noted in the antrum and fundus, and normal duodenum. Patient tolerating tube feeds, denies nausea, vomiting, abdominal pain, chest pain, shortness of breath, hematemesis, hematochezia, melena.    PAST MEDICAL/SURGICAL HISTORY:  H/O malignant neoplasm of gum    Hypertension    Hyperlipidemia    Mild asthma    H/O  section    History of hip replacement  left hip    History of partial hysterectomy    H/O neck surgery  partial right mandibulectomy with fibular free flap reconstriction and right neck lymphnode dissection 22      MEDICATIONS  (STANDING):  ascorbic acid 500 milliGRAM(s) Oral daily  aspirin  chewable 81 milliGRAM(s) Oral daily  atorvastatin 10 milliGRAM(s) Oral at bedtime  metoprolol tartrate 12.5 milliGRAM(s) Oral two times a day  multivitamin 1 Tablet(s) Oral daily  nystatin    Suspension 860215 Unit(s) Oral four times a day  pantoprazole   Suspension 40 milliGRAM(s) Oral before breakfast  potassium chloride   Powder 40 milliEquivalent(s) Oral once  potassium chloride  10 mEq/100 mL IVPB 10 milliEquivalent(s) IV Intermittent every 1 hour  senna 2 Tablet(s) Oral at bedtime    MEDICATIONS  (PRN):  acetaminophen     Tablet .. 650 milliGRAM(s) Oral every 6 hours PRN Temp greater or equal to 38C (100.4F), Mild Pain (1 - 3)  ondansetron Injectable 4 milliGRAM(s) IV Push every 8 hours PRN Nausea and/or Vomiting  oxyCODONE    Solution 5 milliGRAM(s) Oral every 6 hours PRN Moderate Pain (4 - 6)  polyethylene glycol 3350 17 Gram(s) Oral two times a day PRN Constipation    morphine (Hives)    T(C): 37.1 (22 @ 04:28), Max: 37.1 (22 @ 20:19)  HR: 72 (22 @ 04:28) (72 - 75)  BP: 129/76 (22 @ 04:28) (110/73 - 129/76)  RR: 18 (22 @ 04:28) (18 - 18)  SpO2: 94% (22 @ 04:28) (94% - 94%)      PHYSICAL EXAM:  Constitutional: NAD  Neck: Redness, supple  Respiratory: breat sounds diminished bilaterally, no accessory muscle use noted.   Cardiovascular: Regular, S1 and S2, +Systolic murmur  Gastrointestinal: soft, nontender, non distended normoactive bowel sounds. PEG tube site C/D/I.   Extremities: Right leg splint noted, No peripheral edema, neg clubbing, cyanosis  Vascular: 2+ peripheral pulses  Neurological: A/O x 3, no focal deficits  Psychiatric: Normal mood, normal affect  Skin: No rashes       LABS:               8.8    3.97  )-----------( 189      (  @ 07:45 )             27.2                8.1    4.01  )-----------( 167      (  @ 07:29 )             24.7                8.1    4.12  )-----------( 171      (  @ 07:15 )             25.4           139  |  97<L>  |  19.8  ----------------------------<  138<H>  3.2<L>   |  28.0  |  0.86    Ca    8.6      2022 07:45    TPro  5.8<L>  /  Alb  2.9<L>  /  TBili  0.3<L>  /  DBili  x   /  AST  30  /  ALT  18  /  AlkPhos  67      LIVER FUNCTIONS - ( 2022 07:45 )  Alb: 2.9 g/dL / Pro: 5.8 g/dL / ALK PHOS: 67 U/L / ALT: 18 U/L / AST: 30 U/L / GGT: x               < from: EGD w/ PEG Placement (22 @ 08:51) >    Findings:     Esophagus Lumen A small size hiatal hernia was seen. Retroflexion view inthe    stomach confirmed the size and morphology of the hernia.     Additional esophagus findings Otherwise normal esophagus.     Stomach Mucosa Patchy petechiae, erythema and tiny erosions were present with    streaky erythema in the antrum of the mucosa with no bleeding was noted in the    fundus and antrum.     Duodenum Mucosa Normal mucosa was noted in the whole duodenum.     Impressions:      Hiatal Hernia in the gastroesophageal junction.      Otherwise normal esophagus.      Petechiae, erythema and tiny erosions were present with streaky erythema in the    antrum in the fundus and antrum.      Normal mucosa in the whole examined duodenum.         Plan: Protonix 40 mg po daily     Start tube feeds in AM    Can use for meds today     end of copied text >

## 2022-04-22 NOTE — DISCHARGE NOTE NURSING/CASE MANAGEMENT/SOCIAL WORK - NSDCPEFALRISK_GEN_ALL_CORE
For information on Fall & Injury Prevention, visit: [email or web address] OR  [email or web address] OR  https://www.cdc.gov/steadi/patient.html

## 2022-04-22 NOTE — PROGRESS NOTE ADULT - NS ATTEND AMEND GEN_ALL_CORE FT
Patient is tolerating bolus feeds through the gastrostomy tube without any difficulty and is also consuming some food from a puréed diet as tolerated.  She is also now having bowel movements.  At this time there is nothing further to recommend from a gastrointestinal standpoint and will see patient only prn your request.

## 2022-04-22 NOTE — PROGRESS NOTE ADULT - ASSESSMENT
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 patient states she has been onable to tolerate oral diet well since the procedure for last ~3 month and has been on clear liquid diet. but for the last 2-3 weeks she cant tolerate anything by mouth. she gets pain in the oral cavity, nausea anorexia. patient report finishing chemo and radiation last week.    Syncope secondary to hypovolemia and electrolyte imbalances  - s/p fluid bolus  - IVF discontinued  - Telemetry monitoring  - TTE noted    Hypokalemia  - Repletion ordered  - Monitor BMP  - Continue telemetry monitoring     Anemia  - likely dilutional in addition to chemo/radiation  - No active bleeding  - Monitor CBC    Decreased oral intake  - Due to oral thursh and radiation induced mucositis   - Nystatin oral swish and spit : oral thrush + radiation induced mucositis  - Speech and swallow evaluation noted  - Barium swallow noted  - GI consult appreciated  - S/p PEG, started on bolus feedings.    Right buccal cancer  - Squamous cell carcinoma of gingiva s/p R composite resection, SND I-IV, s/p trach removed in January 2022, s/p mandibulectomy, neck dissection, L fibula flap on 1/13   - Right chest wall chemo port in place   - Completed chemo/radiation last week   - Outpatient follow up with Oncology and Surgery    Displaced right distal fibula fx  - ortho consult appreciated  - patient given CAM boot  - f/u outpatient  - PT - home vs assist vs GAURAV    HTN  - Lisinopril on hold  - Lopressor 12.5mg BID    DVT ppx  - Heparin SQ    Home arrangements being made. PT rec home vs [facility], pt prefers home. Will need to coordinate supplies and demonstrate ability to operate feeds

## 2022-04-22 NOTE — PROGRESS NOTE ADULT - SUBJECTIVE AND OBJECTIVE BOX
[facility] Division of Hospital Medicine  Dillan Fink MD    Chief Complaint:  Patient is a 56y old  Female who presents with a chief complaint of Syncope (21 Apr 2022 11:31)      SUBJECTIVE / OVERNIGHT EVENTS:  Patient seen and examined at bedside. No acute events reported overnight. No new complaints.    Patient denies chest pain, SOB, abd pain, N/V, fever, chills, dysuria or any other complaints. All remainder ROS negative.     MEDICATIONS  (STANDING):  ascorbic acid 500 milliGRAM(s) Oral daily  aspirin  chewable 81 milliGRAM(s) Oral daily  atorvastatin 10 milliGRAM(s) Oral at bedtime  metoprolol tartrate 12.5 milliGRAM(s) Oral two times a day  multivitamin 1 Tablet(s) Oral daily  nystatin    Suspension 055332 Unit(s) Oral four times a day  pantoprazole   Suspension 40 milliGRAM(s) Oral before breakfast  potassium chloride   Powder 40 milliEquivalent(s) Oral once  potassium chloride  10 mEq/100 mL IVPB 10 milliEquivalent(s) IV Intermittent every 1 hour  senna 2 Tablet(s) Oral at bedtime    MEDICATIONS  (PRN):  acetaminophen     Tablet .. 650 milliGRAM(s) Oral every 6 hours PRN Temp greater or equal to 38C (100.4F), Mild Pain (1 - 3)  ondansetron Injectable 4 milliGRAM(s) IV Push every 8 hours PRN Nausea and/or Vomiting  oxyCODONE    Solution 5 milliGRAM(s) Oral every 6 hours PRN Moderate Pain (4 - 6)  polyethylene glycol 3350 17 Gram(s) Oral two times a day PRN Constipation        I&O's Summary      PHYSICAL EXAM:  Vital Signs Last 24 Hrs  T(C): 37.1 (22 Apr 2022 04:28), Max: 37.1 (21 Apr 2022 20:19)  T(F): 98.7 (22 Apr 2022 04:28), Max: 98.7 (21 Apr 2022 20:19)  HR: 72 (22 Apr 2022 04:28) (72 - 75)  BP: 129/76 (22 Apr 2022 04:28) (110/73 - 129/76)  BP(mean): --  RR: 18 (22 Apr 2022 04:28) (18 - 18)  SpO2: 94% (22 Apr 2022 04:28) (94% - 94%)        CONSTITUTIONAL: NAD  HEENT: NC/AT, PERRL, no JVD  RESPIRATORY: CTA bilaterally, normal effort  CARDIOVASCULAR: RRR, S1/S2+, no m/g/r  ABDOMEN: +PEG  MUSCULOSKELETAL: No edema, cyanosis or deformities.  PSYCH: Calm, affect appropriate.  NEUROLOGY: Awake, alert, no focal neurological deficits.   SKIN: No rashes; no palpable lesions  VASC: Distal pulses palpable    LABS:                        8.8    3.97  )-----------( 189      ( 22 Apr 2022 07:45 )             27.2     04-22    139  |  97<L>  |  19.8  ----------------------------<  138<H>  3.2<L>   |  28.0  |  0.86    Ca    8.6      22 Apr 2022 07:45    TPro  5.8<L>  /  Alb  2.9<L>  /  TBili  0.3<L>  /  DBili  x   /  AST  30  /  ALT  18  /  AlkPhos  67  04-22              CAPILLARY BLOOD GLUCOSE            RADIOLOGY & ADDITIONAL TESTS:  Results Reviewed:   Imaging Personally Reviewed:  Electrocardiogram Personally Reviewed:

## 2022-04-22 NOTE — DISCHARGE NOTE NURSING/CASE MANAGEMENT/SOCIAL WORK - PATIENT PORTAL LINK FT
You can access the FollowMyHealth Patient Portal offered by [facility] by registering at the following website: [email or web address]. By joining [facility]’s FollowMyHealth portal, you will also be able to view your health information using other applications (apps) compatible with our system.

## 2022-04-22 NOTE — CHART NOTE - NSCHARTNOTEFT_GEN_A_CORE
Patient was fit and delivered a pneumatic walking boot CAM type. The patient was educated on the care use and function of the orthosis. Contact info was given to patient. All went without incident.   Jamie HERNANDEZ  [facility]  [phone]
Pt can not use a can or rolling walker. Pt needs w/c for KAREY's in home due to difficulties maintaining NWB of Right LE. DX: Age- related debility, ICD: R54. Pt can self propel w/c and able to use in home. Pt needs ELR's due to edema.
Followed up w/ repeat K after supplementation from day for K of 2.5 w/ KCL IVPB/PO. Repeat K 3.2; KCL 40mEq po q4 x2 doses ordered.  RN was made aware.  Repeat BMP ordered for am.  Monitor and reassess prn.
This document will serve as a proof of delivery for a Pneumatic walking boot.   I Sara Dsouza received a walking boot with inflatable bladders from [facility]. I have been educated on the care use and function of the device.         Signature:_________________________________________________  Date:____________    Print:__________________________________________________________________________

## 2022-04-23 RX ORDER — ATORVASTATIN CALCIUM 80 MG/1
1 TABLET, FILM COATED ORAL
Qty: 30 | Refills: 0
Start: 2022-04-23 | End: 2022-05-22

## 2022-04-23 RX ORDER — ATORVASTATIN CALCIUM 80 MG/1
1 TABLET, FILM COATED ORAL
Qty: 0 | Refills: 0 | DISCHARGE

## 2022-04-23 RX ORDER — LISINOPRIL 2.5 MG/1
1 TABLET ORAL
Qty: 0 | Refills: 0 | DISCHARGE

## 2022-04-23 RX ORDER — LISINOPRIL 2.5 MG/1
1 TABLET ORAL
Qty: 30 | Refills: 0
Start: 2022-04-23 | End: 2022-05-22

## 2022-04-25 NOTE — CDI QUERY NOTE - NSCDIOTHERTXTBX_GEN_ALL_CORE_HH
The patient received a nutrition assessment by a Registered Dietician on 4/21/22.  The documentation of this assessment states: severe malnutrition       Please specify the clinical significance of these findings based on your clinical judgement such as:    -            Severe protein calorie malnutrition  -	other (please specify)  -	Clinically unable to be determined      Please document your response in your progress notes and/or discharge summary as appropriate.      Chart Documentation:    ASSESSMENT:  Change in Usual Weight Prior to Admission  yes  Was weight change intentional or unintentional?  unintentional  Reason for Weight Change  decreased po intake  Usual Weight Prior to Admission (lbs)  145 [place] Sandwich(s)  Usual Weight Prior to Admission (kg)  65.8 kg  Weight Change  Loss  Pounds Lost/Gained  40 Pound(s)  % Change  27.58 %  Time Frame  4 weeks  Ideal Body Weight (lbs)  125  Ideal Body Weight (kg)  56.6  Physical Assessment  debilitated  Nutriton Focused Physical Exam  yes...  Muscle Mass (Loss of Muscle)  Temples...  Clavicles...  Shoulders...  Temples Depletion is  moderate  Clavicles Depletion is  severe  Shoulders Depletion is  severe  Body Fat (loss of subcutaneous fat)  Orbital...  Buccal...  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  Malnutrition  Severe (chronic)  Nutrition Diagnositc Terminology #1  Malnutrition...  Nutrition Diagnosis  yes...  Etiology-Basis  Chronic illness  Patient meets criteria for malnutrition  yes

## 2022-04-26 ENCOUNTER — APPOINTMENT (OUTPATIENT)
Dept: HEMATOLOGY ONCOLOGY | Facility: CLINIC | Age: 57
End: 2022-04-26

## 2022-05-03 ENCOUNTER — APPOINTMENT (OUTPATIENT)
Age: 57
End: 2022-05-03
Payer: COMMERCIAL

## 2022-05-03 DIAGNOSIS — S82.891A OTHER FRACTURE OF RIGHT LOWER LEG, INITIAL ENCOUNTER FOR CLOSED FRACTURE: ICD-10-CM

## 2022-05-03 PROCEDURE — 99203 OFFICE O/P NEW LOW 30 MIN: CPT | Mod: 57

## 2022-05-03 PROCEDURE — 73610 X-RAY EXAM OF ANKLE: CPT | Mod: RT

## 2022-05-03 PROCEDURE — 27786 TREATMENT OF ANKLE FRACTURE: CPT | Mod: RT

## 2022-05-03 NOTE — HISTORY OF PRESENT ILLNESS
[de-identified] : The patient is a pleasant 56-year-old female presents today for evaluation of right ankle pain.  She suffered a fall on Easter Sunday after she got lightheaded.  She is status post reconstructive surgery to her face for squamous cell carcinoma and has been unable to eat very much especially after radiation therapy.  She feels this is why she got lightheaded.  She feels she would likely twisted her ankle while fainting.  She was seen in urgent care where x-rays were obtained and the distal fibula fracture was diagnosed.  She was placed into a cam boot.  She is ambulating with a walker in the cam boot.  The patient states the pain is made worse with activity and relieved with rest.  Aching, 4 out of 10 [Bending] : worsened by bending [Lifting] : worsened by lifting [Weight Bearing] : worsened by weight bearing [Recumbency] : relieved by recumbency [Rest] : relieved by rest

## 2022-05-03 NOTE — DISCUSSION/SUMMARY
[de-identified] : The patient presents today with clinical exam findings and radiographic imaging consistent with a minimally displaced distal fibula fracture. Based on the fracture pattern and the ankle stability, it does not require surgical intervention. The ankle mortise appears quite stable and as such should be able to be treated nonoperatively. We will allow the patient to weight-bear as tolerated in a cam walker boot.  If she is still having pain, the patient should come back in one month for repeat x-rays to evaluate for bony healing. At that time we will likely advance to an ankle brace for rotational support for one additional month.  If she is asymptomatic, she may wean out of the cam boot into regular shoes as tolerated.  Especially given her other medical issues currently going on, she may follow-up with orthopedic surgery as needed.\par \par The question of when to drive is impossible to generalize to everyone because it is largely dependent on the individual.  Importantly, doctors do not have a license with the DMV to "clear you" or "release you" to return to driving.  There are 3 primary criteria that must be met.  You need to be off of narcotic pain medicines (otherwise you are driving under the influence).  You need to be able to get in and out of the 's seat comfortably.  And you must have regained your normal reflexes / strength.  Also, return to driving depends partly on what side had surgery (ie. Right leg operates the pedals; people with Left side surgery can generally get back to driving much sooner unless you have a clutch).  The average time to return to driving is around 2 weeks after you return to normal walking for the right side and usually sooner for the left.  We recommend 'testing' yourself with another licensed  in an empty parking lot or quiet street first in order to check your reflexes moving your foot from pedal to pedal.\par \par The patient was given the opportunity to ask questions and all questions were answered to their satisfaction.\par \par Mark Hough MD\par Orthopaedic Trauma Surgeon\par [facility]\par [facility]\par Director Orthopaedic Trauma, [facility]\par \par \par \par \par \par

## 2022-05-03 NOTE — PHYSICAL EXAM
[de-identified] : Physical Exam:\par General: Well appearing, no acute distress, A&O\par Neurologic: A&Ox3, No focal deficits\par Head: NCAT without abrasions, lacerations, or ecchymosis to head, face, or scalp\par Respiratory: Equal chest wall expansion bilaterally, no accessory muscle use\par Lymphatic: No lymphadenopathy palpated\par Skin: Warm and dry\par Psychiatric: Normal mood and affect\par \par RLE:\par SILT s/s/sp/dp/t\par Fires EHL/FHL/GS/TA\par 2+ DP/PT pulse\par brisk capillary refill\par Moderate swelling\par Positive tenderness to palpation distal fibula\par No pain medial ankle [de-identified] : Plain films of the right ankle obtained today show a minimally displaced fracture of the distal fibula. ankle mortise is intact.

## 2022-05-06 ENCOUNTER — OUTPATIENT (OUTPATIENT)
Dept: OUTPATIENT SERVICES | Facility: HOSPITAL | Age: 57
LOS: 1 days | Discharge: HOME | End: 2022-05-06

## 2022-05-06 DIAGNOSIS — Z98.891 HISTORY OF UTERINE SCAR FROM PREVIOUS SURGERY: Chronic | ICD-10-CM

## 2022-05-06 DIAGNOSIS — C03.9 MALIGNANT NEOPLASM OF GUM, UNSPECIFIED: ICD-10-CM

## 2022-05-06 DIAGNOSIS — Z96.649 PRESENCE OF UNSPECIFIED ARTIFICIAL HIP JOINT: Chronic | ICD-10-CM

## 2022-05-06 DIAGNOSIS — Z90.711 ACQUIRED ABSENCE OF UTERUS WITH REMAINING CERVICAL STUMP: Chronic | ICD-10-CM

## 2022-05-06 DIAGNOSIS — Z98.890 OTHER SPECIFIED POSTPROCEDURAL STATES: Chronic | ICD-10-CM

## 2022-05-09 ENCOUNTER — APPOINTMENT (OUTPATIENT)
Dept: RADIATION ONCOLOGY | Facility: CLINIC | Age: 57
End: 2022-05-09

## 2022-05-09 ENCOUNTER — NON-APPOINTMENT (OUTPATIENT)
Age: 57
End: 2022-05-09

## 2022-05-09 ENCOUNTER — APPOINTMENT (OUTPATIENT)
Dept: RADIATION ONCOLOGY | Facility: CLINIC | Age: 57
End: 2022-05-09
Payer: COMMERCIAL

## 2022-05-09 PROCEDURE — 99024 POSTOP FOLLOW-UP VISIT: CPT

## 2022-05-09 NOTE — HISTORY OF PRESENT ILLNESS
[Home] : at home, [unfilled] , at the time of the visit. [Medical Office: ([facility])___] : at the medical office located in  [Verbal consent obtained from patient] : the patient, [unfilled] [FreeTextEntry1] : 56 year old woman seen today via Telehealth  is s/p 6,000cGY for a [id number] lVA SCCA of the pharynx completed 4/8/22\par Reports xerostomia pain when masticating, oral pain, improving neck edema  and improving dysgeusia.\par Reports weight loss while in hospital & using feeding tube at home with Glucerna (vomiting if the feeding is done too quickly) \par Denies  odynophagia, dysphagia &  fatigue \par \par Recall, she noted soreness in her right mandible in 11/2021, and initially went for evaluation of possible dentures. She met with Dr. Cristopher Pinon (oral surgeon).\par \par 11/26/21 biopsy of the right mandibular gingiva showed well-differentiated squamous cell carcinoma, P16 negative.\par \par She met with Dr. So on 12/14/21, and with Dr. Evans on 12/20/21. On exam, she was noted to have an ulcerated and endophytic lesion in the right posterior mandibular gingiva/post-molar region, abutting the 2nd premolar tooth and the base RMT posteriorly. There was buccal induration, no lingual extension.\par \par pet ct 12/23/21:\par IMPRESSION: FDG-PET/CT scan demonstrates:\par 1. FDG avid soft tissue lesion along the right posterior mandibular gingiva and gingivobuccal sulcus as seen on contrast-enhanced CT compatible with newly diagnosed squamous cell cancer. FDG avidity extends towards the lingual surface.\par 2. FDG avid right level Ib cervical lymph nodes likely metastatic. FDG avid right level IIA and IIB lymph nodes are indeterminate and may be further evaluated with ultrasound.\par 3. FDG avid focus within the enlarged right lobe, likely corresponding to the vague 1.1 cm nodule on contrast enhanced CT. Differentials include benign and malignant etiologies. Ultrasound and percutaneous possible FNA biopsy may be performed.\par 4. Nonspecific focal FDG avidity mapping to opacification in the right vallecula. Please correlate clinically or with direct visualization to exclude any lesion in this region. Approximately symmetric hypermetabolism of bilateral palatine tonsils, physiologic versus inflammatory.\par CT neck/face:\par IMPRESSION: Enhancing abnormal soft tissue lesion centered along the right posterior mandibular gingiva and gingivobuccal sulcus with suspected bony involvement of the adjacent alveolar ridge and also the lingual mandibular gingiva. Findings are compatible with the patient's biopsy-proven squamous cell carcinoma.\par Right-sided pathologic cervical lymphadenopathy at right level Ib and right level II concerning for metastatic disease. Minimally hazy margins adjacent to the right level Ib lymph node. Correlate with physical exam to assess for mobile or fixed state.\par 1.1 cm right-sided thyroid upper pole nodule. Recommend ultrasound correlation as neoplasm is not excluded.\par \par Admitted [facility] 1/13/22:\par Specimen(s) Submitted\par 1-Right neck dissection level 1 b\par 2-Neck dissection level 1 a\par 3-Right segment of mandible with gingival cancer\par 4-Right neck dissection level 4\par 5-Right neck dissection level 3\par 6-Right neck dissection level 2 a\par 7-Right neck dissection level 2 b\par \par Final Diagnosis\par 1. Lymph nodes, right level 1b, neck dissection\par - 3 lymph nodes positive for metastatic carcinoma (3/3)\par - Largest metastatic deposit measures 1.5 cm\par - Submandibular gland negative for carcinoma\par 2. Lymph nodes, level 1a, neck dissection\par - 2 lymph nodes negative for metastatic carcinoma (0/2)\par 3. Oral cavity, right mandible segment, resection\par - Invasive squamous cell carcinoma, well-differentiated, see synoptic summary\par - Carcinoma is present 1 mm from the lateral soft tissue margin and 2 mm from the medial soft tissue margin\par 4. Lymph nodes, right level 4, neck dissection\par - 3 lymph nodes negative for metastatic carcinoma (0/3)\par 5. Lymph nodes, right level 3, neck dissection\par - 6 lymph nodes negative for metastatic carcinoma (0/6)\par 6. Lymph nodes, right level 2a, neck dissection\par - 1 out of 2 lymph nodes positive for metastatic carcinoma (1/2)\par 7. Lymph nodes, right level 2b, neck dissection\par - 2 lymph nodes negative for metastatic carcinoma (0/2)\par SPECIMEN:  Partial mandibulectomy\par TUMOR\par Unifocal\par Tumor Focality:\par Multiple Primary Sites:   Not applicable (no additional primary site(s)\par present)\par Tumor Site:  Oral cavity\par +Tumor Subsite:  Lower gingiva\par Tumor Laterality:  Right\par Tumor Size:  Greatest dimension in Centimeters (cm):   1.7 x 0.8 x 0.7  cm\par Histologic Type:  Squamous cell carcinoma, conventional\par Histologic Grade:  G1, well differentiated\par Tumor Depth of Invasion (DOI):   6 mm\par Lymphovascular Invasion:   Not identified\par Perineural Invasion:   Not identified\par MARGINS\par Specimen Margin Status for Invasive Tumor\par All specimen margins negative for invasive tumor\par Distance from Invasive Tumor to Closest Specimen Margin:    Carcinoma is present 1 mm from the lateral soft tissue margin and 2 mm from the medial soft tissue margin\par Specimen Margin Status for Noninvasive Tumor:    All specimen margins negative for high-grade dysplasia / in situ disease\par REGIONAL LYMPH NODES\par Regional Lymph Node Status:   Regional lymph nodes present\par Tumor present in regional lymph node(s)\par Number of Lymph Nodes with Tumor: 4\par Laterality of Lymph Node(s) with Tumor:   Ipsilateral\par Size of Largest Joby Metastatic Deposit:    At least 1.5 cm\par Extranodal Extension (LEONIE):   Not identified\par Number of Lymph Nodes Examined: 18\par DISTANT METASTASIS : Not applicable\par PATHOLOGIC STAGE CLASSIFICATION (pTNM, AJCC 8th Edition)\par TNM Descriptors:  Not applicable\par Pathologic Stage Classification\par pT Category: pT2\par pT2: Tumor less than or equal to 2 cm with DOI greater than 5 mm or\par tumor greater than 2 cm and less than or equal to 4 cm with DOI less\par than or equal to 10 mm\par pN Category: pN2b\par pN2b: Metastases in multiple ipsilateral nodes, none larger than 6 cm in greatest dimension and LEONIE(-)\par pM Category:  Not applicable - pM cannot be determined from the submitted specimen(s)\par admitted 2/6/22 with post op wound infection discharged 2/11/22\par Dr Evans \par DR Pappas  s/p weekly Cisplatin\par \par admitted [facility] with FX right distal fibula  post a syncopal episode 4/17/22:\par Ct brain C spine:\par IMPRESSION:\par BRAIN:\par Normal non-contrast CT of the brain.\par CERVICAL SPINE:\par No fracture or traumatic subluxation.\par Pet placed 4/20/22\par discharged to home with feedings 4/22/22

## 2022-05-09 NOTE — REVIEW OF SYSTEMS
[Recent Change In Weight] : ~T recent weight change [Vomiting] : vomiting [Negative] : Allergic/Immunologic [Constipation: Grade 0] : Constipation: Grade 0 [Diarrhea: Grade 0] : Diarrhea: Grade 0 [Vomiting: Grade 1 - 1 - 2 episodes ( by 5 minutes) in 24 hrs] : Vomiting: Grade 1 - 1 - 2 episodes ( by 5 minutes) in 24 hrs [Localized Edema: Grade 1 - Localized to dependent areas, no disability or functional impairment] : Localized Edema: Grade 1 - Localized to dependent areas, no disability or functional impairment [Neck Edema: Grade 1 - Asymptomatic localized neck edema] : Neck Edema: Grade 1 - Asymptomatic localized neck edema [Dysgeusia: Grade 1- Altered taste but no change in diet] : Dysgeusia: Grade 1 - Altered taste but no change in diet [Skin Hyperpigmentation: Grade 1 - Hyperpigmentation covering <10% BSA; no psychosocial impact] : Skin Hyperpigmentation: Grade 1 - Hyperpigmentation covering <10% BSA; no psychosocial impact [FreeTextEntry7] : if she puts the Glucerna in too quickly

## 2022-05-09 NOTE — VITALS
[Maximal Pain Intensity: 7/10] : 7/10 [Least Pain Intensity: 7/10] : 7/10 [Pain Description/Quality: ___] : Pain description/quality: [unfilled] [Pain Duration: ___] : Pain duration: [unfilled] [Pain Location: ___] : Pain Location: [unfilled] [Pain Interferes with ADLs] : Pain interferes with activities of daily living. [OTC] : OTC [70: Cares for self; unalbe to carry on normal activity or do active work.] : 70: Cares for self; unable to carry on normal activity or do active work.

## 2022-05-10 ENCOUNTER — NON-APPOINTMENT (OUTPATIENT)
Age: 57
End: 2022-05-10

## 2022-05-10 ENCOUNTER — APPOINTMENT (OUTPATIENT)
Dept: HEMATOLOGY ONCOLOGY | Facility: CLINIC | Age: 57
End: 2022-05-10
Payer: COMMERCIAL

## 2022-05-10 ENCOUNTER — RESULT REVIEW (OUTPATIENT)
Age: 57
End: 2022-05-10

## 2022-05-10 VITALS
SYSTOLIC BLOOD PRESSURE: 100 MMHG | HEART RATE: 95 BPM | HEIGHT: 64 IN | DIASTOLIC BLOOD PRESSURE: 68 MMHG | BODY MASS INDEX: 32.27 KG/M2 | WEIGHT: 189 LBS | OXYGEN SATURATION: 91 %

## 2022-05-10 DIAGNOSIS — K12.30 ORAL MUCOSITIS (ULCERATIVE), UNSPECIFIED: ICD-10-CM

## 2022-05-10 DIAGNOSIS — K11.7 DISTURBANCES OF SALIVARY SECRETION: ICD-10-CM

## 2022-05-10 LAB
BASOPHILS # BLD AUTO: 0.1 K/UL — SIGNIFICANT CHANGE UP (ref 0–0.2)
BASOPHILS NFR BLD AUTO: 1.3 % — SIGNIFICANT CHANGE UP (ref 0–2)
EOSINOPHIL # BLD AUTO: 0.1 K/UL — SIGNIFICANT CHANGE UP (ref 0–0.5)
EOSINOPHIL NFR BLD AUTO: 1.6 % — SIGNIFICANT CHANGE UP (ref 0–6)
HCT VFR BLD CALC: 25.6 % — LOW (ref 34.5–45)
HGB BLD-MCNC: 8.2 G/DL — LOW (ref 11.5–15.5)
LYMPHOCYTES # BLD AUTO: 0.4 K/UL — LOW (ref 1–3.3)
LYMPHOCYTES # BLD AUTO: 4.8 % — LOW (ref 13–44)
MCHC RBC-ENTMCNC: 28.3 PG — SIGNIFICANT CHANGE UP (ref 27–34)
MCHC RBC-ENTMCNC: 32 G/DL — SIGNIFICANT CHANGE UP (ref 32–36)
MCV RBC AUTO: 88.3 FL — SIGNIFICANT CHANGE UP (ref 80–100)
MONOCYTES # BLD AUTO: 0.5 K/UL — SIGNIFICANT CHANGE UP (ref 0–0.9)
MONOCYTES NFR BLD AUTO: 6.7 % — SIGNIFICANT CHANGE UP (ref 2–14)
NEUTROPHILS # BLD AUTO: 6.5 K/UL — SIGNIFICANT CHANGE UP (ref 1.8–7.4)
NEUTROPHILS NFR BLD AUTO: 85.7 % — HIGH (ref 43–77)
PLATELET # BLD AUTO: 245 K/UL — SIGNIFICANT CHANGE UP (ref 150–400)
RBC # BLD: 2.9 M/UL — LOW (ref 3.8–5.2)
RBC # FLD: 19.8 % — HIGH (ref 10.3–14.5)
WBC # BLD: 7.5 K/UL — SIGNIFICANT CHANGE UP (ref 3.8–10.5)
WBC # FLD AUTO: 7.5 K/UL — SIGNIFICANT CHANGE UP (ref 3.8–10.5)

## 2022-05-10 PROCEDURE — 99214 OFFICE O/P EST MOD 30 MIN: CPT

## 2022-05-11 LAB
ALBUMIN SERPL ELPH-MCNC: 3.4 G/DL
ALP BLD-CCNC: 98 U/L
ALT SERPL-CCNC: <5 U/L
ANION GAP SERPL CALC-SCNC: 16 MMOL/L
AST SERPL-CCNC: 10 U/L
BILIRUB SERPL-MCNC: 0.3 MG/DL
BUN SERPL-MCNC: 35 MG/DL
CALCIUM SERPL-MCNC: 9.1 MG/DL
CHLORIDE SERPL-SCNC: 87 MMOL/L
CO2 SERPL-SCNC: 28 MMOL/L
CREAT SERPL-MCNC: 2.28 MG/DL
EGFR: 24 ML/MIN/1.73M2
GLUCOSE SERPL-MCNC: 112 MG/DL
MAGNESIUM SERPL-MCNC: 2.5 MG/DL
POTASSIUM SERPL-SCNC: 4 MMOL/L
PROT SERPL-MCNC: 6.2 G/DL
SODIUM SERPL-SCNC: 132 MMOL/L
TSH SERPL-ACNC: 0.09 UIU/ML

## 2022-05-12 PROBLEM — K11.7 INCREASED OROPHARYNGEAL SECRETIONS: Status: ACTIVE | Noted: 2022-03-24

## 2022-05-12 PROBLEM — K12.30 MUCOSITIS ORAL: Status: ACTIVE | Noted: 2022-03-15

## 2022-05-17 ENCOUNTER — APPOINTMENT (OUTPATIENT)
Dept: HEMATOLOGY ONCOLOGY | Facility: CLINIC | Age: 57
End: 2022-05-17

## 2022-05-17 ENCOUNTER — RESULT REVIEW (OUTPATIENT)
Age: 57
End: 2022-05-17

## 2022-05-17 VITALS — BODY MASS INDEX: 31.94 KG/M2 | WEIGHT: 186.05 LBS

## 2022-05-17 LAB
BASOPHILS # BLD AUTO: 0.1 K/UL — SIGNIFICANT CHANGE UP (ref 0–0.2)
BASOPHILS NFR BLD AUTO: 1.8 % — SIGNIFICANT CHANGE UP (ref 0–2)
EOSINOPHIL # BLD AUTO: 0.1 K/UL — SIGNIFICANT CHANGE UP (ref 0–0.5)
EOSINOPHIL NFR BLD AUTO: 1.5 % — SIGNIFICANT CHANGE UP (ref 0–6)
HCT VFR BLD CALC: 25.4 % — LOW (ref 34.5–45)
HGB BLD-MCNC: 8.3 G/DL — LOW (ref 11.5–15.5)
LYMPHOCYTES # BLD AUTO: 0.3 K/UL — LOW (ref 1–3.3)
LYMPHOCYTES # BLD AUTO: 4.4 % — LOW (ref 13–44)
MCHC RBC-ENTMCNC: 28.4 PG — SIGNIFICANT CHANGE UP (ref 27–34)
MCHC RBC-ENTMCNC: 32.7 G/DL — SIGNIFICANT CHANGE UP (ref 32–36)
MCV RBC AUTO: 86.8 FL — SIGNIFICANT CHANGE UP (ref 80–100)
MONOCYTES # BLD AUTO: 0.7 K/UL — SIGNIFICANT CHANGE UP (ref 0–0.9)
MONOCYTES NFR BLD AUTO: 10.3 % — SIGNIFICANT CHANGE UP (ref 2–14)
NEUTROPHILS # BLD AUTO: 5.6 K/UL — SIGNIFICANT CHANGE UP (ref 1.8–7.4)
NEUTROPHILS NFR BLD AUTO: 82 % — HIGH (ref 43–77)
PLATELET # BLD AUTO: 350 K/UL — SIGNIFICANT CHANGE UP (ref 150–400)
RBC # BLD: 2.93 M/UL — LOW (ref 3.8–5.2)
RBC # FLD: 18.9 % — HIGH (ref 10.3–14.5)
WBC # BLD: 6.8 K/UL — SIGNIFICANT CHANGE UP (ref 3.8–10.5)
WBC # FLD AUTO: 6.8 K/UL — SIGNIFICANT CHANGE UP (ref 3.8–10.5)

## 2022-05-18 ENCOUNTER — EMERGENCY (EMERGENCY)
Facility: HOSPITAL | Age: 57
LOS: 1 days | Discharge: HOME | End: 2022-05-18
Attending: STUDENT IN AN ORGANIZED HEALTH CARE EDUCATION/TRAINING PROGRAM
Payer: COMMERCIAL

## 2022-05-18 VITALS
OXYGEN SATURATION: 99 % | RESPIRATION RATE: 17 BRPM | TEMPERATURE: 99 F | SYSTOLIC BLOOD PRESSURE: 95 MMHG | HEART RATE: 99 BPM | HEIGHT: 65 IN | DIASTOLIC BLOOD PRESSURE: 66 MMHG | WEIGHT: 186.07 LBS

## 2022-05-18 VITALS
OXYGEN SATURATION: 97 % | SYSTOLIC BLOOD PRESSURE: 95 MMHG | HEART RATE: 80 BPM | DIASTOLIC BLOOD PRESSURE: 61 MMHG | RESPIRATION RATE: 18 BRPM | TEMPERATURE: 98 F

## 2022-05-18 DIAGNOSIS — Z98.890 OTHER SPECIFIED POSTPROCEDURAL STATES: Chronic | ICD-10-CM

## 2022-05-18 DIAGNOSIS — Z96.649 PRESENCE OF UNSPECIFIED ARTIFICIAL HIP JOINT: Chronic | ICD-10-CM

## 2022-05-18 DIAGNOSIS — Z90.711 ACQUIRED ABSENCE OF UTERUS WITH REMAINING CERVICAL STUMP: Chronic | ICD-10-CM

## 2022-05-18 DIAGNOSIS — Z98.891 HISTORY OF UTERINE SCAR FROM PREVIOUS SURGERY: Chronic | ICD-10-CM

## 2022-05-18 LAB
ALBUMIN SERPL ELPH-MCNC: 3.1 G/DL — LOW (ref 3.3–5.2)
ALP SERPL-CCNC: 92 U/L — SIGNIFICANT CHANGE UP (ref 40–120)
ALT FLD-CCNC: 5 U/L — SIGNIFICANT CHANGE UP
ANION GAP SERPL CALC-SCNC: 17 MMOL/L — SIGNIFICANT CHANGE UP (ref 5–17)
ANISOCYTOSIS BLD QL: SLIGHT — SIGNIFICANT CHANGE UP
APTT BLD: 23.4 SEC — LOW (ref 27.5–35.5)
AST SERPL-CCNC: 10 U/L — SIGNIFICANT CHANGE UP
BASOPHILS # BLD AUTO: 0.06 K/UL — SIGNIFICANT CHANGE UP (ref 0–0.2)
BASOPHILS NFR BLD AUTO: 0.9 % — SIGNIFICANT CHANGE UP (ref 0–2)
BILIRUB SERPL-MCNC: 0.2 MG/DL — LOW (ref 0.4–2)
BUN SERPL-MCNC: 47.2 MG/DL — HIGH (ref 8–20)
CALCIUM SERPL-MCNC: 9 MG/DL — SIGNIFICANT CHANGE UP (ref 8.6–10.2)
CHLORIDE SERPL-SCNC: 86 MMOL/L — LOW (ref 98–107)
CO2 SERPL-SCNC: 29 MMOL/L — SIGNIFICANT CHANGE UP (ref 22–29)
CREAT SERPL-MCNC: 2.07 MG/DL — HIGH (ref 0.5–1.3)
EGFR: 27 ML/MIN/1.73M2 — LOW
ELLIPTOCYTES BLD QL SMEAR: SLIGHT — SIGNIFICANT CHANGE UP
EOSINOPHIL # BLD AUTO: 0 K/UL — SIGNIFICANT CHANGE UP (ref 0–0.5)
EOSINOPHIL NFR BLD AUTO: 0 % — SIGNIFICANT CHANGE UP (ref 0–6)
GLUCOSE SERPL-MCNC: 92 MG/DL — SIGNIFICANT CHANGE UP (ref 70–99)
HCT VFR BLD CALC: 22.5 % — LOW (ref 34.5–45)
HGB BLD-MCNC: 7.3 G/DL — LOW (ref 11.5–15.5)
INR BLD: 1.04 RATIO — SIGNIFICANT CHANGE UP (ref 0.88–1.16)
LACTATE BLDV-MCNC: 0.7 MMOL/L — SIGNIFICANT CHANGE UP (ref 0.5–2)
LYMPHOCYTES # BLD AUTO: 0.17 K/UL — LOW (ref 1–3.3)
LYMPHOCYTES # BLD AUTO: 2.6 % — LOW (ref 13–44)
MANUAL SMEAR VERIFICATION: SIGNIFICANT CHANGE UP
MCHC RBC-ENTMCNC: 28.4 PG — SIGNIFICANT CHANGE UP (ref 27–34)
MCHC RBC-ENTMCNC: 32.4 GM/DL — SIGNIFICANT CHANGE UP (ref 32–36)
MCV RBC AUTO: 87.5 FL — SIGNIFICANT CHANGE UP (ref 80–100)
MICROCYTES BLD QL: SLIGHT — SIGNIFICANT CHANGE UP
MONOCYTES # BLD AUTO: 0.33 K/UL — SIGNIFICANT CHANGE UP (ref 0–0.9)
MONOCYTES NFR BLD AUTO: 5.2 % — SIGNIFICANT CHANGE UP (ref 2–14)
NEUTROPHILS # BLD AUTO: 5.84 K/UL — SIGNIFICANT CHANGE UP (ref 1.8–7.4)
NEUTROPHILS NFR BLD AUTO: 91.3 % — HIGH (ref 43–77)
OVALOCYTES BLD QL SMEAR: SLIGHT — SIGNIFICANT CHANGE UP
PLAT MORPH BLD: NORMAL — SIGNIFICANT CHANGE UP
PLATELET # BLD AUTO: 307 K/UL — SIGNIFICANT CHANGE UP (ref 150–400)
POIKILOCYTOSIS BLD QL AUTO: SLIGHT — SIGNIFICANT CHANGE UP
POLYCHROMASIA BLD QL SMEAR: SIGNIFICANT CHANGE UP
POTASSIUM SERPL-MCNC: 3.9 MMOL/L — SIGNIFICANT CHANGE UP (ref 3.5–5.3)
POTASSIUM SERPL-SCNC: 3.9 MMOL/L — SIGNIFICANT CHANGE UP (ref 3.5–5.3)
PROT SERPL-MCNC: 6.8 G/DL — SIGNIFICANT CHANGE UP (ref 6.6–8.7)
PROTHROM AB SERPL-ACNC: 12.1 SEC — SIGNIFICANT CHANGE UP (ref 10.5–13.4)
RAPID RVP RESULT: SIGNIFICANT CHANGE UP
RBC # BLD: 2.57 M/UL — LOW (ref 3.8–5.2)
RBC # FLD: 21.3 % — HIGH (ref 10.3–14.5)
RBC BLD AUTO: ABNORMAL
SARS-COV-2 RNA SPEC QL NAA+PROBE: SIGNIFICANT CHANGE UP
SODIUM SERPL-SCNC: 132 MMOL/L — LOW (ref 135–145)
WBC # BLD: 6.4 K/UL — SIGNIFICANT CHANGE UP (ref 3.8–10.5)
WBC # FLD AUTO: 6.4 K/UL — SIGNIFICANT CHANGE UP (ref 3.8–10.5)

## 2022-05-18 PROCEDURE — G1004: CPT

## 2022-05-18 PROCEDURE — 87040 BLOOD CULTURE FOR BACTERIA: CPT

## 2022-05-18 PROCEDURE — 96367 TX/PROPH/DG ADDL SEQ IV INF: CPT | Mod: XU

## 2022-05-18 PROCEDURE — 93005 ELECTROCARDIOGRAM TRACING: CPT

## 2022-05-18 PROCEDURE — 71045 X-RAY EXAM CHEST 1 VIEW: CPT | Mod: 26

## 2022-05-18 PROCEDURE — 0225U NFCT DS DNA&RNA 21 SARSCOV2: CPT

## 2022-05-18 PROCEDURE — 10060 I&D ABSCESS SIMPLE/SINGLE: CPT

## 2022-05-18 PROCEDURE — 85730 THROMBOPLASTIN TIME PARTIAL: CPT

## 2022-05-18 PROCEDURE — 96366 THER/PROPH/DIAG IV INF ADDON: CPT | Mod: XU

## 2022-05-18 PROCEDURE — 85025 COMPLETE CBC W/AUTO DIFF WBC: CPT

## 2022-05-18 PROCEDURE — 85610 PROTHROMBIN TIME: CPT

## 2022-05-18 PROCEDURE — 36415 COLL VENOUS BLD VENIPUNCTURE: CPT

## 2022-05-18 PROCEDURE — 70490 CT SOFT TISSUE NECK W/O DYE: CPT | Mod: MG

## 2022-05-18 PROCEDURE — 96365 THER/PROPH/DIAG IV INF INIT: CPT | Mod: XU

## 2022-05-18 PROCEDURE — 93010 ELECTROCARDIOGRAM REPORT: CPT

## 2022-05-18 PROCEDURE — 71045 X-RAY EXAM CHEST 1 VIEW: CPT

## 2022-05-18 PROCEDURE — 80053 COMPREHEN METABOLIC PANEL: CPT

## 2022-05-18 PROCEDURE — 99285 EMERGENCY DEPT VISIT HI MDM: CPT

## 2022-05-18 PROCEDURE — 83605 ASSAY OF LACTIC ACID: CPT

## 2022-05-18 PROCEDURE — 99285 EMERGENCY DEPT VISIT HI MDM: CPT | Mod: 25

## 2022-05-18 RX ORDER — SODIUM CHLORIDE 9 MG/ML
1750 INJECTION, SOLUTION INTRAVENOUS ONCE
Refills: 0 | Status: COMPLETED | OUTPATIENT
Start: 2022-05-18 | End: 2022-05-18

## 2022-05-18 RX ORDER — VANCOMYCIN HCL 1 G
1000 VIAL (EA) INTRAVENOUS ONCE
Refills: 0 | Status: COMPLETED | OUTPATIENT
Start: 2022-05-18 | End: 2022-05-18

## 2022-05-18 RX ORDER — PIPERACILLIN AND TAZOBACTAM 4; .5 G/20ML; G/20ML
3.38 INJECTION, POWDER, LYOPHILIZED, FOR SOLUTION INTRAVENOUS ONCE
Refills: 0 | Status: COMPLETED | OUTPATIENT
Start: 2022-05-18 | End: 2022-05-18

## 2022-05-18 RX ADMIN — PIPERACILLIN AND TAZOBACTAM 200 GRAM(S): 4; .5 INJECTION, POWDER, LYOPHILIZED, FOR SOLUTION INTRAVENOUS at 20:11

## 2022-05-18 RX ADMIN — Medication 1000 MILLIGRAM(S): at 22:50

## 2022-05-18 RX ADMIN — SODIUM CHLORIDE 1750 MILLILITER(S): 9 INJECTION, SOLUTION INTRAVENOUS at 20:11

## 2022-05-18 RX ADMIN — SODIUM CHLORIDE 1750 MILLILITER(S): 9 INJECTION, SOLUTION INTRAVENOUS at 22:55

## 2022-05-18 RX ADMIN — PIPERACILLIN AND TAZOBACTAM 3.38 GRAM(S): 4; .5 INJECTION, POWDER, LYOPHILIZED, FOR SOLUTION INTRAVENOUS at 21:54

## 2022-05-18 RX ADMIN — Medication 250 MILLIGRAM(S): at 21:54

## 2022-05-18 NOTE — ED ADULT TRIAGE NOTE - CHIEF COMPLAINT QUOTE
pt states she had surgery to her jaw with recurrent abscess, began draining today.  denies fever or chills.  minimal amount of cloudy discharge to right side of jaw.

## 2022-05-18 NOTE — ED PROVIDER NOTE - PHYSICAL EXAMINATION
Gen: Alert, NAD  Head: NC, AT, PERRL, EOMI, normal lids/conjunctiva  ENT:  patent oropharynx without erythema/exudate, uvula midline, 2cm wound dehiscence with pouring out purulent discharge with erythema extending over entire right neck and mandible, no crepitus  Neck: +supple, no tenderness/meningismus/JVD, +Trachea midline  Pulm: Bilateral BS, normal resp effort, no wheeze/stridor/retractions  CV: RRR, no M/R/G, 2+dist pulses  Abd: soft, NT/ND, +BS, no hepatosplenomegaly  Mskel: ROM intact x4 extremities.   Neuro:  grossly intact

## 2022-05-18 NOTE — ED PROVIDER NOTE - OBJECTIVE STATEMENT
57yoF; with PMH signif for HTN, HLD, Oral Cancer (s/p resection by Dr. Vic So in [facility], complicated by surgical abscess in Feb); now p/w spontaneous drainage of wound today. denies n/v/d. c/o chills. denies fever. denies swelling of throat. denies abd pain. denies vomiting.  PMH: HTN, HLD, Oral Cancer (s/p resection)  SOCIAL: ex-smoker

## 2022-05-18 NOTE — ASSESSMENT
[FreeTextEntry1] : 56 year old woman diagnosed with oral cancer s/p resection with right neck dissection, [id number] \par \par # Oral Cancer \par - s/p C5 of CRT with weekly Cisplatin\par - C4 delayed by elevated creatinine, improved with IVF and received C5 on 4/4.\par - RT completed on 4/11\par - GEMA Hobbs following closely and lengthy discussion today regarding dire need to increase calorie intake.\par - also advised proper fluid intake of 2L of liquid daily. \par - continue viscous lidocaine for oral pain.\par - patient not using fentanyl patch\par - continue aquaphor for erythematous skin around neck\par - scopolamine ordered by RT for thick oral secretions but patient states she is no longer using it  \par - Dr. Zamora evaluation scheduled for 4/22 but patient cancelled \par - MD/PA follow up in 3-4 weeks for residual side effect check, labs and weight check\par - lab only for CBC and weight check in one week \par - post treatment PET ordered for 7/11/22\par \par #Anemia \par - Hgb down to 8.2\par - will recheck in one week and consider transfusion pending results \par

## 2022-05-18 NOTE — ED ADULT NURSE NOTE - NSIMPLEMENTINTERV_GEN_ALL_ED
Implemented All Fall Risk Interventions:  [place] to call system. Call bell, personal items and telephone within reach. Instruct patient to call for assistance. Room bathroom lighting operational. Non-slip footwear when patient is off stretcher. Physically safe environment: no spills, clutter or unnecessary equipment. Stretcher in lowest position, wheels locked, appropriate side rails in place. Provide visual cue, wrist band, yellow gown, etc. Monitor gait and stability. Monitor for mental status changes and reorient to person, place, and time. Review medications for side effects contributing to fall risk. Reinforce activity limits and safety measures with patient and family.

## 2022-05-18 NOTE — ED PROVIDER NOTE - NS ED ROS FT
Constitutional: (-) fever  (-)chills  (-)sweats  Eyes/ENT: (-)   Cardiovascular: (-) chest pain, (-) palpitations (-) edema   Respiratory: (-) cough, (-) shortness of breath   Gastrointestinal: (-)nausea  (-)vomiting, (-) diarrhea  (-) abdominal pain   :  (-)dysuria, (-)frequency, (-)urgency, (-)hematuria  Musculoskeletal: (-) neck pain, (-) back pain, (-) joint pain  Integumentary: +draining abscess  Neurological: (-) headache, (-) altered mental status  (-)LOC

## 2022-05-18 NOTE — PHYSICAL EXAM
[Fully active, able to carry on all pre-disease performance without restriction] : Status 0 - Fully active, able to carry on all pre-disease performance without restriction [Normal] : affect appropriate [de-identified] : right neck fullness and erythema post operatively.

## 2022-05-18 NOTE — HISTORY OF PRESENT ILLNESS
[T: ___] : T[unfilled] [N: ___] : N[unfilled] [M: ___] : M[unfilled] [de-identified] : Patient is a 57 yo F with h/o HTN, HLD and asthma who was diagnosed with SCC of the gingival mucosa.\par \par She saw oral surgeon, Dr. Cristopher Pinon, c/o soreness in the right jaw.  A biopsy of the right mandibular gingiva on 11/26/21 showed well-differentiated squamous cell carcinoma, P16 negative.\par \par She next saw Dr. James So. On exam, she was noted to have an ulcerated and endophytic lesion in the right posterior mandibular gingiva/post-molar region, abutting the 2nd premolar tooth and the base RMT posteriorly. There was buccal induration, no lingual extension.\par \par Staging CT neck on 12/23/21 showed an enhancing abnormal soft tissue lesion centered along the right posterior mandibular gingiva and gingivobuccal sulcus with suspected bony involvement of the adjacent alveolar ridge and also the lingual mandibular gingiva.  Right-sided pathologic cervical lymphadenopathy at right level Ib and right level II. Minimally hazy margins adjacent to the right level Ib lymph node. Staging PET on the same day showed FDG avid soft tissue lesion along the right posterior mandibular gingiva and gingivobuccal sulcus as seen on contrast-enhanced CT compatible with newly diagnosed squamous cell cancer. FDG avidity extends towards the lingual surface. FDG avid right level Ib cervical lymph nodes likely metastatic. FDG avid right level IIA and IIB lymph nodes are indeterminate. FDG avid focus within the enlarged right lobe, likely corresponding to the vague 1.1 cm nodule on contrast enhanced CT.\par \par On 1/13/22 she underwent right composite resection (segmental mandibulectomy with a partial right buccal soft tissue and floor of mouth excision), right neck dissection levels I-IV with Dr So.  Pathology showed invasive squamous cell carcinoma, well-differentiated, measuring 1.7 cm. Depth of invasion 0.6 cm. No LVI/PNI. There was microscopic evidence of tumor invasion into underlying bone (pT4a). Tumor margin from medial soft tissue was 0.4 cm; from the lateral soft tissue was 0.1 cm. Positive lymph nodes were identified in right level 1b and 2a; total 4/18; largest 1.5 cm, no LEONIE. pT4a N2b. \par \par The patient was started on cRT with weekly cisplatin. \par \par  [de-identified] : Patient presents s/p C5 CRT with weekly Cisplatin for SCC of the gingival mucosa. Received only 5 cycles of Cisplatin due to elevated creatinine. Completed RT on 4/11/22.  \par Recently hospitalized s/p syncopal episode causing fractured right ankle.  PEG placed while in-patient.\par + Odynophagia, almost resolved, no dysphagia. + Still with severe oral pain with associated mucositis, also improved but still can be severe.  + Significant weight loss since the start of treatment. + Dysgeusia improved. + Nausea, no emesis. + Excessive oral secretions, improving. + Xerostomia, still intermittently severe. + Right ankle pain due to fracture. + R ear numbness unchanged, still no pain.  + Pain at LLE skin graft site continues improving. + Erythema right neck improving. Dry cough nearly resolved. No fevers or SOB.

## 2022-05-19 ENCOUNTER — INPATIENT (INPATIENT)
Facility: HOSPITAL | Age: 57
LOS: 3 days | Discharge: HOME HEALTH | End: 2022-05-23
Attending: OTOLARYNGOLOGY | Admitting: OTOLARYNGOLOGY
Payer: COMMERCIAL

## 2022-05-19 VITALS
HEART RATE: 88 BPM | HEIGHT: 65 IN | DIASTOLIC BLOOD PRESSURE: 72 MMHG | TEMPERATURE: 97 F | RESPIRATION RATE: 18 BRPM | OXYGEN SATURATION: 92 % | SYSTOLIC BLOOD PRESSURE: 110 MMHG

## 2022-05-19 DIAGNOSIS — Z98.891 HISTORY OF UTERINE SCAR FROM PREVIOUS SURGERY: Chronic | ICD-10-CM

## 2022-05-19 DIAGNOSIS — Z98.890 OTHER SPECIFIED POSTPROCEDURAL STATES: Chronic | ICD-10-CM

## 2022-05-19 DIAGNOSIS — N17.9 ACUTE KIDNEY FAILURE, UNSPECIFIED: ICD-10-CM

## 2022-05-19 DIAGNOSIS — Z96.649 PRESENCE OF UNSPECIFIED ARTIFICIAL HIP JOINT: Chronic | ICD-10-CM

## 2022-05-19 DIAGNOSIS — K12.2 CELLULITIS AND ABSCESS OF MOUTH: ICD-10-CM

## 2022-05-19 DIAGNOSIS — R22.0 LOCALIZED SWELLING, MASS AND LUMP, HEAD: ICD-10-CM

## 2022-05-19 DIAGNOSIS — Z90.711 ACQUIRED ABSENCE OF UTERUS WITH REMAINING CERVICAL STUMP: Chronic | ICD-10-CM

## 2022-05-19 DIAGNOSIS — I10 ESSENTIAL (PRIMARY) HYPERTENSION: ICD-10-CM

## 2022-05-19 LAB
ANION GAP SERPL CALC-SCNC: 11 MMOL/L — SIGNIFICANT CHANGE UP (ref 7–14)
BUN SERPL-MCNC: 37 MG/DL — HIGH (ref 7–23)
CALCIUM SERPL-MCNC: 9.1 MG/DL — SIGNIFICANT CHANGE UP (ref 8.4–10.5)
CHLORIDE SERPL-SCNC: 90 MMOL/L — LOW (ref 98–107)
CO2 SERPL-SCNC: 32 MMOL/L — HIGH (ref 22–31)
CREAT SERPL-MCNC: 1.51 MG/DL — HIGH (ref 0.5–1.3)
CRP SERPL-MCNC: 156.6 MG/L — HIGH
EGFR: 40 ML/MIN/1.73M2 — LOW
GLUCOSE SERPL-MCNC: 106 MG/DL — HIGH (ref 70–99)
POTASSIUM SERPL-MCNC: 3.9 MMOL/L — SIGNIFICANT CHANGE UP (ref 3.5–5.3)
POTASSIUM SERPL-SCNC: 3.9 MMOL/L — SIGNIFICANT CHANGE UP (ref 3.5–5.3)
SODIUM SERPL-SCNC: 133 MMOL/L — LOW (ref 135–145)

## 2022-05-19 PROCEDURE — 70490 CT SOFT TISSUE NECK W/O DYE: CPT | Mod: 26,MG

## 2022-05-19 PROCEDURE — 99222 1ST HOSP IP/OBS MODERATE 55: CPT

## 2022-05-19 PROCEDURE — 99223 1ST HOSP IP/OBS HIGH 75: CPT

## 2022-05-19 PROCEDURE — G1004: CPT

## 2022-05-19 PROCEDURE — 99285 EMERGENCY DEPT VISIT HI MDM: CPT

## 2022-05-19 RX ORDER — AMPICILLIN SODIUM AND SULBACTAM SODIUM 250; 125 MG/ML; MG/ML
3 INJECTION, POWDER, FOR SUSPENSION INTRAMUSCULAR; INTRAVENOUS ONCE
Refills: 0 | Status: COMPLETED | OUTPATIENT
Start: 2022-05-19 | End: 2022-05-19

## 2022-05-19 RX ORDER — ASPIRIN/CALCIUM CARB/MAGNESIUM 324 MG
81 TABLET ORAL DAILY
Refills: 0 | Status: DISCONTINUED | OUTPATIENT
Start: 2022-05-19 | End: 2022-05-23

## 2022-05-19 RX ORDER — VANCOMYCIN HCL 1 G
1000 VIAL (EA) INTRAVENOUS ONCE
Refills: 0 | Status: COMPLETED | OUTPATIENT
Start: 2022-05-19 | End: 2022-05-19

## 2022-05-19 RX ORDER — LISINOPRIL 2.5 MG/1
5 TABLET ORAL DAILY
Refills: 0 | Status: DISCONTINUED | OUTPATIENT
Start: 2022-05-19 | End: 2022-05-19

## 2022-05-19 RX ORDER — PANTOPRAZOLE SODIUM 20 MG/1
40 TABLET, DELAYED RELEASE ORAL
Refills: 0 | Status: DISCONTINUED | OUTPATIENT
Start: 2022-05-19 | End: 2022-05-23

## 2022-05-19 RX ORDER — NYSTATIN 500MM UNIT
500000 POWDER (EA) MISCELLANEOUS
Refills: 0 | Status: DISCONTINUED | OUTPATIENT
Start: 2022-05-19 | End: 2022-05-23

## 2022-05-19 RX ORDER — OXYCODONE HYDROCHLORIDE 5 MG/1
5 TABLET ORAL ONCE
Refills: 0 | Status: DISCONTINUED | OUTPATIENT
Start: 2022-05-19 | End: 2022-05-19

## 2022-05-19 RX ORDER — AMPICILLIN SODIUM AND SULBACTAM SODIUM 250; 125 MG/ML; MG/ML
3 INJECTION, POWDER, FOR SUSPENSION INTRAMUSCULAR; INTRAVENOUS EVERY 6 HOURS
Refills: 0 | Status: DISCONTINUED | OUTPATIENT
Start: 2022-05-19 | End: 2022-05-23

## 2022-05-19 RX ORDER — SODIUM CHLORIDE 9 MG/ML
1000 INJECTION INTRAMUSCULAR; INTRAVENOUS; SUBCUTANEOUS ONCE
Refills: 0 | Status: COMPLETED | OUTPATIENT
Start: 2022-05-19 | End: 2022-05-19

## 2022-05-19 RX ORDER — ATORVASTATIN CALCIUM 80 MG/1
10 TABLET, FILM COATED ORAL DAILY
Refills: 0 | Status: DISCONTINUED | OUTPATIENT
Start: 2022-05-19 | End: 2022-05-23

## 2022-05-19 RX ORDER — ALBUTEROL 90 UG/1
2 AEROSOL, METERED ORAL EVERY 6 HOURS
Refills: 0 | Status: DISCONTINUED | OUTPATIENT
Start: 2022-05-19 | End: 2022-05-23

## 2022-05-19 RX ORDER — VANCOMYCIN HCL 1 G
VIAL (EA) INTRAVENOUS
Refills: 0 | Status: DISCONTINUED | OUTPATIENT
Start: 2022-05-19 | End: 2022-05-23

## 2022-05-19 RX ORDER — VANCOMYCIN HCL 1 G
1000 VIAL (EA) INTRAVENOUS EVERY 24 HOURS
Refills: 0 | Status: DISCONTINUED | OUTPATIENT
Start: 2022-05-20 | End: 2022-05-23

## 2022-05-19 RX ORDER — PROCHLORPERAZINE MALEATE 5 MG
10 TABLET ORAL EVERY 6 HOURS
Refills: 0 | Status: DISCONTINUED | OUTPATIENT
Start: 2022-05-19 | End: 2022-05-23

## 2022-05-19 RX ORDER — AMPICILLIN SODIUM AND SULBACTAM SODIUM 250; 125 MG/ML; MG/ML
INJECTION, POWDER, FOR SUSPENSION INTRAMUSCULAR; INTRAVENOUS
Refills: 0 | Status: DISCONTINUED | OUTPATIENT
Start: 2022-05-19 | End: 2022-05-23

## 2022-05-19 RX ORDER — OXYCODONE HYDROCHLORIDE 5 MG/1
5 TABLET ORAL EVERY 6 HOURS
Refills: 0 | Status: DISCONTINUED | OUTPATIENT
Start: 2022-05-19 | End: 2022-05-23

## 2022-05-19 RX ORDER — ONDANSETRON 8 MG/1
4 TABLET, FILM COATED ORAL EVERY 8 HOURS
Refills: 0 | Status: DISCONTINUED | OUTPATIENT
Start: 2022-05-19 | End: 2022-05-23

## 2022-05-19 RX ORDER — SODIUM CHLORIDE 9 MG/ML
1000 INJECTION INTRAMUSCULAR; INTRAVENOUS; SUBCUTANEOUS
Refills: 0 | Status: DISCONTINUED | OUTPATIENT
Start: 2022-05-19 | End: 2022-05-20

## 2022-05-19 RX ORDER — HEPARIN SODIUM 5000 [USP'U]/ML
5000 INJECTION INTRAVENOUS; SUBCUTANEOUS EVERY 12 HOURS
Refills: 0 | Status: DISCONTINUED | OUTPATIENT
Start: 2022-05-19 | End: 2022-05-20

## 2022-05-19 RX ADMIN — SODIUM CHLORIDE 1000 MILLILITER(S): 9 INJECTION INTRAMUSCULAR; INTRAVENOUS; SUBCUTANEOUS at 09:24

## 2022-05-19 RX ADMIN — Medication 81 MILLIGRAM(S): at 12:53

## 2022-05-19 RX ADMIN — SODIUM CHLORIDE 75 MILLILITER(S): 9 INJECTION INTRAMUSCULAR; INTRAVENOUS; SUBCUTANEOUS at 19:29

## 2022-05-19 RX ADMIN — Medication 500000 UNIT(S): at 17:16

## 2022-05-19 RX ADMIN — AMPICILLIN SODIUM AND SULBACTAM SODIUM 200 GRAM(S): 250; 125 INJECTION, POWDER, FOR SUSPENSION INTRAMUSCULAR; INTRAVENOUS at 17:17

## 2022-05-19 RX ADMIN — ATORVASTATIN CALCIUM 10 MILLIGRAM(S): 80 TABLET, FILM COATED ORAL at 12:53

## 2022-05-19 RX ADMIN — AMPICILLIN SODIUM AND SULBACTAM SODIUM 200 GRAM(S): 250; 125 INJECTION, POWDER, FOR SUSPENSION INTRAMUSCULAR; INTRAVENOUS at 23:52

## 2022-05-19 RX ADMIN — Medication 1000 MILLIGRAM(S): at 18:15

## 2022-05-19 RX ADMIN — OXYCODONE HYDROCHLORIDE 5 MILLIGRAM(S): 5 TABLET ORAL at 12:54

## 2022-05-19 RX ADMIN — OXYCODONE HYDROCHLORIDE 5 MILLIGRAM(S): 5 TABLET ORAL at 09:24

## 2022-05-19 NOTE — ED PROVIDER NOTE - CLINICAL SUMMARY MEDICAL DECISION MAKING FREE TEXT BOX
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

## 2022-05-19 NOTE — ED ADULT TRIAGE NOTE - NS ED NURSE AMBULANCES
· Resume home PT services as previously ordered  · Call cardiology office to review echocardiogram performed in hospital with new changes noted 
[facility]

## 2022-05-19 NOTE — H&P ADULT - NSHPPHYSICALEXAM_GEN_ALL_CORE
AVSS  HEENT:  Head: + right sided erythema right mandible, open area with purulent drainage. No tenderness right, + tenderness left jaw.   Culture of wound taken. Packed with 1/4 gauze and covered with 4x4 gauze dressing.    mouth: Well healed flap left oral cavity. no FOM edema.

## 2022-05-19 NOTE — ED ADULT TRIAGE NOTE - CHIEF COMPLAINT QUOTE
Pt transfer from [facility] for ENT eval. As per EMT" Hx of oral ca, has reoccuring abcess....had partial jaw removal 1/2022. Yesterday started to drain again." Pt denies difficulty swallowing Pt transfer from [facility] for ENT eval. As per EMT" Hx of oral ca, has reoccuring abcess....had partial jaw removal 1/2022. Yesterday started to drain again." Pt denies difficulty swallowing Pt st" I finished Chemo & radiation a month ago." Pt has Chest wall port and a feed tube.

## 2022-05-19 NOTE — CONSULT NOTE ADULT - ASSESSMENT
57 year old female with DM, right mandibulectomy for SCCA in Jan 2022, was admitted in Feb 2022 for wound infection with cx growing Parvimonus and Propionibacterium, recently completed radiation therapy in April 2022 and now with drainage from her right lower jaw. CT with post surgical changes with extensive infiltration of the overlying subcutaneous fat and skin thickening - limited study for abscess/ infection.    Recommend:  #Right mandibular infection  -Continue vancomycin 1 gram IV q 24 hours and unasyn 3 gram IV q 6 hours  -Monitor vanco trough  -F/U blood cultures  -F/U drainage cultures  -Check ESR/ CRP    #BROOKS  -Improving  -Continue to monitor while on abx.    Dennis Truong MD  Available through MS Teams  If no response, or after 5pm/weekends, call [phone]   57 year old female with DM, right mandibulectomy for SCCA in Jan 2022, was admitted in Feb 2022 for wound infection with cx growing Parvimonus and Propionibacterium, recently completed radiation therapy in April 2022 and now with drainage from her right lower jaw. CT with post surgical changes with extensive infiltration of the overlying subcutaneous fat and skin thickening - limited study for abscess/ infection.    Recommend:  #Right mandibular infection  -Continue vancomycin 1 gram IV q 24 hours and unasyn 3 gram IV q 6 hours  -Monitor vanco trough  -F/U blood cultures  -F/U drainage cultures  -Check ESR/ CRP  -Check MRSA nasal swab    #BROOKS  -Improving  -Continue to monitor while on abx.    Dennis Truong MD  Available through MS Teams  If no response, or after 5pm/weekends, call [phone]

## 2022-05-19 NOTE — H&P ADULT - PROBLEM SELECTOR PLAN 1
1. Follow up labs  2. Home meds   3. ID consult called  4. Medicine consult called   5. PRS consult called  6. Start on Peg feeds  7. Will review CT with neuro rads.

## 2022-05-19 NOTE — CONSULT NOTE ADULT - PROBLEM SELECTOR RECOMMENDATION 3
iso poor PO.   improved with IVF. likely pre-renal  c/w IVF  avoid nephrotoxics. monitor electrolytes.

## 2022-05-19 NOTE — CONSULT NOTE ADULT - ASSESSMENT
ASSESSMENT/PLAN:   PARUL HERNANDEZ is a 57y Female with hx of R mandibulectomy with L FFF 1/2022, wound infection 2/2022, finished RT 5/2022, presenting to [facility] with 1 day of spontaneous drainage from the right mandible. Plate does not appear to be involved on CT.    - Aquacel packing to submental wound  - Follow up wound cx  - Trend WBC count  - Continue abx  - Given recent hx of XRT, would favor avoiding surgery  - Appreciate excellent care per primary team    Discussed with PRS attending Dr. Evans    Plastic Surgery   [facility]: [id number]

## 2022-05-19 NOTE — ED ADULT NURSE REASSESSMENT NOTE - NS ED NURSE REASSESS COMMENT FT1
Report given to [facility] RN Chris pt placed in transport. Safety maintained
received report from SHASHANK Henderson. Pt AxOx3 resting with RR even and unlabored. Abscess to right side of neck covered with gauze, s/o drainage by ENT MD. No bleeding noted. Airway patent. Maintaining O2 sat above 94% on RA. Denies SOB, CP. Medicated as per MD orders for mouth/neck discomfort. Peg tube to upper abdomen intact and flushing without resistance. VSS and as noted. Awaiting bed assignment.
Report received from SHASHANK Garcia. Pt is A&Ox4, resting in stretcher. RR even and unlabored. Pt reporting some minor mouth pain, pt in no acute distress. Airway patent, pt able to complete full sentences. VSS and noted. Safety maintained

## 2022-05-19 NOTE — H&P ADULT - ASSESSMENT
56 year old female with a history of right mandibulectomy S/P radiation that presents with spontaneous drainage right jaw.

## 2022-05-19 NOTE — CONSULT NOTE ADULT - PROBLEM SELECTOR RECOMMENDATION 9
skin is rubor. non-tender  labs no leuk, no fever.   discharge is clear. not purulent.   s/p vanc and zosyn at [facility] prior to transfer, culture was send.   doubt infectious at this point  would hold abx and monitor clinically.   - f/u cultures.   - wound care per primary team.

## 2022-05-19 NOTE — ED ADULT NURSE NOTE - CHIEF COMPLAINT QUOTE
Pt transfer from [facility] for ENT eval. As per EMT" Hx of oral ca, has reoccuring abcess....had partial jaw removal 1/2022. Yesterday started to drain again." Pt denies difficulty swallowing Pt st" I finished Chemo & radiation a month ago." Pt has Chest wall port and a feed tube.

## 2022-05-19 NOTE — ED PROVIDER NOTE - NS ED ATTENDING STATEMENT MOD
This was a shared visit with the JO. I reviewed and verified the documentation and independently performed the documented:

## 2022-05-19 NOTE — CONSULT NOTE ADULT - ASSESSMENT
57F with PMH HTN, R mandibular ca s/p resection/chemo/radiation. patient presentes for recurrent submandibular drainage.

## 2022-05-19 NOTE — ED ADULT NURSE NOTE - OBJECTIVE STATEMENT
Pt received to room 15. Pt A&ox4, ambulatory at baseline. Pmh squamous cell carcinoma, HLD, HTN. Pt a transfer from [facility] for ENT consult. Pt states back in January of 2022 pt had jaw surgery by Dr. So r/t squamous cell carcinoma. Pt states approximately 2 months later developed an abscess to the area the surgery was performed on the underside of the jaw to the R side. Pt states she was admitted at that time to the hospital for the abscess, it was treated and was discharged. As of yesterday morning pt states she noticed spontaneous drainage from the same area, but is unaware as to when the new abscess developed. Purulent drainage noted from the site. Pt vitally stable, denies fevers, chills, n/v/d, chest pain, SOB, headache, dizziness. Pt resp even unlabored, breath sounds clear anterior and posterior, abd soft nontender, bowel sounds heard in all four quadrants, pedal pulses 2+ bilaterally.  Denies chest pain, SOB, nausea, vomiting, diarrhea, headache, numbness/tingling, visual disturbances.  Awaiting ENT consult.

## 2022-05-19 NOTE — ED PROVIDER NOTE - PHYSICAL EXAMINATION
Right submandibular region: +purulent nonbloody drainage from small opening; + surrounding erythema; nontender; no crepitus

## 2022-05-19 NOTE — H&P ADULT - HISTORY OF PRESENT ILLNESS
Patient with history of DM and right mandibulectomy for SCCA in January 2022. States she was admitted for wound infection in Feb and was treated with IV antibiotics and then switched to oral and discharged home. Recently finished with Radiation therapy in April. Patient states she had spontaneous drainage from right jaw yesterday and went to [facility] and had a non contrast CT of head and neck due to having BROOKS. Patient also received one dose of Vanc and Zosyn and then transferred to [facility]. C/O mild jaw pain. Denies any fevers, chills, rigors N/V sweats. Denies SOB, dyspnea, cough. No other complaints. Peg dependant on Glucerna.

## 2022-05-19 NOTE — ED ADULT NURSE REASSESSMENT NOTE - NS ED NURSE REASSESS COMMENT FT1
Patient to be transferred to [facility] for ENT, report given to [facility] ED RN [place]. Pt consents to transfer. Awaiting EMS transport
Pt A&Ox4 c/o abscess to chin at this time. Pt resting comfortably, VSS, no signs of distress at this time, awaiting CT results, safety maintained, call bell in reach

## 2022-05-19 NOTE — ED ADULT TRIAGE NOTE - AS HEIGHT TYPE
I called mother and discussed concerns, clonazepam discontinued started on alprazolam 1 mg BID as needed for anxiety.    actual

## 2022-05-20 DIAGNOSIS — D63.8 ANEMIA IN OTHER CHRONIC DISEASES CLASSIFIED ELSEWHERE: ICD-10-CM

## 2022-05-20 DIAGNOSIS — K13.21 LEUKOPLAKIA OF ORAL MUCOSA, INCLUDING TONGUE: ICD-10-CM

## 2022-05-20 LAB
ANION GAP SERPL CALC-SCNC: 11 MMOL/L — SIGNIFICANT CHANGE UP (ref 7–14)
ANION GAP SERPL CALC-SCNC: 13 MMOL/L — SIGNIFICANT CHANGE UP (ref 7–14)
BLD GP AB SCN SERPL QL: NEGATIVE — SIGNIFICANT CHANGE UP
BUN SERPL-MCNC: 18 MG/DL — SIGNIFICANT CHANGE UP (ref 7–23)
BUN SERPL-MCNC: 19 MG/DL — SIGNIFICANT CHANGE UP (ref 7–23)
CALCIUM SERPL-MCNC: 8.5 MG/DL — SIGNIFICANT CHANGE UP (ref 8.4–10.5)
CALCIUM SERPL-MCNC: 8.8 MG/DL — SIGNIFICANT CHANGE UP (ref 8.4–10.5)
CHLORIDE SERPL-SCNC: 94 MMOL/L — LOW (ref 98–107)
CHLORIDE SERPL-SCNC: 99 MMOL/L — SIGNIFICANT CHANGE UP (ref 98–107)
CO2 SERPL-SCNC: 27 MMOL/L — SIGNIFICANT CHANGE UP (ref 22–31)
CO2 SERPL-SCNC: 27 MMOL/L — SIGNIFICANT CHANGE UP (ref 22–31)
CREAT SERPL-MCNC: 0.87 MG/DL — SIGNIFICANT CHANGE UP (ref 0.5–1.3)
CREAT SERPL-MCNC: 0.9 MG/DL — SIGNIFICANT CHANGE UP (ref 0.5–1.3)
EGFR: 74 ML/MIN/1.73M2 — SIGNIFICANT CHANGE UP
EGFR: 77 ML/MIN/1.73M2 — SIGNIFICANT CHANGE UP
GLUCOSE SERPL-MCNC: 107 MG/DL — HIGH (ref 70–99)
GLUCOSE SERPL-MCNC: 89 MG/DL — SIGNIFICANT CHANGE UP (ref 70–99)
HCT VFR BLD CALC: 21.4 % — LOW (ref 34.5–45)
HCT VFR BLD CALC: 22.1 % — LOW (ref 34.5–45)
HGB BLD-MCNC: 6.9 G/DL — CRITICAL LOW (ref 11.5–15.5)
HGB BLD-MCNC: 7.2 G/DL — LOW (ref 11.5–15.5)
MAGNESIUM SERPL-MCNC: 1.9 MG/DL — SIGNIFICANT CHANGE UP (ref 1.6–2.6)
MAGNESIUM SERPL-MCNC: 2 MG/DL — SIGNIFICANT CHANGE UP (ref 1.6–2.6)
MCHC RBC-ENTMCNC: 27.6 PG — SIGNIFICANT CHANGE UP (ref 27–34)
MCHC RBC-ENTMCNC: 28 PG — SIGNIFICANT CHANGE UP (ref 27–34)
MCHC RBC-ENTMCNC: 32.2 GM/DL — SIGNIFICANT CHANGE UP (ref 32–36)
MCHC RBC-ENTMCNC: 32.6 GM/DL — SIGNIFICANT CHANGE UP (ref 32–36)
MCV RBC AUTO: 84.7 FL — SIGNIFICANT CHANGE UP (ref 80–100)
MCV RBC AUTO: 87 FL — SIGNIFICANT CHANGE UP (ref 80–100)
NRBC # BLD: 0 /100 WBCS — SIGNIFICANT CHANGE UP
NRBC # BLD: 0 /100 WBCS — SIGNIFICANT CHANGE UP
NRBC # FLD: 0 K/UL — SIGNIFICANT CHANGE UP
NRBC # FLD: 0 K/UL — SIGNIFICANT CHANGE UP
PHOSPHATE SERPL-MCNC: 2.9 MG/DL — SIGNIFICANT CHANGE UP (ref 2.5–4.5)
PHOSPHATE SERPL-MCNC: 3.6 MG/DL — SIGNIFICANT CHANGE UP (ref 2.5–4.5)
PLATELET # BLD AUTO: 278 K/UL — SIGNIFICANT CHANGE UP (ref 150–400)
PLATELET # BLD AUTO: 297 K/UL — SIGNIFICANT CHANGE UP (ref 150–400)
POTASSIUM SERPL-MCNC: 3.1 MMOL/L — LOW (ref 3.5–5.3)
POTASSIUM SERPL-MCNC: 4.5 MMOL/L — SIGNIFICANT CHANGE UP (ref 3.5–5.3)
POTASSIUM SERPL-SCNC: 3.1 MMOL/L — LOW (ref 3.5–5.3)
POTASSIUM SERPL-SCNC: 4.5 MMOL/L — SIGNIFICANT CHANGE UP (ref 3.5–5.3)
RBC # BLD: 2.46 M/UL — LOW (ref 3.8–5.2)
RBC # BLD: 2.61 M/UL — LOW (ref 3.8–5.2)
RBC # FLD: 20.4 % — HIGH (ref 10.3–14.5)
RBC # FLD: 20.8 % — HIGH (ref 10.3–14.5)
RH IG SCN BLD-IMP: POSITIVE — SIGNIFICANT CHANGE UP
SODIUM SERPL-SCNC: 134 MMOL/L — LOW (ref 135–145)
SODIUM SERPL-SCNC: 137 MMOL/L — SIGNIFICANT CHANGE UP (ref 135–145)
WBC # BLD: 3.44 K/UL — LOW (ref 3.8–10.5)
WBC # BLD: 3.47 K/UL — LOW (ref 3.8–10.5)
WBC # FLD AUTO: 3.44 K/UL — LOW (ref 3.8–10.5)
WBC # FLD AUTO: 3.47 K/UL — LOW (ref 3.8–10.5)

## 2022-05-20 PROCEDURE — 93010 ELECTROCARDIOGRAM REPORT: CPT

## 2022-05-20 PROCEDURE — 99232 SBSQ HOSP IP/OBS MODERATE 35: CPT

## 2022-05-20 PROCEDURE — 99233 SBSQ HOSP IP/OBS HIGH 50: CPT

## 2022-05-20 RX ORDER — ENOXAPARIN SODIUM 100 MG/ML
40 INJECTION SUBCUTANEOUS EVERY 24 HOURS
Refills: 0 | Status: DISCONTINUED | OUTPATIENT
Start: 2022-05-20 | End: 2022-05-23

## 2022-05-20 RX ORDER — POTASSIUM CHLORIDE 20 MEQ
40 PACKET (EA) ORAL EVERY 4 HOURS
Refills: 0 | Status: COMPLETED | OUTPATIENT
Start: 2022-05-20 | End: 2022-05-20

## 2022-05-20 RX ADMIN — Medication 40 MILLIEQUIVALENT(S): at 17:23

## 2022-05-20 RX ADMIN — Medication 40 MILLIEQUIVALENT(S): at 11:06

## 2022-05-20 RX ADMIN — Medication 81 MILLIGRAM(S): at 11:10

## 2022-05-20 RX ADMIN — ATORVASTATIN CALCIUM 10 MILLIGRAM(S): 80 TABLET, FILM COATED ORAL at 11:10

## 2022-05-20 RX ADMIN — OXYCODONE HYDROCHLORIDE 5 MILLIGRAM(S): 5 TABLET ORAL at 23:18

## 2022-05-20 RX ADMIN — ENOXAPARIN SODIUM 40 MILLIGRAM(S): 100 INJECTION SUBCUTANEOUS at 17:23

## 2022-05-20 RX ADMIN — AMPICILLIN SODIUM AND SULBACTAM SODIUM 200 GRAM(S): 250; 125 INJECTION, POWDER, FOR SUSPENSION INTRAMUSCULAR; INTRAVENOUS at 23:06

## 2022-05-20 RX ADMIN — Medication 40 MILLIEQUIVALENT(S): at 13:45

## 2022-05-20 RX ADMIN — Medication 500000 UNIT(S): at 17:28

## 2022-05-20 RX ADMIN — OXYCODONE HYDROCHLORIDE 5 MILLIGRAM(S): 5 TABLET ORAL at 23:48

## 2022-05-20 RX ADMIN — Medication 500000 UNIT(S): at 05:22

## 2022-05-20 RX ADMIN — HEPARIN SODIUM 5000 UNIT(S): 5000 INJECTION INTRAVENOUS; SUBCUTANEOUS at 05:22

## 2022-05-20 RX ADMIN — AMPICILLIN SODIUM AND SULBACTAM SODIUM 200 GRAM(S): 250; 125 INJECTION, POWDER, FOR SUSPENSION INTRAMUSCULAR; INTRAVENOUS at 11:10

## 2022-05-20 RX ADMIN — AMPICILLIN SODIUM AND SULBACTAM SODIUM 200 GRAM(S): 250; 125 INJECTION, POWDER, FOR SUSPENSION INTRAMUSCULAR; INTRAVENOUS at 17:22

## 2022-05-20 RX ADMIN — Medication 250 MILLIGRAM(S): at 15:05

## 2022-05-20 NOTE — CONSULT NOTE ADULT - REASON FOR ADMISSION
Right jaw swelling and drainage

## 2022-05-20 NOTE — CONSULT NOTE ADULT - SUBJECTIVE AND OBJECTIVE BOX
Plastic Surgery Consult Note  (pg [facility]: [id number], [place]: [phone])    HPI:  Patient with history of DM and right mandibulectomy for SCCA in 2022. States she was admitted for wound infection in Feb and was treated with IV antibiotics and then switched to oral and discharged home. Recently finished with Radiation therapy in April. Patient states she had spontaneous drainage from right jaw yesterday and went to [facility] and had a non contrast CT of head and neck due to having BROOKS. Patient also received one dose of Vanc and Zosyn and then transferred to [facility]. C/O mild jaw pain. Denies any fevers, chills, rigors N/V sweats. Denies SOB, dyspnea, cough. No other complaints. Peg dependant on Glucerna. Of note has a fractural distal R fibula from 1 month ago s/p closed reduction.    PAST MEDICAL & SURGICAL HISTORY:  H/O malignant neoplasm of gum      Hypertension      Hyperlipidemia      Mild asthma      H/O  section      History of hip replacement  left hip      History of partial hysterectomy      H/O neck surgery  partial right mandibulectomy with fibular free flap reconstriction and right neck lymphnode dissection 22        Allergies    morphine (Hives)    Intolerances      Home Medications:  albuterol 90 mcg/inh inhalation powder: 2 puff(s) inhaled every 6 hours, As Needed (2022 21:43)  aspirin 81 mg oral delayed release tablet: 1 tab(s) orally once a day (2022 21:43)  Compazine 10 mg oral tablet: orally every 6 hours, As Needed (2022 21:43)  Zofran 8 mg oral tablet: orally every 8 hours, As Needed (2022 21:43)    MEDICATIONS  (STANDING):  ampicillin/sulbactam  IVPB      ampicillin/sulbactam  IVPB 3 Gram(s) IV Intermittent every 6 hours  aspirin enteric coated 81 milliGRAM(s) Oral daily  atorvastatin Oral Tab/Cap - Peds 10 milliGRAM(s) Oral daily  lisinopril 5 milliGRAM(s) Oral daily  nystatin    Suspension 622833 Unit(s) Oral two times a day  sodium chloride 0.9%. 1000 milliLiter(s) (75 mL/Hr) IV Continuous <Continuous>  vancomycin  IVPB          SOCIAL HISTORY:  FAMILY HISTORY:  Family history of CVA (Mother)    FH: thyroid disease (Mother)        ___________________________________________  OBJECTIVE:  Vital Signs Last 24 Hrs  T(C): 36.1 (19 May 2022 17:24), Max: 36.7 (18 May 2022 23:24)  T(F): 97 (19 May 2022 17:24), Max: 98.1 (18 May 2022 23:24)  HR: 90 (19 May 2022 17:24) (80 - 90)  BP: 120/68 (19 May 2022 17:24) (90/54 - 124/78)  BP(mean): --  RR: 17 (19 May 2022 17:24) (16 - 18)  SpO2: 98% (19 May 2022 17:24) (92% - 100%)CAPILLARY BLOOD GLUCOSE        I&O's Detail    General: Well developed, well nourished, NAD  Neuro: Alert and oriented, no focal deficits, moves all extremities spontaneously  HEENT: NCAT, EOMI, anicteric, mucosa moist. Small 1cm submental opening, no active drainage. Packed with 1/4" packing.  Respiratory: Airway patent, respirations unlabored  CVS: Regular rate and rhythm  Abdomen: Soft, nontender, nondistended  Extremities: No edema, sensation and movement grossly intact  Skin: Warm, dry, appropriate color  ____________________________________________  LABS:  CBC Full  -  ( 18 May 2022 20:51 )  WBC Count : 6.40 K/uL  RBC Count : 2.57 M/uL  Hemoglobin : 7.3 g/dL  Hematocrit : 22.5 %  Platelet Count - Automated : 307 K/uL  Mean Cell Volume : 87.5 fl  Mean Cell Hemoglobin : 28.4 pg  Mean Cell Hemoglobin Concentration : 32.4 gm/dL  Auto Neutrophil # : 5.84 K/uL  Auto Lymphocyte # : 0.17 K/uL  Auto Monocyte # : 0.33 K/uL  Auto Eosinophil # : 0.00 K/uL  Auto Basophil # : 0.06 K/uL  Auto Neutrophil % : 91.3 %  Auto Lymphocyte % : 2.6 %  Auto Monocyte % : 5.2 %  Auto Eosinophil % : 0.0 %  Auto Basophil % : 0.9 %        133<L>  |  90<L>  |  37<H>  ----------------------------<  106<H>  3.9   |  32<H>  |  1.51<H>    Ca    9.1      19 May 2022 07:43    TPro  6.8  /  Alb  3.1<L>  /  TBili  0.2<L>  /  DBili  x   /  AST  10  /  ALT  5   /  AlkPhos  92  05-18    LIVER FUNCTIONS - ( 18 May 2022 20:51 )  Alb: 3.1 g/dL / Pro: 6.8 g/dL / ALK PHOS: 92 U/L / ALT: 5 U/L / AST: 10 U/L / GGT: x           PT/INR - ( 18 May 2022 20:51 )   PT: 12.1 sec;   INR: 1.04 ratio      PTT - ( 18 May 2022 20:51 )  PTT:23.4 sec  ____________________________________________  RADIOLOGY:  < from: CT Neck Soft Tissue No Cont (22 @ 00:42) >  Postsurgical changes as described with extensive infiltration of the   overlying subcutaneous fat and skin thickening. Evaluation for   abscess/infection is markedly limited on this noncontrast study.    < end of copied text >  
OMFS Consult Note   #[id number]     HPI: Patient with history of DM and right mandibulectomy for SCCA in 2022. States she was admitted for wound infection in Feb and was treated with IV antibiotics and then switched to oral and discharged home. Recently finished with Radiation therapy in April. Patient states she had spontaneous drainage from right jaw yesterday and went to [facility] and had a non contrast CT of head and neck due to having BROOKS. Patient also received one dose of Vanc and Zosyn and then transferred to [facility]. C/O mild jaw pain. Denies any fevers, chills, rigors N/V sweats. Denies SOB, dyspnea, cough. No other complaints. Peg dependant on Glucerna. Of note has a fractural distal R fibula from 1 month ago s/p closed reduction.       PAST MEDICAL & SURGICAL HISTORY:  H/O malignant neoplasm of gum      Hypertension      Hyperlipidemia      Mild asthma      H/O  section      History of hip replacement  left hip      History of partial hysterectomy      H/O neck surgery  partial right mandibulectomy with fibular free flap reconstriction and right neck lymphnode dissection 22        MEDICATIONS  (STANDING):  ampicillin/sulbactam  IVPB      ampicillin/sulbactam  IVPB 3 Gram(s) IV Intermittent every 6 hours  aspirin enteric coated 81 milliGRAM(s) Oral daily  atorvastatin Oral Tab/Cap - Peds 10 milliGRAM(s) Oral daily  heparin   Injectable 5000 Unit(s) SubCutaneous every 12 hours  nystatin    Suspension 616313 Unit(s) Oral two times a day  sodium chloride 0.9%. 1000 milliLiter(s) (75 mL/Hr) IV Continuous <Continuous>  vancomycin  IVPB 1000 milliGRAM(s) IV Intermittent every 24 hours  vancomycin  IVPB        MEDICATIONS  (PRN):  ALBUTerol    90 MICROgram(s) HFA Inhaler 2 Puff(s) Inhalation every 6 hours PRN Wheezing  ondansetron    Tablet 4 milliGRAM(s) Oral every 8 hours PRN Nausea and/or Vomiting  oxyCODONE    Solution 5 milliGRAM(s) Oral every 6 hours PRN Moderate Pain (4 - 6)  pantoprazole    Tablet 40 milliGRAM(s) Oral before breakfast PRN GERD  prochlorperazine   Tablet 10 milliGRAM(s) Oral every 6 hours PRN nausea    Allergies    morphine (Hives)    Intolerances      FAMILY HISTORY:  Family history of CVA (Mother)    FH: thyroid disease (Mother)      *SOCIAL HISTORY: Denies ETOH use, Tobacco, drugs      Vital Signs Last 24 Hrs  T(C): 36.7 (20 May 2022 05:00), Max: 36.8 (20 May 2022 00:02)  T(F): 98 (20 May 2022 05:00), Max: 98.2 (20 May 2022 00:02)  HR: 75 (20 May 2022 05:00) (75 - 91)  BP: 125/65 (20 May 2022 05:00) (110/65 - 127/69)  BP(mean): 86 (20 May 2022 00:02) (86 - 86)  RR: 17 (20 May 2022 05:00) (16 - 17)  SpO2: 97% (20 May 2022 05:00) (95% - 100%)    Physical Exam:  General: Well developed, well nourished, NAD  Neuro: Alert and oriented, no focal deficits, moves all extremities spontaneously  HEENT: NCAT, EOMI, anicteric, mucosa moist. Small 1cm submental opening, no active drainage. Packed with 1/4" packing.  Respiratory: Airway patent, respirations unlabored  CVS: Regular rate and rhythm  Abdomen: Soft, nontender, nondistended  Extremities: No edema, sensation and movement grossly intact  Skin: Warm, dry, appropriate color       LABS:                        7.3    6.40  )-----------( 307      ( 18 May 2022 20:51 )             22.5     05-19    133<L>  |  90<L>  |  37<H>  ----------------------------<  106<H>  3.9   |  32<H>  |  1.51<H>    Ca    9.1      19 May 2022 07:43    TPro  6.8  /  Alb  3.1<L>  /  TBili  0.2<L>  /  DBili  x   /  AST  10  /  ALT  5   /  AlkPhos  92  05-18      PT/INR - ( 18 May 2022 20:51 )   PT: 12.1 sec;   INR: 1.04 ratio         PTT - ( 18 May 2022 20:51 )  PTT:23.4 sec        CT Neck:   Status post partial right mandibulectomy with fibular free flap reconstruction and right neck lymph node dissection with infiltration of the adjacent perimandibular and submandibular fat and associated skin thickening. Evaluation for abscess/infection is markedly limited on this noncontrast study.    Redemonstrated calcifications of the bilateral palatine tonsils and anterior tonsillar pillars.    Status post right selective neck dissection. Nonspecific small subcentimeter lymph nodes are present in the submental, submandibular, jugular chain, and spinal accessory chain regions.    The left lobe of the thyroid gland is again not visualized and may be post hemithyroidectomy. The right thyroid gland is heterogeneous. Evidence of prior tracheostomy. The the left submandibular gland is unremarkable. The right is not visualized and may have been resected. The parotid glands are unremarkable.    The paranasal sinuses are well-aerated. The mastoid air cells and middle ear cavities are well-aerated.    No focal intracranial abnormalities are noted within the field-of-view. Right staphyloma and left cataract surgery.    The upper lungs are clear. The osseous structures are unremarkable.    
Patient is a 57y old  Female who presents with a chief complaint of Right jaw swelling and drainage (19 May 2022 09:00)      HPI:  Patient with history of HTN and right mandibulectomy for SCCA in 2022. States she was admitted for wound infection in Feb and was treated with IV antibiotics and then switched to oral and discharged home. Recently finished with Radiation therapy in April. Patient states she had spontaneous drainage from right jaw yesterday and went to [facility] and had a non contrast CT of head and neck due to having BROOKS. Patient also received one dose of Vanc and Zosyn and then transferred to [facility]. C/O mild jaw pain. Denies any fevers, chills, rigors N/V sweats. Denies SOB, dyspnea, cough. No other complaints. Peg dependant on Glucerna.  (19 May 2022 09:00)    Patient reports her last chemo and radiation was 1 month ago. She completed abx in March. She noted a white purulent discharge yesterday. was evaluated at [facility] and then transfer to [facility]. Patient seen in ED, she denied any pain from the neck. denied fever or chills. no cough, no cp, no sob.       Allergies    morphine (Hives)    Intolerances        HOME MEDICATIONS: [ ] Reviewed    MEDICATIONS  (STANDING):  aspirin enteric coated 81 milliGRAM(s) Oral daily  atorvastatin Oral Tab/Cap - Peds 10 milliGRAM(s) Oral daily  lisinopril 5 milliGRAM(s) Oral daily    MEDICATIONS  (PRN):  ALBUTerol    90 MICROgram(s) HFA Inhaler 2 Puff(s) Inhalation every 6 hours PRN Wheezing  ondansetron    Tablet 4 milliGRAM(s) Oral every 8 hours PRN Nausea and/or Vomiting  oxyCODONE    Solution 5 milliGRAM(s) Oral every 6 hours PRN Moderate Pain (4 - 6)  pantoprazole    Tablet 40 milliGRAM(s) Oral before breakfast PRN GERD  prochlorperazine   Tablet 10 milliGRAM(s) Oral every 6 hours PRN nausea      PAST MEDICAL & SURGICAL HISTORY:  H/O malignant neoplasm of gum      Hypertension      Hyperlipidemia      Mild asthma      H/O  section      History of hip replacement  left hip      History of partial hysterectomy      H/O neck surgery  partial right mandibulectomy with fibular free flap reconstriction and right neck lymphnode dissection 22      [ ] Reviewed     SOCIAL HISTORY:  Residence: [ ] [facility]  [ ] SNF  [ ] Community  [ ] Substance abuse: no  [ ] Tobacco: ex smoker  [ ] Alcohol use: no    FAMILY HISTORY:  Family history of CVA (Mother)    FH: thyroid disease (Mother)    [ ] No pertinent family history in first degree relatives     REVIEW OF SYSTEMS:    CONSTITUTIONAL: No fever, weight loss, or fatigue  EYES: No eye pain, visual disturbances, or discharge  ENMT:  No difficulty hearing, tinnitus, vertigo; No sinus or throat pain  NECK: No pain or stiffness; neck drainage.   BREASTS: No pain, masses, or nipple discharge  RESPIRATORY: No cough, wheezing, chills or hemoptysis; No shortness of breath  CARDIOVASCULAR: No chest pain, palpitations, dizziness, or leg swelling  GASTROINTESTINAL: No abdominal or epigastric pain. No nausea, vomiting, or hematemesis; No diarrhea or constipation. No melena or hematochezia.  GENITOURINARY: No dysuria, frequency, hematuria, or incontinence  NEUROLOGICAL: No headaches, memory loss, loss of strength, numbness, or tremors  SKIN: No itching, burning, rashes, or lesions   LYMPH NODES: No enlarged glands  ENDOCRINE: No heat or cold intolerance; No hair loss  MUSCULOSKELETAL: No muscle or back pain  PSYCHIATRIC: No depression, anxiety, mood swings, or difficulty sleeping  HEME/LYMPH: No easy bruising, or bleeding gums  ALLERGY AND IMMUNOLOGIC: No hives or eczema    [  ] All other ROS negative  [  ] Unable to obtain due to poor mental status    Vital Signs Last 24 Hrs  T(C): 36.6 (19 May 2022 13:10), Max: 37.2 (18 May 2022 17:51)  T(F): 97.8 (19 May 2022 13:10), Max: 99 (18 May 2022 17:51)  HR: 85 (19 May 2022 13:10) (80 - 99)  BP: 110/65 (19 May 2022 13:10) (90/54 - 124/78)  BP(mean): --  RR: 16 (19 May 2022 13:10) (16 - 18)  SpO2: 100% (19 May 2022 13:10) (92% - 100%)    PHYSICAL EXAM:    GENERAL: NAD, well-groomed, well-developed  HEAD:  Atraumatic, Normocephalic  EYES: EOMI, PERRLA, conjunctiva and sclera clear  ENMT: leukoplakia in oral mucosa.   NECK: enlarged, erythematous, sit of drainage from right neck/submadibular area, packing in place. draining serous fluid  RESPIRATORY: Clear to auscultation bilaterally; No rales, rhonchi, wheezing, or rubs  CARDIOVASCULAR: Regular rate and rhythm; No murmurs, rubs, or gallops  GASTROINTESTINAL: Soft, Nontender, Nondistended; Bowel sounds present  GENITOURINARY: Not examined  EXTREMITIES:  2+ Peripheral Pulses, No clubbing, cyanosis, or edema  NERVOUS SYSTEM:  Alert & Oriented X3; Moving all 4 extremities; No gross sensory deficits  HEME/LYMPH: No lymphadenopathy noted  SKIN: No rashes or lesions; Incisions C/D/I    LABS:                        7.3    6.40  )-----------( 307      ( 18 May 2022 20:51 )             22.5     05-19    133<L>  |  90<L>  |  37<H>  ----------------------------<  106<H>  3.9   |  32<H>  |  1.51<H>    Ca    9.1      19 May 2022 07:43    TPro  6.8  /  Alb  3.1<L>  /  TBili  0.2<L>  /  DBili  x   /  AST  10  /  ALT  5   /  AlkPhos  92  05-18    PT/INR - ( 18 May 2022 20:51 )   PT: 12.1 sec;   INR: 1.04 ratio         PTT - ( 18 May 2022 20:51 )  PTT:23.4 sec    CAPILLARY BLOOD GLUCOSE          RADIOLOGY & ADDITIONAL STUDIES:    EKG:   Personally Reviewed:  [ ] YES     Imaging:   Personally Reviewed:  [ ] YES               Consultant(s) notes reviewed:    Care Discussed with Consultant(s)/Other Providers:    Advanced Directives: [ ] DNR  [ ] No feeding tube  [ ] MOLST in chart  [ ] MOLST completed today  [ ] Unknown    [ ] Probable osteoporosis  [ ] Possible osteomalacia  [ ] Increased delirium risk  [ ] Delirium and other risks can be reduced by:          -early ambulation          -minimizing "tethers" - IV, oxygen, catheters, etc          -avoiding hypnotics and sedatives          -maintaining hydration/nutrition          -avoid anticholinergics - diphenhydramine, etc          -pain control          -supportive environment  
HPI:  57F with DM and right mandibulectomy for SCCA in 2022. States she was admitted for wound infection in Feb and was treated with IV antibiotics and then switched to oral and discharged home. Recently finished with Radiation therapy in April. Patient states she had spontaneous drainage from right jaw yesterday and went to [facility] and had a non contrast CT of head and neck due to having BROOKS. Patient also received one dose of Vanc and Zosyn and then transferred to [facility]. C/O mild jaw pain. Denies any fevers, chills, rigors N/V sweats. Denies SOB, dyspnea, cough. No other complaints. Peg dependant on Glucerna. Afebrile. WBC WNL. BROOKS improving.    PAST MEDICAL & SURGICAL HISTORY:  H/O malignant neoplasm of gum    Hypertension    Hyperlipidemia    Mild asthma    H/O  section    History of hip replacement  left hip    History of partial hysterectomy    H/O neck surgery  partial right mandibulectomy with fibular free flap reconstruction and right neck lymph node dissection 22    Allergies    morphine (Hives)    Intolerances    ANTIMICROBIALS:      OTHER MEDS:  ALBUTerol    90 MICROgram(s) HFA Inhaler 2 Puff(s) Inhalation every 6 hours PRN  aspirin enteric coated 81 milliGRAM(s) Oral daily  atorvastatin Oral Tab/Cap - Peds 10 milliGRAM(s) Oral daily  lisinopril 5 milliGRAM(s) Oral daily  ondansetron    Tablet 4 milliGRAM(s) Oral every 8 hours PRN  oxyCODONE    Solution 5 milliGRAM(s) Oral every 6 hours PRN  pantoprazole    Tablet 40 milliGRAM(s) Oral before breakfast PRN  prochlorperazine   Tablet 10 milliGRAM(s) Oral every 6 hours PRN    SOCIAL HISTORY: Former smoker, no alcohol , no drug use.    FAMILY HISTORY:  Family history of CVA (Mother)    FH: thyroid disease (Mother)      Drug Dosing Weight  Height (cm): 165.1 (19 May 2022 06:22)  Weight (kg): 84.4 (18 May 2022 17:51)  BMI (kg/m2): 31 (19 May 2022 06:22)  BSA (m2): 1.92 (19 May 2022 06:22)    PE:    Vital Signs Last 24 Hrs  T(C): 36.6 (19 May 2022 13:10), Max: 37.2 (18 May 2022 17:51)  T(F): 97.8 (19 May 2022 13:10), Max: 99 (18 May 2022 17:51)  HR: 85 (19 May 2022 13:10) (80 - 99)  BP: 110/65 (19 May 2022 13:10) (90/54 - 124/78)  BP(mean): --  RR: 16 (19 May 2022 13:10) (16 - 18)  SpO2: 100% (19 May 2022 13:10) (92% - 100%)    Gen: AOx3, NAD  HEENT: R sided healed surgical flap with drainage   CV: S1+S2 normal, no murmurs  Resp: Clear bilat, no resp distress  Abd: Soft, nontender, +BS, PEG in place  Ext: No LE edema, no wounds  : No Dobbs  IV/Skin: No thrombophlebitis  Msk: No low back pain, no arthralgias, no joint swelling  Neuro: No sensory deficits, no motor deficits    LABS:                          7.3    6.40  )-----------( 307      ( 18 May 2022 20:51 )             22.5       05-    133<L>  |  90<L>  |  37<H>  ----------------------------<  106<H>  3.9   |  32<H>  |  1.51<H>    Ca    9.1      19 May 2022 07:43    TPro  6.8  /  Alb  3.1<L>  /  TBili  0.2<L>  /  DBili  x   /  AST  10  /  ALT  5   /  AlkPhos  92      MICROBIOLOGY:  v    Rapid RVP Result: NotDetec ( @ 20:51)    RADIOLOGY:    `< from: CT Neck Soft Tissue No Cont (22 @ 00:42) >  IMPRESSION:  Postsurgical changes as described with extensive infiltration of the   overlying subcutaneous fat and skin thickening. Evaluation for   abscess/infection is markedly limited on this noncontrast study.    < end of copied text >

## 2022-05-20 NOTE — CONSULT NOTE ADULT - CONSULT REASON
neck drainage
Jaw drainage, prior free fibula flab with Dr. Evans
Right jaw swelling and drainage
Right mandibular infection

## 2022-05-20 NOTE — PROGRESS NOTE ADULT - ASSESSMENT
57 year old female with DM, right mandibulectomy for SCCA in Jan 2022, was admitted in Feb 2022 for wound infection with cx growing Parvimonus and Propionibacterium, recently completed radiation therapy in April 2022 and now with drainage from her right lower jaw. CT with post surgical changes with extensive infiltration of the overlying subcutaneous fat and skin thickening - limited study for abscess/ infection.    Recommend:  #Right mandibular infection  -Continue vancomycin 1 gram IV q 24 hours and unasyn 3 gram IV q 6 hours  -Monitor vanco trough  -F/U blood cultures  -F/U drainage cultures so far no growth  -Check ESR/ CRP  -Check MRSA nasal swab    #BROOKS  -Now resolved  -Continue to monitor while on abx.    Dennis Truong MD  Available through MS Teams  If no response, or after 5pm/weekends, call [phone]

## 2022-05-20 NOTE — PROGRESS NOTE ADULT - ASSESSMENT
ASSESSMENT/PLAN:   PARUL HERNANDEZ is a 57y Female with hx of R mandibulectomy with L FFF 1/2022, wound infection 2/2022, finished RT 5/2022, presenting to [facility] with 1 day of spontaneous drainage from the right mandible. Plate does not appear to be involved on CT.    - Aquacel packing to submental wound  - Follow up wound cx  - Trend WBC count  - Continue abx  - Given recent hx of XRT, would favor avoiding surgery  - Appreciate excellent care per primary team    Plastic Surgery   [facility]: [id number]

## 2022-05-20 NOTE — CONSULT NOTE ADULT - ASSESSMENT
Assessment/Plan: 57y Female with hx of R mandibulectomy with L FFF 1/2022, wound infection 2/2022, finished RT 5/2022, presenting to [facility] with 1 day of spontaneous drainage from the right mandible. Plate does not appear to be involved on CT.     - Aquacel packing to submental wound  - Follow up wound cx  - Trend WBC count  - Continue abx  - Given recent hx of XRT, would favor avoiding surgery  - Appreciate excellent care per primary team

## 2022-05-20 NOTE — PROGRESS NOTE ADULT - PROBLEM SELECTOR PLAN 3
been off meds since her LE injury.  ctm BP. baseline Hb 7-8  acute drop on AM labs. Hb 6.7  please repeat h/h.   maintain active T&S.

## 2022-05-21 LAB
ANION GAP SERPL CALC-SCNC: 12 MMOL/L — SIGNIFICANT CHANGE UP (ref 7–14)
BUN SERPL-MCNC: 13 MG/DL — SIGNIFICANT CHANGE UP (ref 7–23)
CALCIUM SERPL-MCNC: 9 MG/DL — SIGNIFICANT CHANGE UP (ref 8.4–10.5)
CHLORIDE SERPL-SCNC: 101 MMOL/L — SIGNIFICANT CHANGE UP (ref 98–107)
CO2 SERPL-SCNC: 26 MMOL/L — SIGNIFICANT CHANGE UP (ref 22–31)
CREAT SERPL-MCNC: 0.82 MG/DL — SIGNIFICANT CHANGE UP (ref 0.5–1.3)
EGFR: 83 ML/MIN/1.73M2 — SIGNIFICANT CHANGE UP
GLUCOSE SERPL-MCNC: 91 MG/DL — SIGNIFICANT CHANGE UP (ref 70–99)
HCT VFR BLD CALC: 22.8 % — LOW (ref 34.5–45)
HGB BLD-MCNC: 7.4 G/DL — LOW (ref 11.5–15.5)
MAGNESIUM SERPL-MCNC: 1.7 MG/DL — SIGNIFICANT CHANGE UP (ref 1.6–2.6)
MCHC RBC-ENTMCNC: 28.2 PG — SIGNIFICANT CHANGE UP (ref 27–34)
MCHC RBC-ENTMCNC: 32.5 GM/DL — SIGNIFICANT CHANGE UP (ref 32–36)
MCV RBC AUTO: 87 FL — SIGNIFICANT CHANGE UP (ref 80–100)
MRSA PCR RESULT.: SIGNIFICANT CHANGE UP
NRBC # BLD: 0 /100 WBCS — SIGNIFICANT CHANGE UP
NRBC # FLD: 0 K/UL — SIGNIFICANT CHANGE UP
PHOSPHATE SERPL-MCNC: 2.8 MG/DL — SIGNIFICANT CHANGE UP (ref 2.5–4.5)
PLATELET # BLD AUTO: 306 K/UL — SIGNIFICANT CHANGE UP (ref 150–400)
POTASSIUM SERPL-MCNC: 4.2 MMOL/L — SIGNIFICANT CHANGE UP (ref 3.5–5.3)
POTASSIUM SERPL-SCNC: 4.2 MMOL/L — SIGNIFICANT CHANGE UP (ref 3.5–5.3)
RBC # BLD: 2.62 M/UL — LOW (ref 3.8–5.2)
RBC # FLD: 21.1 % — HIGH (ref 10.3–14.5)
S AUREUS DNA NOSE QL NAA+PROBE: DETECTED
SODIUM SERPL-SCNC: 139 MMOL/L — SIGNIFICANT CHANGE UP (ref 135–145)
VANCOMYCIN TROUGH SERPL-MCNC: 13.3 UG/ML — SIGNIFICANT CHANGE UP (ref 10–20)
WBC # BLD: 3.4 K/UL — LOW (ref 3.8–10.5)
WBC # FLD AUTO: 3.4 K/UL — LOW (ref 3.8–10.5)

## 2022-05-21 PROCEDURE — 99232 SBSQ HOSP IP/OBS MODERATE 35: CPT

## 2022-05-21 RX ADMIN — ENOXAPARIN SODIUM 40 MILLIGRAM(S): 100 INJECTION SUBCUTANEOUS at 19:06

## 2022-05-21 RX ADMIN — AMPICILLIN SODIUM AND SULBACTAM SODIUM 200 GRAM(S): 250; 125 INJECTION, POWDER, FOR SUSPENSION INTRAMUSCULAR; INTRAVENOUS at 19:04

## 2022-05-21 RX ADMIN — Medication 500000 UNIT(S): at 19:06

## 2022-05-21 RX ADMIN — Medication 500000 UNIT(S): at 05:19

## 2022-05-21 RX ADMIN — AMPICILLIN SODIUM AND SULBACTAM SODIUM 200 GRAM(S): 250; 125 INJECTION, POWDER, FOR SUSPENSION INTRAMUSCULAR; INTRAVENOUS at 05:19

## 2022-05-21 RX ADMIN — AMPICILLIN SODIUM AND SULBACTAM SODIUM 200 GRAM(S): 250; 125 INJECTION, POWDER, FOR SUSPENSION INTRAMUSCULAR; INTRAVENOUS at 13:46

## 2022-05-21 RX ADMIN — OXYCODONE HYDROCHLORIDE 5 MILLIGRAM(S): 5 TABLET ORAL at 15:56

## 2022-05-21 RX ADMIN — ATORVASTATIN CALCIUM 10 MILLIGRAM(S): 80 TABLET, FILM COATED ORAL at 19:07

## 2022-05-21 RX ADMIN — Medication 81 MILLIGRAM(S): at 13:46

## 2022-05-21 RX ADMIN — OXYCODONE HYDROCHLORIDE 5 MILLIGRAM(S): 5 TABLET ORAL at 14:56

## 2022-05-21 RX ADMIN — Medication 250 MILLIGRAM(S): at 15:29

## 2022-05-21 NOTE — PROGRESS NOTE ADULT - PROBLEM SELECTOR PLAN 3
baseline Hb 7-8  acute drop on AM labs. Hb 6.7  please repeat h/h.   maintain active T&S. baseline Hb 7-8  acute relatively stable 7.4  trend CBC   maintain active T&S. baseline Hb 7-8  Hb relatively stable 7.4  trend CBC   maintain active T&S.

## 2022-05-21 NOTE — PATIENT PROFILE ADULT - FALL HARM RISK - UNIVERSAL INTERVENTIONS
Bed in lowest position, wheels locked, appropriate side rails in place/Call bell, personal items and telephone in reach/Instruct patient to call for assistance before getting out of bed or chair/Non-slip footwear when patient is out of bed/[place] to call system/Physically safe environment - no spills, clutter or unnecessary equipment/Purposeful Proactive Rounding/Room/bathroom lighting operational, light cord in reach

## 2022-05-21 NOTE — PATIENT PROFILE ADULT - INTERNATIONAL TRAVEL
What Type Of Note Output Would You Prefer (Optional)?: Standard Output Hpi Title: Evaluation of Skin Lesions Have Your Spot(S) Been Treated In The Past?: has not been treated No

## 2022-05-21 NOTE — PROGRESS NOTE ADULT - ASSESSMENT
ASSESSMENT/PLAN:   PARUL HERNANDEZ is a 57y Female with hx of R mandibulectomy with L FFF 1/2022, wound infection 2/2022, finished RT 5/2022, presenting to [facility] with 1 day of spontaneous drainage from the right mandible. Plate does not appear to be involved on CT.    - Aquacel packing to submental wound  - will aspirate possible second submental collection  - Follow up wound cx  - Trend WBC count  - Continue abx  - Given recent hx of XRT, would favor avoiding surgery  - Appreciate excellent care per primary team    Plastic Surgery   [facility]: [id number]

## 2022-05-22 LAB
-  AMPICILLIN/SULBACTAM: SIGNIFICANT CHANGE UP
-  CEFAZOLIN: SIGNIFICANT CHANGE UP
-  CLINDAMYCIN: SIGNIFICANT CHANGE UP
-  ERYTHROMYCIN: SIGNIFICANT CHANGE UP
-  GENTAMICIN: SIGNIFICANT CHANGE UP
-  OXACILLIN: SIGNIFICANT CHANGE UP
-  PENICILLIN: SIGNIFICANT CHANGE UP
-  RIFAMPIN: SIGNIFICANT CHANGE UP
-  TETRACYCLINE: SIGNIFICANT CHANGE UP
-  TRIMETHOPRIM/SULFAMETHOXAZOLE: SIGNIFICANT CHANGE UP
-  VANCOMYCIN: SIGNIFICANT CHANGE UP
METHOD TYPE: SIGNIFICANT CHANGE UP

## 2022-05-22 PROCEDURE — 99232 SBSQ HOSP IP/OBS MODERATE 35: CPT

## 2022-05-22 RX ADMIN — AMPICILLIN SODIUM AND SULBACTAM SODIUM 200 GRAM(S): 250; 125 INJECTION, POWDER, FOR SUSPENSION INTRAMUSCULAR; INTRAVENOUS at 00:39

## 2022-05-22 RX ADMIN — ENOXAPARIN SODIUM 40 MILLIGRAM(S): 100 INJECTION SUBCUTANEOUS at 17:59

## 2022-05-22 RX ADMIN — AMPICILLIN SODIUM AND SULBACTAM SODIUM 200 GRAM(S): 250; 125 INJECTION, POWDER, FOR SUSPENSION INTRAMUSCULAR; INTRAVENOUS at 12:27

## 2022-05-22 RX ADMIN — Medication 81 MILLIGRAM(S): at 12:28

## 2022-05-22 RX ADMIN — AMPICILLIN SODIUM AND SULBACTAM SODIUM 200 GRAM(S): 250; 125 INJECTION, POWDER, FOR SUSPENSION INTRAMUSCULAR; INTRAVENOUS at 05:57

## 2022-05-22 RX ADMIN — Medication 500000 UNIT(S): at 17:59

## 2022-05-22 RX ADMIN — ATORVASTATIN CALCIUM 10 MILLIGRAM(S): 80 TABLET, FILM COATED ORAL at 21:59

## 2022-05-22 RX ADMIN — Medication 250 MILLIGRAM(S): at 13:02

## 2022-05-22 RX ADMIN — AMPICILLIN SODIUM AND SULBACTAM SODIUM 200 GRAM(S): 250; 125 INJECTION, POWDER, FOR SUSPENSION INTRAMUSCULAR; INTRAVENOUS at 17:58

## 2022-05-22 RX ADMIN — Medication 500000 UNIT(S): at 05:57

## 2022-05-22 NOTE — PROGRESS NOTE ADULT - ASSESSMENT
ASSESSMENT/PLAN:   PARUL HERNANDEZ is a 57y Female with hx of R mandibulectomy with L FFF 1/2022, wound infection 2/2022, finished RT 5/2022, presenting to [facility] with 1 day of spontaneous drainage from the right mandible. Plate does not appear to be involved on CT.    - Aquacel packing to submental wound which communicates with 3 oclock site which was prior thought to be a separate collection  - Follow up wound cx  - Trend WBC count  - Continue abx  - Given recent hx of XRT, would favor avoiding surgery  - Appreciate excellent care per primary team    Plastic Surgery   [facility]: [id number]

## 2022-05-23 ENCOUNTER — TRANSCRIPTION ENCOUNTER (OUTPATIENT)
Age: 57
End: 2022-05-23

## 2022-05-23 ENCOUNTER — APPOINTMENT (OUTPATIENT)
Dept: HEMATOLOGY ONCOLOGY | Facility: CLINIC | Age: 57
End: 2022-05-23

## 2022-05-23 VITALS
OXYGEN SATURATION: 96 % | DIASTOLIC BLOOD PRESSURE: 64 MMHG | RESPIRATION RATE: 17 BRPM | HEART RATE: 82 BPM | SYSTOLIC BLOOD PRESSURE: 113 MMHG | TEMPERATURE: 98 F

## 2022-05-23 DIAGNOSIS — J44.9 CHRONIC OBSTRUCTIVE PULMONARY DISEASE, UNSPECIFIED: ICD-10-CM

## 2022-05-23 LAB
ANION GAP SERPL CALC-SCNC: 13 MMOL/L — SIGNIFICANT CHANGE UP (ref 7–14)
BUN SERPL-MCNC: 11 MG/DL — SIGNIFICANT CHANGE UP (ref 7–23)
CALCIUM SERPL-MCNC: 8.4 MG/DL — SIGNIFICANT CHANGE UP (ref 8.4–10.5)
CHLORIDE SERPL-SCNC: 98 MMOL/L — SIGNIFICANT CHANGE UP (ref 98–107)
CO2 SERPL-SCNC: 24 MMOL/L — SIGNIFICANT CHANGE UP (ref 22–31)
CREAT SERPL-MCNC: 0.83 MG/DL — SIGNIFICANT CHANGE UP (ref 0.5–1.3)
CULTURE RESULTS: SIGNIFICANT CHANGE UP
CULTURE RESULTS: SIGNIFICANT CHANGE UP
EGFR: 82 ML/MIN/1.73M2 — SIGNIFICANT CHANGE UP
GLUCOSE SERPL-MCNC: 94 MG/DL — SIGNIFICANT CHANGE UP (ref 70–99)
HCT VFR BLD CALC: 23.2 % — LOW (ref 34.5–45)
HGB BLD-MCNC: 7.6 G/DL — LOW (ref 11.5–15.5)
MAGNESIUM SERPL-MCNC: 1.5 MG/DL — LOW (ref 1.6–2.6)
MCHC RBC-ENTMCNC: 28.4 PG — SIGNIFICANT CHANGE UP (ref 27–34)
MCHC RBC-ENTMCNC: 32.8 GM/DL — SIGNIFICANT CHANGE UP (ref 32–36)
MCV RBC AUTO: 86.6 FL — SIGNIFICANT CHANGE UP (ref 80–100)
NRBC # BLD: 0 /100 WBCS — SIGNIFICANT CHANGE UP
NRBC # FLD: 0 K/UL — SIGNIFICANT CHANGE UP
PHOSPHATE SERPL-MCNC: 3.8 MG/DL — SIGNIFICANT CHANGE UP (ref 2.5–4.5)
PLATELET # BLD AUTO: 291 K/UL — SIGNIFICANT CHANGE UP (ref 150–400)
POTASSIUM SERPL-MCNC: 3.3 MMOL/L — LOW (ref 3.5–5.3)
POTASSIUM SERPL-SCNC: 3.3 MMOL/L — LOW (ref 3.5–5.3)
RBC # BLD: 2.68 M/UL — LOW (ref 3.8–5.2)
RBC # FLD: 20.4 % — HIGH (ref 10.3–14.5)
SODIUM SERPL-SCNC: 135 MMOL/L — SIGNIFICANT CHANGE UP (ref 135–145)
SPECIMEN SOURCE: SIGNIFICANT CHANGE UP
SPECIMEN SOURCE: SIGNIFICANT CHANGE UP
WBC # BLD: 3.92 K/UL — SIGNIFICANT CHANGE UP (ref 3.8–10.5)
WBC # FLD AUTO: 3.92 K/UL — SIGNIFICANT CHANGE UP (ref 3.8–10.5)

## 2022-05-23 PROCEDURE — 99232 SBSQ HOSP IP/OBS MODERATE 35: CPT

## 2022-05-23 RX ORDER — POTASSIUM CHLORIDE 20 MEQ
40 PACKET (EA) ORAL ONCE
Refills: 0 | Status: COMPLETED | OUTPATIENT
Start: 2022-05-23 | End: 2022-05-23

## 2022-05-23 RX ADMIN — Medication 250 MILLIGRAM(S): at 14:07

## 2022-05-23 RX ADMIN — AMPICILLIN SODIUM AND SULBACTAM SODIUM 200 GRAM(S): 250; 125 INJECTION, POWDER, FOR SUSPENSION INTRAMUSCULAR; INTRAVENOUS at 12:00

## 2022-05-23 RX ADMIN — Medication 40 MILLIEQUIVALENT(S): at 12:02

## 2022-05-23 RX ADMIN — AMPICILLIN SODIUM AND SULBACTAM SODIUM 200 GRAM(S): 250; 125 INJECTION, POWDER, FOR SUSPENSION INTRAMUSCULAR; INTRAVENOUS at 05:45

## 2022-05-23 RX ADMIN — Medication 500000 UNIT(S): at 05:45

## 2022-05-23 RX ADMIN — Medication 81 MILLIGRAM(S): at 12:00

## 2022-05-23 RX ADMIN — AMPICILLIN SODIUM AND SULBACTAM SODIUM 200 GRAM(S): 250; 125 INJECTION, POWDER, FOR SUSPENSION INTRAMUSCULAR; INTRAVENOUS at 00:36

## 2022-05-23 NOTE — PROGRESS NOTE ADULT - PROBLEM SELECTOR PLAN 2
candidiasis vs radiation induced.   nystatin oral solution  monitor response.

## 2022-05-23 NOTE — PROGRESS NOTE ADULT - ASSESSMENT
ASSESSMENT/PLAN:   PARUL HERNANDEZ is a 57y Female with hx of R mandibulectomy with L FFF 1/2022, wound infection 2/2022, finished RT 5/2022, presenting to [facility] with 1 day of spontaneous drainage from the right mandible. Plate does not appear to be involved on CT.    - Aquacel packing to submental wound which communicates with 3 oclock site which was prior thought to be a separate collection  - Follow up wound cx  - Trend WBC count  - Continue abx  - Given recent hx of XRT, would favor avoiding surgery  - Appreciate excellent care per primary team  - Clear for dc from PRS perspective, awaiting ID recs    Plastic Surgery   [facility]: [id number]

## 2022-05-23 NOTE — DISCHARGE NOTE PROVIDER - NSDCCAREPROVSEEN_GEN_ALL_CORE_FT
Butch Ann no back pain, no gout, no musculoskeletal pain, no neck pain, and no weakness. no musculoskeletal pain, no neck pain, and no weakness.

## 2022-05-23 NOTE — DISCHARGE NOTE PROVIDER - CARE PROVIDER_API CALL
Vic So)  [facility]; Otolaryngology  [address] [place] [id number]  Phone: [phone]  Fax: [phone]  Follow Up Time:

## 2022-05-23 NOTE — DISCHARGE NOTE PROVIDER - NSDCMRMEDTOKEN_GEN_ALL_CORE_FT
albuterol 90 mcg/inh inhalation powder: 2 puff(s) inhaled every 6 hours, As Needed  ascorbic acid 500 mg oral tablet: 1 tab(s) orally once a day  aspirin 81 mg oral delayed release tablet: 1 tab(s) orally once a day  atorvastatin 10 mg oral tablet: 1 tab(s) orally once a day PM  Compazine 10 mg oral tablet: orally every 6 hours, As Needed  esomeprazole 40 mg oral delayed release capsule: 1 cap(s) orally once a day   lisinopril 5 mg oral tablet: 1 tab(s) orally once a day  Multiple Vitamins oral tablet: 1 tab(s) orally once a day  oxyCODONE 5 mg/5 mL oral solution: 5 milliliter(s) orally every 6 hours, As Needed -Severe pain MDD:20mL  polyethylene glycol 3350 oral powder for reconstitution: 17 gram(s) orally 2 times a day, As needed, Constipation  Rolling Walker and Wheelchair for debility/ ataxia ICD: R54: 1 application buccal once a day   senna oral tablet: 2 tab(s) orally once a day (at bedtime)  Tube Feeds: Glucerna 1.5, Total Volume for 24 hours: 960 bolus, Total Volume of BOlus (mL): 240, Total Number fo Feeds: 5, Tube Feed Frequency; Every 6 hours, Bolus Feed Rate (mL per hour): 60, Bolus Feed Duration (Hours): 24, Free Water Flushh 250mL Q8hr: 1 application by gastrostomy tube once a day   Zofran 8 mg oral tablet: orally every 8 hours, As Needed   albuterol 90 mcg/inh inhalation powder: 2 puff(s) inhaled every 6 hours, As Needed  amoxicillin-clavulanate 875 mg-125 mg oral tablet: 1 tab(s) orally every 12 hours   ascorbic acid 500 mg oral tablet: 1 tab(s) orally once a day  aspirin 81 mg oral delayed release tablet: 1 tab(s) orally once a day  atorvastatin 10 mg oral tablet: 1 tab(s) orally once a day PM  Compazine 10 mg oral tablet: orally every 6 hours, As Needed  esomeprazole 40 mg oral delayed release capsule: 1 cap(s) orally once a day   lisinopril 5 mg oral tablet: 1 tab(s) orally once a day  Multiple Vitamins oral tablet: 1 tab(s) orally once a day  oxyCODONE 5 mg/5 mL oral solution: 5 milliliter(s) orally every 6 hours, As Needed -Severe pain MDD:20mL  polyethylene glycol 3350 oral powder for reconstitution: 17 gram(s) orally 2 times a day, As needed, Constipation  Rolling Walker and Wheelchair for debility/ ataxia ICD: R54: 1 application buccal once a day   senna oral tablet: 2 tab(s) orally once a day (at bedtime)  Tube Feeds: Glucerna 1.5, Total Volume for 24 hours: 960 bolus, Total Volume of BOlus (mL): 240, Total Number fo Feeds: 5, Tube Feed Frequency; Every 6 hours, Bolus Feed Rate (mL per hour): 60, Bolus Feed Duration (Hours): 24, Free Water Flushh 250mL Q8hr: 1 application by gastrostomy tube once a day   Zofran 8 mg oral tablet: orally every 8 hours, As Needed

## 2022-05-23 NOTE — PROGRESS NOTE ADULT - REASON FOR ADMISSION
Right jaw swelling and drainage

## 2022-05-23 NOTE — DISCHARGE NOTE PROVIDER - HOSPITAL COURSE
Patient presented to [facility] with right jaw swelling and draining abscess. Abscess cultured. Packed wound with Aquicel Bid. Started on IV vanc and zosyn. Sensitivities resulted and patient was changed to oral .... by I.D. cleared for discharge home with VNS on... Patient presented to [facility] with right jaw swelling and draining abscess. Abscess cultured. Packed wound with Aquicel Bid. Started on IV vanc and zosyn. Sensitivities resulted and patient was changed to oral  Augmentin 875 x 10 days by I.D. cleared for discharge home with VNS on 5/23/22.

## 2022-05-23 NOTE — DISCHARGE NOTE NURSING/CASE MANAGEMENT/SOCIAL WORK - PATIENT PORTAL LINK FT
You can access the FollowMyHealth Patient Portal offered by  by registering at the following website: [email or web address]. By joining [employer]’s FollowMyHealth portal, you will also be able to view your health information using other applications (apps) compatible with our system.

## 2022-05-23 NOTE — PROGRESS NOTE ADULT - ASSESSMENT
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   Surgical site infection in Feb growing Parvimonus and Propionibacterium s/p two weeks of PO Augmentin Doxycycline.   Completed radiation in April.   Here 5/14 with another wound infection, spontaneous drainage growing MSSA.   Low concern for bone/hardware infection per ENT but if she has further infections I would reconsider it.     Suggest  -can discharge on Augmentin 875mg PO BID for another 10 days to finish a two week course     Discussed with ENT     Lebron Lambert MD   Infectious Disease   Available on TEAMS. 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   Surgical site infection in Feb growing Parvimonus and Propionibacterium s/p two weeks of PO Augmentin Doxycycline.   Completed radiation in April.   Here 5/14 with another wound infection, spontaneous drainage growing MSSA.   Low concern for bone/hardware infection per ENT but if she has further infections I would reconsider it.     Suggest  -can discharge on Augmentin 875mg PO BID for another 10 days to finish a two week course     Discussed with ENT   Signing off, call back if needed    Lebron Lambert MD   Infectious Disease   Available on TEAMS. After 5PM and on weekends please page fellow on call or call [phone]

## 2022-05-23 NOTE — PROGRESS NOTE ADULT - SUBJECTIVE AND OBJECTIVE BOX
INTERVAL HX:  5/20: NAEON, packing changed on rounds. Still with purulent drainage.   5/21: No acute events overnight. Performing packing changes. Pt anxious to go home. Per PRS, will perform bedside aspiration.     ICU Vital Signs Last 24 Hrs  T(C): 36.5 (21 May 2022 10:23), Max: 36.7 (20 May 2022 14:43)  T(F): 97.7 (21 May 2022 10:23), Max: 98.1 (20 May 2022 17:01)  HR: 109 (21 May 2022 10:23) (73 - 109)  BP: 119/78 (21 May 2022 10:23) (105/56 - 130/71)  BP(mean): --  ABP: --  ABP(mean): --  RR: 18 (21 May 2022 10:23) (16 - 18)  SpO2: 97% (21 May 2022 10:23) (95% - 99%)      HEENT:  Head: + right sided erythema right mandible, open area with purulent drainage. No tenderness right, + tenderness left jaw.   Culture of wound taken. Packed with aquacel   mouth: Well healed flap left oral cavity. no FOM edema.    A/P:   57y Female with a history of right mandibulectomy S/P radiation that presents with spontaneous drainage right jaw.     - IV abx - vanc/unasyn per ID recs  - will discuss with ID re PO abx   - packing changes daily   - PRS   - home meds  - sqh/ASA  
CC: Patient is a 57y old  Female who presents with a chief complaint of Right jaw swelling and drainage (20 May 2022 09:07)    ID following for right mandibular infection    Interval History/ROS: Patient with packed right mandibular wound. No fevers, no chills. Cultures so far no growth.    Rest of ROS negative.    Allergies  morphine (Hives)    ANTIMICROBIALS:  ampicillin/sulbactam  IVPB    ampicillin/sulbactam  IVPB 3 every 6 hours  nystatin    Suspension 322102 two times a day  vancomycin  IVPB 1000 every 24 hours  vancomycin  IVPB      OTHER MEDS:  ALBUTerol    90 MICROgram(s) HFA Inhaler 2 Puff(s) Inhalation every 6 hours PRN  aspirin enteric coated 81 milliGRAM(s) Oral daily  atorvastatin Oral Tab/Cap - Peds 10 milliGRAM(s) Oral daily  heparin   Injectable 5000 Unit(s) SubCutaneous every 12 hours  ondansetron    Tablet 4 milliGRAM(s) Oral every 8 hours PRN  oxyCODONE    Solution 5 milliGRAM(s) Oral every 6 hours PRN  pantoprazole    Tablet 40 milliGRAM(s) Oral before breakfast PRN  potassium chloride   Powder 40 milliEquivalent(s) Oral every 4 hours  prochlorperazine   Tablet 10 milliGRAM(s) Oral every 6 hours PRN  sodium chloride 0.9%. 1000 milliLiter(s) IV Continuous <Continuous>    PE:    Vital Signs Last 24 Hrs  T(C): 36.6 (20 May 2022 09:54), Max: 36.8 (20 May 2022 00:02)  T(F): 97.9 (20 May 2022 09:54), Max: 98.2 (20 May 2022 00:02)  HR: 83 (20 May 2022 09:54) (75 - 91)  BP: 131/75 (20 May 2022 09:54) (120/65 - 131/75)  BP(mean): 86 (20 May 2022 00:02) (86 - 86)  RR: 17 (20 May 2022 09:54) (16 - 17)  SpO2: 96% (20 May 2022 09:54) (95% - 99%)    Gen: AOx3, NAD  HEENT: R sided mandibular wound packed  CV: S1+S2 normal, no murmurs  Resp: Clear bilat, no resp distress  Abd: Soft, nontender, +BS  Ext: No LE edema, no wounds  : No Dobbs  IV/Skin: No thrombophlebitis  Neuro: no focal deficits    LABS:                          6.9    3.44  )-----------( 278      ( 20 May 2022 09:52 )             21.4       05-20    134<L>  |  94<L>  |  18  ----------------------------<  89  3.1<L>   |  27  |  0.87    Ca    8.8      20 May 2022 09:52  Phos  3.6     05-20  Mg     2.00     05-20    TPro  6.8  /  Alb  3.1<L>  /  TBili  0.2<L>  /  DBili  x   /  AST  10  /  ALT  5   /  AlkPhos  92  05-18    MICROBIOLOGY:  v  .Abscess Arm - Right  05-19-22   No growth to date.  --  --    Rapid RVP Result: NotDetec (05-18 @ 20:51)    RADIOLOGY:  < from: CT Neck Soft Tissue No Cont (05.19.22 @ 00:42) >  IMPRESSION:  Postsurgical changes as described with extensive infiltration of the   overlying subcutaneous fat and skin thickening. Evaluation for   abscess/infection is markedly limited on this noncontrast study.      < end of copied text >    
Plastic Surgery    SUBJECTIVE: Pt seen and examined on rounds with team. NAEON.      VITALS  T(C): 36.7 (05-22-22 @ 06:33), Max: 36.7 (05-21-22 @ 17:32)  HR: 80 (05-22-22 @ 06:33) (76 - 109)  BP: 133/79 (05-22-22 @ 06:33) (102/64 - 135/58)  RR: 18 (05-22-22 @ 06:33) (18 - 18)  SpO2: 99% (05-22-22 @ 06:33) (97% - 99%)  CAPILLARY BLOOD GLUCOSE          Is/Os    05-21 @ 07:01  -  05-22 @ 07:00  --------------------------------------------------------  IN:    Free Water: 450 mL    Glucerna 1.5: 960 mL    Oral Fluid: 480 mL  Total IN: 1890 mL    OUT:    IV PiggyBack: 0 mL    Voided (mL): 450 mL  Total OUT: 450 mL    Total NET: 1440 mL        PHYSICAL EXAM    HEENT: NCAT, EOMI, anicteric, mucosa moist. Small 1cm submental opening. Packed aquacel packing, tracking toward 3 oclock. Respiratory: Airway patent, respirations unlabored  CVS: Regular rate and rhythm  Abdomen: Soft, nontender, nondistended  Extremities: No edema, sensation and movement grossly intact. L leg fibula donor site skin graft with good take.  Skin: Warm, dry, appropriate color      MEDICATIONS (STANDING): ampicillin/sulbactam  IVPB      ampicillin/sulbactam  IVPB 3 Gram(s) IV Intermittent every 6 hours  aspirin enteric coated 81 milliGRAM(s) Oral daily  atorvastatin Oral Tab/Cap - Peds 10 milliGRAM(s) Oral daily  enoxaparin Injectable 40 milliGRAM(s) SubCutaneous every 24 hours  nystatin    Suspension 299910 Unit(s) Oral two times a day  vancomycin  IVPB      vancomycin  IVPB 1000 milliGRAM(s) IV Intermittent every 24 hours    MEDICATIONS (PRN):ALBUTerol    90 MICROgram(s) HFA Inhaler 2 Puff(s) Inhalation every 6 hours PRN Wheezing  ondansetron    Tablet 4 milliGRAM(s) Oral every 8 hours PRN Nausea and/or Vomiting  oxyCODONE    Solution 5 milliGRAM(s) Oral every 6 hours PRN Moderate Pain (4 - 6)  pantoprazole    Tablet 40 milliGRAM(s) Oral before breakfast PRN GERD  prochlorperazine   Tablet 10 milliGRAM(s) Oral every 6 hours PRN nausea      LABS  CBC (05-21 @ 08:07)                              7.4<L>                         3.40<L>  )----------------(  306        --    % Neutrophils, --    % Lymphocytes, ANC: --                                  22.8<L>    BMP (05-21 @ 08:07)             139     |  101     |  13    		Ca++ --      Ca 9.0                ---------------------------------( 91    		Mg 1.70               4.2     |  26      |  0.82  			Ph 2.8                   IMAGING STUDIES    
    INTERVAL HX:  5/20: NAEON, packing changed on rounds. Still with purulent drainage.     Vital Signs Last 24 Hrs  T(C): 36.7 (20 May 2022 05:00), Max: 36.8 (20 May 2022 00:02)  T(F): 98 (20 May 2022 05:00), Max: 98.2 (20 May 2022 00:02)  HR: 75 (20 May 2022 05:00) (75 - 91)  BP: 125/65 (20 May 2022 05:00) (110/65 - 127/69)  BP(mean): 86 (20 May 2022 00:02) (86 - 86)  RR: 17 (20 May 2022 05:00) (16 - 17)  SpO2: 97% (20 May 2022 05:00) (95% - 100%)      HEENT:  Head: + right sided erythema right mandible, open area with purulent drainage. No tenderness right, + tenderness left jaw.   Culture of wound taken. Packed with aquacel   mouth: Well healed flap left oral cavity. no FOM edema.    A/P:   57y Female with a history of right mandibulectomy S/P radiation that presents with spontaneous drainage right jaw.     - IV abx - vanc/unasyn per ID recs  - will discuss with ID re PO abx   - packing changes daily   - PRS   - home meds  - sqh/ASA  
    INTERVAL HX:  5/20: NAEON, packing changed on rounds. Still with purulent drainage.   5/21: No acute events overnight. Performing packing changes. Pt anxious to go home. Per PRS, will perform bedside aspiration.   5/22: naeon.     Vital Signs Last 24 Hrs  T(C): 37.2 (22 May 2022 15:50), Max: 37.2 (22 May 2022 15:50)  T(F): 99 (22 May 2022 15:50), Max: 99 (22 May 2022 15:50)  HR: 87 (22 May 2022 15:50) (77 - 90)  BP: 111/72 (22 May 2022 15:50) (111/72 - 135/58)  BP(mean): --  RR: 16 (22 May 2022 15:50) (16 - 18)  SpO2: 100% (22 May 2022 15:50) (97% - 100%)    HEENT:  Head: + right sided erythema right mandible, open area with purulent drainage. No tenderness right, + tenderness left jaw.   Culture of wound taken. Packed with aquacel   mouth: Well healed flap left oral cavity. no FOM edema.    A/P:   57y Female with a history of right mandibulectomy S/P radiation that presents with spontaneous drainage right jaw.     - IV abx - vanc/unasyn per ID recs  - will discuss with ID re PO abx   - packing changes daily   - PRS   - home meds  - sqh/ASA  
    INTERVAL HX:  5/20: NAEON, packing changed on rounds. Still with purulent drainage.   5/21: No acute events overnight. Performing packing changes. Pt anxious to go home. Per PRS, will perform bedside aspiration.   5/22: naeon.   5/23: naeon, sensitivities resulted    Vital Signs Last 24 Hrs  T(C): 36.5 (23 May 2022 05:44), Max: 37.2 (22 May 2022 15:50)  T(F): 97.7 (23 May 2022 05:44), Max: 99 (22 May 2022 15:50)  HR: 76 (23 May 2022 05:44) (76 - 90)  BP: 119/76 (23 May 2022 05:44) (111/72 - 132/74)  BP(mean): --  RR: 16 (23 May 2022 05:44) (16 - 18)  SpO2: 99% (23 May 2022 05:44) (96% - 100%)    HEENT:  Head: + right sided erythema right mandible much resolved, open area with minimal drainage. No tenderness right, + tenderness left jaw.   Culture of wound taken. Packed with aquacel   mouth: Well healed flap left oral cavity. no FOM edema.    A/P:   57y Female with a history of right mandibulectomy S/P radiation that presents with spontaneous drainage right jaw.     - IV abx  - will discuss with ID re PO abx   - packing changes daily   - PRS   - home meds  - sqh/ASA  - possible home today   
Dr. Sandra Prater  Pager [phone]    PROGRESS NOTE:     Patient is a 57y old  Female who presents with a chief complaint of Right jaw swelling and drainage (22 May 2022 07:47)      SUBJECTIVE / OVERNIGHT EVENTS: pt denies chest pain or sob   ADDITIONAL REVIEW OF SYSTEMS: afebrile     MEDICATIONS  (STANDING):  ampicillin/sulbactam  IVPB      ampicillin/sulbactam  IVPB 3 Gram(s) IV Intermittent every 6 hours  aspirin enteric coated 81 milliGRAM(s) Oral daily  atorvastatin Oral Tab/Cap - Peds 10 milliGRAM(s) Oral daily  enoxaparin Injectable 40 milliGRAM(s) SubCutaneous every 24 hours  nystatin    Suspension 654825 Unit(s) Oral two times a day  vancomycin  IVPB      vancomycin  IVPB 1000 milliGRAM(s) IV Intermittent every 24 hours    MEDICATIONS  (PRN):  ALBUTerol    90 MICROgram(s) HFA Inhaler 2 Puff(s) Inhalation every 6 hours PRN Wheezing  ondansetron    Tablet 4 milliGRAM(s) Oral every 8 hours PRN Nausea and/or Vomiting  oxyCODONE    Solution 5 milliGRAM(s) Oral every 6 hours PRN Moderate Pain (4 - 6)  pantoprazole    Tablet 40 milliGRAM(s) Oral before breakfast PRN GERD  prochlorperazine   Tablet 10 milliGRAM(s) Oral every 6 hours PRN nausea      CAPILLARY BLOOD GLUCOSE        I&O's Summary    21 May 2022 07:01  -  22 May 2022 07:00  --------------------------------------------------------  IN: 1890 mL / OUT: 450 mL / NET: 1440 mL        PHYSICAL EXAM:  Vital Signs Last 24 Hrs  T(C): 36.4 (22 May 2022 09:29), Max: 36.7 (21 May 2022 17:32)  T(F): 97.6 (22 May 2022 09:29), Max: 98 (21 May 2022 17:32)  HR: 90 (22 May 2022 09:29) (76 - 90)  BP: 132/74 (22 May 2022 09:29) (102/64 - 135/58)  BP(mean): --  RR: 18 (22 May 2022 09:29) (18 - 18)  SpO2: 98% (22 May 2022 09:29) (97% - 99%)  Gen: NAD; sitting in bed comfortably   Neck: right neck wound with packing in place.   Mouth: leukoplakia.   Pulm: no accessory muscle use; lungs clear on auscultation bilaterally; no wheezing or crackles.   Cards: RRR, nl S1/S2; no LE edema; no JVD  Abd: non-distended; soft and NT on exam; +bs  Ext: DANNA; no joint effusion or tenderness in upper and lower extremities; no cyanosis  Neuro: Awake and Alert; non-focal; moving all extremities.   Skin: no new rashes; warm to touch;     LABS:                        7.4    3.40  )-----------( 306      ( 21 May 2022 08:07 )             22.8     05-21    139  |  101  |  13  ----------------------------<  91  4.2   |  26  |  0.82    Ca    9.0      21 May 2022 08:07  Phos  2.8     05-21  Mg     1.70     05-21                  RADIOLOGY & ADDITIONAL TESTS:  Results Reviewed:   Imaging Personally Reviewed:  Electrocardiogram Personally Reviewed:    COORDINATION OF CARE:  Care Discussed with Consultants/Other Providers [Y/N]:  Prior or Outpatient Records Reviewed [Y/N]:  
Plastic Surgery Progress Note (pg [facility]: [id number], [facility]: [phone])    SUBJECTIVE  Pt comfortable in bed. Pain well controlled, no complaints.    OBJECTIVE  ___________________________________________________  VITAL SIGNS / I&O's   Vital Signs Last 24 Hrs  T(C): 36.5 (23 May 2022 05:44), Max: 37.2 (22 May 2022 15:50)  T(F): 97.7 (23 May 2022 05:44), Max: 99 (22 May 2022 15:50)  HR: 76 (23 May 2022 05:44) (76 - 90)  BP: 119/76 (23 May 2022 05:44) (111/72 - 132/74)  BP(mean): --  RR: 16 (23 May 2022 05:44) (16 - 18)  SpO2: 99% (23 May 2022 05:44) (96% - 100%)      22 May 2022 07:01  -  23 May 2022 07:00  --------------------------------------------------------  IN:    Free Water: 100 mL    Glucerna 1.5: 240 mL    IV PiggyBack: 200 mL  Total IN: 540 mL    OUT:  Total OUT: 0 mL    Total NET: 540 mL        ___________________________________________________  PHYSICAL EXAM    HEENT: NCAT, EOMI, anicteric, mucosa moist. Small 1cm submental opening. Packed aquacel packing, tracking toward 3 oclock. Respiratory: Airway patent, respirations unlabored  CVS: Regular rate and rhythm  Abdomen: Soft, nontender, nondistended  Extremities: No edema, sensation and movement grossly intact. L leg fibula donor site skin graft with good take.  Skin: Warm, dry, appropriate color    ___________________________________________________  LABS                        7.4    3.40  )-----------( 306      ( 21 May 2022 08:07 )             22.8     21 May 2022 08:07    139    |  101    |  13     ----------------------------<  91     4.2     |  26     |  0.82     Ca    9.0        21 May 2022 08:07  Phos  2.8       21 May 2022 08:07  Mg     1.70      21 May 2022 08:07    ___________________________________________________  MICRO  Recent Cultures:  Specimen Source: .Abscess Arm - Right, 05-19 @ 08:40; Results   Rare Staphylococcus aureus; Gram Stain: --; Organism: Staphylococcus aureus  Specimen Source: .Blood Blood-Peripheral, 05-18 @ 20:54; Results   No growth at 48 hours; Gram Stain: --; Organism: --  Specimen Source: .Blood Blood-Peripheral, 05-18 @ 20:53; Results   No growth at 48 hours; Gram Stain: --; Organism: --    ___________________________________________________  MEDICATIONS  (STANDING):  ampicillin/sulbactam  IVPB      ampicillin/sulbactam  IVPB 3 Gram(s) IV Intermittent every 6 hours  aspirin enteric coated 81 milliGRAM(s) Oral daily  atorvastatin Oral Tab/Cap - Peds 10 milliGRAM(s) Oral daily  enoxaparin Injectable 40 milliGRAM(s) SubCutaneous every 24 hours  nystatin    Suspension 971367 Unit(s) Oral two times a day  vancomycin  IVPB 1000 milliGRAM(s) IV Intermittent every 24 hours  vancomycin  IVPB        MEDICATIONS  (PRN):  ALBUTerol    90 MICROgram(s) HFA Inhaler 2 Puff(s) Inhalation every 6 hours PRN Wheezing  ondansetron    Tablet 4 milliGRAM(s) Oral every 8 hours PRN Nausea and/or Vomiting  oxyCODONE    Solution 5 milliGRAM(s) Oral every 6 hours PRN Moderate Pain (4 - 6)  pantoprazole    Tablet 40 milliGRAM(s) Oral before breakfast PRN GERD  prochlorperazine   Tablet 10 milliGRAM(s) Oral every 6 hours PRN nausea  
Follow Up: ENT infection    Interval History/ROS: Feels much better, ready to go home. No pain. No fevers or chills. No diarrhea.     Allergies  morphine (Hives)        ANTIMICROBIALS:  ampicillin/sulbactam  IVPB    ampicillin/sulbactam  IVPB 3 every 6 hours  nystatin    Suspension 788486 two times a day  vancomycin  IVPB 1000 every 24 hours  vancomycin  IVPB        OTHER MEDS:  ALBUTerol    90 MICROgram(s) HFA Inhaler 2 Puff(s) Inhalation every 6 hours PRN  aspirin enteric coated 81 milliGRAM(s) Oral daily  atorvastatin Oral Tab/Cap - Peds 10 milliGRAM(s) Oral daily  enoxaparin Injectable 40 milliGRAM(s) SubCutaneous every 24 hours  ondansetron    Tablet 4 milliGRAM(s) Oral every 8 hours PRN  oxyCODONE    Solution 5 milliGRAM(s) Oral every 6 hours PRN  pantoprazole    Tablet 40 milliGRAM(s) Oral before breakfast PRN  prochlorperazine   Tablet 10 milliGRAM(s) Oral every 6 hours PRN      Vital Signs Last 24 Hrs  T(C): 36.8 (23 May 2022 10:16), Max: 36.8 (23 May 2022 10:16)  T(F): 98.3 (23 May 2022 10:16), Max: 98.3 (23 May 2022 10:16)  HR: 82 (23 May 2022 10:16) (76 - 85)  BP: 113/64 (23 May 2022 10:16) (113/64 - 125/71)  BP(mean): --  RR: 17 (23 May 2022 10:16) (16 - 17)  SpO2: 96% (23 May 2022 10:16) (96% - 99%)    Physical Exam:  General: non toxic  ENT: right jaw wound packed, no surrounding erythema. intraoral exam without gross lesions   Cardio: regular rate   Respiratory: nonlabored on room air   abd: nondistended  Skin: no rash                          7.6    3.92  )-----------( 291      ( 23 May 2022 07:35 )             23.2       05-23    135  |  98  |  11  ----------------------------<  94  3.3<L>   |  24  |  0.83    Ca    8.4      23 May 2022 07:35  Phos  3.8     05-23  Mg     1.50     05-23            MICROBIOLOGY:  Culture - Abscess with Gram Stain (collected 05-19-22 @ 08:40)  Source: .Abscess Arm - Right  Preliminary Report (05-22-22 @ 12:55):    Rare Staphylococcus aureus  Organism: Staphylococcus aureus (05-22-22 @ 12:54)  Organism: Staphylococcus aureus (05-22-22 @ 12:54)      -  Ampicillin/Sulbactam: S <=8/4      -  Cefazolin: S <=4      -  Clindamycin: S <=0.25      -  Erythromycin: S <=0.25      -  Gentamicin: S 2 Should not be used as monotherapy      -  Oxacillin: S 0.5 Oxacillin predicts susceptibility for dicloxacillin, methicillin, and nafcillin      -  Penicillin: R >8      -  Rifampin: S <=1 Should not be used as monotherapy      -  Tetra/Doxy: S <=1      -  Trimethoprim/Sulfamethoxazole: S <=0.5/9.5      -  Vancomycin: S 1      Method Type: DHEERAJ    Culture - Blood (collected 05-18-22 @ 20:54)  Source: .Blood Blood-Peripheral  Preliminary Report (05-20-22 @ 21:00):    No growth at 48 hours    Culture - Blood (collected 05-18-22 @ 20:53)  Source: .Blood Blood-Peripheral  Preliminary Report (05-20-22 @ 21:00):    No growth at 48 hours    Rapid RVP Result: NotDetec (05-18 @ 20:51)      RADIOLOGY:  Images below reviewed personally  CT Neck Soft Tissue No Cont (05.19.22 @ 00:42)   Postsurgical changes as described with extensive infiltration of the   overlying subcutaneous fat and skin thickening. Evaluation for   abscess/infection is markedly limited on this noncontrast study.  
Plastic Surgery    SUBJECTIVE: Pt seen and examined on rounds with team. NAEON.      VITALS  T(C): 36.6 (05-21-22 @ 05:56), Max: 36.7 (05-20-22 @ 14:43)  HR: 84 (05-21-22 @ 05:56) (73 - 86)  BP: 130/71 (05-21-22 @ 05:56) (105/56 - 131/75)  RR: 17 (05-21-22 @ 05:56) (16 - 18)  SpO2: 97% (05-21-22 @ 05:56) (95% - 99%)  CAPILLARY BLOOD GLUCOSE          Is/Os    05-20 @ 07:01  -  05-21 @ 07:00  --------------------------------------------------------  IN:    Free Water: 300 mL    Glucerna 1.5: 480 mL  Total IN: 780 mL    OUT:  Total OUT: 0 mL    Total NET: 780 mL        MEDICATIONS (STANDING): ampicillin/sulbactam  IVPB      ampicillin/sulbactam  IVPB 3 Gram(s) IV Intermittent every 6 hours  aspirin enteric coated 81 milliGRAM(s) Oral daily  atorvastatin Oral Tab/Cap - Peds 10 milliGRAM(s) Oral daily  enoxaparin Injectable 40 milliGRAM(s) SubCutaneous every 24 hours  nystatin    Suspension 765053 Unit(s) Oral two times a day  vancomycin  IVPB 1000 milliGRAM(s) IV Intermittent every 24 hours  vancomycin  IVPB        MEDICATIONS (PRN):ALBUTerol    90 MICROgram(s) HFA Inhaler 2 Puff(s) Inhalation every 6 hours PRN Wheezing  ondansetron    Tablet 4 milliGRAM(s) Oral every 8 hours PRN Nausea and/or Vomiting  oxyCODONE    Solution 5 milliGRAM(s) Oral every 6 hours PRN Moderate Pain (4 - 6)  pantoprazole    Tablet 40 milliGRAM(s) Oral before breakfast PRN GERD  prochlorperazine   Tablet 10 milliGRAM(s) Oral every 6 hours PRN nausea      LABS  CBC (05-21 @ 08:07)                              7.4<L>                         3.40<L>  )----------------(  306        --    % Neutrophils, --    % Lymphocytes, ANC: --                                  22.8<L>  CBC (05-20 @ 14:55)                              7.2<L>                         3.47<L>  )----------------(  297        --    % Neutrophils, --    % Lymphocytes, ANC: --                                  22.1<L>    BMP (05-21 @ 08:07)             139     |  101     |  13    		Ca++ --      Ca 9.0                ---------------------------------( 91    		Mg 1.70               4.2     |  26      |  0.82  			Ph 2.8     BMP (05-20 @ 14:55)             137     |  99      |  19    		Ca++ --      Ca 8.5                ---------------------------------( 107<H>		Mg 1.90               4.5     |  27      |  0.90  			Ph 2.9         PHYSICAL EXAM    HEENT: NCAT, EOMI, anicteric, mucosa moist. Small 1cm submental opening, no active drainage. Packed aquacel packing. Another area on the L submental that does not communicate and appears to be a small erythematous collection.  Respiratory: Airway patent, respirations unlabored  CVS: Regular rate and rhythm  Abdomen: Soft, nontender, nondistended  Extremities: No edema, sensation and movement grossly intact. L leg fibula donor site skin graft with good take.  Skin: Warm, dry, appropriate color          IMAGING STUDIES    
Plastic Surgery Progress Note (pg [facility]: [id number], [facility]: [phone])    SUBJECTIVE  Pt comfortable in bed. Pain well controlled, no complaints.    OBJECTIVE  ___________________________________________________  VITAL SIGNS / I&O's   Vital Signs Last 24 Hrs  T(C): 36.7 (20 May 2022 05:00), Max: 36.8 (20 May 2022 00:02)  T(F): 98 (20 May 2022 05:00), Max: 98.2 (20 May 2022 00:02)  HR: 75 (20 May 2022 05:00) (75 - 91)  BP: 125/65 (20 May 2022 05:00) (110/65 - 127/69)  BP(mean): 86 (20 May 2022 00:02) (86 - 86)  RR: 17 (20 May 2022 05:00) (16 - 18)  SpO2: 97% (20 May 2022 05:00) (95% - 100%)      ___________________________________________________  PHYSICAL EXAM    General: Well developed, well nourished, NAD  Neuro: Alert and oriented, no focal deficits, moves all extremities spontaneously  HEENT: NCAT, EOMI, anicteric, mucosa moist. Small 1cm submental opening, no active drainage. Packed with 1/4" packing.  Respiratory: Airway patent, respirations unlabored  CVS: Regular rate and rhythm  Abdomen: Soft, nontender, nondistended  Extremities: No edema, sensation and movement grossly intact. L leg fibula donor site skin graft with good take.  Skin: Warm, dry, appropriate color    ___________________________________________________  LABS                        7.3    6.40  )-----------( 307      ( 18 May 2022 20:51 )             22.5     19 May 2022 07:43    133    |  90     |  37     ----------------------------<  106    3.9     |  32     |  1.51     Ca    9.1        19 May 2022 07:43    TPro  6.8    /  Alb  3.1    /  TBili  0.2    /  DBili  x      /  AST  10     /  ALT  5      /  AlkPhos  92     18 May 2022 20:51    PT/INR - ( 18 May 2022 20:51 )   PT: 12.1 sec;   INR: 1.04 ratio         PTT - ( 18 May 2022 20:51 )  PTT:23.4 sec  ___________________________________________________  MEDICATIONS  (STANDING):  ampicillin/sulbactam  IVPB      ampicillin/sulbactam  IVPB 3 Gram(s) IV Intermittent every 6 hours  aspirin enteric coated 81 milliGRAM(s) Oral daily  atorvastatin Oral Tab/Cap - Peds 10 milliGRAM(s) Oral daily  heparin   Injectable 5000 Unit(s) SubCutaneous every 12 hours  nystatin    Suspension 558082 Unit(s) Oral two times a day  sodium chloride 0.9%. 1000 milliLiter(s) (75 mL/Hr) IV Continuous <Continuous>  vancomycin  IVPB 1000 milliGRAM(s) IV Intermittent every 24 hours  vancomycin  IVPB        MEDICATIONS  (PRN):  ALBUTerol    90 MICROgram(s) HFA Inhaler 2 Puff(s) Inhalation every 6 hours PRN Wheezing  ondansetron    Tablet 4 milliGRAM(s) Oral every 8 hours PRN Nausea and/or Vomiting  oxyCODONE    Solution 5 milliGRAM(s) Oral every 6 hours PRN Moderate Pain (4 - 6)  pantoprazole    Tablet 40 milliGRAM(s) Oral before breakfast PRN GERD  prochlorperazine   Tablet 10 milliGRAM(s) Oral every 6 hours PRN nausea  
[facility] Division of [facility] Medicine  Mark Lambert MD  In House Pager [phone]    Patient is a 57y old  Female who presents with a chief complaint of Right jaw swelling and drainage (20 May 2022 13:57)      SUBJECTIVE / OVERNIGHT EVENTS:  NAEO, Patient seen and examined at bedside, no acute complaints today. no acute complaints. reports neck swelling improving.   No fever, no chills, no SOB, no CP, no n/v/d, no abd pain, no dysuria      MEDICATIONS  (STANDING):  ampicillin/sulbactam  IVPB      ampicillin/sulbactam  IVPB 3 Gram(s) IV Intermittent every 6 hours  aspirin enteric coated 81 milliGRAM(s) Oral daily  atorvastatin Oral Tab/Cap - Peds 10 milliGRAM(s) Oral daily  heparin   Injectable 5000 Unit(s) SubCutaneous every 12 hours  nystatin    Suspension 658651 Unit(s) Oral two times a day  potassium chloride   Powder 40 milliEquivalent(s) Oral every 4 hours  sodium chloride 0.9%. 1000 milliLiter(s) (75 mL/Hr) IV Continuous <Continuous>  vancomycin  IVPB 1000 milliGRAM(s) IV Intermittent every 24 hours  vancomycin  IVPB        MEDICATIONS  (PRN):  ALBUTerol    90 MICROgram(s) HFA Inhaler 2 Puff(s) Inhalation every 6 hours PRN Wheezing  ondansetron    Tablet 4 milliGRAM(s) Oral every 8 hours PRN Nausea and/or Vomiting  oxyCODONE    Solution 5 milliGRAM(s) Oral every 6 hours PRN Moderate Pain (4 - 6)  pantoprazole    Tablet 40 milliGRAM(s) Oral before breakfast PRN GERD  prochlorperazine   Tablet 10 milliGRAM(s) Oral every 6 hours PRN nausea    CAPILLARY BLOOD GLUCOSE        I&O's Summary      PHYSICAL EXAM:  Vital Signs Last 24 Hrs  T(C): 36.6 (20 May 2022 09:54), Max: 36.8 (20 May 2022 00:02)  T(F): 97.9 (20 May 2022 09:54), Max: 98.2 (20 May 2022 00:02)  HR: 83 (20 May 2022 09:54) (75 - 91)  BP: 131/75 (20 May 2022 09:54) (120/65 - 131/75)  BP(mean): 86 (20 May 2022 00:02) (86 - 86)  RR: 17 (20 May 2022 09:54) (16 - 17)  SpO2: 96% (20 May 2022 09:54) (95% - 99%)    Gen: NAD; sitting in bed comfortably   Neck: right neck erythema and swelling, packing in place.   Mouth: leukoplakia.   Pulm: no accessory muscle use; lungs clear on auscultation bilaterally; no wheezing or crackles.   Cards: RRR, nl S1/S2; no LE edema; no JVD  Abd: non-distended; soft and NT on exam; +bs  Ext: DANNA; no joint effusion or tenderness in upper and lower extremities; no cyanosis  Neuro: Awake and Alert; non-focal; moving all extremities.   Skin: no new rashes; warm to touch;     LABS:                        6.9    3.44  )-----------( 278      ( 20 May 2022 09:52 )             21.4     05-20    134<L>  |  94<L>  |  18  ----------------------------<  89  3.1<L>   |  27  |  0.87    Ca    8.8      20 May 2022 09:52  Phos  3.6     05-20  Mg     2.00     05-20    TPro  6.8  /  Alb  3.1<L>  /  TBili  0.2<L>  /  DBili  x   /  AST  10  /  ALT  5   /  AlkPhos  92  05-18    PT/INR - ( 18 May 2022 20:51 )   PT: 12.1 sec;   INR: 1.04 ratio         PTT - ( 18 May 2022 20:51 )  PTT:23.4 sec          Culture - Abscess with Gram Stain (collected 19 May 2022 08:40)  Source: .Abscess Arm - Right  Preliminary Report (20 May 2022 10:56):    No growth to date.        RADIOLOGY & ADDITIONAL TESTS:  Results Reviewed: Y  Imaging Personally Reviewed: Y  Electrocardiogram Personally Reviewed: Y    COORDINATION OF CARE:  Care Discussed with Consultants/Other Providers [Y/N]: Y  Prior or Outpatient Records Reviewed [Y/N]: Y  
Dr. Sandra Prater  Pager [phone]    PROGRESS NOTE:     Patient is a 57y old  Female who presents with a chief complaint of Right jaw swelling and drainage (21 May 2022 11:35)      SUBJECTIVE / OVERNIGHT EVENTS: pt denies chest pain/sob/right jaw pain  ADDITIONAL REVIEW OF SYSTEMS: eager to go home soon    MEDICATIONS  (STANDING):  ampicillin/sulbactam  IVPB      ampicillin/sulbactam  IVPB 3 Gram(s) IV Intermittent every 6 hours  aspirin enteric coated 81 milliGRAM(s) Oral daily  atorvastatin Oral Tab/Cap - Peds 10 milliGRAM(s) Oral daily  enoxaparin Injectable 40 milliGRAM(s) SubCutaneous every 24 hours  nystatin    Suspension 777156 Unit(s) Oral two times a day  vancomycin  IVPB 1000 milliGRAM(s) IV Intermittent every 24 hours  vancomycin  IVPB        MEDICATIONS  (PRN):  ALBUTerol    90 MICROgram(s) HFA Inhaler 2 Puff(s) Inhalation every 6 hours PRN Wheezing  ondansetron    Tablet 4 milliGRAM(s) Oral every 8 hours PRN Nausea and/or Vomiting  oxyCODONE    Solution 5 milliGRAM(s) Oral every 6 hours PRN Moderate Pain (4 - 6)  pantoprazole    Tablet 40 milliGRAM(s) Oral before breakfast PRN GERD  prochlorperazine   Tablet 10 milliGRAM(s) Oral every 6 hours PRN nausea      CAPILLARY BLOOD GLUCOSE        I&O's Summary    20 May 2022 07:01  -  21 May 2022 07:00  --------------------------------------------------------  IN: 780 mL / OUT: 0 mL / NET: 780 mL    21 May 2022 07:01  -  21 May 2022 12:48  --------------------------------------------------------  IN: 630 mL / OUT: 0 mL / NET: 630 mL        PHYSICAL EXAM:  Vital Signs Last 24 Hrs  T(C): 36.5 (21 May 2022 10:23), Max: 36.7 (20 May 2022 14:43)  T(F): 97.7 (21 May 2022 10:23), Max: 98.1 (20 May 2022 17:01)  HR: 109 (21 May 2022 10:23) (73 - 109)  BP: 119/78 (21 May 2022 10:23) (105/56 - 130/71)  BP(mean): --  RR: 18 (21 May 2022 10:23) (16 - 18)  SpO2: 97% (21 May 2022 10:23) (95% - 99%)  Gen: NAD; sitting in bed comfortably   Neck: right neck wound with packing in place.   Mouth: leukoplakia.   Pulm: no accessory muscle use; lungs clear on auscultation bilaterally; no wheezing or crackles.   Cards: RRR, nl S1/S2; no LE edema; no JVD  Abd: non-distended; soft and NT on exam; +bs  Ext: DANNA; no joint effusion or tenderness in upper and lower extremities; no cyanosis  Neuro: Awake and Alert; non-focal; moving all extremities.   Skin: no new rashes; warm to touch;       LABS:                        7.4    3.40  )-----------( 306      ( 21 May 2022 08:07 )             22.8     05-21    139  |  101  |  13  ----------------------------<  91  4.2   |  26  |  0.82    Ca    9.0      21 May 2022 08:07  Phos  2.8     05-21  Mg     1.70     05-21                Culture - Abscess with Gram Stain (collected 19 May 2022 08:40)  Source: .Abscess Arm - Right  Preliminary Report (20 May 2022 10:56):    No growth to date.    Culture - Blood (collected 18 May 2022 20:54)  Source: .Blood Blood-Peripheral  Preliminary Report (20 May 2022 21:00):    No growth at 48 hours    Culture - Blood (collected 18 May 2022 20:53)  Source: .Blood Blood-Peripheral  Preliminary Report (20 May 2022 21:00):    No growth at 48 hours        RADIOLOGY & ADDITIONAL TESTS:  Results Reviewed:   Imaging Personally Reviewed:  Electrocardiogram Personally Reviewed:    COORDINATION OF CARE:  Care Discussed with Consultants/Other Providers [Y/N]:  Prior or Outpatient Records Reviewed [Y/N]:  
Medicine Progress Note    Patient is a 57y old  Female who presents with a chief complaint of Right jaw swelling and drainage (23 May 2022 09:15)      SUBJECTIVE / OVERNIGHT EVENTS: patient is medically stable , denies any complaints , wants to go home       MEDICATIONS  (STANDING):  ampicillin/sulbactam  IVPB      ampicillin/sulbactam  IVPB 3 Gram(s) IV Intermittent every 6 hours  aspirin enteric coated 81 milliGRAM(s) Oral daily  atorvastatin Oral Tab/Cap - Peds 10 milliGRAM(s) Oral daily  enoxaparin Injectable 40 milliGRAM(s) SubCutaneous every 24 hours  nystatin    Suspension 121597 Unit(s) Oral two times a day  vancomycin  IVPB      vancomycin  IVPB 1000 milliGRAM(s) IV Intermittent every 24 hours    MEDICATIONS  (PRN):  ALBUTerol    90 MICROgram(s) HFA Inhaler 2 Puff(s) Inhalation every 6 hours PRN Wheezing  ondansetron    Tablet 4 milliGRAM(s) Oral every 8 hours PRN Nausea and/or Vomiting  oxyCODONE    Solution 5 milliGRAM(s) Oral every 6 hours PRN Moderate Pain (4 - 6)  pantoprazole    Tablet 40 milliGRAM(s) Oral before breakfast PRN GERD  prochlorperazine   Tablet 10 milliGRAM(s) Oral every 6 hours PRN nausea    CAPILLARY BLOOD GLUCOSE        I&O's Summary    22 May 2022 07:01  -  23 May 2022 07:00  --------------------------------------------------------  IN: 780 mL / OUT: 0 mL / NET: 780 mL        PHYSICAL EXAM:  Vital Signs Last 24 Hrs  T(C): 36.8 (23 May 2022 10:16), Max: 37.2 (22 May 2022 15:50)  T(F): 98.3 (23 May 2022 10:16), Max: 99 (22 May 2022 15:50)  HR: 82 (23 May 2022 10:16) (76 - 87)  BP: 113/64 (23 May 2022 10:16) (111/72 - 125/71)  BP(mean): --  RR: 17 (23 May 2022 10:16) (16 - 17)  SpO2: 96% (23 May 2022 10:16) (96% - 100%)  CONSTITUTIONAL: NAD,   ENMT: jaw dressing   RESPIRATORY: Normal respiratory effort; scattered wheezing   CARDIOVASCULAR: Regular rate and rhythm, normal S1 and S2, ; No lower extremity edema;  ABDOMEN: Nontender to palpation, normoactive bowel sounds, no rebound/guarding;   PSYCH: A+O to person, place, and time; affect appropriate  NEUROLOGY: CN 2-12 are intact and symmetric; no gross sensory deficits   SKIN: No rashes; no palpable lesions    LABS:                        7.6    3.92  )-----------( 291      ( 23 May 2022 07:35 )             23.2     05-23    135  |  98  |  11  ----------------------------<  94  3.3<L>   |  24  |  0.83    Ca    8.4      23 May 2022 07:35  Phos  3.8     05-23  Mg     1.50     05-23                SARS-CoV-2: NotDetec (18 May 2022 20:51)  COVID-19 PCR: NotDetec (17 Apr 2022 19:26)  COVID-19 PCR: NotDetec (22 Feb 2022 14:40)  COVID-19 PCR: NotDetec (06 Feb 2022 07:09)      RADIOLOGY & ADDITIONAL TESTS:  Imaging from Last 24 Hours:    Electrocardiogram/QTc Interval:    COORDINATION OF CARE:  Care Discussed with Consultants/Other Providers:

## 2022-05-23 NOTE — PROGRESS NOTE ADULT - PROBLEM SELECTOR PLAN 1
concern for local cellulitis  Vanc and Unasyn per ID  culture pending.   clinically improving with abx.   packing and dressing change per surgery.
concern for local cellulitis  Vanc and Unasyn per ID  f/u cultures, bcx 5/18 ngtd, abscess cx 5/19 staph aureus, f/u sensitivity, nasal PCR Neg for MRSA, +MSSA  clinically improving with abx.   f/u ID recs for atb for DC.   packing and dressing change per surgery.
concern for local cellulitis  Vanc and Unasyn per ID  f/u cultures, bcx 5/18 ngtd, abscess cx 5/19 staph aureus, f/u sensitivity, nasal PCR Neg for MRSA, +MSSA  clinically improving with abx.   f/u ID recs  packing and dressing change per surgery.
concern for local cellulitis  Vanc and Unasyn per ID  f/u cultures, bcx 5/18 and abscess cx 5/19 ngtd, nasal PCR Neg for MRSA, +MSSA  clinically improving with abx.   packing and dressing change per surgery.

## 2022-05-23 NOTE — PROGRESS NOTE ADULT - PROVIDER SPECIALTY LIST ADULT
ENT
ENT
Infectious Disease
ENT
ENT
Plastic Surgery
Plastic Surgery
Infectious Disease
Plastic Surgery
Plastic Surgery
Hospitalist

## 2022-05-23 NOTE — PROGRESS NOTE ADULT - PROBLEM SELECTOR PROBLEM 5
BROOKS (acute kidney injury)

## 2022-05-23 NOTE — DISCHARGE NOTE PROVIDER - NSDCFUSCHEDAPPT_GEN_ALL_CORE_FT
[facility]  [facility]  Scheduled Appointment: 05/31/2022    [facility]  [address]  Scheduled Appointment: 06/24/2022    Freddy Pappas  [facility]  [facility]  Scheduled Appointment: 07/18/2022

## 2022-05-23 NOTE — PROGRESS NOTE ADULT - PROBLEM SELECTOR PLAN 5
pre-renal due to hydration status  now resolved.   ctm.
pre-renal due to hydration status  now resolved.   ctm.
pre-renal due to hydration status  now resolved.   ctm.    Electrolytes abnormalities : hypokalemia , hypomagnesemia , replete K and mag
pre-renal due to hydration status  now resolved.   ctm.

## 2022-05-23 NOTE — DISCHARGE NOTE NURSING/CASE MANAGEMENT/SOCIAL WORK - NSDCPEFALRISK_GEN_ALL_CORE
For information on Fall & Injury Prevention, visit: [email or web address] OR  [email or web address] OR  https://www.cdc.gov/steadi/patient.html

## 2022-05-23 NOTE — DISCHARGE NOTE NURSING/CASE MANAGEMENT/SOCIAL WORK - NSSCNAMETXT_GEN_ALL_CORE
[facility] [phone] Nurse to visit on the day following discharge. Other appropriate services to be arranged thereafter. Please contact the home care agency at the above phone number if you have not heard from them by approximately 12 noon on the day after your hospital discharge.

## 2022-05-24 LAB
CULTURE RESULTS: SIGNIFICANT CHANGE UP
ORGANISM # SPEC MICROSCOPIC CNT: SIGNIFICANT CHANGE UP
ORGANISM # SPEC MICROSCOPIC CNT: SIGNIFICANT CHANGE UP
SPECIMEN SOURCE: SIGNIFICANT CHANGE UP

## 2022-05-31 ENCOUNTER — RESULT REVIEW (OUTPATIENT)
Age: 57
End: 2022-05-31

## 2022-05-31 ENCOUNTER — APPOINTMENT (OUTPATIENT)
Dept: HEMATOLOGY ONCOLOGY | Facility: CLINIC | Age: 57
End: 2022-05-31

## 2022-05-31 VITALS — WEIGHT: 176 LBS | BODY MASS INDEX: 30.21 KG/M2

## 2022-05-31 LAB
BASOPHILS # BLD AUTO: 0 K/UL — SIGNIFICANT CHANGE UP (ref 0–0.2)
BASOPHILS NFR BLD AUTO: 0.7 % — SIGNIFICANT CHANGE UP (ref 0–2)
EOSINOPHIL # BLD AUTO: 0.1 K/UL — SIGNIFICANT CHANGE UP (ref 0–0.5)
EOSINOPHIL NFR BLD AUTO: 1.7 % — SIGNIFICANT CHANGE UP (ref 0–6)
HCT VFR BLD CALC: 26.8 % — LOW (ref 34.5–45)
HGB BLD-MCNC: 8.7 G/DL — LOW (ref 11.5–15.5)
LYMPHOCYTES # BLD AUTO: 0.4 K/UL — LOW (ref 1–3.3)
LYMPHOCYTES # BLD AUTO: 9.9 % — LOW (ref 13–44)
MCHC RBC-ENTMCNC: 28.7 PG — SIGNIFICANT CHANGE UP (ref 27–34)
MCHC RBC-ENTMCNC: 32.3 G/DL — SIGNIFICANT CHANGE UP (ref 32–36)
MCV RBC AUTO: 88.9 FL — SIGNIFICANT CHANGE UP (ref 80–100)
MONOCYTES # BLD AUTO: 0.4 K/UL — SIGNIFICANT CHANGE UP (ref 0–0.9)
MONOCYTES NFR BLD AUTO: 9.7 % — SIGNIFICANT CHANGE UP (ref 2–14)
NEUTROPHILS # BLD AUTO: 2.8 K/UL — SIGNIFICANT CHANGE UP (ref 1.8–7.4)
NEUTROPHILS NFR BLD AUTO: 78.1 % — HIGH (ref 43–77)
PLATELET # BLD AUTO: 246 K/UL — SIGNIFICANT CHANGE UP (ref 150–400)
RBC # BLD: 3.02 M/UL — LOW (ref 3.8–5.2)
RBC # FLD: 18.8 % — HIGH (ref 10.3–14.5)
WBC # BLD: 3.6 K/UL — LOW (ref 3.8–10.5)
WBC # FLD AUTO: 3.6 K/UL — LOW (ref 3.8–10.5)

## 2022-05-31 NOTE — ED ADULT NURSE NOTE - FINAL NURSING ELECTRONIC SIGNATURE
-- DO NOT REPLY / DO NOT REPLY ALL --  -- Message is from the [facility]--    General Patient Message      Reason for Call: patient was seen at the urgent care for stomach pain. Patient seen Dr. Arielle Vidal. [facility] stated the referral was incomplete for a ultra sound. Patient nurse prefer  Dr. Hernandez to be aware of the message and request for the US order.  Please follow     Caller Information       Type Contact Phone    05/31/2022 09:57 AM CDT Phone (Incoming) scotty mclaughlin (Other) [phone]          Alternative phone number: none    Turnaround time given to caller:   \"This message will be sent to [state Provider's name]. The clinical team will fulfill your request as soon as they review your message.\"     19-May-2022 05:16

## 2022-06-06 LAB
ALBUMIN SERPL ELPH-MCNC: 3.7 G/DL
ALP BLD-CCNC: 79 U/L
ALT SERPL-CCNC: 13 U/L
ANION GAP SERPL CALC-SCNC: 16 MMOL/L
AST SERPL-CCNC: 14 U/L
BILIRUB SERPL-MCNC: <0.2 MG/DL
BUN SERPL-MCNC: 18 MG/DL
CALCIUM SERPL-MCNC: 9.2 MG/DL
CHLORIDE SERPL-SCNC: 99 MMOL/L
CO2 SERPL-SCNC: 22 MMOL/L
CREAT SERPL-MCNC: 1.31 MG/DL
EGFR: 47 ML/MIN/1.73M2
GLUCOSE SERPL-MCNC: 101 MG/DL
MAGNESIUM SERPL-MCNC: 1.9 MG/DL
POTASSIUM SERPL-SCNC: 3.9 MMOL/L
PROT SERPL-MCNC: 6.3 G/DL
SODIUM SERPL-SCNC: 136 MMOL/L

## 2022-06-08 ENCOUNTER — OUTPATIENT (OUTPATIENT)
Dept: OUTPATIENT SERVICES | Facility: HOSPITAL | Age: 57
LOS: 1 days | Discharge: HOME | End: 2022-06-08

## 2022-06-08 DIAGNOSIS — Z96.649 PRESENCE OF UNSPECIFIED ARTIFICIAL HIP JOINT: Chronic | ICD-10-CM

## 2022-06-08 DIAGNOSIS — C03.9 MALIGNANT NEOPLASM OF GUM, UNSPECIFIED: ICD-10-CM

## 2022-06-08 DIAGNOSIS — Z98.890 OTHER SPECIFIED POSTPROCEDURAL STATES: Chronic | ICD-10-CM

## 2022-06-08 DIAGNOSIS — Z90.711 ACQUIRED ABSENCE OF UTERUS WITH REMAINING CERVICAL STUMP: Chronic | ICD-10-CM

## 2022-06-08 DIAGNOSIS — Z98.891 HISTORY OF UTERINE SCAR FROM PREVIOUS SURGERY: Chronic | ICD-10-CM

## 2022-06-09 ENCOUNTER — APPOINTMENT (OUTPATIENT)
Age: 57
End: 2022-06-09

## 2022-06-10 DIAGNOSIS — C76.0 MALIGNANT NEOPLASM OF HEAD, FACE AND NECK: ICD-10-CM

## 2022-06-10 DIAGNOSIS — Z51.11 ENCOUNTER FOR ANTINEOPLASTIC CHEMOTHERAPY: ICD-10-CM

## 2022-06-13 ENCOUNTER — NON-APPOINTMENT (OUTPATIENT)
Age: 57
End: 2022-06-13

## 2022-06-15 ENCOUNTER — INPATIENT (INPATIENT)
Facility: HOSPITAL | Age: 57
LOS: 1 days | Discharge: HOME HEALTH | End: 2022-06-17
Attending: OTOLARYNGOLOGY | Admitting: OTOLARYNGOLOGY
Payer: COMMERCIAL

## 2022-06-15 VITALS
SYSTOLIC BLOOD PRESSURE: 102 MMHG | TEMPERATURE: 97 F | HEIGHT: 65 IN | OXYGEN SATURATION: 100 % | RESPIRATION RATE: 16 BRPM | HEART RATE: 99 BPM | DIASTOLIC BLOOD PRESSURE: 69 MMHG

## 2022-06-15 DIAGNOSIS — Z98.891 HISTORY OF UTERINE SCAR FROM PREVIOUS SURGERY: Chronic | ICD-10-CM

## 2022-06-15 DIAGNOSIS — T14.8XXA OTHER INJURY OF UNSPECIFIED BODY REGION, INITIAL ENCOUNTER: ICD-10-CM

## 2022-06-15 DIAGNOSIS — I10 ESSENTIAL (PRIMARY) HYPERTENSION: ICD-10-CM

## 2022-06-15 DIAGNOSIS — L03.221 CELLULITIS OF NECK: ICD-10-CM

## 2022-06-15 DIAGNOSIS — Z98.890 OTHER SPECIFIED POSTPROCEDURAL STATES: Chronic | ICD-10-CM

## 2022-06-15 DIAGNOSIS — E78.5 HYPERLIPIDEMIA, UNSPECIFIED: ICD-10-CM

## 2022-06-15 DIAGNOSIS — Z96.649 PRESENCE OF UNSPECIFIED ARTIFICIAL HIP JOINT: Chronic | ICD-10-CM

## 2022-06-15 DIAGNOSIS — N17.9 ACUTE KIDNEY FAILURE, UNSPECIFIED: ICD-10-CM

## 2022-06-15 DIAGNOSIS — Z29.9 ENCOUNTER FOR PROPHYLACTIC MEASURES, UNSPECIFIED: ICD-10-CM

## 2022-06-15 DIAGNOSIS — K12.2 CELLULITIS AND ABSCESS OF MOUTH: ICD-10-CM

## 2022-06-15 DIAGNOSIS — Z90.711 ACQUIRED ABSENCE OF UTERUS WITH REMAINING CERVICAL STUMP: Chronic | ICD-10-CM

## 2022-06-15 LAB
ALBUMIN SERPL ELPH-MCNC: 3.5 G/DL — SIGNIFICANT CHANGE UP (ref 3.3–5)
ALP SERPL-CCNC: 87 U/L — SIGNIFICANT CHANGE UP (ref 40–120)
ALT FLD-CCNC: 6 U/L — SIGNIFICANT CHANGE UP (ref 4–33)
ANION GAP SERPL CALC-SCNC: 17 MMOL/L — HIGH (ref 7–14)
AST SERPL-CCNC: 10 U/L — SIGNIFICANT CHANGE UP (ref 4–32)
BASOPHILS # BLD AUTO: 0.02 K/UL — SIGNIFICANT CHANGE UP (ref 0–0.2)
BASOPHILS NFR BLD AUTO: 0.4 % — SIGNIFICANT CHANGE UP (ref 0–2)
BILIRUB SERPL-MCNC: 0.2 MG/DL — SIGNIFICANT CHANGE UP (ref 0.2–1.2)
BLOOD GAS VENOUS COMPREHENSIVE RESULT: SIGNIFICANT CHANGE UP
BUN SERPL-MCNC: 17 MG/DL — SIGNIFICANT CHANGE UP (ref 7–23)
CALCIUM SERPL-MCNC: 8.9 MG/DL — SIGNIFICANT CHANGE UP (ref 8.4–10.5)
CHLORIDE SERPL-SCNC: 92 MMOL/L — LOW (ref 98–107)
CO2 SERPL-SCNC: 24 MMOL/L — SIGNIFICANT CHANGE UP (ref 22–31)
CREAT SERPL-MCNC: 1.99 MG/DL — HIGH (ref 0.5–1.3)
EGFR: 29 ML/MIN/1.73M2 — LOW
EOSINOPHIL # BLD AUTO: 0.1 K/UL — SIGNIFICANT CHANGE UP (ref 0–0.5)
EOSINOPHIL NFR BLD AUTO: 1.9 % — SIGNIFICANT CHANGE UP (ref 0–6)
GLUCOSE SERPL-MCNC: 97 MG/DL — SIGNIFICANT CHANGE UP (ref 70–99)
HCT VFR BLD CALC: 26.6 % — LOW (ref 34.5–45)
HGB BLD-MCNC: 8.6 G/DL — LOW (ref 11.5–15.5)
IANC: 4.26 K/UL — SIGNIFICANT CHANGE UP (ref 1.8–7.4)
IMM GRANULOCYTES NFR BLD AUTO: 0.4 % — SIGNIFICANT CHANGE UP (ref 0–1.5)
LYMPHOCYTES # BLD AUTO: 0.32 K/UL — LOW (ref 1–3.3)
LYMPHOCYTES # BLD AUTO: 6 % — LOW (ref 13–44)
MCHC RBC-ENTMCNC: 29.1 PG — SIGNIFICANT CHANGE UP (ref 27–34)
MCHC RBC-ENTMCNC: 32.3 GM/DL — SIGNIFICANT CHANGE UP (ref 32–36)
MCV RBC AUTO: 89.9 FL — SIGNIFICANT CHANGE UP (ref 80–100)
MONOCYTES # BLD AUTO: 0.58 K/UL — SIGNIFICANT CHANGE UP (ref 0–0.9)
MONOCYTES NFR BLD AUTO: 10.9 % — SIGNIFICANT CHANGE UP (ref 2–14)
NEUTROPHILS # BLD AUTO: 4.26 K/UL — SIGNIFICANT CHANGE UP (ref 1.8–7.4)
NEUTROPHILS NFR BLD AUTO: 80.4 % — HIGH (ref 43–77)
NRBC # BLD: 0 /100 WBCS — SIGNIFICANT CHANGE UP
NRBC # FLD: 0 K/UL — SIGNIFICANT CHANGE UP
PLATELET # BLD AUTO: 206 K/UL — SIGNIFICANT CHANGE UP (ref 150–400)
POTASSIUM SERPL-MCNC: 3.6 MMOL/L — SIGNIFICANT CHANGE UP (ref 3.5–5.3)
POTASSIUM SERPL-SCNC: 3.6 MMOL/L — SIGNIFICANT CHANGE UP (ref 3.5–5.3)
PROT SERPL-MCNC: 6.2 G/DL — SIGNIFICANT CHANGE UP (ref 6–8.3)
RBC # BLD: 2.96 M/UL — LOW (ref 3.8–5.2)
RBC # FLD: 17.8 % — HIGH (ref 10.3–14.5)
SARS-COV-2 RNA SPEC QL NAA+PROBE: SIGNIFICANT CHANGE UP
SODIUM SERPL-SCNC: 133 MMOL/L — LOW (ref 135–145)
WBC # BLD: 5.3 K/UL — SIGNIFICANT CHANGE UP (ref 3.8–10.5)
WBC # FLD AUTO: 5.3 K/UL — SIGNIFICANT CHANGE UP (ref 3.8–10.5)

## 2022-06-15 PROCEDURE — 99285 EMERGENCY DEPT VISIT HI MDM: CPT

## 2022-06-15 PROCEDURE — 99223 1ST HOSP IP/OBS HIGH 75: CPT

## 2022-06-15 RX ORDER — PIPERACILLIN AND TAZOBACTAM 4; .5 G/20ML; G/20ML
3.38 INJECTION, POWDER, LYOPHILIZED, FOR SOLUTION INTRAVENOUS EVERY 8 HOURS
Refills: 0 | Status: DISCONTINUED | OUTPATIENT
Start: 2022-06-15 | End: 2022-06-17

## 2022-06-15 RX ORDER — ASCORBIC ACID 60 MG
500 TABLET,CHEWABLE ORAL DAILY
Refills: 0 | Status: DISCONTINUED | OUTPATIENT
Start: 2022-06-15 | End: 2022-06-17

## 2022-06-15 RX ORDER — PIPERACILLIN AND TAZOBACTAM 4; .5 G/20ML; G/20ML
3.38 INJECTION, POWDER, LYOPHILIZED, FOR SOLUTION INTRAVENOUS ONCE
Refills: 0 | Status: COMPLETED | OUTPATIENT
Start: 2022-06-15 | End: 2022-06-15

## 2022-06-15 RX ORDER — VANCOMYCIN HCL 1 G
1000 VIAL (EA) INTRAVENOUS ONCE
Refills: 0 | Status: COMPLETED | OUTPATIENT
Start: 2022-06-15 | End: 2022-06-15

## 2022-06-15 RX ORDER — SODIUM CHLORIDE 9 MG/ML
1000 INJECTION INTRAMUSCULAR; INTRAVENOUS; SUBCUTANEOUS ONCE
Refills: 0 | Status: COMPLETED | OUTPATIENT
Start: 2022-06-15 | End: 2022-06-15

## 2022-06-15 RX ORDER — SODIUM CHLORIDE 9 MG/ML
1000 INJECTION INTRAMUSCULAR; INTRAVENOUS; SUBCUTANEOUS
Refills: 0 | Status: DISCONTINUED | OUTPATIENT
Start: 2022-06-15 | End: 2022-06-17

## 2022-06-15 RX ORDER — ALBUTEROL 90 UG/1
2 AEROSOL, METERED ORAL EVERY 6 HOURS
Refills: 0 | Status: DISCONTINUED | OUTPATIENT
Start: 2022-06-15 | End: 2022-06-17

## 2022-06-15 RX ORDER — ACETAMINOPHEN 500 MG
650 TABLET ORAL EVERY 6 HOURS
Refills: 0 | Status: DISCONTINUED | OUTPATIENT
Start: 2022-06-15 | End: 2022-06-17

## 2022-06-15 RX ORDER — POLYETHYLENE GLYCOL 3350 17 G/17G
17 POWDER, FOR SOLUTION ORAL DAILY
Refills: 0 | Status: DISCONTINUED | OUTPATIENT
Start: 2022-06-15 | End: 2022-06-17

## 2022-06-15 RX ORDER — OXYCODONE HYDROCHLORIDE 5 MG/1
5 TABLET ORAL EVERY 6 HOURS
Refills: 0 | Status: DISCONTINUED | OUTPATIENT
Start: 2022-06-15 | End: 2022-06-17

## 2022-06-15 RX ORDER — PANTOPRAZOLE SODIUM 20 MG/1
40 TABLET, DELAYED RELEASE ORAL
Refills: 0 | Status: DISCONTINUED | OUTPATIENT
Start: 2022-06-15 | End: 2022-06-17

## 2022-06-15 RX ORDER — ATORVASTATIN CALCIUM 80 MG/1
10 TABLET, FILM COATED ORAL AT BEDTIME
Refills: 0 | Status: DISCONTINUED | OUTPATIENT
Start: 2022-06-15 | End: 2022-06-17

## 2022-06-15 RX ORDER — PETROLATUM,WHITE
1 JELLY (GRAM) TOPICAL
Refills: 0 | Status: DISCONTINUED | OUTPATIENT
Start: 2022-06-15 | End: 2022-06-17

## 2022-06-15 RX ORDER — SENNA PLUS 8.6 MG/1
2 TABLET ORAL AT BEDTIME
Refills: 0 | Status: DISCONTINUED | OUTPATIENT
Start: 2022-06-15 | End: 2022-06-17

## 2022-06-15 RX ORDER — LISINOPRIL 2.5 MG/1
5 TABLET ORAL DAILY
Refills: 0 | Status: DISCONTINUED | OUTPATIENT
Start: 2022-06-15 | End: 2022-06-15

## 2022-06-15 RX ORDER — OXYCODONE HYDROCHLORIDE 5 MG/1
2.5 TABLET ORAL EVERY 6 HOURS
Refills: 0 | Status: DISCONTINUED | OUTPATIENT
Start: 2022-06-15 | End: 2022-06-17

## 2022-06-15 RX ORDER — SODIUM HYPOCHLORITE 0.125 %
1 SOLUTION, NON-ORAL MISCELLANEOUS
Refills: 0 | Status: DISCONTINUED | OUTPATIENT
Start: 2022-06-15 | End: 2022-06-17

## 2022-06-15 RX ORDER — ENOXAPARIN SODIUM 100 MG/ML
40 INJECTION SUBCUTANEOUS EVERY 24 HOURS
Refills: 0 | Status: DISCONTINUED | OUTPATIENT
Start: 2022-06-15 | End: 2022-06-17

## 2022-06-15 RX ORDER — ASPIRIN/CALCIUM CARB/MAGNESIUM 324 MG
81 TABLET ORAL DAILY
Refills: 0 | Status: DISCONTINUED | OUTPATIENT
Start: 2022-06-15 | End: 2022-06-17

## 2022-06-15 RX ADMIN — Medication 1 APPLICATION(S): at 18:27

## 2022-06-15 RX ADMIN — SODIUM CHLORIDE 100 MILLILITER(S): 9 INJECTION INTRAMUSCULAR; INTRAVENOUS; SUBCUTANEOUS at 18:04

## 2022-06-15 RX ADMIN — PANTOPRAZOLE SODIUM 40 MILLIGRAM(S): 20 TABLET, DELAYED RELEASE ORAL at 14:40

## 2022-06-15 RX ADMIN — Medication 1 TABLET(S): at 14:40

## 2022-06-15 RX ADMIN — Medication 650 MILLIGRAM(S): at 14:40

## 2022-06-15 RX ADMIN — Medication 650 MILLIGRAM(S): at 13:25

## 2022-06-15 RX ADMIN — PIPERACILLIN AND TAZOBACTAM 200 GRAM(S): 4; .5 INJECTION, POWDER, LYOPHILIZED, FOR SOLUTION INTRAVENOUS at 06:08

## 2022-06-15 RX ADMIN — ENOXAPARIN SODIUM 40 MILLIGRAM(S): 100 INJECTION SUBCUTANEOUS at 18:05

## 2022-06-15 RX ADMIN — Medication 250 MILLIGRAM(S): at 06:08

## 2022-06-15 RX ADMIN — LISINOPRIL 5 MILLIGRAM(S): 2.5 TABLET ORAL at 13:25

## 2022-06-15 RX ADMIN — Medication 500 MILLIGRAM(S): at 13:25

## 2022-06-15 RX ADMIN — SODIUM CHLORIDE 1000 MILLILITER(S): 9 INJECTION INTRAMUSCULAR; INTRAVENOUS; SUBCUTANEOUS at 06:51

## 2022-06-15 RX ADMIN — Medication 81 MILLIGRAM(S): at 13:25

## 2022-06-15 RX ADMIN — SODIUM CHLORIDE 1000 MILLILITER(S): 9 INJECTION INTRAMUSCULAR; INTRAVENOUS; SUBCUTANEOUS at 07:20

## 2022-06-15 RX ADMIN — Medication 1000 MILLIGRAM(S): at 07:20

## 2022-06-15 RX ADMIN — ATORVASTATIN CALCIUM 10 MILLIGRAM(S): 80 TABLET, FILM COATED ORAL at 21:26

## 2022-06-15 RX ADMIN — PIPERACILLIN AND TAZOBACTAM 3.38 GRAM(S): 4; .5 INJECTION, POWDER, LYOPHILIZED, FOR SOLUTION INTRAVENOUS at 07:00

## 2022-06-15 RX ADMIN — PIPERACILLIN AND TAZOBACTAM 25 GRAM(S): 4; .5 INJECTION, POWDER, LYOPHILIZED, FOR SOLUTION INTRAVENOUS at 21:25

## 2022-06-15 NOTE — ED ADULT NURSE REASSESSMENT NOTE - NS ED NURSE REASSESS COMMENT FT1
Pt awake, alert and oriented x 4 denies any pain or distress.    Denies chest pain or SOB.   Respirations even and unlabored.   VSS.   Pt reports baseline spo2 91-94% on room air (former smoker)  Denies headache, denies n/v/d.    ENT consult complete.   IV site unremarkable.    Awaiting inpatient bed.

## 2022-06-15 NOTE — ED PROVIDER NOTE - PROGRESS NOTE DETAILS
Discussed case w/ ENT resident, she states she will evaluate pt in ED and asked that I also consult Dr. Evans of plastic surgery service per Dr. So's request. Placed call to Dr. Evans's answering service, awaiting call back.  -Dr. Mantilla

## 2022-06-15 NOTE — PATIENT PROFILE ADULT - FALL HARM RISK - UNIVERSAL INTERVENTIONS
Bed in lowest position, wheels locked, appropriate side rails in place/Call bell, personal items and telephone in reach/Instruct patient to call for assistance before getting out of bed or chair/Non-slip footwear when patient is out of bed/[place] to call system/Physically safe environment - no spills, clutter or unnecessary equipment/Purposeful Proactive Rounding/Room/bathroom lighting operational, light cord in reach

## 2022-06-15 NOTE — H&P ADULT - NSHPPHYSICALEXAM_GEN_ALL_CORE
AVSS  HEENT:   Mouth: no floor of mouth edema, No injection. no exudates.   Right sided dressing in place. + erythema and warmth with no discharge or exudates.

## 2022-06-15 NOTE — CONSULT NOTE ADULT - PROBLEM SELECTOR RECOMMENDATION 3
pt not on medication, on lifestyle modification  dc lisinopril given BROOKS  if BP persistently > 130/90 can start amlodipine 5 mg qd

## 2022-06-15 NOTE — H&P ADULT - PROBLEM SELECTOR PLAN 1
1. IV antibiotics possibly culture id able to obtain  2. Peg feeds  3. IVF  4. pain control  5. Home meds  6. Medicine consult  7. Follow up labs/covid

## 2022-06-15 NOTE — ED PROVIDER NOTE - ATTENDING CONTRIBUTION TO CARE
I performed a face-to-face evaluation of the patient and performed a history and physical examination along with the resident or ACP, and/or medical student above.  I agree with the history and physical examination as documented by the resident or ACP, and/or medical student above.  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 Edwards office for IV abx and admission. Pt reports increasing redness, pain, and purulent drainage from surgical site. Denies f/c. Labs, abx, ent/plastics consult, tba.

## 2022-06-15 NOTE — ED PROVIDER NOTE - OBJECTIVE STATEMENT
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 pt was recently admitted may 19-22 for similar complaints, treated with vanc/zosyn and transitioned to augmentin and discharged home. states that the site is red and painful below the chin and there has been large amounts of drainage. spoke with nursing assistant from Dr. Edwards office yesterday who told her to come to the hospital for admission and IV antibiotics. no fevers, chills, n/v, difficulty swallowing, SOB.

## 2022-06-15 NOTE — H&P ADULT - ASSESSMENT
57 year old female with a history of SCCA and right mandibulectomy that presents to [facility] with wound infection

## 2022-06-15 NOTE — ED PROVIDER NOTE - ENMT, MLM
Airway patent, Nasal mucosa clear. Mouth with normal mucosa. Throat has no vesicles, no oropharyngeal exudates and uvula is midline. right neck erythematous  with increased warmth, dressing place, mild submandibular ttp

## 2022-06-15 NOTE — ED ADULT NURSE REASSESSMENT NOTE - NS ED NURSE REASSESS COMMENT FT1
Pt awake, alert and oriented x 4 denies any chest pain, SOB, denies abdominal pain or any distress.   Respirations even and unlabored.    IV site unremarkable.   VSS.   Awaiting GT feeds.    Contacted ENT to find out if patient can have pleasure feeds - waiting to hear back after they speak with the attending.

## 2022-06-15 NOTE — H&P ADULT - HISTORY OF PRESENT ILLNESS
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 Patient has been admitted with multiple wound infections this year. Presents c/o right facial swelling and pain. Patient sent in for admission and IV antibiotics.

## 2022-06-15 NOTE — CONSULT NOTE ADULT - SUBJECTIVE AND OBJECTIVE BOX
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 Patient has been admitted with multiple wound infections this year. Presents c/o right facial swelling and pain. Patient sent in for admission and IV antibiotics.  (15 Angel 2022 09:49)    Pt recently admitted for similar findings, was treated with IV vancomycin and discharged on PO agumentin. Pt reports completing abx course. She endorses greenish drainage from R. neck wound associated w/ redness and pain. Denies fever, chills, dysphagia, sob, cp, palpitations.     Allergies    morphine (Hives)    Intolerances        HOME MEDICATIONS: [X ] Reviewed    Home Medications:  albuterol 90 mcg/inh inhalation powder: 2 puff(s) inhaled every 6 hours, As Needed (2022 21:43)  aspirin 81 mg oral delayed release tablet: 1 tab(s) orally once a day (2022 21:43)  Compazine 10 mg oral tablet: orally every 6 hours, As Needed (2022 21:43)  Zofran 8 mg oral tablet: orally every 8 hours, As Needed (2022 21:43)    MEDICATIONS  (STANDING):  acetaminophen     Tablet .. 650 milliGRAM(s) Oral every 6 hours  AQUAPHOR (petrolatum Ointment) 1 Application(s) Topical two times a day  ascorbic acid 500 milliGRAM(s) Oral daily  aspirin enteric coated 81 milliGRAM(s) Oral daily  atorvastatin 10 milliGRAM(s) Oral at bedtime  Dakins Solution - 1/4 Strength 1 Application(s) Topical two times a day  lisinopril 5 milliGRAM(s) Oral daily  multivitamin 1 Tablet(s) Oral daily  pantoprazole    Tablet 40 milliGRAM(s) Oral before breakfast  polyethylene glycol 3350 17 Gram(s) Oral daily  senna 2 Tablet(s) Oral at bedtime    MEDICATIONS  (PRN):  ALBUTerol    90 MICROgram(s) HFA Inhaler 2 Puff(s) Inhalation every 6 hours PRN Shortness of Breath and/or Wheezing  bisacodyl 5 milliGRAM(s) Oral daily PRN Constipation  oxyCODONE    Solution 2.5 milliGRAM(s) Oral every 6 hours PRN Moderate Pain (4 - 6)  oxyCODONE    Solution 5 milliGRAM(s) Oral every 6 hours PRN Severe Pain (7 - 10)      PAST MEDICAL & SURGICAL HISTORY:  H/O malignant neoplasm of gum      Hypertension      Hyperlipidemia      Mild asthma      H/O  section      History of hip replacement  left hip      History of partial hysterectomy      H/O neck surgery  partial right mandibulectomy with fibular free flap reconstriction and right neck lymphnode dissection 22      [X ] Reviewed     SOCIAL HISTORY:  Residence: [ ] [facility]  [ ] SNF  [X ] Community  Denies smoking, etoh use or other illicit drug use  Independent in ADls and IADLs    FAMILY HISTORY:  Family history of CVA (Mother)    FH: thyroid disease (Mother)      REVIEW OF SYSTEMS:    CONSTITUTIONAL: No fever, weight loss, or fatigue  EYES: No eye pain, visual disturbances, or discharge  ENMT:  No difficulty hearing, tinnitus, vertigo; No sinus or throat pain  NECK: +R. neck wound drainage, redness and pain  BREASTS: No pain, masses, or nipple discharge  RESPIRATORY: No cough, wheezing, chills or hemoptysis; No shortness of breath  CARDIOVASCULAR: No chest pain, palpitations, dizziness, or leg swelling  GASTROINTESTINAL: No abdominal or epigastric pain. No nausea, vomiting, or hematemesis; No diarrhea or constipation. No melena or hematochezia.  GENITOURINARY: No dysuria, frequency, hematuria, or incontinence  NEUROLOGICAL: No headaches, memory loss, loss of strength, numbness, or tremors  SKIN: No itching, burning, rashes, or lesions   LYMPH NODES: No enlarged glands  ENDOCRINE: No heat or cold intolerance; No hair loss  MUSCULOSKELETAL: No muscle or back pain  PSYCHIATRIC: No depression, anxiety, mood swings, or difficulty sleeping  HEME/LYMPH: No easy bruising, or bleeding gums  ALLERGY AND IMMUNOLOGIC: No hives or eczema    [  ] All other ROS negative  [  ] Unable to obtain due to poor mental status    Vital Signs Last 24 Hrs  T(C): 36.4 (15 Angel 2022 06:53), Max: 36.4 (15 Angel 2022 06:53)  T(F): 97.6 (15 Angel 2022 06:53), Max: 97.6 (15 Angel 2022 06:53)  HR: 74 (15 Angel 2022 10:56) (72 - 99)  BP: 107/68 (15 Angel 2022 10:56) (102/69 - 124/78)  BP(mean): 78 (15 Angel 2022 06:53) (78 - 78)  RR: 14 (15 Angel 2022 10:56) (12 - 16)  SpO2: 94% (15 Angel 2022 10:56) (94% - 100%)    PHYSICAL EXAM:    GENERAL: NAD, well-groomed, well-developed  HEAD:  Atraumatic, Normocephalic  EYES: EOMI, PERRLA, conjunctiva and sclera clear  ENMT: Moist mucous membranes  NECK: +R. submandibular wound packing appears c/d/i, +erythema, TTP w/ surrounding radiation skin pigmentation  RESPIRATORY: Clear to auscultation bilaterally; No rales, rhonchi, wheezing, or rubs  CARDIOVASCULAR: Regular rate and rhythm; No murmurs, rubs, or gallops  GASTROINTESTINAL: +PEG site c/d/i. Soft, Nontender, Nondistended; Bowel sounds present  GENITOURINARY: Not examined  EXTREMITIES:  2+ Peripheral Pulses, bl LE 1+ non pitting edema  NERVOUS SYSTEM:  Alert & Oriented X3; Moving all 4 extremities; No gross sensory deficits  HEME/LYMPH: No lymphadenopathy noted  SKIN: No rashes or lesions; Incisions C/D/I    LABS:                        8.6    5.30  )-----------( 206      ( 15 Angel 2022 05:53 )             26.6     06-15    133<L>  |  92<L>  |  17  ----------------------------<  97  3.6   |  24  |  1.99<H>    Ca    8.9      15 Angel 2022 05:53  Phos  4.0     06-15  Mg     2.00     06-15    TPro  6.2  /  Alb  3.5  /  TBili  0.2  /  DBili  x   /  AST  10  /  ALT  6   /  AlkPhos  87  06-15        CAPILLARY BLOOD GLUCOSE        Microbiology    RADIOLOGY & ADDITIONAL STUDIES:    EKG:   Personally Reviewed:  [ ] YES     Imaging:   Personally Reviewed:  [ ] YES               [ ] Consultant(s) Notes Reviewed  [ ] Care Discussed with Consultants/Other Providers: ENT PA    [ ] Increased delirium risk  [ ] Delirium and other risks can be reduced by:          -early ambulation          -minimizing "tethers" - IV, oxygen, catheters, etc          -avoiding hypnotics and sedatives          -maintaining hydration/nutrition          -avoid anticholinergics - diphenhydramine, etc          -pain control          -supportive environment  
Plastic Surgery Consult Note  (pg [facility]: [id number], [facility]: [phone])  ===================================================  SUBJECTIVE    HPI:  56yo F Hx of  HTN, HLD, squamous cell carcinoma of gingiva with metastasis to right neck s/p right segmental mandibulectomy and reconstruction with a left osteocutaneous free fibula flap on 2022. She underwent radiation in 2022. Her postoperative course was complicated by neck infection requiring readmission for IV antibiotics May 19- after which the patient was discharged home on Augmentin. She states that the site is red and painful below the chin and there has been large amounts of drainage. She spoke with nursing assistant from Dr. Edwards office yesterday who told her to come to the hospital for admission and IV antibiotics. No fevers, chills, n/v, difficulty swallowing, SOB.    ___________________________________________________  PAST MEDICAL & SURGICAL HISTORY:  H/O malignant neoplasm of gum      Hypertension      Hyperlipidemia      Mild asthma      H/O  section      History of hip replacement  left hip      History of partial hysterectomy      H/O neck surgery  partial right mandibulectomy with fibular free flap reconstriction and right neck lymphnode dissection 22        Allergies    morphine (Hives)    Intolerances      ___________________________________________________  Home Medications:  albuterol 90 mcg/inh inhalation powder: 2 puff(s) inhaled every 6 hours, As Needed (2022 21:43)  aspirin 81 mg oral delayed release tablet: 1 tab(s) orally once a day (2022 21:43)  Compazine 10 mg oral tablet: orally every 6 hours, As Needed (2022 21:43)  Zofran 8 mg oral tablet: orally every 8 hours, As Needed (2022 21:43)    MEDICATIONS  (STANDING):    ___________________________________________________  SOCIAL HISTORY:  FAMILY HISTORY:  Family history of CVA (Mother)    FH: thyroid disease (Mother)        ===================================================  OBJECTIVE:    Vital Signs Last 24 Hrs  T(C): 36.4 (15 Angel 2022 06:53), Max: 36.4 (15 Angel 2022 06:53)  T(F): 97.6 (15 Angel 2022 06:53), Max: 97.6 (15 Angel 2022 06:53)  HR: 72 (15 Angel 2022 06:53) (72 - 99)  BP: 105/61 (15 Angel 2022 06:53) (102/69 - 124/78)  BP(mean): 78 (15 Angel 2022 06:53) (78 - 78)  RR: 12 (15 Angel 2022 06:53) (12 - 16)  SpO2: 98% (15 Angel 2022 06:53) (98% - 100%)CAPILLARY BLOOD GLUCOSE        I&O's Detail    ___________________________________________________  EXAM:  General: Well developed, well nourished, NAD  Neuro: Alert and oriented, no focal deficits, moves all extremities spontaneously  HEENT: Open wound of right submental region 1-2 cm in size just under the mandible with purulent drainage. Surrounding tissue is indurated with pigmentation changes secondary to radiation. No spreading erythema appreciated. Able to express some purulence from medial neck. Mildly tender to palpation. Intraorally the flap is pink, viable, incisions intact no dehiscence appreciated. Well healed tracheal stoma site.  Respiratory: Airway patent, respirations unlabored  Extremities: No edema, sensation and movement grossly intact, Left lateral leg fibular donor site well healed with area of dry skin.    ___________________________________________________  LABS:  CBC Full  -  ( 15 Angel 2022 05:53 )  WBC Count : 5.30 K/uL  RBC Count : 2.96 M/uL  Hemoglobin : 8.6 g/dL  Hematocrit : 26.6 %  Platelet Count - Automated : 206 K/uL  Mean Cell Volume : 89.9 fL  Mean Cell Hemoglobin : 29.1 pg  Mean Cell Hemoglobin Concentration : 32.3 gm/dL  Auto Neutrophil # : 4.26 K/uL  Auto Lymphocyte # : 0.32 K/uL  Auto Monocyte # : 0.58 K/uL  Auto Eosinophil # : 0.10 K/uL  Auto Basophil # : 0.02 K/uL  Auto Neutrophil % : 80.4 %  Auto Lymphocyte % : 6.0 %  Auto Monocyte % : 10.9 %  Auto Eosinophil % : 1.9 %  Auto Basophil % : 0.4 %    06-15    133<L>  |  92<L>  |  17  ----------------------------<  97  3.6   |  24  |  1.99<H>    Ca    8.9      15 Angel 2022 05:53  Phos  4.0     -15  Mg     2.00     06-15    TPro  6.2  /  Alb  3.5  /  TBili  0.2  /  DBili  x   /  AST  10  /  ALT  6   /  AlkPhos  87  -15    LIVER FUNCTIONS - ( 15 Angel 2022 05:53 )  Alb: 3.5 g/dL / Pro: 6.2 g/dL / ALK PHOS: 87 U/L / ALT: 6 U/L / AST: 10 U/L / GGT: x       
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 Patient has been admitted with multiple wound infections this year. Presents c/o right facial swelling and pain. Patient sent in for admission and IV antibiotics.  (15 Angel 2022 09:49)"    Above reviewed. 55 yo F with history of mandibulectomy, tracheostomy, R neck dissection for SCC, history of lichen planus, wound infections at neck, now presents to ED for further care  No fevers, no chills, states nurse has noticed some discharge from wound  No chest pain, no cough, no abd pain, no diarrhea, no other symptoms  Aside from drainage, no other new complaints  Has baseline redness at neck due to underlying lichen planus  ID called for further eval.    PAST MEDICAL & SURGICAL HISTORY:  H/O malignant neoplasm of gum    Hypertension    Hyperlipidemia    Mild asthma    H/O  section    History of hip replacement  left hip    History of partial hysterectomy    H/O neck surgery  partial right mandibulectomy with fibular free flap reconstriction and right neck lymphnode dissection 22    Allergies    morphine (Hives)    Intolerances    ANTIMICROBIALS:      OTHER MEDS:  acetaminophen     Tablet .. 650 milliGRAM(s) Oral every 6 hours  ALBUTerol    90 MICROgram(s) HFA Inhaler 2 Puff(s) Inhalation every 6 hours PRN  AQUAPHOR (petrolatum Ointment) 1 Application(s) Topical two times a day  ascorbic acid 500 milliGRAM(s) Oral daily  aspirin enteric coated 81 milliGRAM(s) Oral daily  atorvastatin 10 milliGRAM(s) Oral at bedtime  bisacodyl 5 milliGRAM(s) Oral daily PRN  Dakins Solution - 1/4 Strength 1 Application(s) Topical two times a day  enoxaparin Injectable 40 milliGRAM(s) SubCutaneous every 24 hours  multivitamin 1 Tablet(s) Oral daily  oxyCODONE    Solution 2.5 milliGRAM(s) Oral every 6 hours PRN  oxyCODONE    Solution 5 milliGRAM(s) Oral every 6 hours PRN  pantoprazole    Tablet 40 milliGRAM(s) Oral before breakfast  polyethylene glycol 3350 17 Gram(s) Oral daily  senna 2 Tablet(s) Oral at bedtime  sodium chloride 0.9%. 1000 milliLiter(s) IV Continuous <Continuous>    SOCIAL HISTORY: No tobacco, no alcohol, no illicit drugs    FAMILY HISTORY:  Family history of CVA (Mother)    FH: thyroid disease (Mother)    Drug Dosing Weight  Height (cm): 165.1 (15 Angel 2022 04:40)  Weight (kg): 79.8323 (31 May 2022 11:00)  BMI (kg/m2): 29.3 (15 Angel 2022 04:40)  BSA (m2): 1.87 (15 Angel 2022 04:40)    PE:    Vital Signs Last 24 Hrs  T(C): 36.4 (15 Angel 2022 06:53), Max: 36.4 (15 Angel 2022 06:53)  T(F): 97.6 (15 Angel 2022 06:53), Max: 97.6 (15 Angel 2022 06:53)  HR: 74 (15 Angel 2022 10:56) (72 - 99)  BP: 107/68 (15 Angel 2022 10:56) (102/69 - 124/78)  BP(mean): 78 (15 Angel 2022 06:53) (78 - 78)  RR: 14 (15 Angel 2022 10:56) (12 - 16)  SpO2: 94% (15 Angel 2022 10:56) (94% - 100%)    Gen: AOx3, NAD, non-toxic  HEENT: R sided neck wound with packing in place, area of neck red--unclear if from radiation vs lichen planus, but not painful  CV: S1+S2 normal, nontachycardic  Resp: Clear bilat, no resp distress, no crackles/wheezes  Abd: Soft, nontender, +BS  Ext: No LE edema, no wounds  : No Dobbs, no suprapubic tenderness  IV/Skin: No thrombophlebitis, no rash  Msk: No low back pain, no arthralgias, no joint swelling  Neuro: No sensory deficits, no motor deficits    LABS:                        8.6    5.30  )-----------( 206      ( 15 Angle 2022 05:53 )             26.6     06-15    133<L>  |  92<L>  |  17  ----------------------------<  97  3.6   |  24  |  1.99<H>    Ca    8.9      15 Angel 2022 05:53  Phos  4.0     06-15  Mg     2.00     06-15    TPro  6.2  /  Alb  3.5  /  TBili  0.2  /  DBili  x   /  AST  10  /  ALT  6   /  AlkPhos  87  06-15    MICROBIOLOGY:    .Abscess Arm - Right  22   Rare Staphylococcus aureus  --  Staphylococcus aureus    .Blood Blood-Peripheral  22   No growth at 5 days.  --  --    .Blood Blood-Peripheral  22   No growth at 5 days.  --  --    RADIOLOGY:     CT:    IMPRESSION:  Postsurgical changes as described with extensive infiltration of the   overlying subcutaneous fat and skin thickening. Evaluation for   abscess/infection is markedly limited on this noncontrast study.

## 2022-06-15 NOTE — ED ADULT NURSE NOTE - OBJECTIVE STATEMENT
pt presents to ED, A&04 ambulatory at baseline, presents to room 4 sent by her doctor. PMH of HTN, HLD, squamous cell carcinoma of gingiva with met to right neck s/p mandibulectomy presents with infection from previous surgical site, (last chemo/radiation in march). Patient was sent home with antibiotics. Patient endorses increased redness, swelling and pain right sided mandibular region and noticed large amounts to drainage, dressing. Respirations even and unlabored. Lung sounds clear with equal chest rise bilaterally. ABD is soft, non tender, non distended, peg tube placement noted, no redness or swelling noted around the area. skin other wise dry and intact. No complaints of chest pain, headache, nausea, dizziness, vomiting  SOB, fever, chills verbalized. 20GLAC in place, labs sent, medicated as ordered. awaiting for ENT for further evaluation. pt presents to ED, A&04 ambulatory at baseline, presents to room 4 sent by her doctor. PMH of HTN, HLD, squamous cell carcinoma of gingiva with met to right neck s/p mandibulectomy presents with infection from previous surgical site, (last chemo/radiation in march). Patient was sent home with antibiotics. Patient endorses increased redness, swelling and pain right sided mandibular region and noticed large amounts to drainage, dressing. patient speaking in full sentences, denies difficulty breathing. Respirations even and unlabored. Lung sounds clear with equal chest rise bilaterally. ABD is soft, non tender, non distended, peg tube placement noted, no redness or swelling noted around the area. left calf abrasion noted, no active bleeding or drainage noted at this time. skin otherwise warm dry and intact. No complaints of chest pain, headache, nausea, dizziness, vomiting  SOB, fever, chills verbalized. 20GLAC in place, labs sent, medicated as ordered. awaiting for ENT for further evaluation.

## 2022-06-15 NOTE — ED PROVIDER NOTE - CLINICAL SUMMARY MEDICAL DECISION MAKING FREE TEXT BOX
pt presenting with complaints of infection of surgical site from prior  segmental mandibulectomy, recent admission for same may 19-22. sent to the ED for admission and IV antibiotics by Dr. So (ENT). will obtain labs, Cx, broad spectrum abx, ENT consult and admit.

## 2022-06-15 NOTE — ED PROVIDER NOTE - CPE EDP EYES NORM
-Increase daily water intake, seven 8 oz cups daily. Current water intake 16-24 oz.   -Begin Metamucil 1.7 g PO once a day, may increase dose slowly. Dissolve in water or juice.  -Increase fiber intake with high fiber foods; beans, broccoli, berries, whole grains, dried fruit  -Call back to clinic if further questions or concerns arise   normal...

## 2022-06-15 NOTE — CONSULT NOTE ADULT - PROBLEM SELECTOR RECOMMENDATION 2
suspect pre-renal etiology, prior admission peak Scr 2.07 improved w/ IVF   [ ] recommend initiating maintenance IVF, NS @ 100 cc/hr  will ctm

## 2022-06-15 NOTE — ED ADULT TRIAGE NOTE - CHIEF COMPLAINT QUOTE
Pt. sent from Dr. So for iv abx for infected neck. Denies fever, chills, chest pain, hemoptysis. PMHx: Squamous cell carcinoma of the mouth, HTN

## 2022-06-15 NOTE — CONSULT NOTE ADULT - PROBLEM SELECTOR RECOMMENDATION 9
per plastic sx note able to express some purulence from medial neck  based on prior abscess cx - +MRSA, would c/w IV vanc by level  received IV vanc 1g in ED  [ ] check vanc trough tomorrow w/ AM labs  [ ] please send rpt wound cx  [ ] ID consult  pain control and wound care per primary team p/w R. submandibular wound purulent discharge  per plastic sx note able to express some purulence from medial neck  based on prior abscess cx - +MRSA, would c/w IV vanc by level to BROOKS  received IV vanc 1g in ED  [ ] check vanc trough tomorrow w/ AM labs  [ ] please send rpt wound cx  [ ] ID consult  pain control and wound care per primary team p/w R. submandibular wound purulent discharge  per plastic sx note able to express some purulence from medial neck  based on prior abscess cx - +MRSA, would c/w IV vanc by level to BROOKS  received IV vanc 1g in ED  [ ] check vanc trough tomorrow w/ AM labs  [ ] please send rpt wound cx  [ ] CT Neck r/o abscess  [ ] ID consult  pain control and wound care per primary team p/w R. submandibular wound purulent discharge  per plastic sx note able to express some purulence from medial neck  based on prior abscess cx - +MSSA  c/w zosyn per ID reccs  [ ] please send rpt wound cx  [ ] CT Neck r/o abscess  pain control and wound care per primary team

## 2022-06-15 NOTE — CONSULT NOTE ADULT - ASSESSMENT
57 yo F with history of mandibulectomy, tracheostomy, R neck dissection for SCC, history of lichen planus, wound infections at neck, now presents to ED for further care  No leukocytosis, no fever  Now chronic wound at neck--poorly healing because of prior radiation/malignancy?  Recently noted to have increasing discharge from wound so sent to ED for further care  Here no signs systemic infection, some redness at site but baseline? Discharge noted at packing  Prior culture with MSSA, and MRSA PCR neg  Overall, Chronic wound, wound infection, BROOKS  - Zosyn 3.375g q 8  - DC Vanco  - F/U plastics team, they are recommending CT with contrast (note elevated Cr)  - F/U abscess culture  - F/U BCXs  - Trend BROOKS to normal, if continue to rise, will have to decrease Zosyn dose    Mark Greco MD  Contact on TEAMS messaging from 9am - 5pm  From 5pm-9am, and on weekends call [phone]
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 S/p radiation March 2022. Postoperative course was complicated by neck infection requiring readmission for IV antibiotics May 19-22, 2022. Presenting to ER at [facility] 6/15/2022 with complaints of worsening drainage from right neck open wound. No fever, no leukocytosis.  - recommend CT w/ IV contrast if kidney function tolerates to rule out abscess  - recommend wet to dry quarter strength Dakins soaked packing for 1 week  - then recommend packing with saline wet to dry packing for 1 week after that  - moisturize left lower leg fibular donor site with Aquaphor twice daily  - recent radiation places patient at high risk of prolonged wound healing problems  - discussed with Dr. Cristina Mars PGY5  Plastic Surgery (pg [facility]: [id number], [place]: [phone])  
57F with PMH HTN, R mandibular ca s/p resection/chemo/radiation presents for recurrent submandibular drainage.

## 2022-06-16 ENCOUNTER — TRANSCRIPTION ENCOUNTER (OUTPATIENT)
Age: 57
End: 2022-06-16

## 2022-06-16 LAB
ALBUMIN SERPL ELPH-MCNC: 3 G/DL — LOW (ref 3.3–5)
ALP SERPL-CCNC: 73 U/L — SIGNIFICANT CHANGE UP (ref 40–120)
ALT FLD-CCNC: 5 U/L — SIGNIFICANT CHANGE UP (ref 4–33)
ANION GAP SERPL CALC-SCNC: 14 MMOL/L — SIGNIFICANT CHANGE UP (ref 7–14)
ANION GAP SERPL CALC-SCNC: 16 MMOL/L — HIGH (ref 7–14)
AST SERPL-CCNC: 8 U/L — SIGNIFICANT CHANGE UP (ref 4–32)
BILIRUB SERPL-MCNC: 0.2 MG/DL — SIGNIFICANT CHANGE UP (ref 0.2–1.2)
BUN SERPL-MCNC: 11 MG/DL — SIGNIFICANT CHANGE UP (ref 7–23)
BUN SERPL-MCNC: 9 MG/DL — SIGNIFICANT CHANGE UP (ref 7–23)
CALCIUM SERPL-MCNC: 8.2 MG/DL — LOW (ref 8.4–10.5)
CALCIUM SERPL-MCNC: 8.3 MG/DL — LOW (ref 8.4–10.5)
CHLORIDE SERPL-SCNC: 101 MMOL/L — SIGNIFICANT CHANGE UP (ref 98–107)
CHLORIDE SERPL-SCNC: 97 MMOL/L — LOW (ref 98–107)
CO2 SERPL-SCNC: 24 MMOL/L — SIGNIFICANT CHANGE UP (ref 22–31)
CO2 SERPL-SCNC: 25 MMOL/L — SIGNIFICANT CHANGE UP (ref 22–31)
CREAT SERPL-MCNC: 1.03 MG/DL — SIGNIFICANT CHANGE UP (ref 0.5–1.3)
CREAT SERPL-MCNC: 1.11 MG/DL — SIGNIFICANT CHANGE UP (ref 0.5–1.3)
EGFR: 58 ML/MIN/1.73M2 — LOW
EGFR: 63 ML/MIN/1.73M2 — SIGNIFICANT CHANGE UP
GLUCOSE SERPL-MCNC: 81 MG/DL — SIGNIFICANT CHANGE UP (ref 70–99)
GLUCOSE SERPL-MCNC: 83 MG/DL — SIGNIFICANT CHANGE UP (ref 70–99)
HCT VFR BLD CALC: 25 % — LOW (ref 34.5–45)
HGB BLD-MCNC: 8.3 G/DL — LOW (ref 11.5–15.5)
MAGNESIUM SERPL-MCNC: 1.6 MG/DL — SIGNIFICANT CHANGE UP (ref 1.6–2.6)
MAGNESIUM SERPL-MCNC: 1.8 MG/DL — SIGNIFICANT CHANGE UP (ref 1.6–2.6)
MCHC RBC-ENTMCNC: 29 PG — SIGNIFICANT CHANGE UP (ref 27–34)
MCHC RBC-ENTMCNC: 33.2 GM/DL — SIGNIFICANT CHANGE UP (ref 32–36)
MCV RBC AUTO: 87.4 FL — SIGNIFICANT CHANGE UP (ref 80–100)
NRBC # BLD: 0 /100 WBCS — SIGNIFICANT CHANGE UP
NRBC # FLD: 0 K/UL — SIGNIFICANT CHANGE UP
PHOSPHATE SERPL-MCNC: 2.7 MG/DL — SIGNIFICANT CHANGE UP (ref 2.5–4.5)
PHOSPHATE SERPL-MCNC: 3.6 MG/DL — SIGNIFICANT CHANGE UP (ref 2.5–4.5)
PLATELET # BLD AUTO: 196 K/UL — SIGNIFICANT CHANGE UP (ref 150–400)
POTASSIUM SERPL-MCNC: 2.5 MMOL/L — CRITICAL LOW (ref 3.5–5.3)
POTASSIUM SERPL-MCNC: 3.8 MMOL/L — SIGNIFICANT CHANGE UP (ref 3.5–5.3)
POTASSIUM SERPL-SCNC: 2.5 MMOL/L — CRITICAL LOW (ref 3.5–5.3)
POTASSIUM SERPL-SCNC: 3.8 MMOL/L — SIGNIFICANT CHANGE UP (ref 3.5–5.3)
PROT SERPL-MCNC: 5.6 G/DL — LOW (ref 6–8.3)
RBC # BLD: 2.86 M/UL — LOW (ref 3.8–5.2)
RBC # FLD: 17.5 % — HIGH (ref 10.3–14.5)
SODIUM SERPL-SCNC: 137 MMOL/L — SIGNIFICANT CHANGE UP (ref 135–145)
SODIUM SERPL-SCNC: 140 MMOL/L — SIGNIFICANT CHANGE UP (ref 135–145)
WBC # BLD: 4.22 K/UL — SIGNIFICANT CHANGE UP (ref 3.8–10.5)
WBC # FLD AUTO: 4.22 K/UL — SIGNIFICANT CHANGE UP (ref 3.8–10.5)

## 2022-06-16 PROCEDURE — 99231 SBSQ HOSP IP/OBS SF/LOW 25: CPT

## 2022-06-16 PROCEDURE — 99232 SBSQ HOSP IP/OBS MODERATE 35: CPT

## 2022-06-16 PROCEDURE — 70491 CT SOFT TISSUE NECK W/DYE: CPT | Mod: 26

## 2022-06-16 RX ORDER — POTASSIUM CHLORIDE 20 MEQ
40 PACKET (EA) ORAL EVERY 4 HOURS
Refills: 0 | Status: COMPLETED | OUTPATIENT
Start: 2022-06-16 | End: 2022-06-16

## 2022-06-16 RX ORDER — MAGNESIUM OXIDE 400 MG ORAL TABLET 241.3 MG
400 TABLET ORAL ONCE
Refills: 0 | Status: DISCONTINUED | OUTPATIENT
Start: 2022-06-16 | End: 2022-06-16

## 2022-06-16 RX ORDER — POTASSIUM CHLORIDE 20 MEQ
20 PACKET (EA) ORAL ONCE
Refills: 0 | Status: DISCONTINUED | OUTPATIENT
Start: 2022-06-16 | End: 2022-06-16

## 2022-06-16 RX ORDER — MAGNESIUM OXIDE 400 MG ORAL TABLET 241.3 MG
400 TABLET ORAL ONCE
Refills: 0 | Status: COMPLETED | OUTPATIENT
Start: 2022-06-16 | End: 2022-06-16

## 2022-06-16 RX ORDER — PETROLATUM,WHITE
1 JELLY (GRAM) TOPICAL
Qty: 2 | Refills: 0
Start: 2022-06-16

## 2022-06-16 RX ORDER — POTASSIUM CHLORIDE 20 MEQ
20 PACKET (EA) ORAL ONCE
Refills: 0 | Status: COMPLETED | OUTPATIENT
Start: 2022-06-16 | End: 2022-06-16

## 2022-06-16 RX ORDER — POTASSIUM PHOSPHATE, MONOBASIC POTASSIUM PHOSPHATE, DIBASIC 236; 224 MG/ML; MG/ML
15 INJECTION, SOLUTION INTRAVENOUS ONCE
Refills: 0 | Status: COMPLETED | OUTPATIENT
Start: 2022-06-16 | End: 2022-06-16

## 2022-06-16 RX ADMIN — SODIUM CHLORIDE 100 MILLILITER(S): 9 INJECTION INTRAMUSCULAR; INTRAVENOUS; SUBCUTANEOUS at 18:52

## 2022-06-16 RX ADMIN — Medication 1 APPLICATION(S): at 07:04

## 2022-06-16 RX ADMIN — Medication 1 APPLICATION(S): at 05:02

## 2022-06-16 RX ADMIN — MAGNESIUM OXIDE 400 MG ORAL TABLET 400 MILLIGRAM(S): 241.3 TABLET ORAL at 18:48

## 2022-06-16 RX ADMIN — PIPERACILLIN AND TAZOBACTAM 25 GRAM(S): 4; .5 INJECTION, POWDER, LYOPHILIZED, FOR SOLUTION INTRAVENOUS at 13:59

## 2022-06-16 RX ADMIN — PIPERACILLIN AND TAZOBACTAM 25 GRAM(S): 4; .5 INJECTION, POWDER, LYOPHILIZED, FOR SOLUTION INTRAVENOUS at 05:01

## 2022-06-16 RX ADMIN — PANTOPRAZOLE SODIUM 40 MILLIGRAM(S): 20 TABLET, DELAYED RELEASE ORAL at 05:01

## 2022-06-16 RX ADMIN — PIPERACILLIN AND TAZOBACTAM 25 GRAM(S): 4; .5 INJECTION, POWDER, LYOPHILIZED, FOR SOLUTION INTRAVENOUS at 21:36

## 2022-06-16 RX ADMIN — Medication 650 MILLIGRAM(S): at 13:04

## 2022-06-16 RX ADMIN — Medication 500 MILLIGRAM(S): at 13:04

## 2022-06-16 RX ADMIN — Medication 1 APPLICATION(S): at 17:10

## 2022-06-16 RX ADMIN — Medication 81 MILLIGRAM(S): at 13:04

## 2022-06-16 RX ADMIN — Medication 40 MILLIEQUIVALENT(S): at 09:35

## 2022-06-16 RX ADMIN — Medication 20 MILLIEQUIVALENT(S): at 18:48

## 2022-06-16 RX ADMIN — ATORVASTATIN CALCIUM 10 MILLIGRAM(S): 80 TABLET, FILM COATED ORAL at 21:36

## 2022-06-16 RX ADMIN — Medication 1 TABLET(S): at 13:03

## 2022-06-16 RX ADMIN — ENOXAPARIN SODIUM 40 MILLIGRAM(S): 100 INJECTION SUBCUTANEOUS at 17:11

## 2022-06-16 RX ADMIN — OXYCODONE HYDROCHLORIDE 5 MILLIGRAM(S): 5 TABLET ORAL at 03:54

## 2022-06-16 RX ADMIN — POTASSIUM PHOSPHATE, MONOBASIC POTASSIUM PHOSPHATE, DIBASIC 62.5 MILLIMOLE(S): 236; 224 INJECTION, SOLUTION INTRAVENOUS at 09:35

## 2022-06-16 RX ADMIN — OXYCODONE HYDROCHLORIDE 5 MILLIGRAM(S): 5 TABLET ORAL at 04:24

## 2022-06-16 NOTE — DISCHARGE NOTE PROVIDER - NSDCCPCAREPLAN_GEN_ALL_CORE_FT
PRINCIPAL DISCHARGE DIAGNOSIS  Diagnosis: Wound infection  Assessment and Plan of Treatment: follow up outpatient

## 2022-06-16 NOTE — DISCHARGE NOTE PROVIDER - CARE PROVIDERS DIRECT ADDRESSES
[email or web address]

## 2022-06-16 NOTE — PROVIDER CONTACT NOTE (CRITICAL VALUE NOTIFICATION) - SITUATION
Patient is alert and orientedX4. In no acute distress. Morning lab reports came back low for potassium levels.

## 2022-06-16 NOTE — PROGRESS NOTE ADULT - SUBJECTIVE AND OBJECTIVE BOX
Patient is a 57y old  Female who presents with a chief complaint of Right facial cellulitis (16 Jun 2022 07:47)      SUBJECTIVE / OVERNIGHT EVENTS: EVAN. Patient reports submandibular drainage has improved. Has no medical complaints at this time,    MEDICATIONS  (STANDING):  acetaminophen     Tablet .. 650 milliGRAM(s) Oral every 6 hours  AQUAPHOR (petrolatum Ointment) 1 Application(s) Topical two times a day  ascorbic acid 500 milliGRAM(s) Oral daily  aspirin enteric coated 81 milliGRAM(s) Oral daily  atorvastatin 10 milliGRAM(s) Oral at bedtime  Dakins Solution - 1/4 Strength 1 Application(s) Topical two times a day  enoxaparin Injectable 40 milliGRAM(s) SubCutaneous every 24 hours  multivitamin 1 Tablet(s) Oral daily  pantoprazole    Tablet 40 milliGRAM(s) Oral before breakfast  piperacillin/tazobactam IVPB.. 3.375 Gram(s) IV Intermittent every 8 hours  polyethylene glycol 3350 17 Gram(s) Oral daily  potassium chloride   Powder 40 milliEquivalent(s) Oral every 4 hours  senna 2 Tablet(s) Oral at bedtime  sodium chloride 0.9%. 1000 milliLiter(s) (100 mL/Hr) IV Continuous <Continuous>    MEDICATIONS  (PRN):  ALBUTerol    90 MICROgram(s) HFA Inhaler 2 Puff(s) Inhalation every 6 hours PRN Shortness of Breath and/or Wheezing  bisacodyl 5 milliGRAM(s) Oral daily PRN Constipation  oxyCODONE    Solution 2.5 milliGRAM(s) Oral every 6 hours PRN Moderate Pain (4 - 6)  oxyCODONE    Solution 5 milliGRAM(s) Oral every 6 hours PRN Severe Pain (7 - 10)        CAPILLARY BLOOD GLUCOSE        I&O's Summary    15 Angel 2022 07:01  -  16 Jun 2022 07:00  --------------------------------------------------------  IN: 1200 mL / OUT: 700 mL / NET: 500 mL    Vital Signs Last 24 Hrs  T(C): 36.6 (16 Jun 2022 10:13), Max: 36.6 (16 Jun 2022 10:13)  T(F): 97.8 (16 Jun 2022 10:13), Max: 97.8 (16 Jun 2022 10:13)  HR: 76 (16 Jun 2022 10:13) (76 - 88)  BP: 122/73 (16 Jun 2022 10:13) (102/65 - 125/68)  BP(mean): --  RR: 18 (16 Jun 2022 10:13) (15 - 18)  SpO2: 95% (16 Jun 2022 10:13) (93% - 97%)      PHYSICAL EXAM:    GENERAL: NAD, well-groomed, well-developed  HEAD:  Atraumatic, Normocephalic  EYES: EOMI, PERRLA, conjunctiva and sclera clear  ENMT: Moist mucous membranes  NECK: +R. submandibular wound packing appears c/d/i, +erythema, +TTP, +surrounding radiation skin pigmentation  RESPIRATORY: Clear to auscultation bilaterally; No rales, rhonchi, wheezing, or rubs  CARDIOVASCULAR: Regular rate and rhythm; No murmurs, rubs, or gallops  GASTROINTESTINAL: +PEG site c/d/i. Soft, Nontender, Nondistended; Bowel sounds present  GENITOURINARY: Not examined  EXTREMITIES:  2+ Peripheral Pulses, bl LE 1+ non pitting edema  NERVOUS SYSTEM:  Alert & Oriented X3; Moving all 4 extremities; No gross sensory deficits      LABS:                        8.3    4.22  )-----------( 196      ( 16 Jun 2022 06:10 )             25.0     06-16    137  |  97<L>  |  11  ----------------------------<  81  2.5<LL>   |  24  |  1.11    Ca    8.2<L>      16 Jun 2022 06:10  Phos  2.7     06-16  Mg     1.80     06-16    TPro  5.6<L>  /  Alb  3.0<L>  /  TBili  0.2  /  DBili  x   /  AST  8   /  ALT  5   /  AlkPhos  73  06-16              RADIOLOGY & ADDITIONAL TESTS:    Imaging Personally Reviewed:    Consultant(s) Notes Reviewed:      Care Discussed with Consultants/Other Providers:

## 2022-06-16 NOTE — PROGRESS NOTE ADULT - SUBJECTIVE AND OBJECTIVE BOX
Plastic Surgery Progress Note (pg [facility]: [id number], [facility]: [phone])    SUBJECTIVE  The patient was seen and examined. No acute events overnight.    OBJECTIVE  ___________________________________________________  VITAL SIGNS / I&O's   Vital Signs Last 24 Hrs  T(C): 36.4 (16 Jun 2022 06:39), Max: 36.5 (15 Angel 2022 17:41)  T(F): 97.5 (16 Jun 2022 06:39), Max: 97.7 (15 Angel 2022 17:41)  HR: 88 (16 Jun 2022 06:39) (74 - 88)  BP: 125/68 (16 Jun 2022 06:39) (102/65 - 125/68)  BP(mean): --  RR: 18 (16 Jun 2022 06:39) (14 - 18)  SpO2: 97% (16 Jun 2022 06:39) (93% - 97%)      15 Angel 2022 07:01  -  16 Jun 2022 07:00  --------------------------------------------------------  IN:    sodium chloride 0.9%: 1200 mL  Total IN: 1200 mL    OUT:    Oral Fluid: 0 mL    Voided (mL): 700 mL  Total OUT: 700 mL    Total NET: 500 mL        ___________________________________________________  PHYSICAL EXAM    General: Well developed, well nourished, NAD  Neuro: Alert and oriented, no focal deficits, moves all extremities spontaneously  HEENT: Open wound of right submental region 1-2 cm in size just under the mandible with purulent drainage. Surrounding tissue is indurated with pigmentation changes secondary to radiation. No spreading erythema appreciated. Able to express some purulence from medial neck. Mildly tender to palpation. Intraorally the flap is pink, viable, incisions intact no dehiscence appreciated. Well healed tracheal stoma site.  Respiratory: Airway patent, respirations unlabored  Extremities: No edema, sensation and movement grossly intact, Left lateral leg fibular donor site well healed with area of dry skin.    ___________________________________________________  LABS                        8.6    5.30  )-----------( 206      ( 15 Angel 2022 05:53 )             26.6     15 Angel 2022 05:53    133    |  92     |  17     ----------------------------<  97     3.6     |  24     |  1.99     Ca    8.9        15 Angel 2022 05:53  Phos  4.0       15 Angel 2022 05:53  Mg     2.00      15 Angel 2022 05:53    TPro  6.2    /  Alb  3.5    /  TBili  0.2    /  DBili  x      /  AST  10     /  ALT  6      /  AlkPhos  87     15 Angel 2022 05:53    ___________________________________________________  MEDICATIONS  (STANDING):  acetaminophen     Tablet .. 650 milliGRAM(s) Oral every 6 hours  AQUAPHOR (petrolatum Ointment) 1 Application(s) Topical two times a day  ascorbic acid 500 milliGRAM(s) Oral daily  aspirin enteric coated 81 milliGRAM(s) Oral daily  atorvastatin 10 milliGRAM(s) Oral at bedtime  Dakins Solution - 1/4 Strength 1 Application(s) Topical two times a day  enoxaparin Injectable 40 milliGRAM(s) SubCutaneous every 24 hours  multivitamin 1 Tablet(s) Oral daily  pantoprazole    Tablet 40 milliGRAM(s) Oral before breakfast  piperacillin/tazobactam IVPB.. 3.375 Gram(s) IV Intermittent every 8 hours  polyethylene glycol 3350 17 Gram(s) Oral daily  senna 2 Tablet(s) Oral at bedtime  sodium chloride 0.9%. 1000 milliLiter(s) (100 mL/Hr) IV Continuous <Continuous>    MEDICATIONS  (PRN):  ALBUTerol    90 MICROgram(s) HFA Inhaler 2 Puff(s) Inhalation every 6 hours PRN Shortness of Breath and/or Wheezing  bisacodyl 5 milliGRAM(s) Oral daily PRN Constipation  oxyCODONE    Solution 2.5 milliGRAM(s) Oral every 6 hours PRN Moderate Pain (4 - 6)  oxyCODONE    Solution 5 milliGRAM(s) Oral every 6 hours PRN Severe Pain (7 - 10)

## 2022-06-16 NOTE — DISCHARGE NOTE PROVIDER - NSDCFUADDINST_GEN_ALL_CORE_FT
Patient was previously scheduled for colonoscopy with IV sedation with Dr. Oconnor on 3/29/22 at 1200 however he will be out of office after 1200 on that date .     Will need to contact pt to discuss /reschedule.   Packing Instructions  -1 week 1/4inc dakins wet to dry until 6/22/2022 then followed by 1 week saline wet to dry untill 6/29/2022.     Donor Site  - moisturize left lower leg fibular donor site with Aquaphor twice daily

## 2022-06-16 NOTE — DISCHARGE NOTE PROVIDER - NSDCMRMEDTOKEN_GEN_ALL_CORE_FT
albuterol 90 mcg/inh inhalation powder: 2 puff(s) inhaled every 6 hours, As Needed  amoxicillin-clavulanate 875 mg-125 mg oral tablet: 1 tab(s) orally every 12 hours   ascorbic acid 500 mg oral tablet: 1 tab(s) orally once a day  aspirin 81 mg oral delayed release tablet: 1 tab(s) orally once a day  atorvastatin 10 mg oral tablet: 1 tab(s) orally once a day PM  Compazine 10 mg oral tablet: orally every 6 hours, As Needed  esomeprazole 40 mg oral delayed release capsule: 1 cap(s) orally once a day   lisinopril 5 mg oral tablet: 1 tab(s) orally once a day  Multiple Vitamins oral tablet: 1 tab(s) orally once a day  oxyCODONE 5 mg/5 mL oral solution: 5 milliliter(s) orally every 6 hours, As Needed -Severe pain MDD:20mL  polyethylene glycol 3350 oral powder for reconstitution: 17 gram(s) orally 2 times a day, As needed, Constipation  Rolling Walker and Wheelchair for debility/ ataxia ICD: R54: 1 application buccal once a day   senna oral tablet: 2 tab(s) orally once a day (at bedtime)  Tube Feeds: Glucerna 1.5, Total Volume for 24 hours: 960 bolus, Total Volume of BOlus (mL): 240, Total Number fo Feeds: 5, Tube Feed Frequency; Every 6 hours, Bolus Feed Rate (mL per hour): 60, Bolus Feed Duration (Hours): 24, Free Water Flushh 250mL Q8hr: 1 application by gastrostomy tube once a day   Zofran 8 mg oral tablet: orally every 8 hours, As Needed   albuterol 90 mcg/inh inhalation powder: 2 puff(s) inhaled every 6 hours, As Needed  amoxicillin-clavulanate 875 mg-125 mg oral tablet: 1 tab(s) orally every 12 hours   ascorbic acid 500 mg oral tablet: 1 tab(s) orally once a day  aspirin 81 mg oral delayed release tablet: 1 tab(s) orally once a day  atorvastatin 10 mg oral tablet: 1 tab(s) orally once a day PM  Compazine 10 mg oral tablet: orally every 6 hours, As Needed  esomeprazole 40 mg oral delayed release capsule: 1 cap(s) orally once a day   lisinopril 5 mg oral tablet: 1 tab(s) orally once a day  Multiple Vitamins oral tablet: 1 tab(s) orally once a day  oxyCODONE 5 mg/5 mL oral solution: 5 milliliter(s) orally every 6 hours, As Needed -Severe pain MDD:20mL  petrolatum topical ointment: 1 application topically 2 times a day  polyethylene glycol 3350 oral powder for reconstitution: 17 gram(s) orally 2 times a day, As needed, Constipation  Rolling Walker and Wheelchair for debility/ ataxia ICD: R54: 1 application buccal once a day   senna oral tablet: 2 tab(s) orally once a day (at bedtime)  Tube Feeds: Glucerna 1.5, Total Volume for 24 hours: 960 bolus, Total Volume of BOlus (mL): 240, Total Number fo Feeds: 5, Tube Feed Frequency; Every 6 hours, Bolus Feed Rate (mL per hour): 60, Bolus Feed Duration (Hours): 24, Free Water Flushh 250mL Q8hr: 1 application by gastrostomy tube once a day   Zofran 8 mg oral tablet: orally every 8 hours, As Needed   albuterol 90 mcg/inh inhalation powder: 2 puff(s) inhaled every 6 hours, As Needed  ascorbic acid 500 mg oral tablet: 1 tab(s) orally once a day  aspirin 81 mg oral delayed release tablet: 1 tab(s) orally once a day  atorvastatin 10 mg oral tablet: 1 tab(s) orally once a day PM  Compazine 10 mg oral tablet: orally every 6 hours, As Needed  esomeprazole 40 mg oral delayed release capsule: 1 cap(s) orally once a day   lisinopril 5 mg oral tablet: 1 tab(s) orally once a day  Multiple Vitamins oral tablet: 1 tab(s) orally once a day  oxyCODONE 5 mg/5 mL oral solution: 5 milliliter(s) orally every 6 hours, As Needed -Severe pain MDD:20mL  petrolatum topical ointment: 1 application topically 2 times a day  polyethylene glycol 3350 oral powder for reconstitution: 17 gram(s) orally 2 times a day, As needed, Constipation  Rolling Walker and Wheelchair for debility/ ataxia ICD: R54: 1 application buccal once a day   senna oral tablet: 2 tab(s) orally once a day (at bedtime)  Tube Feeds: Glucerna 1.5, Total Volume for 24 hours: 960 bolus, Total Volume of BOlus (mL): 240, Total Number fo Feeds: 5, Tube Feed Frequency; Every 6 hours, Bolus Feed Rate (mL per hour): 60, Bolus Feed Duration (Hours): 24, Free Water Flushh 250mL Q8hr: 1 application by gastrostomy tube once a day   Zofran 8 mg oral tablet: orally every 8 hours, As Needed

## 2022-06-16 NOTE — PROGRESS NOTE ADULT - SUBJECTIVE AND OBJECTIVE BOX
SUBJECTIVE:  6/16: No issues overnight.     Vital Signs Last 24 Hrs  T(C): 36.4 (16 Jun 2022 06:39), Max: 36.5 (15 Angel 2022 17:41)  T(F): 97.5 (16 Jun 2022 06:39), Max: 97.7 (15 Angel 2022 17:41)  HR: 88 (16 Jun 2022 06:39) (74 - 88)  BP: 125/68 (16 Jun 2022 06:39) (102/65 - 125/68)  BP(mean): --  RR: 18 (16 Jun 2022 06:39) (14 - 18)  SpO2: 97% (16 Jun 2022 06:39) (93% - 97%)    PE:  General: NAD, awake, alert, conversant  HEENT: Small area of R neck packed, dressing clean. No focal swelling/edema.   Gtube in place.     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     - Admitted to ENT  - Continue packing per PRS  - F/u Na, wound cultures  - Continue Zosyn  - Appreciate ID recommendations  - Gtube feeds, bolus  - Medicine co-mgmt  - Pain control as needed SUBJECTIVE:  6/16: Patient with BROOKS. No other issues overnight.    Vital Signs Last 24 Hrs  T(C): 36.4 (16 Jun 2022 06:39), Max: 36.5 (15 Angel 2022 17:41)  T(F): 97.5 (16 Jun 2022 06:39), Max: 97.7 (15 Angel 2022 17:41)  HR: 88 (16 Jun 2022 06:39) (74 - 88)  BP: 125/68 (16 Jun 2022 06:39) (102/65 - 125/68)  BP(mean): --  RR: 18 (16 Jun 2022 06:39) (14 - 18)  SpO2: 97% (16 Jun 2022 06:39) (93% - 97%)    PE:  General: NAD, awake, alert, conversant  HEENT: Small area of R neck packed, dressing clean. No focal swelling/edema.   Gtube in place.     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     - Admitted to ENT  - Continue packing per PRS  - F/u Na, wound cultures  - Fluids for BROOKS  - Continue Zosyn  - Appreciate ID recommendations  - Gtube feeds, bolus  - Medicine co-mgmt  - Pain control as needed

## 2022-06-16 NOTE — DISCHARGE NOTE PROVIDER - NSDCFUSCHEDAPPT_GEN_ALL_CORE_FT
[facility]  [facility]  [address]   Scheduled Appointment: 06/22/2022    [facility]  [facility]  [address]   Scheduled Appointment: 06/22/2022    [facility]  GASTRO [address]  Scheduled Appointment: 06/24/2022    Vic So  [facility]  OTOLARYNG [address]  Scheduled Appointment: 07/12/2022    Freddy Pappas  [facility]  [facility]  Scheduled Appointment: 07/18/2022

## 2022-06-16 NOTE — DISCHARGE NOTE PROVIDER - HOSPITAL COURSE
58 y/o F pmhx HTN, HLD and right right mandible SCC s/p right composite +SND p/w right neck infection on 6/15. Pt admitted with right facial cellulitis. Pt is receiving packing for right neck infection - 1 week 1/4inc dakins wet to dry then 1 week saline wet to dry. Pt is tolerating diet of Glucerna Gtube bolus and normal saline.  Infectious disease was consulted and pt is on currently on zosyn started 6/15----? Medicine consulted for BROOKS, labs have been trended and replaced as needed. CT scan 6/16 - showed no abscess.    56 y/o F pmhx HTN, HLD and right right mandible SCC s/p right composite +SND p/w right neck infection on 6/15. Pt admitted with right facial cellulitis. Pt is receiving packing for right neck infection - 1 week 1/4inc dakins wet to dry then 1 week saline wet to dry. Pt is tolerating diet of Glucerna Gtube bolus and normal saline.  Infectious disease was consulted and pt was placed on zosyn.  Medicine consulted for BROOKS, labs have been trended and replaced as needed. CT scan 6/16 - showed no abscess. Since most recent CT scan revealed no abscess and infection, antibiotics were no longer needed per ID.    58 y/o F pmhx HTN, HLD and right right mandible SCC s/p right composite +SND p/w right neck infection on 6/15. Pt admitted with right facial cellulitis. Pt is receiving packing for right neck infection - 1 week 1/4inc dakins wet to dry till 6/22/2022, then 1 week saline wet to dry till 6/29/2022. Pt is tolerating soft diet with Glucerna Gtube bolus feeding and normal saline.  Infectious disease was consulted and pt was placed on zosyn while inpatient.  Medicine consulted for BROOKS, labs have been trended and replaced as needed. CT scan 6/16 - showed no abscess. Since most recent CT scan revealed no abscess and infection, antibiotics were no longer needed per ID. Plastic surgery cleared for discharge. Patient was cleared for discharge home by Dr. So on 6/17/2022. All prescriptions were sent to a pharmacy that was agreed on with the patient.     56 y/o F pmhx HTN, HLD and right right mandible SCC s/p right composite +SND p/w right neck infection on 6/15. Pt admitted with right facial cellulitis. Pt is receiving packing for right neck infection - 1 week 1/4inc dakins wet to dry till 6/22/2022, then 1 week saline wet to dry till 6/29/2022. Pt is tolerating soft diet with Glucerna Gtube bolus feeding. Infectious disease was consulted and pt was placed on zosyn while inpatient.  Medicine consulted for BROOKS, labs have been trended and replaced as needed. CT scan 6/16 - showed no abscess. Since most recent CT scan revealed no abscess and infection, antibiotics were no longer needed per ID. Plastic surgery cleared for discharge. Patient was cleared for discharge home by Dr. So on 6/17/2022. All prescriptions were sent to a pharmacy that was agreed on with the patient.

## 2022-06-16 NOTE — DISCHARGE NOTE PROVIDER - CARE PROVIDER_API CALL
Vic So)  [facility] Surgery; Otolaryngology  [address] [place] [id number]  Phone: [phone]  Fax: [phone]  Follow Up Time: 1 week

## 2022-06-16 NOTE — PROGRESS NOTE ADULT - SUBJECTIVE AND OBJECTIVE BOX
Follow Up: Jaw drainage     Interval History/ROS: Afebrile. Feels great. Minor pain at the front of her chin. Wants to go home.     Allergies  morphine (Hives)        ANTIMICROBIALS:  piperacillin/tazobactam IVPB.. 3.375 every 8 hours      OTHER MEDS:  acetaminophen     Tablet .. 650 milliGRAM(s) Oral every 6 hours  ALBUTerol    90 MICROgram(s) HFA Inhaler 2 Puff(s) Inhalation every 6 hours PRN  AQUAPHOR (petrolatum Ointment) 1 Application(s) Topical two times a day  ascorbic acid 500 milliGRAM(s) Oral daily  aspirin enteric coated 81 milliGRAM(s) Oral daily  atorvastatin 10 milliGRAM(s) Oral at bedtime  bisacodyl 5 milliGRAM(s) Oral daily PRN  Dakins Solution - 1/4 Strength 1 Application(s) Topical two times a day  enoxaparin Injectable 40 milliGRAM(s) SubCutaneous every 24 hours  magnesium oxide 400 milliGRAM(s) Oral once  multivitamin 1 Tablet(s) Oral daily  oxyCODONE    Solution 2.5 milliGRAM(s) Oral every 6 hours PRN  oxyCODONE    Solution 5 milliGRAM(s) Oral every 6 hours PRN  pantoprazole    Tablet 40 milliGRAM(s) Oral before breakfast  polyethylene glycol 3350 17 Gram(s) Oral daily  potassium chloride   Powder 20 milliEquivalent(s) Oral once  senna 2 Tablet(s) Oral at bedtime  sodium chloride 0.9%. 1000 milliLiter(s) IV Continuous <Continuous>      Vital Signs Last 24 Hrs  T(C): 36.9 (16 Jun 2022 17:29), Max: 36.9 (16 Jun 2022 17:29)  T(F): 98.4 (16 Jun 2022 17:29), Max: 98.4 (16 Jun 2022 17:29)  HR: 111 (16 Jun 2022 17:29) (72 - 111)  BP: 118/50 (16 Jun 2022 17:29) (102/70 - 129/70)  BP(mean): --  RR: 18 (16 Jun 2022 17:29) (17 - 18)  SpO2: 95% (16 Jun 2022 17:29) (93% - 97%)    Physical Exam:  General: non toxic  ENT: post op changes intraorally right side. small wound right anterior jaw. packed. no pus or cellulitis.   Cardio: regular rate   Respiratory: nonlabored   abd: nondistended  Skin: no rash                          8.3    4.22  )-----------( 196      ( 16 Jun 2022 06:10 )             25.0       06-16    140  |  101  |  9   ----------------------------<  83  3.8   |  25  |  1.03    Ca    8.3<L>      16 Jun 2022 16:20  Phos  3.6     06-16  Mg     1.60     06-16    TPro  5.6<L>  /  Alb  3.0<L>  /  TBili  0.2  /  DBili  x   /  AST  8   /  ALT  5   /  AlkPhos  73  06-16          MICROBIOLOGY:  Culture - Abscess with Gram Stain (collected 06-15-22 @ 11:00)  Source: .Abscess face  Preliminary Report (06-16-22 @ 14:14):    No growth    Culture - Blood (collected 06-15-22 @ 06:00)  Source: .Blood Blood-Venous  Preliminary Report (06-16-22 @ 10:01):    No growth to date.    Culture - Blood (collected 06-15-22 @ 05:45)  Source: .Blood Blood  Preliminary Report (06-16-22 @ 10:01):    No growth to date.      RADIOLOGY:  Images below reviewed personally  CT Neck Soft Tissue w/ IV Cont (06.16.22 @ 11:29)   1. Redemonstration of right-sided compositesegmental mandibulectomy and   fibular free flap reconstruction without evidence of recurrent or   residual neoplastic disease at the postsurgical site.  2. Mild cellulitis affecting the right neck without abscess formation.   Additional inflammatory/edematous changes track into the right   submandibular, right supraglottic, and retropharyngeal spaces.  3. No new or enlarging cervical lymph nodes.

## 2022-06-16 NOTE — DISCHARGE NOTE PROVIDER - INSTRUCTIONS
glucerna 1.5  Glucerna 1.5   240mL bolus for 4 feeds a day every 6 hours  - PEG care  1. Clean around your PEG tube daily as taught at [facility] and as needed  2. Glucerna 1.5 bolus feed at 240ml every 6 hours via PEG  3. Free water 200ml every 6 hours via PEG  4. Please flush PEG tube after each feed with free water to prevent plug

## 2022-06-17 ENCOUNTER — TRANSCRIPTION ENCOUNTER (OUTPATIENT)
Age: 57
End: 2022-06-17

## 2022-06-17 VITALS
TEMPERATURE: 98 F | OXYGEN SATURATION: 100 % | SYSTOLIC BLOOD PRESSURE: 123 MMHG | RESPIRATION RATE: 18 BRPM | HEART RATE: 76 BPM | DIASTOLIC BLOOD PRESSURE: 56 MMHG

## 2022-06-17 LAB
ANION GAP SERPL CALC-SCNC: 17 MMOL/L — HIGH (ref 7–14)
BUN SERPL-MCNC: 8 MG/DL — SIGNIFICANT CHANGE UP (ref 7–23)
CALCIUM SERPL-MCNC: 8.2 MG/DL — LOW (ref 8.4–10.5)
CHLORIDE SERPL-SCNC: 98 MMOL/L — SIGNIFICANT CHANGE UP (ref 98–107)
CO2 SERPL-SCNC: 22 MMOL/L — SIGNIFICANT CHANGE UP (ref 22–31)
CREAT SERPL-MCNC: 0.88 MG/DL — SIGNIFICANT CHANGE UP (ref 0.5–1.3)
EGFR: 76 ML/MIN/1.73M2 — SIGNIFICANT CHANGE UP
GLUCOSE SERPL-MCNC: 84 MG/DL — SIGNIFICANT CHANGE UP (ref 70–99)
HCT VFR BLD CALC: 24.8 % — LOW (ref 34.5–45)
HGB BLD-MCNC: 8.3 G/DL — LOW (ref 11.5–15.5)
MAGNESIUM SERPL-MCNC: 1.6 MG/DL — SIGNIFICANT CHANGE UP (ref 1.6–2.6)
MCHC RBC-ENTMCNC: 29.5 PG — SIGNIFICANT CHANGE UP (ref 27–34)
MCHC RBC-ENTMCNC: 33.5 GM/DL — SIGNIFICANT CHANGE UP (ref 32–36)
MCV RBC AUTO: 88.3 FL — SIGNIFICANT CHANGE UP (ref 80–100)
NRBC # BLD: 0 /100 WBCS — SIGNIFICANT CHANGE UP
NRBC # FLD: 0 K/UL — SIGNIFICANT CHANGE UP
PHOSPHATE SERPL-MCNC: 2.7 MG/DL — SIGNIFICANT CHANGE UP (ref 2.5–4.5)
PLATELET # BLD AUTO: 192 K/UL — SIGNIFICANT CHANGE UP (ref 150–400)
POTASSIUM SERPL-MCNC: 3.2 MMOL/L — LOW (ref 3.5–5.3)
POTASSIUM SERPL-SCNC: 3.2 MMOL/L — LOW (ref 3.5–5.3)
RBC # BLD: 2.81 M/UL — LOW (ref 3.8–5.2)
RBC # FLD: 17.3 % — HIGH (ref 10.3–14.5)
SODIUM SERPL-SCNC: 137 MMOL/L — SIGNIFICANT CHANGE UP (ref 135–145)
WBC # BLD: 5.07 K/UL — SIGNIFICANT CHANGE UP (ref 3.8–10.5)
WBC # FLD AUTO: 5.07 K/UL — SIGNIFICANT CHANGE UP (ref 3.8–10.5)

## 2022-06-17 PROCEDURE — 99232 SBSQ HOSP IP/OBS MODERATE 35: CPT

## 2022-06-17 PROCEDURE — ZZZZZ: CPT

## 2022-06-17 RX ORDER — SODIUM HYPOCHLORITE 0.125 %
1 SOLUTION, NON-ORAL MISCELLANEOUS
Qty: 20 | Refills: 0
Start: 2022-06-17 | End: 2022-06-21

## 2022-06-17 RX ORDER — POTASSIUM CHLORIDE 20 MEQ
40 PACKET (EA) ORAL ONCE
Refills: 0 | Status: COMPLETED | OUTPATIENT
Start: 2022-06-17 | End: 2022-06-17

## 2022-06-17 RX ORDER — MAGNESIUM OXIDE 400 MG ORAL TABLET 241.3 MG
400 TABLET ORAL ONCE
Refills: 0 | Status: COMPLETED | OUTPATIENT
Start: 2022-06-17 | End: 2022-06-17

## 2022-06-17 RX ADMIN — Medication 81 MILLIGRAM(S): at 13:09

## 2022-06-17 RX ADMIN — PANTOPRAZOLE SODIUM 40 MILLIGRAM(S): 20 TABLET, DELAYED RELEASE ORAL at 05:39

## 2022-06-17 RX ADMIN — Medication 40 MILLIEQUIVALENT(S): at 13:09

## 2022-06-17 RX ADMIN — MAGNESIUM OXIDE 400 MG ORAL TABLET 400 MILLIGRAM(S): 241.3 TABLET ORAL at 09:41

## 2022-06-17 RX ADMIN — Medication 500 MILLIGRAM(S): at 13:08

## 2022-06-17 RX ADMIN — Medication 1 TABLET(S): at 13:09

## 2022-06-17 RX ADMIN — PIPERACILLIN AND TAZOBACTAM 25 GRAM(S): 4; .5 INJECTION, POWDER, LYOPHILIZED, FOR SOLUTION INTRAVENOUS at 05:37

## 2022-06-17 RX ADMIN — Medication 1 APPLICATION(S): at 05:37

## 2022-06-17 RX ADMIN — Medication 40 MILLIEQUIVALENT(S): at 09:40

## 2022-06-17 NOTE — PROGRESS NOTE ADULT - SUBJECTIVE AND OBJECTIVE BOX
Plastic Surgery Progress Note (pg [facility]: [id number], [place]: [phone])    SUBJECTIVE  The patient was seen and examined. No acute events overnight.    OBJECTIVE  ___________________________________________________  VITAL SIGNS / I&O's   Vital Signs Last 24 Hrs  T(C): 36.4 (17 Jun 2022 06:09), Max: 37.1 (16 Jun 2022 21:41)  T(F): 97.6 (17 Jun 2022 06:09), Max: 98.8 (16 Jun 2022 21:41)  HR: 69 (17 Jun 2022 06:09) (69 - 111)  BP: 114/82 (17 Jun 2022 06:09) (113/57 - 129/70)  BP(mean): --  RR: 18 (17 Jun 2022 06:09) (17 - 18)  SpO2: 100% (17 Jun 2022 06:09) (95% - 100%)      16 Jun 2022 07:01  -  17 Jun 2022 07:00  --------------------------------------------------------  IN:    sodium chloride 0.9%: 1200 mL  Total IN: 1200 mL    OUT:    Oral Fluid: 0 mL  Total OUT: 0 mL    Total NET: 1200 mL        ___________________________________________________  PHYSICAL EXAM  General: Well developed, well nourished, NAD  Neuro: Alert and oriented, no focal deficits, moves all extremities spontaneously  HEENT: Open wound of right submental region 1-2 cm in size just under the mandible with purulent drainage. Surrounding tissue is indurated with pigmentation changes secondary to radiation. No spreading erythema appreciated. Able to express some purulence from medial neck. Mildly tender to palpation. Intraorally the flap is pink, viable, incisions intact no dehiscence appreciated. Well healed tracheal stoma site.  Respiratory: Airway patent, respirations unlabored  Extremities: No edema, sensation and movement grossly intact, Left lateral leg fibular donor site well healed with area of dry skin.    ___________________________________________________  LABS                        8.3    5.07  )-----------( 192      ( 17 Jun 2022 06:10 )             24.8     17 Jun 2022 06:10    137    |  98     |  8      ----------------------------<  84     3.2     |  22     |  0.88     Ca    8.2        17 Jun 2022 06:10  Phos  2.7       17 Jun 2022 06:10  Mg     1.60      17 Jun 2022 06:10    TPro  5.6    /  Alb  3.0    /  TBili  0.2    /  DBili  x      /  AST  8      /  ALT  5      /  AlkPhos  73     16 Jun 2022 06:10    ___________________________________________________  MICRO  Recent Cultures:  Specimen Source: .Abscess face, 06-15 @ 11:00; Results   No growth; Gram Stain: --; Organism: --  Specimen Source: .Blood Blood-Venous, 06-15 @ 06:00; Results   No growth to date.; Gram Stain: --; Organism: --  Specimen Source: .Blood Blood, 06-15 @ 05:45; Results   No growth to date.; Gram Stain: --; Organism: --    ___________________________________________________  MEDICATIONS  (STANDING):  acetaminophen     Tablet .. 650 milliGRAM(s) Oral every 6 hours  AQUAPHOR (petrolatum Ointment) 1 Application(s) Topical two times a day  ascorbic acid 500 milliGRAM(s) Oral daily  aspirin enteric coated 81 milliGRAM(s) Oral daily  atorvastatin 10 milliGRAM(s) Oral at bedtime  Dakins Solution - 1/4 Strength 1 Application(s) Topical two times a day  enoxaparin Injectable 40 milliGRAM(s) SubCutaneous every 24 hours  magnesium oxide 400 milliGRAM(s) Oral once  multivitamin 1 Tablet(s) Oral daily  pantoprazole    Tablet 40 milliGRAM(s) Oral before breakfast  piperacillin/tazobactam IVPB.. 3.375 Gram(s) IV Intermittent every 8 hours  polyethylene glycol 3350 17 Gram(s) Oral daily  potassium chloride   Powder 40 milliEquivalent(s) Oral once  potassium chloride   Powder 40 milliEquivalent(s) Oral once  senna 2 Tablet(s) Oral at bedtime    MEDICATIONS  (PRN):  ALBUTerol    90 MICROgram(s) HFA Inhaler 2 Puff(s) Inhalation every 6 hours PRN Shortness of Breath and/or Wheezing  bisacodyl 5 milliGRAM(s) Oral daily PRN Constipation  oxyCODONE    Solution 2.5 milliGRAM(s) Oral every 6 hours PRN Moderate Pain (4 - 6)  oxyCODONE    Solution 5 milliGRAM(s) Oral every 6 hours PRN Severe Pain (7 - 10)

## 2022-06-17 NOTE — PROGRESS NOTE ADULT - PROBLEM SELECTOR PLAN 1
p/w R. submandibular wound purulent discharge  per plastic sx note able to express some purulence from medial neck on admission  based on prior abscess cx - +MSSA  c/w zosyn per ID reccs  bcx NGTD  [ ] f/u rpt wound cx  [ ] CT Neck r/o abscess  pain control and wound care per primary team.
p/w R. submandibular wound purulent discharge  per plastic sx note able to express some purulence from medial neck on admission  based on prior abscess cx - +MSSA  can dc abx  bcx NGTD  rpt wound cx no growth, ENT suspects this is natural progression of healing  pain control and wound care per primary team.

## 2022-06-17 NOTE — DISCHARGE NOTE NURSING/CASE MANAGEMENT/SOCIAL WORK - NSDCPEFALRISK_GEN_ALL_CORE
For information on Fall & Injury Prevention, visit: [email or web address] OR  [email or web address] OR  https://www.cdc.gov/steadi/patient.html

## 2022-06-17 NOTE — PROGRESS NOTE ADULT - PROBLEM SELECTOR PLAN 3
with your back straight. · Focus on your breathing. Make it slow and steady. · Breathe in through your nose. Breathe out through either your nose or mouth. · Breathe deeply, filling up the area between your navel and your rib cage. Breathe so that your belly goes up and down. · Do not hold your breath. · Breathe like this for 5 to 10 minutes. Notice the feeling of calmness throughout your whole body. As you continue to breathe slowly and deeply, relax by doing the following for another 5 to 10 minutes:  · Tighten and relax each muscle group in your body. You can begin at your toes and work your way up to your head. · Imagine your muscle groups relaxing and becoming heavy. · Empty your mind of all thoughts. · Let yourself relax more and more deeply. · Become aware of the state of calmness that surrounds you. · When your relaxation time is over, you can bring yourself back to alertness by moving your fingers and toes and then your hands and feet and then stretching and moving your entire body. Sometimes people fall asleep during relaxation, but they usually wake up shortly afterward. xxxxxxxxx[id number]    Eat to live. The closer to nature the better. Eat at least 2 servings of fruit and 3 servings of veggies per day. Try to limit carbs to high fiber carbs and small portions of meat. If plant protein, can do much more.
pt not on medication, on lifestyle modification
pt not on medication, on lifestyle modification  if BP persistently > 130/90 can start amlodipine 5 mg qd.

## 2022-06-17 NOTE — PROGRESS NOTE ADULT - ASSESSMENT
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   Surgical site infection in Feb, grew Parvimonus and Propionibacterium.   Completed radiation in April.   Another wound infection in May, grew MSSA.   Back 6/14 for wound drainage.   ENT suspects this is part of the natural history and not infection.   Not systemically ill, no cellulitis or abscess and nothing is growing from the wound or blood.   Feels well and wants to go home.     Suggest  -can stop antibiotics and discharge home   -monitor cultures     Discussed with ENT   Will sign off, call back if needed     Lebron Lambert MD   Infectious Disease   Available on TEAMS. After 5PM and on weekends please page fellow on call or call [phone]
57F with PMH HTN, R mandibular ca s/p resection/chemo/radiation presents for recurrent submandibular drainage. 
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 S/p radiation March 2022. Postoperative course was complicated by neck infection requiring readmission for IV antibiotics May 19-22, 2022. Presenting to ER at [facility] 6/15/2022 with complaints of worsening drainage from right neck open wound. No fever, no leukocytosis.  - CT completed, no abscess   - recommend wet to dry quarter strength Dakins soaked packing for 1 week  - then recommend packing with saline wet to dry packing for 1 week after that  - moisturize left lower leg fibular donor site with Aquaphor twice daily  - recent radiation places patient at high risk of prolonged wound healing problems  - ok for dc home from PRS perspective with local wound care    Plastic Surgery (pg [facility]: [id number], NS: [phone])
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 S/p radiation March 2022. Postoperative course was complicated by neck infection requiring readmission for IV antibiotics May 19-22, 2022. Presenting to ER at [facility] 6/15/2022 with complaints of worsening drainage from right neck open wound. No fever, no leukocytosis.  - recommend CT w/ IV contrast if kidney function tolerates to rule out abscess - pending scan   - recommend wet to dry quarter strength Dakins soaked packing for 1 week  - then recommend packing with saline wet to dry packing for 1 week after that  - moisturize left lower leg fibular donor site with Aquaphor twice daily  - recent radiation places patient at high risk of prolonged wound healing problems    Plastic Surgery (pg [facility]: [id number], NS: [phone])
57F with PMH HTN, R mandibular ca s/p resection/chemo/radiation presents for recurrent submandibular drainage.

## 2022-06-17 NOTE — PROGRESS NOTE ADULT - SUBJECTIVE AND OBJECTIVE BOX
SUBJECTIVE:  6/16: Patient with BROOKS. No other issues overnight.  6/17: No issues overnight. On bolus feeds. Will trial soft food today and discharge if doing well on diet and has appropriate wound care plan.    Vital Signs Last 24 Hrs  T(C): 36.4 (17 Jun 2022 06:09), Max: 37.1 (16 Jun 2022 21:41)  T(F): 97.6 (17 Jun 2022 06:09), Max: 98.8 (16 Jun 2022 21:41)  HR: 69 (17 Jun 2022 06:09) (69 - 111)  BP: 114/82 (17 Jun 2022 06:09) (113/57 - 129/70)  BP(mean): --  RR: 18 (17 Jun 2022 06:09) (17 - 18)  SpO2: 100% (17 Jun 2022 06:09) (95% - 100%)    PE:  General: NAD, awake, alert, conversant  HEENT: Small area of R neck packed, dressing clean. No focal swelling/edema.   Gtube in place.     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     - Admitted to ENT  - Continue packing per PRS  - F/u Na, wound cultures  - Creatinine improving, continue to trend  - Continue Zosyn  - Appreciate ID recommendations  - Gtube feeds, bolus  - Trials soft diet  - Medicine co-mgmt  - Pain control as needed  - Discharge today likely

## 2022-06-17 NOTE — DISCHARGE NOTE NURSING/CASE MANAGEMENT/SOCIAL WORK - PATIENT PORTAL LINK FT
You can access the FollowMyHealth Patient Portal offered by [facility] by registering at the following website: [email or web address]. By joining [facility]’s FollowMyHealth portal, you will also be able to view your health information using other applications (apps) compatible with our system.

## 2022-06-17 NOTE — PROGRESS NOTE ADULT - PROBLEM SELECTOR PLAN 2
suspect pre-renal etiology, prior admission peak Scr 2.07 improved w/ IVF   Scr improving, although received IV contrast, will ctm  c/w maintenance IVF, NS @ 100 cc/hr
RESOLVED w/ IVF

## 2022-06-17 NOTE — PROGRESS NOTE ADULT - SUBJECTIVE AND OBJECTIVE BOX
Patient is a 57y old  Female who presents with a chief complaint of Right facial cellulitis (17 Jun 2022 08:36)      SUBJECTIVE / OVERNIGHT EVENTS: EVAN. Pt has no medical complaints at this time. No further drainage from submandibular lesion    MEDICATIONS  (STANDING):  acetaminophen     Tablet .. 650 milliGRAM(s) Oral every 6 hours  AQUAPHOR (petrolatum Ointment) 1 Application(s) Topical two times a day  ascorbic acid 500 milliGRAM(s) Oral daily  aspirin enteric coated 81 milliGRAM(s) Oral daily  atorvastatin 10 milliGRAM(s) Oral at bedtime  Dakins Solution - 1/4 Strength 1 Application(s) Topical two times a day  enoxaparin Injectable 40 milliGRAM(s) SubCutaneous every 24 hours  multivitamin 1 Tablet(s) Oral daily  pantoprazole    Tablet 40 milliGRAM(s) Oral before breakfast  polyethylene glycol 3350 17 Gram(s) Oral daily  senna 2 Tablet(s) Oral at bedtime    MEDICATIONS  (PRN):  ALBUTerol    90 MICROgram(s) HFA Inhaler 2 Puff(s) Inhalation every 6 hours PRN Shortness of Breath and/or Wheezing  bisacodyl 5 milliGRAM(s) Oral daily PRN Constipation  oxyCODONE    Solution 2.5 milliGRAM(s) Oral every 6 hours PRN Moderate Pain (4 - 6)  oxyCODONE    Solution 5 milliGRAM(s) Oral every 6 hours PRN Severe Pain (7 - 10)        CAPILLARY BLOOD GLUCOSE        I&O's Summary    16 Jun 2022 07:01  -  17 Jun 2022 07:00  --------------------------------------------------------  IN: 1200 mL / OUT: 0 mL / NET: 1200 mL      Vital Signs Last 24 Hrs  T(C): 36.9 (17 Jun 2022 09:47), Max: 37.1 (16 Jun 2022 21:41)  T(F): 98.4 (17 Jun 2022 09:47), Max: 98.8 (16 Jun 2022 21:41)  HR: 75 (17 Jun 2022 09:47) (69 - 111)  BP: 125/58 (17 Jun 2022 09:47) (113/57 - 129/70)  BP(mean): --  RR: 17 (17 Jun 2022 09:47) (17 - 18)  SpO2: 100% (17 Jun 2022 09:47) (95% - 100%)      PHYSICAL EXAM:    GENERAL: NAD, well-groomed, well-developed  HEAD:  Atraumatic, Normocephalic  EYES: EOMI, PERRLA, conjunctiva and sclera clear  ENMT: Moist mucous membranes  NECK: +R. submandibular wound packing appears c/d/i, NT, +surrounding radiation skin pigmentation  RESPIRATORY: Clear to auscultation bilaterally; No rales, rhonchi, wheezing, or rubs  CARDIOVASCULAR: Regular rate and rhythm; No murmurs, rubs, or gallops  GASTROINTESTINAL: +PEG site c/d/i. Soft, Nontender, Nondistended; Bowel sounds present  GENITOURINARY: Not examined  EXTREMITIES:  2+ Peripheral Pulses, bl LE 1+ non pitting edema  NERVOUS SYSTEM:  Alert & Oriented X3; Moving all 4 extremities; No gross sensory deficits    LABS:                        8.3    5.07  )-----------( 192      ( 17 Jun 2022 06:10 )             24.8     06-17    137  |  98  |  8   ----------------------------<  84  3.2<L>   |  22  |  0.88    Ca    8.2<L>      17 Jun 2022 06:10  Phos  2.7     06-17  Mg     1.60     06-17    TPro  5.6<L>  /  Alb  3.0<L>  /  TBili  0.2  /  DBili  x   /  AST  8   /  ALT  5   /  AlkPhos  73  06-16              RADIOLOGY & ADDITIONAL TESTS:    Imaging Personally Reviewed:    Consultant(s) Notes Reviewed:      Care Discussed with Consultants/Other Providers:

## 2022-06-17 NOTE — PROGRESS NOTE ADULT - REASON FOR ADMISSION
Right facial cellulitis

## 2022-06-20 LAB
CULTURE RESULTS: SIGNIFICANT CHANGE UP
SPECIMEN SOURCE: SIGNIFICANT CHANGE UP

## 2022-06-22 ENCOUNTER — OUTPATIENT (OUTPATIENT)
Dept: OUTPATIENT SERVICES | Facility: HOSPITAL | Age: 57
LOS: 1 days | End: 2022-06-22

## 2022-06-22 ENCOUNTER — APPOINTMENT (OUTPATIENT)
Dept: NUCLEAR MEDICINE | Facility: CLINIC | Age: 57
End: 2022-06-22
Payer: COMMERCIAL

## 2022-06-22 DIAGNOSIS — Z98.891 HISTORY OF UTERINE SCAR FROM PREVIOUS SURGERY: Chronic | ICD-10-CM

## 2022-06-22 DIAGNOSIS — Z98.890 OTHER SPECIFIED POSTPROCEDURAL STATES: Chronic | ICD-10-CM

## 2022-06-22 DIAGNOSIS — C03.9 MALIGNANT NEOPLASM OF GUM, UNSPECIFIED: ICD-10-CM

## 2022-06-22 DIAGNOSIS — Z96.649 PRESENCE OF UNSPECIFIED ARTIFICIAL HIP JOINT: Chronic | ICD-10-CM

## 2022-06-22 DIAGNOSIS — Z90.711 ACQUIRED ABSENCE OF UTERUS WITH REMAINING CERVICAL STUMP: Chronic | ICD-10-CM

## 2022-06-22 PROCEDURE — 78815 PET IMAGE W/CT SKULL-THIGH: CPT | Mod: 26,PS

## 2022-06-24 ENCOUNTER — APPOINTMENT (OUTPATIENT)
Dept: GASTROENTEROLOGY | Facility: CLINIC | Age: 57
End: 2022-06-24
Payer: COMMERCIAL

## 2022-06-24 VITALS
SYSTOLIC BLOOD PRESSURE: 118 MMHG | HEART RATE: 94 BPM | WEIGHT: 165 LBS | OXYGEN SATURATION: 98 % | BODY MASS INDEX: 28.17 KG/M2 | RESPIRATION RATE: 14 BRPM | DIASTOLIC BLOOD PRESSURE: 72 MMHG | HEIGHT: 64 IN

## 2022-06-24 DIAGNOSIS — Z78.9 OTHER SPECIFIED HEALTH STATUS: ICD-10-CM

## 2022-06-24 PROCEDURE — 99214 OFFICE O/P EST MOD 30 MIN: CPT

## 2022-06-24 RX ORDER — LIDOCAINE HYDROCHLORIDE 40 MG/ML
4 SOLUTION TOPICAL
Qty: 300 | Refills: 0 | Status: DISCONTINUED | COMMUNITY
Start: 2022-03-15 | End: 2022-06-24

## 2022-06-24 RX ORDER — ONDANSETRON 8 MG/1
8 TABLET, ORALLY DISINTEGRATING ORAL EVERY 8 HOURS
Qty: 90 | Refills: 3 | Status: DISCONTINUED | COMMUNITY
Start: 2022-01-28 | End: 2022-06-24

## 2022-06-24 RX ORDER — ONDANSETRON 8 MG/1
8 TABLET ORAL EVERY 8 HOURS
Qty: 90 | Refills: 0 | Status: DISCONTINUED | COMMUNITY
Start: 2022-03-18 | End: 2022-06-24

## 2022-06-24 RX ORDER — GABAPENTIN 300 MG/1
300 CAPSULE ORAL
Qty: 60 | Refills: 1 | Status: DISCONTINUED | COMMUNITY
Start: 2022-03-25 | End: 2022-06-24

## 2022-06-24 RX ORDER — SCOPOLAMINE 1.5 MG/1
1 PATCH, EXTENDED RELEASE TRANSDERMAL
Qty: 5 | Refills: 0 | Status: DISCONTINUED | COMMUNITY
Start: 2022-03-24 | End: 2022-06-24

## 2022-06-24 RX ORDER — DIPHENHYDRAMINE HYDROCHLORIDE AND LIDOCAINE HYDROCHLORIDE AND ALUMINUM HYDROXIDE AND MAGNESIUM HYDRO
KIT
Qty: 600 | Refills: 3 | Status: DISCONTINUED | COMMUNITY
Start: 2022-03-15 | End: 2022-06-24

## 2022-06-24 RX ORDER — LISINOPRIL 10 MG/1
10 TABLET ORAL
Refills: 0 | Status: DISCONTINUED | COMMUNITY
Start: 2022-01-28 | End: 2022-06-24

## 2022-06-24 RX ORDER — PROCHLORPERAZINE MALEATE 10 MG/1
10 TABLET ORAL EVERY 6 HOURS
Qty: 120 | Refills: 5 | Status: DISCONTINUED | COMMUNITY
Start: 2022-01-28 | End: 2022-06-24

## 2022-06-24 RX ORDER — OXYCODONE HYDROCHLORIDE 5 MG/5ML
5 SOLUTION ORAL
Qty: 900 | Refills: 0 | Status: DISCONTINUED | COMMUNITY
Start: 2022-01-28 | End: 2022-06-24

## 2022-06-24 NOTE — HISTORY OF PRESENT ILLNESS
[de-identified] : 57-year-old woman history of head and neck cancer, recent PEG placement April 22 by Dr. Chou here for follow-up.\par The patient states that she finished her chemo and radiation and had a PET scan yesterday that the results of which have not been back yet to assess disease activity.  She reports that she had a bone graft as well as a plate placed into her jaw to replace the malignant segment of mandible that was resected.  She reports that the plate portion of this is not attaching properly and will need to be removed in November.  The timing of this is based on time since chemo and radiation.  She was tolerating solid food but was told by the cancer center to stop that because the constant motion was not allowing healing.  She is tolerating pudding consistency foods now.\par She can only tolerate 3 of the 4 cans of Glucerna she is supposed to have every day.  If she tries the fourth can she feels it coming back into her mouth.  She is still losing weight, her initial weight before diagnosis was 238 and she is currently at 165.\par She reports daily bowel movements, not loose, not bloody or with mucus.\par She reports 4 visits to the ER since the PEG is placed concern for infection at the surgical site.

## 2022-06-24 NOTE — PHYSICAL EXAM
[FreeTextEntry1] : PEG site clean dry and intact; tube check, clip functioning; rest of abdomen with evidence of weight loss (GA) but otherwise benign

## 2022-06-24 NOTE — ASSESSMENT
[FreeTextEntry1] : 57-year-old woman history of mandibular cancer status post PEG tube placement in April by Dr. Chou to aid with nutrition, still with weight loss now.

## 2022-06-29 ENCOUNTER — NON-APPOINTMENT (OUTPATIENT)
Age: 57
End: 2022-06-29

## 2022-06-30 ENCOUNTER — NON-APPOINTMENT (OUTPATIENT)
Age: 57
End: 2022-06-30

## 2022-07-06 ENCOUNTER — NON-APPOINTMENT (OUTPATIENT)
Age: 57
End: 2022-07-06

## 2022-07-12 ENCOUNTER — APPOINTMENT (OUTPATIENT)
Dept: OTOLARYNGOLOGY | Facility: CLINIC | Age: 57
End: 2022-07-12

## 2022-07-12 VITALS
SYSTOLIC BLOOD PRESSURE: 123 MMHG | DIASTOLIC BLOOD PRESSURE: 69 MMHG | HEIGHT: 64 IN | OXYGEN SATURATION: 96 % | BODY MASS INDEX: 28.17 KG/M2 | WEIGHT: 165 LBS | HEART RATE: 102 BPM

## 2022-07-12 PROCEDURE — 99214 OFFICE O/P EST MOD 30 MIN: CPT

## 2022-07-12 NOTE — HISTORY OF PRESENT ILLNESS
[de-identified] : Ms. Dsouza presents for follow up. 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 RT completed on 4/8/2022. She is 3 months out of treatment.\par  6/22/2022 Pet scan\par PreviDent ?\par Following up with nutritionist. SLP? \par Has follow up appt. Dr. Pappas and Dr. Lambert next week.\par \par Did see NIKKO Trejo and is doing home exercises.

## 2022-07-12 NOTE — PHYSICAL EXAM
[FreeTextEntry1] : r neck clean/ wound packed [Midline] : trachea located in midline position [Normal] : no rashes

## 2022-07-14 ENCOUNTER — NON-APPOINTMENT (OUTPATIENT)
Age: 57
End: 2022-07-14

## 2022-07-15 ENCOUNTER — NON-APPOINTMENT (OUTPATIENT)
Age: 57
End: 2022-07-15

## 2022-07-15 ENCOUNTER — APPOINTMENT (OUTPATIENT)
Dept: RADIATION ONCOLOGY | Facility: CLINIC | Age: 57
End: 2022-07-15

## 2022-07-15 VITALS
WEIGHT: 160 LBS | SYSTOLIC BLOOD PRESSURE: 105 MMHG | DIASTOLIC BLOOD PRESSURE: 73 MMHG | OXYGEN SATURATION: 96 % | BODY MASS INDEX: 27.46 KG/M2 | RESPIRATION RATE: 16 BRPM | HEART RATE: 113 BPM

## 2022-07-15 PROCEDURE — 99214 OFFICE O/P EST MOD 30 MIN: CPT

## 2022-07-15 RX ORDER — OXYCODONE HYDROCHLORIDE 5 MG/5ML
5 SOLUTION ORAL EVERY 6 HOURS
Qty: 500 | Refills: 0 | Status: DISCONTINUED | COMMUNITY
Start: 2022-07-15 | End: 2022-07-15

## 2022-07-15 NOTE — VITALS
[Maximal Pain Intensity: 8/10] : 8/10 [Least Pain Intensity: 0/10] : 0/10 [Pain Description/Quality: ___] : Pain description/quality: [unfilled] [Pain Duration: ___] : Pain duration: [unfilled] [Pain Location: ___] : Pain Location: [unfilled] [Pain Interferes with ADLs] : Pain interferes with activities of daily living. [OTC] : OTC [Opioid] : opioid [70: Cares for self; unalbe to carry on normal activity or do active work.] : 70: Cares for self; unable to carry on normal activity or do active work.

## 2022-07-15 NOTE — REVIEW OF SYSTEMS
[Skin Wound] : skin wound [Negative] : Allergic/Immunologic [Dysphagia: Grade 0] : Dysphagia: Grade 0 [Edema Limbs: Grade 0] : Edema Limbs: Grade 0  [Fatigue: Grade 0] : Fatigue: Grade 0 [Localized Edema: Grade 2 - Moderate localized edema and intervention indicated; limiting instrumental ADL] : Localized Edema: Grade 2 - Moderate localized edema and intervention indicated; limiting instrumental ADL [Dysgeusia: Grade 0] : Dysgeusia: Grade 0 [Skin Hyperpigmentation: Grade 1 - Hyperpigmentation covering <10% BSA; no psychosocial impact] : Skin Hyperpigmentation: Grade 1 - Hyperpigmentation covering <10% BSA; no psychosocial impact [de-identified] : right neck [Xerostomia: Grade 2 - Moderate symptoms; oral intake alterations (e.g., copious water, other lubricants, diet limited to purees and/or soft, moist foods); unstimulated saliva 0.1 to 0.2 ml/min] : Xerostomia: Grade 2 - Moderate symptoms; oral intake alterations (e.g., copious water, other lubricants, diet limited to purees and/or soft, moist foods); unstimulated saliva 0.1 to 0.2 ml/min [Oral Pain: Grade 2 - Moderate pain; limiting instrumental ADL] : Oral Pain: Grade 2 - Moderate pain; limiting instrumental ADL [Pruritus: Grade 1 - Mild or localized; topical intervention indicated] : Pruritus: Grade 1 - Mild or localized; topical intervention indicated [Skin Atrophy: Grade 1 - Covering <10% BSA; associated with telangiectasias or changes in skin color] : Skin Atrophy: Grade 1 - Covering <10% BSA; associated with telangiectasias or changes in skin color [Dermatitis Radiation: Grade 1 - Faint erythema or dry desquamation] : Dermatitis Radiation: Grade 1 - Faint erythema or dry desquamation

## 2022-07-15 NOTE — HISTORY OF PRESENT ILLNESS
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, status post right composite resection (segmental mandibulectomy with a partial right buccal soft tissue and floor of mouth excision), right neck dissection levels I-IV on 1/13/22 and adjuvant chemoradiation with weekly cisplatin completed 4/8/22.\par \par Interval history:\par Multiple hospitalization for recurrent infections.\par Reports ongoing xerostomia, pain 8/10 when masticating.\par Has open wound on right neck, draining.\par Dysgeusia improving.\par Nutrition via tube feeds with [employer]..\par No odynophagia or dysphagia.\par Reports wheezing due to allergies using Albuterol \par \par 5/19/22-5/23/22 Admitted [facility], transferred to [facility] with jaw infection.\par 5/19/22 CT neck: Postsurgical changes as described with extensive infiltration of the overlying subcutaneous fat and skin thickening. \par \par 6/15/22-6/17/22 readmitted to [facility]\par 6/16/22 CT neck \par 1. Redemonstration of right-sided composite segmental mandibulectomy and fibular free flap reconstruction without evidence of recurrent or residual neoplastic disease at the postsurgical site.\par 2. Mild cellulitis affecting the right neck without abscess formation. Additional inflammatory/edematous changes track into the right submandibular, right supraglottic, and retropharyngeal spaces.\par 3. No new or enlarging cervical lymph nodes.\par \par 6/22/22 PET/CT showed non-FDG avid postsurgical changes.  There was a new nonspecific focal FDG avidity in the anterior aspect of residual mandible just to the left of midline.\par \par ENT: [facility] \par Plastic: [facility] \par HemeOnc: Pappas \par GI: [facility] \par

## 2022-07-15 NOTE — PHYSICAL EXAM
[Extraocular Movements] : extraocular movements were intact [] : no respiratory distress [Respiration, Rhythm And Depth] : normal respiratory rhythm and effort [Exaggerated Use Of Accessory Muscles For Inspiration] : no accessory muscle use [Heart Rate And Rhythm] : heart rate and rhythm were normal [Abdomen Soft] : soft [Nondistended] : nondistended [Abdomen Tenderness] : non-tender [Feeding Tube] : a feeding tube was present [Normal] : no palpable adenopathy [Oriented To Time, Place, And Person] : oriented to person, place, and time [Mood] : the mood was normal [de-identified] : edema, right neck wound, clean [de-identified] : wheeze [de-identified] : resolving hyperpigmentation of the neck

## 2022-07-18 ENCOUNTER — RESULT REVIEW (OUTPATIENT)
Age: 57
End: 2022-07-18

## 2022-07-18 ENCOUNTER — LABORATORY RESULT (OUTPATIENT)
Age: 57
End: 2022-07-18

## 2022-07-18 ENCOUNTER — APPOINTMENT (OUTPATIENT)
Dept: HEMATOLOGY ONCOLOGY | Facility: CLINIC | Age: 57
End: 2022-07-18

## 2022-07-18 VITALS
HEART RATE: 100 BPM | BODY MASS INDEX: 27.14 KG/M2 | SYSTOLIC BLOOD PRESSURE: 117 MMHG | DIASTOLIC BLOOD PRESSURE: 79 MMHG | OXYGEN SATURATION: 94 % | WEIGHT: 159 LBS | HEIGHT: 64 IN

## 2022-07-18 LAB
BASOPHILS # BLD AUTO: 0.1 K/UL — SIGNIFICANT CHANGE UP (ref 0–0.2)
BASOPHILS NFR BLD AUTO: 1.7 % — SIGNIFICANT CHANGE UP (ref 0–2)
EOSINOPHIL # BLD AUTO: 0.1 K/UL — SIGNIFICANT CHANGE UP (ref 0–0.5)
EOSINOPHIL NFR BLD AUTO: 1.3 % — SIGNIFICANT CHANGE UP (ref 0–6)
FERRITIN SERPL-MCNC: 714 NG/ML
FOLATE SERPL-MCNC: 9.5 NG/ML
HCT VFR BLD CALC: 33.1 % — LOW (ref 34.5–45)
HGB BLD-MCNC: 10.7 G/DL — LOW (ref 11.5–15.5)
IRON SATN MFR SERPL: 23 %
IRON SERPL-MCNC: 39 UG/DL
LYMPHOCYTES # BLD AUTO: 0.4 K/UL — LOW (ref 1–3.3)
LYMPHOCYTES # BLD AUTO: 5.2 % — LOW (ref 13–44)
MCHC RBC-ENTMCNC: 29.6 PG — SIGNIFICANT CHANGE UP (ref 27–34)
MCHC RBC-ENTMCNC: 32.3 G/DL — SIGNIFICANT CHANGE UP (ref 32–36)
MCV RBC AUTO: 91.6 FL — SIGNIFICANT CHANGE UP (ref 80–100)
MONOCYTES # BLD AUTO: 0.6 K/UL — SIGNIFICANT CHANGE UP (ref 0–0.9)
MONOCYTES NFR BLD AUTO: 7.5 % — SIGNIFICANT CHANGE UP (ref 2–14)
NEUTROPHILS # BLD AUTO: 6.6 K/UL — SIGNIFICANT CHANGE UP (ref 1.8–7.4)
NEUTROPHILS NFR BLD AUTO: 84.3 % — HIGH (ref 43–77)
PLATELET # BLD AUTO: 330 K/UL — SIGNIFICANT CHANGE UP (ref 150–400)
RBC # BLD: 3.62 M/UL — LOW (ref 3.8–5.2)
RBC # FLD: 13.6 % — SIGNIFICANT CHANGE UP (ref 10.3–14.5)
TIBC SERPL-MCNC: 170 UG/DL
TSH SERPL-ACNC: 10.4 UIU/ML
UIBC SERPL-MCNC: 131 UG/DL
VIT B12 SERPL-MCNC: 1540 PG/ML
WBC # BLD: 7.9 K/UL — SIGNIFICANT CHANGE UP (ref 3.8–10.5)
WBC # FLD AUTO: 7.9 K/UL — SIGNIFICANT CHANGE UP (ref 3.8–10.5)

## 2022-07-18 PROCEDURE — 99215 OFFICE O/P EST HI 40 MIN: CPT

## 2022-07-20 NOTE — ASSESSMENT
[FreeTextEntry1] : 56 year old woman diagnosed with oral cancer s/p resection with right neck dissection, [id number] \par \par # Oral Cancer \par - s/p C5 of CRT with weekly Cisplatin\par - C4 delayed by elevated creatinine, improved with IVF and received C5 on 4/4.\par - RT completed on 4/11\par - Most recent PET-CT from 6/22/22 reviewed; EUFEMIA \par \par # Malnutrition\par - GEMA Hobbs following closely and lengthy discussion today regarding dire need to increase calorie intake;\par - also advised proper fluid intake of 2L of liquid daily. \par - Patient still requiring oxycodone for pain intermittently. Patient encouraged to take tylenol. \par - Follow up with ENT and dental. \par \par \par #Anemia \par - Hgb down to 8.2\par - Follow up blood work today. \par

## 2022-07-20 NOTE — RESULTS/DATA
[FreeTextEntry1] : 6/22/22: PET-CT \par IMPRESSION: Compared to FDG-PET/CT scan.\par \par 1. Non-FDG avid postsurgical changes status post post interval right segmental mandibulectomy with resection of previously seen FDG avid gingival lesion.\par \par 2. Nonspecific new focal FDG avidity in the anterior aspect of residual mandible just to the left of midline with questionable lucency on CT. Please correlate clinically or with contrast-enhanced CT as indicated.\par \par 3. Previously seen FDG avid right cervical lymph nodes have been resected in the interim.\par \par 4. Unchanged FDG avid focus within the enlarged right thyroid lobe. Differentials include benign and malignant etiologies.\par \par 5. Interval resolution of focal FDG avidity in the right vallecula and symmetric hypermetabolism of bilateral palatine tonsils.\par \par \par \par \par 1/13/22 Pathology:\par Lymph nodes, right level 1b, neck dissection: 3 lymph nodes positive for metastatic carcinoma (3/3).  Largest metastatic deposit measures 1.5 cm.  Submandibular gland negative for carcinoma\par Lymph nodes, level 1a, neck dissection: 2 lymph nodes negative for metastatic carcinoma (0/2)\par Oral cavity, right mandible segment, resection: Invasive squamous cell carcinoma, well-differentiated. Carcinoma is present 1 mm from the lateral soft tissue margin and 2\par mm from the medial soft tissue margin\par Lymph nodes, right level 4, neck dissection: 3 lymph nodes negative for metastatic carcinoma (0/3)\par Lymph nodes, right level 3, neck dissection: 6 lymph nodes negative for metastatic carcinoma (0/6)\par Lymph nodes, right level 2a, neck dissection: 1 out of 2 lymph nodes positive for metastatic carcinoma (1/2)\par Lymph nodes, right level 2b, neck dissection: 2 lymph nodes negative for metastatic carcinoma (0/2)\par Synoptic Summary\par SPECIMEN:  Partial mandibulectomy\par TUMOR\par Tumor Focality:  Unifocal\par Multiple Primary Sites:   Not applicable (no additional primary site(s) present\par Tumor Site:  Oral cavity\par +Tumor Subsite:  Lower gingiva\par Tumor Laterality:  Right\par Tumor Size:  Greatest dimension in Centimeters (cm):   1.7 x 0.8 x 0.7  cm\par Histologic Type:  Squamous cell carcinoma, conventional\par Histologic Grade:  G1, well differentiated\par Tumor Depth of Invasion (DOI):   6 mm\par Lymphovascular Invasion:   Not identified\par Perineural Invasion:   Not identified\par MARGINS\par All specimen margins negative for invasive tumor\par Distance from Invasive Tumor to Closest Specimen Margin:    Carcinoma is present 1 mm from the lateral soft tissue margin and 2 mm from the medial soft tissue margin\par Specimen Margin Status for Noninvasive Tumor:    All specimen margins negative for high-grade dysplasia / in situ disease\par REGIONAL LYMPH NODES\par Number of Lymph Nodes with Tumor: 4\par Laterality of Lymph Node(s) with Tumor:   Ipsilateral\par Size of Largest Joby Metastatic Deposit:    At least 1.5 cm\par Extranodal Extension (LEONIE):   Not identified\par Number of Lymph Nodes Examined: 18\par DISTANT METASTASIS : Not applicable\par PATHOLOGIC STAGE CLASSIFICATION (pTNM, AJCC 8th Edition)\par Pathologic Stage Classification\par pT Category: pT2: Tumor less than or equal to 2 cm with DOI greater than 5 mm or tumor greater than 2 cm and less than or equal to 4 cm with DOI less than or equal to 10 mm\par pN Category: pN2b: Metastases in multiple ipsilateral nodes, none larger than 6 cm in greatest dimension and LEONIE(-) \par pM Category:  Not applicable - pM cannot be determined from the submitted specimen(s)\par ADDITIONAL FINDINGS:   None identified\par \par 12/23/21 PET:\par FDG avid soft tissue lesion along the right posterior mandibular gingiva and gingivobuccal sulcus as seen on contrast-enhanced CT compatible with newly diagnosed squamous cell cancer. FDG avidity extends towards the lingual surface. FDG avid right level Ib cervical lymph nodes likely metastatic. FDG avid right level IIA and IIB lymph nodes are indeterminate and may be further evaluated with ultrasound. FDG avid focus within the enlarged right lobe, likely corresponding to the vague 1.1 cm nodule on contrast enhanced CT. Differentials include benign and malignant etiologies. Ultrasound and percutaneous possible FNA biopsy may be performed. Nonspecific focal FDG avidity mapping to opacification in the right vallecula. Please correlate clinically or with direct visualization to exclude any lesion in this region. Approximately symmetric hypermetabolism of bilateral palatine tonsils, physiologic versus inflammatory.\par \par 12/23/21 CT Neck/Maxillofacial:\par Enhancing abnormal soft tissue lesion centered along the right posterior mandibular gingiva and gingivobuccal sulcus with suspected bony involvement of the adjacent alveolar ridge and also the lingual mandibular gingiva. Findings are compatible with the patient's biopsy-proven squamous cell carcinoma.\par Right-sided pathologic cervical lymphadenopathy at right level Ib and right level II concerning for metastatic disease. Minimally hazy margins adjacent to the right level Ib lymph node. Correlate with physical exam to assess for mobile or fixed state.\par 1.1 cm right-sided thyroid upper pole nodule. Recommend ultrasound correlation as neoplasm is not excluded.\par \par 12/16/21 Pathology:\par Mandibular gingiva, RIGHT, lower, biopsy:  Invasive squamous carcinoma well differentiated

## 2022-07-20 NOTE — HISTORY OF PRESENT ILLNESS
[T: ___] : T[unfilled] [N: ___] : N[unfilled] [M: ___] : M[unfilled] [de-identified] : Patient is a 57 yo F with h/o HTN, HLD and asthma who was diagnosed with SCC of the gingival mucosa.\par \par She saw oral surgeon, Dr. Cristopher Pinon, c/o soreness in the right jaw.  A biopsy of the right mandibular gingiva on 11/26/21 showed well-differentiated squamous cell carcinoma, P16 negative.\par \par She next saw Dr. James So. On exam, she was noted to have an ulcerated and endophytic lesion in the right posterior mandibular gingiva/post-molar region, abutting the 2nd premolar tooth and the base RMT posteriorly. There was buccal induration, no lingual extension.\par \par Staging CT neck on 12/23/21 showed an enhancing abnormal soft tissue lesion centered along the right posterior mandibular gingiva and gingivobuccal sulcus with suspected bony involvement of the adjacent alveolar ridge and also the lingual mandibular gingiva.  Right-sided pathologic cervical lymphadenopathy at right level Ib and right level II. Minimally hazy margins adjacent to the right level Ib lymph node. Staging PET on the same day showed FDG avid soft tissue lesion along the right posterior mandibular gingiva and gingivobuccal sulcus as seen on contrast-enhanced CT compatible with newly diagnosed squamous cell cancer. FDG avidity extends towards the lingual surface. FDG avid right level Ib cervical lymph nodes likely metastatic. FDG avid right level IIA and IIB lymph nodes are indeterminate. FDG avid focus within the enlarged right lobe, likely corresponding to the vague 1.1 cm nodule on contrast enhanced CT.\par \par On 1/13/22 she underwent right composite resection (segmental mandibulectomy with a partial right buccal soft tissue and floor of mouth excision), right neck dissection levels I-IV with Dr So.  Pathology showed invasive squamous cell carcinoma, well-differentiated, measuring 1.7 cm. Depth of invasion 0.6 cm. No LVI/PNI. There was microscopic evidence of tumor invasion into underlying bone (pT4a). Tumor margin from medial soft tissue was 0.4 cm; from the lateral soft tissue was 0.1 cm. Positive lymph nodes were identified in right level 1b and 2a; total 4/18; largest 1.5 cm, no LEONIE. pT4a N2b. \par \par The patient was started on cRT with weekly cisplatin, which she completed on 4/8/22.  \par \par  [de-identified] : - Patient beginning to improve and is eating normal foods (e,g [place] onion soup), but still has issues some solid foods due to pain and poor wound healing related to hardware. . Patient continues to have weight loss.  + Odynophagia without dysphagia.

## 2022-07-20 NOTE — PHYSICAL EXAM
[Fully active, able to carry on all pre-disease performance without restriction] : Status 0 - Fully active, able to carry on all pre-disease performance without restriction [Normal] : well developed, well nourished, in no acute distress [de-identified] : right neck fullness and erythema post operatively.

## 2022-07-26 ENCOUNTER — APPOINTMENT (OUTPATIENT)
Dept: PLASTIC SURGERY | Facility: CLINIC | Age: 57
End: 2022-07-26

## 2022-08-02 ENCOUNTER — APPOINTMENT (OUTPATIENT)
Dept: PLASTIC SURGERY | Facility: CLINIC | Age: 57
End: 2022-08-02

## 2022-08-02 VITALS
OXYGEN SATURATION: 96 % | SYSTOLIC BLOOD PRESSURE: 110 MMHG | TEMPERATURE: 97.6 F | BODY MASS INDEX: 27.31 KG/M2 | WEIGHT: 160 LBS | HEIGHT: 64 IN | HEART RATE: 104 BPM | DIASTOLIC BLOOD PRESSURE: 78 MMHG

## 2022-08-02 PROCEDURE — 99212 OFFICE O/P EST SF 10 MIN: CPT

## 2022-08-04 ENCOUNTER — NON-APPOINTMENT (OUTPATIENT)
Age: 57
End: 2022-08-04

## 2022-08-04 ENCOUNTER — APPOINTMENT (OUTPATIENT)
Dept: HEMATOLOGY ONCOLOGY | Facility: CLINIC | Age: 57
End: 2022-08-04

## 2022-08-06 NOTE — PROVIDER CONTACT NOTE (CRITICAL VALUE NOTIFICATION) - ACTION/TREATMENT ORDERED:
replace K
PA made aware.   Will replete K today.
SHASHANK advised MD Navarro advised of potassium, awaiting further orders.
0

## 2022-08-29 NOTE — H&P PST ADULT - MALE-SPECIFIC SYMPTOMS
Patient's depression is stable on current medication   Taking daily as prescribed.   Continue medication    not applicable

## 2022-09-07 ENCOUNTER — OUTPATIENT (OUTPATIENT)
Dept: OUTPATIENT SERVICES | Facility: HOSPITAL | Age: 57
LOS: 1 days | Discharge: HOME | End: 2022-09-07

## 2022-09-07 ENCOUNTER — APPOINTMENT (OUTPATIENT)
Dept: GASTROENTEROLOGY | Facility: CLINIC | Age: 57
End: 2022-09-07

## 2022-09-07 DIAGNOSIS — Z90.711 ACQUIRED ABSENCE OF UTERUS WITH REMAINING CERVICAL STUMP: Chronic | ICD-10-CM

## 2022-09-07 DIAGNOSIS — Z96.649 PRESENCE OF UNSPECIFIED ARTIFICIAL HIP JOINT: Chronic | ICD-10-CM

## 2022-09-07 DIAGNOSIS — C03.9 MALIGNANT NEOPLASM OF GUM, UNSPECIFIED: ICD-10-CM

## 2022-09-07 DIAGNOSIS — Z98.890 OTHER SPECIFIED POSTPROCEDURAL STATES: Chronic | ICD-10-CM

## 2022-09-07 DIAGNOSIS — Z98.891 HISTORY OF UTERINE SCAR FROM PREVIOUS SURGERY: Chronic | ICD-10-CM

## 2022-09-07 DIAGNOSIS — C76.0 MALIGNANT NEOPLASM OF HEAD, FACE AND NECK: ICD-10-CM

## 2022-09-12 ENCOUNTER — RESULT REVIEW (OUTPATIENT)
Age: 57
End: 2022-09-12

## 2022-09-12 ENCOUNTER — LABORATORY RESULT (OUTPATIENT)
Age: 57
End: 2022-09-12

## 2022-09-12 ENCOUNTER — APPOINTMENT (OUTPATIENT)
Dept: HEMATOLOGY ONCOLOGY | Facility: CLINIC | Age: 57
End: 2022-09-12

## 2022-09-12 VITALS
OXYGEN SATURATION: 93 % | BODY MASS INDEX: 23.06 KG/M2 | SYSTOLIC BLOOD PRESSURE: 112 MMHG | DIASTOLIC BLOOD PRESSURE: 78 MMHG | HEIGHT: 64 IN | HEART RATE: 94 BPM | WEIGHT: 135.06 LBS

## 2022-09-12 DIAGNOSIS — E03.2 HYPOTHYROIDISM DUE TO MEDICAMENTS AND OTHER EXOGENOUS SUBSTANCES: ICD-10-CM

## 2022-09-12 LAB
ALBUMIN SERPL ELPH-MCNC: 3.5 G/DL
ALP BLD-CCNC: 122 U/L
ALT SERPL-CCNC: 5 U/L
ANION GAP SERPL CALC-SCNC: 16 MMOL/L
AST SERPL-CCNC: 10 U/L
BASOPHILS # BLD AUTO: 0 K/UL — SIGNIFICANT CHANGE UP (ref 0–0.2)
BASOPHILS NFR BLD AUTO: 0.3 % — SIGNIFICANT CHANGE UP (ref 0–2)
BILIRUB SERPL-MCNC: 0.8 MG/DL
BUN SERPL-MCNC: 23 MG/DL
CALCIUM SERPL-MCNC: 9.5 MG/DL
CHLORIDE SERPL-SCNC: 90 MMOL/L
CO2 SERPL-SCNC: 30 MMOL/L
CREAT SERPL-MCNC: 1.35 MG/DL
EGFR: 46 ML/MIN/1.73M2
EOSINOPHIL # BLD AUTO: 0 K/UL — SIGNIFICANT CHANGE UP (ref 0–0.5)
EOSINOPHIL NFR BLD AUTO: 0.2 % — SIGNIFICANT CHANGE UP (ref 0–6)
GLUCOSE SERPL-MCNC: 123 MG/DL
HCT VFR BLD CALC: 36.9 % — SIGNIFICANT CHANGE UP (ref 34.5–45)
HGB BLD-MCNC: 12.5 G/DL — SIGNIFICANT CHANGE UP (ref 11.5–15.5)
LYMPHOCYTES # BLD AUTO: 0.5 K/UL — LOW (ref 1–3.3)
LYMPHOCYTES # BLD AUTO: 4 % — LOW (ref 13–44)
MAGNESIUM SERPL-MCNC: 2 MG/DL
MCHC RBC-ENTMCNC: 28.6 PG — SIGNIFICANT CHANGE UP (ref 27–34)
MCHC RBC-ENTMCNC: 34 G/DL — SIGNIFICANT CHANGE UP (ref 32–36)
MCV RBC AUTO: 84 FL — SIGNIFICANT CHANGE UP (ref 80–100)
MONOCYTES # BLD AUTO: 0.5 K/UL — SIGNIFICANT CHANGE UP (ref 0–0.9)
MONOCYTES NFR BLD AUTO: 4.6 % — SIGNIFICANT CHANGE UP (ref 2–14)
NEUTROPHILS # BLD AUTO: 10.9 K/UL — HIGH (ref 1.8–7.4)
NEUTROPHILS NFR BLD AUTO: 90.9 % — HIGH (ref 43–77)
PLATELET # BLD AUTO: 327 K/UL — SIGNIFICANT CHANGE UP (ref 150–400)
POTASSIUM SERPL-SCNC: 3.3 MMOL/L
PROT SERPL-MCNC: 6 G/DL
RBC # BLD: 4.39 M/UL — SIGNIFICANT CHANGE UP (ref 3.8–5.2)
RBC # FLD: 13.7 % — SIGNIFICANT CHANGE UP (ref 10.3–14.5)
SODIUM SERPL-SCNC: 136 MMOL/L
TSH SERPL-ACNC: 4.84 UIU/ML
WBC # BLD: 12 K/UL — HIGH (ref 3.8–10.5)
WBC # FLD AUTO: 12 K/UL — HIGH (ref 3.8–10.5)

## 2022-09-12 PROCEDURE — 99214 OFFICE O/P EST MOD 30 MIN: CPT

## 2022-09-13 ENCOUNTER — APPOINTMENT (OUTPATIENT)
Dept: OTOLARYNGOLOGY | Facility: CLINIC | Age: 57
End: 2022-09-13

## 2022-09-20 ENCOUNTER — APPOINTMENT (OUTPATIENT)
Dept: PLASTIC SURGERY | Facility: CLINIC | Age: 57
End: 2022-09-20
Payer: COMMERCIAL

## 2022-09-20 VITALS
OXYGEN SATURATION: 93 % | HEART RATE: 109 BPM | HEIGHT: 64 IN | WEIGHT: 135 LBS | TEMPERATURE: 98.2 F | BODY MASS INDEX: 23.05 KG/M2 | SYSTOLIC BLOOD PRESSURE: 99 MMHG | DIASTOLIC BLOOD PRESSURE: 75 MMHG

## 2022-09-20 PROCEDURE — 99212 OFFICE O/P EST SF 10 MIN: CPT

## 2022-09-21 NOTE — HISTORY OF PRESENT ILLNESS
[T: ___] : T[unfilled] [N: ___] : N[unfilled] [M: ___] : M[unfilled] [de-identified] : Patient is a 55 yo F with h/o HTN, HLD and asthma who was diagnosed with SCC of the gingival mucosa.\par \par She saw oral surgeon, Dr. Cristopher Pinon, c/o soreness in the right jaw.  A biopsy of the right mandibular gingiva on 11/26/21 showed well-differentiated squamous cell carcinoma, P16 negative.\par \par She next saw Dr. James So. On exam, she was noted to have an ulcerated and endophytic lesion in the right posterior mandibular gingiva/post-molar region, abutting the 2nd premolar tooth and the base RMT posteriorly. There was buccal induration, no lingual extension.\par \par Staging CT neck on 12/23/21 showed an enhancing abnormal soft tissue lesion centered along the right posterior mandibular gingiva and gingivobuccal sulcus with suspected bony involvement of the adjacent alveolar ridge and also the lingual mandibular gingiva.  Right-sided pathologic cervical lymphadenopathy at right level Ib and right level II. Minimally hazy margins adjacent to the right level Ib lymph node. Staging PET on the same day showed FDG avid soft tissue lesion along the right posterior mandibular gingiva and gingivobuccal sulcus as seen on contrast-enhanced CT compatible with newly diagnosed squamous cell cancer. FDG avidity extends towards the lingual surface. FDG avid right level Ib cervical lymph nodes likely metastatic. FDG avid right level IIA and IIB lymph nodes are indeterminate. FDG avid focus within the enlarged right lobe, likely corresponding to the vague 1.1 cm nodule on contrast enhanced CT.\par \par On 1/13/22 she underwent right composite resection (segmental mandibulectomy with a partial right buccal soft tissue and floor of mouth excision), right neck dissection levels I-IV with Dr So.  Pathology showed invasive squamous cell carcinoma, well-differentiated, measuring 1.7 cm. Depth of invasion 0.6 cm. No LVI/PNI. There was microscopic evidence of tumor invasion into underlying bone (pT4a). Tumor margin from medial soft tissue was 0.4 cm; from the lateral soft tissue was 0.1 cm. Positive lymph nodes were identified in right level 1b and 2a; total 4/18; largest 1.5 cm, no LEONIE. pT4a N2b. \par \par The patient was started on cRT with weekly cisplatin, which she completed on 4/8/22.  \par \par  [de-identified] : The patient still having significant jaw pain due to poor wound healing. Planning to see Dr. So tomorrow. \par Patient has not been able to chew, and is reliant on her peg, but having issues achieve adequate nutrition as patient feels nauseous from PEG feeds. Patient endorses weight loss.   \par The patient continues to use oxycodone with tylenol and ibuprofen. She denies feeling sedated and states that her pain is undercontrol. She takes approximately 10-15 mg of oxycodone q4-6 hours.

## 2022-09-21 NOTE — ASSESSMENT
[FreeTextEntry1] : 56 year old woman diagnosed with oral cancer s/p resection with right neck dissection, [id number] \par \par # Oral Cancer \par - s/p C5 of CRT with weekly Cisplatin\par - C4 delayed by elevated creatinine, improved with IVF and received C5 on 4/4.\par - RT completed on 4/11\par - Most recent PET-CT from 6/22/22 reviewed; EUFEMIA \par - Patient to see Dr. So tomorrow. \par - discussed pain regimen with patient. WIll refer patient to pain management. Patient counseled on the importance of following up with pain management. I expressed my concerns that she has been dependent on opiates for a long period of time. At this time, she says that it is on the only thing that works.  She is taking tylenol and ibuprofen as well. Patient understanding that she must follow up with pain management as a prerequisite for continuing her pain regimen in the future  \par \par # Malnutrition\par - GEMA Hobbs following closely and lengthy discussion today regarding dire need to increase calorie intake. I again advised the patient that she must improve her calorie intake.  \par - Patient still requiring oxycodone for pain intermittently. Patient taking tylenol and ibuprofen standing (q6 hours). \par - Follow up with ENT and dental. \par \par #Anemia \par - now normal \par - Follow up blood work today. \par \par Addendum: 9/21/22 Visiting nurse reports increased sedation at home. At the time of my visit, the patient appeared to have good mentation, however. Will plan to ween oxycodone to 5 mg q6hrs in hopes of address sedation while also addressing pain related to eroding hardware.  Patient does not appear to have an appointment with pain. Will attempt to schedule for ASAP.

## 2022-09-21 NOTE — REVIEW OF SYSTEMS
[Odynophagia] : odynophagia [Negative] : Heme/Lymph [Abdominal Pain] : no abdominal pain [Vomiting] : no vomiting

## 2022-09-21 NOTE — PHYSICAL EXAM
[Fully active, able to carry on all pre-disease performance without restriction] : Status 0 - Fully active, able to carry on all pre-disease performance without restriction [Normal] : affect appropriate [de-identified] : right neck fullness and erythema post operatively.  [de-identified] : patient AAOx3, mentating well.

## 2022-09-23 ENCOUNTER — APPOINTMENT (OUTPATIENT)
Dept: PHYSICAL MEDICINE AND REHAB | Facility: CLINIC | Age: 57
End: 2022-09-23

## 2022-09-23 VITALS
HEART RATE: 96 BPM | BODY MASS INDEX: 23.05 KG/M2 | SYSTOLIC BLOOD PRESSURE: 102 MMHG | HEIGHT: 64 IN | DIASTOLIC BLOOD PRESSURE: 74 MMHG | WEIGHT: 135 LBS

## 2022-09-23 DIAGNOSIS — F11.90 OPIOID USE, UNSPECIFIED, UNCOMPLICATED: ICD-10-CM

## 2022-09-23 PROCEDURE — 99214 OFFICE O/P EST MOD 30 MIN: CPT

## 2022-09-23 NOTE — PHYSICAL EXAM
[FreeTextEntry1] : Gen: Patient is A&O x 3, NAD\par HEENT: EOMI, hearing grossly normal\par Resp: regular, non - labored\par CV: pulses regular\par Skin: no rashes, erythema\par Lymph: no clubbing, cyanosis, edema, or palpable lymphadenopathy\par Inspection: Draining wound \par ROM: full throughout\par Palpation:TTP right mandible \par Sensation: intact to light touch\par Reflexes: 1+ and symmetric throughout\par Strength: 5/5 throughout\par Special tests: -Markham's sign \par Gait: normal, non-antalgic\par \par

## 2022-09-23 NOTE — ASSESSMENT
[FreeTextEntry1] : 57 year old female presenting for evaluation.\par \par #Chronic pain after cancer treatment:\par -ENT notes reviewed, plate superinfected, planning for removal\par -Heme-onc notes reviewed, s/p treatment with cisplatin\par -Rad-onc notes reviewed, completed radiation treatment in April 2022\par -Discussed with patient goal is to ultimately wean opiate pain medications.  Especially after her reconstruction procedure.\par -Continue liquid oxycodone 5 mg every 8 hours as needed for pain.  2-week supply sent for patient.\par -Narcan sent\par -Pain contract signed\par -I stop #[id number]\par \par #Neuropathic pain\par -Start liquid gabapentin 300 mg twice daily, continue to monitor given kidney function.\par \par Follow-up in 2 to 3 weeks.

## 2022-10-01 ENCOUNTER — RESULT REVIEW (OUTPATIENT)
Age: 57
End: 2022-10-01

## 2022-10-01 ENCOUNTER — APPOINTMENT (OUTPATIENT)
Dept: CT IMAGING | Facility: CLINIC | Age: 57
End: 2022-10-01

## 2022-10-01 ENCOUNTER — OUTPATIENT (OUTPATIENT)
Dept: OUTPATIENT SERVICES | Facility: HOSPITAL | Age: 57
LOS: 1 days | End: 2022-10-01

## 2022-10-01 DIAGNOSIS — Z98.891 HISTORY OF UTERINE SCAR FROM PREVIOUS SURGERY: Chronic | ICD-10-CM

## 2022-10-01 DIAGNOSIS — Z90.711 ACQUIRED ABSENCE OF UTERUS WITH REMAINING CERVICAL STUMP: Chronic | ICD-10-CM

## 2022-10-01 DIAGNOSIS — Z96.649 PRESENCE OF UNSPECIFIED ARTIFICIAL HIP JOINT: Chronic | ICD-10-CM

## 2022-10-01 DIAGNOSIS — Z98.890 OTHER SPECIFIED POSTPROCEDURAL STATES: Chronic | ICD-10-CM

## 2022-10-01 DIAGNOSIS — C76.0 MALIGNANT NEOPLASM OF HEAD, FACE AND NECK: ICD-10-CM

## 2022-10-01 PROCEDURE — 76376 3D RENDER W/INTRP POSTPROCES: CPT | Mod: 26

## 2022-10-01 PROCEDURE — 70486 CT MAXILLOFACIAL W/O DYE: CPT | Mod: 26

## 2022-10-04 ENCOUNTER — APPOINTMENT (OUTPATIENT)
Dept: PLASTIC SURGERY | Facility: CLINIC | Age: 57
End: 2022-10-04
Payer: COMMERCIAL

## 2022-10-04 VITALS
HEIGHT: 64 IN | OXYGEN SATURATION: 93 % | TEMPERATURE: 98 F | SYSTOLIC BLOOD PRESSURE: 102 MMHG | BODY MASS INDEX: 23.05 KG/M2 | HEART RATE: 125 BPM | DIASTOLIC BLOOD PRESSURE: 71 MMHG | WEIGHT: 135 LBS

## 2022-10-04 PROCEDURE — 99212 OFFICE O/P EST SF 10 MIN: CPT

## 2022-10-07 ENCOUNTER — APPOINTMENT (OUTPATIENT)
Dept: PHYSICAL MEDICINE AND REHAB | Facility: CLINIC | Age: 57
End: 2022-10-07

## 2022-10-07 VITALS
BODY MASS INDEX: 31.24 KG/M2 | HEIGHT: 55 IN | SYSTOLIC BLOOD PRESSURE: 106 MMHG | WEIGHT: 135 LBS | DIASTOLIC BLOOD PRESSURE: 77 MMHG | HEART RATE: 98 BPM

## 2022-10-07 PROCEDURE — 99214 OFFICE O/P EST MOD 30 MIN: CPT

## 2022-10-07 NOTE — PHYSICAL EXAM
[FreeTextEntry1] : Gen: Patient is A&O x 3, NAD\par HEENT: EOMI, hearing grossly normal\par Resp: regular, non - labored\par CV: pulses regular\par Skin: no rashes, erythema\par Lymph: no clubbing, cyanosis, edema, or palpable lymphadenopathy\par Inspection: Draining wound \par ROM: full throughout\par Palpation:TTP right mandible \par Sensation: intact to light touch\par Reflexes: 1+ and symmetric throughout\par Strength: 5/5 throughout\par Special tests: -Markham's sign \par Gait: antalgic \par \par

## 2022-10-07 NOTE — HISTORY OF PRESENT ILLNESS
[FreeTextEntry1] : Ms. Dsouza is a 57 year old female diagnosed with squamous cell carcinoma of the oral cavity, status post right composite resection (segmental mandibulectomy with a partial right buccal soft tissue and floor of mouth excision), right neck dissection levels I-IV on 1/13/22 completing adjuvant chemoradiation with weekly cisplatin on 4/8/22.  Plate is infected with plan for removal.  \par \par Since her last visit she states she has been taking oxycodone for severe pain.  She is taking 15 mg total a day she reports.  She reports that she does have enough for 1 week left as would be appropriate for her prescription.  She is taking gabapentin unsure if this is helping.  Denies any side effects.  Denies any constipation.  She still reports she has some difficulty with balance.  Denies any focal weakness.\par \par \par \par \par \par

## 2022-10-07 NOTE — ASSESSMENT
[FreeTextEntry1] : 57 year old female presenting for evaluation.\par \par #Chronic pain after cancer treatment:\par -Discussed with patient goal is to ultimately wean opiate pain medications.  Especially after her reconstruction procedure.\par -Continue liquid oxycodone 5 mg every 8 hours as needed for pain.  2-week supply to be sent for patient.\par -Patient reports she is taking as prescribed and has enough supply left as appropriate for prescription \par -Narcan previously sent \par -I stop #[id number]\par \par #Neuropathic pain\par -Continue liquid gabapentin 300 mg twice daily, continue to monitor given kidney function.\par \par #Impaired mobility:\par -Discussed starting PT, but she would like to defer \par \par Follow-up in 2 to 3 weeks.

## 2022-10-11 ENCOUNTER — APPOINTMENT (OUTPATIENT)
Dept: RADIATION ONCOLOGY | Facility: CLINIC | Age: 57
End: 2022-10-11

## 2022-10-11 ENCOUNTER — NON-APPOINTMENT (OUTPATIENT)
Age: 57
End: 2022-10-11

## 2022-10-11 VITALS
SYSTOLIC BLOOD PRESSURE: 110 MMHG | RESPIRATION RATE: 16 BRPM | BODY MASS INDEX: 5932.25 KG/M2 | HEART RATE: 106 BPM | DIASTOLIC BLOOD PRESSURE: 77 MMHG | OXYGEN SATURATION: 93 % | WEIGHT: 135 LBS

## 2022-10-11 PROCEDURE — 99214 OFFICE O/P EST MOD 30 MIN: CPT

## 2022-10-11 RX ORDER — ALBUTEROL SULFATE 90 UG/1
108 AEROSOL, METERED RESPIRATORY (INHALATION)
Refills: 0 | Status: COMPLETED | COMMUNITY
End: 2022-10-11

## 2022-10-11 RX ORDER — ASPIRIN 81 MG
81 TABLET, DELAYED RELEASE (ENTERIC COATED) ORAL
Refills: 0 | Status: COMPLETED | COMMUNITY
End: 2022-10-11

## 2022-10-11 RX ORDER — OXYCODONE HYDROCHLORIDE 5 MG/5ML
5 SOLUTION ORAL
Qty: 225 | Refills: 0 | Status: COMPLETED | COMMUNITY
Start: 2022-09-23 | End: 2022-10-11

## 2022-10-11 RX ORDER — ERGOCALCIFEROL 1.25 MG/1
1.25 MG CAPSULE, LIQUID FILLED ORAL
Refills: 0 | Status: COMPLETED | COMMUNITY
End: 2022-10-11

## 2022-10-11 RX ORDER — ATORVASTATIN CALCIUM 10 MG/1
10 TABLET, FILM COATED ORAL
Refills: 0 | Status: COMPLETED | COMMUNITY
End: 2022-10-11

## 2022-10-11 RX ORDER — POTASSIUM CHLORIDE 20 MEQ/15ML
20 MEQ/15ML SOLUTION ORAL
Qty: 210 | Refills: 0 | Status: COMPLETED | COMMUNITY
Start: 2022-09-24 | End: 2022-10-11

## 2022-10-11 RX ORDER — SODIUM HYPOCHLORITE 2.5 MG/ML
0.25 SOLUTION TOPICAL
Qty: 2 | Refills: 0 | Status: COMPLETED | COMMUNITY
Start: 2022-07-15 | End: 2022-10-11

## 2022-10-11 RX ORDER — FENTANYL 25 UG/H
25 PATCH, EXTENDED RELEASE TRANSDERMAL
Qty: 3 | Refills: 0 | Status: COMPLETED | COMMUNITY
Start: 2022-04-11 | End: 2022-10-11

## 2022-10-11 NOTE — VITALS
[Maximal Pain Intensity: 2/10] : 2/10 [Least Pain Intensity: 2/10] : 2/10 [Pain Description/Quality: ___] : Pain description/quality: [unfilled] [Pain Duration: ___] : Pain duration: [unfilled] [Pain Location: ___] : Pain Location: [unfilled] [Pain Interferes with ADLs] : Pain interferes with activities of daily living. [Opioid] : opioid [Adjuvant (neuroleptics)] : adjuvant (neuroleptics) [80: Normal activity with effort; some signs or symptoms of disease.] : 80: Normal activity with effort; some signs or symptoms of disease.

## 2022-10-11 NOTE — PHYSICAL EXAM
[Sclera] : the sclera and conjunctiva were normal [Extraocular Movements] : extraocular movements were intact [] : no respiratory distress [Respiration, Rhythm And Depth] : normal respiratory rhythm and effort [Exaggerated Use Of Accessory Muscles For Inspiration] : no accessory muscle use [Bowel Sounds] : normal bowel sounds [Abdomen Soft] : soft [Nondistended] : nondistended [Abdomen Tenderness] : non-tender [Feeding Tube] : a feeding tube was present [Normal] : no palpable adenopathy [de-identified] : Bandage across bridge of nose.  No oral/opx masses.  Sores along lateral surfaces of tongue. [de-identified] : right neck bandage c/d/i [de-identified] : cane

## 2022-10-11 NOTE — REVIEW OF SYSTEMS
[Skin Wound] : skin wound [Negative] : Allergic/Immunologic [Localized Edema: Grade 1 - Localized to dependent areas, no disability or functional impairment] : Localized Edema: Grade 1 - Localized to dependent areas, no disability or functional impairment [Neck Edema: Grade 1 - Asymptomatic localized neck edema] : Neck Edema: Grade 1 - Asymptomatic localized neck edema [Mucositis Oral: Grade 0] : Mucositis Oral: Grade 0  [Oral Pain: Grade 0] : Oral Pain: Grade 0 [Fatigue: Grade 1 - Fatigue relieved by rest] : Fatigue: Grade 1 - Fatigue relieved by rest [Pruritus: Grade 0] : Pruritus: Grade 0 [Skin Hyperpigmentation: Grade 1 - Hyperpigmentation covering <10% BSA; no psychosocial impact] : Skin Hyperpigmentation: Grade 1 - Hyperpigmentation covering <10% BSA; no psychosocial impact [Dermatitis Radiation: Grade 1 - Faint erythema or dry desquamation] : Dermatitis Radiation: Grade 1 - Faint erythema or dry desquamation

## 2022-10-11 NOTE — HISTORY OF PRESENT ILLNESS
[FreeTextEntry1] : 57 year old woman with squamous cell carcinoma of the oral cavity, status post right composite resection (segmental mandibulectomy with a partial right buccal soft tissue and floor of mouth excision), right neck dissection levels I-IV on 1/13/22 and adjuvant chemoradiation with weekly cisplatin completed 4/8/22.\par \par Interval history:\par Has 8-month history of non-healing right jaw wound with hardware exposure. Planned to undergo removal of mandibular hardware in Nov/Dec.\par Reports ongoing xerostomia, and notes burning sensation with acidic, spicy, or tart foods.  Also inability to tolerate salad dressing, soda.\par Has trismus, difficulty chewing hard foods.\par Not using PEG for nutrition, just flushing.\par No dysgeusia, odynophagia or dysphagia.\par Continues to smoke.\par Episodes of poor balance, resulting in falls at home.  Most recently, cut along bridge of nose.\par Started on Gabapentin, weaning off oxycodone; reports hasn't used oxycodone in ~2 weeks.\par \par 10/1/22 CT maxillofacial:\par 1. Redemonstration of right segmental mandibulectomy with fibular free flap reconstruction. Symphyseal margin is nonunited and irregular. Permeative appearance of the fibular graft and the native mandible with areas of lucency, gas, cortical irregularity and periosteal reaction. 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 New lucencies along multiple screws in the native mandible (3:35). A screw in the fibular graft is backed out by 1.5 mm (3:39). These findings suggest hardware loosening.\par \par ENT: Judah\par Plastic: Kinsley\par HemeOnc: Pappas\par GI: Vrabie \par PM&R: Sera

## 2022-10-25 ENCOUNTER — APPOINTMENT (OUTPATIENT)
Dept: OTOLARYNGOLOGY | Facility: CLINIC | Age: 57
End: 2022-10-25

## 2022-10-25 VITALS
OXYGEN SATURATION: 97 % | DIASTOLIC BLOOD PRESSURE: 70 MMHG | HEIGHT: 55 IN | WEIGHT: 135 LBS | BODY MASS INDEX: 31.24 KG/M2 | HEART RATE: 79 BPM | SYSTOLIC BLOOD PRESSURE: 98 MMHG

## 2022-10-25 PROCEDURE — 99214 OFFICE O/P EST MOD 30 MIN: CPT

## 2022-10-25 NOTE — HISTORY OF PRESENT ILLNESS
[None] : No associated symptoms are reported. [de-identified] : Ms. Dsouza presents for follow up. 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 RT completed on 4/8/2022. She is 6 months out of treatment.\par  6/22/2022 Pet scan\par Under the care of Dr. Zamora for pain management. \par Eating soft food and 3 protein shakes, maintaining weight , following up with nutritionist. PEG place, but not using it.\par Will have surgery with Dr. Evans at the end of the year to remove implant plate in her jaw. She has wound care nurse treating her wound. \par Is not on PreviDent toothpaste. \par Smoking 2-3 cigarettes a day. No alcohol, no illicit drugs\par

## 2022-10-25 NOTE — PHYSICAL EXAM
[FreeTextEntry1] : r neck clean.  some erthema at wound site and chin [Midline] : trachea located in midline position [Normal] : no rashes

## 2022-10-25 NOTE — CONSULT LETTER
[Dear  ___] : Dear  [unfilled], [Courtesy Letter:] : I had the pleasure of seeing your patient, [unfilled], in my office today. [Please see my note below.] : Please see my note below. [Sincerely,] : Sincerely, [FreeTextEntry2] : Cristopher Pinon DDS ([place], [place]) \par  [FreeTextEntry3] : Vic So MD, FACS\par \par    [facility] [place]\par Associate Chair\par    Department of Otolaryngology\par \par Professor\par Otolaryngology & Molecular Medicine\par [employer]\par

## 2022-10-28 ENCOUNTER — APPOINTMENT (OUTPATIENT)
Dept: PHYSICAL MEDICINE AND REHAB | Facility: CLINIC | Age: 57
End: 2022-10-28

## 2022-10-28 ENCOUNTER — APPOINTMENT (OUTPATIENT)
Dept: HEMATOLOGY ONCOLOGY | Facility: CLINIC | Age: 57
End: 2022-10-28

## 2022-10-28 ENCOUNTER — LABORATORY RESULT (OUTPATIENT)
Age: 57
End: 2022-10-28

## 2022-10-28 DIAGNOSIS — G89.3 NEOPLASM RELATED PAIN (ACUTE) (CHRONIC): ICD-10-CM

## 2022-10-28 PROCEDURE — 99214 OFFICE O/P EST MOD 30 MIN: CPT

## 2022-10-28 NOTE — ASU PATIENT PROFILE, ADULT - FALL HARM RISK - HARM RISK INTERVENTIONS
Assistance with ambulation/Assistance OOB with selected safe patient handling equipment/Communicate Risk of Fall with Harm to all staff/Discuss with provider need for PT consult/Monitor gait and stability/Move patient closer to nurses' station/Provide patient with walking aids - walker, cane, crutches/Reinforce activity limits and safety measures with patient and family/Review medications for side effects contributing to fall risk/Sit up slowly, dangle for a short time, stand at bedside before walking/Tailored Fall Risk Interventions/Visual Cue: Yellow wristband and red socks/Bed in lowest position, wheels locked, appropriate side rails in place/Call bell, personal items and telephone in reach/Instruct patient to call for assistance before getting out of bed or chair/Non-slip footwear when patient is out of bed/[place] to call system/Physically safe environment - no spills, clutter or unnecessary equipment/Purposeful Proactive Rounding/Room/bathroom lighting operational, light cord in reach

## 2022-10-28 NOTE — PHYSICAL EXAM
[FreeTextEntry1] : Gen: Patient is A&O x 3, NAD\par HEENT: EOMI, hearing grossly normal\par Resp: regular, non- labored\par CV: Pulses regular \par Skin: +fibrotic changes\par Lymph: no clubbing, cyanosis, edema, or palpable lymphadenopathy\par Inspection: Bandaged area without discharge\par ROM: Mildly decreased ROM of neck on lateral rotation\par Palpation:TTP right mandible \par Sensation: intact to light touch\par Reflexes: 1+ and symmetric throughout\par Strength: 5/5 throughout\par Gait: uses rolling walker.\par

## 2022-10-28 NOTE — HISTORY OF PRESENT ILLNESS
[FreeTextEntry1] : Ms. Dsouza is a 57 year old female diagnosed with squamous cell carcinoma of the oral cavity, status post right composite resection (segmental mandibulectomy with a partial right buccal soft tissue and floor of mouth excision), right neck dissection levels I-IV on 1/13/22 completing adjuvant chemoradiation with weekly cisplatin on 4/8/22. Plate is infected with plan for removal on 10/31 by Dr Evans. \par \par Since her last visit she states she continues to have neck and jaw pain for which she has been taking oxycodone. She is taking 15 mg total a day she reports. The medication was recently refilled appropriately.  She is taking gabapentin and does not think it is helping. Denies any side effects from gabapentin. Denies any constipation. - last BM this AM. Denies any focal weakness. Notes fatigue (chronic).\par

## 2022-10-28 NOTE — ASSESSMENT
[FreeTextEntry1] : 57 year old female presenting for evaluation.\par \par #SCC of oral cavity\par -s/p R composite resection, neck dissection I-IV, completed adjuvant chemoradiation w/ cisplatin 4/8/22.\par -Plan for infected plate removal 10/31.\par \par #Chronic pain after cancer treatment:\par -Discussed with patient goal is to ultimately wean opiate pain medications. Especially after her reconstruction procedure.\par -Continue liquid oxycodone 5 mg every 8 hours as needed for pain. Recently refilled.\par -Patient reports she is taking as prescribed, no signs of aberrant behavior.  \par -Narcan previously sent \par -I stop #[id number]\par \par #Neuropathic pain\par -Increase liquid gabapentin to 600 mg twice daily, continue to monitor given kidney function.\par \par #Impaired mobility:\par -Discussed starting PT, but she would like to defer \par \par Follow-up in 2 weeks.

## 2022-10-30 ENCOUNTER — TRANSCRIPTION ENCOUNTER (OUTPATIENT)
Age: 57
End: 2022-10-30

## 2022-10-31 ENCOUNTER — OUTPATIENT (OUTPATIENT)
Dept: OUTPATIENT SERVICES | Facility: HOSPITAL | Age: 57
LOS: 1 days | Discharge: HOME | End: 2022-10-31

## 2022-10-31 ENCOUNTER — TRANSCRIPTION ENCOUNTER (OUTPATIENT)
Age: 57
End: 2022-10-31

## 2022-10-31 ENCOUNTER — RESULT REVIEW (OUTPATIENT)
Age: 57
End: 2022-10-31

## 2022-10-31 ENCOUNTER — APPOINTMENT (OUTPATIENT)
Dept: PLASTIC SURGERY | Facility: HOSPITAL | Age: 57
End: 2022-10-31

## 2022-10-31 VITALS
TEMPERATURE: 98 F | OXYGEN SATURATION: 100 % | DIASTOLIC BLOOD PRESSURE: 61 MMHG | RESPIRATION RATE: 15 BRPM | HEART RATE: 65 BPM | SYSTOLIC BLOOD PRESSURE: 103 MMHG

## 2022-10-31 VITALS
SYSTOLIC BLOOD PRESSURE: 98 MMHG | TEMPERATURE: 98 F | HEIGHT: 65 IN | DIASTOLIC BLOOD PRESSURE: 72 MMHG | RESPIRATION RATE: 16 BRPM | WEIGHT: 134.92 LBS | OXYGEN SATURATION: 96 % | HEART RATE: 96 BPM

## 2022-10-31 DIAGNOSIS — Z96.649 PRESENCE OF UNSPECIFIED ARTIFICIAL HIP JOINT: Chronic | ICD-10-CM

## 2022-10-31 DIAGNOSIS — Z98.890 OTHER SPECIFIED POSTPROCEDURAL STATES: Chronic | ICD-10-CM

## 2022-10-31 DIAGNOSIS — C76.0 MALIGNANT NEOPLASM OF HEAD, FACE AND NECK: ICD-10-CM

## 2022-10-31 DIAGNOSIS — Z90.711 ACQUIRED ABSENCE OF UTERUS WITH REMAINING CERVICAL STUMP: Chronic | ICD-10-CM

## 2022-10-31 DIAGNOSIS — Z98.891 HISTORY OF UTERINE SCAR FROM PREVIOUS SURGERY: Chronic | ICD-10-CM

## 2022-10-31 PROCEDURE — 20680 REMOVAL OF IMPLANT DEEP: CPT

## 2022-10-31 PROCEDURE — 88300 SURGICAL PATH GROSS: CPT | Mod: 26

## 2022-10-31 RX ORDER — SODIUM CHLORIDE 9 MG/ML
500 INJECTION, SOLUTION INTRAVENOUS ONCE
Refills: 0 | Status: COMPLETED | OUTPATIENT
Start: 2022-10-31 | End: 2022-10-31

## 2022-10-31 RX ORDER — ASPIRIN/CALCIUM CARB/MAGNESIUM 324 MG
1 TABLET ORAL
Qty: 0 | Refills: 0 | DISCHARGE

## 2022-10-31 RX ORDER — ALBUMIN HUMAN 25 %
250 VIAL (ML) INTRAVENOUS ONCE
Refills: 0 | Status: COMPLETED | OUTPATIENT
Start: 2022-10-31 | End: 2022-10-31

## 2022-10-31 RX ORDER — ACETAMINOPHEN 500 MG
1000 TABLET ORAL ONCE
Refills: 0 | Status: COMPLETED | OUTPATIENT
Start: 2022-10-31 | End: 2022-10-31

## 2022-10-31 RX ADMIN — SODIUM CHLORIDE 1000 MILLILITER(S): 9 INJECTION, SOLUTION INTRAVENOUS at 17:19

## 2022-10-31 RX ADMIN — Medication 400 MILLIGRAM(S): at 17:04

## 2022-10-31 RX ADMIN — Medication 125 MILLILITER(S): at 17:39

## 2022-10-31 RX ADMIN — Medication 1000 MILLIGRAM(S): at 17:19

## 2022-10-31 NOTE — ASU DISCHARGE PLAN (ADULT/PEDIATRIC) - PROVIDER TOKENS
FREE:[LAST:[Cristina],FIRST:[Neo],PHONE:[phone]],FAX:[(   )    -],ADDRESS:[[address] [place] [address]],FOLLOWUP:[1 week]]

## 2022-10-31 NOTE — H&P PST ADULT - ENT GEN HX ROS MEA POS PC
difficulty chewing on right side,  jaw pain, no sensation in right ear, swelling of right mouth (right lower)/recurrent cold sores
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
## 2022-10-31 NOTE — ASU DISCHARGE PLAN (ADULT/PEDIATRIC) - ASU DC SPECIAL INSTRUCTIONSFT
Please keep incision dry for 24 hours. Permitted to shower normally thereafter. Apply Bacitracin twice daily to the incision. Follow up in 1 week. Please take Augmentin as prescribed for 10 days. Take tylenol extra strength as needed for pain.

## 2022-10-31 NOTE — H&P PST ADULT - ASSESSMENT
CAPRINI SCORE    AGE RELATED RISK FACTORS                                                             [x ] Age 41-60 years                                            (1 Point)  [ ] Age: 61-74 years                                           (2 Points)                 [ ] Age= 75 years                                                (3 Points)             DISEASE RELATED RISK FACTORS                                                       [ ] Edema in the lower extremities                 (1 Point)                     [ ] Varicose veins                                               (1 Point)                                 [x ] BMI > 25 Kg/m2                                            (1 Point)                                  [ ] Serious infection (ie PNA)                            (1 Point)                     [ ] Lung disease ( COPD, Emphysema)            (1 Point)                                                                          [ ] Acute myocardial infarction                         (1 Point)                  [ ] Congestive heart failure (in the previous month)  (1 Point)         [ ] Inflammatory bowel disease                            (1 Point)                  [ ] Central venous access, PICC or Port               (2 points)       (within the last month)                                                                [ ] Stroke (in the previous month)                        (5 Points)    [x ] Previous or present malignancy                       (2 points)                                                                                                                                                         HEMATOLOGY RELATED FACTORS                                                         [ ] Prior episodes of VTE                                     (3 Points)                     [ ] Positive family history for VTE                      (3 Points)                  [ ] Prothrombin 16250 A                                     (3 Points)                     [ ] Factor V Leiden                                                (3 Points)                        [ ] Lupus anticoagulants                                      (3 Points)                                                           [ ] Anticardiolipin antibodies                              (3 Points)                                                       [ ] High homocysteine in the blood                   (3 Points)                                             [ ] Other congenital or acquired thrombophilia      (3 Points)                                                [ ] Heparin induced thrombocytopenia                  (3 Points)                                        MOBILITY RELATED FACTORS  [ ] Bed rest                                                         (1 Point)  [ ] Plaster cast                                                    (2 points)  [ ] Bed bound for more than 72 hours           (2 Points)    GENDER SPECIFIC FACTORS  [ ] Pregnancy or had a baby within the last month   (1 Point)  [ ] Post-partum < 6 weeks                                   (1 Point)  [ ] Hormonal therapy  or oral contraception   (1 Point)  [ ] History of pregnancy complications              (1 point)  [ ] Unexplained or recurrent              (1 Point)    OTHER RISK FACTORS                                           (1 Point)  [ ] BMI >40, smoking, diabetes requiring insulin, chemotherapy  blood transfusions and length of surgery over 2 hours    SURGERY RELATED RISK FACTORS  [ ]  Section within the last month     (1 Point)  [x ] Minor surgery                                                  (1 Point)  [ ] Arthroscopic surgery                                       (2 Points)  [ ] Planned major surgery lasting more            (2 Points)      than 45 minutes     [ ] Elective hip or knee joint replacement       (5 points)       surgery                                                TRAUMA RELATED RISK FACTORS  [ ] Fracture of the hip, pelvis, or leg                       (5 Points)  [ ] Spinal cord injury resulting in paralysis             (5 points)       (in the previous month)    [ ] Paralysis  (less than 1 month)                             (5 Points)  [ ] Multiple Trauma within 1 month                        (5 Points)    Total Score [    5    ]    Caprini Score 0-2: Low Risk, NO VTE prophylaxis required for most patients, encourage ambulation  Caprini Score 3-6: Moderate Risk , pharmacologic VTE prophylaxis is indicated for most patients (in the absence of contraindications)  Caprini Score Greater than or =7: High risk, pharmocologic VTE prophylaxis indicated for most patients (in the absence of contraindications)                OPIOID RISK TOOL    YADY EACH BOX THAT APPLIES AND ADD TOTALS AT THE END    FAMILY HISTORY OF SUBSTANCE ABUSE                 FEMALE         MALE                                                Alcohol                             [  ]1 pt          [  ]3pts                                               Illegal Durgs                     [  ]2 pts        [  ]3pts                                               Rx Drugs                           [  ]4 pts        [  ]4 pts    PERSONAL HISTORY OF SUBSTANCE ABUSE                                                                                          Alcohol                             [  ]3 pts       [  ]3 pts                                               Illegal Durgs                     [  ]4 pts        [  ]4 pts                                               Rx Drugs                           [  ]5 pts        [  ]5 pts    AGE BETWEEN 16-45 YEARS                                      [  ]1 pt         [  ]1 pt    HISTORY OF PREADOLESCENT   SEXUAL ABUSE                                                             [  ]3 pts        [  ]0pts    PSYCHOLOGICAL DISEASE                     ADD, OCD, Bipolar, Schizophrenia        [  ]2 pts         [  ]2 pts                      Depression                                               [  ]1 pt           [  ]1 pt           SCORING TOTAL   0                                    A score of 3 or lower indicated LOW risk for future opiod abuse  A score of 4 to 7 indicated moderate risk for future opiod abuse  A score of 8 or higher indicates a high risk for opiod abuse    56 year old female with a pmhx of HTN, 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 Patient was recently admitted to [facility] 22 for purulent discharge coming from area of her right neck distal to jaw (not from surgical incision), CT of neck found two thin fluid collections containing gas in the right mandibular post operative bed, concern for superimposed infection. Mary Jo and submandibular fat stranding which could represent post surgical changes or be reactive in the setting of cellulitis, blood cultures were negative, patient discharged home on oral augmentin and doxycycline, as per patient she has 3 days remaining.  Patient presents to PST today with dressing in place to right neck, dressing was changed this morning with packing, patient reports tan colored drainage, denies any foul smelling odor.  Erythema + swelling ot neck, no foul odor, no visible drainage, dressing is intact with packing, changed this AM by home care RN, pt request not to remove, neck is non tender, incision well approximated. Patient is now scheduled for port placement in IR with Dr North on 22. Patient will see Dr So ENT prior to procedure to ensure resolution of neck infection, assisted with appt patient will see him in [place] on 22 at 10:45AM.

## 2022-10-31 NOTE — H&P PST ADULT - HISTORY OF PRESENT ILLNESS
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 Patient was recently admitted to [facility] 2/6/22 for purulent discharge coming from area of her right neck distal to jaw (not from surgical incision),  Patient presents today with dressing in place to right neck, dressing was changed this morning with packing, patient reports tan colored drainage, denies any foul smelling odor.    Patient denies fever, chills, or malaise. Denies dizziness or lightheadedness. Patient reports left leg pain at site of graft, repotrs pain is burning and tingling. Reports she vomited yesterday after taking oxycodone for left leg pain, today she denies any nausea, vomiting, diarrhea or constipation. States she tolerated breakfast well, tolerating diet, reports mild jaw pain with prolonged chewing.

## 2022-10-31 NOTE — ASU DISCHARGE PLAN (ADULT/PEDIATRIC) - CARE PROVIDER_API CALL
Neo Evans  [address]   [place] [address]  Phone: [phone]  Fax: (   )    -  Follow Up Time: 1 week

## 2022-11-08 ENCOUNTER — OUTPATIENT (OUTPATIENT)
Dept: OUTPATIENT SERVICES | Facility: HOSPITAL | Age: 57
LOS: 1 days | Discharge: HOME | End: 2022-11-08

## 2022-11-08 ENCOUNTER — APPOINTMENT (OUTPATIENT)
Dept: PLASTIC SURGERY | Facility: CLINIC | Age: 57
End: 2022-11-08

## 2022-11-08 VITALS
HEART RATE: 110 BPM | DIASTOLIC BLOOD PRESSURE: 74 MMHG | TEMPERATURE: 98.2 F | BODY MASS INDEX: 31.24 KG/M2 | HEIGHT: 55 IN | OXYGEN SATURATION: 95 % | SYSTOLIC BLOOD PRESSURE: 104 MMHG | WEIGHT: 135 LBS

## 2022-11-08 DIAGNOSIS — Z90.711 ACQUIRED ABSENCE OF UTERUS WITH REMAINING CERVICAL STUMP: Chronic | ICD-10-CM

## 2022-11-08 DIAGNOSIS — C03.9 MALIGNANT NEOPLASM OF GUM, UNSPECIFIED: ICD-10-CM

## 2022-11-08 DIAGNOSIS — Z98.890 OTHER SPECIFIED POSTPROCEDURAL STATES: Chronic | ICD-10-CM

## 2022-11-08 DIAGNOSIS — C76.0 MALIGNANT NEOPLASM OF HEAD, FACE AND NECK: ICD-10-CM

## 2022-11-08 DIAGNOSIS — Z98.891 HISTORY OF UTERINE SCAR FROM PREVIOUS SURGERY: Chronic | ICD-10-CM

## 2022-11-08 DIAGNOSIS — Z96.649 PRESENCE OF UNSPECIFIED ARTIFICIAL HIP JOINT: Chronic | ICD-10-CM

## 2022-11-08 LAB — SURGICAL PATHOLOGY STUDY: SIGNIFICANT CHANGE UP

## 2022-11-08 PROCEDURE — 99024 POSTOP FOLLOW-UP VISIT: CPT

## 2022-11-08 NOTE — REASON FOR VISIT
[Post Op: _________] : a [unfilled] post op visit [FreeTextEntry1] : s/p removal of mandibular hardware DOS: 10/31/22. Patient is previously s/p right mandibular osteocutaneous fibular free flap on 1/13/22. Patient states her incision has continued to drain and bleed since her surgery, but denies fever, sweats, and chills. Otherwise no complaints or concerns.

## 2022-11-14 ENCOUNTER — APPOINTMENT (OUTPATIENT)
Dept: PLASTIC SURGERY | Facility: CLINIC | Age: 57
End: 2022-11-14

## 2022-11-15 ENCOUNTER — APPOINTMENT (OUTPATIENT)
Dept: HEMATOLOGY ONCOLOGY | Facility: CLINIC | Age: 57
End: 2022-11-15

## 2022-11-15 ENCOUNTER — RESULT REVIEW (OUTPATIENT)
Age: 57
End: 2022-11-15

## 2022-11-15 VITALS
OXYGEN SATURATION: 94 % | HEIGHT: 64 IN | SYSTOLIC BLOOD PRESSURE: 92 MMHG | WEIGHT: 126.01 LBS | DIASTOLIC BLOOD PRESSURE: 68 MMHG | BODY MASS INDEX: 21.51 KG/M2 | HEART RATE: 107 BPM

## 2022-11-15 LAB
BASOPHILS # BLD AUTO: 0 K/UL — SIGNIFICANT CHANGE UP (ref 0–0.2)
BASOPHILS NFR BLD AUTO: 0.3 % — SIGNIFICANT CHANGE UP (ref 0–2)
EOSINOPHIL # BLD AUTO: 0.1 K/UL — SIGNIFICANT CHANGE UP (ref 0–0.5)
EOSINOPHIL NFR BLD AUTO: 1.8 % — SIGNIFICANT CHANGE UP (ref 0–6)
HCT VFR BLD CALC: 31.1 % — LOW (ref 34.5–45)
HGB BLD-MCNC: 10.1 G/DL — LOW (ref 11.5–15.5)
LYMPHOCYTES # BLD AUTO: 0.5 K/UL — LOW (ref 1–3.3)
LYMPHOCYTES # BLD AUTO: 7.7 % — LOW (ref 13–44)
MCHC RBC-ENTMCNC: 29.4 PG — SIGNIFICANT CHANGE UP (ref 27–34)
MCHC RBC-ENTMCNC: 32.6 G/DL — SIGNIFICANT CHANGE UP (ref 32–36)
MCV RBC AUTO: 90.2 FL — SIGNIFICANT CHANGE UP (ref 80–100)
MONOCYTES # BLD AUTO: 0.6 K/UL — SIGNIFICANT CHANGE UP (ref 0–0.9)
MONOCYTES NFR BLD AUTO: 8.8 % — SIGNIFICANT CHANGE UP (ref 2–14)
NEUTROPHILS # BLD AUTO: 5.2 K/UL — SIGNIFICANT CHANGE UP (ref 1.8–7.4)
NEUTROPHILS NFR BLD AUTO: 81.5 % — HIGH (ref 43–77)
PLATELET # BLD AUTO: 298 K/UL — SIGNIFICANT CHANGE UP (ref 150–400)
RBC # BLD: 3.44 M/UL — LOW (ref 3.8–5.2)
RBC # FLD: 15.5 % — HIGH (ref 10.3–14.5)
WBC # BLD: 6.4 K/UL — SIGNIFICANT CHANGE UP (ref 3.8–10.5)
WBC # FLD AUTO: 6.4 K/UL — SIGNIFICANT CHANGE UP (ref 3.8–10.5)

## 2022-11-15 PROCEDURE — 99214 OFFICE O/P EST MOD 30 MIN: CPT

## 2022-11-15 NOTE — PHYSICAL EXAM
[Fully active, able to carry on all pre-disease performance without restriction] : Status 0 - Fully active, able to carry on all pre-disease performance without restriction [Normal] : affect appropriate [de-identified] : Right jaw is bandaged without  [de-identified] : patient AAOx3, mentating well.

## 2022-11-15 NOTE — ASSESSMENT
[FreeTextEntry1] : 56 year old woman diagnosed with oral cancer s/p resection with right neck dissection, [id number] \par \par # Oral Cancer \par - s/p C5 of CRT with weekly Cisplatin\par - C4 delayed by elevated creatinine, improved with IVF and received C5 on 4/4.\par - RT completed on 4/11\par - Most recent PET-CT from 6/22/22 reviewed; EUFEMIA \par - Patient to see Dr. So tomorrow. \par - discussed pain regimen with patient. WIll refer patient to pain management. Patient counseled on the importance of following up with pain management. I expressed my concerns that she has been dependent on opiates for a long period of time. At this time, she says that it is on the only thing that works.  She is taking tylenol and ibuprofen as well. Patient understanding that she must follow up with pain management as a prerequisite for continuing her pain regimen in the future. The patient does not display drug seeking behavior. \par - follow up in 3 months. \par \par \par # Malnutrition\par - GEMA Hobbs following closely and lengthy discussion today regarding dire need to increase calorie intake. I again advised the patient that she must improve her calorie intake as she contiues to lose weight. \par - Patient still requiring oxycodone for pain intermittently. Patient taking tylenol and ibuprofen standing (q6 hours). \par - Follow up with ENT and dental. \par \par #Anemia \par - now normal \par - Follow up blood work today. \par \par

## 2022-11-15 NOTE — HISTORY OF PRESENT ILLNESS
[T: ___] : T[unfilled] [N: ___] : N[unfilled] [M: ___] : M[unfilled] [de-identified] : Patient is a 57 yo F with h/o HTN, HLD and asthma who was diagnosed with SCC of the gingival mucosa.\par \par She saw oral surgeon, Dr. Cristopher Pinon, c/o soreness in the right jaw.  A biopsy of the right mandibular gingiva on 11/26/21 showed well-differentiated squamous cell carcinoma, P16 negative.\par \par She next saw Dr. James So. On exam, she was noted to have an ulcerated and endophytic lesion in the right posterior mandibular gingiva/post-molar region, abutting the 2nd premolar tooth and the base RMT posteriorly. There was buccal induration, no lingual extension.\par \par Staging CT neck on 12/23/21 showed an enhancing abnormal soft tissue lesion centered along the right posterior mandibular gingiva and gingivobuccal sulcus with suspected bony involvement of the adjacent alveolar ridge and also the lingual mandibular gingiva.  Right-sided pathologic cervical lymphadenopathy at right level Ib and right level II. Minimally hazy margins adjacent to the right level Ib lymph node. Staging PET on the same day showed FDG avid soft tissue lesion along the right posterior mandibular gingiva and gingivobuccal sulcus as seen on contrast-enhanced CT compatible with newly diagnosed squamous cell cancer. FDG avidity extends towards the lingual surface. FDG avid right level Ib cervical lymph nodes likely metastatic. FDG avid right level IIA and IIB lymph nodes are indeterminate. FDG avid focus within the enlarged right lobe, likely corresponding to the vague 1.1 cm nodule on contrast enhanced CT.\par \par On 1/13/22 she underwent right composite resection (segmental mandibulectomy with a partial right buccal soft tissue and floor of mouth excision), right neck dissection levels I-IV with Dr So.  Pathology showed invasive squamous cell carcinoma, well-differentiated, measuring 1.7 cm. Depth of invasion 0.6 cm. No LVI/PNI. There was microscopic evidence of tumor invasion into underlying bone (pT4a). Tumor margin from medial soft tissue was 0.4 cm; from the lateral soft tissue was 0.1 cm. Positive lymph nodes were identified in right level 1b and 2a; total 4/18; largest 1.5 cm, no LEONIE. pT4a N2b. \par \par The patient was started on cRT with weekly cisplatin, which she completed on 4/8/22.  \par \par  [de-identified] : The patient still having significant jaw pain due to poor wound healing. Planning to see Dr. So tomorrow. \par Patient has not been able to chew, and is reliant on her peg, but having issues achieve adequate nutrition as patient feels nauseous from PEG feeds. Patient endorses weight loss. She also reports increased  bruising recently. She only eats one meal a day. \par \par The patient continues to use oxycodone with tylenol and ibuprofen. She denies feeling sedated and states that her pain is undercontrol. She currently on 5 mg of oxycodone q4-6 hours.

## 2022-11-16 LAB
ALBUMIN SERPL ELPH-MCNC: 2.9 G/DL
ALP BLD-CCNC: 120 U/L
ALT SERPL-CCNC: 5 U/L
ANION GAP SERPL CALC-SCNC: 15 MMOL/L
APTT BLD: 32.6 SEC
AST SERPL-CCNC: 8 U/L
BILIRUB SERPL-MCNC: 0.2 MG/DL
BUN SERPL-MCNC: 13 MG/DL
CALCIUM SERPL-MCNC: 8.9 MG/DL
CHLORIDE SERPL-SCNC: 98 MMOL/L
CO2 SERPL-SCNC: 23 MMOL/L
CREAT SERPL-MCNC: 1.16 MG/DL
EGFR: 55 ML/MIN/1.73M2
GLUCOSE SERPL-MCNC: 135 MG/DL
INR PPP: 1.09 RATIO
MAGNESIUM SERPL-MCNC: 1.7 MG/DL
POTASSIUM SERPL-SCNC: 3.9 MMOL/L
PROT SERPL-MCNC: 5.2 G/DL
PT BLD: 12.7 SEC
SODIUM SERPL-SCNC: 137 MMOL/L
T4 SERPL-MCNC: 7.8 UG/DL
TSH SERPL-ACNC: 0.36 UIU/ML

## 2022-11-18 ENCOUNTER — APPOINTMENT (OUTPATIENT)
Dept: PHYSICAL MEDICINE AND REHAB | Facility: CLINIC | Age: 57
End: 2022-11-18

## 2022-11-18 DIAGNOSIS — R25.2 CRAMP AND SPASM: ICD-10-CM

## 2022-11-18 PROCEDURE — 99214 OFFICE O/P EST MOD 30 MIN: CPT

## 2022-11-18 NOTE — ASSESSMENT
[FreeTextEntry1] : 57 year old female presenting for evaluation.\par \par #Chronic pain after cancer treatment:\par -Discussed with patient goal is to ultimately wean opiate pain medications.\par -Continue liquid oxycodone 5 mg every 8 hours as needed for pain-rx sent, goal to wean at next visit given hardware removal \par -Narcan previously sent \par -I stop #[id number]\par \par #Neuropathic pain\par -Continue liquid gabapentin 600 mg twice daily, continue to monitor given kidney function.\par \par #Impaired mobility:\par -Start PT\par \par #Trismus:\par -Plan to start SLP once cleared by plastics\par \par Follow-up in 3 weeks.

## 2022-11-18 NOTE — PHYSICAL EXAM
[FreeTextEntry1] : Gen: Patient is A&O x 3, NAD\par HEENT: EOMI, hearing grossly normal\par Resp: regular, non- labored\par CV: Pulses regular \par Skin: +fibrotic changes\par Lymph: no clubbing, cyanosis, edema, or palpable lymphadenopathy\par Inspection: Bandaged area without discharge\par ROM: Mildly decreased ROM of neck on lateral rotation, decreased jaw ROM\par Palpation:TTP right mandible \par Sensation: intact to light touch\par Reflexes: 1+ and symmetric throughout\par Strength: 5/5 throughout\par Gait: uses rolling walker.\par

## 2022-11-18 NOTE — HISTORY OF PRESENT ILLNESS
[FreeTextEntry1] : Ms. Dsouza is a 57 year old female diagnosed with squamous cell carcinoma of the oral cavity, status post right composite resection (segmental mandibulectomy with a partial right buccal soft tissue and floor of mouth excision), right neck dissection levels I-IV on 1/13/22 completing adjuvant chemoradiation with weekly cisplatin on 4/8/22. S/p plate removal with Dr. Evans on 10/31/22.  \par \par \par She reports that she still is having pain at the postsurgical site.  Still with healing wound which she is following with wound care.  Reports she is having difficulty with her job range of motion and chewing.  She reports generalized weakness.  Denies any focal weakness numbness ting or bowel bladder dysfunction.  Continues on current medications denies any side effects.  Denies any constipation.

## 2022-11-22 ENCOUNTER — APPOINTMENT (OUTPATIENT)
Dept: PLASTIC SURGERY | Facility: CLINIC | Age: 57
End: 2022-11-22

## 2022-11-22 VITALS
DIASTOLIC BLOOD PRESSURE: 65 MMHG | TEMPERATURE: 98.3 F | SYSTOLIC BLOOD PRESSURE: 93 MMHG | WEIGHT: 130 LBS | HEART RATE: 62 BPM | OXYGEN SATURATION: 99 % | BODY MASS INDEX: 21.66 KG/M2 | HEIGHT: 65 IN

## 2022-11-22 PROCEDURE — 99024 POSTOP FOLLOW-UP VISIT: CPT

## 2022-11-28 ENCOUNTER — APPOINTMENT (OUTPATIENT)
Dept: PLASTIC SURGERY | Facility: CLINIC | Age: 57
End: 2022-11-28

## 2022-11-28 VITALS
HEIGHT: 65 IN | WEIGHT: 130 LBS | TEMPERATURE: 97.7 F | HEART RATE: 120 BPM | OXYGEN SATURATION: 98 % | DIASTOLIC BLOOD PRESSURE: 75 MMHG | BODY MASS INDEX: 21.66 KG/M2 | SYSTOLIC BLOOD PRESSURE: 107 MMHG

## 2022-11-28 PROCEDURE — 99024 POSTOP FOLLOW-UP VISIT: CPT

## 2022-12-06 ENCOUNTER — APPOINTMENT (OUTPATIENT)
Dept: OTOLARYNGOLOGY | Facility: CLINIC | Age: 57
End: 2022-12-06

## 2022-12-09 ENCOUNTER — APPOINTMENT (OUTPATIENT)
Dept: PHYSICAL MEDICINE AND REHAB | Facility: CLINIC | Age: 57
End: 2022-12-09

## 2022-12-09 VITALS
SYSTOLIC BLOOD PRESSURE: 110 MMHG | TEMPERATURE: 97.3 F | HEIGHT: 65 IN | HEART RATE: 102 BPM | WEIGHT: 126.06 LBS | OXYGEN SATURATION: 99 % | BODY MASS INDEX: 21 KG/M2 | DIASTOLIC BLOOD PRESSURE: 75 MMHG

## 2022-12-09 PROCEDURE — 99214 OFFICE O/P EST MOD 30 MIN: CPT

## 2022-12-09 NOTE — HISTORY OF PRESENT ILLNESS
[FreeTextEntry1] : Ms. Dsouza is a 57 year old female diagnosed with squamous cell carcinoma of the oral cavity, status post right composite resection (segmental mandibulectomy with a partial right buccal soft tissue and floor of mouth excision), right neck dissection levels I-IV on 1/13/22 completing adjuvant chemoradiation with weekly cisplatin on 4/8/22. S/p plate removal with Dr. Evans on 10/31/22.  \par \par \par She reports that her wound site is healing.  She reports the pain is getting better but sometimes feels sharp shooting and stabbing pain.  She is taking gabapentin twice a day and states this helps denies any side effects.  Reports she is taking oxycodone and has reduced to 2-3 times a day.  Denies any constipation.  Start physical therapy for generalized weakness and is excited for its potential.

## 2022-12-09 NOTE — ASSESSMENT
[FreeTextEntry1] : 57 year old female presenting for evaluation.\par \par #Chronic pain after cancer treatment:\par -Discussed with patient goal is to ultimately wean opiate pain medications.\par -Decrease liquid oxycodone to 5 mg BID prn severe pain\par -Narcan previously sent \par -I stop #[id number]\par \par #Neuropathic pain\par -Continue liquid gabapentin 600 mg twice daily, continue to monitor given kidney function, CMP reviewed, Rx sent\par \par #Impaired mobility:\par -Continue PT\par \par \par Follow-up in 2 weeks.

## 2022-12-13 ENCOUNTER — APPOINTMENT (OUTPATIENT)
Dept: PLASTIC SURGERY | Facility: CLINIC | Age: 57
End: 2022-12-13

## 2022-12-13 VITALS
DIASTOLIC BLOOD PRESSURE: 82 MMHG | TEMPERATURE: 97.9 F | HEIGHT: 65 IN | WEIGHT: 127 LBS | SYSTOLIC BLOOD PRESSURE: 117 MMHG | BODY MASS INDEX: 21.16 KG/M2 | OXYGEN SATURATION: 94 % | HEART RATE: 101 BPM

## 2022-12-13 PROCEDURE — 99024 POSTOP FOLLOW-UP VISIT: CPT

## 2022-12-14 NOTE — REASON FOR VISIT
[Follow-Up: _____] : a [unfilled] follow-up visit [FreeTextEntry1] : s/p removal of mandibular hardware DOS: 10/31/22. Patient states that her incision has continued to drain since her surgery. Otherwise no concerns or complaints.

## 2022-12-20 NOTE — PHYSICAL THERAPY INITIAL EVALUATION ADULT - STANDING BALANCE: DYNAMIC, REHAB EVAL
Pt admitted for scheduled supracervical hysterectomy with bilateral salpingectomy.   ebl- 350cc cell savor return was 125cc. Pt transferred to pacu in stable condition.   post op dy # 1 Pt admitted for scheduled supracervical hysterectomy with bilateral salpingectomy.   ebl- 350cc cell savor return was 125cc. Pt transferred to pacu in stable condition.   post op dy # 1 doing well passed flatus , voided and ambulated without difficulty.   Pt discharge on po day #1. fair balance

## 2022-12-23 ENCOUNTER — APPOINTMENT (OUTPATIENT)
Dept: PHYSICAL MEDICINE AND REHAB | Facility: CLINIC | Age: 57
End: 2022-12-23

## 2022-12-23 PROCEDURE — 99214 OFFICE O/P EST MOD 30 MIN: CPT

## 2022-12-23 NOTE — HISTORY OF PRESENT ILLNESS
[FreeTextEntry1] : Ms. Dsouza is a 57 year old female diagnosed with squamous cell carcinoma of the oral cavity, status post right composite resection (segmental mandibulectomy with a partial right buccal soft tissue and floor of mouth excision), right neck dissection levels I-IV on 1/13/22 completing adjuvant chemoradiation with weekly cisplatin on 4/8/22. S/p plate removal with Dr. Evans on 10/31/22.  \par \par \par She reports that she is still having severe pain in her jaw or region.  She reports that she recently followed up with her surgeon last concern for possible new malignancy.  She is following with plastic surgery next week to further evaluate this.  She reports that she is having more swelling in her facial region.  She takes oxycodone which helps with the pain.  Denies any side effects or constipation.  Continues on gabapentin.  She reports that the pain is significant and can be 10 out of 10 at times.

## 2022-12-23 NOTE — PHYSICAL EXAM
[FreeTextEntry1] : Gen: Patient is A&O x 3, NAD\par HEENT: EOMI, hearing grossly normal\par Resp: regular, non- labored\par CV: Pulses regular \par Skin: +fibrotic changes\par Lymph: +Right facial edema \par Inspection: Bandaged area without discharge\par ROM: Mildly decreased ROM of neck on lateral rotation, decreased jaw ROM\par Palpation:TTP right mandible \par Sensation: intact to light touch\par Reflexes: 1+ and symmetric throughout\par Strength: 5/5 throughout\par Gait: normal \par

## 2022-12-23 NOTE — ASSESSMENT
[FreeTextEntry1] : 57 year old female presenting for evaluation.\par \par #Chronic pain after cancer treatment:\par -Discussed with patient goal is to ultimately wean opiate pain medications.\par -Concern for possible recurrence of cancer with reported increase in pain.  I discussed her case with Trice Oakes (plastic surgery).  Discussed that ultimately goal is to wean patient's pain medications however she is reporting an exacerbation of pain with new concern for malignancy.  She is following up with plastic surgery next week to further evaluate.  Discussed will continue to prescribe at current dose and will not wean this visit given this new concern, however if malignancy is ruled out we will continue with weaning process.  Will continue further discussion with surgical team regarding continued management.  \par -Continue liquid oxycodone to 5 mg BID prn severe pain\par -Narcan previously sent \par -I stop #[id number]\par \par #Neuropathic pain\par -Continue liquid gabapentin 600 mg twice daily\par \par #Impaired mobility:\par -Functional status and strength improving \par -Continue PT\par \par \par Follow-up in 2 weeks.

## 2022-12-27 ENCOUNTER — APPOINTMENT (OUTPATIENT)
Dept: PHYSICAL MEDICINE AND REHAB | Facility: CLINIC | Age: 57
End: 2022-12-27

## 2022-12-27 ENCOUNTER — APPOINTMENT (OUTPATIENT)
Dept: PLASTIC SURGERY | Facility: CLINIC | Age: 57
End: 2022-12-27
Payer: MEDICAID

## 2022-12-27 VITALS
RESPIRATION RATE: 15 BRPM | HEIGHT: 65 IN | BODY MASS INDEX: 21.16 KG/M2 | OXYGEN SATURATION: 95 % | HEART RATE: 105 BPM | SYSTOLIC BLOOD PRESSURE: 125 MMHG | DIASTOLIC BLOOD PRESSURE: 82 MMHG | WEIGHT: 127 LBS

## 2022-12-27 PROCEDURE — 99024 POSTOP FOLLOW-UP VISIT: CPT

## 2023-01-04 ENCOUNTER — RESULT REVIEW (OUTPATIENT)
Age: 58
End: 2023-01-04

## 2023-01-06 ENCOUNTER — APPOINTMENT (OUTPATIENT)
Dept: PHYSICAL MEDICINE AND REHAB | Facility: CLINIC | Age: 58
End: 2023-01-06
Payer: COMMERCIAL

## 2023-01-06 PROCEDURE — 99214 OFFICE O/P EST MOD 30 MIN: CPT

## 2023-01-06 NOTE — ASSESSMENT
[FreeTextEntry1] : 57 year old female presenting for evaluation.\par \par #Chronic pain after cancer treatment:\par -Discussed with patient goal is to ultimately wean opiate pain medications.\par -Now s/p biopsy, results pending, which will determine further treatment plan \par -Continue liquid oxycodone to 5 mg BID prn severe pain for now-rx sent \par -Narcan sent \par -I stop #[id number]\par -Educated on bowel program \par \par #Neuropathic pain\par -Continue liquid gabapentin 600 mg twice daily\par -Start duloxetine 30mg daily \par \par #Impaired mobility:\par -Functional status and strength improving, no longer using cane for ambulation \par -Continue Physical therapy \par \par \par Follow-up in 2-4 weeks.

## 2023-01-06 NOTE — HISTORY OF PRESENT ILLNESS
[FreeTextEntry1] : Ms. Dsouza is a 57 year old female diagnosed with squamous cell carcinoma of the oral cavity, status post right composite resection (segmental mandibulectomy with a partial right buccal soft tissue and floor of mouth excision), right neck dissection levels I-IV on 1/13/22 completing adjuvant chemoradiation with weekly cisplatin on 4/8/22. S/p plate removal with Dr. Evans on 10/31/22.  \par \par \par She is now status post biopsy pending evaluation and further follow-up.  She reports increased pain since this biopsy.  She reports some more swelling in this region 2.  She continues with physical therapy which she thinks is helping with her endurance.  No longer using cane for ambulation.  She takes gabapentin which she states helps denies any side effects.  She also reports she is taking oxycodone mostly twice a day 5 mg.  Denies any major constipation.  States when she does take it her pain improves.  Feels like she is having breakthrough pain with some burning sensation in her jaw especially after biopsy.

## 2023-01-09 NOTE — DISCHARGE NOTE PROVIDER - NS AS DC PROVIDER CONTACT Y/N MULTI
Patient sent message via [employer], please advise. Marlen Eduardo  Urology Clinical Staff (supporting Yovana Rodriguez MD) 14 hours ago (10:04 PM)     Virigl Abbott  So this morning I was feeling some stomach cramps nothing too bad. Late this afternoon I noticed blood after I urinated. It was in my urine as well as on the toilet paper after I wiped. At first I thought maybe my period came early as it's due in 13 days, however the second time I urinated there was only blood in my urine. Nothing when I wiped with toilet paper. The 3rd time I urinated there was no blood, but blood was visible the 4th time and super visible the 5th time. My urine is clear like water so the blood although faint after the first time was easy to spot and definitely visible the last time. Yes

## 2023-01-10 ENCOUNTER — APPOINTMENT (OUTPATIENT)
Dept: RADIATION ONCOLOGY | Facility: CLINIC | Age: 58
End: 2023-01-10

## 2023-01-10 ENCOUNTER — APPOINTMENT (OUTPATIENT)
Dept: PLASTIC SURGERY | Facility: CLINIC | Age: 58
End: 2023-01-10
Payer: MEDICAID

## 2023-01-10 ENCOUNTER — NON-APPOINTMENT (OUTPATIENT)
Age: 58
End: 2023-01-10

## 2023-01-10 VITALS
TEMPERATURE: 97.4 F | SYSTOLIC BLOOD PRESSURE: 118 MMHG | WEIGHT: 119.5 LBS | DIASTOLIC BLOOD PRESSURE: 84 MMHG | HEIGHT: 65 IN | BODY MASS INDEX: 19.91 KG/M2 | OXYGEN SATURATION: 94 % | HEART RATE: 105 BPM

## 2023-01-10 PROCEDURE — 99024 POSTOP FOLLOW-UP VISIT: CPT

## 2023-01-20 ENCOUNTER — APPOINTMENT (OUTPATIENT)
Dept: PHYSICAL MEDICINE AND REHAB | Facility: CLINIC | Age: 58
End: 2023-01-20
Payer: COMMERCIAL

## 2023-01-20 ENCOUNTER — NON-APPOINTMENT (OUTPATIENT)
Age: 58
End: 2023-01-20

## 2023-01-20 VITALS
HEIGHT: 65 IN | HEART RATE: 103 BPM | BODY MASS INDEX: 20.16 KG/M2 | DIASTOLIC BLOOD PRESSURE: 59 MMHG | SYSTOLIC BLOOD PRESSURE: 97 MMHG | WEIGHT: 121 LBS

## 2023-01-20 PROCEDURE — 99214 OFFICE O/P EST MOD 30 MIN: CPT

## 2023-01-20 NOTE — PHYSICAL EXAM
[FreeTextEntry1] : Gen: Patient is A&O x 3, NAD\par HEENT: EOMI, hearing grossly normal\par Resp: regular, non- labored\par CV: Pulses regular \par Skin: +fibrotic changes\par Lymph: +Right facial edema \par Inspection: Bandaged area without discharge\par ROM: Mildly decreased ROM of neck on lateral rotation, decreased jaw ROM\par Palpation:TTP  mandible \par Sensation: intact to light touch\par Reflexes: 1+ and symmetric throughout\par Strength: 5/5 throughout\par Gait: normal \par

## 2023-01-20 NOTE — ASSESSMENT
[FreeTextEntry1] : 57 year old female presenting for evaluation.\par \par #Chronic pain after cancer treatment:\par -Discussed with patient goal is to ultimately wean opiate pain medications.\par -Now s/p biopsy, necrosis of mandible, starting hyperbaric oxygen therapy \par -Continue liquid oxycodone to 5 mg BID prn severe pain for now-rx sent \par -Narcan previously sent \par -I stop #[id number]\par -Continue bowel program\par \par #Neuropathic pain\par -Continue liquid gabapentin 600 mg twice daily\par -Increase duloxetine to 60mg daily \par \par #Impaired mobility:\par -Functional status and strength improving\par -Continue Physical therapy \par \par \par Follow-up in 4 weeks.

## 2023-01-20 NOTE — HISTORY OF PRESENT ILLNESS
[FreeTextEntry1] : Ms. Dsouza is a 57 year old female diagnosed with squamous cell carcinoma of the oral cavity, status post right composite resection (segmental mandibulectomy with a partial right buccal soft tissue and floor of mouth excision), right neck dissection levels I-IV on 1/13/22 completing adjuvant chemoradiation with weekly cisplatin on 4/8/22. S/p plate removal with Dr. Evans on 10/31/22.  Now s/p biopsy demonstrating necrotic bone, planning to start hyperbaric oxygen therapy.\par \par She reports that she continues to have severe pain in her jaw and mandible region.  She reports that she feels like it shooting across.  She is taking gabapentin and start duloxetine denies any side effects this.  Unsure if it is helping at this time.  Continues oxycodone for her severe pain taking 10 mg total daily.  Denies any constipation.  Continue to work physical therapy and thinks her strength is overall improving.  Still working with plastic surgery for healing wound.

## 2023-01-24 ENCOUNTER — NON-APPOINTMENT (OUTPATIENT)
Age: 58
End: 2023-01-24

## 2023-01-24 ENCOUNTER — APPOINTMENT (OUTPATIENT)
Dept: RADIATION ONCOLOGY | Facility: CLINIC | Age: 58
End: 2023-01-24

## 2023-01-24 NOTE — HISTORY OF PRESENT ILLNESS
[FreeTextEntry1] : \par 57 year old woman with squamous cell carcinoma of the oral cavity, status post right composite resection (segmental mandibulectomy with a partial right buccal soft tissue and floor of mouth excision), right neck dissection levels I-IV on 1/13/22 and adjuvant chemoradiation with weekly cisplatin completed 4/8/22.\par \par Interval history:\par Has 8-month history of non-healing right jaw wound with hardware exposure. Planned to undergo removal of mandibular hardware in Nov/Dec.\par Reports ongoing xerostomia, and notes burning sensation with acidic, spicy, or tart foods. Also inability to tolerate salad dressing, soda.\par Has trismus, difficulty chewing hard foods.\par Not using PEG for nutrition, just flushing.\par No dysgeusia, odynophagia or dysphagia.\par Continues to smoke.\par pain oxycodone 5mg 2 times per day\par gabapentin 600mg 2 times per day\par duloxetine 30mg daily\par .\par \par 10/1/22 CT maxillofacial:\par 1. Redemonstration of right segmental mandibulectomy with fibular free flap reconstruction. Symphyseal margin is nonunited and irregular. Permeative appearance of the fibular graft and the native mandible with areas of lucency, gas, cortical irregularity and periosteal reaction. 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 New lucencies along multiple screws in the native mandible (3:35). A screw in the fibular graft is backed out by 1.5 mm (3:39). These findings suggest hardware loosening.\par \par Dr Rowe:\par 1/4/23 biopsy left mandible: necrotic tissue \par \par ENT: Judah 4/11/23\par Plastic: Corona 1/10/23 hyperbaric O2 ordered packing jaw infectious disease consult \par HemeOnc: Pappas 2/13/23\par GI: Vrabie \par PM&R: Sera 12/27/22\par Dr Quesada (PCP) 3/15/23

## 2023-02-05 ENCOUNTER — OUTPATIENT (OUTPATIENT)
Dept: OUTPATIENT SERVICES | Facility: HOSPITAL | Age: 58
LOS: 1 days | Discharge: HOME | End: 2023-02-05

## 2023-02-05 DIAGNOSIS — Z96.649 PRESENCE OF UNSPECIFIED ARTIFICIAL HIP JOINT: Chronic | ICD-10-CM

## 2023-02-05 DIAGNOSIS — Z90.711 ACQUIRED ABSENCE OF UTERUS WITH REMAINING CERVICAL STUMP: Chronic | ICD-10-CM

## 2023-02-05 DIAGNOSIS — C76.0 MALIGNANT NEOPLASM OF HEAD, FACE AND NECK: ICD-10-CM

## 2023-02-05 DIAGNOSIS — Z98.891 HISTORY OF UTERINE SCAR FROM PREVIOUS SURGERY: Chronic | ICD-10-CM

## 2023-02-05 DIAGNOSIS — C03.9 MALIGNANT NEOPLASM OF GUM, UNSPECIFIED: ICD-10-CM

## 2023-02-05 DIAGNOSIS — Z98.890 OTHER SPECIFIED POSTPROCEDURAL STATES: Chronic | ICD-10-CM

## 2023-02-05 PROBLEM — D64.9 ANEMIA DUE TO IMMUNOSUPPRESSIVE MEDICATION: Status: ACTIVE | Noted: 2022-07-18

## 2023-02-07 ENCOUNTER — APPOINTMENT (OUTPATIENT)
Dept: INTERNAL MEDICINE | Facility: CLINIC | Age: 58
End: 2023-02-07

## 2023-02-07 DIAGNOSIS — T45.1X5A ANEMIA, UNSPECIFIED: ICD-10-CM

## 2023-02-07 DIAGNOSIS — D64.9 ANEMIA, UNSPECIFIED: ICD-10-CM

## 2023-02-07 NOTE — DISCHARGE NOTE NURSING/CASE MANAGEMENT/SOCIAL WORK - NSCORESITESY/N_GEN_A_CORE_RD
[FreeTextEntry1] : \par Margret presents for follow up of pelvic pain. We again reviewed possible etiologies including incisional endometriosis and pelvic floor dysfunction. We discussed that her pelvic pain may be a result of a combination of both endometrial growth and resultant inflammation, and that her pain may be resulting in contraction for pelvic floor muscles. In addition to continuing OCPs, she will add Motrin. We also discussed that incisional endometriosis should not affect her fertility in the same way as pelvic endometriosis. She would like to conceive in a few years and so we discussed that she would have to stop OCPs and Motrin prior to attempting pregnancy.  We also discussed starting pelvic floor physical therapy, which she was amenable to. If she does not have adequate improvement of her pelvic pain with these conservative measures, we discussed the possibility of needing further evaluation with a hysteroscopy or proceeding with surgical excision. \par \par She will continue to observe her stress incontinence as it is not bothersome and recently improved. \par \par For her dizziness, CBC was ordered. Initial heart rate was 104 bpm, repeat was 72 bpm.  We discussed maintaining adequate hydration, avoiding hypoglycemia, and follow up with her PCP if she continues to have dizziness.\par \par RTO 3 months for pelvic pain follow up.  No

## 2023-02-14 ENCOUNTER — APPOINTMENT (OUTPATIENT)
Dept: HEMATOLOGY ONCOLOGY | Facility: CLINIC | Age: 58
End: 2023-02-14

## 2023-02-16 ENCOUNTER — NON-APPOINTMENT (OUTPATIENT)
Age: 58
End: 2023-02-16

## 2023-02-17 ENCOUNTER — APPOINTMENT (OUTPATIENT)
Dept: PHYSICAL MEDICINE AND REHAB | Facility: CLINIC | Age: 58
End: 2023-02-17
Payer: COMMERCIAL

## 2023-02-17 PROCEDURE — 99214 OFFICE O/P EST MOD 30 MIN: CPT

## 2023-02-17 NOTE — PHYSICAL EXAM
[FreeTextEntry1] : Vitals: \par BP: 139/82\par Pusle 90\par Oxygen saturation 94%\par Resp 15\par \par \par Gen: Patient is A&O x 3, NAD\par HEENT: EOMI, hearing grossly normal\par Resp: regular, non- labored\par CV: Pulses regular \par Skin: +fibrotic changes\par Lymph: +facial edema \par Inspection: Bandaged area on mandible \par ROM: Mildly decreased ROM of neck on lateral rotation, decreased jaw ROM\par Palpation:TTP  mandible \par Sensation: intact to light touch\par Reflexes: 1+ and symmetric throughout\par Strength: 5/5 throughout\par Gait: normal \par

## 2023-02-17 NOTE — ASSESSMENT
[FreeTextEntry1] : 57 year old female presenting for evaluation.\par \par #Chronic pain after cancer treatment:\par -Extensive discussion had with patient as well as son about pain management options.  Currently she reports that she is in no pain.  She is taking gabapentin and duloxetine which is helping.  She reports that she has not been taking oxycodone for at least over 3 days and denies any symptoms of withdrawal.  We discussed that given she was taking her oxycodone inappropriately would be best to not continue with this medication at this time especially given that her pain is currently under control.  She and her son are agreement with this plan.  I had an extensive discussion with them about withdrawal symptoms to monitor for.  Discussed that given her recent pneumothorax if she does develop any tachycardia or tachypnea to present emergently to the ER for further evaluation and they are in agreement with this plan.  Her son lives with her and reports he will keep a close eye on her.\par \par #Neuropathic pain\par -Continue liquid gabapentin 600 mg twice daily\par -Continue  duloxetine 60mg daily\par -discussed risks and benefits of these medications \par \par #Impaired mobility:\par -Hold physical therapy given recent pneumothorax\par \par #Lymphedema\par -Appears stable, treatment currently on hold given healing wound consider treatment after resolution.\par \par \par Follow-up in 4 weeks.

## 2023-02-17 NOTE — HISTORY OF PRESENT ILLNESS
[FreeTextEntry1] : Ms. Dsouza is a 57 year old female diagnosed with squamous cell carcinoma of the oral cavity, status post right composite resection (segmental mandibulectomy with a partial right buccal soft tissue and floor of mouth excision), right neck dissection levels I-IV on 1/13/22 completing adjuvant chemoradiation with weekly cisplatin on 4/8/22. S/p plate removal with Dr. Evans on 10/31/22. \par \par She started hyperbaric oxygen therapy and suffered a pneumothorax and was recently hospitalized.  Discharged on Tuesday.  Since that time she has not taken any oxycodone.  She denies any withdrawal symptoms.  She denies having any pain at this time.  She has restarted taking her gabapentin 600 mg twice a day as well as her duloxetine 60 mg daily.  Thinks that these are helping denies any side effects.  Currently physical therapy on hold due to recent pneumothorax.  She denies any shortness of breath, nausea, difficulty with her appetite, tachycardia.  Overall she reports that she is feeling very well.\par \par She admitted that she has not been taking oxycodone as prescribed.  She states that when it was prescribed she would take more than the dose prescribed and then would run out.  She states overall the oxycodone had been making her feel worse and causing her to be nauseous and not eat as much.  She reports that since stopping she has felt better and is able to have more appetite and does not feel as nauseous.  She reports that she would like to discontinue this medication and not take it for now given her pain is controlled.\par \par \par Veena Kahn Present for entire encounter

## 2023-02-27 ENCOUNTER — EMERGENCY (EMERGENCY)
Facility: HOSPITAL | Age: 58
LOS: 1 days | Discharge: HOME | End: 2023-02-27
Attending: EMERGENCY MEDICINE
Payer: COMMERCIAL

## 2023-02-27 ENCOUNTER — APPOINTMENT (OUTPATIENT)
Dept: HEMATOLOGY ONCOLOGY | Facility: CLINIC | Age: 58
End: 2023-02-27
Payer: COMMERCIAL

## 2023-02-27 VITALS
OXYGEN SATURATION: 90 % | HEIGHT: 65 IN | TEMPERATURE: 97.2 F | DIASTOLIC BLOOD PRESSURE: 86 MMHG | SYSTOLIC BLOOD PRESSURE: 126 MMHG | HEART RATE: 110 BPM | WEIGHT: 111.01 LBS | BODY MASS INDEX: 18.49 KG/M2

## 2023-02-27 VITALS
HEART RATE: 114 BPM | DIASTOLIC BLOOD PRESSURE: 87 MMHG | TEMPERATURE: 98 F | OXYGEN SATURATION: 98 % | WEIGHT: 110.89 LBS | SYSTOLIC BLOOD PRESSURE: 140 MMHG | HEIGHT: 65 IN | RESPIRATION RATE: 20 BRPM

## 2023-02-27 VITALS
DIASTOLIC BLOOD PRESSURE: 85 MMHG | RESPIRATION RATE: 16 BRPM | HEART RATE: 93 BPM | TEMPERATURE: 97 F | OXYGEN SATURATION: 92 % | SYSTOLIC BLOOD PRESSURE: 134 MMHG

## 2023-02-27 DIAGNOSIS — Z98.890 OTHER SPECIFIED POSTPROCEDURAL STATES: Chronic | ICD-10-CM

## 2023-02-27 DIAGNOSIS — Z98.891 HISTORY OF UTERINE SCAR FROM PREVIOUS SURGERY: Chronic | ICD-10-CM

## 2023-02-27 DIAGNOSIS — Z96.649 PRESENCE OF UNSPECIFIED ARTIFICIAL HIP JOINT: Chronic | ICD-10-CM

## 2023-02-27 DIAGNOSIS — Z90.711 ACQUIRED ABSENCE OF UTERUS WITH REMAINING CERVICAL STUMP: Chronic | ICD-10-CM

## 2023-02-27 LAB
ALBUMIN SERPL ELPH-MCNC: 3.2 G/DL — LOW (ref 3.3–5.2)
ALP SERPL-CCNC: 101 U/L — SIGNIFICANT CHANGE UP (ref 40–120)
ALT FLD-CCNC: 7 U/L — SIGNIFICANT CHANGE UP
ANION GAP SERPL CALC-SCNC: 12 MMOL/L — SIGNIFICANT CHANGE UP (ref 5–17)
AST SERPL-CCNC: 13 U/L — SIGNIFICANT CHANGE UP
BASOPHILS # BLD AUTO: 0.04 K/UL — SIGNIFICANT CHANGE UP (ref 0–0.2)
BASOPHILS NFR BLD AUTO: 0.8 % — SIGNIFICANT CHANGE UP (ref 0–2)
BILIRUB SERPL-MCNC: <0.2 MG/DL — LOW (ref 0.4–2)
BUN SERPL-MCNC: 16.5 MG/DL — SIGNIFICANT CHANGE UP (ref 8–20)
CALCIUM SERPL-MCNC: 9.2 MG/DL — SIGNIFICANT CHANGE UP (ref 8.4–10.5)
CHLORIDE SERPL-SCNC: 102 MMOL/L — SIGNIFICANT CHANGE UP (ref 96–108)
CO2 SERPL-SCNC: 24 MMOL/L — SIGNIFICANT CHANGE UP (ref 22–29)
CREAT SERPL-MCNC: 1.2 MG/DL — SIGNIFICANT CHANGE UP (ref 0.5–1.3)
EGFR: 52 ML/MIN/1.73M2 — LOW
EOSINOPHIL # BLD AUTO: 0.14 K/UL — SIGNIFICANT CHANGE UP (ref 0–0.5)
EOSINOPHIL NFR BLD AUTO: 2.8 % — SIGNIFICANT CHANGE UP (ref 0–6)
GLUCOSE SERPL-MCNC: 98 MG/DL — SIGNIFICANT CHANGE UP (ref 70–99)
HCT VFR BLD CALC: 30.8 % — LOW (ref 34.5–45)
HGB BLD-MCNC: 10.2 G/DL — LOW (ref 11.5–15.5)
IMM GRANULOCYTES NFR BLD AUTO: 0.2 % — SIGNIFICANT CHANGE UP (ref 0–0.9)
INR BLD: 0.97 RATIO — SIGNIFICANT CHANGE UP (ref 0.88–1.16)
LYMPHOCYTES # BLD AUTO: 0.65 K/UL — LOW (ref 1–3.3)
LYMPHOCYTES # BLD AUTO: 13 % — SIGNIFICANT CHANGE UP (ref 13–44)
MCHC RBC-ENTMCNC: 29.1 PG — SIGNIFICANT CHANGE UP (ref 27–34)
MCHC RBC-ENTMCNC: 33.1 GM/DL — SIGNIFICANT CHANGE UP (ref 32–36)
MCV RBC AUTO: 87.7 FL — SIGNIFICANT CHANGE UP (ref 80–100)
MONOCYTES # BLD AUTO: 0.54 K/UL — SIGNIFICANT CHANGE UP (ref 0–0.9)
MONOCYTES NFR BLD AUTO: 10.8 % — SIGNIFICANT CHANGE UP (ref 2–14)
NEUTROPHILS # BLD AUTO: 3.63 K/UL — SIGNIFICANT CHANGE UP (ref 1.8–7.4)
NEUTROPHILS NFR BLD AUTO: 72.4 % — SIGNIFICANT CHANGE UP (ref 43–77)
NT-PROBNP SERPL-SCNC: 408 PG/ML — HIGH (ref 0–300)
PLATELET # BLD AUTO: 307 K/UL — SIGNIFICANT CHANGE UP (ref 150–400)
POTASSIUM SERPL-MCNC: 4.7 MMOL/L — SIGNIFICANT CHANGE UP (ref 3.5–5.3)
POTASSIUM SERPL-SCNC: 4.7 MMOL/L — SIGNIFICANT CHANGE UP (ref 3.5–5.3)
PROT SERPL-MCNC: 6.3 G/DL — LOW (ref 6.6–8.7)
PROTHROM AB SERPL-ACNC: 11.3 SEC — SIGNIFICANT CHANGE UP (ref 10.5–13.4)
RAPID RVP RESULT: SIGNIFICANT CHANGE UP
RBC # BLD: 3.51 M/UL — LOW (ref 3.8–5.2)
RBC # FLD: 16.4 % — HIGH (ref 10.3–14.5)
SARS-COV-2 RNA SPEC QL NAA+PROBE: SIGNIFICANT CHANGE UP
SODIUM SERPL-SCNC: 138 MMOL/L — SIGNIFICANT CHANGE UP (ref 135–145)
TROPONIN T SERPL-MCNC: <0.01 NG/ML — SIGNIFICANT CHANGE UP (ref 0–0.06)
WBC # BLD: 5.01 K/UL — SIGNIFICANT CHANGE UP (ref 3.8–10.5)
WBC # FLD AUTO: 5.01 K/UL — SIGNIFICANT CHANGE UP (ref 3.8–10.5)

## 2023-02-27 PROCEDURE — 0225U NFCT DS DNA&RNA 21 SARSCOV2: CPT

## 2023-02-27 PROCEDURE — 85025 COMPLETE CBC W/AUTO DIFF WBC: CPT

## 2023-02-27 PROCEDURE — 99284 EMERGENCY DEPT VISIT MOD MDM: CPT

## 2023-02-27 PROCEDURE — 99285 EMERGENCY DEPT VISIT HI MDM: CPT | Mod: 25

## 2023-02-27 PROCEDURE — 99214 OFFICE O/P EST MOD 30 MIN: CPT

## 2023-02-27 PROCEDURE — 71275 CT ANGIOGRAPHY CHEST: CPT | Mod: 26,MA

## 2023-02-27 PROCEDURE — 71045 X-RAY EXAM CHEST 1 VIEW: CPT

## 2023-02-27 PROCEDURE — 71045 X-RAY EXAM CHEST 1 VIEW: CPT | Mod: 26

## 2023-02-27 PROCEDURE — 80053 COMPREHEN METABOLIC PANEL: CPT

## 2023-02-27 PROCEDURE — 85610 PROTHROMBIN TIME: CPT

## 2023-02-27 PROCEDURE — 85730 THROMBOPLASTIN TIME PARTIAL: CPT

## 2023-02-27 PROCEDURE — 71275 CT ANGIOGRAPHY CHEST: CPT | Mod: MA

## 2023-02-27 PROCEDURE — 84484 ASSAY OF TROPONIN QUANT: CPT

## 2023-02-27 PROCEDURE — 93005 ELECTROCARDIOGRAM TRACING: CPT

## 2023-02-27 PROCEDURE — 93010 ELECTROCARDIOGRAM REPORT: CPT | Mod: 76

## 2023-02-27 PROCEDURE — 36415 COLL VENOUS BLD VENIPUNCTURE: CPT

## 2023-02-27 PROCEDURE — 83880 ASSAY OF NATRIURETIC PEPTIDE: CPT

## 2023-02-27 RX ORDER — CEFTRIAXONE 500 MG/1
1000 INJECTION, POWDER, FOR SOLUTION INTRAMUSCULAR; INTRAVENOUS ONCE
Refills: 0 | Status: COMPLETED | OUTPATIENT
Start: 2023-02-27 | End: 2023-02-27

## 2023-02-27 RX ORDER — CEFTRIAXONE 500 MG/1
1000 INJECTION, POWDER, FOR SOLUTION INTRAMUSCULAR; INTRAVENOUS ONCE
Refills: 0 | Status: DISCONTINUED | OUTPATIENT
Start: 2023-02-27 | End: 2023-02-27

## 2023-02-27 RX ORDER — CEFPODOXIME PROXETIL 100 MG
1 TABLET ORAL
Qty: 20 | Refills: 0
Start: 2023-02-27 | End: 2023-03-08

## 2023-02-27 RX ORDER — ALBUTEROL 90 UG/1
2 AEROSOL, METERED ORAL
Qty: 2 | Refills: 0
Start: 2023-02-27 | End: 2023-03-05

## 2023-02-27 NOTE — ED PROVIDER NOTE - PATIENT PORTAL LINK FT
You can access the FollowMyHealth Patient Portal offered by [facility] by registering at the following website: [email or web address]. By joining [employer]’s FollowMyHealth portal, you will also be able to view your health information using other applications (apps) compatible with our system.

## 2023-02-27 NOTE — ED PROVIDER NOTE - CLINICAL SUMMARY MEDICAL DECISION MAKING FREE TEXT BOX
pt with history of squamous cell carcinoma with mets to neck  comes to ed for work up of low oxygenation ;

## 2023-02-27 NOTE — ED ADULT TRIAGE NOTE - CHIEF COMPLAINT QUOTE
Pt sent from Dr. Pappas for eval of low oxygen level reading 90% in the office. Pt was hospitalized in February for collapsed lung . Pt breathing easy and unlabored

## 2023-02-27 NOTE — ED PROVIDER NOTE - PHYSICAL EXAMINATION
Alert, lucid, and in no apparent distress. Pt is normocephalic, atraumatic.  Pupils are equal, round, lips pink, moist mucous membranes, tongue midline. Neck supple.   Lungs clear to auscultation. Heart regular rate and rhythm, normal S1, S2,   Abdomen is soft, nontender, no pulsatile mass, no masses, no distension,  Non-focal sensory, 5 out of 5 motor strength, no dysmetria, fluent, goal directed speech. CN2 to 12 intact. Skin without rash,    Normal mentation,

## 2023-02-27 NOTE — ED PROVIDER NOTE - NSFOLLOWUPINSTRUCTIONS_ED_ALL_ED_FT
vantin 200mg by mouth 2 times a day for 10 days  prednisone 20mg by mouth 2 times a day for 5 days  albuterol mdi 2puffs every 6 hours  follow up with doctori n 3 - 5 days

## 2023-02-27 NOTE — ED ADULT NURSE NOTE - OBJECTIVE STATEMENT
Pt BIB EMS c/o SOB on and off for since surgery hx r mandibulectomy , scc ca placed told by pcp to come to ED for further eval reese rn

## 2023-02-27 NOTE — ED PROVIDER NOTE - OBJECTIVE STATEMENT
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 for low o2 sat 90 ; pt with recent pneumothorax with chest tube placed; pt sent for further evaluation of low oxygenation;

## 2023-02-27 NOTE — ED ADULT NURSE REASSESSMENT NOTE - NS ED NURSE REASSESS COMMENT FT1
Assumed care of patient at approx 19:30. Patient AxOx4. Patient denies pain/discomfort. Patient has been updated on the plan of care. Patient offers no complaints at this time. No visible signs of acute distress noted, safety maintained.

## 2023-02-28 ENCOUNTER — APPOINTMENT (OUTPATIENT)
Dept: PLASTIC SURGERY | Facility: CLINIC | Age: 58
End: 2023-02-28
Payer: COMMERCIAL

## 2023-02-28 ENCOUNTER — NON-APPOINTMENT (OUTPATIENT)
Age: 58
End: 2023-02-28

## 2023-02-28 VITALS
HEART RATE: 128 BPM | HEIGHT: 65 IN | WEIGHT: 109.4 LBS | SYSTOLIC BLOOD PRESSURE: 145 MMHG | TEMPERATURE: 97.2 F | DIASTOLIC BLOOD PRESSURE: 101 MMHG | BODY MASS INDEX: 18.23 KG/M2 | OXYGEN SATURATION: 92 %

## 2023-02-28 PROCEDURE — 99213 OFFICE O/P EST LOW 20 MIN: CPT

## 2023-03-01 NOTE — REASON FOR VISIT
[Post Op: _________] : a [unfilled] post op visit [Family Member] : family member [FreeTextEntry1] : s/p removal of mandibular hardware DOS: 10/31/22. Patient reports her incision is no longer draining and she does not apply a dressing anymore. She reports intermittent tightness of her jaw when she talks or chews excessively but denies pain.

## 2023-03-07 ENCOUNTER — APPOINTMENT (OUTPATIENT)
Dept: RADIATION ONCOLOGY | Facility: CLINIC | Age: 58
End: 2023-03-07
Payer: COMMERCIAL

## 2023-03-07 VITALS
OXYGEN SATURATION: 95 % | SYSTOLIC BLOOD PRESSURE: 121 MMHG | HEART RATE: 129 BPM | WEIGHT: 120 LBS | RESPIRATION RATE: 16 BRPM | DIASTOLIC BLOOD PRESSURE: 79 MMHG | BODY MASS INDEX: 19.97 KG/M2

## 2023-03-07 PROCEDURE — 99214 OFFICE O/P EST MOD 30 MIN: CPT

## 2023-03-07 NOTE — HISTORY OF PRESENT ILLNESS
[FreeTextEntry1] : 57 year old woman with squamous cell carcinoma of the oral cavity, status post right composite resection (segmental mandibulectomy with a partial right buccal soft tissue and floor of mouth excision), right neck dissection levels I-IV on 1/13/22 and adjuvant chemoradiation with weekly cisplatin completed 4/8/22.\par \par Interval history:\par 10/1/22 CT maxillofacial noted suspicion for osteoradionecrosis versus osteomyelitis.\par 1/4/23 bone biopsy of the left mandible showed necrotic bone.\par \par She underwent hyperbaric O2 treatments, but developed a pneumothorax after 12 sessions.\par Hospitalization with chest tube placement, now recovered.\par Started on vitamin E and pentoxifylline.\par \par Has ongoing xerostomia and intermittent burning sensation with acidic, spicy, or tart foods.  Also inability to tolerate salad dressing, soda.  Has trismus and some difficulty chewing hard foods.  However, maintaining on oral intake and not using PEG for nutrition.\par No dysgeusia, odynophagia or dysphagia.\par Quit smoking 3 weeks ago.\par

## 2023-03-07 NOTE — PHYSICAL EXAM
[Extraocular Movements] : extraocular movements were intact [Outer Ear] : the ears and nose were normal in appearance [] : no respiratory distress [Respiration, Rhythm And Depth] : normal respiratory rhythm and effort [Exaggerated Use Of Accessory Muscles For Inspiration] : no accessory muscle use [Auscultation Breath Sounds / Voice Sounds] : lungs were clear to auscultation bilaterally [Heart Rate And Rhythm] : heart rate and rhythm were normal [Heart Sounds] : normal S1 and S2 [Bowel Sounds] : normal bowel sounds [Abdomen Soft] : soft [Nondistended] : nondistended [Normal] : no palpable adenopathy [Cervical Lymph Nodes Enlarged Anterior Bilaterally] : anterior cervical [Supraclavicular Lymph Nodes Enlarged Bilaterally] : supraclavicular [Axillary Lymph Nodes Enlarged Bilaterally] : axillary [de-identified] : poor dentition; no oral lesions noted. [de-identified] : post-treatment induration; no suspicious nodules

## 2023-03-08 NOTE — HISTORY OF PRESENT ILLNESS
[T: ___] : T[unfilled] [N: ___] : N[unfilled] [M: ___] : M[unfilled] [de-identified] : Patient is a 57 yo F with h/o HTN, HLD and asthma who was diagnosed with SCC of the gingival mucosa.\par \par She saw oral surgeon, Dr. Cristopher Pinon, c/o soreness in the right jaw.  A biopsy of the right mandibular gingiva on 11/26/21 showed well-differentiated squamous cell carcinoma, P16 negative.\par \par She next saw Dr. James So. On exam, she was noted to have an ulcerated and endophytic lesion in the right posterior mandibular gingiva/post-molar region, abutting the 2nd premolar tooth and the base RMT posteriorly. There was buccal induration, no lingual extension.\par \par Staging CT neck on 12/23/21 showed an enhancing abnormal soft tissue lesion centered along the right posterior mandibular gingiva and gingivobuccal sulcus with suspected bony involvement of the adjacent alveolar ridge and also the lingual mandibular gingiva.  Right-sided pathologic cervical lymphadenopathy at right level Ib and right level II. Minimally hazy margins adjacent to the right level Ib lymph node. Staging PET on the same day showed FDG avid soft tissue lesion along the right posterior mandibular gingiva and gingivobuccal sulcus as seen on contrast-enhanced CT compatible with newly diagnosed squamous cell cancer. FDG avidity extends towards the lingual surface. FDG avid right level Ib cervical lymph nodes likely metastatic. FDG avid right level IIA and IIB lymph nodes are indeterminate. FDG avid focus within the enlarged right lobe, likely corresponding to the vague 1.1 cm nodule on contrast enhanced CT.\par \par On 1/13/22 she underwent right composite resection (segmental mandibulectomy with a partial right buccal soft tissue and floor of mouth excision), right neck dissection levels I-IV with Dr So.  Pathology showed invasive squamous cell carcinoma, well-differentiated, measuring 1.7 cm. Depth of invasion 0.6 cm. No LVI/PNI. There was microscopic evidence of tumor invasion into underlying bone (pT4a). Tumor margin from medial soft tissue was 0.4 cm; from the lateral soft tissue was 0.1 cm. Positive lymph nodes were identified in right level 1b and 2a; total 4/18; largest 1.5 cm, no LEONIE. pT4a N2b. \par \par The patient was started on cRT with weekly cisplatin, which she completed on 4/8/22.  \par \par  [de-identified] : - Patient beginning to improve and is eating normal foods (e,g [place] onion soup), but still has issues some solid foods due to pain and poor wound healing related to hardware. . Patient continues to have weight loss.  + Odynophagia without dysphagia.

## 2023-03-08 NOTE — PHYSICAL EXAM
[Fully active, able to carry on all pre-disease performance without restriction] : Status 0 - Fully active, able to carry on all pre-disease performance without restriction [Normal] : affect appropriate [de-identified] : right neck fullness and erythema post operatively.

## 2023-03-08 NOTE — ASSESSMENT
[FreeTextEntry1] : 56 year old woman diagnosed with oral cancer s/p resection with right neck dissection, [id number] \par \par # Oral Cancer \par - s/p C5 of CRT with weekly Cisplatin\par - C4 delayed by elevated creatinine, improved with IVF and received C5 on 4/4.\par - RT completed on 4/11/22\par - Most recent PET-CT from 6/22/22 reviewed; EUFEMIA \par - PAtient to follow up with ENT/RT. Discussed surveillance plans including  q3 month follow up with fibro-optic exam for first year, then q6 months for year two post treatment, TSH q6-12 months\par - follow up in 3 months with PA \par \par \par # Malnutrition\par - [facility]bs following closely and lengthy discussion today regarding dire need to increase calorie intake;\par - also advised proper fluid intake of 2L of liquid daily. \par - Patient still requiring oxycodone for pain intermittently. Patient encouraged to take tylenol. \par - Follow up with ENT and dental. \par \par #Anemia \par - improving \par

## 2023-03-15 ENCOUNTER — APPOINTMENT (OUTPATIENT)
Dept: FAMILY MEDICINE | Facility: CLINIC | Age: 58
End: 2023-03-15

## 2023-03-24 ENCOUNTER — APPOINTMENT (OUTPATIENT)
Dept: PHYSICAL MEDICINE AND REHAB | Facility: CLINIC | Age: 58
End: 2023-03-24
Payer: COMMERCIAL

## 2023-03-24 VITALS
OXYGEN SATURATION: 97 % | WEIGHT: 113.13 LBS | DIASTOLIC BLOOD PRESSURE: 87 MMHG | HEART RATE: 121 BPM | SYSTOLIC BLOOD PRESSURE: 124 MMHG | BODY MASS INDEX: 18.85 KG/M2 | HEIGHT: 65 IN

## 2023-03-24 PROCEDURE — 99214 OFFICE O/P EST MOD 30 MIN: CPT

## 2023-03-24 RX ORDER — GABAPENTIN 800 MG/1
800 TABLET, COATED ORAL 3 TIMES DAILY
Qty: 90 | Refills: 2 | Status: DISCONTINUED | COMMUNITY
Start: 2023-03-24 | End: 2023-03-24

## 2023-03-24 NOTE — ASSESSMENT
[FreeTextEntry1] : 57 year old female presenting for evaluation.\par \par #Chronic pain after cancer treatment:\par -Improving, no longer on opiate medications \par \par #Neuropathic pain\par -Increase gabapentin to 800 mg twice daily\par -Continue  duloxetine 60mg daily-rx sent \par \par #Impaired mobility:\par -Hold physical therapy given recent pneumothorax\par -Refer to pulmonology for further evaluation \par \par #Lymphedema\par -Appears stable, treatment currently on hold given healing wound consider treatment after resolution.\par \par \par Follow-up in 4-6 weeks.

## 2023-03-24 NOTE — PHYSICAL EXAM
[FreeTextEntry1] : Gen: Patient is A&O x 3, NAD\par HEENT: EOMI, hearing grossly normal\par Resp: regular, non- labored\par CV: Pulses regular \par Skin: +fibrotic changes\par Lymph: +facial edema, improving \par Inspection: healing wound \par ROM: Mildly decreased ROM of neck on lateral rotation, decreased jaw ROM\par Palpation:TTP  mandible \par Sensation: intact to light touch\par Reflexes: 1+ and symmetric throughout\par Strength: 5/5 throughout\par Gait: normal \par

## 2023-03-24 NOTE — HISTORY OF PRESENT ILLNESS
[FreeTextEntry1] : Ms. Dsouza is a 57 year old female diagnosed with squamous cell carcinoma of the oral cavity, status post right composite resection (segmental mandibulectomy with a partial right buccal soft tissue and floor of mouth excision), right neck dissection levels I-IV on 1/13/22 completing adjuvant chemoradiation with weekly cisplatin on 4/8/22. S/p plate removal with Dr. Evans on 10/31/22. \par \par \par She reports she is still feeling pain in her post surgical site.  No longer using opiates.  Denies any side effects to gabapentin.  Reports duloxetine is helping.  \par \par \par

## 2023-04-12 ENCOUNTER — APPOINTMENT (OUTPATIENT)
Dept: OTOLARYNGOLOGY | Facility: CLINIC | Age: 58
End: 2023-04-12
Payer: COMMERCIAL

## 2023-04-12 VITALS
DIASTOLIC BLOOD PRESSURE: 89 MMHG | HEART RATE: 121 BPM | BODY MASS INDEX: 18.83 KG/M2 | OXYGEN SATURATION: 98 % | WEIGHT: 113 LBS | SYSTOLIC BLOOD PRESSURE: 124 MMHG | HEIGHT: 65 IN

## 2023-04-12 PROCEDURE — 99215 OFFICE O/P EST HI 40 MIN: CPT

## 2023-04-12 NOTE — HISTORY OF PRESENT ILLNESS
[None] : No associated symptoms are reported. [de-identified] : Ms. Dsouza presents for follow up. 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 RT /chemo completed on 4/8/2022. She is 1 year out of treatment.\par  6/22/2022 Pet scan\par Removal of mandibular hardware on 10/31/22  with Dr. Evans . \par Reports having right neck stiffness/tightness, following up with Dr. Zamora.\par Denies pain, dysphagia, dysphonia and or dyspnea \par Eating solids, no difficulty. Not using PEG tube. Weigh stable now, she gained 6 lbs in the last month \par USing PreviDent toothpaste. \par QUit smoking after hospitalization due to pneumothorax post hyperbaric O2 treatment. She had 12 full sessions. \par On Pentoxifylline BID\par  No alcohol, no illicit drugs\par \par drainage has stopped\par

## 2023-04-12 NOTE — CONSULT LETTER
[Dear  ___] : Dear  [unfilled], [Courtesy Letter:] : I had the pleasure of seeing your patient, [unfilled], in my office today. [Please see my note below.] : Please see my note below. [Sincerely,] : Sincerely, [FreeTextEntry2] : Cristopher Pinon DDS ([place], [place]) \par  [FreeTextEntry3] : Vic So MD, FACS\par \par    [facility]\par Associate Chair\par    Department of Otolaryngology\par \par Professor\par Otolaryngology & Molecular Medicine\par [facility] [employer]\par

## 2023-04-13 ENCOUNTER — OUTPATIENT (OUTPATIENT)
Dept: OUTPATIENT SERVICES | Facility: HOSPITAL | Age: 58
LOS: 1 days | End: 2023-04-13
Payer: COMMERCIAL

## 2023-04-13 ENCOUNTER — APPOINTMENT (OUTPATIENT)
Dept: CT IMAGING | Facility: CLINIC | Age: 58
End: 2023-04-13
Payer: COMMERCIAL

## 2023-04-13 DIAGNOSIS — Z98.891 HISTORY OF UTERINE SCAR FROM PREVIOUS SURGERY: Chronic | ICD-10-CM

## 2023-04-13 DIAGNOSIS — Z90.711 ACQUIRED ABSENCE OF UTERUS WITH REMAINING CERVICAL STUMP: Chronic | ICD-10-CM

## 2023-04-13 DIAGNOSIS — Z00.8 ENCOUNTER FOR OTHER GENERAL EXAMINATION: ICD-10-CM

## 2023-04-13 DIAGNOSIS — Z96.649 PRESENCE OF UNSPECIFIED ARTIFICIAL HIP JOINT: Chronic | ICD-10-CM

## 2023-04-13 DIAGNOSIS — Z98.890 OTHER SPECIFIED POSTPROCEDURAL STATES: Chronic | ICD-10-CM

## 2023-04-13 PROCEDURE — 70491 CT SOFT TISSUE NECK W/DYE: CPT | Mod: 26

## 2023-04-13 PROCEDURE — 71250 CT THORAX DX C-: CPT | Mod: 26

## 2023-04-13 PROCEDURE — 71250 CT THORAX DX C-: CPT

## 2023-04-13 PROCEDURE — 70491 CT SOFT TISSUE NECK W/DYE: CPT

## 2023-04-18 ENCOUNTER — APPOINTMENT (OUTPATIENT)
Dept: PLASTIC SURGERY | Facility: CLINIC | Age: 58
End: 2023-04-18
Payer: COMMERCIAL

## 2023-04-18 VITALS
WEIGHT: 116.2 LBS | TEMPERATURE: 97.6 F | HEIGHT: 65 IN | OXYGEN SATURATION: 98 % | DIASTOLIC BLOOD PRESSURE: 77 MMHG | SYSTOLIC BLOOD PRESSURE: 111 MMHG | BODY MASS INDEX: 19.36 KG/M2 | HEART RATE: 108 BPM

## 2023-04-18 PROCEDURE — 99212 OFFICE O/P EST SF 10 MIN: CPT

## 2023-04-19 ENCOUNTER — APPOINTMENT (OUTPATIENT)
Dept: PULMONOLOGY | Facility: CLINIC | Age: 58
End: 2023-04-19
Payer: COMMERCIAL

## 2023-04-19 VITALS
OXYGEN SATURATION: 98 % | HEART RATE: 103 BPM | SYSTOLIC BLOOD PRESSURE: 116 MMHG | RESPIRATION RATE: 16 BRPM | WEIGHT: 116 LBS | BODY MASS INDEX: 19.81 KG/M2 | DIASTOLIC BLOOD PRESSURE: 76 MMHG | HEIGHT: 64 IN

## 2023-04-19 DIAGNOSIS — J44.9 CHRONIC OBSTRUCTIVE PULMONARY DISEASE, UNSPECIFIED: ICD-10-CM

## 2023-04-19 DIAGNOSIS — R91.8 OTHER NONSPECIFIC ABNORMAL FINDING OF LUNG FIELD: ICD-10-CM

## 2023-04-19 PROCEDURE — G0296 VISIT TO DETERM LDCT ELIG: CPT

## 2023-04-19 PROCEDURE — 99205 OFFICE O/P NEW HI 60 MIN: CPT | Mod: 25

## 2023-04-19 PROCEDURE — 94010 BREATHING CAPACITY TEST: CPT

## 2023-04-19 RX ORDER — OXYCODONE HYDROCHLORIDE 5 MG/5ML
5 SOLUTION ORAL
Qty: 150 | Refills: 0 | Status: DISCONTINUED | COMMUNITY
Start: 2022-03-29 | End: 2023-04-19

## 2023-04-19 RX ORDER — ALBUTEROL SULFATE 90 UG/1
108 (90 BASE) INHALANT RESPIRATORY (INHALATION)
Qty: 1 | Refills: 5 | Status: ACTIVE | COMMUNITY
Start: 2023-04-19 | End: 1900-01-01

## 2023-04-19 RX ORDER — SULFAMETHOXAZOLE AND TRIMETHOPRIM 800; 160 MG/1; MG/1
800-160 TABLET ORAL
Qty: 20 | Refills: 0 | Status: DISCONTINUED | COMMUNITY
Start: 2022-12-27 | End: 2023-04-19

## 2023-04-19 RX ORDER — GABAPENTIN 250 MG/5ML
250 SOLUTION ORAL
Qty: 720 | Refills: 1 | Status: DISCONTINUED | COMMUNITY
Start: 2022-09-23 | End: 2023-04-19

## 2023-04-19 RX ORDER — ALBUTEROL SULFATE 90 UG/1
108 (90 BASE) INHALANT RESPIRATORY (INHALATION)
Refills: 0 | Status: ACTIVE | COMMUNITY

## 2023-04-19 RX ORDER — DULOXETINE HYDROCHLORIDE 30 MG/1
30 CAPSULE, DELAYED RELEASE PELLETS ORAL
Qty: 30 | Refills: 2 | Status: DISCONTINUED | COMMUNITY
Start: 2023-01-06 | End: 2023-04-19

## 2023-04-19 RX ORDER — SODIUM FLUORIDE 1.1 G/100G
1.1 GEL, DENTIFRICE ORAL
Qty: 2 | Refills: 11 | Status: DISCONTINUED | COMMUNITY
Start: 2022-10-25 | End: 2023-04-19

## 2023-04-19 NOTE — END OF VISIT
[Time Spent: ___ minutes] : I have spent [unfilled] minutes of time on the encounter. [FreeTextEntry3] : Chart review, PACS review

## 2023-04-19 NOTE — HISTORY OF PRESENT ILLNESS
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  Patient seen for evaluation of underlying lung disease. s/p PTX during hyperbaric rx requiring C-tube and Vent x 2 days. \par \par Patient denies any complaints of cough wheeze or shortness of breath.  There is no history of sputum production except for that which has occurred status post treatment of the above.\par \par Possible pending surgery for left mandibula fragmentation and worsening status post surgery and RT [TextBox_11] : 1.5 [TextBox_13] : 30 [TextBox_29] : currently smoking 4 cigarettes per day

## 2023-04-19 NOTE — PHYSICAL EXAM
[No Acute Distress] : no acute distress [Normal Oropharynx] : normal oropharynx [Normal Appearance] : normal appearance [No Neck Mass] : no neck mass [Normal Rate/Rhythm] : normal rate/rhythm [Normal S1, S2] : normal s1, s2 [No Murmurs] : no murmurs [No Resp Distress] : no resp distress [Clear to Auscultation Bilaterally] : clear to auscultation bilaterally [No Abnormalities] : no abnormalities [Benign] : benign [Normal Gait] : normal gait [No Clubbing] : no clubbing [No Cyanosis] : no cyanosis [No Edema] : no edema [FROM] : FROM [Normal Color/ Pigmentation] : normal color/ pigmentation [No Focal Deficits] : no focal deficits [Oriented x3] : oriented x3 [Normal Affect] : normal affect [TextBox_11] : Mandibular surgery

## 2023-04-19 NOTE — CONSULT LETTER
[Dear  ___] : Dear  [unfilled], [Consult Letter:] : I had the pleasure of evaluating your patient, [unfilled]. [Please see my note below.] : Please see my note below. [Consult Closing:] : Thank you very much for allowing me to participate in the care of this patient.  If you have any questions, please do not hesitate to contact me. [Sincerely,] : Sincerely, [FreeTextEntry3] : Santiago Jackson MD FCCP\par Pulmonary/Critical Care/Sleep Medicine\par Department of Internal Medicine\par \par [facility]\par

## 2023-04-20 NOTE — REASON FOR VISIT
[Follow-Up: _____] : a [unfilled] follow-up visit [FreeTextEntry1] : s/p removal of mandibular hardware DOS: 10/31/22. Patient states she is doing well and denies pain, draining, or discomfort. She presents today to review her recent CT from 04/13/23.

## 2023-04-23 ENCOUNTER — OUTPATIENT (OUTPATIENT)
Dept: OUTPATIENT SERVICES | Facility: HOSPITAL | Age: 58
LOS: 1 days | Discharge: HOME | End: 2023-04-23

## 2023-04-23 DIAGNOSIS — C03.9 MALIGNANT NEOPLASM OF GUM, UNSPECIFIED: ICD-10-CM

## 2023-04-23 DIAGNOSIS — Z96.649 PRESENCE OF UNSPECIFIED ARTIFICIAL HIP JOINT: Chronic | ICD-10-CM

## 2023-04-23 DIAGNOSIS — Z90.711 ACQUIRED ABSENCE OF UTERUS WITH REMAINING CERVICAL STUMP: Chronic | ICD-10-CM

## 2023-04-23 DIAGNOSIS — Z98.890 OTHER SPECIFIED POSTPROCEDURAL STATES: Chronic | ICD-10-CM

## 2023-04-23 DIAGNOSIS — Z98.891 HISTORY OF UTERINE SCAR FROM PREVIOUS SURGERY: Chronic | ICD-10-CM

## 2023-04-26 ENCOUNTER — APPOINTMENT (OUTPATIENT)
Dept: PLASTIC SURGERY | Facility: CLINIC | Age: 58
End: 2023-04-26

## 2023-04-26 ENCOUNTER — APPOINTMENT (OUTPATIENT)
Dept: PLASTIC SURGERY | Facility: CLINIC | Age: 58
End: 2023-04-26
Payer: COMMERCIAL

## 2023-04-26 VITALS
DIASTOLIC BLOOD PRESSURE: 80 MMHG | SYSTOLIC BLOOD PRESSURE: 121 MMHG | TEMPERATURE: 98 F | BODY MASS INDEX: 20.14 KG/M2 | HEIGHT: 64 IN | WEIGHT: 118 LBS | OXYGEN SATURATION: 97 % | HEART RATE: 92 BPM | RESPIRATION RATE: 16 BRPM

## 2023-04-26 PROCEDURE — 99212 OFFICE O/P EST SF 10 MIN: CPT

## 2023-04-28 ENCOUNTER — APPOINTMENT (OUTPATIENT)
Dept: HEMATOLOGY ONCOLOGY | Facility: CLINIC | Age: 58
End: 2023-04-28
Payer: COMMERCIAL

## 2023-04-28 ENCOUNTER — RESULT REVIEW (OUTPATIENT)
Age: 58
End: 2023-04-28

## 2023-04-28 VITALS
HEIGHT: 64 IN | WEIGHT: 118.06 LBS | HEART RATE: 113 BPM | BODY MASS INDEX: 20.16 KG/M2 | TEMPERATURE: 98.1 F | SYSTOLIC BLOOD PRESSURE: 118 MMHG | DIASTOLIC BLOOD PRESSURE: 85 MMHG | OXYGEN SATURATION: 98 %

## 2023-04-28 LAB
BASOPHILS # BLD AUTO: 0 K/UL — SIGNIFICANT CHANGE UP (ref 0–0.2)
BASOPHILS NFR BLD AUTO: 0.4 % — SIGNIFICANT CHANGE UP (ref 0–2)
EOSINOPHIL # BLD AUTO: 0.2 K/UL — SIGNIFICANT CHANGE UP (ref 0–0.5)
EOSINOPHIL NFR BLD AUTO: 3.5 % — SIGNIFICANT CHANGE UP (ref 0–6)
HCT VFR BLD CALC: 32.6 % — LOW (ref 34.5–45)
HGB BLD-MCNC: 10.4 G/DL — LOW (ref 11.5–15.5)
LYMPHOCYTES # BLD AUTO: 0.8 K/UL — LOW (ref 1–3.3)
LYMPHOCYTES # BLD AUTO: 15.8 % — SIGNIFICANT CHANGE UP (ref 13–44)
MCHC RBC-ENTMCNC: 29.9 PG — SIGNIFICANT CHANGE UP (ref 27–34)
MCHC RBC-ENTMCNC: 31.8 G/DL — LOW (ref 32–36)
MCV RBC AUTO: 93.9 FL — SIGNIFICANT CHANGE UP (ref 80–100)
MONOCYTES # BLD AUTO: 0.5 K/UL — SIGNIFICANT CHANGE UP (ref 0–0.9)
MONOCYTES NFR BLD AUTO: 9.3 % — SIGNIFICANT CHANGE UP (ref 2–14)
NEUTROPHILS # BLD AUTO: 3.5 K/UL — SIGNIFICANT CHANGE UP (ref 1.8–7.4)
NEUTROPHILS NFR BLD AUTO: 71 % — SIGNIFICANT CHANGE UP (ref 43–77)
PLATELET # BLD AUTO: 385 K/UL — SIGNIFICANT CHANGE UP (ref 150–400)
RBC # BLD: 3.47 M/UL — LOW (ref 3.8–5.2)
RBC # FLD: 17.1 % — HIGH (ref 10.3–14.5)
WBC # BLD: 5 K/UL — SIGNIFICANT CHANGE UP (ref 3.8–10.5)
WBC # FLD AUTO: 5 K/UL — SIGNIFICANT CHANGE UP (ref 3.8–10.5)

## 2023-04-28 PROCEDURE — 99214 OFFICE O/P EST MOD 30 MIN: CPT

## 2023-05-01 LAB
ALBUMIN SERPL ELPH-MCNC: 3.9 G/DL
ALP BLD-CCNC: 111 U/L
ALT SERPL-CCNC: 7 U/L
ANION GAP SERPL CALC-SCNC: 13 MMOL/L
APTT BLD: 39.1 SEC
AST SERPL-CCNC: 8 U/L
BILIRUB SERPL-MCNC: <0.2 MG/DL
BUN SERPL-MCNC: 21 MG/DL
CALCIUM SERPL-MCNC: 9.6 MG/DL
CHLORIDE SERPL-SCNC: 99 MMOL/L
CO2 SERPL-SCNC: 24 MMOL/L
CREAT SERPL-MCNC: 1.18 MG/DL
EGFR: 54 ML/MIN/1.73M2
GLUCOSE SERPL-MCNC: 81 MG/DL
INR PPP: 0.93 RATIO
POTASSIUM SERPL-SCNC: 5.1 MMOL/L
PROT SERPL-MCNC: 6.1 G/DL
PT BLD: 10.8 SEC
SODIUM SERPL-SCNC: 135 MMOL/L
TSH SERPL-ACNC: 2.27 UIU/ML

## 2023-05-03 NOTE — HISTORY OF PRESENT ILLNESS
[T: ___] : T[unfilled] [N: ___] : N[unfilled] [M: ___] : M[unfilled] [de-identified] : Patient is a 55 yo F with h/o HTN, HLD and asthma who was diagnosed with SCC of the gingival mucosa.\par \par She saw oral surgeon, Dr. Cristopher Pinon, c/o soreness in the right jaw.  A biopsy of the right mandibular gingiva on 11/26/21 showed well-differentiated squamous cell carcinoma, P16 negative.\par \par She next saw Dr. James So. On exam, she was noted to have an ulcerated and endophytic lesion in the right posterior mandibular gingiva/post-molar region, abutting the 2nd premolar tooth and the base RMT posteriorly. There was buccal induration, no lingual extension.\par \par Staging CT neck on 12/23/21 showed an enhancing abnormal soft tissue lesion centered along the right posterior mandibular gingiva and gingivobuccal sulcus with suspected bony involvement of the adjacent alveolar ridge and also the lingual mandibular gingiva.  Right-sided pathologic cervical lymphadenopathy at right level Ib and right level II. Minimally hazy margins adjacent to the right level Ib lymph node. Staging PET on the same day showed FDG avid soft tissue lesion along the right posterior mandibular gingiva and gingivobuccal sulcus as seen on contrast-enhanced CT compatible with newly diagnosed squamous cell cancer. FDG avidity extends towards the lingual surface. FDG avid right level Ib cervical lymph nodes likely metastatic. FDG avid right level IIA and IIB lymph nodes are indeterminate. FDG avid focus within the enlarged right lobe, likely corresponding to the vague 1.1 cm nodule on contrast enhanced CT.\par \par On 1/13/22 she underwent right composite resection (segmental mandibulectomy with a partial right buccal soft tissue and floor of mouth excision), right neck dissection levels I-IV with Dr So.  Pathology showed invasive squamous cell carcinoma, well-differentiated, measuring 1.7 cm. Depth of invasion 0.6 cm. No LVI/PNI. There was microscopic evidence of tumor invasion into underlying bone (pT4a). Tumor margin from medial soft tissue was 0.4 cm; from the lateral soft tissue was 0.1 cm. Positive lymph nodes were identified in right level 1b and 2a; total 4/18; largest 1.5 cm, no LEONIE. pT4a N2b. \par \par The patient was started on cRT with weekly cisplatin, which she completed on 4/8/22.  \par \par  [de-identified] : Patient reports she is eating normally, she is gaining weight  Reports bruising on hands and arms. Denies bleeding, pain with eating, AC use

## 2023-05-03 NOTE — ADDENDUM
[FreeTextEntry1] : Documented by Ginger Fernandez acting as scribe for Dr. Pappas on 04/28/2023 \par \par All Medical record entries made by the Scribe were at my, Dr. Pappas's, direction and personally dictated by me on 04/28/2023 . I have reviewed the chart and agree that the record accurately reflects my personal performance of the history, physical exam, assessment and plan. I have also personally directed, reviewed, and agreed with the discharge instructions.\par \par

## 2023-05-03 NOTE — ASSESSMENT
[FreeTextEntry1] : 57 year old woman diagnosed with oral cancer s/p resection with right neck dissection, [id number] \par \par # Oral Cancer \par - s/p C5 of CRT with weekly Cisplatin\par - C4 delayed by elevated creatinine, improved with IVF and received C5 on 4/4.\par - RT completed on 4/11/22\par - Most recent PET-CT from 6/22/22 reviewed; EUFEMIA \par - Patient to follow up with ENT/RT. Discussed surveillance plans including  q 3 month follow up with fibro-optic exam for first year, then q6 months for year two post treatment, TSH q 6-12 months\par - follow up in 3 months with Dr. Onofre\par \par \par # Malnutrition\par - Continue f/u with [facility] Hobbs\par - Okay to remove feeding tube\par - Follow up with ENT and dental. \par \par #Anemia\par -(Bruising) Labs today\par

## 2023-05-03 NOTE — RESULTS/DATA
[FreeTextEntry1] : 4/13/23 CT Chest \par IMPRESSION:\par Centrilobular nodular opacities possibly representing aspiration pneumonitis within bilateral lower lobes and lingula are decreased.\par \par \par 6/22/22: PET-CT \par IMPRESSION: Compared to FDG-PET/CT scan.\par \par 1. Non-FDG avid postsurgical changes status post post interval right segmental mandibulectomy with resection of previously seen FDG avid gingival lesion.\par \par 2. Nonspecific new focal FDG avidity in the anterior aspect of residual mandible just to the left of midline with questionable lucency on CT. Please correlate clinically or with contrast-enhanced CT as indicated.\par \par 3. Previously seen FDG avid right cervical lymph nodes have been resected in the interim.\par \par 4. Unchanged FDG avid focus within the enlarged right thyroid lobe. Differentials include benign and malignant etiologies.\par \par 5. Interval resolution of focal FDG avidity in the right vallecula and symmetric hypermetabolism of bilateral palatine tonsils.\par \par \par \par \par 1/13/22 Pathology:\par Lymph nodes, right level 1b, neck dissection: 3 lymph nodes positive for metastatic carcinoma (3/3).  Largest metastatic deposit measures 1.5 cm.  Submandibular gland negative for carcinoma\par Lymph nodes, level 1a, neck dissection: 2 lymph nodes negative for metastatic carcinoma (0/2)\par Oral cavity, right mandible segment, resection: Invasive squamous cell carcinoma, well-differentiated. Carcinoma is present 1 mm from the lateral soft tissue margin and 2\par mm from the medial soft tissue margin\par Lymph nodes, right level 4, neck dissection: 3 lymph nodes negative for metastatic carcinoma (0/3)\par Lymph nodes, right level 3, neck dissection: 6 lymph nodes negative for metastatic carcinoma (0/6)\par Lymph nodes, right level 2a, neck dissection: 1 out of 2 lymph nodes positive for metastatic carcinoma (1/2)\par Lymph nodes, right level 2b, neck dissection: 2 lymph nodes negative for metastatic carcinoma (0/2)\par Synoptic Summary\par SPECIMEN:  Partial mandibulectomy\par TUMOR\par Tumor Focality:  Unifocal\par Multiple Primary Sites:   Not applicable (no additional primary site(s) present\par Tumor Site:  Oral cavity\par +Tumor Subsite:  Lower gingiva\par Tumor Laterality:  Right\par Tumor Size:  Greatest dimension in Centimeters (cm):   1.7 x 0.8 x 0.7  cm\par Histologic Type:  Squamous cell carcinoma, conventional\par Histologic Grade:  G1, well differentiated\par Tumor Depth of Invasion (DOI):   6 mm\par Lymphovascular Invasion:   Not identified\par Perineural Invasion:   Not identified\par MARGINS\par All specimen margins negative for invasive tumor\par Distance from Invasive Tumor to Closest Specimen Margin:    Carcinoma is present 1 mm from the lateral soft tissue margin and 2 mm from the medial soft tissue margin\par Specimen Margin Status for Noninvasive Tumor:    All specimen margins negative for high-grade dysplasia / in situ disease\par REGIONAL LYMPH NODES\par Number of Lymph Nodes with Tumor: 4\par Laterality of Lymph Node(s) with Tumor:   Ipsilateral\par Size of Largest Joby Metastatic Deposit:    At least 1.5 cm\par Extranodal Extension (LEONIE):   Not identified\par Number of Lymph Nodes Examined: 18\par DISTANT METASTASIS : Not applicable\par PATHOLOGIC STAGE CLASSIFICATION (pTNM, AJCC 8th Edition)\par Pathologic Stage Classification\par pT Category: pT2: Tumor less than or equal to 2 cm with DOI greater than 5 mm or tumor greater than 2 cm and less than or equal to 4 cm with DOI less than or equal to 10 mm\par pN Category: pN2b: Metastases in multiple ipsilateral nodes, none larger than 6 cm in greatest dimension and LEONIE(-) \par pM Category:  Not applicable - pM cannot be determined from the submitted specimen(s)\par ADDITIONAL FINDINGS:   None identified\par \par 12/23/21 PET:\par FDG avid soft tissue lesion along the right posterior mandibular gingiva and gingivobuccal sulcus as seen on contrast-enhanced CT compatible with newly diagnosed squamous cell cancer. FDG avidity extends towards the lingual surface. FDG avid right level Ib cervical lymph nodes likely metastatic. FDG avid right level IIA and IIB lymph nodes are indeterminate and may be further evaluated with ultrasound. FDG avid focus within the enlarged right lobe, likely corresponding to the vague 1.1 cm nodule on contrast enhanced CT. Differentials include benign and malignant etiologies. Ultrasound and percutaneous possible FNA biopsy may be performed. Nonspecific focal FDG avidity mapping to opacification in the right vallecula. Please correlate clinically or with direct visualization to exclude any lesion in this region. Approximately symmetric hypermetabolism of bilateral palatine tonsils, physiologic versus inflammatory.\par \par 12/23/21 CT Neck/Maxillofacial:\par Enhancing abnormal soft tissue lesion centered along the right posterior mandibular gingiva and gingivobuccal sulcus with suspected bony involvement of the adjacent alveolar ridge and also the lingual mandibular gingiva. Findings are compatible with the patient's biopsy-proven squamous cell carcinoma.\par Right-sided pathologic cervical lymphadenopathy at right level Ib and right level II concerning for metastatic disease. Minimally hazy margins adjacent to the right level Ib lymph node. Correlate with physical exam to assess for mobile or fixed state.\par 1.1 cm right-sided thyroid upper pole nodule. Recommend ultrasound correlation as neoplasm is not excluded.\par \par 12/16/21 Pathology:\par Mandibular gingiva, RIGHT, lower, biopsy:  Invasive squamous carcinoma well differentiated

## 2023-05-03 NOTE — PHYSICAL EXAM
[Fully active, able to carry on all pre-disease performance without restriction] : Status 0 - Fully active, able to carry on all pre-disease performance without restriction [Normal] : affect appropriate [de-identified] : right neck fullness and erythema

## 2023-05-05 ENCOUNTER — APPOINTMENT (OUTPATIENT)
Dept: PHYSICAL MEDICINE AND REHAB | Facility: CLINIC | Age: 58
End: 2023-05-05
Payer: COMMERCIAL

## 2023-05-05 VITALS
HEART RATE: 108 BPM | HEIGHT: 64 IN | WEIGHT: 118 LBS | BODY MASS INDEX: 20.14 KG/M2 | DIASTOLIC BLOOD PRESSURE: 83 MMHG | SYSTOLIC BLOOD PRESSURE: 125 MMHG

## 2023-05-05 PROCEDURE — 99214 OFFICE O/P EST MOD 30 MIN: CPT

## 2023-05-05 NOTE — ASSESSMENT
[FreeTextEntry1] : 57 year old female presenting for evaluation.\par \par #Neuropathic pain\par -Continue gabapentin to 800 mg twice daily, she is tolerating, monitor given kidney function \par -Continue  duloxetine 60mg daily-rx sent \par \par #Impaired mobility:\par -Reports cleared by pulmonology\par -Start PT for functional endurance training\par \par #Neck pain:\par -Start PT for ROM/Stretching and manual therapy\par -Pentoxifylline -Vitamin E  \par \par #Lymphedema\par -Improving\par -Monitor \par \par Follow-up in 6 weeks.

## 2023-05-05 NOTE — PHYSICAL EXAM
[FreeTextEntry1] : Gen: Patient is A&O x 3, NAD\par HEENT: EOMI, hearing grossly normal\par Resp: regular, non- labored\par CV: Pulses regular \par Skin: +fibrotic changes\par Lymph: No edema \par Inspection: healing wound \par ROM: Mildly decreased ROM of neck on lateral rotation, decreased jaw ROM\par Palpation:TTP  mandible \par Sensation: intact to light touch\par Reflexes: 1+ and symmetric throughout\par Strength: 5/5 throughout\par Gait: normal \par

## 2023-05-11 NOTE — REASON FOR VISIT
[Follow-Up: _____] : a [unfilled] follow-up visit [FreeTextEntry1] : patient presents to discuss surgery and CT Chest  results. S/p removal of mandibular hardware DOS: 10/31/22.

## 2023-06-05 ENCOUNTER — APPOINTMENT (OUTPATIENT)
Dept: GASTROENTEROLOGY | Facility: CLINIC | Age: 58
End: 2023-06-05
Payer: COMMERCIAL

## 2023-06-05 VITALS
OXYGEN SATURATION: 98 % | RESPIRATION RATE: 16 BRPM | BODY MASS INDEX: 20.14 KG/M2 | WEIGHT: 118 LBS | HEART RATE: 100 BPM | DIASTOLIC BLOOD PRESSURE: 65 MMHG | HEIGHT: 64 IN | SYSTOLIC BLOOD PRESSURE: 110 MMHG

## 2023-06-05 DIAGNOSIS — E46 UNSPECIFIED PROTEIN-CALORIE MALNUTRITION: ICD-10-CM

## 2023-06-05 PROCEDURE — 99214 OFFICE O/P EST MOD 30 MIN: CPT

## 2023-06-06 PROBLEM — E46 MALNUTRITION: Status: ACTIVE | Noted: 2022-06-24

## 2023-06-06 NOTE — PHYSICAL EXAM
[Bowel Sounds] : normal bowel sounds [Abdomen Tenderness] : non-tender [de-identified] : poor dentition; divet under chin with ?bone showing (graft didn't take there) R chin [de-identified] : PEG mid-abdomen, c/d/i [de-identified] : extensive bruises b/l upper extremities, pt states from min trauma

## 2023-06-06 NOTE — HISTORY OF PRESENT ILLNESS
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 Chou had last seen me in 2022 for ongoing weight loss and the plan was for nutritional optimization of her Glucerna supplements here in follow-up.\par Other medical issues include anemia, necrosis of the mandible, hypothyroidism, asthma, hyperlipidemia, hypertension, surgical history significant for jaw resection, , left hip replacement, hysterectomy.  She is gaining weight and it is appropriate to pull the feeding tube today.\par I last saw her  and at that time she was 165, she is currently 118 but she reports this is adequate per Onc and that she was lower and in slowly gaining now. She also reports great appetite and eating well - recently had steak, which she chewed carefully. \par

## 2023-06-06 NOTE — ASSESSMENT
[FreeTextEntry1] : 58-year-old lady history of head and neck cancer status post PEG placement by Dr. Chou had last seen me in 2022 for ongoing weight loss and the plan was for nutritional optimization of her Glucerna supplements here to remove PEG. .\par Other medical issues include anemia, necrosis of the mandible, hypothyroidism, asthma, hyperlipidemia, hypertension, surgical history significant for jaw resection, , left hip replacement, hysterectomy.

## 2023-06-16 ENCOUNTER — APPOINTMENT (OUTPATIENT)
Dept: PHYSICAL MEDICINE AND REHAB | Facility: CLINIC | Age: 58
End: 2023-06-16
Payer: COMMERCIAL

## 2023-06-16 VITALS
HEIGHT: 64 IN | HEART RATE: 108 BPM | WEIGHT: 118 LBS | DIASTOLIC BLOOD PRESSURE: 89 MMHG | BODY MASS INDEX: 20.14 KG/M2 | SYSTOLIC BLOOD PRESSURE: 134 MMHG

## 2023-06-16 DIAGNOSIS — M54.2 CERVICALGIA: ICD-10-CM

## 2023-06-16 PROCEDURE — 99214 OFFICE O/P EST MOD 30 MIN: CPT

## 2023-06-16 RX ORDER — PENTOXIFYLLINE 400 MG/1
400 TABLET, EXTENDED RELEASE ORAL TWICE DAILY
Qty: 60 | Refills: 1 | Status: DISCONTINUED | COMMUNITY
Start: 2023-02-28 | End: 2023-06-16

## 2023-06-16 NOTE — HISTORY OF PRESENT ILLNESS
[FreeTextEntry1] : Ms. Dsozua is a 58 year old female diagnosed with squamous cell carcinoma of the oral cavity, status post right composite resection (segmental mandibulectomy with a partial right buccal soft tissue and floor of mouth excision), right neck dissection levels I-IV on 1/13/22 completing adjuvant chemoradiation with weekly cisplatin on 4/8/22. S/p plate removal with Dr. Evans on 10/31/22. \par \par \par She reports that her pain has stabilized.  She is using gabapentin and duloxetine which are helping.  Denies any side effects this.  Still working with physical therapy with goal to return to work.  She does report her strength overall is improving.  Denies any swelling in her neck region.  Does report some easy bruising.  Does report that she has had full healing in her previous surgical region.\par

## 2023-06-16 NOTE — PHYSICAL EXAM
[FreeTextEntry1] : Gen: Patient is A&O x 3, NAD\par HEENT: EOMI, hearing grossly normal\par Resp: regular, non- labored\par CV: Pulses regular \par Skin: +fibrotic changes\par Lymph: No edema \par Inspection: healing wound \par ROM: Mildly decreased ROM of neck on lateral rotation, decreased jaw ROM\par Palpation: no TTP\par Sensation: Dysesthesia to light touch along post-surgical region \par Reflexes: 1+ and symmetric throughout\par Strength: 5/5 throughout\par Gait: normal \par Functional mobility: Sit to stand: 10/10

## 2023-06-16 NOTE — ASSESSMENT
[FreeTextEntry1] : 58 year old female presenting for evaluation.\par \par #Neuropathic pain\par -Continue gabapentin to 800 mg twice daily, she is tolerating, monitor given kidney function \par -Continue  duloxetine 60mg daily-rx sent \par \par #Impaired mobility:\par -Continue PT\par -Case discussd with Jamie Chung-PT, goal is to improve strength for return to work \par \par #Neck pain:\par -Continue PT for ROM/Stretching and manual therapy\par -Stop pentoxifylline, possible contribution to bruising \par \par #Lymphedema\par -Improved\par -Monitor \par \par Follow-up in 4-6 weeks.

## 2023-06-20 ENCOUNTER — NON-APPOINTMENT (OUTPATIENT)
Age: 58
End: 2023-06-20

## 2023-06-20 ENCOUNTER — APPOINTMENT (OUTPATIENT)
Dept: RADIATION ONCOLOGY | Facility: CLINIC | Age: 58
End: 2023-06-20

## 2023-06-30 NOTE — CHART NOTE - NSREFPHYEXREFERTOOTHER_GEN_ALL_CORE
PDMP reviewed. Send refill until follow-up with Dr. Vang 7/11/23.    Benjamin Rosenstein, MD, MA  
Proof of Delivery
Dispensing of Pneumatic CAM boot

## 2023-07-14 ENCOUNTER — APPOINTMENT (OUTPATIENT)
Dept: PHYSICAL MEDICINE AND REHAB | Facility: CLINIC | Age: 58
End: 2023-07-14
Payer: COMMERCIAL

## 2023-07-14 VITALS
WEIGHT: 118 LBS | SYSTOLIC BLOOD PRESSURE: 121 MMHG | HEIGHT: 64 IN | BODY MASS INDEX: 20.14 KG/M2 | HEART RATE: 111 BPM | DIASTOLIC BLOOD PRESSURE: 80 MMHG

## 2023-07-14 PROCEDURE — 99214 OFFICE O/P EST MOD 30 MIN: CPT

## 2023-07-14 NOTE — HISTORY OF PRESENT ILLNESS
[FreeTextEntry1] : Ms. Dsouza is a 57 year old female diagnosed with squamous cell carcinoma of the oral cavity, status post right composite resection (segmental mandibulectomy with a partial right buccal soft tissue and floor of mouth excision), right neck dissection levels I-IV on 1/13/22 completing adjuvant chemoradiation with weekly cisplatin on 4/8/22. S/p plate removal with Dr. Evans on 10/31/22. \par \par \par She reports that her strength continues to improve.  She continues to gain weight.  She denies any difficulty with her vision or cervical range of motion.  She denies any focal weakness.  She reports she still has some numbness and burning in her neck region.  Unsure if gabapentin is helping.  Continues on duloxetine.  Denies any side effects to these medications.  Denies any dizziness, sedation or blurry vision.  Denies any cognitive difficulties with these medications.  She continues to report that she is getting stronger and is able to pull heavy objects at this point in time.  Denies any balance difficulties.

## 2023-07-14 NOTE — ASSESSMENT
[FreeTextEntry1] : 58 year old female presenting for evaluation.\par \par #Neuropathic pain\par -Decreased gabapentin to 400mg BID, will consider discontinue at next visit \par -Continue duloxetine 60mg daily\par \par #Impaired mobility:\par -Continue PT\par -Case discussd with Jamie Chung-PT, continue with high resistance strength training \par \par #Lymphedema\par -Improved\par -Monitor \par -Educated about risk of lymphedema \par \par Follow-up in 4-6 weeks.

## 2023-07-14 NOTE — PHYSICAL EXAM
[FreeTextEntry1] : Gen: Patient is A&O x 3, NAD\par HEENT: EOMI, hearing grossly normal\par Resp: regular, non- labored\par CV: Pulses regular \par Skin: +fibrotic changes\par Lymph: No edema \par Inspection: healed in cervical region \par ROM: FROM\par Palpation: No TTP\par Sensation: intact to light touch\par Reflexes: 1+ and symmetric throughout\par Strength: 5/5 throughout\par Gait: normal\par

## 2023-07-21 ENCOUNTER — OUTPATIENT (OUTPATIENT)
Dept: OUTPATIENT SERVICES | Facility: HOSPITAL | Age: 58
LOS: 1 days | Discharge: HOME | End: 2023-07-21

## 2023-07-21 DIAGNOSIS — Z98.890 OTHER SPECIFIED POSTPROCEDURAL STATES: Chronic | ICD-10-CM

## 2023-07-21 DIAGNOSIS — C03.9 MALIGNANT NEOPLASM OF GUM, UNSPECIFIED: ICD-10-CM

## 2023-07-21 DIAGNOSIS — Z90.711 ACQUIRED ABSENCE OF UTERUS WITH REMAINING CERVICAL STUMP: Chronic | ICD-10-CM

## 2023-07-21 DIAGNOSIS — Z98.891 HISTORY OF UTERINE SCAR FROM PREVIOUS SURGERY: Chronic | ICD-10-CM

## 2023-07-21 DIAGNOSIS — Z96.649 PRESENCE OF UNSPECIFIED ARTIFICIAL HIP JOINT: Chronic | ICD-10-CM

## 2023-07-25 ENCOUNTER — APPOINTMENT (OUTPATIENT)
Dept: PLASTIC SURGERY | Facility: CLINIC | Age: 58
End: 2023-07-25
Payer: COMMERCIAL

## 2023-07-25 ENCOUNTER — APPOINTMENT (OUTPATIENT)
Dept: HEMATOLOGY ONCOLOGY | Facility: CLINIC | Age: 58
End: 2023-07-25
Payer: COMMERCIAL

## 2023-07-25 ENCOUNTER — RESULT REVIEW (OUTPATIENT)
Age: 58
End: 2023-07-25

## 2023-07-25 VITALS
OXYGEN SATURATION: 96 % | HEART RATE: 120 BPM | TEMPERATURE: 99 F | HEIGHT: 64 IN | SYSTOLIC BLOOD PRESSURE: 102 MMHG | BODY MASS INDEX: 21.37 KG/M2 | WEIGHT: 125.2 LBS | DIASTOLIC BLOOD PRESSURE: 70 MMHG

## 2023-07-25 VITALS
DIASTOLIC BLOOD PRESSURE: 63 MMHG | HEART RATE: 82 BPM | SYSTOLIC BLOOD PRESSURE: 90 MMHG | BODY MASS INDEX: 21.34 KG/M2 | RESPIRATION RATE: 15 BRPM | OXYGEN SATURATION: 98 % | WEIGHT: 125 LBS | HEIGHT: 64 IN

## 2023-07-25 PROCEDURE — 99215 OFFICE O/P EST HI 40 MIN: CPT

## 2023-07-25 PROCEDURE — 99024 POSTOP FOLLOW-UP VISIT: CPT

## 2023-07-25 NOTE — ASSESSMENT
[FreeTextEntry1] : 57 year old woman diagnosed with oral cancer s/p resection with right neck dissection, [id number] \par \par # Oral Cancer \par - s/p C5 of CRT with weekly Cisplatin\par - C4 delayed by elevated creatinine, improved with IVF and received C5 on 4/4.\par - RT completed on 4/11/22\par - Most recent PET-CT from 4/2023 reviewed; EUFEMIA \par - Patient to follow up with ENT/RT. Discussed surveillance plans including  q 3 month follow up with fibro-optic exam for first year, then q6 months for year two post treatment, TSH q 6-12 months\par - Will order USG thyroid \par - CT NECK/ CAP in Sept 2023 \par - f/u with me after scans\par - can remove port after next appointment \par \par - PAtient with swelling in Rt mandible Reports intermittently worse, She call Plastics/ to set up appointment \par \par \par # Malnutrition PEG removed \par \par #Anemia\par -(Bruising) Labs today\par - Advised to start Vit C \par

## 2023-07-25 NOTE — HISTORY OF PRESENT ILLNESS
[T: ___] : T[unfilled] [N: ___] : N[unfilled] [M: ___] : M[unfilled] [de-identified] : Patient is a 55 yo F with h/o HTN, HLD and asthma who was diagnosed with SCC of the gingival mucosa.\par \par She saw oral surgeon, Dr. Cristopher Pinon, c/o soreness in the right jaw.  A biopsy of the right mandibular gingiva on 11/26/21 showed well-differentiated squamous cell carcinoma, P16 negative.\par \par She next saw Dr. James So. On exam, she was noted to have an ulcerated and endophytic lesion in the right posterior mandibular gingiva/post-molar region, abutting the 2nd premolar tooth and the base RMT posteriorly. There was buccal induration, no lingual extension.\par \par Staging CT neck on 12/23/21 showed an enhancing abnormal soft tissue lesion centered along the right posterior mandibular gingiva and gingivobuccal sulcus with suspected bony involvement of the adjacent alveolar ridge and also the lingual mandibular gingiva.  Right-sided pathologic cervical lymphadenopathy at right level Ib and right level II. Minimally hazy margins adjacent to the right level Ib lymph node. Staging PET on the same day showed FDG avid soft tissue lesion along the right posterior mandibular gingiva and gingivobuccal sulcus as seen on contrast-enhanced CT compatible with newly diagnosed squamous cell cancer. FDG avidity extends towards the lingual surface. FDG avid right level Ib cervical lymph nodes likely metastatic. FDG avid right level IIA and IIB lymph nodes are indeterminate. FDG avid focus within the enlarged right lobe, likely corresponding to the vague 1.1 cm nodule on contrast enhanced CT.\par \par On 1/13/22 she underwent right composite resection (segmental mandibulectomy with a partial right buccal soft tissue and floor of mouth excision), right neck dissection levels I-IV with Dr So.  Pathology showed invasive squamous cell carcinoma, well-differentiated, measuring 1.7 cm. Depth of invasion 0.6 cm. No LVI/PNI. There was microscopic evidence of tumor invasion into underlying bone (pT4a). Tumor margin from medial soft tissue was 0.4 cm; from the lateral soft tissue was 0.1 cm. Positive lymph nodes were identified in right level 1b and 2a; total 4/18; largest 1.5 cm, no LEONIE. pT4a N2b. \par \par The patient was started on cRT with weekly cisplatin, which she completed on 4/8/22.  \par \par  [de-identified] : Patient reports she is eating normally, she is gaining weight  Reports bruising on hands and arms. Denies bleeding, pain with eating, AC use\par \par 4/13/23: CT CHEST : Centrilobular nodular opacities possibly representing aspiration pneumonitis within bilateral lower lobes and lingula are decreased.\par CT NECK \par 1. Interval removal of the metallic fixation plate across the mandibular symphysis and right-sided fibular graft.\par 2. Slight interval worsening of heterogeneous mottled lucency/sclerosis and partial fragmentation involving the fibular graft, mandibular symphysis, extending to the left mandibular body for which the differential diagnosis remains osteoradionecrosis versus less likely osteomyelitis. The possibility of residual or recurrent infiltrating tumor cannot be excluded.\par 3. No new or enlarging cervical lymph nodes.\par 4. Anterior dislocation of the right condylar head out of the mandibular fossa.\par 5. Similar-appearing 2.2 cm heterogeneous right-sided thyroid nodule. Thyroid neoplasm is not excluded.\par 6. Please refer to the report for the concurrent dedicated chest CT for findings regarding the thoracic structures.\par \par WIll repeat scan in 6 months\par On gabapetin and Cymbalta

## 2023-07-25 NOTE — PHYSICAL EXAM
[Fully active, able to carry on all pre-disease performance without restriction] : Status 0 - Fully active, able to carry on all pre-disease performance without restriction [Normal] : affect appropriate [de-identified] : right neck fullness and erythema

## 2023-07-26 ENCOUNTER — APPOINTMENT (OUTPATIENT)
Dept: GASTROENTEROLOGY | Facility: CLINIC | Age: 58
End: 2023-07-26

## 2023-07-26 LAB
ALBUMIN SERPL ELPH-MCNC: 4 G/DL
ALP BLD-CCNC: 135 U/L
ALT SERPL-CCNC: 20 U/L
ANION GAP SERPL CALC-SCNC: 15 MMOL/L
AST SERPL-CCNC: 25 U/L
BILIRUB SERPL-MCNC: 0.2 MG/DL
BUN SERPL-MCNC: 26 MG/DL
CALCIUM SERPL-MCNC: 9.3 MG/DL
CHLORIDE SERPL-SCNC: 97 MMOL/L
CO2 SERPL-SCNC: 22 MMOL/L
CREAT SERPL-MCNC: 1.53 MG/DL
EGFR: 39 ML/MIN/1.73M2
GLUCOSE SERPL-MCNC: 126 MG/DL
MAGNESIUM SERPL-MCNC: 1.8 MG/DL
POTASSIUM SERPL-SCNC: 4.3 MMOL/L
PROT SERPL-MCNC: 6.5 G/DL
SODIUM SERPL-SCNC: 135 MMOL/L

## 2023-07-28 NOTE — PATIENT PROFILE ADULT - TRANSPORTATION
No care due was identified.  [facility] Embedded Care Due Messages. Reference number: [id number].   7/28/2023 9:15:36 AM CDT   no

## 2023-07-29 ENCOUNTER — OUTPATIENT (OUTPATIENT)
Dept: OUTPATIENT SERVICES | Facility: HOSPITAL | Age: 58
LOS: 1 days | End: 2023-07-29
Payer: COMMERCIAL

## 2023-07-29 ENCOUNTER — APPOINTMENT (OUTPATIENT)
Dept: CT IMAGING | Facility: CLINIC | Age: 58
End: 2023-07-29
Payer: COMMERCIAL

## 2023-07-29 DIAGNOSIS — Z96.649 PRESENCE OF UNSPECIFIED ARTIFICIAL HIP JOINT: Chronic | ICD-10-CM

## 2023-07-29 DIAGNOSIS — Z98.891 HISTORY OF UTERINE SCAR FROM PREVIOUS SURGERY: Chronic | ICD-10-CM

## 2023-07-29 DIAGNOSIS — Z98.890 OTHER SPECIFIED POSTPROCEDURAL STATES: Chronic | ICD-10-CM

## 2023-07-29 DIAGNOSIS — Z00.8 ENCOUNTER FOR OTHER GENERAL EXAMINATION: ICD-10-CM

## 2023-07-29 DIAGNOSIS — Z90.711 ACQUIRED ABSENCE OF UTERUS WITH REMAINING CERVICAL STUMP: Chronic | ICD-10-CM

## 2023-07-29 PROCEDURE — 70491 CT SOFT TISSUE NECK W/DYE: CPT | Mod: 26

## 2023-07-29 PROCEDURE — 70491 CT SOFT TISSUE NECK W/DYE: CPT

## 2023-08-09 ENCOUNTER — APPOINTMENT (OUTPATIENT)
Dept: PULMONOLOGY | Facility: CLINIC | Age: 58
End: 2023-08-09

## 2023-08-18 ENCOUNTER — APPOINTMENT (OUTPATIENT)
Dept: PHYSICAL MEDICINE AND REHAB | Facility: CLINIC | Age: 58
End: 2023-08-18
Payer: COMMERCIAL

## 2023-08-18 ENCOUNTER — APPOINTMENT (OUTPATIENT)
Dept: PLASTIC SURGERY | Facility: CLINIC | Age: 58
End: 2023-08-18
Payer: COMMERCIAL

## 2023-08-18 VITALS
SYSTOLIC BLOOD PRESSURE: 125 MMHG | HEIGHT: 64 IN | RESPIRATION RATE: 14 BRPM | DIASTOLIC BLOOD PRESSURE: 81 MMHG | BODY MASS INDEX: 21.17 KG/M2 | HEART RATE: 98 BPM | WEIGHT: 124 LBS

## 2023-08-18 VITALS
BODY MASS INDEX: 20.78 KG/M2 | DIASTOLIC BLOOD PRESSURE: 91 MMHG | HEIGHT: 64 IN | OXYGEN SATURATION: 97 % | SYSTOLIC BLOOD PRESSURE: 146 MMHG | HEART RATE: 107 BPM | WEIGHT: 121.7 LBS | TEMPERATURE: 98 F

## 2023-08-18 PROCEDURE — 99214 OFFICE O/P EST MOD 30 MIN: CPT

## 2023-08-18 NOTE — HISTORY OF PRESENT ILLNESS
[FreeTextEntry1] : Ms. Dsouza is a 57 year old female diagnosed with squamous cell carcinoma of the oral cavity, status post right composite resection (segmental mandibulectomy with a partial right buccal soft tissue and floor of mouth excision), right neck dissection levels I-IV on 1/13/22 completing adjuvant chemoradiation with weekly cisplatin on 4/8/22. S/p plate removal with Dr. Evans on 10/31/22.    She is pending more surgical repair.  She reports she was able to pass physical test for work.  Decreased gabapentin, no side effects.  No change in pain.  On duloxetine.  Denies side effects.

## 2023-08-18 NOTE — ASSESSMENT
[FreeTextEntry1] : 58 year old female presenting for evaluation.  #Neuropathic pain -Discontinue gabapentin -Continue duloxetine 60mg daily-rx sent   #Impaired mobility: -Completed PT -Continue home exercise program  #Lymphedema -Hold treatment until surgery -Monitor   Follow-up after surgery

## 2023-08-18 NOTE — PHYSICAL EXAM
[FreeTextEntry1] : Gen: Patient is A&O x 3, NAD HEENT: EOMI, hearing grossly normal Resp: regular, non- labored CV: Pulses regular  Skin: +fibrotic changes Lymph: +Right facial edema  Inspection: healed in cervical region  ROM: FROM Palpation: No TTP Sensation: intact to light touch Reflexes: 1+ and symmetric throughout Strength: 5/5 throughout Gait: normal

## 2023-08-21 NOTE — REASON FOR VISIT
[Follow-Up: _____] : a [unfilled] follow-up visit [FreeTextEntry1] : s/p removal of mandibular hardware DOS: 10/31/22. Patient states she noted her right mandibular bone began protruding out of her skin again. She denies notable drainage or bleeding but notes two more open areas along her jaw. She presents today to discuss possible revision surgery.

## 2023-08-25 ENCOUNTER — APPOINTMENT (OUTPATIENT)
Dept: CT IMAGING | Facility: CLINIC | Age: 58
End: 2023-08-25
Payer: COMMERCIAL

## 2023-08-25 ENCOUNTER — OUTPATIENT (OUTPATIENT)
Dept: OUTPATIENT SERVICES | Facility: HOSPITAL | Age: 58
LOS: 1 days | End: 2023-08-25
Payer: COMMERCIAL

## 2023-08-25 DIAGNOSIS — M27.2 INFLAMMATORY CONDITIONS OF JAWS: ICD-10-CM

## 2023-08-25 DIAGNOSIS — Z98.891 HISTORY OF UTERINE SCAR FROM PREVIOUS SURGERY: Chronic | ICD-10-CM

## 2023-08-25 DIAGNOSIS — Z90.711 ACQUIRED ABSENCE OF UTERUS WITH REMAINING CERVICAL STUMP: Chronic | ICD-10-CM

## 2023-08-25 DIAGNOSIS — Z96.649 PRESENCE OF UNSPECIFIED ARTIFICIAL HIP JOINT: Chronic | ICD-10-CM

## 2023-08-25 DIAGNOSIS — Z98.890 OTHER SPECIFIED POSTPROCEDURAL STATES: Chronic | ICD-10-CM

## 2023-08-25 PROCEDURE — 73706 CT ANGIO LWR EXTR W/O&W/DYE: CPT | Mod: 26,RT

## 2023-08-25 PROCEDURE — 73706 CT ANGIO LWR EXTR W/O&W/DYE: CPT

## 2023-08-28 NOTE — H&P PST ADULT - SKIN/BREAST
Yes
Detail Level: Detailed
Quality 110: Preventive Care And Screening: Influenza Immunization: Influenza Immunization Administered during Influenza season
negative

## 2023-08-30 NOTE — PATIENT PROFILE ADULT - HOW PATIENT ADDRESSED, PROFILE
Sara
Rhythm: Normal rate and regular rhythm. Pulses: Normal pulses. Heart sounds: Normal heart sounds. No murmur heard. No friction rub. No gallop. Pulmonary:      Effort: Pulmonary effort is normal. No respiratory distress. Breath sounds: Normal breath sounds. No stridor. No wheezing, rhonchi or rales. Chest:      Chest wall: No tenderness. Musculoskeletal:      Right hand: Deformity and tenderness present. Arms:    Neurological:      Mental Status: She is alert and oriented to person, place, and time. Mental status is at baseline. Psychiatric:         Mood and Affect: Mood normal.         Behavior: Behavior normal.         Thought Content: Thought content normal.         Judgment: Judgment normal.       Labs/Imaging/Diagnostics   Labs:  Hemoglobin A1C   Date Value Ref Range Status   02/22/2023 5.6 4.0 - 6.0 % Final       Imaging Last 24 Hours:  HM MAMMOGRAPHY   - MAMM SCREENING BILATERAL W CAD     BILATERAL DIGITAL SCREENING MAMMOGRAM 3D/2D WITH CAD: 9/23/2020     CLINICAL: Routine screening. No breast complaints at this time. Current study was also evaluated with a Computer Aided Detection (CAD)    system. Comparison is made to exams dated:  2/17/2017 mammogram, 10/29/2013   mammogram, 9/15/2015 mammogram, and 8/24/2011 mammogram - [facility].     Two view 3D digital tomosynthesis as well as C-view (synthetic 2D   reconstruction with CAD) were performed. There are scattered fibroglandular elements in both breasts. No suspicious masses, calcifications, or other findings are seen in   either breast.     There has been no significant interval change. IMPRESSION: NEGATIVE   There is no mammographic evidence of malignancy. A 1 year screening   mammogram is recommended.        Diane Sim M.D., md/armand:9/24/2020 22:47:81       letter sent: Normal/Negative     Mammogram BI-RADS: 1 Negative  Other Result Text    Interface - Rad

## 2023-09-01 ENCOUNTER — APPOINTMENT (OUTPATIENT)
Dept: CT IMAGING | Facility: CLINIC | Age: 58
End: 2023-09-01

## 2023-09-18 ENCOUNTER — APPOINTMENT (OUTPATIENT)
Age: 58
End: 2023-09-18
Payer: COMMERCIAL

## 2023-09-18 PROCEDURE — 99213 OFFICE O/P EST LOW 20 MIN: CPT

## 2023-09-27 ENCOUNTER — APPOINTMENT (OUTPATIENT)
Dept: OTOLARYNGOLOGY | Facility: CLINIC | Age: 58
End: 2023-09-27
Payer: COMMERCIAL

## 2023-09-27 VITALS
HEIGHT: 64 IN | WEIGHT: 124 LBS | SYSTOLIC BLOOD PRESSURE: 117 MMHG | HEART RATE: 113 BPM | TEMPERATURE: 98.2 F | OXYGEN SATURATION: 99 % | DIASTOLIC BLOOD PRESSURE: 83 MMHG | BODY MASS INDEX: 21.17 KG/M2

## 2023-09-27 DIAGNOSIS — C03.9 MALIGNANT NEOPLASM OF GUM, UNSPECIFIED: ICD-10-CM

## 2023-09-27 PROCEDURE — 99214 OFFICE O/P EST MOD 30 MIN: CPT

## 2023-10-09 ENCOUNTER — RESULT REVIEW (OUTPATIENT)
Age: 58
End: 2023-10-09

## 2023-10-10 ENCOUNTER — APPOINTMENT (OUTPATIENT)
Age: 58
End: 2023-10-10
Payer: COMMERCIAL

## 2023-10-10 PROCEDURE — 99213 OFFICE O/P EST LOW 20 MIN: CPT

## 2023-10-11 ENCOUNTER — APPOINTMENT (OUTPATIENT)
Dept: CT IMAGING | Facility: CLINIC | Age: 58
End: 2023-10-11
Payer: COMMERCIAL

## 2023-10-11 ENCOUNTER — OUTPATIENT (OUTPATIENT)
Dept: OUTPATIENT SERVICES | Facility: HOSPITAL | Age: 58
LOS: 1 days | Discharge: HOME | End: 2023-10-11

## 2023-10-11 ENCOUNTER — OUTPATIENT (OUTPATIENT)
Dept: OUTPATIENT SERVICES | Facility: HOSPITAL | Age: 58
LOS: 1 days | End: 2023-10-11

## 2023-10-11 DIAGNOSIS — Z98.890 OTHER SPECIFIED POSTPROCEDURAL STATES: Chronic | ICD-10-CM

## 2023-10-11 DIAGNOSIS — Z98.891 HISTORY OF UTERINE SCAR FROM PREVIOUS SURGERY: Chronic | ICD-10-CM

## 2023-10-11 DIAGNOSIS — Z90.711 ACQUIRED ABSENCE OF UTERUS WITH REMAINING CERVICAL STUMP: Chronic | ICD-10-CM

## 2023-10-11 DIAGNOSIS — C03.9 MALIGNANT NEOPLASM OF GUM, UNSPECIFIED: ICD-10-CM

## 2023-10-11 DIAGNOSIS — C76.0 MALIGNANT NEOPLASM OF HEAD, FACE AND NECK: ICD-10-CM

## 2023-10-11 DIAGNOSIS — Z96.649 PRESENCE OF UNSPECIFIED ARTIFICIAL HIP JOINT: Chronic | ICD-10-CM

## 2023-10-11 PROCEDURE — 74177 CT ABD & PELVIS W/CONTRAST: CPT | Mod: 26

## 2023-10-13 ENCOUNTER — NON-APPOINTMENT (OUTPATIENT)
Age: 58
End: 2023-10-13

## 2023-10-13 ENCOUNTER — APPOINTMENT (OUTPATIENT)
Dept: GASTROENTEROLOGY | Facility: CLINIC | Age: 58
End: 2023-10-13
Payer: COMMERCIAL

## 2023-10-13 VITALS
HEIGHT: 64 IN | DIASTOLIC BLOOD PRESSURE: 91 MMHG | BODY MASS INDEX: 20.49 KG/M2 | WEIGHT: 120 LBS | SYSTOLIC BLOOD PRESSURE: 118 MMHG | HEART RATE: 100 BPM

## 2023-10-13 PROCEDURE — 99213 OFFICE O/P EST LOW 20 MIN: CPT

## 2023-10-13 RX ORDER — SULFAMETHOXAZOLE AND TRIMETHOPRIM 800; 160 MG/1; MG/1
800-160 TABLET ORAL
Qty: 20 | Refills: 0 | Status: DISCONTINUED | COMMUNITY
Start: 2023-07-25 | End: 2023-10-13

## 2023-10-13 RX ORDER — GABAPENTIN 800 MG/1
800 TABLET, COATED ORAL
Qty: 60 | Refills: 2 | Status: DISCONTINUED | COMMUNITY
Start: 2023-03-24 | End: 2023-10-13

## 2023-10-13 RX ORDER — GLUCOSAMINE/CHONDR SU A SOD 750-600 MG
1000 TABLET ORAL
Qty: 60 | Refills: 1 | Status: DISCONTINUED | COMMUNITY
Start: 2023-02-28 | End: 2023-10-13

## 2023-10-13 RX ORDER — GABAPENTIN 400 MG/1
400 CAPSULE ORAL
Qty: 60 | Refills: 2 | Status: DISCONTINUED | COMMUNITY
Start: 2023-07-14 | End: 2023-10-13

## 2023-10-13 RX ORDER — NALOXONE HYDROCHLORIDE 4 MG/.1ML
4 SPRAY NASAL
Qty: 1 | Refills: 2 | Status: DISCONTINUED | COMMUNITY
Start: 2022-09-23 | End: 2023-10-13

## 2023-10-13 RX ORDER — INHALER, ASSIST DEVICES
SPACER (EA) MISCELLANEOUS
Qty: 1 | Refills: 1 | Status: DISCONTINUED | COMMUNITY
Start: 2023-04-19 | End: 2023-10-13

## 2023-10-18 ENCOUNTER — APPOINTMENT (OUTPATIENT)
Dept: HEMATOLOGY ONCOLOGY | Facility: CLINIC | Age: 58
End: 2023-10-18

## 2023-10-24 ENCOUNTER — APPOINTMENT (OUTPATIENT)
Dept: PLASTIC SURGERY | Facility: CLINIC | Age: 58
End: 2023-10-24
Payer: COMMERCIAL

## 2023-10-24 VITALS
SYSTOLIC BLOOD PRESSURE: 127 MMHG | DIASTOLIC BLOOD PRESSURE: 82 MMHG | HEART RATE: 89 BPM | OXYGEN SATURATION: 98 % | BODY MASS INDEX: 20.49 KG/M2 | HEIGHT: 64 IN | WEIGHT: 120 LBS

## 2023-10-24 PROCEDURE — 99024 POSTOP FOLLOW-UP VISIT: CPT

## 2023-11-06 ENCOUNTER — APPOINTMENT (OUTPATIENT)
Dept: GASTROENTEROLOGY | Facility: CLINIC | Age: 58
End: 2023-11-06

## 2023-11-07 ENCOUNTER — APPOINTMENT (OUTPATIENT)
Dept: PLASTIC SURGERY | Facility: CLINIC | Age: 58
End: 2023-11-07
Payer: COMMERCIAL

## 2023-11-07 VITALS
OXYGEN SATURATION: 97 % | WEIGHT: 113.6 LBS | HEIGHT: 64 IN | HEART RATE: 92 BPM | BODY MASS INDEX: 19.39 KG/M2 | DIASTOLIC BLOOD PRESSURE: 91 MMHG | SYSTOLIC BLOOD PRESSURE: 145 MMHG | TEMPERATURE: 97.6 F

## 2023-11-07 PROCEDURE — 99024 POSTOP FOLLOW-UP VISIT: CPT

## 2023-11-13 ENCOUNTER — APPOINTMENT (OUTPATIENT)
Age: 58
End: 2023-11-13

## 2023-11-13 RX ORDER — DULOXETINE HYDROCHLORIDE 60 MG/1
60 CAPSULE, DELAYED RELEASE PELLETS ORAL DAILY
Qty: 30 | Refills: 2 | Status: ACTIVE | COMMUNITY
Start: 2023-11-13 | End: 1900-01-01

## 2023-11-13 RX ORDER — DULOXETINE HYDROCHLORIDE 60 MG/1
60 CAPSULE, DELAYED RELEASE PELLETS ORAL
Qty: 30 | Refills: 2 | Status: DISCONTINUED | COMMUNITY
Start: 2023-01-20 | End: 2023-11-13

## 2023-11-15 ENCOUNTER — OUTPATIENT (OUTPATIENT)
Dept: OUTPATIENT SERVICES | Facility: HOSPITAL | Age: 58
LOS: 1 days | End: 2023-11-15
Payer: SELF-PAY

## 2023-11-15 VITALS
WEIGHT: 119.93 LBS | TEMPERATURE: 98 F | SYSTOLIC BLOOD PRESSURE: 119 MMHG | OXYGEN SATURATION: 99 % | HEIGHT: 64 IN | HEART RATE: 90 BPM | DIASTOLIC BLOOD PRESSURE: 78 MMHG | RESPIRATION RATE: 16 BRPM

## 2023-11-15 DIAGNOSIS — Z98.891 HISTORY OF UTERINE SCAR FROM PREVIOUS SURGERY: Chronic | ICD-10-CM

## 2023-11-15 DIAGNOSIS — Z92.3 PERSONAL HISTORY OF IRRADIATION: ICD-10-CM

## 2023-11-15 DIAGNOSIS — Z96.649 PRESENCE OF UNSPECIFIED ARTIFICIAL HIP JOINT: Chronic | ICD-10-CM

## 2023-11-15 DIAGNOSIS — Z98.890 OTHER SPECIFIED POSTPROCEDURAL STATES: Chronic | ICD-10-CM

## 2023-11-15 DIAGNOSIS — Z90.711 ACQUIRED ABSENCE OF UTERUS WITH REMAINING CERVICAL STUMP: Chronic | ICD-10-CM

## 2023-11-15 DIAGNOSIS — C41.1 MALIGNANT NEOPLASM OF MANDIBLE: ICD-10-CM

## 2023-11-15 DIAGNOSIS — G89.3 NEOPLASM RELATED PAIN (ACUTE) (CHRONIC): ICD-10-CM

## 2023-11-15 LAB
ALBUMIN SERPL ELPH-MCNC: 3.5 G/DL — SIGNIFICANT CHANGE UP (ref 3.3–5)
ALBUMIN SERPL ELPH-MCNC: 3.5 G/DL — SIGNIFICANT CHANGE UP (ref 3.3–5)
ALP SERPL-CCNC: 94 U/L — SIGNIFICANT CHANGE UP (ref 40–120)
ALP SERPL-CCNC: 94 U/L — SIGNIFICANT CHANGE UP (ref 40–120)
ALT FLD-CCNC: 6 U/L — SIGNIFICANT CHANGE UP (ref 4–33)
ALT FLD-CCNC: 6 U/L — SIGNIFICANT CHANGE UP (ref 4–33)
ANION GAP SERPL CALC-SCNC: 12 MMOL/L — SIGNIFICANT CHANGE UP (ref 7–14)
ANION GAP SERPL CALC-SCNC: 12 MMOL/L — SIGNIFICANT CHANGE UP (ref 7–14)
AST SERPL-CCNC: 12 U/L — SIGNIFICANT CHANGE UP (ref 4–32)
AST SERPL-CCNC: 12 U/L — SIGNIFICANT CHANGE UP (ref 4–32)
BILIRUB SERPL-MCNC: <0.2 MG/DL — SIGNIFICANT CHANGE UP (ref 0.2–1.2)
BILIRUB SERPL-MCNC: <0.2 MG/DL — SIGNIFICANT CHANGE UP (ref 0.2–1.2)
BLD GP AB SCN SERPL QL: NEGATIVE — SIGNIFICANT CHANGE UP
BLD GP AB SCN SERPL QL: NEGATIVE — SIGNIFICANT CHANGE UP
BUN SERPL-MCNC: 16 MG/DL — SIGNIFICANT CHANGE UP (ref 7–23)
BUN SERPL-MCNC: 16 MG/DL — SIGNIFICANT CHANGE UP (ref 7–23)
CALCIUM SERPL-MCNC: 9.3 MG/DL — SIGNIFICANT CHANGE UP (ref 8.4–10.5)
CALCIUM SERPL-MCNC: 9.3 MG/DL — SIGNIFICANT CHANGE UP (ref 8.4–10.5)
CHLORIDE SERPL-SCNC: 94 MMOL/L — LOW (ref 98–107)
CHLORIDE SERPL-SCNC: 94 MMOL/L — LOW (ref 98–107)
CO2 SERPL-SCNC: 24 MMOL/L — SIGNIFICANT CHANGE UP (ref 22–31)
CO2 SERPL-SCNC: 24 MMOL/L — SIGNIFICANT CHANGE UP (ref 22–31)
CREAT SERPL-MCNC: 1.18 MG/DL — SIGNIFICANT CHANGE UP (ref 0.5–1.3)
CREAT SERPL-MCNC: 1.18 MG/DL — SIGNIFICANT CHANGE UP (ref 0.5–1.3)
EGFR: 53 ML/MIN/1.73M2 — LOW
EGFR: 53 ML/MIN/1.73M2 — LOW
GLUCOSE SERPL-MCNC: 79 MG/DL — SIGNIFICANT CHANGE UP (ref 70–99)
GLUCOSE SERPL-MCNC: 79 MG/DL — SIGNIFICANT CHANGE UP (ref 70–99)
HCT VFR BLD CALC: 31.7 % — LOW (ref 34.5–45)
HCT VFR BLD CALC: 31.7 % — LOW (ref 34.5–45)
HGB BLD-MCNC: 10.6 G/DL — LOW (ref 11.5–15.5)
HGB BLD-MCNC: 10.6 G/DL — LOW (ref 11.5–15.5)
MCHC RBC-ENTMCNC: 30.3 PG — SIGNIFICANT CHANGE UP (ref 27–34)
MCHC RBC-ENTMCNC: 30.3 PG — SIGNIFICANT CHANGE UP (ref 27–34)
MCHC RBC-ENTMCNC: 33.4 GM/DL — SIGNIFICANT CHANGE UP (ref 32–36)
MCHC RBC-ENTMCNC: 33.4 GM/DL — SIGNIFICANT CHANGE UP (ref 32–36)
MCV RBC AUTO: 90.6 FL — SIGNIFICANT CHANGE UP (ref 80–100)
MCV RBC AUTO: 90.6 FL — SIGNIFICANT CHANGE UP (ref 80–100)
NRBC # BLD: 0 /100 WBCS — SIGNIFICANT CHANGE UP (ref 0–0)
NRBC # BLD: 0 /100 WBCS — SIGNIFICANT CHANGE UP (ref 0–0)
NRBC # FLD: 0 K/UL — SIGNIFICANT CHANGE UP (ref 0–0)
NRBC # FLD: 0 K/UL — SIGNIFICANT CHANGE UP (ref 0–0)
PLATELET # BLD AUTO: 285 K/UL — SIGNIFICANT CHANGE UP (ref 150–400)
PLATELET # BLD AUTO: 285 K/UL — SIGNIFICANT CHANGE UP (ref 150–400)
POTASSIUM SERPL-MCNC: 4 MMOL/L — SIGNIFICANT CHANGE UP (ref 3.5–5.3)
POTASSIUM SERPL-MCNC: 4 MMOL/L — SIGNIFICANT CHANGE UP (ref 3.5–5.3)
POTASSIUM SERPL-SCNC: 4 MMOL/L — SIGNIFICANT CHANGE UP (ref 3.5–5.3)
POTASSIUM SERPL-SCNC: 4 MMOL/L — SIGNIFICANT CHANGE UP (ref 3.5–5.3)
PROT SERPL-MCNC: 6.4 G/DL — SIGNIFICANT CHANGE UP (ref 6–8.3)
PROT SERPL-MCNC: 6.4 G/DL — SIGNIFICANT CHANGE UP (ref 6–8.3)
RBC # BLD: 3.5 M/UL — LOW (ref 3.8–5.2)
RBC # BLD: 3.5 M/UL — LOW (ref 3.8–5.2)
RBC # FLD: 16.6 % — HIGH (ref 10.3–14.5)
RBC # FLD: 16.6 % — HIGH (ref 10.3–14.5)
RH IG SCN BLD-IMP: POSITIVE — SIGNIFICANT CHANGE UP
RH IG SCN BLD-IMP: POSITIVE — SIGNIFICANT CHANGE UP
SODIUM SERPL-SCNC: 130 MMOL/L — LOW (ref 135–145)
SODIUM SERPL-SCNC: 130 MMOL/L — LOW (ref 135–145)
WBC # BLD: 6.71 K/UL — SIGNIFICANT CHANGE UP (ref 3.8–10.5)
WBC # BLD: 6.71 K/UL — SIGNIFICANT CHANGE UP (ref 3.8–10.5)
WBC # FLD AUTO: 6.71 K/UL — SIGNIFICANT CHANGE UP (ref 3.8–10.5)
WBC # FLD AUTO: 6.71 K/UL — SIGNIFICANT CHANGE UP (ref 3.8–10.5)

## 2023-11-15 PROCEDURE — 93010 ELECTROCARDIOGRAM REPORT: CPT

## 2023-11-15 RX ORDER — SODIUM CHLORIDE 9 MG/ML
1000 INJECTION, SOLUTION INTRAVENOUS
Refills: 0 | Status: DISCONTINUED | OUTPATIENT
Start: 2023-11-21 | End: 2023-11-21

## 2023-11-15 NOTE — H&P PST ADULT - ENMT COMMENTS
malignant neoplasm of mandible. Pt reports pain with chewing missing teeth, not loose, MALLAMPATI IV, limited oral opening

## 2023-11-15 NOTE — H&P PST ADULT - NSICDXPASTSURGICALHX_GEN_ALL_CORE_FT
PAST SURGICAL HISTORY:  H/O  section     H/O neck surgery partial right mandibulectomy with fibular free flap reconstriction and right neck lymphnode dissection 22    History of hip replacement left hip    History of partial hysterectomy     History of vascular access device

## 2023-11-15 NOTE — H&P PST ADULT - HISTORY OF PRESENT ILLNESS
59 y/o female presents to presurgical testing with diagnosis of malignant neoplasm of mandible and inflammatory conditions of jaws. 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 Pt completed chemotherapy and radiation. Pt reports open areas to right mandible with bone exposed. Pt is scheduled for a mandibulectomy radical lymphadenectomy trach removal of implant deep extraction of teeth # 1, 47, 8,9, 10, 12, 13, 26, 14, 14, 23, 22, 21, 19 and Right fibula osteocutaneous flap with microvascular flap with anastomosis, split thickness skin graft, osteotomy fibula, vestibuloplasty anterior, local tissue rearrangement.

## 2023-11-15 NOTE — H&P PST ADULT - NSICDXPASTMEDICALHX_GEN_ALL_CORE_FT
PAST MEDICAL HISTORY:  H/O malignant neoplasm of gum     Hyperlipidemia     Hypertension     Malignant neoplasm of mandible     Mild asthma

## 2023-11-15 NOTE — H&P PST ADULT - PROBLEM SELECTOR PLAN 1
Patient tentatively scheduled for a mandibulectomy radical lymphadenectomy trach removal of implant deep extraction of teeth # 1, 47, 8,9, 10, 12, 13, 26, 14, 14, 23, 22, 21, 19 and Right fibula osteocutaneous flap with microvascular flap with anastomosis, split thickness skin graft, osteotomy fibula, vestibuloplasty anterior, local tissue rearrangement for 11/21/23.      CBC CMP T&S EKG done    Copy of echo 2022 and comparison ekg in chart.

## 2023-11-16 NOTE — ED PROVIDER NOTE - OBJECTIVE STATEMENT
Conjuntivae and eyelids appear normal, Sclerae : White without injection Pt is a 58 y/o F PMHx HTN, HLD, squamos cell carcinoma of gingiva with metastasis to right neck s/p segmental mandibulectomy p/w drainage from right submandibular region yesterday.  Pt report she developed spontaneous atraumatic purulent drainage at right submandibular region. She report she had an infection at that region months ago.  Pt denies any fevers, chills, nausea, vomiting, difficulty breathing, chest pain.

## 2023-11-20 NOTE — ASU PATIENT PROFILE, ADULT - NSCAFFEINEWITH_GEN_ALL_CORE_SD
Pt has future lab order in.   Will need to have done before 17.   If not, orders should be canceled and new orders placed with new established PCP.       Spoke with pt.  Informed her she has lab order in, will  17.  Pt unsure if she will be able to have lab done prior or not.    Lab order expiration date changed to 17.      denies

## 2023-11-20 NOTE — ASU PATIENT PROFILE, ADULT - REASON FOR ADMISSION, PROFILE
Mandibulectomy, raduical lymphadenectomy, trach removal of implant, deep extraction teeth # 1,4,7,8,9,10,12,13, 26, 14, 23,22, 21 19, right fibula osteocutaneous flap with microvascular flap with anastomosis, split thickness skin graft, osteotomy fibula, vestibuloplasty, anterior, local tissue rearrangement Mandibulectomy, radical lymphadenectomy, trach removal of implant, deep extraction teeth # 1,4,7,8,9,10,12,13, 26, 14, 23,22, 21 19, right fibula osteocutaneous flap with microvascular flap with anastomosis, split thickness skin graft, osteotomy fibula, vestibuloplasty, anterior, local tissue rearrangement

## 2023-11-21 ENCOUNTER — INPATIENT (INPATIENT)
Facility: HOSPITAL | Age: 58
LOS: 12 days | Discharge: HOME HEALTH | End: 2023-12-04
Attending: ORAL & MAXILLOFACIAL SURGERY | Admitting: ORAL & MAXILLOFACIAL SURGERY
Payer: COMMERCIAL

## 2023-11-21 ENCOUNTER — RESULT REVIEW (OUTPATIENT)
Age: 58
End: 2023-11-21

## 2023-11-21 ENCOUNTER — APPOINTMENT (OUTPATIENT)
Age: 58
End: 2023-11-21

## 2023-11-21 ENCOUNTER — APPOINTMENT (OUTPATIENT)
Dept: PLASTIC SURGERY | Facility: HOSPITAL | Age: 58
End: 2023-11-21
Payer: MEDICAID

## 2023-11-21 VITALS
OXYGEN SATURATION: 100 % | HEIGHT: 64 IN | SYSTOLIC BLOOD PRESSURE: 133 MMHG | TEMPERATURE: 98 F | WEIGHT: 119.93 LBS | DIASTOLIC BLOOD PRESSURE: 90 MMHG | RESPIRATION RATE: 18 BRPM | HEART RATE: 90 BPM

## 2023-11-21 DIAGNOSIS — Z96.649 PRESENCE OF UNSPECIFIED ARTIFICIAL HIP JOINT: Chronic | ICD-10-CM

## 2023-11-21 DIAGNOSIS — Z90.711 ACQUIRED ABSENCE OF UTERUS WITH REMAINING CERVICAL STUMP: Chronic | ICD-10-CM

## 2023-11-21 DIAGNOSIS — Z98.890 OTHER SPECIFIED POSTPROCEDURAL STATES: Chronic | ICD-10-CM

## 2023-11-21 DIAGNOSIS — C41.1 MALIGNANT NEOPLASM OF MANDIBLE: ICD-10-CM

## 2023-11-21 DIAGNOSIS — Z98.891 HISTORY OF UTERINE SCAR FROM PREVIOUS SURGERY: Chronic | ICD-10-CM

## 2023-11-21 LAB
A1C WITH ESTIMATED AVERAGE GLUCOSE RESULT: 5.3 % — SIGNIFICANT CHANGE UP (ref 4–5.6)
A1C WITH ESTIMATED AVERAGE GLUCOSE RESULT: 5.3 % — SIGNIFICANT CHANGE UP (ref 4–5.6)
ESTIMATED AVERAGE GLUCOSE: 105 — SIGNIFICANT CHANGE UP
ESTIMATED AVERAGE GLUCOSE: 105 — SIGNIFICANT CHANGE UP
GAS PNL BLDA: SIGNIFICANT CHANGE UP
HCT VFR BLD CALC: 22.1 % — LOW (ref 34.5–45)
HCT VFR BLD CALC: 22.1 % — LOW (ref 34.5–45)
HGB BLD-MCNC: 7.1 G/DL — LOW (ref 11.5–15.5)
HGB BLD-MCNC: 7.1 G/DL — LOW (ref 11.5–15.5)
MCHC RBC-ENTMCNC: 30 PG — SIGNIFICANT CHANGE UP (ref 27–34)
MCHC RBC-ENTMCNC: 30 PG — SIGNIFICANT CHANGE UP (ref 27–34)
MCHC RBC-ENTMCNC: 32.1 GM/DL — SIGNIFICANT CHANGE UP (ref 32–36)
MCHC RBC-ENTMCNC: 32.1 GM/DL — SIGNIFICANT CHANGE UP (ref 32–36)
MCV RBC AUTO: 93.2 FL — SIGNIFICANT CHANGE UP (ref 80–100)
MCV RBC AUTO: 93.2 FL — SIGNIFICANT CHANGE UP (ref 80–100)
NRBC # BLD: 0 /100 WBCS — SIGNIFICANT CHANGE UP (ref 0–0)
NRBC # BLD: 0 /100 WBCS — SIGNIFICANT CHANGE UP (ref 0–0)
NRBC # FLD: 0 K/UL — SIGNIFICANT CHANGE UP (ref 0–0)
NRBC # FLD: 0 K/UL — SIGNIFICANT CHANGE UP (ref 0–0)
PLATELET # BLD AUTO: 166 K/UL — SIGNIFICANT CHANGE UP (ref 150–400)
PLATELET # BLD AUTO: 166 K/UL — SIGNIFICANT CHANGE UP (ref 150–400)
RBC # BLD: 2.37 M/UL — LOW (ref 3.8–5.2)
RBC # BLD: 2.37 M/UL — LOW (ref 3.8–5.2)
RBC # FLD: 16.4 % — HIGH (ref 10.3–14.5)
RBC # FLD: 16.4 % — HIGH (ref 10.3–14.5)
TSH SERPL-MCNC: 1.76 UIU/ML — SIGNIFICANT CHANGE UP (ref 0.27–4.2)
TSH SERPL-MCNC: 1.76 UIU/ML — SIGNIFICANT CHANGE UP (ref 0.27–4.2)
WBC # BLD: 6.08 K/UL — SIGNIFICANT CHANGE UP (ref 3.8–10.5)
WBC # BLD: 6.08 K/UL — SIGNIFICANT CHANGE UP (ref 3.8–10.5)
WBC # FLD AUTO: 6.08 K/UL — SIGNIFICANT CHANGE UP (ref 3.8–10.5)
WBC # FLD AUTO: 6.08 K/UL — SIGNIFICANT CHANGE UP (ref 3.8–10.5)

## 2023-11-21 PROCEDURE — 71045 X-RAY EXAM CHEST 1 VIEW: CPT | Mod: 26

## 2023-11-21 PROCEDURE — 14301 TIS TRNFR ANY 30.1-60 SQ CM: CPT

## 2023-11-21 PROCEDURE — 27707 OSTEOTOMY FIBULA: CPT

## 2023-11-21 PROCEDURE — 20969 BONE/SKIN GRAFT MICROVASC: CPT

## 2023-11-21 PROCEDURE — 21047 EXCISE LWR JAW CYST W/REPAIR: CPT

## 2023-11-21 PROCEDURE — 88311 DECALCIFY TISSUE: CPT | Mod: 26

## 2023-11-21 PROCEDURE — 20680 REMOVAL OF IMPLANT DEEP: CPT

## 2023-11-21 PROCEDURE — 88307 TISSUE EXAM BY PATHOLOGIST: CPT | Mod: 26

## 2023-11-21 PROCEDURE — 88309 TISSUE EXAM BY PATHOLOGIST: CPT | Mod: 26

## 2023-11-21 PROCEDURE — 88305 TISSUE EXAM BY PATHOLOGIST: CPT | Mod: 26

## 2023-11-21 PROCEDURE — D7140: CPT | Mod: NC

## 2023-11-21 PROCEDURE — 15100 SPLT AGRFT T/A/L 1ST 100SQCM: CPT

## 2023-11-21 PROCEDURE — 38724 REMOVAL OF LYMPH NODES NECK: CPT

## 2023-11-21 PROCEDURE — 40840 RECONSTRUCTION OF MOUTH: CPT

## 2023-11-21 PROCEDURE — 31610 TRACHEOSTOMY FENEST SKIN FLP: CPT | Mod: 59

## 2023-11-21 DEVICE — GUIDE CUTTING RECON IPS X-SMALL: Type: IMPLANTABLE DEVICE | Site: RIGHT | Status: FUNCTIONAL

## 2023-11-21 DEVICE — SCREW LOKG 2X11MM: Type: IMPLANTABLE DEVICE | Site: RIGHT | Status: FUNCTIONAL

## 2023-11-21 DEVICE — GUIDE CUTTING RECON IPS X-LRG: Type: IMPLANTABLE DEVICE | Site: RIGHT | Status: FUNCTIONAL

## 2023-11-21 DEVICE — IMPLANTABLE DEVICE: Type: IMPLANTABLE DEVICE | Site: RIGHT | Status: FUNCTIONAL

## 2023-11-21 DEVICE — GUIDE RECONSTRUCT IPS CUTTING: Type: IMPLANTABLE DEVICE | Site: RIGHT | Status: FUNCTIONAL

## 2023-11-21 DEVICE — SCREW MAXDRIVE 1 LVL 2X7MM: Type: IMPLANTABLE DEVICE | Site: RIGHT | Status: FUNCTIONAL

## 2023-11-21 DEVICE — SURGIFOAM PAD 8CM X 12.5CM X 2MM (100C): Type: IMPLANTABLE DEVICE | Site: RIGHT | Status: FUNCTIONAL

## 2023-11-21 DEVICE — CARTRIDGE MICROCLIP 30: Type: IMPLANTABLE DEVICE | Site: RIGHT | Status: FUNCTIONAL

## 2023-11-21 DEVICE — SCREW EMERG CROSS DRIVE TI 2X9: Type: IMPLANTABLE DEVICE | Site: RIGHT | Status: FUNCTIONAL

## 2023-11-21 DEVICE — DOPPLER PROBE DISPOSABLE: Type: IMPLANTABLE DEVICE | Site: RIGHT | Status: FUNCTIONAL

## 2023-11-21 DEVICE — SCREW CR DRV 2.0X11MM: Type: IMPLANTABLE DEVICE | Site: RIGHT | Status: FUNCTIONAL

## 2023-11-21 DEVICE — LIGATING CLIPS WECK HORIZON MEDIUM (BLUE) 24: Type: IMPLANTABLE DEVICE | Site: RIGHT | Status: FUNCTIONAL

## 2023-11-21 DEVICE — TUBE TRACH SZ 6 CUFF FLEX REUSE: Type: IMPLANTABLE DEVICE | Site: RIGHT | Status: FUNCTIONAL

## 2023-11-21 DEVICE — SCREW LOKG 2X9MM: Type: IMPLANTABLE DEVICE | Site: RIGHT | Status: FUNCTIONAL

## 2023-11-21 DEVICE — COUPLER VESSEL ANASTOMOTIC 3MM: Type: IMPLANTABLE DEVICE | Site: RIGHT | Status: FUNCTIONAL

## 2023-11-21 DEVICE — LIGATING CLIPS WECK HORIZON SMALL-WIDE (RED) 24: Type: IMPLANTABLE DEVICE | Site: RIGHT | Status: FUNCTIONAL

## 2023-11-21 RX ORDER — FENTANYL CITRATE 50 UG/ML
25 INJECTION INTRAVENOUS
Refills: 0 | Status: DISCONTINUED | OUTPATIENT
Start: 2023-11-21 | End: 2023-11-21

## 2023-11-21 RX ORDER — HYDROMORPHONE HYDROCHLORIDE 2 MG/ML
0.5 INJECTION INTRAMUSCULAR; INTRAVENOUS; SUBCUTANEOUS EVERY 4 HOURS
Refills: 0 | Status: DISCONTINUED | OUTPATIENT
Start: 2023-11-21 | End: 2023-11-22

## 2023-11-21 RX ORDER — FENTANYL CITRATE 50 UG/ML
50 INJECTION INTRAVENOUS
Refills: 0 | Status: DISCONTINUED | OUTPATIENT
Start: 2023-11-21 | End: 2023-11-21

## 2023-11-21 RX ORDER — GABAPENTIN 400 MG/1
600 CAPSULE ORAL ONCE
Refills: 0 | Status: COMPLETED | OUTPATIENT
Start: 2023-11-21 | End: 2023-11-21

## 2023-11-21 RX ORDER — ACETAMINOPHEN 500 MG
1000 TABLET ORAL EVERY 6 HOURS
Refills: 0 | Status: COMPLETED | OUTPATIENT
Start: 2023-11-21 | End: 2023-11-22

## 2023-11-21 RX ORDER — PANTOPRAZOLE SODIUM 20 MG/1
40 TABLET, DELAYED RELEASE ORAL DAILY
Refills: 0 | Status: DISCONTINUED | OUTPATIENT
Start: 2023-11-21 | End: 2023-11-22

## 2023-11-21 RX ORDER — SODIUM CHLORIDE 9 MG/ML
1000 INJECTION, SOLUTION INTRAVENOUS
Refills: 0 | Status: DISCONTINUED | OUTPATIENT
Start: 2023-11-21 | End: 2023-11-22

## 2023-11-21 RX ORDER — AMPICILLIN SODIUM AND SULBACTAM SODIUM 250; 125 MG/ML; MG/ML
3 INJECTION, POWDER, FOR SUSPENSION INTRAMUSCULAR; INTRAVENOUS EVERY 6 HOURS
Refills: 0 | Status: COMPLETED | OUTPATIENT
Start: 2023-11-22 | End: 2023-11-22

## 2023-11-21 RX ORDER — DEXAMETHASONE 0.5 MG/5ML
8 ELIXIR ORAL EVERY 8 HOURS
Refills: 0 | Status: COMPLETED | OUTPATIENT
Start: 2023-11-21 | End: 2023-11-22

## 2023-11-21 RX ORDER — CHLORHEXIDINE GLUCONATE 213 G/1000ML
15 SOLUTION TOPICAL
Refills: 0 | Status: DISCONTINUED | OUTPATIENT
Start: 2023-11-21 | End: 2023-12-04

## 2023-11-21 RX ORDER — DEXAMETHASONE 0.5 MG/5ML
8 ELIXIR ORAL EVERY 8 HOURS
Refills: 0 | Status: DISCONTINUED | OUTPATIENT
Start: 2023-11-21 | End: 2023-11-21

## 2023-11-21 RX ORDER — SODIUM CHLORIDE 9 MG/ML
500 INJECTION, SOLUTION INTRAVENOUS ONCE
Refills: 0 | Status: COMPLETED | OUTPATIENT
Start: 2023-11-21 | End: 2023-11-21

## 2023-11-21 RX ORDER — ALBUTEROL 90 UG/1
2 AEROSOL, METERED ORAL
Qty: 0 | Refills: 0 | DISCHARGE

## 2023-11-21 RX ORDER — PHENYLEPHRINE HYDROCHLORIDE 10 MG/ML
0.5 INJECTION INTRAVENOUS
Qty: 40 | Refills: 0 | Status: DISCONTINUED | OUTPATIENT
Start: 2023-11-21 | End: 2023-11-22

## 2023-11-21 RX ORDER — HYDROMORPHONE HYDROCHLORIDE 2 MG/ML
1 INJECTION INTRAMUSCULAR; INTRAVENOUS; SUBCUTANEOUS EVERY 4 HOURS
Refills: 0 | Status: DISCONTINUED | OUTPATIENT
Start: 2023-11-21 | End: 2023-11-22

## 2023-11-21 RX ORDER — SODIUM CHLORIDE 9 MG/ML
250 INJECTION, SOLUTION INTRAVENOUS ONCE
Refills: 0 | Status: DISCONTINUED | OUTPATIENT
Start: 2023-11-21 | End: 2023-11-21

## 2023-11-21 RX ORDER — ALBUMIN HUMAN 25 %
250 VIAL (ML) INTRAVENOUS ONCE
Refills: 0 | Status: COMPLETED | OUTPATIENT
Start: 2023-11-21 | End: 2023-11-21

## 2023-11-21 RX ORDER — ENOXAPARIN SODIUM 100 MG/ML
40 INJECTION SUBCUTANEOUS EVERY 24 HOURS
Refills: 0 | Status: DISCONTINUED | OUTPATIENT
Start: 2023-11-21 | End: 2023-12-04

## 2023-11-21 RX ORDER — ONDANSETRON 8 MG/1
4 TABLET, FILM COATED ORAL EVERY 4 HOURS
Refills: 0 | Status: DISCONTINUED | OUTPATIENT
Start: 2023-11-21 | End: 2023-11-29

## 2023-11-21 RX ORDER — DULOXETINE HYDROCHLORIDE 30 MG/1
1 CAPSULE, DELAYED RELEASE ORAL
Refills: 0 | DISCHARGE

## 2023-11-21 RX ORDER — AMPICILLIN SODIUM AND SULBACTAM SODIUM 250; 125 MG/ML; MG/ML
INJECTION, POWDER, FOR SUSPENSION INTRAMUSCULAR; INTRAVENOUS
Refills: 0 | Status: COMPLETED | OUTPATIENT
Start: 2023-11-21 | End: 2023-11-22

## 2023-11-21 RX ORDER — CHLORHEXIDINE GLUCONATE 213 G/1000ML
15 SOLUTION TOPICAL ONCE
Refills: 0 | Status: COMPLETED | OUTPATIENT
Start: 2023-11-21 | End: 2023-11-21

## 2023-11-21 RX ORDER — AMPICILLIN SODIUM AND SULBACTAM SODIUM 250; 125 MG/ML; MG/ML
3 INJECTION, POWDER, FOR SUSPENSION INTRAMUSCULAR; INTRAVENOUS ONCE
Refills: 0 | Status: COMPLETED | OUTPATIENT
Start: 2023-11-21 | End: 2023-11-21

## 2023-11-21 RX ORDER — ONDANSETRON 8 MG/1
4 TABLET, FILM COATED ORAL EVERY 4 HOURS
Refills: 0 | Status: DISCONTINUED | OUTPATIENT
Start: 2023-11-21 | End: 2023-11-21

## 2023-11-21 RX ORDER — ACETAMINOPHEN 500 MG
975 TABLET ORAL ONCE
Refills: 0 | Status: COMPLETED | OUTPATIENT
Start: 2023-11-21 | End: 2023-11-21

## 2023-11-21 RX ORDER — APREPITANT 80 MG/1
40 CAPSULE ORAL ONCE
Refills: 0 | Status: COMPLETED | OUTPATIENT
Start: 2023-11-21 | End: 2023-11-21

## 2023-11-21 RX ADMIN — FENTANYL CITRATE 25 MICROGRAM(S): 50 INJECTION INTRAVENOUS at 18:15

## 2023-11-21 RX ADMIN — FENTANYL CITRATE 25 MICROGRAM(S): 50 INJECTION INTRAVENOUS at 18:00

## 2023-11-21 RX ADMIN — Medication 101.6 MILLIGRAM(S): at 22:16

## 2023-11-21 RX ADMIN — Medication 975 MILLIGRAM(S): at 06:34

## 2023-11-21 RX ADMIN — GABAPENTIN 600 MILLIGRAM(S): 400 CAPSULE ORAL at 06:34

## 2023-11-21 RX ADMIN — SODIUM CHLORIDE 30 MILLILITER(S): 9 INJECTION, SOLUTION INTRAVENOUS at 06:34

## 2023-11-21 RX ADMIN — HYDROMORPHONE HYDROCHLORIDE 0.5 MILLIGRAM(S): 2 INJECTION INTRAMUSCULAR; INTRAVENOUS; SUBCUTANEOUS at 19:30

## 2023-11-21 RX ADMIN — SODIUM CHLORIDE 125 MILLILITER(S): 9 INJECTION, SOLUTION INTRAVENOUS at 17:30

## 2023-11-21 RX ADMIN — CHLORHEXIDINE GLUCONATE 15 MILLILITER(S): 213 SOLUTION TOPICAL at 06:35

## 2023-11-21 RX ADMIN — PANTOPRAZOLE SODIUM 40 MILLIGRAM(S): 20 TABLET, DELAYED RELEASE ORAL at 21:36

## 2023-11-21 RX ADMIN — HYDROMORPHONE HYDROCHLORIDE 0.5 MILLIGRAM(S): 2 INJECTION INTRAMUSCULAR; INTRAVENOUS; SUBCUTANEOUS at 20:15

## 2023-11-21 RX ADMIN — AMPICILLIN SODIUM AND SULBACTAM SODIUM 200 GRAM(S): 250; 125 INJECTION, POWDER, FOR SUSPENSION INTRAMUSCULAR; INTRAVENOUS at 17:35

## 2023-11-21 RX ADMIN — ENOXAPARIN SODIUM 40 MILLIGRAM(S): 100 INJECTION SUBCUTANEOUS at 21:36

## 2023-11-21 RX ADMIN — HYDROMORPHONE HYDROCHLORIDE 1 MILLIGRAM(S): 2 INJECTION INTRAMUSCULAR; INTRAVENOUS; SUBCUTANEOUS at 23:47

## 2023-11-21 RX ADMIN — Medication 125 MILLILITER(S): at 16:20

## 2023-11-21 RX ADMIN — FENTANYL CITRATE 25 MICROGRAM(S): 50 INJECTION INTRAVENOUS at 17:00

## 2023-11-21 RX ADMIN — APREPITANT 40 MILLIGRAM(S): 80 CAPSULE ORAL at 06:35

## 2023-11-21 RX ADMIN — HYDROMORPHONE HYDROCHLORIDE 1 MILLIGRAM(S): 2 INJECTION INTRAMUSCULAR; INTRAVENOUS; SUBCUTANEOUS at 23:17

## 2023-11-21 RX ADMIN — AMPICILLIN SODIUM AND SULBACTAM SODIUM 200 GRAM(S): 250; 125 INJECTION, POWDER, FOR SUSPENSION INTRAMUSCULAR; INTRAVENOUS at 23:17

## 2023-11-21 RX ADMIN — PHENYLEPHRINE HYDROCHLORIDE 10.2 MICROGRAM(S)/KG/MIN: 10 INJECTION INTRAVENOUS at 16:10

## 2023-11-21 RX ADMIN — SODIUM CHLORIDE 100 MILLILITER(S): 9 INJECTION, SOLUTION INTRAVENOUS at 21:35

## 2023-11-21 RX ADMIN — SODIUM CHLORIDE 1000 MILLILITER(S): 9 INJECTION, SOLUTION INTRAVENOUS at 16:45

## 2023-11-21 RX ADMIN — Medication 400 MILLIGRAM(S): at 21:36

## 2023-11-21 RX ADMIN — FENTANYL CITRATE 25 MICROGRAM(S): 50 INJECTION INTRAVENOUS at 16:37

## 2023-11-21 RX ADMIN — SODIUM CHLORIDE 30 MILLILITER(S): 9 INJECTION, SOLUTION INTRAVENOUS at 16:00

## 2023-11-21 NOTE — BRIEF OPERATIVE NOTE - OPERATION/FINDINGS
Reconstruction of mandible defect with right free fibula free flap and STSG.
R. neck fistulectomy, L. neck cervical incision w/ supraomohyoid neck dissection, facial artery/vein isolated for microvascular anastomosis, crestal incision over anterior mandible, buccal subperiosteal dissection, bilateral mandibular osteotomies w/ resection guide, inset of fibula free flap w/ custom mandibular reconstruction plate, fixated to native mandible w/ 9-mm screws x4 on L and x3 on R, extraction of remaining maxillary dentition

## 2023-11-21 NOTE — BRIEF OPERATIVE NOTE - NSICDXBRIEFPREOP_GEN_ALL_CORE_FT
PRE-OP DIAGNOSIS:  Osteoradionecrosis 21-Nov-2023 17:18:59  Jeremías Cash  
PRE-OP DIAGNOSIS:  Osteoradionecrosis 21-Nov-2023 17:18:59  Jeremías Cash

## 2023-11-21 NOTE — CONSULT NOTE ADULT - ATTENDING COMMENTS
I agree with the detailed interval history, physical, and plan, which I have reviewed and edited where appropriate'; also agree with notes/assessment with my team on service.  I have personally examined the patient.  I was physically present for the key portions of the evaluation and management (E/M) service provided.  I reviewed all the pertinent data.  The patient is a critical care patient with life threatening hemodynamic and metabolic instability in SICU.  The SICU team has a constant risk benefit analyzes discussion and coordinating care with the primary team and all consultants.   The patient is in SICU with the chief complaint and diagnosis mentioned in the note.   The plan will be specified in the note.  59 y/o female s/p mandibulectomy, radical lymphadenectomy, trach, and right fibula osteocutaneous flap with microvascular flap with anastomosis, split thickness skin graft to RLE, admitted to SICU for flap monitoring.   Exam:   General: NAD  HEENT: Trach, free flap, strong doppler signal.   Pulm: clear  CV: RR  Abdomen: nontender    Neuro:  -Tylenol, dilaudid PRN  -Flap checks Q1 hour  Resp:  -Tracheostomy  CV:  -MAP>65  GI/Nutrition:  -Diet: NPO  -Protonix IV daily  Renal:  -Dobbs  Heme:  -lovenox  ID:  -Unasyn  Endo:  -Monitor blood glucose    Dispo: SICU I agree with the detailed interval history, physical, and plan, which I have reviewed and edited where appropriate'; also agree with notes/assessment with my team on service.  I have personally examined the patient.  I was physically present for the key portions of the evaluation and management (E/M) service provided.  I reviewed all the pertinent data.  The patient is a critical care patient with life threatening hemodynamic and metabolic instability in SICU.  The SICU team has a constant risk benefit analyzes discussion and coordinating care with the primary team and all consultants.   The patient is in SICU with the chief complaint and diagnosis mentioned in the note.   The plan will be specified in the note.  57 y/o female s/p mandibulectomy, radical lymphadenectomy, trach, and right fibula osteocutaneous flap with microvascular flap with anastomosis, split thickness skin graft to RLE, admitted to SICU for flap monitoring.   Exam:   General: NAD  HEENT: Trach, free flap, strong doppler signal.   Pulm: clear  CV: RR  Abdomen: nontender    Neuro:  -Tylenol, dilaudid PRN  -Flap checks Q1 hour  Resp:  -Tracheostomy  CV:  -MAP>65  GI/Nutrition:  -Diet: NPO  -Protonix IV daily  Renal:  -Dobbs  Heme:  -lovenox  ID:  -Unasyn  Endo:  -Monitor blood glucose    Dispo: SICU

## 2023-11-21 NOTE — CHART NOTE - NSCHARTNOTEFT_GEN_A_CORE
POST-OP NOTE    PARUL HERNANDEZ | [id number] | [facility]    Procedure: R. neck fistulectomy, L. neck cervical incision w/ supraomohyoid neck dissection, facial artery/vein isolated for microvascular anastomosis, crestal incision over anterior mandible, buccal subperiosteal dissection, bilateral mandibular osteotomies w/ resection guide, inset of fibula free flap w/ custom mandibular reconstruction plate, fixated to native mandible w/ 9-mm screws x4 on L and x3 on R, extraction of remaining maxillary dentition    Vital Signs Last 24 Hrs  T(C): 36.5 (21 Nov 2023 19:00), Max: 36.5 (21 Nov 2023 05:37)  T(F): 97.7 (21 Nov 2023 19:00), Max: 97.7 (21 Nov 2023 05:37)  HR: 81 (21 Nov 2023 20:00) (72 - 88)  BP: 106/59 (21 Nov 2023 20:00) (79/48 - 133/90)  BP(mean): 70 (21 Nov 2023 20:00) (56 - 76)  RR: 13 (21 Nov 2023 20:00) (12 - 19)  SpO2: 100% (21 Nov 2023 20:00) (97% - 100%)    Parameters below as of 21 Nov 2023 15:50  Patient On (Oxygen Delivery Method): tracheostomy collar  O2 Flow (L/min): 4  O2 Concentration (%): 98  I&O's Summary    21 Nov 2023 07:01  -  21 Nov 2023 20:47  --------------------------------------------------------  IN: 1065.2 mL / OUT: 525 mL / NET: 540.2 mL                            7.1    6.08  )-----------( 166      ( 21 Nov 2023 17:14 )             22.1         PHYSICAL EXAM:  Gen: NAD  Resp: breathing easily, no stridor  CV: RRR  Abdomen: soft, nontender, nondistended  Skin: Incision c/d/i. Normal color, no rashes or lesions    Cook signal strong. Surgical sites hemostatic. Pain well controlled. JESSEE drain L. neck, R. leg. A-line L. arm. Dobbs. Pt w/ no acute complaints. ERAS protocol. Please page OMFS w/ any questions or concerns.      Dany Porter  Oral and Maxillofacial Surgery  [facility] OMFS Pager [phone]

## 2023-11-21 NOTE — CONSULT NOTE ADULT - SUBJECTIVE AND OBJECTIVE BOX
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 Pt s/p chemotherapy and radiation. Pt now s/p mandibulectomy, radical lymphadenectomy, trach, and right fibula osteocutaneous flap with microvascular flap with anastomosis, split thickness skin graft to RLE. Pt admitted to SICU for flap monitoring.      (15 Nov 2023 13:29)    ----------  CODE STATUS:    ----------  ALLERGIES:  morphine (Hives)    ----------  PAST MEDICAL & SURGICAL HISTORY:  H/O malignant neoplasm of gum      Hypertension      Hyperlipidemia      Mild asthma      Malignant neoplasm of mandible      H/O  section      History of hip replacement  left hip      History of partial hysterectomy      H/O neck surgery  partial right mandibulectomy with fibular free flap reconstriction and right neck lymphnode dissection 22      History of vascular access device        ----------  SOCIAL HISTORY:    ----------  FAMILY HISTORY:  Family history of CVA (Mother)    FH: thyroid disease (Mother)      ----------  HOME MEDICATIONS:  albuterol 90 mcg/inh inhalation powder (2023 06:09)  DULoxetine 60 mg oral delayed release capsule (2023 06:09)    ----------    ----------  VITAL SIGNS:  T(C): 36.5 (23 @ 05:37), Max: 36.5 (23 @ 05:37)  HR: 83 (23 @ 05:37) (83 - 83)  BP: 133/90 (23 @ 05:37) (133/90 - 133/90)  ABP: --  ABP(mean): --  RR: 18 (23 @ 05:37) (18 - 18)  SpO2: 100% (23 @ 05:37) (100% - 100%)  CVP(mm Hg): --  ----------  MEDICATIONS  (STANDING):  lactated ringers Bolus 250 milliLiter(s) IV Bolus once  lactated ringers. 1000 milliLiter(s) (30 mL/Hr) IV Continuous <Continuous>  phenylephrine    Infusion 0.5 MICROgram(s)/kG/Min (10.2 mL/Hr) IV Continuous <Continuous>    MEDICATIONS  (PRN):    ----------  Exam:   General: Resting comfortably in bed  HEENT: Trach, free flap to right mandible w/ external skin paddle, warm and well perfused, strong doppler signal.   Pulm: Equal chest rise and fall; trach  CV: RRR  Abdomen: nontender, nondistended  Neuro: moving all extremities spontaneously  Extremities: RLE wrapped with ace bandage, wound vac in place, skin graft donor site with overlying foam thrombin and tegederm.         ----------  RESPIRATORY  Exam: Respirations grossly unlabored, b/l chest rise. No wheezes / rhonchi / strior / crackles.     ABG - ( 2023 11:51 )  pH, Arterial: 7.34  pH, Blood: x     /  pCO2: 47    /  pO2: 81    / HCO3: 25    / Base Excess: -0.5  /  SaO2: 97.8                  ----------  CARDIOVASCULAR    Exam: No murmurs / rubs / gallops. No CP or SOB.    Cardiac Rhythm: NSR on telemetry in sicu          phenylephrine    Infusion 0.5 MICROgram(s)/kG/Min IV Continuous <Continuous>    ----------  GI/NUTRITION    Exam: SNTND  Diet: npo      ----------  GENITOURINARY/RENAl  Exam: Dobbs catheter in place    lactated ringers Bolus 250 milliLiter(s) IV Bolus once  lactated ringers. 1000 milliLiter(s) IV Continuous <Continuous>      Weight (kg): 54.4 (- @ 06:06)          [x ] Dobbs catheter, indication: urine output monitoring in critically ill patient  ----------  HEMATOLOGIC  Transfusion: [ ] No transfusion in past 24h.  [ ] PRBC	[ ] Platelets	[ ] FFP	[ ] Cryoprecipitate  VTE Prophylaxis:   VTE Prophylaxis: No VTE ppx given pt is therapeutically anticoagulated / has had evidence of recent hemorrhage.      ----------  INFECTIOUS DISEASES        ----------  ENDOCRINE    CAPILLARY BLOOD GLUCOSE      ----------  TUBES AND LINES:  [x] Peripheral IV  [ ] Central Venous Line	[ ] R	[ ] L	[ ] IJ	        [ ] Fem	[ ] SC	Placed:   [X ] Arterial Line		        [ ] R	[ ] L	[ ] Fem	[ ] Rad	[ ] Ax	Placed:   [ ] PICC:					[ ] Mediport  [X ] Urinary Catheter                                                                             Placed:   [ ] Chest Tube                       [ ] R [ ] L                   Output: __cc/24h    [x] Necessity of urinary, arterial, and venous catheters discussed with SICU team on rounds.    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 Pt s/p chemotherapy and radiation. Pt now s/p mandibulectomy, radical lymphadenectomy, trach, and right fibula osteocutaneous flap with microvascular flap with anastomosis, split thickness skin graft to RLE. Pt admitted to SICU for flap monitoring.      (15 Nov 2023 13:29)    ----------  CODE STATUS:    ----------  ALLERGIES:  morphine (Hives)    ----------  PAST MEDICAL & SURGICAL HISTORY:  H/O malignant neoplasm of gum      Hypertension      Hyperlipidemia      Mild asthma      Malignant neoplasm of mandible      H/O  section      History of hip replacement  left hip      History of partial hysterectomy      H/O neck surgery  partial right mandibulectomy with fibular free flap reconstriction and right neck lymphnode dissection 22      History of vascular access device        ----------  SOCIAL HISTORY:    ----------  FAMILY HISTORY:  Family history of CVA (Mother)    FH: thyroid disease (Mother)      ----------  HOME MEDICATIONS:  albuterol 90 mcg/inh inhalation powder (2023 06:09)  DULoxetine 60 mg oral delayed release capsule (2023 06:09)    ----------    ----------  VITAL SIGNS:  T(C): 36.5 (23 @ 05:37), Max: 36.5 (23 @ 05:37)  HR: 83 (23 @ 05:37) (83 - 83)  BP: 133/90 (23 @ 05:37) (133/90 - 133/90)  ABP: --  ABP(mean): --  RR: 18 (23 @ 05:37) (18 - 18)  SpO2: 100% (23 @ 05:37) (100% - 100%)  CVP(mm Hg): --  ----------  MEDICATIONS  (STANDING):  lactated ringers Bolus 250 milliLiter(s) IV Bolus once  lactated ringers. 1000 milliLiter(s) (30 mL/Hr) IV Continuous <Continuous>  phenylephrine    Infusion 0.5 MICROgram(s)/kG/Min (10.2 mL/Hr) IV Continuous <Continuous>    MEDICATIONS  (PRN):    ----------  Exam:   General: Resting comfortably in bed  HEENT: Trach, free flap to right mandible w/ external skin paddle, warm and well perfused, strong doppler signal.   Pulm: Equal chest rise and fall; trach  CV: RRR  Abdomen: nontender, nondistended  Neuro: moving all extremities spontaneously  Extremities: RLE wrapped with ace bandage, wound vac in place, skin graft donor site with overlying foam thrombin and tegederm.         ----------  RESPIRATORY  Exam: Respirations grossly unlabored, b/l chest rise. No wheezes / rhonchi / strior / crackles.     ABG - ( 2023 11:51 )  pH, Arterial: 7.34  pH, Blood: x     /  pCO2: 47    /  pO2: 81    / HCO3: 25    / Base Excess: -0.5  /  SaO2: 97.8                  ----------  CARDIOVASCULAR    Exam: No murmurs / rubs / gallops. No CP or SOB.    Cardiac Rhythm: NSR on telemetry in sicu          phenylephrine    Infusion 0.5 MICROgram(s)/kG/Min IV Continuous <Continuous>    ----------  GI/NUTRITION    Exam: SNTND  Diet: npo      ----------  GENITOURINARY/RENAl  Exam: Dobbs catheter in place    lactated ringers Bolus 250 milliLiter(s) IV Bolus once  lactated ringers. 1000 milliLiter(s) IV Continuous <Continuous>      Weight (kg): 54.4 (- @ 06:06)          [x ] Dobbs catheter, indication: urine output monitoring in critically ill patient  ----------  HEMATOLOGIC  Transfusion: [ ] No transfusion in past 24h.  [ ] PRBC	[ ] Platelets	[ ] FFP	[ ] Cryoprecipitate  VTE Prophylaxis:   VTE Prophylaxis: No VTE ppx given pt is therapeutically anticoagulated / has had evidence of recent hemorrhage.      ----------  INFECTIOUS DISEASES        ----------  ENDOCRINE    CAPILLARY BLOOD GLUCOSE      ----------  TUBES AND LINES:  [x] Peripheral IV  [ ] Central Venous Line	[ ] R	[ ] L	[ ] IJ	        [ ] Fem	[ ] SC	Placed:   [X ] Arterial Line		        [ ] R	[ ] L	[ ] Fem	[ ] Rad	[ ] Ax	Placed:   [ ] PICC:					[ ] Mediport  [X ] Urinary Catheter                                                                             Placed:   [ ] Chest Tube                       [ ] R [ ] L                   Output: __cc/24h    [x] Necessity of urinary, arterial, and venous catheters discussed with SICU team on rounds.    ----------  OTHER MEDICATIONS:     ----------  IMAGING STUDIES:

## 2023-11-21 NOTE — BRIEF OPERATIVE NOTE - NSICDXBRIEFPROCEDURE_GEN_ALL_CORE_FT
PROCEDURES:  Fistulectomy, orocutaneous 21-Nov-2023 17:20:01  Jeremías Cash  Mandibulectomy, with neck dissection, tracheostomy, and reconstruction using flap 21-Nov-2023 17:20:44  Jeremías Cash  
PROCEDURES:  Free flap, fibula 21-Nov-2023 18:03:55  Deepthi Rowland

## 2023-11-21 NOTE — BRIEF OPERATIVE NOTE - NSICDXBRIEFPOSTOP_GEN_ALL_CORE_FT
POST-OP DIAGNOSIS:  Osteoradionecrosis 21-Nov-2023 17:19:11  Jeremías Cash  
POST-OP DIAGNOSIS:  Osteoradionecrosis 21-Nov-2023 17:19:11  Jeremías Cash

## 2023-11-21 NOTE — CONSULT NOTE ADULT - ASSESSMENT
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 Pt s/p chemotherapy and radiation. Pt now s/p mandibulectomy, radical lymphadenectomy, trach, and right fibula osteocutaneous flap with microvascular flap with anastomosis, split thickness skin graft to RLE. Pt admitted to SICU for flap monitoring.       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 Pt s/p chemotherapy and radiation. Pt now s/p mandibulectomy, radical lymphadenectomy, trach, and right fibula osteocutaneous flap with microvascular flap with anastomosis, split thickness skin graft to RLE. Pt admitted to SICU for flap monitoring.       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 Pt s/p chemotherapy and radiation. Pt now s/p mandibulectomy, radical lymphadenectomy, trach, and right fibula osteocutaneous flap with microvascular flap with anastomosis, split thickness skin graft to RLE. Pt admitted to SICU for flap monitoring.       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 Pt s/p chemotherapy and radiation. Pt now s/p mandibulectomy, radical lymphadenectomy, trach, and right fibula osteocutaneous flap with microvascular flap with anastomosis, split thickness skin graft to RLE. Pt admitted to SICU for flap monitoring.       Neuro:  -Pain: Tylenol, dilaudid PRN  -Flap checks Q1 hour    Resp:  -Tracheostomy  -Maintain O2 saturation >92%    CV:  -MAP>65    GI/Nutrition:  -Diet: NPO  -NG tube in place  -Zofran Q8 PRN  -Protonix IV daily    Renal:  -I/Os  -Dobbs    Heme:  -DVT ppx--lovenox  -1u pRBC in pacu per [facility]    ID:  -Unasynx1 day    Endo:  -Monitor blood glucose    Musculoskeletal:   -CAM boot for ambulation  -Wound vac to suction RLE    Dispo: SICU

## 2023-11-22 LAB
ALBUMIN SERPL ELPH-MCNC: 3.4 G/DL — SIGNIFICANT CHANGE UP (ref 3.3–5)
ALBUMIN SERPL ELPH-MCNC: 3.4 G/DL — SIGNIFICANT CHANGE UP (ref 3.3–5)
ALP SERPL-CCNC: 56 U/L — SIGNIFICANT CHANGE UP (ref 40–120)
ALP SERPL-CCNC: 56 U/L — SIGNIFICANT CHANGE UP (ref 40–120)
ALT FLD-CCNC: 6 U/L — SIGNIFICANT CHANGE UP (ref 4–33)
ALT FLD-CCNC: 6 U/L — SIGNIFICANT CHANGE UP (ref 4–33)
ANION GAP SERPL CALC-SCNC: 10 MMOL/L — SIGNIFICANT CHANGE UP (ref 7–14)
ANION GAP SERPL CALC-SCNC: 10 MMOL/L — SIGNIFICANT CHANGE UP (ref 7–14)
AST SERPL-CCNC: 11 U/L — SIGNIFICANT CHANGE UP (ref 4–32)
AST SERPL-CCNC: 11 U/L — SIGNIFICANT CHANGE UP (ref 4–32)
BILIRUB SERPL-MCNC: 0.2 MG/DL — SIGNIFICANT CHANGE UP (ref 0.2–1.2)
BILIRUB SERPL-MCNC: 0.2 MG/DL — SIGNIFICANT CHANGE UP (ref 0.2–1.2)
BUN SERPL-MCNC: 11 MG/DL — SIGNIFICANT CHANGE UP (ref 7–23)
BUN SERPL-MCNC: 11 MG/DL — SIGNIFICANT CHANGE UP (ref 7–23)
CALCIUM SERPL-MCNC: 8 MG/DL — LOW (ref 8.4–10.5)
CALCIUM SERPL-MCNC: 8 MG/DL — LOW (ref 8.4–10.5)
CHLORIDE SERPL-SCNC: 103 MMOL/L — SIGNIFICANT CHANGE UP (ref 98–107)
CHLORIDE SERPL-SCNC: 103 MMOL/L — SIGNIFICANT CHANGE UP (ref 98–107)
CO2 SERPL-SCNC: 25 MMOL/L — SIGNIFICANT CHANGE UP (ref 22–31)
CO2 SERPL-SCNC: 25 MMOL/L — SIGNIFICANT CHANGE UP (ref 22–31)
CREAT SERPL-MCNC: 0.92 MG/DL — SIGNIFICANT CHANGE UP (ref 0.5–1.3)
CREAT SERPL-MCNC: 0.92 MG/DL — SIGNIFICANT CHANGE UP (ref 0.5–1.3)
EGFR: 72 ML/MIN/1.73M2 — SIGNIFICANT CHANGE UP
EGFR: 72 ML/MIN/1.73M2 — SIGNIFICANT CHANGE UP
GLUCOSE SERPL-MCNC: 137 MG/DL — HIGH (ref 70–99)
GLUCOSE SERPL-MCNC: 137 MG/DL — HIGH (ref 70–99)
HCT VFR BLD CALC: 27.2 % — LOW (ref 34.5–45)
HCT VFR BLD CALC: 27.2 % — LOW (ref 34.5–45)
HGB BLD-MCNC: 9.2 G/DL — LOW (ref 11.5–15.5)
HGB BLD-MCNC: 9.2 G/DL — LOW (ref 11.5–15.5)
MAGNESIUM SERPL-MCNC: 1.6 MG/DL — SIGNIFICANT CHANGE UP (ref 1.6–2.6)
MAGNESIUM SERPL-MCNC: 1.6 MG/DL — SIGNIFICANT CHANGE UP (ref 1.6–2.6)
MCHC RBC-ENTMCNC: 30.9 PG — SIGNIFICANT CHANGE UP (ref 27–34)
MCHC RBC-ENTMCNC: 30.9 PG — SIGNIFICANT CHANGE UP (ref 27–34)
MCHC RBC-ENTMCNC: 33.8 GM/DL — SIGNIFICANT CHANGE UP (ref 32–36)
MCHC RBC-ENTMCNC: 33.8 GM/DL — SIGNIFICANT CHANGE UP (ref 32–36)
MCV RBC AUTO: 91.3 FL — SIGNIFICANT CHANGE UP (ref 80–100)
MCV RBC AUTO: 91.3 FL — SIGNIFICANT CHANGE UP (ref 80–100)
NRBC # BLD: 0 /100 WBCS — SIGNIFICANT CHANGE UP (ref 0–0)
NRBC # BLD: 0 /100 WBCS — SIGNIFICANT CHANGE UP (ref 0–0)
NRBC # FLD: 0 K/UL — SIGNIFICANT CHANGE UP (ref 0–0)
NRBC # FLD: 0 K/UL — SIGNIFICANT CHANGE UP (ref 0–0)
PHOSPHATE SERPL-MCNC: 3.8 MG/DL — SIGNIFICANT CHANGE UP (ref 2.5–4.5)
PHOSPHATE SERPL-MCNC: 3.8 MG/DL — SIGNIFICANT CHANGE UP (ref 2.5–4.5)
PLATELET # BLD AUTO: 200 K/UL — SIGNIFICANT CHANGE UP (ref 150–400)
PLATELET # BLD AUTO: 200 K/UL — SIGNIFICANT CHANGE UP (ref 150–400)
POTASSIUM SERPL-MCNC: 4.1 MMOL/L — SIGNIFICANT CHANGE UP (ref 3.5–5.3)
POTASSIUM SERPL-MCNC: 4.1 MMOL/L — SIGNIFICANT CHANGE UP (ref 3.5–5.3)
POTASSIUM SERPL-SCNC: 4.1 MMOL/L — SIGNIFICANT CHANGE UP (ref 3.5–5.3)
POTASSIUM SERPL-SCNC: 4.1 MMOL/L — SIGNIFICANT CHANGE UP (ref 3.5–5.3)
PROT SERPL-MCNC: 5 G/DL — LOW (ref 6–8.3)
PROT SERPL-MCNC: 5 G/DL — LOW (ref 6–8.3)
RBC # BLD: 2.98 M/UL — LOW (ref 3.8–5.2)
RBC # BLD: 2.98 M/UL — LOW (ref 3.8–5.2)
RBC # FLD: 15.7 % — HIGH (ref 10.3–14.5)
RBC # FLD: 15.7 % — HIGH (ref 10.3–14.5)
SODIUM SERPL-SCNC: 138 MMOL/L — SIGNIFICANT CHANGE UP (ref 135–145)
SODIUM SERPL-SCNC: 138 MMOL/L — SIGNIFICANT CHANGE UP (ref 135–145)
WBC # BLD: 9.89 K/UL — SIGNIFICANT CHANGE UP (ref 3.8–10.5)
WBC # BLD: 9.89 K/UL — SIGNIFICANT CHANGE UP (ref 3.8–10.5)
WBC # FLD AUTO: 9.89 K/UL — SIGNIFICANT CHANGE UP (ref 3.8–10.5)
WBC # FLD AUTO: 9.89 K/UL — SIGNIFICANT CHANGE UP (ref 3.8–10.5)

## 2023-11-22 PROCEDURE — 99254 IP/OBS CNSLTJ NEW/EST MOD 60: CPT

## 2023-11-22 PROCEDURE — 99222 1ST HOSP IP/OBS MODERATE 55: CPT

## 2023-11-22 RX ORDER — OXYCODONE HYDROCHLORIDE 5 MG/1
5 TABLET ORAL EVERY 4 HOURS
Refills: 0 | Status: DISCONTINUED | OUTPATIENT
Start: 2023-11-22 | End: 2023-11-28

## 2023-11-22 RX ORDER — GABAPENTIN 400 MG/1
100 CAPSULE ORAL THREE TIMES A DAY
Refills: 0 | Status: DISCONTINUED | OUTPATIENT
Start: 2023-11-22 | End: 2023-11-26

## 2023-11-22 RX ORDER — HYDROMORPHONE HYDROCHLORIDE 2 MG/ML
0.5 INJECTION INTRAMUSCULAR; INTRAVENOUS; SUBCUTANEOUS ONCE
Refills: 0 | Status: DISCONTINUED | OUTPATIENT
Start: 2023-11-22 | End: 2023-11-22

## 2023-11-22 RX ORDER — INFLUENZA VIRUS VACCINE 15; 15; 15; 15 UG/.5ML; UG/.5ML; UG/.5ML; UG/.5ML
0.5 SUSPENSION INTRAMUSCULAR ONCE
Refills: 0 | Status: DISCONTINUED | OUTPATIENT
Start: 2023-11-22 | End: 2023-12-04

## 2023-11-22 RX ORDER — OXYCODONE HYDROCHLORIDE 5 MG/1
10 TABLET ORAL EVERY 4 HOURS
Refills: 0 | Status: DISCONTINUED | OUTPATIENT
Start: 2023-11-22 | End: 2023-11-28

## 2023-11-22 RX ORDER — POLYETHYLENE GLYCOL 3350 17 G/17G
17 POWDER, FOR SOLUTION ORAL DAILY
Refills: 0 | Status: DISCONTINUED | OUTPATIENT
Start: 2023-11-22 | End: 2023-11-26

## 2023-11-22 RX ORDER — SENNA PLUS 8.6 MG/1
2 TABLET ORAL AT BEDTIME
Refills: 0 | Status: DISCONTINUED | OUTPATIENT
Start: 2023-11-22 | End: 2023-12-04

## 2023-11-22 RX ORDER — PANTOPRAZOLE SODIUM 20 MG/1
40 TABLET, DELAYED RELEASE ORAL DAILY
Refills: 0 | Status: DISCONTINUED | OUTPATIENT
Start: 2023-11-22 | End: 2023-11-26

## 2023-11-22 RX ADMIN — Medication 400 MILLIGRAM(S): at 11:44

## 2023-11-22 RX ADMIN — Medication 1000 MILLIGRAM(S): at 12:37

## 2023-11-22 RX ADMIN — AMPICILLIN SODIUM AND SULBACTAM SODIUM 200 GRAM(S): 250; 125 INJECTION, POWDER, FOR SUSPENSION INTRAMUSCULAR; INTRAVENOUS at 12:23

## 2023-11-22 RX ADMIN — OXYCODONE HYDROCHLORIDE 10 MILLIGRAM(S): 5 TABLET ORAL at 15:33

## 2023-11-22 RX ADMIN — Medication 400 MILLIGRAM(S): at 05:40

## 2023-11-22 RX ADMIN — HYDROMORPHONE HYDROCHLORIDE 1 MILLIGRAM(S): 2 INJECTION INTRAMUSCULAR; INTRAVENOUS; SUBCUTANEOUS at 04:10

## 2023-11-22 RX ADMIN — HYDROMORPHONE HYDROCHLORIDE 1 MILLIGRAM(S): 2 INJECTION INTRAMUSCULAR; INTRAVENOUS; SUBCUTANEOUS at 12:22

## 2023-11-22 RX ADMIN — HYDROMORPHONE HYDROCHLORIDE 1 MILLIGRAM(S): 2 INJECTION INTRAMUSCULAR; INTRAVENOUS; SUBCUTANEOUS at 03:39

## 2023-11-22 RX ADMIN — Medication 101.6 MILLIGRAM(S): at 09:23

## 2023-11-22 RX ADMIN — PANTOPRAZOLE SODIUM 40 MILLIGRAM(S): 20 TABLET, DELAYED RELEASE ORAL at 11:45

## 2023-11-22 RX ADMIN — HYDROMORPHONE HYDROCHLORIDE 1 MILLIGRAM(S): 2 INJECTION INTRAMUSCULAR; INTRAVENOUS; SUBCUTANEOUS at 07:31

## 2023-11-22 RX ADMIN — HYDROMORPHONE HYDROCHLORIDE 1 MILLIGRAM(S): 2 INJECTION INTRAMUSCULAR; INTRAVENOUS; SUBCUTANEOUS at 12:37

## 2023-11-22 RX ADMIN — OXYCODONE HYDROCHLORIDE 10 MILLIGRAM(S): 5 TABLET ORAL at 20:44

## 2023-11-22 RX ADMIN — OXYCODONE HYDROCHLORIDE 10 MILLIGRAM(S): 5 TABLET ORAL at 15:00

## 2023-11-22 RX ADMIN — OXYCODONE HYDROCHLORIDE 10 MILLIGRAM(S): 5 TABLET ORAL at 21:14

## 2023-11-22 RX ADMIN — Medication 101.6 MILLIGRAM(S): at 14:22

## 2023-11-22 RX ADMIN — AMPICILLIN SODIUM AND SULBACTAM SODIUM 200 GRAM(S): 250; 125 INJECTION, POWDER, FOR SUSPENSION INTRAMUSCULAR; INTRAVENOUS at 05:40

## 2023-11-22 RX ADMIN — HYDROMORPHONE HYDROCHLORIDE 1 MILLIGRAM(S): 2 INJECTION INTRAMUSCULAR; INTRAVENOUS; SUBCUTANEOUS at 08:45

## 2023-11-22 RX ADMIN — CHLORHEXIDINE GLUCONATE 15 MILLILITER(S): 213 SOLUTION TOPICAL at 05:40

## 2023-11-22 RX ADMIN — ENOXAPARIN SODIUM 40 MILLIGRAM(S): 100 INJECTION SUBCUTANEOUS at 22:58

## 2023-11-22 RX ADMIN — GABAPENTIN 100 MILLIGRAM(S): 400 CAPSULE ORAL at 14:23

## 2023-11-22 RX ADMIN — HYDROMORPHONE HYDROCHLORIDE 0.5 MILLIGRAM(S): 2 INJECTION INTRAMUSCULAR; INTRAVENOUS; SUBCUTANEOUS at 02:20

## 2023-11-22 RX ADMIN — CHLORHEXIDINE GLUCONATE 15 MILLILITER(S): 213 SOLUTION TOPICAL at 18:02

## 2023-11-22 RX ADMIN — SENNA PLUS 2 TABLET(S): 8.6 TABLET ORAL at 22:58

## 2023-11-22 RX ADMIN — POLYETHYLENE GLYCOL 3350 17 GRAM(S): 17 POWDER, FOR SOLUTION ORAL at 11:45

## 2023-11-22 RX ADMIN — GABAPENTIN 100 MILLIGRAM(S): 400 CAPSULE ORAL at 23:08

## 2023-11-22 RX ADMIN — Medication 400 MILLIGRAM(S): at 18:02

## 2023-11-22 RX ADMIN — HYDROMORPHONE HYDROCHLORIDE 0.5 MILLIGRAM(S): 2 INJECTION INTRAMUSCULAR; INTRAVENOUS; SUBCUTANEOUS at 01:50

## 2023-11-22 RX ADMIN — AMPICILLIN SODIUM AND SULBACTAM SODIUM 200 GRAM(S): 250; 125 INJECTION, POWDER, FOR SUSPENSION INTRAMUSCULAR; INTRAVENOUS at 18:03

## 2023-11-22 NOTE — PROGRESS NOTE ADULT - SUBJECTIVE AND OBJECTIVE BOX
SICU Daily Progress Note  =====================================================  Interval/Overnight Events:    Allergies: morphine (Hives)      MEDICATIONS:   --------------------------------------------------------------------------------------  Neurologic Medications  acetaminophen   IVPB .. 1000 milliGRAM(s) IV Intermittent every 6 hours  HYDROmorphone  Injectable 0.5 milliGRAM(s) IV Push every 4 hours PRN Moderate Pain (4 - 6)  HYDROmorphone  Injectable 1 milliGRAM(s) IV Push every 4 hours PRN Severe Pain (7 - 10)  ondansetron Injectable 4 milliGRAM(s) IV Push every 4 hours PRN Nausea    Respiratory Medications    Cardiovascular Medications  phenylephrine    Infusion 0.5 MICROgram(s)/kG/Min IV Continuous <Continuous>    Gastrointestinal Medications  lactated ringers. 1000 milliLiter(s) IV Continuous <Continuous>  pantoprazole  Injectable 40 milliGRAM(s) IV Push daily    Genitourinary Medications    Hematologic/Oncologic Medications  enoxaparin Injectable 40 milliGRAM(s) SubCutaneous every 24 hours    Antimicrobial/Immunologic Medications  ampicillin/sulbactam  IVPB      ampicillin/sulbactam  IVPB 3 Gram(s) IV Intermittent every 6 hours    Endocrine/Metabolic Medications  dexAMETHasone  IVPB 8 milliGRAM(s) IV Intermittent every 8 hours    Topical/Other Medications  chlorhexidine 0.12% Liquid 15 milliLiter(s) Swish and Spit two times a day    --------------------------------------------------------------------------------------    VITAL SIGNS, INS/OUTS (last 24 hours):  --------------------------------------------------------------------------------------   T(C): 36.2 (11-21-23 @ 20:30), Max: 36.5 (11-21-23 @ 05:37)  HR: 70 (11-21-23 @ 23:00) (70 - 88)  BP: 106/56 (11-21-23 @ 20:30) (79/48 - 133/90)  BP(mean): 71 (11-21-23 @ 20:30) (56 - 76)  ABP: 100/46 (11-21-23 @ 23:00) (83/39 - 120/36)  ABP(mean): 69 (11-21-23 @ 23:00) (55 - 82)  RR: 14 (11-21-23 @ 23:00) (12 - 19)  SpO2: 99% (11-21-23 @ 23:00) (91% - 100%)  CVP(mm Hg): --  CI: --  CAPILLARY BLOOD GLUCOSE       N/A    11-21 @ 07:01  -  11-22 @ 00:38  --------------------------------------------------------  IN:    IV PiggyBack: 350 mL    Lactated Ringers: 30 mL    Lactated Ringers: 675 mL    Phenylephrine: 10.2 mL    PRBCs (Packed Red Blood Cells): 300 mL  Total IN: 1365.2 mL    OUT:    Bulb (mL): 30 mL    Indwelling Catheter - Urethral (mL): 545 mL  Total OUT: 575 mL    Total NET: 790.2 mL        --------------------------------------------------------------------------------------    Exam:   General: Resting comfortably in bed  HEENT: Trach, free flap to right mandible w/ external skin paddle, warm and well perfused, strong doppler signal.   Pulm: Equal chest rise and fall; trach  CV: RRR  Abdomen: nontender, nondistended  Neuro: moving all extremities spontaneously  Extremities: RLE wrapped with ace bandage, wound vac in place, skin graft donor site with overlying foam thrombin and tegederm.     LABS  --------------------------------------------------------------------------------------   CBC (11-21 @ 17:14)                              7.1<L>                         6.08    )----------------(  166        --    % Neutrophils, --    % Lymphocytes, ANC: --                                  22.1<L>                      ABG (11-21 @ 11:51)     7.34<L> / 47<H> / 81<L> / 25 / -0.5 / 97.8%     Lactate:     ABG (11-21 @ 09:24)     7.36 / 47<H> / 449<H> / 27 / 0.8 / 100.0<H>%     Lactate:           --------------------------------------------------------------------------------------    IMAGING STUDIES: SICU Daily Progress Note  =====================================================  Interval/Overnight Events:  starting tube feeds  follow head and neck eras protocol  parvez kaba  removing noemi      Allergies: morphine (Hives)      MEDICATIONS:   --------------------------------------------------------------------------------------  Neurologic Medications  acetaminophen   IVPB .. 1000 milliGRAM(s) IV Intermittent every 6 hours  HYDROmorphone  Injectable 0.5 milliGRAM(s) IV Push every 4 hours PRN Moderate Pain (4 - 6)  HYDROmorphone  Injectable 1 milliGRAM(s) IV Push every 4 hours PRN Severe Pain (7 - 10)  ondansetron Injectable 4 milliGRAM(s) IV Push every 4 hours PRN Nausea    Respiratory Medications    Cardiovascular Medications  phenylephrine    Infusion 0.5 MICROgram(s)/kG/Min IV Continuous <Continuous>    Gastrointestinal Medications  lactated ringers. 1000 milliLiter(s) IV Continuous <Continuous>  pantoprazole  Injectable 40 milliGRAM(s) IV Push daily    Genitourinary Medications    Hematologic/Oncologic Medications  enoxaparin Injectable 40 milliGRAM(s) SubCutaneous every 24 hours    Antimicrobial/Immunologic Medications  ampicillin/sulbactam  IVPB      ampicillin/sulbactam  IVPB 3 Gram(s) IV Intermittent every 6 hours    Endocrine/Metabolic Medications  dexAMETHasone  IVPB 8 milliGRAM(s) IV Intermittent every 8 hours    Topical/Other Medications  chlorhexidine 0.12% Liquid 15 milliLiter(s) Swish and Spit two times a day    --------------------------------------------------------------------------------------    VITAL SIGNS, INS/OUTS (last 24 hours):  --------------------------------------------------------------------------------------   T(C): 36.2 (11-21-23 @ 20:30), Max: 36.5 (11-21-23 @ 05:37)  HR: 70 (11-21-23 @ 23:00) (70 - 88)  BP: 106/56 (11-21-23 @ 20:30) (79/48 - 133/90)  BP(mean): 71 (11-21-23 @ 20:30) (56 - 76)  ABP: 100/46 (11-21-23 @ 23:00) (83/39 - 120/36)  ABP(mean): 69 (11-21-23 @ 23:00) (55 - 82)  RR: 14 (11-21-23 @ 23:00) (12 - 19)  SpO2: 99% (11-21-23 @ 23:00) (91% - 100%)  CVP(mm Hg): --  CI: --  CAPILLARY BLOOD GLUCOSE       N/A    11-21 @ 07:01  -  11-22 @ 00:38  --------------------------------------------------------  IN:    IV PiggyBack: 350 mL    Lactated Ringers: 30 mL    Lactated Ringers: 675 mL    Phenylephrine: 10.2 mL    PRBCs (Packed Red Blood Cells): 300 mL  Total IN: 1365.2 mL    OUT:    Bulb (mL): 30 mL    Indwelling Catheter - Urethral (mL): 545 mL  Total OUT: 575 mL    Total NET: 790.2 mL        --------------------------------------------------------------------------------------    Exam:   General: Resting comfortably in bed  HEENT: Trach, free flap to right mandible w/ external skin paddle, warm and well perfused, strong doppler signal.   Pulm: Equal chest rise and fall; trach  CV: RRR  Abdomen: nontender, nondistended  Neuro: moving all extremities spontaneously  Extremities: RLE wrapped with ace bandage, wound vac in place, skin graft donor site with overlying foam thrombin and tegederm.     LABS  --------------------------------------------------------------------------------------   CBC (11-21 @ 17:14)                              7.1<L>                         6.08    )----------------(  166        --    % Neutrophils, --    % Lymphocytes, ANC: --                                  22.1<L>                      ABG (11-21 @ 11:51)     7.34<L> / 47<H> / 81<L> / 25 / -0.5 / 97.8%     Lactate:     ABG (11-21 @ 09:24)     7.36 / 47<H> / 449<H> / 27 / 0.8 / 100.0<H>%     Lactate:           --------------------------------------------------------------------------------------    IMAGING STUDIES:

## 2023-11-22 NOTE — PROGRESS NOTE ADULT - SUBJECTIVE AND OBJECTIVE BOX
TEAM Plastic Surgery Daily Progress Note  =====================================================    SUBJECTIVE: Patient seen and examined at bedside on AM rounds. Patient reports that they're feeling well. Patient received 1u pRBC in pacu post-op and H/H responded well.       ALLERGIES:  morphine (Hives)      --------------------------------------------------------------------------------------    MEDICATIONS:    Neurologic Medications  acetaminophen   IVPB .. 1000 milliGRAM(s) IV Intermittent every 6 hours  HYDROmorphone  Injectable 0.5 milliGRAM(s) IV Push every 4 hours PRN Moderate Pain (4 - 6)  HYDROmorphone  Injectable 1 milliGRAM(s) IV Push every 4 hours PRN Severe Pain (7 - 10)  ondansetron Injectable 4 milliGRAM(s) IV Push every 4 hours PRN Nausea    Respiratory Medications    Cardiovascular Medications    Gastrointestinal Medications  lactated ringers. 1000 milliLiter(s) IV Continuous <Continuous>  pantoprazole  Injectable 40 milliGRAM(s) IV Push daily    Genitourinary Medications    Hematologic/Oncologic Medications  enoxaparin Injectable 40 milliGRAM(s) SubCutaneous every 24 hours    Antimicrobial/Immunologic Medications  ampicillin/sulbactam  IVPB      ampicillin/sulbactam  IVPB 3 Gram(s) IV Intermittent every 6 hours    Endocrine/Metabolic Medications  dexAMETHasone  IVPB 8 milliGRAM(s) IV Intermittent every 8 hours    Topical/Other Medications  chlorhexidine 0.12% Liquid 15 milliLiter(s) Swish and Spit two times a day    --------------------------------------------------------------------------------------    VITAL SIGNS:  Vital Signs Last 24 Hrs  T(C): 36.7 (22 Nov 2023 04:00), Max: 36.7 (22 Nov 2023 04:00)  T(F): 98.1 (22 Nov 2023 04:00), Max: 98.1 (22 Nov 2023 04:00)  HR: 78 (22 Nov 2023 06:00) (65 - 88)  BP: 106/56 (21 Nov 2023 20:30) (79/48 - 116/65)  BP(mean): 71 (21 Nov 2023 20:30) (56 - 76)  ABP: 127/61 (22 Nov 2023 06:00) (83/39 - 127/61)  ABP(mean): 89 (22 Nov 2023 06:00) (55 - 89)  RR: 16 (22 Nov 2023 06:00) (10 - 19)  SpO2: 100% (22 Nov 2023 06:00) (91% - 100%)    O2 Parameters below as of 22 Nov 2023 04:00  Patient On (Oxygen Delivery Method): tracheostomy collar    O2 Concentration (%): 50      --------------------------------------------------------------------------------------    EXAM    General: NAD, resting in bed comfortably.  HEENT: Chin suture line cdi, flap with good cap refill,   Cardiac: regular rate, warm and well perfused  Respiratory: Nonlabored respirations, normal cw expansion.  Abdomen: soft, nontender, nondistended. ___ incision is c/d/i, ostomy, NGT, merchant.   Extremities: normal strength, FROM, no deformities    --------------------------------------------------------------------------------------    LABS    ((Insert SICU Labs here))***    --------------------------------------------------------------------------------------    INS AND OUTS:    ((Insert SICU Vitals/Is+Os here))***  -------------------------------------------------------------------------------------- TEAM Plastic Surgery Daily Progress Note  =====================================================    SUBJECTIVE: Patient seen and examined at bedside on AM rounds. Patient reports that they're feeling well. Patient received 1u pRBC in pacu post-op and H/H responded well.       ALLERGIES:  morphine (Hives)      --------------------------------------------------------------------------------------    MEDICATIONS:    Neurologic Medications  acetaminophen   IVPB .. 1000 milliGRAM(s) IV Intermittent every 6 hours  HYDROmorphone  Injectable 0.5 milliGRAM(s) IV Push every 4 hours PRN Moderate Pain (4 - 6)  HYDROmorphone  Injectable 1 milliGRAM(s) IV Push every 4 hours PRN Severe Pain (7 - 10)  ondansetron Injectable 4 milliGRAM(s) IV Push every 4 hours PRN Nausea    Respiratory Medications    Cardiovascular Medications    Gastrointestinal Medications  lactated ringers. 1000 milliLiter(s) IV Continuous <Continuous>  pantoprazole  Injectable 40 milliGRAM(s) IV Push daily    Genitourinary Medications    Hematologic/Oncologic Medications  enoxaparin Injectable 40 milliGRAM(s) SubCutaneous every 24 hours    Antimicrobial/Immunologic Medications  ampicillin/sulbactam  IVPB      ampicillin/sulbactam  IVPB 3 Gram(s) IV Intermittent every 6 hours    Endocrine/Metabolic Medications  dexAMETHasone  IVPB 8 milliGRAM(s) IV Intermittent every 8 hours    Topical/Other Medications  chlorhexidine 0.12% Liquid 15 milliLiter(s) Swish and Spit two times a day    --------------------------------------------------------------------------------------    VITAL SIGNS:  Vital Signs Last 24 Hrs  T(C): 36.7 (22 Nov 2023 04:00), Max: 36.7 (22 Nov 2023 04:00)  T(F): 98.1 (22 Nov 2023 04:00), Max: 98.1 (22 Nov 2023 04:00)  HR: 78 (22 Nov 2023 06:00) (65 - 88)  BP: 106/56 (21 Nov 2023 20:30) (79/48 - 116/65)  BP(mean): 71 (21 Nov 2023 20:30) (56 - 76)  ABP: 127/61 (22 Nov 2023 06:00) (83/39 - 127/61)  ABP(mean): 89 (22 Nov 2023 06:00) (55 - 89)  RR: 16 (22 Nov 2023 06:00) (10 - 19)  SpO2: 100% (22 Nov 2023 06:00) (91% - 100%)    O2 Parameters below as of 22 Nov 2023 04:00  Patient On (Oxygen Delivery Method): tracheostomy collar    O2 Concentration (%): 50      --------------------------------------------------------------------------------------    EXAM    General: NAD, resting in bed comfortably.  HEENT: Chin suture line cdi, flap with good cap refill, soft, dobhoff sutured in place, cook in place (+), trach'd  Cardiac: regular rate, warm and well perfused  Respiratory: Nonlabored respirations, normal cw expansion.  Extremities:   RLE with ACE wrap in place, cdi, soft, vac in place and holding suction.     --------------------------------------------------------------------------------------    LABS                          9.2    9.89  )-----------( 200      ( 22 Nov 2023 01:35 )             27.2       11-22    138  |  103  |  11  ----------------------------<  137<H>  4.1   |  25  |  0.92    Ca    8.0<L>      22 Nov 2023 01:35  Phos  3.8     11-22  Mg     1.60     11-22    TPro  5.0<L>  /  Alb  3.4  /  TBili  0.2  /  DBili  x   /  AST  11  /  ALT  6   /  AlkPhos  56  11-22  --------------------------------------------------------------------------------------    INS AND OUTS:    I&O's Detail    21 Nov 2023 07:01  -  22 Nov 2023 07:00  --------------------------------------------------------  IN:    IV PiggyBack: 650 mL    Lactated Ringers: 30 mL    Lactated Ringers: 1575 mL    Phenylephrine: 10.2 mL    PRBCs (Packed Red Blood Cells): 300 mL  Total IN: 2565.2 mL    OUT:    Bulb (mL): 45 mL    Indwelling Catheter - Urethral (mL): 1245 mL  Total OUT: 1290 mL    Total NET: 1275.2 mL    -------------------------------------------------------------------------------------- TEAM Plastic Surgery Daily Progress Note  =====================================================    SUBJECTIVE: Patient seen and examined at bedside on AM rounds. Patient reports that they're feeling well. Patient received 1u pRBC in pacu post-op and H/H responded well.       ALLERGIES:  morphine (Hives)      --------------------------------------------------------------------------------------    MEDICATIONS:    Neurologic Medications  acetaminophen   IVPB .. 1000 milliGRAM(s) IV Intermittent every 6 hours  HYDROmorphone  Injectable 0.5 milliGRAM(s) IV Push every 4 hours PRN Moderate Pain (4 - 6)  HYDROmorphone  Injectable 1 milliGRAM(s) IV Push every 4 hours PRN Severe Pain (7 - 10)  ondansetron Injectable 4 milliGRAM(s) IV Push every 4 hours PRN Nausea    Respiratory Medications    Cardiovascular Medications    Gastrointestinal Medications  lactated ringers. 1000 milliLiter(s) IV Continuous <Continuous>  pantoprazole  Injectable 40 milliGRAM(s) IV Push daily    Genitourinary Medications    Hematologic/Oncologic Medications  enoxaparin Injectable 40 milliGRAM(s) SubCutaneous every 24 hours    Antimicrobial/Immunologic Medications  ampicillin/sulbactam  IVPB      ampicillin/sulbactam  IVPB 3 Gram(s) IV Intermittent every 6 hours    Endocrine/Metabolic Medications  dexAMETHasone  IVPB 8 milliGRAM(s) IV Intermittent every 8 hours    Topical/Other Medications  chlorhexidine 0.12% Liquid 15 milliLiter(s) Swish and Spit two times a day    --------------------------------------------------------------------------------------    VITAL SIGNS:  Vital Signs Last 24 Hrs  T(C): 36.7 (22 Nov 2023 04:00), Max: 36.7 (22 Nov 2023 04:00)  T(F): 98.1 (22 Nov 2023 04:00), Max: 98.1 (22 Nov 2023 04:00)  HR: 78 (22 Nov 2023 06:00) (65 - 88)  BP: 106/56 (21 Nov 2023 20:30) (79/48 - 116/65)  BP(mean): 71 (21 Nov 2023 20:30) (56 - 76)  ABP: 127/61 (22 Nov 2023 06:00) (83/39 - 127/61)  ABP(mean): 89 (22 Nov 2023 06:00) (55 - 89)  RR: 16 (22 Nov 2023 06:00) (10 - 19)  SpO2: 100% (22 Nov 2023 06:00) (91% - 100%)    O2 Parameters below as of 22 Nov 2023 04:00  Patient On (Oxygen Delivery Method): tracheostomy collar    O2 Concentration (%): 50      --------------------------------------------------------------------------------------    EXAM    General: NAD, resting in bed comfortably.  HEENT: Chin suture line cdi, flap with good cap refill, soft, dobhoff sutured in place, cook in place (+), trach'd  Cardiac: regular rate, warm and well perfused  Respiratory: Nonlabored respirations, normal cw expansion.  Extremities:   RLE with ACE wrap in place, cdi, soft, vac in place and holding suction. JESSEE with SS output.     --------------------------------------------------------------------------------------    LABS                          9.2    9.89  )-----------( 200      ( 22 Nov 2023 01:35 )             27.2       11-22    138  |  103  |  11  ----------------------------<  137<H>  4.1   |  25  |  0.92    Ca    8.0<L>      22 Nov 2023 01:35  Phos  3.8     11-22  Mg     1.60     11-22    TPro  5.0<L>  /  Alb  3.4  /  TBili  0.2  /  DBili  x   /  AST  11  /  ALT  6   /  AlkPhos  56  11-22  --------------------------------------------------------------------------------------    INS AND OUTS:    I&O's Detail    21 Nov 2023 07:01  -  22 Nov 2023 07:00  --------------------------------------------------------  IN:    IV PiggyBack: 650 mL    Lactated Ringers: 30 mL    Lactated Ringers: 1575 mL    Phenylephrine: 10.2 mL    PRBCs (Packed Red Blood Cells): 300 mL  Total IN: 2565.2 mL    OUT:    Bulb (mL): 45 mL    Indwelling Catheter - Urethral (mL): 1245 mL  Total OUT: 1290 mL    Total NET: 1275.2 mL    --------------------------------------------------------------------------------------

## 2023-11-22 NOTE — PHYSICAL THERAPY INITIAL EVALUATION ADULT - PATIENT PROFILE REVIEW, REHAB EVAL
No Active Activity Order in the Computer; Spoke with SHASHANK Hyman prior to PT evaluation--> Pt OK for PT consult/OOB activity; vitals taken; /63mmHg, heart rate 68bpm, SpO2 97%/yes

## 2023-11-22 NOTE — PHYSICAL THERAPY INITIAL EVALUATION ADULT - GAIT DISTANCE, PT EVAL
Prep Survey      Responses   Jew facility patient discharged from?  [place]   Is LACE score < 7 ?  No   Eligibility  Readm Mgmt   Discharge diagnosis  Acute HypoxiaAcute-on-Chronic dCHF   COVID-19 Test Status  Not tested   Does the patient have one of the following disease processes/diagnoses(primary or secondary)?  CHF   Does the patient have Home health ordered?  No   Is there a DME ordered?  No   Prep survey completed?  Yes          Ilana Becker RN         75 feet

## 2023-11-22 NOTE — PROGRESS NOTE ADULT - ASSESSMENT
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 Pt is progressing well.     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 Pt is progressing well.     Plan:   -ERAS Day 1 Protocol (Nutrition Consult and starting Tube Feeds, Dobbs D/C with TOV, Flap Checks q1h, and OOBTC with CAM boot delivery)  -Multimodal pain management  -Appreciate excellent care per SICU    Junior Valdez  Oral and Maxillofacial Surgery  [facility] OMFS Pager #[phone]  Available on Teams

## 2023-11-22 NOTE — PROGRESS NOTE ADULT - SUBJECTIVE AND OBJECTIVE BOX
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 Pt is s/p segmental mandibulectomy with R partial buccal soft tissue and FOM excision, tracheostomy, right neck dissection in 1/2022, and removal of mandibular hardware in 10/2022. Pt completed chemotherapy and radiation. Pt reports open areas to right mandible with bone exposed.     Interval Events: NAEON, NAD. Pt progressing well    Patient received 1u pRBC in pacu post-op and H/H responded well.     Vital Signs Last 24 Hrs  T(C): 36.7 (22 Nov 2023 04:00), Max: 36.7 (22 Nov 2023 04:00)  T(F): 98.1 (22 Nov 2023 04:00), Max: 98.1 (22 Nov 2023 04:00)  HR: 78 (22 Nov 2023 06:00) (65 - 88)  BP: 106/56 (21 Nov 2023 20:30) (79/48 - 116/65)  BP(mean): 71 (21 Nov 2023 20:30) (56 - 76)  RR: 16 (22 Nov 2023 06:00) (10 - 19)  SpO2: 100% (22 Nov 2023 06:00) (91% - 100%)    Parameters below as of 22 Nov 2023 04:00  Patient On (Oxygen Delivery Method): tracheostomy collar    O2 Concentration (%): 50    I&O's Detail    21 Nov 2023 07:01  -  22 Nov 2023 07:00  --------------------------------------------------------  IN:    IV PiggyBack: 650 mL    Lactated Ringers: 30 mL    Lactated Ringers: 1575 mL    Phenylephrine: 10.2 mL    PRBCs (Packed Red Blood Cells): 300 mL  Total IN: 2565.2 mL    OUT:    Bulb (mL): 45 mL    Indwelling Catheter - Urethral (mL): 1245 mL  Total OUT: 1290 mL    Total NET: 1275.2 mL    Medications:    MEDICATIONS  (STANDING):  acetaminophen   IVPB .. 1000 milliGRAM(s) IV Intermittent every 6 hours  ampicillin/sulbactam  IVPB      ampicillin/sulbactam  IVPB 3 Gram(s) IV Intermittent every 6 hours  chlorhexidine 0.12% Liquid 15 milliLiter(s) Swish and Spit two times a day  dexAMETHasone  IVPB 8 milliGRAM(s) IV Intermittent every 8 hours  enoxaparin Injectable 40 milliGRAM(s) SubCutaneous every 24 hours  gabapentin Solution 100 milliGRAM(s) Oral three times a day  lactated ringers. 1000 milliLiter(s) (100 mL/Hr) IV Continuous <Continuous>  pantoprazole   Suspension 40 milliGRAM(s) Oral daily  polyethylene glycol 3350 17 Gram(s) Oral daily  senna 2 Tablet(s) Oral at bedtime    MEDICATIONS  (PRN):  HYDROmorphone  Injectable 0.5 milliGRAM(s) IV Push every 4 hours PRN Moderate Pain (4 - 6)  HYDROmorphone  Injectable 1 milliGRAM(s) IV Push every 4 hours PRN Severe Pain (7 - 10)  ondansetron Injectable 4 milliGRAM(s) IV Push every 4 hours PRN Nausea    Labs:                        9.2    9.89  )-----------( 200      ( 22 Nov 2023 01:35 )             27.2     11-22    138  |  103  |  11  ----------------------------<  137<H>  4.1   |  25  |  0.92    Ca    8.0<L>      22 Nov 2023 01:35  Phos  3.8     11-22  Mg     1.60     11-22    TPro  5.0<L>  /  Alb  3.4  /  TBili  0.2  /  DBili  x   /  AST  11  /  ALT  6   /  AlkPhos  56  11-22    Physical Exam:   PHYSICAL EXAM:  Gen: NAD  Resp: breathing easily, no stridor  CV: RRR  Abdomen: soft, nontender, nondistended  Skin: Incision c/d/i. Normal color, no rashes or lesions  -Cook signal strong. Surgical sites hemostatic. Pain well controlled. JESSEE drain L. neck, R. leg. A-line L. arm. Dobbs.

## 2023-11-22 NOTE — PROGRESS NOTE ADULT - ASSESSMENT
58F s/p mandibulectomy, radical lymphadenectomy, trach, and right fibula osteocutaneous flap with microvascular flap with anastomosis, split thickness skin graft to RLE 11/21.    Plan:  - ERAS protocol  - D/c caryl Dobbs/santos A-line today  - Start NGT trickle feeds

## 2023-11-22 NOTE — PROGRESS NOTE ADULT - SUBJECTIVE AND OBJECTIVE BOX
POST ANESTHESIA EVALUATION    58y Female POSTOP DAY 1     MENTAL STATUS: Patient participation [  ] Awake     [  x] Arousable     [  ] Sedated    AIRWAY PATENCY: [ x ] Satisfactory  [  ] Other:     Vital Signs Last 24 Hrs  T(C): 37.1 (22 Nov 2023 08:00), Max: 37.1 (22 Nov 2023 08:00)  T(F): 98.7 (22 Nov 2023 08:00), Max: 98.7 (22 Nov 2023 08:00)  HR: 67 (22 Nov 2023 10:04) (65 - 88)  BP: 106/56 (21 Nov 2023 20:30) (79/48 - 116/65)  BP(mean): 71 (21 Nov 2023 20:30) (56 - 76)  RR: 15 (22 Nov 2023 10:04) (10 - 19)  SpO2: 98% (22 Nov 2023 10:04) (91% - 100%)    Parameters below as of 22 Nov 2023 10:04  Patient On (Oxygen Delivery Method): room air      I&O's Summary    21 Nov 2023 07:01  -  22 Nov 2023 07:00  --------------------------------------------------------  IN: 2565.2 mL / OUT: 1290 mL / NET: 1275.2 mL    22 Nov 2023 07:01  -  22 Nov 2023 11:12  --------------------------------------------------------  IN: 300 mL / OUT: 60 mL / NET: 240 mL          NAUSEA/ VOMITTING:  [ x ] NONE  [  ] CONTROLLED [  ] OTHER     PAIN: [  x] CONTROLLED WITH CURRENT REGIMEN  [  ] OTHER    [ x ] NO APPARENT ANESTHESIA COMPLICATIONS      Comments: Discussed with ICU team member

## 2023-11-22 NOTE — PHYSICAL THERAPY INITIAL EVALUATION ADULT - DID THE PATIENT HAVE SURGERY?
s/ p Reconstruction of mandible defect with right free fibula free flap and STSG., Fistulectomy, orocutaneous, Mandibulectomy, with neck dissection, tracheostomy, and reconstruction using flap/yes

## 2023-11-22 NOTE — PHYSICAL THERAPY INITIAL EVALUATION ADULT - ADDITIONAL COMMENTS
Pt reports that she lives in a private house with her son, daughter in law, and grandchildren with 1 step to enter; bedroom/bathroom is on the first floor. Prior to hospital admission, pt was completely independent and used no assistive device with ambulation. She does own a rolling walker if she needs. Pt denies any recent falls.    Pt left comfortable seated in chair, NAD, all lines intact, all precautions maintained, with call bell in reach, and RN aware of PT evaluation.

## 2023-11-22 NOTE — PHYSICAL THERAPY INITIAL EVALUATION ADULT - MANUAL MUSCLE TESTING RESULTS, REHAB EVAL
surgical precautions; bilateral upper extremities and bilateral lower extremities at least 3+/5/grossly assessed due to

## 2023-11-22 NOTE — PATIENT PROFILE ADULT - FALL HARM RISK - HARM RISK INTERVENTIONS

## 2023-11-22 NOTE — PHYSICAL THERAPY INITIAL EVALUATION ADULT - GENERAL OBSERVATIONS, REHAB EVAL
Pt encountered seated in chair, no distress, AxOx4, with +IV, +tele, +pulse oximeter, +feeding line, +JESSEE drains, +doppler, +trach to wall humidifier, and +wound vac. Pt agreeable to participate in PT evaluation.

## 2023-11-22 NOTE — PROGRESS NOTE ADULT - ASSESSMENT
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 Pt s/p chemotherapy and radiation.     - 11/21: s/p mandibulectomy, radical lymphadenectomy, trach, and right fibula osteocutaneous flap with microvascular flap with anastomosis, split thickness skin graft to RLE.        Neuro:  -Pain: Tylenol, dilaudid PRN  -Flap checks Q1 hour    Resp:  -Tracheostomy  -Maintain O2 saturation >92%    CV:  -MAP>65    GI/Nutrition:  -Diet: NPO  -NG tube in place  -Zofran Q8 PRN  -Protonix IV daily    Renal:  -I/Os  -Dobbs    Heme:  -DVT ppx--lovenox  -1u pRBC in pacu per [place]    ID:  -Unasynx1 day    Endo:  -Monitor blood glucose    Musculoskeletal:   -CAM boot for ambulation  -Wound vac to suction RLE    Dispo: SICU   58F PMH SCC right mandibular gingiva with metastasis to right neck, s/p resection with segmental mandibulectomy with a partial right buccal soft tissue and FOM excision, tracheostomy, right neck dissection in 1/2022, and removal of mandibular hardware in 10/2022. Pt s/p chemotherapy and radiation.     - 11/21: s/p mandibulectomy, radical lymphadenectomy, trach, and right fibula osteocutaneous flap with microvascular flap with anastomosis, split thickness skin graft to RLE.        Neuro:  -Pain: Tylenol, dilaudid PRN, gabapentin tid  -Flap checks Q1 hour    Resp:  -Tracheostomy; trach collar  -Maintain O2 saturation >92%    CV:  -MAP>65    GI/Nutrition:  -Diet: TF  -NG tube in place  -Zofran Q8 PRN  -Protonix IV daily  - peg/senna    Renal:  -I/Os  - lr @ 100cc/hr will dc when tf are at goal  -Dobbs d/c today    Heme:  -DVT ppx--lovenox  -1u pRBC in pacu per [place]    ID:  -Unasynx1 day    Endo:  -Monitor blood glucose  - decadron 8q8h    Musculoskeletal:   -CAM boot for ambulation  -Wound vac to suction RLE    Dispo: SICU

## 2023-11-22 NOTE — PROGRESS NOTE ADULT - ATTENDING COMMENTS
I agree with the detailed interval history, physical, and plan, which I have reviewed and edited where appropriate'; also agree with notes/assessment with my team on service.  I have personally examined the patient.  I was physically present for the key portions of the evaluation and management (E/M) service provided.  I reviewed all the pertinent data.  The patient is a critical care patient with life threatening hemodynamic and metabolic instability in SICU.  The SICU team has a constant risk benefit analyzes discussion and coordinating care with the primary team and all consultants.   The patient is in SICU with the chief complaint and diagnosis mentioned in the note.   The plan will be specified in the note.  57 y/o female s/p mandibulectomy, radical lymphadenectomy, trach, and right fibula osteocutaneous flap with microvascular flap with anastomosis, split thickness skin graft to RLE in SICU for flap monitoring.   Exam:   General: NAD  HEENT: Trach, free flap  Pulm: clear  CV: RR  Abdomen: nontender    Neuro:  -Tylenol, dilaudid   -Flap checks Q1 hour  Resp:  -Tracheostomy  CV:  -MAP>65  GI/Nutrition:  -Diet: NPO  -Protonix   Renal:  -Dobbs  Heme:  -lovenox  ID:  -Unasyn  Endo:  -Monitor blood glucose    Dispo: SICU

## 2023-11-22 NOTE — PROGRESS NOTE ADULT - ASSESSMENT
A: Ms Dsouza is a 59yo F with previous hx of FFF for mandibular reconstruction c/b osteoradionecrosis who is now s/p RLE FFF for reconstruction on 11/21. Patient is recovering well.     Plan:  -ERAS protocol  -Cont vac  -Cont cook doppler  -Will order CAM boot today  -Monitor I&Os, JESSEE output  -Appreciate great care per FREDO Zamarripa [place]  Plastic Surgery  #[phone] [facility] pager  [phone] [facility] pager  Available on teams

## 2023-11-22 NOTE — PROGRESS NOTE ADULT - SUBJECTIVE AND OBJECTIVE BOX
OTOLARYNGOLOGY (ENT) PROGRESS NOTE    PATIENT: PARUL HERNANDEZ  MRN: [id number]  : 63  [id number]  DATE OF SERVICE:  23  	  Subjective/ Interval:   No acute events overnight.    ALLERGIES:  morphine (Hives)      MEDICATIONS:  Antiinfectives:   ampicillin/sulbactam  IVPB      ampicillin/sulbactam  IVPB 3 Gram(s) IV Intermittent every 6 hours    IV fluids:  lactated ringers. 1000 milliLiter(s) IV Continuous <Continuous>    Hematologic/Anticoagulation:  enoxaparin Injectable 40 milliGRAM(s) SubCutaneous every 24 hours    Pain medications/Neuro:  acetaminophen   IVPB .. 1000 milliGRAM(s) IV Intermittent every 6 hours  HYDROmorphone  Injectable 1 milliGRAM(s) IV Push every 4 hours PRN  HYDROmorphone  Injectable 0.5 milliGRAM(s) IV Push every 4 hours PRN  ondansetron Injectable 4 milliGRAM(s) IV Push every 4 hours PRN    Endocrine Medications:   dexAMETHasone  IVPB 8 milliGRAM(s) IV Intermittent every 8 hours    All other standing medications:   chlorhexidine 0.12% Liquid 15 milliLiter(s) Swish and Spit two times a day  pantoprazole  Injectable 40 milliGRAM(s) IV Push daily    All other PRN medications:    Vital Signs Last 24 Hrs  T(C): 36.7 (2023 04:00), Max: 36.7 (2023 04:00)  T(F): 98.1 (2023 04:00), Max: 98.1 (2023 04:00)  HR: 78 (2023 06:00) (65 - 88)  BP: 106/56 (2023 20:30) (79/48 - 116/65)  BP(mean): 71 (2023 20:30) (56 - 76)  RR: 16 (2023 06:00) (10 - 19)  SpO2: 100% (2023 06:00) (91% - 100%)    Parameters below as of 2023 04:00  Patient On (Oxygen Delivery Method): tracheostomy collar    O2 Concentration (%): 50      - @ 07:01  -   @ 07:00  --------------------------------------------------------  IN:    IV PiggyBack: 650 mL    Lactated Ringers: 30 mL    Lactated Ringers: 1575 mL    Phenylephrine: 10.2 mL    PRBCs (Packed Red Blood Cells): 300 mL  Total IN: 2565.2 mL    OUT:    Bulb (mL): 45 mL    Indwelling Catheter - Urethral (mL): 1245 mL  Total OUT: 1290 mL    Total NET: 1275.2 mL          23 @ 07:01  -  23 @ 07:00  --------------------------------------------------------  IN:  Total IN: 0 mL    OUT:    Bulb (mL): 45 mL  Total OUT: 45 mL    Total NET: -45 mL    Gen: NAD  HEENT: 6CN trach in place, to TC, skin paddle warm and well perfused, strong arterial doppler signal  Resp: unlabored respirations  CV: RRR  Abd: soft, nondistended  Ext: RLE wrapped with ACE, wound vac, stsg site healthy appearing       LABS                       9.2    9.89  )-----------( 200      ( 2023 01:35 )             27.2        138  |  103  |  11  ----------------------------<  137<H>  4.1   |  25  |  0.92    Ca    8.0<L>      2023 01:35  Phos  3.8       Mg     1.60         TPro  5.0<L>  /  Alb  3.4  /  TBili  0.2  /  DBili  x   /  AST  11  /  ALT  6   /  AlkPhos  56           Coagulation Studies-     Urinalysis Basic - ( 2023 01:35 )    Color: x / Appearance: x / SG: x / pH: x  Gluc: 137 mg/dL / Ketone: x  / Bili: x / Urobili: x   Blood: x / Protein: x / Nitrite: x   Leuk Esterase: x / RBC: x / WBC x   Sq Epi: x / Non Sq Epi: x / Bacteria: x      Endocrine Panel-  Calcium: 8.0 mg/dL ( @ 01:35)                MICROBIOLOGY:

## 2023-11-23 LAB
ANION GAP SERPL CALC-SCNC: 13 MMOL/L — SIGNIFICANT CHANGE UP (ref 7–14)
ANION GAP SERPL CALC-SCNC: 13 MMOL/L — SIGNIFICANT CHANGE UP (ref 7–14)
BUN SERPL-MCNC: 16 MG/DL — SIGNIFICANT CHANGE UP (ref 7–23)
BUN SERPL-MCNC: 16 MG/DL — SIGNIFICANT CHANGE UP (ref 7–23)
CALCIUM SERPL-MCNC: 8.5 MG/DL — SIGNIFICANT CHANGE UP (ref 8.4–10.5)
CALCIUM SERPL-MCNC: 8.5 MG/DL — SIGNIFICANT CHANGE UP (ref 8.4–10.5)
CHLORIDE SERPL-SCNC: 100 MMOL/L — SIGNIFICANT CHANGE UP (ref 98–107)
CHLORIDE SERPL-SCNC: 100 MMOL/L — SIGNIFICANT CHANGE UP (ref 98–107)
CO2 SERPL-SCNC: 21 MMOL/L — LOW (ref 22–31)
CO2 SERPL-SCNC: 21 MMOL/L — LOW (ref 22–31)
CREAT SERPL-MCNC: 1.02 MG/DL — SIGNIFICANT CHANGE UP (ref 0.5–1.3)
CREAT SERPL-MCNC: 1.02 MG/DL — SIGNIFICANT CHANGE UP (ref 0.5–1.3)
EGFR: 63 ML/MIN/1.73M2 — SIGNIFICANT CHANGE UP
EGFR: 63 ML/MIN/1.73M2 — SIGNIFICANT CHANGE UP
GLUCOSE SERPL-MCNC: 111 MG/DL — HIGH (ref 70–99)
GLUCOSE SERPL-MCNC: 111 MG/DL — HIGH (ref 70–99)
HCT VFR BLD CALC: 25.3 % — LOW (ref 34.5–45)
HCT VFR BLD CALC: 25.3 % — LOW (ref 34.5–45)
HGB BLD-MCNC: 8.5 G/DL — LOW (ref 11.5–15.5)
HGB BLD-MCNC: 8.5 G/DL — LOW (ref 11.5–15.5)
MAGNESIUM SERPL-MCNC: 1.9 MG/DL — SIGNIFICANT CHANGE UP (ref 1.6–2.6)
MAGNESIUM SERPL-MCNC: 1.9 MG/DL — SIGNIFICANT CHANGE UP (ref 1.6–2.6)
MCHC RBC-ENTMCNC: 30.1 PG — SIGNIFICANT CHANGE UP (ref 27–34)
MCHC RBC-ENTMCNC: 30.1 PG — SIGNIFICANT CHANGE UP (ref 27–34)
MCHC RBC-ENTMCNC: 33.6 GM/DL — SIGNIFICANT CHANGE UP (ref 32–36)
MCHC RBC-ENTMCNC: 33.6 GM/DL — SIGNIFICANT CHANGE UP (ref 32–36)
MCV RBC AUTO: 89.7 FL — SIGNIFICANT CHANGE UP (ref 80–100)
MCV RBC AUTO: 89.7 FL — SIGNIFICANT CHANGE UP (ref 80–100)
NRBC # BLD: 0 /100 WBCS — SIGNIFICANT CHANGE UP (ref 0–0)
NRBC # BLD: 0 /100 WBCS — SIGNIFICANT CHANGE UP (ref 0–0)
NRBC # FLD: 0 K/UL — SIGNIFICANT CHANGE UP (ref 0–0)
NRBC # FLD: 0 K/UL — SIGNIFICANT CHANGE UP (ref 0–0)
PHOSPHATE SERPL-MCNC: 3.4 MG/DL — SIGNIFICANT CHANGE UP (ref 2.5–4.5)
PHOSPHATE SERPL-MCNC: 3.4 MG/DL — SIGNIFICANT CHANGE UP (ref 2.5–4.5)
PLATELET # BLD AUTO: 220 K/UL — SIGNIFICANT CHANGE UP (ref 150–400)
PLATELET # BLD AUTO: 220 K/UL — SIGNIFICANT CHANGE UP (ref 150–400)
POTASSIUM SERPL-MCNC: 4.4 MMOL/L — SIGNIFICANT CHANGE UP (ref 3.5–5.3)
POTASSIUM SERPL-MCNC: 4.4 MMOL/L — SIGNIFICANT CHANGE UP (ref 3.5–5.3)
POTASSIUM SERPL-SCNC: 4.4 MMOL/L — SIGNIFICANT CHANGE UP (ref 3.5–5.3)
POTASSIUM SERPL-SCNC: 4.4 MMOL/L — SIGNIFICANT CHANGE UP (ref 3.5–5.3)
RBC # BLD: 2.82 M/UL — LOW (ref 3.8–5.2)
RBC # BLD: 2.82 M/UL — LOW (ref 3.8–5.2)
RBC # FLD: 15.9 % — HIGH (ref 10.3–14.5)
RBC # FLD: 15.9 % — HIGH (ref 10.3–14.5)
SODIUM SERPL-SCNC: 134 MMOL/L — LOW (ref 135–145)
SODIUM SERPL-SCNC: 134 MMOL/L — LOW (ref 135–145)
WBC # BLD: 11.41 K/UL — HIGH (ref 3.8–10.5)
WBC # BLD: 11.41 K/UL — HIGH (ref 3.8–10.5)
WBC # FLD AUTO: 11.41 K/UL — HIGH (ref 3.8–10.5)
WBC # FLD AUTO: 11.41 K/UL — HIGH (ref 3.8–10.5)

## 2023-11-23 PROCEDURE — 99232 SBSQ HOSP IP/OBS MODERATE 35: CPT | Mod: GC

## 2023-11-23 RX ORDER — SODIUM CHLORIDE 9 MG/ML
500 INJECTION INTRAMUSCULAR; INTRAVENOUS; SUBCUTANEOUS ONCE
Refills: 0 | Status: COMPLETED | OUTPATIENT
Start: 2023-11-23 | End: 2023-11-23

## 2023-11-23 RX ORDER — HYDROMORPHONE HYDROCHLORIDE 2 MG/ML
0.5 INJECTION INTRAMUSCULAR; INTRAVENOUS; SUBCUTANEOUS ONCE
Refills: 0 | Status: DISCONTINUED | OUTPATIENT
Start: 2023-11-23 | End: 2023-11-23

## 2023-11-23 RX ADMIN — GABAPENTIN 100 MILLIGRAM(S): 400 CAPSULE ORAL at 05:43

## 2023-11-23 RX ADMIN — CHLORHEXIDINE GLUCONATE 15 MILLILITER(S): 213 SOLUTION TOPICAL at 05:43

## 2023-11-23 RX ADMIN — ENOXAPARIN SODIUM 40 MILLIGRAM(S): 100 INJECTION SUBCUTANEOUS at 22:07

## 2023-11-23 RX ADMIN — CHLORHEXIDINE GLUCONATE 15 MILLILITER(S): 213 SOLUTION TOPICAL at 18:10

## 2023-11-23 RX ADMIN — SENNA PLUS 2 TABLET(S): 8.6 TABLET ORAL at 22:07

## 2023-11-23 RX ADMIN — HYDROMORPHONE HYDROCHLORIDE 0.5 MILLIGRAM(S): 2 INJECTION INTRAMUSCULAR; INTRAVENOUS; SUBCUTANEOUS at 08:08

## 2023-11-23 RX ADMIN — OXYCODONE HYDROCHLORIDE 10 MILLIGRAM(S): 5 TABLET ORAL at 23:02

## 2023-11-23 RX ADMIN — OXYCODONE HYDROCHLORIDE 10 MILLIGRAM(S): 5 TABLET ORAL at 01:22

## 2023-11-23 RX ADMIN — PANTOPRAZOLE SODIUM 40 MILLIGRAM(S): 20 TABLET, DELAYED RELEASE ORAL at 12:06

## 2023-11-23 RX ADMIN — HYDROMORPHONE HYDROCHLORIDE 0.5 MILLIGRAM(S): 2 INJECTION INTRAMUSCULAR; INTRAVENOUS; SUBCUTANEOUS at 08:49

## 2023-11-23 RX ADMIN — OXYCODONE HYDROCHLORIDE 10 MILLIGRAM(S): 5 TABLET ORAL at 22:32

## 2023-11-23 RX ADMIN — OXYCODONE HYDROCHLORIDE 10 MILLIGRAM(S): 5 TABLET ORAL at 06:13

## 2023-11-23 RX ADMIN — OXYCODONE HYDROCHLORIDE 10 MILLIGRAM(S): 5 TABLET ORAL at 05:43

## 2023-11-23 RX ADMIN — OXYCODONE HYDROCHLORIDE 10 MILLIGRAM(S): 5 TABLET ORAL at 19:02

## 2023-11-23 RX ADMIN — GABAPENTIN 100 MILLIGRAM(S): 400 CAPSULE ORAL at 22:07

## 2023-11-23 RX ADMIN — OXYCODONE HYDROCHLORIDE 10 MILLIGRAM(S): 5 TABLET ORAL at 18:32

## 2023-11-23 RX ADMIN — OXYCODONE HYDROCHLORIDE 10 MILLIGRAM(S): 5 TABLET ORAL at 01:52

## 2023-11-23 RX ADMIN — SODIUM CHLORIDE 1000 MILLILITER(S): 9 INJECTION INTRAMUSCULAR; INTRAVENOUS; SUBCUTANEOUS at 12:23

## 2023-11-23 RX ADMIN — GABAPENTIN 100 MILLIGRAM(S): 400 CAPSULE ORAL at 14:29

## 2023-11-23 RX ADMIN — POLYETHYLENE GLYCOL 3350 17 GRAM(S): 17 POWDER, FOR SOLUTION ORAL at 12:06

## 2023-11-23 NOTE — PROGRESS NOTE ADULT - ASSESSMENT
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 Pt is progressing well.     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 Pt is s/p segmental mandibulectomy with R partial buccal soft tissue and FOM excision, tracheostomy, right neck dissection in 1/2022, and removal of mandibular hardware in 10/2022. Pt completed chemotherapy and radiation. Pt reports open areas to right mandible with bone exposed. Pt is progressing well.     Plan:   -ERAS Day 2 Protocol  -Trend JESSEE Outputs  -OOBTC  -Multimodal pain management  -Appreciate excellent care per FREDO Porter  Oral and Maxillofacial Surgery  [facility] Pager #[phone]  Available on Teams

## 2023-11-23 NOTE — PROGRESS NOTE ADULT - SUBJECTIVE AND OBJECTIVE BOX
[facility] Plastic Surgery Daily Progress Note  =====================================================    SUBJECTIVE: Patient seen and examined at bedside on AM rounds. Patient reports that they're feeling well.       ALLERGIES:  morphine (Hives)      --------------------------------------------------------------------------------------    MEDICATIONS:    Neurologic Medications  acetaminophen   IVPB .. 1000 milliGRAM(s) IV Intermittent every 6 hours  HYDROmorphone  Injectable 0.5 milliGRAM(s) IV Push every 4 hours PRN Moderate Pain (4 - 6)  HYDROmorphone  Injectable 1 milliGRAM(s) IV Push every 4 hours PRN Severe Pain (7 - 10)  ondansetron Injectable 4 milliGRAM(s) IV Push every 4 hours PRN Nausea    Respiratory Medications    Cardiovascular Medications    Gastrointestinal Medications  lactated ringers. 1000 milliLiter(s) IV Continuous <Continuous>  pantoprazole  Injectable 40 milliGRAM(s) IV Push daily    Genitourinary Medications    Hematologic/Oncologic Medications  enoxaparin Injectable 40 milliGRAM(s) SubCutaneous every 24 hours    Antimicrobial/Immunologic Medications  ampicillin/sulbactam  IVPB      ampicillin/sulbactam  IVPB 3 Gram(s) IV Intermittent every 6 hours    Endocrine/Metabolic Medications  dexAMETHasone  IVPB 8 milliGRAM(s) IV Intermittent every 8 hours    Topical/Other Medications  chlorhexidine 0.12% Liquid 15 milliLiter(s) Swish and Spit two times a day    --------------------------------------------------------------------------------------    VITAL SIGNS:  ICU Vital Signs Last 24 Hrs  T(C): 36.3 (23 Nov 2023 08:00), Max: 36.8 (22 Nov 2023 20:00)  T(F): 97.3 (23 Nov 2023 08:00), Max: 98.3 (22 Nov 2023 20:00)  HR: 96 (23 Nov 2023 11:00) (65 - 96)  BP: 114/66 (23 Nov 2023 11:00) (94/60 - 132/70)  BP(mean): 81 (23 Nov 2023 11:00) (66 - 90)  ABP: --  ABP(mean): --  RR: 25 (23 Nov 2023 11:00) (10 - 25)  SpO2: 100% (23 Nov 2023 11:00) (93% - 100%)    O2 Parameters below as of 23 Nov 2023 08:00  Patient On (Oxygen Delivery Method): tracheostomy collar  O2 Flow (L/min): 8  O2 Concentration (%): 50    --------------------------------------------------------------------------------------    EXAM    General: NAD, resting in bed comfortably.  HEENT: Chin suture line cdi, flap with good cap refill, soft, dobhoff sutured in place, cook in place (+), trach'd  Cardiac: regular rate, warm and well perfused  Respiratory: Nonlabored respirations, normal cw expansion.  Extremities:   RLE with ACE wrap in place, cdi, soft, vac in place and holding suction. JESSEE with SS output.     --------------------------------------------------------------------------------------    LABS                          8.5    11.41 )-----------( 220      ( 23 Nov 2023 02:50 )             25.3   11-23    134<L>  |  100  |  16  ----------------------------<  111<H>  4.4   |  21<L>  |  1.02    Ca    8.5      23 Nov 2023 01:05  Phos  3.4     11-23  Mg     1.90     11-23    TPro  5.0<L>  /  Alb  3.4  /  TBili  0.2  /  DBili  x   /  AST  11  /  ALT  6   /  AlkPhos  56  11-22        --------------------------------------------------------------------------------------    INS AND OUTS:    I&O's Detail    22 Nov 2023 07:01  -  23 Nov 2023 07:00  --------------------------------------------------------  IN:    IV PiggyBack: 500 mL    Jevity 1.5: 556 mL    Lactated Ringers: 900 mL  Total IN: 1956 mL    OUT:    Bulb (mL): 20 mL    Indwelling Catheter - Urethral (mL): 440 mL    Intermittent Catheterization - Urethral (mL): 300 mL    VAC (Vacuum Assisted Closure) System (mL): 0 mL  Total OUT: 760 mL    Total NET: 1196 mL          --------------------------------------------------------------------------------------

## 2023-11-23 NOTE — PROGRESS NOTE ADULT - ASSESSMENT
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 Pt s/p chemotherapy and radiation.     - 11/21: s/p mandibulectomy, radical lymphadenectomy, trach, and right fibula osteocutaneous flap with microvascular flap with anastomosis, split thickness skin graft to RLE.          Neuro:  -Pain: Tylenol, dilaudid PRN, gabapentin tid  -Flap checks Q2 hour    Resp:  -Tracheostomy; trach collar  -Maintain O2 saturation >92%    CV:  -MAP>65  - no active issues     GI/Nutrition:  - Diet: NPO with TF  - Protonix IV daily  - bowel regimen     Renal:  - monitor I&Os  - f/u TOV  - monitor electrolyes, replete prn    Heme:  - monitor H/H on CBC  -DVT ppx--lovenox    ID:  - s/p Unasynx1 day post-op  - monitor WBC and trend fever curve    Endo:  - Monitor blood glucose   - ISS    Dispo: SICU   58F PMH SCC right mandibular gingiva with metastasis to right neck, s/p resection with segmental mandibulectomy with a partial right buccal soft tissue and FOM excision, tracheostomy, right neck dissection in 1/2022, and removal of mandibular hardware in 10/2022. Pt s/p chemotherapy and radiation.     - 11/21: s/p mandibulectomy, radical lymphadenectomy, trach, and right fibula osteocutaneous flap with microvascular flap with anastomosis, split thickness skin graft to RLE.            Neuro:  -Pain: Tylenol, oxycodone PRN, gabapentin tid  -Flap checks Q2 hour    Resp:  -Tracheostomy; trach collar  -Maintain O2 saturation >92%    CV:  -MAP>65  - no active issues     GI/Nutrition:  - Diet: NPO with TF (Jevity)  - Protonix IV daily  - bowel regimen     Renal:  - monitor I&Os  - f/u TOV  - monitor electrolyes, replete prn    Heme:  - monitor H/H on CBC  -DVT ppx--lovenox    ID:  - s/p Unasynx1 day post-op  - monitor WBC and trend fever curve    Endo:  - Monitor blood glucose   - ISS    Dispo: LIST in PM

## 2023-11-23 NOTE — PROGRESS NOTE ADULT - SUBJECTIVE AND OBJECTIVE BOX
OTOLARYNGOLOGY (ENT) PROGRESS NOTE    PATIENT: PARUL HERNANDEZ  MRN: [id number]  : 63  [id number]  DATE OF SERVICE:  23  	  Subjective/ Interval:   No acute events overnight.    ALLERGIES:  morphine (Hives)      MEDICATIONS:  Antiinfectives:   ampicillin/sulbactam  IVPB      ampicillin/sulbactam  IVPB 3 Gram(s) IV Intermittent every 6 hours    IV fluids:  lactated ringers. 1000 milliLiter(s) IV Continuous <Continuous>    Hematologic/Anticoagulation:  enoxaparin Injectable 40 milliGRAM(s) SubCutaneous every 24 hours    Pain medications/Neuro:  acetaminophen   IVPB .. 1000 milliGRAM(s) IV Intermittent every 6 hours  HYDROmorphone  Injectable 1 milliGRAM(s) IV Push every 4 hours PRN  HYDROmorphone  Injectable 0.5 milliGRAM(s) IV Push every 4 hours PRN  ondansetron Injectable 4 milliGRAM(s) IV Push every 4 hours PRN    Endocrine Medications:   dexAMETHasone  IVPB 8 milliGRAM(s) IV Intermittent every 8 hours    All other standing medications:   chlorhexidine 0.12% Liquid 15 milliLiter(s) Swish and Spit two times a day  pantoprazole  Injectable 40 milliGRAM(s) IV Push daily    All other PRN medications:    Vital Signs Last 24 Hrs  T(C): 36.3 (2023 08:00), Max: 36.9 (2023 12:00)  T(F): 97.3 (2023 08:00), Max: 98.5 (2023 12:00)  HR: 80 (2023 08:00) (65 - 94)  BP: 132/70 (2023 08:00) (94/60 - 132/70)  BP(mean): 89 (2023 08:00) (66 - 90)  RR: 14 (2023 08:00) (11 - 20)  SpO2: 100% (2023 08:00) (93% - 100%)    Parameters below as of 2023 08:00  Patient On (Oxygen Delivery Method): tracheostomy collar  O2 Flow (L/min): 8  O2 Concentration (%): 50  O2 Concentration (%): 50       @ 07:01  -   @ 07:00  --------------------------------------------------------  IN:    IV PiggyBack: 650 mL    Lactated Ringers: 30 mL    Lactated Ringers: 1575 mL    Phenylephrine: 10.2 mL    PRBCs (Packed Red Blood Cells): 300 mL  Total IN: 2565.2 mL    OUT:    Bulb (mL): 45 mL    Indwelling Catheter - Urethral (mL): 1245 mL  Total OUT: 1290 mL    Total NET: 1275.2 mL          23 @ 07:01  -  23 @ 07:00  --------------------------------------------------------  IN:  Total IN: 0 mL    OUT:    Bulb (mL): 45 mL  Total OUT: 45 mL    Total NET: -45 mL    Gen: NAD  HEENT: 6CN trach in place, to TC, skin paddle warm and well perfused, strong arterial doppler signal  Resp: unlabored respirations  CV: RRR  Abd: soft, nondistended  Ext: RLE wrapped with ACE, wound vac, stsg site healthy appearing       LABS                       9.2    9.89  )-----------( 200      ( 2023 01:35 )             27.2        138  |  103  |  11  ----------------------------<  137<H>  4.1   |  25  |  0.92    Ca    8.0<L>      2023 01:35  Phos  3.8       Mg     1.60         TPro  5.0<L>  /  Alb  3.4  /  TBili  0.2  /  DBili  x   /  AST  11  /  ALT  6   /  AlkPhos  56           Coagulation Studies-     Urinalysis Basic - ( 2023 01:35 )    Color: x / Appearance: x / SG: x / pH: x  Gluc: 137 mg/dL / Ketone: x  / Bili: x / Urobili: x   Blood: x / Protein: x / Nitrite: x   Leuk Esterase: x / RBC: x / WBC x   Sq Epi: x / Non Sq Epi: x / Bacteria: x      Endocrine Panel-  Calcium: 8.0 mg/dL ( @ 01:35)                MICROBIOLOGY:

## 2023-11-23 NOTE — PROGRESS NOTE ADULT - SUBJECTIVE AND OBJECTIVE BOX
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 Pt is s/p segmental mandibulectomy with R partial buccal soft tissue and FOM excision, tracheostomy, right neck dissection in 1/2022, and removal of mandibular hardware in 10/2022. Pt completed chemotherapy and radiation. Pt reports open areas to right mandible with bone exposed.     Interval Events: Failed TOVx1 --> Straight cathed, next TOV at 9AM. Home PT 3-5x/week. Jeri OLIVEIRA.     Vital Signs Last 24 Hrs  T(C): 36.2 (23 Nov 2023 04:00), Max: 37.1 (22 Nov 2023 08:00)  T(F): 97.1 (23 Nov 2023 04:00), Max: 98.7 (22 Nov 2023 08:00)  HR: 76 (23 Nov 2023 06:00) (65 - 94)  BP: 124/69 (23 Nov 2023 06:00) (94/60 - 124/69)  BP(mean): 86 (23 Nov 2023 06:00) (66 - 86)  RR: 13 (23 Nov 2023 06:00) (10 - 20)  SpO2: 100% (23 Nov 2023 05:00) (93% - 100%)    Parameters below as of 23 Nov 2023 04:00  Patient On (Oxygen Delivery Method): tracheostomy collar  O2 Flow (L/min): 8  O2 Concentration (%): 50      I&O's Detail    22 Nov 2023 07:01  -  23 Nov 2023 07:00  --------------------------------------------------------  IN:    IV PiggyBack: 500 mL    Jevity 1.5: 556 mL    Lactated Ringers: 900 mL  Total IN: 1956 mL    OUT:    Bulb (mL): 20 mL    Indwelling Catheter - Urethral (mL): 440 mL    Intermittent Catheterization - Urethral (mL): 300 mL    VAC (Vacuum Assisted Closure) System (mL): 0 mL  Total OUT: 760 mL    Total NET: 1196 mL      MEDICATIONS  (STANDING):  chlorhexidine 0.12% Liquid 15 milliLiter(s) Swish and Spit two times a day  enoxaparin Injectable 40 milliGRAM(s) SubCutaneous every 24 hours  gabapentin Solution 100 milliGRAM(s) Oral three times a day  HYDROmorphone  Injectable 0.5 milliGRAM(s) IV Push once  influenza   Vaccine 0.5 milliLiter(s) IntraMuscular once  pantoprazole   Suspension 40 milliGRAM(s) Oral daily  polyethylene glycol 3350 17 Gram(s) Oral daily  senna 2 Tablet(s) Oral at bedtime    MEDICATIONS  (PRN):  ondansetron Injectable 4 milliGRAM(s) IV Push every 4 hours PRN Nausea  oxyCODONE    Solution 5 milliGRAM(s) Oral every 4 hours PRN Moderate Pain (4 - 6)  oxyCODONE    Solution 10 milliGRAM(s) Oral every 4 hours PRN Severe Pain (7 - 10)      PHYSICAL EXAM:  Gen: NAD  Resp: breathing easily, no stridor  CV: RRR  Abdomen: soft, nontender, nondistended  Skin: Incision c/d/i. Normal color, no rashes or lesions  Cook signal strong. Surgical sites hemostatic. Pain well controlled. JESSEE drain L. neck, R. leg. A-line L. arm. 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 Pt is s/p segmental mandibulectomy with R partial buccal soft tissue and FOM excision, tracheostomy, right neck dissection in 1/2022, and removal of mandibular hardware in 10/2022. Pt completed chemotherapy and radiation. Pt reports open areas to right mandible with bone exposed.     Interval Events: Failed TOVx1 --> Straight cathed, next TOV at 9AM. Home PT 3-5x/week. Jeri OLIVEIRA.     Vital Signs Last 24 Hrs  T(C): 36.2 (23 Nov 2023 04:00), Max: 37.1 (22 Nov 2023 08:00)  T(F): 97.1 (23 Nov 2023 04:00), Max: 98.7 (22 Nov 2023 08:00)  HR: 76 (23 Nov 2023 06:00) (65 - 94)  BP: 124/69 (23 Nov 2023 06:00) (94/60 - 124/69)  BP(mean): 86 (23 Nov 2023 06:00) (66 - 86)  RR: 13 (23 Nov 2023 06:00) (10 - 20)  SpO2: 100% (23 Nov 2023 05:00) (93% - 100%)    Parameters below as of 23 Nov 2023 04:00  Patient On (Oxygen Delivery Method): tracheostomy collar  O2 Flow (L/min): 8  O2 Concentration (%): 50      I&O's Detail    22 Nov 2023 07:01  -  23 Nov 2023 07:00  --------------------------------------------------------  IN:    IV PiggyBack: 500 mL    Jevity 1.5: 556 mL    Lactated Ringers: 900 mL  Total IN: 1956 mL    OUT:    Bulb (mL): 20 mL    Indwelling Catheter - Urethral (mL): 440 mL    Intermittent Catheterization - Urethral (mL): 300 mL    VAC (Vacuum Assisted Closure) System (mL): 0 mL  Total OUT: 760 mL    Total NET: 1196 mL      MEDICATIONS  (STANDING):  chlorhexidine 0.12% Liquid 15 milliLiter(s) Swish and Spit two times a day  enoxaparin Injectable 40 milliGRAM(s) SubCutaneous every 24 hours  gabapentin Solution 100 milliGRAM(s) Oral three times a day  HYDROmorphone  Injectable 0.5 milliGRAM(s) IV Push once  influenza   Vaccine 0.5 milliLiter(s) IntraMuscular once  pantoprazole   Suspension 40 milliGRAM(s) Oral daily  polyethylene glycol 3350 17 Gram(s) Oral daily  senna 2 Tablet(s) Oral at bedtime    MEDICATIONS  (PRN):  ondansetron Injectable 4 milliGRAM(s) IV Push every 4 hours PRN Nausea  oxyCODONE    Solution 5 milliGRAM(s) Oral every 4 hours PRN Moderate Pain (4 - 6)  oxyCODONE    Solution 10 milliGRAM(s) Oral every 4 hours PRN Severe Pain (7 - 10)      PHYSICAL EXAM:  Gen: NAD  Resp: breathing easily, no stridor  CV: RRR  Abdomen: soft, nontender, nondistended  Skin: Incision c/d/i. Normal color, no rashes or lesions  Cook signal strong. Surgical sites hemostatic. Pain well controlled. JESSEE drain L. neck, R. leg. A-line L. arm.

## 2023-11-23 NOTE — PROGRESS NOTE ADULT - ASSESSMENT
A: Ms Dsouza is a 57yo F with previous hx of FFF for mandibular reconstruction c/b osteoradionecrosis who is now s/p RLE FFF for reconstruction on 11/21. Patient is recovering well.     Plan:  -ERAS protocol  -Cont vac  -Cont cook doppler  -OOB with CAM boot  -Monitor I&Os, JESSEE output  -Appreciate great care per FREDO Zamarripa [place]  Plastic Surgery  #[phone] [facility] pager  [phone] [facility] pager  Available on teams

## 2023-11-23 NOTE — PROGRESS NOTE ADULT - SUBJECTIVE AND OBJECTIVE BOX
SICU Daily Progress Note  =====================================================  Interval/Overnight Events:     - q4hr flap checks  - TOV at 10:00PM  Allergies: morphine (Hives)      MEDICATIONS:   --------------------------------------------------------------------------------------  Neurologic Medications  gabapentin Solution 100 milliGRAM(s) Oral three times a day  ondansetron Injectable 4 milliGRAM(s) IV Push every 4 hours PRN Nausea  oxyCODONE    Solution 5 milliGRAM(s) Oral every 4 hours PRN Moderate Pain (4 - 6)  oxyCODONE    Solution 10 milliGRAM(s) Oral every 4 hours PRN Severe Pain (7 - 10)    Respiratory Medications    Cardiovascular Medications    Gastrointestinal Medications  pantoprazole   Suspension 40 milliGRAM(s) Oral daily  polyethylene glycol 3350 17 Gram(s) Oral daily  senna 2 Tablet(s) Oral at bedtime    Genitourinary Medications    Hematologic/Oncologic Medications  enoxaparin Injectable 40 milliGRAM(s) SubCutaneous every 24 hours  influenza   Vaccine 0.5 milliLiter(s) IntraMuscular once    Antimicrobial/Immunologic Medications    Endocrine/Metabolic Medications    Topical/Other Medications  chlorhexidine 0.12% Liquid 15 milliLiter(s) Swish and Spit two times a day    --------------------------------------------------------------------------------------    VITAL SIGNS, INS/OUTS (last 24 hours):  --------------------------------------------------------------------------------------   T(C): 36.8 (11-22-23 @ 20:00), Max: 37.1 (11-22-23 @ 08:00)  HR: 74 (11-22-23 @ 21:00) (65 - 83)  BP: 110/62 (11-22-23 @ 21:00) (98/60 - 124/69)  BP(mean): 75 (11-22-23 @ 21:00) (73 - 86)  ABP: 96/41 (11-22-23 @ 09:00) (96/41 - 127/61)  ABP(mean): 62 (11-22-23 @ 09:00) (62 - 89)  RR: 14 (11-22-23 @ 21:00) (10 - 20)  SpO2: 100% (11-22-23 @ 21:00) (93% - 100%)  CVP(mm Hg): --  CI: --  CAPILLARY BLOOD GLUCOSE       N/A    11-21 @ 07:01  -  11-22 @ 07:00  --------------------------------------------------------  IN:    IV PiggyBack: 650 mL    Lactated Ringers: 30 mL    Lactated Ringers: 1575 mL    Phenylephrine: 10.2 mL    PRBCs (Packed Red Blood Cells): 300 mL  Total IN: 2565.2 mL    OUT:    Bulb (mL): 45 mL    Indwelling Catheter - Urethral (mL): 1245 mL  Total OUT: 1290 mL    Total NET: 1275.2 mL      11-22 @ 07:01  -  11-23 @ 00:05  --------------------------------------------------------  IN:    IV PiggyBack: 400 mL    Jevity 1.5: 148 mL    Lactated Ringers: 900 mL  Total IN: 1448 mL    OUT:    Bulb (mL): 15 mL    Indwelling Catheter - Urethral (mL): 440 mL  Total OUT: 455 mL    Total NET: 993 mL        --------------------------------------------------------------------------------------    EXAM  General: Resting comfortably in bed  HEENT: Trach, free flap to right mandible w/ external skin paddle, warm and well perfused, strong doppler signal.   Pulm: Equal chest rise and fall; trach  CV: RRR  Abdomen: nontender, nondistended  Neuro: moving all extremities spontaneously  Extremities: RLE wrapped with ace bandage, wound vac in place, skin graft donor site with overlying foam thrombin and tegederm.     LABS  --------------------------------------------------------------------------------------   CBC (11-22 @ 01:35)                              9.2<L>                         9.89    )----------------(  200        --    % Neutrophils, --    % Lymphocytes, ANC: --                                  27.2<L>              CBC (11-21 @ 17:14)                              7.1<L>                         6.08    )----------------(  166        --    % Neutrophils, --    % Lymphocytes, ANC: --                                  22.1<L>                BMP (11-22 @ 01:35)             138     |  103     |  11    		Ca++ --      Ca 8.0<L>             ---------------------------------( 137<H>		Mg 1.60               4.1     |  25      |  0.92  			Ph 3.8       LFTs (11-22 @ 01:35)      TPro 5.0<L> / Alb 3.4 / TBili 0.2 / DBili -- / AST 11 / ALT 6 / AlkPhos 56            --------------------------------------------------------------------------------------    IMAGING STUDIES: SICU Daily Progress Note  =====================================================  Interval events:  - q2hr flap checks, can be q4hr 11/23 PM  - straight cath x1, TOV 9AM  - 0.5 mg dilaudid for breakthrough pain      MEDICATIONS:   --------------------------------------------------------------------------------------  Neurologic Medications  gabapentin Solution 100 milliGRAM(s) Oral three times a day  ondansetron Injectable 4 milliGRAM(s) IV Push every 4 hours PRN Nausea  oxyCODONE    Solution 5 milliGRAM(s) Oral every 4 hours PRN Moderate Pain (4 - 6)  oxyCODONE    Solution 10 milliGRAM(s) Oral every 4 hours PRN Severe Pain (7 - 10)    Respiratory Medications    Cardiovascular Medications    Gastrointestinal Medications  pantoprazole   Suspension 40 milliGRAM(s) Oral daily  polyethylene glycol 3350 17 Gram(s) Oral daily  senna 2 Tablet(s) Oral at bedtime    Genitourinary Medications    Hematologic/Oncologic Medications  enoxaparin Injectable 40 milliGRAM(s) SubCutaneous every 24 hours  influenza   Vaccine 0.5 milliLiter(s) IntraMuscular once    Antimicrobial/Immunologic Medications    Endocrine/Metabolic Medications    Topical/Other Medications  chlorhexidine 0.12% Liquid 15 milliLiter(s) Swish and Spit two times a day    --------------------------------------------------------------------------------------    VITAL SIGNS, INS/OUTS (last 24 hours):  --------------------------------------------------------------------------------------   T(C): 36.8 (11-22-23 @ 20:00), Max: 37.1 (11-22-23 @ 08:00)  HR: 74 (11-22-23 @ 21:00) (65 - 83)  BP: 110/62 (11-22-23 @ 21:00) (98/60 - 124/69)  BP(mean): 75 (11-22-23 @ 21:00) (73 - 86)  ABP: 96/41 (11-22-23 @ 09:00) (96/41 - 127/61)  ABP(mean): 62 (11-22-23 @ 09:00) (62 - 89)  RR: 14 (11-22-23 @ 21:00) (10 - 20)  SpO2: 100% (11-22-23 @ 21:00) (93% - 100%)  CVP(mm Hg): --  CI: --  CAPILLARY BLOOD GLUCOSE       N/A    11-21 @ 07:01  -  11-22 @ 07:00  --------------------------------------------------------  IN:    IV PiggyBack: 650 mL    Lactated Ringers: 30 mL    Lactated Ringers: 1575 mL    Phenylephrine: 10.2 mL    PRBCs (Packed Red Blood Cells): 300 mL  Total IN: 2565.2 mL    OUT:    Bulb (mL): 45 mL    Indwelling Catheter - Urethral (mL): 1245 mL  Total OUT: 1290 mL    Total NET: 1275.2 mL      11-22 @ 07:01  -  11-23 @ 00:05  --------------------------------------------------------  IN:    IV PiggyBack: 400 mL    Jevity 1.5: 148 mL    Lactated Ringers: 900 mL  Total IN: 1448 mL    OUT:    Bulb (mL): 15 mL    Indwelling Catheter - Urethral (mL): 440 mL  Total OUT: 455 mL    Total NET: 993 mL        --------------------------------------------------------------------------------------    EXAM  General: Resting comfortably in bed  HEENT: Trach, free flap to right mandible w/ external skin paddle, warm and well perfused, strong doppler signal.   Pulm: Equal chest rise and fall; trach  CV: RRR  Abdomen: nontender, nondistended  Neuro: moving all extremities spontaneously  Extremities: RLE wrapped with ace bandage, wound vac in place, skin graft donor site with overlying foam thrombin and tegederm.     LABS  --------------------------------------------------------------------------------------   CBC (11-22 @ 01:35)                              9.2<L>                         9.89    )----------------(  200        --    % Neutrophils, --    % Lymphocytes, ANC: --                                  27.2<L>              CBC (11-21 @ 17:14)                              7.1<L>                         6.08    )----------------(  166        --    % Neutrophils, --    % Lymphocytes, ANC: --                                  22.1<L>                BMP (11-22 @ 01:35)             138     |  103     |  11    		Ca++ --      Ca 8.0<L>             ---------------------------------( 137<H>		Mg 1.60               4.1     |  25      |  0.92  			Ph 3.8       LFTs (11-22 @ 01:35)      TPro 5.0<L> / Alb 3.4 / TBili 0.2 / DBili -- / AST 11 / ALT 6 / AlkPhos 56            --------------------------------------------------------------------------------------    IMAGING STUDIES:

## 2023-11-23 NOTE — PROGRESS NOTE ADULT - ATTENDING COMMENTS
s/p trach and FOM resection and reconstruction with free flap   q-2 to transition to q4 flap checks  -monitor airway, suction requirements down  -pain control    Perez Chen MD  Acute and Critical Care Surgery    The Acute Care Surgery (B Team) Attending Group Practice:  Dr. Marcel Proctor, Dr. Santiago Albarado, Dr. Perez Chen,  Dr. Neal Seymour and Dr. Emmanuelle Post     Urgent issues - spectra [id number] or [phone]  Nonurgent issues - [phone]  Patient appointments or after hours - [phone]

## 2023-11-23 NOTE — PROGRESS NOTE ADULT - ASSESSMENT
58F s/p mandibulectomy, radical lymphadenectomy, trach, and right fibula osteocutaneous flap with microvascular flap with anastomosis, split thickness skin graft to RLE 11/21.    Plan:  - ERAS protocol

## 2023-11-24 LAB
ANION GAP SERPL CALC-SCNC: 10 MMOL/L — SIGNIFICANT CHANGE UP (ref 7–14)
ANION GAP SERPL CALC-SCNC: 10 MMOL/L — SIGNIFICANT CHANGE UP (ref 7–14)
BUN SERPL-MCNC: 18 MG/DL — SIGNIFICANT CHANGE UP (ref 7–23)
BUN SERPL-MCNC: 18 MG/DL — SIGNIFICANT CHANGE UP (ref 7–23)
CALCIUM SERPL-MCNC: 8.5 MG/DL — SIGNIFICANT CHANGE UP (ref 8.4–10.5)
CALCIUM SERPL-MCNC: 8.5 MG/DL — SIGNIFICANT CHANGE UP (ref 8.4–10.5)
CHLORIDE SERPL-SCNC: 102 MMOL/L — SIGNIFICANT CHANGE UP (ref 98–107)
CHLORIDE SERPL-SCNC: 102 MMOL/L — SIGNIFICANT CHANGE UP (ref 98–107)
CO2 SERPL-SCNC: 23 MMOL/L — SIGNIFICANT CHANGE UP (ref 22–31)
CO2 SERPL-SCNC: 23 MMOL/L — SIGNIFICANT CHANGE UP (ref 22–31)
CREAT SERPL-MCNC: 0.97 MG/DL — SIGNIFICANT CHANGE UP (ref 0.5–1.3)
CREAT SERPL-MCNC: 0.97 MG/DL — SIGNIFICANT CHANGE UP (ref 0.5–1.3)
EGFR: 67 ML/MIN/1.73M2 — SIGNIFICANT CHANGE UP
EGFR: 67 ML/MIN/1.73M2 — SIGNIFICANT CHANGE UP
GLUCOSE BLDC GLUCOMTR-MCNC: 91 MG/DL — SIGNIFICANT CHANGE UP (ref 70–99)
GLUCOSE BLDC GLUCOMTR-MCNC: 91 MG/DL — SIGNIFICANT CHANGE UP (ref 70–99)
GLUCOSE SERPL-MCNC: 66 MG/DL — LOW (ref 70–99)
GLUCOSE SERPL-MCNC: 66 MG/DL — LOW (ref 70–99)
HCT VFR BLD CALC: 28.4 % — LOW (ref 34.5–45)
HCT VFR BLD CALC: 28.4 % — LOW (ref 34.5–45)
HGB BLD-MCNC: 9.4 G/DL — LOW (ref 11.5–15.5)
HGB BLD-MCNC: 9.4 G/DL — LOW (ref 11.5–15.5)
MAGNESIUM SERPL-MCNC: 2 MG/DL — SIGNIFICANT CHANGE UP (ref 1.6–2.6)
MAGNESIUM SERPL-MCNC: 2 MG/DL — SIGNIFICANT CHANGE UP (ref 1.6–2.6)
MCHC RBC-ENTMCNC: 30.6 PG — SIGNIFICANT CHANGE UP (ref 27–34)
MCHC RBC-ENTMCNC: 30.6 PG — SIGNIFICANT CHANGE UP (ref 27–34)
MCHC RBC-ENTMCNC: 33.1 GM/DL — SIGNIFICANT CHANGE UP (ref 32–36)
MCHC RBC-ENTMCNC: 33.1 GM/DL — SIGNIFICANT CHANGE UP (ref 32–36)
MCV RBC AUTO: 92.5 FL — SIGNIFICANT CHANGE UP (ref 80–100)
MCV RBC AUTO: 92.5 FL — SIGNIFICANT CHANGE UP (ref 80–100)
NRBC # BLD: 0 /100 WBCS — SIGNIFICANT CHANGE UP (ref 0–0)
NRBC # BLD: 0 /100 WBCS — SIGNIFICANT CHANGE UP (ref 0–0)
NRBC # FLD: 0 K/UL — SIGNIFICANT CHANGE UP (ref 0–0)
NRBC # FLD: 0 K/UL — SIGNIFICANT CHANGE UP (ref 0–0)
PHOSPHATE SERPL-MCNC: 2 MG/DL — LOW (ref 2.5–4.5)
PHOSPHATE SERPL-MCNC: 2 MG/DL — LOW (ref 2.5–4.5)
PLATELET # BLD AUTO: 224 K/UL — SIGNIFICANT CHANGE UP (ref 150–400)
PLATELET # BLD AUTO: 224 K/UL — SIGNIFICANT CHANGE UP (ref 150–400)
POTASSIUM SERPL-MCNC: 3.9 MMOL/L — SIGNIFICANT CHANGE UP (ref 3.5–5.3)
POTASSIUM SERPL-MCNC: 3.9 MMOL/L — SIGNIFICANT CHANGE UP (ref 3.5–5.3)
POTASSIUM SERPL-SCNC: 3.9 MMOL/L — SIGNIFICANT CHANGE UP (ref 3.5–5.3)
POTASSIUM SERPL-SCNC: 3.9 MMOL/L — SIGNIFICANT CHANGE UP (ref 3.5–5.3)
RBC # BLD: 3.07 M/UL — LOW (ref 3.8–5.2)
RBC # BLD: 3.07 M/UL — LOW (ref 3.8–5.2)
RBC # FLD: 16.3 % — HIGH (ref 10.3–14.5)
RBC # FLD: 16.3 % — HIGH (ref 10.3–14.5)
SODIUM SERPL-SCNC: 135 MMOL/L — SIGNIFICANT CHANGE UP (ref 135–145)
SODIUM SERPL-SCNC: 135 MMOL/L — SIGNIFICANT CHANGE UP (ref 135–145)
WBC # BLD: 8.76 K/UL — SIGNIFICANT CHANGE UP (ref 3.8–10.5)
WBC # BLD: 8.76 K/UL — SIGNIFICANT CHANGE UP (ref 3.8–10.5)
WBC # FLD AUTO: 8.76 K/UL — SIGNIFICANT CHANGE UP (ref 3.8–10.5)
WBC # FLD AUTO: 8.76 K/UL — SIGNIFICANT CHANGE UP (ref 3.8–10.5)

## 2023-11-24 PROCEDURE — 99233 SBSQ HOSP IP/OBS HIGH 50: CPT

## 2023-11-24 RX ORDER — HYDROMORPHONE HYDROCHLORIDE 2 MG/ML
0.2 INJECTION INTRAMUSCULAR; INTRAVENOUS; SUBCUTANEOUS ONCE
Refills: 0 | Status: DISCONTINUED | OUTPATIENT
Start: 2023-11-24 | End: 2023-11-24

## 2023-11-24 RX ORDER — SODIUM,POTASSIUM PHOSPHATES 278-250MG
1 POWDER IN PACKET (EA) ORAL
Refills: 0 | Status: COMPLETED | OUTPATIENT
Start: 2023-11-24 | End: 2023-11-25

## 2023-11-24 RX ORDER — ACETAMINOPHEN 500 MG
1000 TABLET ORAL EVERY 6 HOURS
Refills: 0 | Status: COMPLETED | OUTPATIENT
Start: 2023-11-24 | End: 2023-11-25

## 2023-11-24 RX ADMIN — GABAPENTIN 100 MILLIGRAM(S): 400 CAPSULE ORAL at 21:50

## 2023-11-24 RX ADMIN — POLYETHYLENE GLYCOL 3350 17 GRAM(S): 17 POWDER, FOR SOLUTION ORAL at 11:56

## 2023-11-24 RX ADMIN — OXYCODONE HYDROCHLORIDE 10 MILLIGRAM(S): 5 TABLET ORAL at 09:30

## 2023-11-24 RX ADMIN — CHLORHEXIDINE GLUCONATE 15 MILLILITER(S): 213 SOLUTION TOPICAL at 05:09

## 2023-11-24 RX ADMIN — Medication 1 PACKET(S): at 17:48

## 2023-11-24 RX ADMIN — Medication 400 MILLIGRAM(S): at 11:56

## 2023-11-24 RX ADMIN — OXYCODONE HYDROCHLORIDE 5 MILLIGRAM(S): 5 TABLET ORAL at 20:40

## 2023-11-24 RX ADMIN — ONDANSETRON 4 MILLIGRAM(S): 8 TABLET, FILM COATED ORAL at 11:54

## 2023-11-24 RX ADMIN — PANTOPRAZOLE SODIUM 40 MILLIGRAM(S): 20 TABLET, DELAYED RELEASE ORAL at 11:56

## 2023-11-24 RX ADMIN — GABAPENTIN 100 MILLIGRAM(S): 400 CAPSULE ORAL at 14:47

## 2023-11-24 RX ADMIN — OXYCODONE HYDROCHLORIDE 10 MILLIGRAM(S): 5 TABLET ORAL at 17:47

## 2023-11-24 RX ADMIN — Medication 400 MILLIGRAM(S): at 17:47

## 2023-11-24 RX ADMIN — HYDROMORPHONE HYDROCHLORIDE 0.2 MILLIGRAM(S): 2 INJECTION INTRAMUSCULAR; INTRAVENOUS; SUBCUTANEOUS at 11:54

## 2023-11-24 RX ADMIN — OXYCODONE HYDROCHLORIDE 10 MILLIGRAM(S): 5 TABLET ORAL at 04:37

## 2023-11-24 RX ADMIN — GABAPENTIN 100 MILLIGRAM(S): 400 CAPSULE ORAL at 05:09

## 2023-11-24 RX ADMIN — OXYCODONE HYDROCHLORIDE 10 MILLIGRAM(S): 5 TABLET ORAL at 09:03

## 2023-11-24 RX ADMIN — ENOXAPARIN SODIUM 40 MILLIGRAM(S): 100 INJECTION SUBCUTANEOUS at 21:50

## 2023-11-24 RX ADMIN — SENNA PLUS 2 TABLET(S): 8.6 TABLET ORAL at 21:50

## 2023-11-24 RX ADMIN — OXYCODONE HYDROCHLORIDE 10 MILLIGRAM(S): 5 TABLET ORAL at 05:37

## 2023-11-24 RX ADMIN — HYDROMORPHONE HYDROCHLORIDE 0.2 MILLIGRAM(S): 2 INJECTION INTRAMUSCULAR; INTRAVENOUS; SUBCUTANEOUS at 12:19

## 2023-11-24 RX ADMIN — CHLORHEXIDINE GLUCONATE 15 MILLILITER(S): 213 SOLUTION TOPICAL at 18:23

## 2023-11-24 RX ADMIN — OXYCODONE HYDROCHLORIDE 10 MILLIGRAM(S): 5 TABLET ORAL at 18:24

## 2023-11-24 RX ADMIN — OXYCODONE HYDROCHLORIDE 5 MILLIGRAM(S): 5 TABLET ORAL at 20:10

## 2023-11-24 NOTE — PROGRESS NOTE ADULT - SUBJECTIVE AND OBJECTIVE BOX
SICU Daily Progress Note  =====================================================     Interval events:  - q4hr flap checks   - passed TOV  - listed for floor   Allergies: morphine (Hives)      MEDICATIONS:   --------------------------------------------------------------------------------------  Neurologic Medications  gabapentin Solution 100 milliGRAM(s) Oral three times a day  ondansetron Injectable 4 milliGRAM(s) IV Push every 4 hours PRN Nausea  oxyCODONE    Solution 5 milliGRAM(s) Oral every 4 hours PRN Moderate Pain (4 - 6)  oxyCODONE    Solution 10 milliGRAM(s) Oral every 4 hours PRN Severe Pain (7 - 10)    Respiratory Medications    Cardiovascular Medications    Gastrointestinal Medications  pantoprazole   Suspension 40 milliGRAM(s) Oral daily  polyethylene glycol 3350 17 Gram(s) Oral daily  senna 2 Tablet(s) Oral at bedtime    Genitourinary Medications    Hematologic/Oncologic Medications  enoxaparin Injectable 40 milliGRAM(s) SubCutaneous every 24 hours  influenza   Vaccine 0.5 milliLiter(s) IntraMuscular once    Antimicrobial/Immunologic Medications    Endocrine/Metabolic Medications    Topical/Other Medications  chlorhexidine 0.12% Liquid 15 milliLiter(s) Swish and Spit two times a day    --------------------------------------------------------------------------------------    VITAL SIGNS, INS/OUTS (last 24 hours):  --------------------------------------------------------------------------------------   T(C): 36.1 (11-24-23 @ 00:00), Max: 36.3 (11-23-23 @ 08:00)  HR: 73 (11-24-23 @ 00:00) (68 - 96)  BP: 108/65 (11-24-23 @ 00:00) (94/60 - 132/70)  BP(mean): 79 (11-24-23 @ 00:00) (66 - 90)  ABP: --  ABP(mean): --  RR: 15 (11-24-23 @ 00:00) (10 - 25)  SpO2: 96% (11-24-23 @ 00:00) (96% - 100%)  CVP(mm Hg): --  CI: --  CAPILLARY BLOOD GLUCOSE       N/A    11-22 @ 07:01  -  11-23 @ 07:00  --------------------------------------------------------  IN:    IV PiggyBack: 500 mL    Jevity 1.5: 556 mL    Lactated Ringers: 900 mL  Total IN: 1956 mL    OUT:    Bulb (mL): 20 mL    Indwelling Catheter - Urethral (mL): 440 mL    Intermittent Catheterization - Urethral (mL): 300 mL    VAC (Vacuum Assisted Closure) System (mL): 0 mL  Total OUT: 760 mL    Total NET: 1196 mL      11-23 @ 07:01  -  11-24 @ 00:05  --------------------------------------------------------  IN:    Jevity 1.5: 408 mL  Total IN: 408 mL    OUT:    VAC (Vacuum Assisted Closure) System (mL): 0 mL    Voided (mL): 800 mL  Total OUT: 800 mL    Total NET: -392 mL        --------------------------------------------------------------------------------------  General: Resting comfortably in bed  HEENT: Trach, free flap to right mandible w/ external skin paddle, warm and well perfused, strong doppler signal.   Pulm: Equal chest rise and fall; trach  CV: RRR  Abdomen: nontender, nondistended  Neuro: moving all extremities spontaneously  Extremities: RLE wrapped with ace bandage, wound vac in place, skin graft donor site with overlying foam thrombin and tegederm. LABS  --------------------------------------------------------------------------------------   CBC (11-23 @ 02:50)                              8.5<L>                         11.41<H>  )----------------(  220        --    % Neutrophils, --    % Lymphocytes, ANC: --                                  25.3<L>                BMP (11-23 @ 01:05)             134<L>  |  100     |  16    		Ca++ --      Ca 8.5                ---------------------------------( 111<H>		Mg 1.90               4.4     |  21<L>   |  1.02  			Ph 3.4                 --------------------------------------------------------------------------------------    IMAGING STUDIES: SICU Daily Progress Note  =====================================================     Interval events:  - q4hr flap checks   - passed TOV  - listed for floor   - 1 episode of emesis after walking 2 laps with PT  Allergies: morphine (Hives)      MEDICATIONS:   --------------------------------------------------------------------------------------  Neurologic Medications  gabapentin Solution 100 milliGRAM(s) Oral three times a day  ondansetron Injectable 4 milliGRAM(s) IV Push every 4 hours PRN Nausea  oxyCODONE    Solution 5 milliGRAM(s) Oral every 4 hours PRN Moderate Pain (4 - 6)  oxyCODONE    Solution 10 milliGRAM(s) Oral every 4 hours PRN Severe Pain (7 - 10)    Respiratory Medications    Cardiovascular Medications    Gastrointestinal Medications  pantoprazole   Suspension 40 milliGRAM(s) Oral daily  polyethylene glycol 3350 17 Gram(s) Oral daily  senna 2 Tablet(s) Oral at bedtime    Genitourinary Medications    Hematologic/Oncologic Medications  enoxaparin Injectable 40 milliGRAM(s) SubCutaneous every 24 hours  influenza   Vaccine 0.5 milliLiter(s) IntraMuscular once    Antimicrobial/Immunologic Medications    Endocrine/Metabolic Medications    Topical/Other Medications  chlorhexidine 0.12% Liquid 15 milliLiter(s) Swish and Spit two times a day    --------------------------------------------------------------------------------------    VITAL SIGNS, INS/OUTS (last 24 hours):  --------------------------------------------------------------------------------------   T(C): 36.1 (11-24-23 @ 00:00), Max: 36.3 (11-23-23 @ 08:00)  HR: 73 (11-24-23 @ 00:00) (68 - 96)  BP: 108/65 (11-24-23 @ 00:00) (94/60 - 132/70)  BP(mean): 79 (11-24-23 @ 00:00) (66 - 90)  ABP: --  ABP(mean): --  RR: 15 (11-24-23 @ 00:00) (10 - 25)  SpO2: 96% (11-24-23 @ 00:00) (96% - 100%)  CVP(mm Hg): --  CI: --  CAPILLARY BLOOD GLUCOSE       N/A    11-22 @ 07:01  -  11-23 @ 07:00  --------------------------------------------------------  IN:    IV PiggyBack: 500 mL    Jevity 1.5: 556 mL    Lactated Ringers: 900 mL  Total IN: 1956 mL    OUT:    Bulb (mL): 20 mL    Indwelling Catheter - Urethral (mL): 440 mL    Intermittent Catheterization - Urethral (mL): 300 mL    VAC (Vacuum Assisted Closure) System (mL): 0 mL  Total OUT: 760 mL    Total NET: 1196 mL      11-23 @ 07:01  -  11-24 @ 00:05  --------------------------------------------------------  IN:    Jevity 1.5: 408 mL  Total IN: 408 mL    OUT:    VAC (Vacuum Assisted Closure) System (mL): 0 mL    Voided (mL): 800 mL  Total OUT: 800 mL    Total NET: -392 mL        --------------------------------------------------------------------------------------  General: Resting comfortably in bed  HEENT: Trach, free flap to right mandible w/ external skin paddle, warm and well perfused, strong doppler signal.   Pulm: Equal chest rise and fall; trach  CV: RRR  Abdomen: nontender, nondistended  Neuro: moving all extremities spontaneously  Extremities: RLE wrapped with ace bandage, wound vac in place, skin graft donor site with overlying foam thrombin and tegederm. LABS  --------------------------------------------------------------------------------------   CBC (11-23 @ 02:50)                              8.5<L>                         11.41<H>  )----------------(  220        --    % Neutrophils, --    % Lymphocytes, ANC: --                                  25.3<L>                BMP (11-23 @ 01:05)             134<L>  |  100     |  16    		Ca++ --      Ca 8.5                ---------------------------------( 111<H>		Mg 1.90               4.4     |  21<L>   |  1.02  			Ph 3.4                 --------------------------------------------------------------------------------------    IMAGING STUDIES:

## 2023-11-24 NOTE — PROGRESS NOTE ADULT - ASSESSMENT
A: Ms Dsouza is a 59yo F with previous hx of FFF for mandibular reconstruction c/b osteoradionecrosis who is now s/p RLE FFF for reconstruction on 11/21. Patient is recovering well.     Plan:  -ERAS protocol  -Cont vac  -Cont cook doppler  -OOB with CAM boot  -Monitor I&Os, JESSEE output  -Appreciate great care per FREDO Zamarripa [place]  Plastic Surgery  [phone] [facility] pager  [phone] [facility] pager  Available on teams

## 2023-11-24 NOTE — PROGRESS NOTE ADULT - ASSESSMENT
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 Pt s/p chemotherapy and radiation.     - 11/21: s/p mandibulectomy, radical lymphadenectomy, trach, and right fibula osteocutaneous flap with microvascular flap with anastomosis, split thickness skin graft to RLE.          Neuro:  -Pain: Tylenol, oxycodone PRN, gabapentin tid with dilaudid for breakthrough pain  -Flap checks Q4 hour    Resp:  -Tracheostomy; trach collar  -Maintain O2 saturation >92%    CV:  - MAP>65  - no active issues     GI/Nutrition:  - Diet: NPO with TF (Jevity)  - Protonix IV daily  - bowel regimen     Renal:  - monitor I&Os, electrolyes, replete   - passed TOV    Heme:  - monitor H/H   -DVT ppx--lovenox    ID:  - s/p [id number] day post-op  - monitor WBC and trend fever curve    Endo:  - Monitor blood glucose   - ISS    Dispo: floor

## 2023-11-24 NOTE — DIETITIAN INITIAL EVALUATION ADULT - OTHER INFO
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 Now POD#3 s/p mandibulectomy, radical lymphadenectomy, trach, right fibula osteocutaneous flap with microvascular flap with anastomosis, split thickness skin graft to RLE. OMFS, Plastic Surgery and ENT on board.    Pt communicates with nodding of head. Confirms NKFA. NGT in place, Jevity 1.5 visibly running at goal rate 34 mL/hr via pump during time of visit. Current nutrition prescription provides 816 mL formula, 1224 kcal, 52.1 gm protein, 621 mL free water daily (meets 22.5 kcal/kg, 1.0 gm protein/kg @ dosing weight 54.4 kg). Pt tolerating EN well, denies GI distress. No BM thus far however endorses flatus. Ordered for senna 2 tablets qHS and Miralax 17 gm BID. Labs notable for hypophosphatemia.

## 2023-11-24 NOTE — PROGRESS NOTE ADULT - SUBJECTIVE AND OBJECTIVE BOX
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 Pt is s/p segmental mandibulectomy with R partial buccal soft tissue and FOM excision, tracheostomy, right neck dissection in 1/2022, and removal of mandibular hardware in 10/2022. Pt completed chemotherapy and radiation. Pt reports open areas to right mandible with bone exposed.     11/24/23  Interval Events: passed TOV, merchant removed. voiding spontaneously now.  NAEON, aVSS.       Vitals:     Vital Signs Last 24 Hrs  T(C): 35.9 (24 Nov 2023 04:00), Max: 36.1 (23 Nov 2023 16:00)  T(F): 96.6 (24 Nov 2023 04:00), Max: 97 (24 Nov 2023 00:00)  HR: 74 (24 Nov 2023 04:00) (70 - 96)  BP: 100/54 (24 Nov 2023 04:00) (100/54 - 124/65)  BP(mean): 68 (24 Nov 2023 04:00) (68 - 85)  RR: 16 (24 Nov 2023 04:00) (10 - 25)  SpO2: 95% (24 Nov 2023 04:00) (95% - 100%)    Parameters below as of 24 Nov 2023 04:00  Patient On (Oxygen Delivery Method): tracheostomy collar  O2 Flow (L/min): 6  O2 Concentration (%): 28    I&O's Detail    23 Nov 2023 07:01  -  24 Nov 2023 07:00  --------------------------------------------------------  IN:    Jevity 1.5: 544 mL  Total IN: 544 mL    OUT:    Bulb (mL): 5 mL    VAC (Vacuum Assisted Closure) System (mL): 0 mL    Voided (mL): 1250 mL  Total OUT: 1255 mL    Total NET: -711 mL          Medications:    MEDICATIONS  (STANDING):  acetaminophen   IVPB .. 1000 milliGRAM(s) IV Intermittent every 6 hours  chlorhexidine 0.12% Liquid 15 milliLiter(s) Swish and Spit two times a day  enoxaparin Injectable 40 milliGRAM(s) SubCutaneous every 24 hours  gabapentin Solution 100 milliGRAM(s) Oral three times a day  influenza   Vaccine 0.5 milliLiter(s) IntraMuscular once  pantoprazole   Suspension 40 milliGRAM(s) Oral daily  polyethylene glycol 3350 17 Gram(s) Oral daily  senna 2 Tablet(s) Oral at bedtime    MEDICATIONS  (PRN):  ondansetron Injectable 4 milliGRAM(s) IV Push every 4 hours PRN Nausea  oxyCODONE    Solution 5 milliGRAM(s) Oral every 4 hours PRN Moderate Pain (4 - 6)  oxyCODONE    Solution 10 milliGRAM(s) Oral every 4 hours PRN Severe Pain (7 - 10)      Labs:                          9.4    8.76  )-----------( 224      ( 24 Nov 2023 05:10 )             28.4       11-24    135  |  102  |  18  ----------------------------<  66<L>  3.9   |  23  |  0.97    Ca    8.5      24 Nov 2023 05:10  Phos  2.0     11-24  Mg     2.00     11-24          PHYSICAL EXAM:  Gen: NAD  Resp: breathing easily, no stridor  CV: RRR  Abdomen: soft, nontender, nondistended  Skin: Incision c/d/i. Normal color, no rashes or lesions, skin paddle warm, pink, and well perfused.   IOE: flap warm, pink, well perfused. intraoral incisions clean, closed, and in tact. no signs of dehiscence intraoral sites healing well without signs of complication   extremities: donor site without complication sutures clean, closed, in tact. dressing in place.     Cook signal strong. Surgical sites hemostatic. Pain well controlled. JESSEE drain L. neck, R. leg

## 2023-11-24 NOTE — PROGRESS NOTE ADULT - SUBJECTIVE AND OBJECTIVE BOX
TEAM Plastic Surgery Daily Progress Note  =====================================================    SUBJECTIVE: Patient seen and examined at bedside on AM rounds. Patient reports that they're feeling well.       ALLERGIES:  morphine (Hives)      --------------------------------------------------------------------------------------    MEDICATIONS:    Neurologic Medications  acetaminophen   IVPB .. 1000 milliGRAM(s) IV Intermittent every 6 hours  HYDROmorphone  Injectable 0.5 milliGRAM(s) IV Push every 4 hours PRN Moderate Pain (4 - 6)  HYDROmorphone  Injectable 1 milliGRAM(s) IV Push every 4 hours PRN Severe Pain (7 - 10)  ondansetron Injectable 4 milliGRAM(s) IV Push every 4 hours PRN Nausea    Respiratory Medications    Cardiovascular Medications    Gastrointestinal Medications  lactated ringers. 1000 milliLiter(s) IV Continuous <Continuous>  pantoprazole  Injectable 40 milliGRAM(s) IV Push daily    Genitourinary Medications    Hematologic/Oncologic Medications  enoxaparin Injectable 40 milliGRAM(s) SubCutaneous every 24 hours    Antimicrobial/Immunologic Medications  ampicillin/sulbactam  IVPB      ampicillin/sulbactam  IVPB 3 Gram(s) IV Intermittent every 6 hours    Endocrine/Metabolic Medications  dexAMETHasone  IVPB 8 milliGRAM(s) IV Intermittent every 8 hours    Topical/Other Medications  chlorhexidine 0.12% Liquid 15 milliLiter(s) Swish and Spit two times a day    --------------------------------------------------------------------------------------    VITAL SIGNS:  ICU Vital Signs Last 24 Hrs  T(C): 35.9 (24 Nov 2023 04:00), Max: 36.1 (23 Nov 2023 16:00)  T(F): 96.6 (24 Nov 2023 04:00), Max: 97 (24 Nov 2023 00:00)  HR: 74 (24 Nov 2023 04:00) (70 - 96)  BP: 100/54 (24 Nov 2023 04:00) (100/54 - 124/65)  BP(mean): 68 (24 Nov 2023 04:00) (68 - 85)  ABP: --  ABP(mean): --  RR: 16 (24 Nov 2023 04:00) (10 - 25)  SpO2: 95% (24 Nov 2023 04:00) (95% - 100%)    O2 Parameters below as of 24 Nov 2023 04:00  Patient On (Oxygen Delivery Method): tracheostomy collar  O2 Flow (L/min): 6  O2 Concentration (%): 28    --------------------------------------------------------------------------------------    EXAM    General: NAD, resting in bed comfortably.  HEENT: Chin suture line cdi, flap with good cap refill, soft, dobhoff sutured in place, cook in place (+), trach'd  Cardiac: regular rate, warm and well perfused  Respiratory: Nonlabored respirations, normal cw expansion.  Extremities:   RLE with ACE wrap in place, cdi, soft, vac in place and holding suction. JESSEE with SS output.     --------------------------------------------------------------------------------------    LABS                           9.4    8.76  )-----------( 224      ( 24 Nov 2023 05:10 )             28.4   11-24    135  |  102  |  18  ----------------------------<  66<L>  3.9   |  23  |  0.97    Ca    8.5      24 Nov 2023 05:10  Phos  2.0     11-24  Mg     2.00     11-24          --------------------------------------------------------------------------------------    INS AND OUTS:    I&O's Detail    23 Nov 2023 07:01  -  24 Nov 2023 07:00  --------------------------------------------------------  IN:    Jevity 1.5: 544 mL  Total IN: 544 mL    OUT:    Bulb (mL): 5 mL    VAC (Vacuum Assisted Closure) System (mL): 0 mL    Voided (mL): 1250 mL  Total OUT: 1255 mL    Total NET: -711 mL    --------------------------------------------------------------------------------------

## 2023-11-24 NOTE — PROGRESS NOTE ADULT - ASSESSMENT
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 Pt is s/p segmental mandibulectomy with R partial buccal soft tissue and FOM excision, tracheostomy, right neck dissection in 1/2022, and removal of mandibular hardware in 10/2022. Pt completed chemotherapy and radiation. Pt reports open areas to right mandible with bone exposed. Pt is progressing well.     Plan:   -ERAS Day 3 Protocol  -Trend JESSEE Outputs  -OOBTC  -Multimodal pain management  -Appreciate excellent care per PINA Jay Pager: [phone]  [place] Pager: [phone]  [facility] Pager: [phone]  [employer] on [employer] Teams (PLEASE USE RESIDENT)

## 2023-11-24 NOTE — DIETITIAN INITIAL EVALUATION ADULT - OTHER CALCULATIONS
Weight history, per HIE: (11/21 dosing) 119.9 lbs / 54.4 kg, (7/25) 125 lbs, (4/28) 118 lbs, (1/10) 119 lbs.  Ideal Body Weight: 120 lbs / 54.5 kg +/-10%

## 2023-11-24 NOTE — PROGRESS NOTE ADULT - ATTENDING COMMENTS
I agree with the detailed interval history, physical, and plan, which I have reviewed and edited where appropriate'; also agree with notes/assessment with my team on service.  I have personally examined the patient.  I was physically present for the key portions of the evaluation and management (E/M) service provided.  I reviewed all the pertinent data.  The patient is a critical care patient with life threatening hemodynamic and metabolic instability in SICU.  The SICU team has a constant risk benefit analyzes discussion and coordinating care with the primary team and all consultants.   The patient is in SICU with the chief complaint and diagnosis mentioned in the note.   The plan will be specified in the note.  59 y/o female s/p mandibulectomy, radical lymphadenectomy, trach, and right fibula osteocutaneous flap with microvascular flap with anastomosis, split thickness skin graft to RLE in SICU for flap monitoring.   Exam:   General: NAD  HEENT: Trach, free flap  Pulm: clear  CV: RR  Abdomen: nontender  PLAN  Neuro:  -Tylenol  -dilaudid   -Flap checks   Resp:  -Pulmonary toilet  CV:  -MAP>65  GI/Nutrition:  -Diet: NPO  -Protonix   Renal:  -Dobbs  Heme:  -lovenox  ID:  -Unasyn  Endo:  -Monitor blood glucose    Dispo: SICU I agree with the detailed interval history, physical, and plan, which I have reviewed and edited where appropriate'; also agree with notes/assessment with my team on service.  I have personally examined the patient.  I was physically present for the key portions of the evaluation and management (E/M) service provided.  I reviewed all the pertinent data.  The patient is a critical care patient with life threatening hemodynamic and metabolic instability in SICU.  The SICU team has a constant risk benefit analyzes discussion and coordinating care with the primary team and all consultants.   The patient is in SICU with the chief complaint and diagnosis mentioned in the note.   The plan will be specified in the note.  57 y/o female s/p mandibulectomy, radical lymphadenectomy, trach, and right fibula osteocutaneous flap with microvascular flap with anastomosis, split thickness skin graft to RLE in SICU for flap monitoring.   Exam:   General: NAD  HEENT: Trach, free flap  Pulm: clear  CV: RR  Abdomen: nontender  PLAN  Neuro:  -Tylenol  -dilaudid   -Flap checks   Resp:  -Pulmonary toilet  CV:  -MAP>65  GI/Nutrition:  -Diet: NPO  -Protonix   Renal:  -Dobbs  Heme:  -lovenox  ID:  -Unasyn  Endo:  -Monitor blood glucose    Dispo: SICU.

## 2023-11-24 NOTE — DIETITIAN INITIAL EVALUATION ADULT - PERTINENT LABORATORY DATA
(11/24) Na 135, BUN 18, Cr 0.97, BG 66<L>, K+ 3.9, Phos 2.0<L>, Mg 2.00  (11/21) HbA1c 5.3%  POCT: (11/24) 91

## 2023-11-24 NOTE — PROGRESS NOTE ADULT - SUBJECTIVE AND OBJECTIVE BOX
OTOLARYNGOLOGY (ENT) PROGRESS NOTE    PATIENT: PARUL HERNANDEZ  MRN: [id number]  : 63  [id number]  DATE OF SERVICE:  23  	  Interval events:  - q4hr flap checks   - passed TOV  - listed for floor   - 1 episode of emesis after walking 2 laps with PT    ALLERGIES:  morphine (Hives)    MEDICATIONS:  Antiinfectives:     IV fluids:  potassium phosphate / sodium phosphate Powder (PHOS-NaK) 1 Packet(s) Oral two times a day    Hematologic/Anticoagulation:  enoxaparin Injectable 40 milliGRAM(s) SubCutaneous every 24 hours    Pain medications/Neuro:  acetaminophen   IVPB .. 1000 milliGRAM(s) IV Intermittent every 6 hours  gabapentin Solution 100 milliGRAM(s) Oral three times a day  ondansetron Injectable 4 milliGRAM(s) IV Push every 4 hours PRN  oxyCODONE    Solution 5 milliGRAM(s) Oral every 4 hours PRN  oxyCODONE    Solution 10 milliGRAM(s) Oral every 4 hours PRN    Endocrine Medications:     All other standing medications:   chlorhexidine 0.12% Liquid 15 milliLiter(s) Swish and Spit two times a day  influenza   Vaccine 0.5 milliLiter(s) IntraMuscular once  pantoprazole   Suspension 40 milliGRAM(s) Oral daily  polyethylene glycol 3350 17 Gram(s) Oral daily  senna 2 Tablet(s) Oral at bedtime    All other PRN medications:    Vital Signs Last 24 Hrs  T(C): 35.9 (2023 08:00), Max: 36.1 (2023 16:00)  T(F): 96.6 (2023 08:00), Max: 97 (2023 00:00)  HR: 67 (2023 08:00) (67 - 75)  BP: 116/69 (2023 08:00) (100/54 - 116/69)  BP(mean): 85 (2023 08:00) (68 - 85)  RR: 13 (2023 08:00) (12 - 16)  SpO2: 100% (2023 08:00) (95% - 100%)    Parameters below as of 2023 08:00  Patient On (Oxygen Delivery Method): tracheostomy collar  O2 Flow (L/min): 6  O2 Concentration (%): 28       @ 07:01  -   @ 07:00  --------------------------------------------------------  IN:    Jevity 1.5: 544 mL  Total IN: 544 mL    OUT:    Bulb (mL): 5 mL    VAC (Vacuum Assisted Closure) System (mL): 0 mL    Voided (mL): 1250 mL  Total OUT: 1255 mL    Total NET: -711 mL       @ 07:  -   @ 13:07  --------------------------------------------------------  IN:  Total IN: 0 mL    OUT:    Voided (mL): 450 mL  Total OUT: 450 mL    Total NET: -450 mL          23 @ 07:01  -  23 @ 07:00  --------------------------------------------------------  IN:  Total IN: 0 mL    OUT:    Bulb (mL): 5 mL    VAC (Vacuum Assisted Closure) System (mL): 0 mL  Total OUT: 5 mL    Total NET: -5 mL    Physical Exam:  Gen: NAD  HEENT: 6CN trach in place, to TC, skin paddle warm and well perfused, strong arterial doppler signal  Resp: unlabored respirations  CV: RRR  Abd: soft, nondistended  Ext: RLE wrapped with ACE, wound vac, stsg site healthy appearing    LABS                       9.4    8.76  )-----------( 224      ( 2023 05:10 )             28.4        135  |  102  |  18  ----------------------------<  66<L>  3.9   |  23  |  0.97    Ca    8.5      2023 05:10  Phos  2.0       Mg     2.00         Coagulation Studies-     Urinalysis Basic - ( 2023 05:10 )    Color: x / Appearance: x / SG: x / pH: x  Gluc: 66 mg/dL / Ketone: x  / Bili: x / Urobili: x   Blood: x / Protein: x / Nitrite: x   Leuk Esterase: x / RBC: x / WBC x   Sq Epi: x / Non Sq Epi: x / Bacteria: x    Endocrine Panel-  Calcium: 8.5 mg/dL ( @ 05:10)    MICROBIOLOGY:

## 2023-11-24 NOTE — DIETITIAN INITIAL EVALUATION ADULT - SIGNS/SYMPTOMS
Pt states Saturday she noticed an odor in her urine, she also noticed it Sunday. Today the odor is stronger and she noticed a slight tingling sensation at the end of urination. Pt will leave urine sample for culture and then  abx   protein/calorie intake below estimated nutrition needs

## 2023-11-24 NOTE — DIETITIAN INITIAL EVALUATION ADULT - ADD RECOMMEND
1) Recommend Jevity 1.5 via NGT at goal rate 40 mL/hr x24 hrs + Liquid Protein Supplement 2x daily to provide a total of 960 mL formula, 1640 kcal (1440 kcal from formula, 200 kcal from modular), 91.2 gm protein (61.2 gm protein from formula, 30 gm protein from modular), 730 mL free water daily. This meets 30.1 kcal/kg, 1.68 gm protein/kg @ dosing weight 54.4 kg. Additional provision of free water per medical discretion.   2) Monitor electrolytes (K+, Mg, P) and replete to within desired limits as clinically indicated.  3) Obtain daily weights via tared bed scale.

## 2023-11-24 NOTE — DIETITIAN INITIAL EVALUATION ADULT - NS FNS DIET ORDER
NPO with Tube Feed: Jevity 1.5 Amor via Nasogastric tube initiated at 20 mL/hr increased by 20 mL q6 hrs to goal rate 34 mL/hr x24 hrs

## 2023-11-25 LAB
ANION GAP SERPL CALC-SCNC: 10 MMOL/L — SIGNIFICANT CHANGE UP (ref 7–14)
ANION GAP SERPL CALC-SCNC: 10 MMOL/L — SIGNIFICANT CHANGE UP (ref 7–14)
BUN SERPL-MCNC: 16 MG/DL — SIGNIFICANT CHANGE UP (ref 7–23)
BUN SERPL-MCNC: 16 MG/DL — SIGNIFICANT CHANGE UP (ref 7–23)
CALCIUM SERPL-MCNC: 8.6 MG/DL — SIGNIFICANT CHANGE UP (ref 8.4–10.5)
CALCIUM SERPL-MCNC: 8.6 MG/DL — SIGNIFICANT CHANGE UP (ref 8.4–10.5)
CHLORIDE SERPL-SCNC: 97 MMOL/L — LOW (ref 98–107)
CHLORIDE SERPL-SCNC: 97 MMOL/L — LOW (ref 98–107)
CO2 SERPL-SCNC: 30 MMOL/L — SIGNIFICANT CHANGE UP (ref 22–31)
CO2 SERPL-SCNC: 30 MMOL/L — SIGNIFICANT CHANGE UP (ref 22–31)
CREAT SERPL-MCNC: 0.91 MG/DL — SIGNIFICANT CHANGE UP (ref 0.5–1.3)
CREAT SERPL-MCNC: 0.91 MG/DL — SIGNIFICANT CHANGE UP (ref 0.5–1.3)
EGFR: 73 ML/MIN/1.73M2 — SIGNIFICANT CHANGE UP
EGFR: 73 ML/MIN/1.73M2 — SIGNIFICANT CHANGE UP
GLUCOSE SERPL-MCNC: 79 MG/DL — SIGNIFICANT CHANGE UP (ref 70–99)
GLUCOSE SERPL-MCNC: 79 MG/DL — SIGNIFICANT CHANGE UP (ref 70–99)
HCT VFR BLD CALC: 32.1 % — LOW (ref 34.5–45)
HCT VFR BLD CALC: 32.1 % — LOW (ref 34.5–45)
HGB BLD-MCNC: 10.3 G/DL — LOW (ref 11.5–15.5)
HGB BLD-MCNC: 10.3 G/DL — LOW (ref 11.5–15.5)
MAGNESIUM SERPL-MCNC: 1.9 MG/DL — SIGNIFICANT CHANGE UP (ref 1.6–2.6)
MAGNESIUM SERPL-MCNC: 1.9 MG/DL — SIGNIFICANT CHANGE UP (ref 1.6–2.6)
MCHC RBC-ENTMCNC: 30.4 PG — SIGNIFICANT CHANGE UP (ref 27–34)
MCHC RBC-ENTMCNC: 30.4 PG — SIGNIFICANT CHANGE UP (ref 27–34)
MCHC RBC-ENTMCNC: 32.1 GM/DL — SIGNIFICANT CHANGE UP (ref 32–36)
MCHC RBC-ENTMCNC: 32.1 GM/DL — SIGNIFICANT CHANGE UP (ref 32–36)
MCV RBC AUTO: 94.7 FL — SIGNIFICANT CHANGE UP (ref 80–100)
MCV RBC AUTO: 94.7 FL — SIGNIFICANT CHANGE UP (ref 80–100)
NRBC # BLD: 0 /100 WBCS — SIGNIFICANT CHANGE UP (ref 0–0)
NRBC # BLD: 0 /100 WBCS — SIGNIFICANT CHANGE UP (ref 0–0)
NRBC # FLD: 0 K/UL — SIGNIFICANT CHANGE UP (ref 0–0)
NRBC # FLD: 0 K/UL — SIGNIFICANT CHANGE UP (ref 0–0)
PHOSPHATE SERPL-MCNC: 2.5 MG/DL — SIGNIFICANT CHANGE UP (ref 2.5–4.5)
PHOSPHATE SERPL-MCNC: 2.5 MG/DL — SIGNIFICANT CHANGE UP (ref 2.5–4.5)
PLATELET # BLD AUTO: 237 K/UL — SIGNIFICANT CHANGE UP (ref 150–400)
PLATELET # BLD AUTO: 237 K/UL — SIGNIFICANT CHANGE UP (ref 150–400)
POTASSIUM SERPL-MCNC: 4.1 MMOL/L — SIGNIFICANT CHANGE UP (ref 3.5–5.3)
POTASSIUM SERPL-MCNC: 4.1 MMOL/L — SIGNIFICANT CHANGE UP (ref 3.5–5.3)
POTASSIUM SERPL-SCNC: 4.1 MMOL/L — SIGNIFICANT CHANGE UP (ref 3.5–5.3)
POTASSIUM SERPL-SCNC: 4.1 MMOL/L — SIGNIFICANT CHANGE UP (ref 3.5–5.3)
RBC # BLD: 3.39 M/UL — LOW (ref 3.8–5.2)
RBC # BLD: 3.39 M/UL — LOW (ref 3.8–5.2)
RBC # FLD: 15.9 % — HIGH (ref 10.3–14.5)
RBC # FLD: 15.9 % — HIGH (ref 10.3–14.5)
SODIUM SERPL-SCNC: 137 MMOL/L — SIGNIFICANT CHANGE UP (ref 135–145)
SODIUM SERPL-SCNC: 137 MMOL/L — SIGNIFICANT CHANGE UP (ref 135–145)
WBC # BLD: 6.57 K/UL — SIGNIFICANT CHANGE UP (ref 3.8–10.5)
WBC # BLD: 6.57 K/UL — SIGNIFICANT CHANGE UP (ref 3.8–10.5)
WBC # FLD AUTO: 6.57 K/UL — SIGNIFICANT CHANGE UP (ref 3.8–10.5)
WBC # FLD AUTO: 6.57 K/UL — SIGNIFICANT CHANGE UP (ref 3.8–10.5)

## 2023-11-25 PROCEDURE — 99233 SBSQ HOSP IP/OBS HIGH 50: CPT

## 2023-11-25 PROCEDURE — 71045 X-RAY EXAM CHEST 1 VIEW: CPT | Mod: 26

## 2023-11-25 PROCEDURE — 93010 ELECTROCARDIOGRAM REPORT: CPT

## 2023-11-25 RX ORDER — AMPICILLIN SODIUM AND SULBACTAM SODIUM 250; 125 MG/ML; MG/ML
INJECTION, POWDER, FOR SUSPENSION INTRAMUSCULAR; INTRAVENOUS
Refills: 0 | Status: DISCONTINUED | OUTPATIENT
Start: 2023-11-25 | End: 2023-11-26

## 2023-11-25 RX ORDER — HYDROMORPHONE HYDROCHLORIDE 2 MG/ML
0.2 INJECTION INTRAMUSCULAR; INTRAVENOUS; SUBCUTANEOUS ONCE
Refills: 0 | Status: DISCONTINUED | OUTPATIENT
Start: 2023-11-25 | End: 2023-11-25

## 2023-11-25 RX ORDER — AMPICILLIN SODIUM AND SULBACTAM SODIUM 250; 125 MG/ML; MG/ML
3 INJECTION, POWDER, FOR SUSPENSION INTRAMUSCULAR; INTRAVENOUS EVERY 6 HOURS
Refills: 0 | Status: DISCONTINUED | OUTPATIENT
Start: 2023-11-25 | End: 2023-11-26

## 2023-11-25 RX ORDER — AMPICILLIN SODIUM AND SULBACTAM SODIUM 250; 125 MG/ML; MG/ML
3 INJECTION, POWDER, FOR SUSPENSION INTRAMUSCULAR; INTRAVENOUS ONCE
Refills: 0 | Status: COMPLETED | OUTPATIENT
Start: 2023-11-25 | End: 2023-11-25

## 2023-11-25 RX ADMIN — OXYCODONE HYDROCHLORIDE 10 MILLIGRAM(S): 5 TABLET ORAL at 17:29

## 2023-11-25 RX ADMIN — HYDROMORPHONE HYDROCHLORIDE 0.2 MILLIGRAM(S): 2 INJECTION INTRAMUSCULAR; INTRAVENOUS; SUBCUTANEOUS at 05:42

## 2023-11-25 RX ADMIN — ONDANSETRON 4 MILLIGRAM(S): 8 TABLET, FILM COATED ORAL at 09:28

## 2023-11-25 RX ADMIN — CHLORHEXIDINE GLUCONATE 15 MILLILITER(S): 213 SOLUTION TOPICAL at 07:07

## 2023-11-25 RX ADMIN — OXYCODONE HYDROCHLORIDE 10 MILLIGRAM(S): 5 TABLET ORAL at 02:06

## 2023-11-25 RX ADMIN — AMPICILLIN SODIUM AND SULBACTAM SODIUM 200 GRAM(S): 250; 125 INJECTION, POWDER, FOR SUSPENSION INTRAMUSCULAR; INTRAVENOUS at 17:30

## 2023-11-25 RX ADMIN — SENNA PLUS 2 TABLET(S): 8.6 TABLET ORAL at 22:25

## 2023-11-25 RX ADMIN — OXYCODONE HYDROCHLORIDE 10 MILLIGRAM(S): 5 TABLET ORAL at 21:33

## 2023-11-25 RX ADMIN — ONDANSETRON 4 MILLIGRAM(S): 8 TABLET, FILM COATED ORAL at 04:37

## 2023-11-25 RX ADMIN — OXYCODONE HYDROCHLORIDE 5 MILLIGRAM(S): 5 TABLET ORAL at 03:39

## 2023-11-25 RX ADMIN — AMPICILLIN SODIUM AND SULBACTAM SODIUM 200 GRAM(S): 250; 125 INJECTION, POWDER, FOR SUSPENSION INTRAMUSCULAR; INTRAVENOUS at 23:23

## 2023-11-25 RX ADMIN — Medication 1000 MILLIGRAM(S): at 07:42

## 2023-11-25 RX ADMIN — OXYCODONE HYDROCHLORIDE 10 MILLIGRAM(S): 5 TABLET ORAL at 10:00

## 2023-11-25 RX ADMIN — GABAPENTIN 100 MILLIGRAM(S): 400 CAPSULE ORAL at 07:07

## 2023-11-25 RX ADMIN — Medication 1000 MILLIGRAM(S): at 01:56

## 2023-11-25 RX ADMIN — GABAPENTIN 100 MILLIGRAM(S): 400 CAPSULE ORAL at 16:39

## 2023-11-25 RX ADMIN — POLYETHYLENE GLYCOL 3350 17 GRAM(S): 17 POWDER, FOR SOLUTION ORAL at 12:38

## 2023-11-25 RX ADMIN — OXYCODONE HYDROCHLORIDE 10 MILLIGRAM(S): 5 TABLET ORAL at 22:35

## 2023-11-25 RX ADMIN — Medication 400 MILLIGRAM(S): at 01:26

## 2023-11-25 RX ADMIN — ENOXAPARIN SODIUM 40 MILLIGRAM(S): 100 INJECTION SUBCUTANEOUS at 22:25

## 2023-11-25 RX ADMIN — PANTOPRAZOLE SODIUM 40 MILLIGRAM(S): 20 TABLET, DELAYED RELEASE ORAL at 12:38

## 2023-11-25 RX ADMIN — HYDROMORPHONE HYDROCHLORIDE 0.2 MILLIGRAM(S): 2 INJECTION INTRAMUSCULAR; INTRAVENOUS; SUBCUTANEOUS at 06:12

## 2023-11-25 RX ADMIN — AMPICILLIN SODIUM AND SULBACTAM SODIUM 200 GRAM(S): 250; 125 INJECTION, POWDER, FOR SUSPENSION INTRAMUSCULAR; INTRAVENOUS at 12:37

## 2023-11-25 RX ADMIN — CHLORHEXIDINE GLUCONATE 15 MILLILITER(S): 213 SOLUTION TOPICAL at 17:29

## 2023-11-25 RX ADMIN — GABAPENTIN 100 MILLIGRAM(S): 400 CAPSULE ORAL at 22:25

## 2023-11-25 RX ADMIN — OXYCODONE HYDROCHLORIDE 10 MILLIGRAM(S): 5 TABLET ORAL at 09:28

## 2023-11-25 RX ADMIN — OXYCODONE HYDROCHLORIDE 10 MILLIGRAM(S): 5 TABLET ORAL at 18:04

## 2023-11-25 RX ADMIN — Medication 400 MILLIGRAM(S): at 07:12

## 2023-11-25 RX ADMIN — OXYCODONE HYDROCHLORIDE 10 MILLIGRAM(S): 5 TABLET ORAL at 01:35

## 2023-11-25 NOTE — PROGRESS NOTE ADULT - ASSESSMENT
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 Pt is s/p segmental mandibulectomy with R partial buccal soft tissue and FOM excision, tracheostomy, right neck dissection in 1/2022, and removal of mandibular hardware in 10/2022. Pt completed chemotherapy and radiation. Pt reports open areas to right mandible with bone exposed. Pt is progressing well.     Plan:   -ERAS Day 4 Protocol  -Trend JESSEE Outputs  -OOBTC  -Multimodal pain management  -Appreciate excellent care per FREDO Porter  ELENI ALLAN [facility] [place] [phone]

## 2023-11-25 NOTE — PROGRESS NOTE ADULT - ATTENDING COMMENTS
I agree with the detailed interval history, physical, and plan, which I have reviewed and edited where appropriate'; also agree with notes/assessment with my team on service.  I have personally examined the patient.  I was physically present for the key portions of the evaluation and management (E/M) service provided.  I reviewed all the pertinent data.  The patient is a critical care patient with life threatening hemodynamic and metabolic instability in SICU.  The SICU team has a constant risk benefit analyzes discussion and coordinating care with the primary team and all consultants.   The patient is in SICU with the chief complaint and diagnosis mentioned in the note.   The plan will be specified in the note.  59 y/o female s/p mandibulectomy, radical lymphadenectomy, trach, and right fibula osteocutaneous flap with microvascular flap with anastomosis, split thickness skin graft to RLE in SICU for flap monitoring.   Exam:   General: NAD  HEENT: Trach, free flap  Pulm: clear  CV: RR  Abdomen: nontender  PLAN  Neuro:  -Tylenol  -dilaudid   -Flap checks   Resp:  -Pulmonary toilet  CV:  -MAP>65  GI/Nutrition:  -Diet: NPO  -Protonix   Renal:  -Dobbs  Heme:  -lovenox  ID:  -Unasyn  Endo:  -Monitor blood glucose    Dispo: DC FLOOR

## 2023-11-25 NOTE — PROGRESS NOTE ADULT - SUBJECTIVE AND OBJECTIVE BOX
SICU Daily Progress Note  =====================================================     Interval events:  - 1 episode of emesis after walking 2 laps with PT  - Trach capped  - No events overnight    Allergies: morphine (Hives)      MEDICATIONS:   --------------------------------------------------------------------------------------  Neurologic Medications  acetaminophen   IVPB .. 1000 milliGRAM(s) IV Intermittent every 6 hours  gabapentin Solution 100 milliGRAM(s) Oral three times a day  ondansetron Injectable 4 milliGRAM(s) IV Push every 4 hours PRN Nausea  oxyCODONE    Solution 5 milliGRAM(s) Oral every 4 hours PRN Moderate Pain (4 - 6)  oxyCODONE    Solution 10 milliGRAM(s) Oral every 4 hours PRN Severe Pain (7 - 10)    Respiratory Medications    Cardiovascular Medications    Gastrointestinal Medications  pantoprazole   Suspension 40 milliGRAM(s) Oral daily  polyethylene glycol 3350 17 Gram(s) Oral daily  potassium phosphate / sodium phosphate Powder (PHOS-NaK) 1 Packet(s) Oral two times a day  senna 2 Tablet(s) Oral at bedtime    Genitourinary Medications    Hematologic/Oncologic Medications  enoxaparin Injectable 40 milliGRAM(s) SubCutaneous every 24 hours  influenza   Vaccine 0.5 milliLiter(s) IntraMuscular once    Antimicrobial/Immunologic Medications    Endocrine/Metabolic Medications    Topical/Other Medications  chlorhexidine 0.12% Liquid 15 milliLiter(s) Swish and Spit two times a day    --------------------------------------------------------------------------------------    VITAL SIGNS, INS/OUTS (last 24 hours):  --------------------------------------------------------------------------------------   T(C): 35.9 (11-24-23 @ 20:00), Max: 36.1 (11-24-23 @ 16:02)  HR: 73 (11-24-23 @ 20:00) (67 - 90)  BP: 122/69 (11-24-23 @ 20:00) (93/56 - 122/69)  BP(mean): 85 (11-24-23 @ 20:00) (68 - 92)  ABP: --  ABP(mean): --  RR: 14 (11-24-23 @ 20:00) (12 - 16)  SpO2: 94% (11-24-23 @ 20:00) (89% - 100%)  CVP(mm Hg): --  CI: --  CAPILLARY BLOOD GLUCOSE      POCT Blood Glucose.: 91 mg/dL (24 Nov 2023 06:39)   N/A    11-23 @ 07:01  -  11-24 @ 07:00  --------------------------------------------------------  IN:    Jevity 1.5: 544 mL  Total IN: 544 mL    OUT:    Bulb (mL): 5 mL    VAC (Vacuum Assisted Closure) System (mL): 0 mL    Voided (mL): 1250 mL  Total OUT: 1255 mL    Total NET: -711 mL      11-24 @ 07:01  -  11-25 @ 00:10  --------------------------------------------------------  IN:    Jevity 1.5: 68 mL  Total IN: 68 mL    OUT:    Bulb (mL): 7 mL    VAC (Vacuum Assisted Closure) System (mL): 0 mL    Voided (mL): 1250 mL  Total OUT: 1257 mL    Total NET: -1189 mL        --------------------------------------------------------------------------------------    General: Resting comfortably in bed  HEENT: Trach, free flap to right mandible w/ external skin paddle, warm and well perfused, strong doppler signal.   Pulm: Equal chest rise and fall; trach  CV: RRR  Abdomen: nontender, nondistended  Neuro: moving all extremities spontaneously  Extremities: RLE wrapped with ace bandage, wound vac in place, skin graft donor site with overlying foam thrombin and tegederm.     LABS  --------------------------------------------------------------------------------------   CBC (11-24 @ 05:10)                              9.4<L>                         8.76    )----------------(  224        --    % Neutrophils, --    % Lymphocytes, ANC: --                                  28.4<L>                BMP (11-24 @ 05:10)             135     |  102     |  18    		Ca++ --      Ca 8.5                ---------------------------------( 66<L> 		Mg 2.00               3.9     |  23      |  0.97  			Ph 2.0<L>              --------------------------------------------------------------------------------------    IMAGING STUDIES:

## 2023-11-25 NOTE — PROGRESS NOTE ADULT - ASSESSMENT
A: Ms Dsouza is a 57yo F with previous hx of FFF for mandibular reconstruction c/b osteoradionecrosis who is now s/p RLE FFF for reconstruction on 11/21. Patient is recovering well.     Plan:  -ERAS protocol  -Would recommend restarting abx for skin cellulitis/erythema   -Cont vac  -Cont cook doppler  -OOB with CAM boot  -Monitor I&Os, JESSEE output  -Appreciate great care per SICU    [facility]  Plastic Surgery  [phone] [facility] pager  [phone] [facility] pager  Available on teams

## 2023-11-25 NOTE — PROGRESS NOTE ADULT - SUBJECTIVE AND OBJECTIVE BOX
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 Pt is s/p segmental mandibulectomy with R partial buccal soft tissue and FOM excision, tracheostomy, right neck dissection in 1/2022, and removal of mandibular hardware in 10/2022. Pt completed chemotherapy and radiation. Pt reports open areas to right mandible with bone exposed.     Interval Events: Downsized trach to 6 uncuffed Shiley. Unable to tolerate capping trial o/n.     Vital Signs Last 24 Hrs  T(C): 36.2 (25 Nov 2023 04:00), Max: 36.2 (25 Nov 2023 04:00)  T(F): 97.1 (25 Nov 2023 04:00), Max: 97.1 (25 Nov 2023 04:00)  HR: 82 (25 Nov 2023 08:00) (67 - 90)  BP: 91/65 (25 Nov 2023 04:00) (91/65 - 122/69)  BP(mean): 74 (25 Nov 2023 04:00) (69 - 92)  RR: 14 (25 Nov 2023 08:00) (12 - 16)  SpO2: 91% (25 Nov 2023 08:00) (89% - 100%)    Parameters below as of 25 Nov 2023 08:00  Patient On (Oxygen Delivery Method): tracheostomy collar  O2 Flow (L/min): 6  O2 Concentration (%): 28    I&O's Detail    24 Nov 2023 07:01  -  25 Nov 2023 07:00  --------------------------------------------------------  IN:    Jevity 1.5: 250 mL  Total IN: 250 mL    OUT:    Bulb (mL): 15 mL    VAC (Vacuum Assisted Closure) System (mL): 0 mL    Voided (mL): 1900 mL  Total OUT: 1915 mL    Total NET: -1665 mL      MEDICATIONS  (STANDING):  chlorhexidine 0.12% Liquid 15 milliLiter(s) Swish and Spit two times a day  enoxaparin Injectable 40 milliGRAM(s) SubCutaneous every 24 hours  gabapentin Solution 100 milliGRAM(s) Oral three times a day  influenza   Vaccine 0.5 milliLiter(s) IntraMuscular once  pantoprazole   Suspension 40 milliGRAM(s) Oral daily  polyethylene glycol 3350 17 Gram(s) Oral daily  potassium phosphate / sodium phosphate Powder (PHOS-NaK) 1 Packet(s) Oral two times a day  senna 2 Tablet(s) Oral at bedtime    MEDICATIONS  (PRN):  ondansetron Injectable 4 milliGRAM(s) IV Push every 4 hours PRN Nausea  oxyCODONE    Solution 5 milliGRAM(s) Oral every 4 hours PRN Moderate Pain (4 - 6)  oxyCODONE    Solution 10 milliGRAM(s) Oral every 4 hours PRN Severe Pain (7 - 10)      PHYSICAL EXAM:  Gen: NAD  Resp: breathing easily, no stridor  CV: RRR  Abdomen: soft, nontender, nondistended  Skin: Incision c/d/i. Normal color, no rashes or lesions, skin paddle warm, pink, and well perfused.   IOE: flap warm, pink, well perfused. intraoral incisions clean, closed, and in tact. no signs of dehiscence intraoral sites healing well without signs of complication   extremities: donor site without complication sutures clean, closed, in tact. dressing in place.     Cook signal strong. Surgical sites hemostatic. Pain well controlled. JESSEE drain L. neck, R. leg

## 2023-11-25 NOTE — PROGRESS NOTE ADULT - ASSESSMENT
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 Pt s/p chemotherapy and radiation.     - 11/21: s/p mandibulectomy, radical lymphadenectomy, trach, and right fibula osteocutaneous flap with microvascular flap with anastomosis, split thickness skin graft to RLE.          Neuro:  -Pain: Tylenol, oxycodone PRN, gabapentin tid with dilaudid for breakthrough pain  -Flap checks Q4 hour    Resp:  -Tracheostomy; trach capped  -Maintain O2 saturation >92%    CV:  - MAP>65  - no active issues     GI/Nutrition:  - Diet: NPO with TF (Jevity)  - Protonix IV daily  - bowel regimen     Renal:  - monitor I&Os, electrolyes, replete   - passed TOV    Heme:  - monitor H/H   -DVT ppx--lovenox    ID:  - s/p Unasynx1 day post-op  - monitor WBC and trend fever curve    Endo:  - Monitor blood glucose   - ISS    Dispo: floor   58F PMH SCC right mandibular gingiva with metastasis to right neck, s/p resection with segmental mandibulectomy with a partial right buccal soft tissue and FOM excision, tracheostomy, right neck dissection in 1/2022, and removal of mandibular hardware in 10/2022. Pt s/p chemotherapy and radiation.     - 11/21: s/p mandibulectomy, radical lymphadenectomy, trach, and right fibula osteocutaneous flap with microvascular flap with anastomosis, split thickness skin graft to RLE.      PLAN:  Neuro:  -Pain: Tylenol, oxycodone PRN, gabapentin tid with dilaudid for breakthrough pain  -Flap checks Q4 hour    Resp:  -Tracheostomy; trach capped  -Maintain O2 saturation >92%    CV:  - MAP>65  - no active issues     GI/Nutrition:  - Diet: NPO with TF (Jevity)  - Protonix IV daily  - bowel regimen     Renal:  - monitor I&Os, electrolyes, replete   - passed TOV    Heme:  - monitor H/H   -DVT ppx--lovenox    ID:  - s/p Unasynx1 day post-op  - monitor WBC and trend fever curve    Endo:  - Monitor blood glucose   - ISS    Dispo: floor

## 2023-11-25 NOTE — PROGRESS NOTE ADULT - SUBJECTIVE AND OBJECTIVE BOX
[facility] Daily Progress Note  =====================================================    SUBJECTIVE: Patient seen and examined at bedside on AM rounds. Patient reports that they're feeling well. Some new facial erythema/cellulitis this AM.       ALLERGIES:  morphine (Hives)      --------------------------------------------------------------------------------------    MEDICATIONS:    Neurologic Medications  acetaminophen   IVPB .. 1000 milliGRAM(s) IV Intermittent every 6 hours  HYDROmorphone  Injectable 0.5 milliGRAM(s) IV Push every 4 hours PRN Moderate Pain (4 - 6)  HYDROmorphone  Injectable 1 milliGRAM(s) IV Push every 4 hours PRN Severe Pain (7 - 10)  ondansetron Injectable 4 milliGRAM(s) IV Push every 4 hours PRN Nausea    Respiratory Medications    Cardiovascular Medications    Gastrointestinal Medications  lactated ringers. 1000 milliLiter(s) IV Continuous <Continuous>  pantoprazole  Injectable 40 milliGRAM(s) IV Push daily    Genitourinary Medications    Hematologic/Oncologic Medications  enoxaparin Injectable 40 milliGRAM(s) SubCutaneous every 24 hours    Antimicrobial/Immunologic Medications  ampicillin/sulbactam  IVPB      ampicillin/sulbactam  IVPB 3 Gram(s) IV Intermittent every 6 hours    Endocrine/Metabolic Medications  dexAMETHasone  IVPB 8 milliGRAM(s) IV Intermittent every 8 hours    Topical/Other Medications  chlorhexidine 0.12% Liquid 15 milliLiter(s) Swish and Spit two times a day    --------------------------------------------------------------------------------------    VITAL SIGNS:  ICU Vital Signs Last 24 Hrs  T(C): 36.2 (25 Nov 2023 04:00), Max: 36.2 (25 Nov 2023 04:00)  T(F): 97.1 (25 Nov 2023 04:00), Max: 97.1 (25 Nov 2023 04:00)  HR: 82 (25 Nov 2023 08:00) (67 - 90)  BP: 91/65 (25 Nov 2023 04:00) (91/65 - 122/69)  BP(mean): 74 (25 Nov 2023 04:00) (69 - 92)  ABP: --  ABP(mean): --  RR: 14 (25 Nov 2023 08:00) (12 - 16)  SpO2: 91% (25 Nov 2023 08:00) (89% - 100%)    O2 Parameters below as of 25 Nov 2023 08:00  Patient On (Oxygen Delivery Method): tracheostomy collar  O2 Flow (L/min): 6  O2 Concentration (%): 28        --------------------------------------------------------------------------------------    EXAM    General: NAD, resting in bed comfortably.  HEENT: Chin suture line cdi, flap with good cap refill, soft, dobhoff sutured in place, cook in place (+), trach'd. Facial erythema on L aspect of face near incision.   Cardiac: regular rate, warm and well perfused  Respiratory: Nonlabored respirations, normal cw expansion.  Extremities:   RLE with ACE wrap in place, cdi, soft, vac in place and holding suction. JESSEE with SS output.     --------------------------------------------------------------------------------------    LABS     ICU Vital Signs Last 24 Hrs  T(C): 36.2 (25 Nov 2023 04:00), Max: 36.2 (25 Nov 2023 04:00)  T(F): 97.1 (25 Nov 2023 04:00), Max: 97.1 (25 Nov 2023 04:00)  HR: 82 (25 Nov 2023 08:00) (67 - 90)  BP: 91/65 (25 Nov 2023 04:00) (91/65 - 122/69)  BP(mean): 74 (25 Nov 2023 04:00) (69 - 92)  ABP: --  ABP(mean): --  RR: 14 (25 Nov 2023 08:00) (12 - 16)  SpO2: 91% (25 Nov 2023 08:00) (89% - 100%)    O2 Parameters below as of 25 Nov 2023 08:00  Patient On (Oxygen Delivery Method): tracheostomy collar  O2 Flow (L/min): 6  O2 Concentration (%): 28            --------------------------------------------------------------------------------------    INS AND OUTS:  I&O's Summary    24 Nov 2023 07:01  -  25 Nov 2023 07:00  --------------------------------------------------------  IN: 250 mL / OUT: 1915 mL / NET: -1665 mL        --------------------------------------------------------------------------------------

## 2023-11-25 NOTE — PROGRESS NOTE ADULT - SUBJECTIVE AND OBJECTIVE BOX
OTOLARYNGOLOGY (ENT) PROGRESS NOTE    PATIENT: PARUL HERNANDEZ  MRN: [id number]  : 63  [id number]  DATE OF SERVICE:  23  	  Interval events:  - q4hr flap checks   - passed TOV  - listed for floor   - 1 episode of emesis after walking 2 laps with PT    ALLERGIES:  morphine (Hives)    MEDICATIONS:  Antiinfectives:     IV fluids:  potassium phosphate / sodium phosphate Powder (PHOS-NaK) 1 Packet(s) Oral two times a day    Hematologic/Anticoagulation:  enoxaparin Injectable 40 milliGRAM(s) SubCutaneous every 24 hours    Pain medications/Neuro:  acetaminophen   IVPB .. 1000 milliGRAM(s) IV Intermittent every 6 hours  gabapentin Solution 100 milliGRAM(s) Oral three times a day  ondansetron Injectable 4 milliGRAM(s) IV Push every 4 hours PRN  oxyCODONE    Solution 5 milliGRAM(s) Oral every 4 hours PRN  oxyCODONE    Solution 10 milliGRAM(s) Oral every 4 hours PRN    Endocrine Medications:     All other standing medications:   chlorhexidine 0.12% Liquid 15 milliLiter(s) Swish and Spit two times a day  influenza   Vaccine 0.5 milliLiter(s) IntraMuscular once  pantoprazole   Suspension 40 milliGRAM(s) Oral daily  polyethylene glycol 3350 17 Gram(s) Oral daily  senna 2 Tablet(s) Oral at bedtime    All other PRN medications:    Vital Signs Last 24 Hrs  T(C): 35.9 (2023 08:00), Max: 36.1 (2023 16:00)  T(F): 96.6 (2023 08:00), Max: 97 (2023 00:00)  HR: 67 (2023 08:00) (67 - 75)  BP: 116/69 (2023 08:00) (100/54 - 116/69)  BP(mean): 85 (2023 08:00) (68 - 85)  RR: 13 (2023 08:00) (12 - 16)  SpO2: 100% (2023 08:00) (95% - 100%)    Parameters below as of 2023 08:00  Patient On (Oxygen Delivery Method): tracheostomy collar  O2 Flow (L/min): 6  O2 Concentration (%): 28       @ 07:01  -   @ 07:00  --------------------------------------------------------  IN:    Jevity 1.5: 544 mL  Total IN: 544 mL    OUT:    Bulb (mL): 5 mL    VAC (Vacuum Assisted Closure) System (mL): 0 mL    Voided (mL): 1250 mL  Total OUT: 1255 mL    Total NET: -711 mL       @ 07:  -   @ 13:07  --------------------------------------------------------  IN:  Total IN: 0 mL    OUT:    Voided (mL): 450 mL  Total OUT: 450 mL    Total NET: -450 mL          23 @ 07:01  -  23 @ 07:00  --------------------------------------------------------  IN:  Total IN: 0 mL    OUT:    Bulb (mL): 5 mL    VAC (Vacuum Assisted Closure) System (mL): 0 mL  Total OUT: 5 mL    Total NET: -5 mL    Physical Exam:  Gen: NAD  HEENT: 6CN trach in place, to TC, skin paddle warm and well perfused, strong arterial doppler signal  Resp: unlabored respirations  CV: RRR  Abd: soft, nondistended  Ext: RLE wrapped with ACE, wound vac, stsg site healthy appearing    LABS                       9.4    8.76  )-----------( 224      ( 2023 05:10 )             28.4        135  |  102  |  18  ----------------------------<  66<L>  3.9   |  23  |  0.97    Ca    8.5      2023 05:10  Phos  2.0       Mg     2.00         Coagulation Studies-     Urinalysis Basic - ( 2023 05:10 )    Color: x / Appearance: x / SG: x / pH: x  Gluc: 66 mg/dL / Ketone: x  / Bili: x / Urobili: x   Blood: x / Protein: x / Nitrite: x   Leuk Esterase: x / RBC: x / WBC x   Sq Epi: x / Non Sq Epi: x / Bacteria: x    Endocrine Panel-  Calcium: 8.5 mg/dL ( @ 05:10)    MICROBIOLOGY:

## 2023-11-26 LAB
ANION GAP SERPL CALC-SCNC: 9 MMOL/L — SIGNIFICANT CHANGE UP (ref 7–14)
ANION GAP SERPL CALC-SCNC: 9 MMOL/L — SIGNIFICANT CHANGE UP (ref 7–14)
BUN SERPL-MCNC: 14 MG/DL — SIGNIFICANT CHANGE UP (ref 7–23)
BUN SERPL-MCNC: 14 MG/DL — SIGNIFICANT CHANGE UP (ref 7–23)
CALCIUM SERPL-MCNC: 8.7 MG/DL — SIGNIFICANT CHANGE UP (ref 8.4–10.5)
CALCIUM SERPL-MCNC: 8.7 MG/DL — SIGNIFICANT CHANGE UP (ref 8.4–10.5)
CHLORIDE SERPL-SCNC: 97 MMOL/L — LOW (ref 98–107)
CHLORIDE SERPL-SCNC: 97 MMOL/L — LOW (ref 98–107)
CO2 SERPL-SCNC: 30 MMOL/L — SIGNIFICANT CHANGE UP (ref 22–31)
CO2 SERPL-SCNC: 30 MMOL/L — SIGNIFICANT CHANGE UP (ref 22–31)
CREAT SERPL-MCNC: 1.03 MG/DL — SIGNIFICANT CHANGE UP (ref 0.5–1.3)
CREAT SERPL-MCNC: 1.03 MG/DL — SIGNIFICANT CHANGE UP (ref 0.5–1.3)
EGFR: 63 ML/MIN/1.73M2 — SIGNIFICANT CHANGE UP
EGFR: 63 ML/MIN/1.73M2 — SIGNIFICANT CHANGE UP
GLUCOSE SERPL-MCNC: 76 MG/DL — SIGNIFICANT CHANGE UP (ref 70–99)
GLUCOSE SERPL-MCNC: 76 MG/DL — SIGNIFICANT CHANGE UP (ref 70–99)
HCT VFR BLD CALC: 28.2 % — LOW (ref 34.5–45)
HCT VFR BLD CALC: 28.2 % — LOW (ref 34.5–45)
HGB BLD-MCNC: 9.1 G/DL — LOW (ref 11.5–15.5)
HGB BLD-MCNC: 9.1 G/DL — LOW (ref 11.5–15.5)
MAGNESIUM SERPL-MCNC: 1.8 MG/DL — SIGNIFICANT CHANGE UP (ref 1.6–2.6)
MAGNESIUM SERPL-MCNC: 1.8 MG/DL — SIGNIFICANT CHANGE UP (ref 1.6–2.6)
MCHC RBC-ENTMCNC: 30.6 PG — SIGNIFICANT CHANGE UP (ref 27–34)
MCHC RBC-ENTMCNC: 30.6 PG — SIGNIFICANT CHANGE UP (ref 27–34)
MCHC RBC-ENTMCNC: 32.3 GM/DL — SIGNIFICANT CHANGE UP (ref 32–36)
MCHC RBC-ENTMCNC: 32.3 GM/DL — SIGNIFICANT CHANGE UP (ref 32–36)
MCV RBC AUTO: 94.9 FL — SIGNIFICANT CHANGE UP (ref 80–100)
MCV RBC AUTO: 94.9 FL — SIGNIFICANT CHANGE UP (ref 80–100)
NRBC # BLD: 0 /100 WBCS — SIGNIFICANT CHANGE UP (ref 0–0)
NRBC # BLD: 0 /100 WBCS — SIGNIFICANT CHANGE UP (ref 0–0)
NRBC # FLD: 0 K/UL — SIGNIFICANT CHANGE UP (ref 0–0)
NRBC # FLD: 0 K/UL — SIGNIFICANT CHANGE UP (ref 0–0)
PHOSPHATE SERPL-MCNC: 3 MG/DL — SIGNIFICANT CHANGE UP (ref 2.5–4.5)
PHOSPHATE SERPL-MCNC: 3 MG/DL — SIGNIFICANT CHANGE UP (ref 2.5–4.5)
PLATELET # BLD AUTO: 215 K/UL — SIGNIFICANT CHANGE UP (ref 150–400)
PLATELET # BLD AUTO: 215 K/UL — SIGNIFICANT CHANGE UP (ref 150–400)
POTASSIUM SERPL-MCNC: 4 MMOL/L — SIGNIFICANT CHANGE UP (ref 3.5–5.3)
POTASSIUM SERPL-MCNC: 4 MMOL/L — SIGNIFICANT CHANGE UP (ref 3.5–5.3)
POTASSIUM SERPL-SCNC: 4 MMOL/L — SIGNIFICANT CHANGE UP (ref 3.5–5.3)
POTASSIUM SERPL-SCNC: 4 MMOL/L — SIGNIFICANT CHANGE UP (ref 3.5–5.3)
RBC # BLD: 2.97 M/UL — LOW (ref 3.8–5.2)
RBC # BLD: 2.97 M/UL — LOW (ref 3.8–5.2)
RBC # FLD: 15.8 % — HIGH (ref 10.3–14.5)
RBC # FLD: 15.8 % — HIGH (ref 10.3–14.5)
SODIUM SERPL-SCNC: 136 MMOL/L — SIGNIFICANT CHANGE UP (ref 135–145)
SODIUM SERPL-SCNC: 136 MMOL/L — SIGNIFICANT CHANGE UP (ref 135–145)
WBC # BLD: 6.65 K/UL — SIGNIFICANT CHANGE UP (ref 3.8–10.5)
WBC # BLD: 6.65 K/UL — SIGNIFICANT CHANGE UP (ref 3.8–10.5)
WBC # FLD AUTO: 6.65 K/UL — SIGNIFICANT CHANGE UP (ref 3.8–10.5)
WBC # FLD AUTO: 6.65 K/UL — SIGNIFICANT CHANGE UP (ref 3.8–10.5)

## 2023-11-26 PROCEDURE — 99233 SBSQ HOSP IP/OBS HIGH 50: CPT

## 2023-11-26 PROCEDURE — 71045 X-RAY EXAM CHEST 1 VIEW: CPT | Mod: 26

## 2023-11-26 RX ORDER — PIPERACILLIN AND TAZOBACTAM 4; .5 G/20ML; G/20ML
3.38 INJECTION, POWDER, LYOPHILIZED, FOR SOLUTION INTRAVENOUS ONCE
Refills: 0 | Status: COMPLETED | OUTPATIENT
Start: 2023-11-26 | End: 2023-11-26

## 2023-11-26 RX ORDER — ACETAMINOPHEN 500 MG
1000 TABLET ORAL EVERY 6 HOURS
Refills: 0 | Status: DISCONTINUED | OUTPATIENT
Start: 2023-11-26 | End: 2023-11-26

## 2023-11-26 RX ORDER — GABAPENTIN 400 MG/1
100 CAPSULE ORAL THREE TIMES A DAY
Refills: 0 | Status: DISCONTINUED | OUTPATIENT
Start: 2023-11-26 | End: 2023-11-28

## 2023-11-26 RX ORDER — PANTOPRAZOLE SODIUM 20 MG/1
40 TABLET, DELAYED RELEASE ORAL DAILY
Refills: 0 | Status: DISCONTINUED | OUTPATIENT
Start: 2023-11-26 | End: 2023-11-27

## 2023-11-26 RX ORDER — PIPERACILLIN AND TAZOBACTAM 4; .5 G/20ML; G/20ML
3.38 INJECTION, POWDER, LYOPHILIZED, FOR SOLUTION INTRAVENOUS ONCE
Refills: 0 | Status: DISCONTINUED | OUTPATIENT
Start: 2023-11-26 | End: 2023-11-26

## 2023-11-26 RX ORDER — ACETAMINOPHEN 500 MG
1000 TABLET ORAL ONCE
Refills: 0 | Status: COMPLETED | OUTPATIENT
Start: 2023-11-26 | End: 2023-11-26

## 2023-11-26 RX ORDER — PIPERACILLIN AND TAZOBACTAM 4; .5 G/20ML; G/20ML
3.38 INJECTION, POWDER, LYOPHILIZED, FOR SOLUTION INTRAVENOUS EVERY 8 HOURS
Refills: 0 | Status: DISCONTINUED | OUTPATIENT
Start: 2023-11-26 | End: 2023-11-26

## 2023-11-26 RX ORDER — PIPERACILLIN AND TAZOBACTAM 4; .5 G/20ML; G/20ML
3.38 INJECTION, POWDER, LYOPHILIZED, FOR SOLUTION INTRAVENOUS EVERY 8 HOURS
Refills: 0 | Status: DISCONTINUED | OUTPATIENT
Start: 2023-11-26 | End: 2023-11-29

## 2023-11-26 RX ORDER — ACETAMINOPHEN 500 MG
1000 TABLET ORAL EVERY 6 HOURS
Refills: 0 | Status: DISCONTINUED | OUTPATIENT
Start: 2023-11-26 | End: 2023-11-27

## 2023-11-26 RX ORDER — POLYETHYLENE GLYCOL 3350 17 G/17G
17 POWDER, FOR SOLUTION ORAL DAILY
Refills: 0 | Status: DISCONTINUED | OUTPATIENT
Start: 2023-11-26 | End: 2023-11-27

## 2023-11-26 RX ORDER — MAGNESIUM SULFATE 500 MG/ML
2 VIAL (ML) INJECTION ONCE
Refills: 0 | Status: COMPLETED | OUTPATIENT
Start: 2023-11-26 | End: 2023-11-26

## 2023-11-26 RX ADMIN — OXYCODONE HYDROCHLORIDE 10 MILLIGRAM(S): 5 TABLET ORAL at 23:30

## 2023-11-26 RX ADMIN — PIPERACILLIN AND TAZOBACTAM 200 GRAM(S): 4; .5 INJECTION, POWDER, LYOPHILIZED, FOR SOLUTION INTRAVENOUS at 09:37

## 2023-11-26 RX ADMIN — Medication 1000 MILLIGRAM(S): at 01:03

## 2023-11-26 RX ADMIN — OXYCODONE HYDROCHLORIDE 10 MILLIGRAM(S): 5 TABLET ORAL at 18:56

## 2023-11-26 RX ADMIN — SENNA PLUS 2 TABLET(S): 8.6 TABLET ORAL at 21:53

## 2023-11-26 RX ADMIN — PIPERACILLIN AND TAZOBACTAM 25 GRAM(S): 4; .5 INJECTION, POWDER, LYOPHILIZED, FOR SOLUTION INTRAVENOUS at 14:10

## 2023-11-26 RX ADMIN — OXYCODONE HYDROCHLORIDE 10 MILLIGRAM(S): 5 TABLET ORAL at 10:07

## 2023-11-26 RX ADMIN — OXYCODONE HYDROCHLORIDE 10 MILLIGRAM(S): 5 TABLET ORAL at 22:43

## 2023-11-26 RX ADMIN — Medication 25 GRAM(S): at 08:27

## 2023-11-26 RX ADMIN — OXYCODONE HYDROCHLORIDE 10 MILLIGRAM(S): 5 TABLET ORAL at 05:45

## 2023-11-26 RX ADMIN — CHLORHEXIDINE GLUCONATE 15 MILLILITER(S): 213 SOLUTION TOPICAL at 05:45

## 2023-11-26 RX ADMIN — OXYCODONE HYDROCHLORIDE 10 MILLIGRAM(S): 5 TABLET ORAL at 14:10

## 2023-11-26 RX ADMIN — ENOXAPARIN SODIUM 40 MILLIGRAM(S): 100 INJECTION SUBCUTANEOUS at 21:52

## 2023-11-26 RX ADMIN — PANTOPRAZOLE SODIUM 40 MILLIGRAM(S): 20 TABLET, DELAYED RELEASE ORAL at 12:13

## 2023-11-26 RX ADMIN — GABAPENTIN 100 MILLIGRAM(S): 400 CAPSULE ORAL at 05:45

## 2023-11-26 RX ADMIN — POLYETHYLENE GLYCOL 3350 17 GRAM(S): 17 POWDER, FOR SOLUTION ORAL at 12:13

## 2023-11-26 RX ADMIN — OXYCODONE HYDROCHLORIDE 10 MILLIGRAM(S): 5 TABLET ORAL at 14:40

## 2023-11-26 RX ADMIN — GABAPENTIN 100 MILLIGRAM(S): 400 CAPSULE ORAL at 14:10

## 2023-11-26 RX ADMIN — OXYCODONE HYDROCHLORIDE 10 MILLIGRAM(S): 5 TABLET ORAL at 09:37

## 2023-11-26 RX ADMIN — GABAPENTIN 100 MILLIGRAM(S): 400 CAPSULE ORAL at 21:53

## 2023-11-26 RX ADMIN — CHLORHEXIDINE GLUCONATE 15 MILLILITER(S): 213 SOLUTION TOPICAL at 18:26

## 2023-11-26 RX ADMIN — AMPICILLIN SODIUM AND SULBACTAM SODIUM 200 GRAM(S): 250; 125 INJECTION, POWDER, FOR SUSPENSION INTRAMUSCULAR; INTRAVENOUS at 06:30

## 2023-11-26 RX ADMIN — PIPERACILLIN AND TAZOBACTAM 25 GRAM(S): 4; .5 INJECTION, POWDER, LYOPHILIZED, FOR SOLUTION INTRAVENOUS at 21:53

## 2023-11-26 RX ADMIN — ONDANSETRON 4 MILLIGRAM(S): 8 TABLET, FILM COATED ORAL at 12:13

## 2023-11-26 RX ADMIN — OXYCODONE HYDROCHLORIDE 10 MILLIGRAM(S): 5 TABLET ORAL at 18:26

## 2023-11-26 RX ADMIN — Medication 400 MILLIGRAM(S): at 00:29

## 2023-11-26 RX ADMIN — OXYCODONE HYDROCHLORIDE 10 MILLIGRAM(S): 5 TABLET ORAL at 06:45

## 2023-11-26 NOTE — PROGRESS NOTE ADULT - SUBJECTIVE AND OBJECTIVE BOX
[facility] Daily Progress Note  =====================================================    SUBJECTIVE: Patient seen and examined at bedside on AM rounds. Patient reports that they're feeling well. Erythema improved this AM. Back on Unasyn.      ALLERGIES:  morphine (Hives)      --------------------------------------------------------------------------------------    MEDICATIONS:    Neurologic Medications  acetaminophen   IVPB .. 1000 milliGRAM(s) IV Intermittent every 6 hours  HYDROmorphone  Injectable 0.5 milliGRAM(s) IV Push every 4 hours PRN Moderate Pain (4 - 6)  HYDROmorphone  Injectable 1 milliGRAM(s) IV Push every 4 hours PRN Severe Pain (7 - 10)  ondansetron Injectable 4 milliGRAM(s) IV Push every 4 hours PRN Nausea    Respiratory Medications    Cardiovascular Medications    Gastrointestinal Medications  lactated ringers. 1000 milliLiter(s) IV Continuous <Continuous>  pantoprazole  Injectable 40 milliGRAM(s) IV Push daily    Genitourinary Medications    Hematologic/Oncologic Medications  enoxaparin Injectable 40 milliGRAM(s) SubCutaneous every 24 hours    Antimicrobial/Immunologic Medications  ampicillin/sulbactam  IVPB      ampicillin/sulbactam  IVPB 3 Gram(s) IV Intermittent every 6 hours    Endocrine/Metabolic Medications  dexAMETHasone  IVPB 8 milliGRAM(s) IV Intermittent every 8 hours    Topical/Other Medications  chlorhexidine 0.12% Liquid 15 milliLiter(s) Swish and Spit two times a day    --------------------------------------------------------------------------------------    VITAL SIGNS:  ICU Vital Signs Last 24 Hrs  T(C): 36.6 (26 Nov 2023 08:00), Max: 37.2 (26 Nov 2023 00:00)  T(F): 97.9 (26 Nov 2023 08:00), Max: 98.9 (26 Nov 2023 00:00)  HR: 70 (26 Nov 2023 08:00) (70 - 104)  BP: 101/63 (26 Nov 2023 08:00) (99/64 - 120/73)  BP(mean): 76 (26 Nov 2023 08:00) (72 - 85)  ABP: --  ABP(mean): --  RR: 12 (26 Nov 2023 08:00) (10 - 18)  SpO2: 99% (26 Nov 2023 08:00) (90% - 100%)    O2 Parameters below as of 26 Nov 2023 08:00  Patient On (Oxygen Delivery Method): tracheostomy collar  O2 Flow (L/min): 8  O2 Concentration (%): 50    --------------------------------------------------------------------------------------    EXAM    General: NAD, resting in bed comfortably.  HEENT: Chin suture line cdi, flap with good cap refill, soft, dobhoff sutured in place, cook in place (+), trach'd. Facial erythema on L aspect of face near incision improved this AM.    Cardiac: regular rate, warm and well perfused  Respiratory: Nonlabored respirations, normal cw expansion.  Extremities:   RLE with ACE wrap in place, cdi, soft, vac in place and holding suction. JESSEE with SS output.     --------------------------------------------------------------------------------------    LABS                          9.1    6.65  )-----------( 215      ( 26 Nov 2023 06:25 )             28.2   11-26    136  |  97<L>  |  14  ----------------------------<  76  4.0   |  30  |  1.03    Ca    8.7      26 Nov 2023 06:25  Phos  3.0     11-26  Mg     1.80     11-26                --------------------------------------------------------------------------------------    INS AND OUTS:  I&O's Detail    25 Nov 2023 07:01  -  26 Nov 2023 07:00  --------------------------------------------------------  IN:  Total IN: 0 mL    OUT:    VAC (Vacuum Assisted Closure) System (mL): 0 mL    Voided (mL): 1050 mL  Total OUT: 1050 mL    Total NET: -1050 mL      26 Nov 2023 07:01  -  26 Nov 2023 10:42  --------------------------------------------------------  IN:    IV PiggyBack: 100 mL  Total IN: 100 mL    OUT:  Total OUT: 0 mL    Total NET: 100 mL            --------------------------------------------------------------------------------------

## 2023-11-26 NOTE — PROGRESS NOTE ADULT - SUBJECTIVE AND OBJECTIVE BOX
OTOLARYNGOLOGY (ENT) PROGRESS NOTE    PATIENT: PARUL HERNANDEZ  MRN: [id number]  : 63  [id number]  DATE OF SERVICE:  23  	  Subjective/ Interval:   AFVSS. No acute events overnight.     ALLERGIES:  morphine (Hives)      MEDICATIONS:  Antiinfectives:   piperacillin/tazobactam IVPB.- 3.375 Gram(s) IV Intermittent once  piperacillin/tazobactam IVPB.. 3.375 Gram(s) IV Intermittent every 8 hours    IV fluids:    Hematologic/Anticoagulation:  enoxaparin Injectable 40 milliGRAM(s) SubCutaneous every 24 hours    Pain medications/Neuro:  acetaminophen   Oral Liquid .. 1000 milliGRAM(s) Oral every 6 hours PRN  gabapentin Solution 100 milliGRAM(s) Oral three times a day  ondansetron Injectable 4 milliGRAM(s) IV Push every 4 hours PRN  oxyCODONE    Solution 5 milliGRAM(s) Oral every 4 hours PRN  oxyCODONE    Solution 10 milliGRAM(s) Oral every 4 hours PRN    Endocrine Medications:     All other standing medications:   chlorhexidine 0.12% Liquid 15 milliLiter(s) Swish and Spit two times a day  influenza   Vaccine 0.5 milliLiter(s) IntraMuscular once  pantoprazole   Suspension 40 milliGRAM(s) Oral daily  polyethylene glycol 3350 17 Gram(s) Oral daily  senna 2 Tablet(s) Oral at bedtime    All other PRN medications:    Vital Signs Last 24 Hrs  T(C): 36.6 (2023 08:00), Max: 37.2 (2023 00:00)  T(F): 97.9 (2023 08:00), Max: 98.9 (2023 00:00)  HR: 70 (2023 08:00) (70 - 104)  BP: 101/63 (2023 08:00) (99/64 - 120/73)  BP(mean): 76 (2023 08:00) (72 - 85)  RR: 12 (2023 08:00) (10 - 18)  SpO2: 99% (2023 08:00) (90% - 100%)    Parameters below as of 2023 08:00  Patient On (Oxygen Delivery Method): tracheostomy collar  O2 Flow (L/min): 8  O2 Concentration (%): 50       @ 07:01  -   @ 07:00  --------------------------------------------------------  IN:  Total IN: 0 mL    OUT:    VAC (Vacuum Assisted Closure) System (mL): 0 mL    Voided (mL): 1050 mL  Total OUT: 1050 mL    Total NET: -1050 mL       @ 07:01  -   @ 12:19  --------------------------------------------------------  IN:    IV PiggyBack: 100 mL  Total IN: 100 mL    OUT:  Total OUT: 0 mL    Total NET: 100 mL          23 @ 07:01  -  23 @ 07:00  --------------------------------------------------------  IN:  Total IN: 0 mL    OUT:    VAC (Vacuum Assisted Closure) System (mL): 0 mL  Total OUT: 0 mL    Total NET: 0 mL      Physical Exam:  Gen: NAD  HEENT: skin paddle warm and well perfused, strong arterial doppler signal  Resp: unlabored respirations  CV: RRR  Abd: soft, nondistended  Ext: RLE wrapped with ACE, wound vac, stsg site healthy appearing               LABS                       9.1    6.65  )-----------( 215      ( 2023 06:25 )             28.2        136  |  97<L>  |  14  ----------------------------<  76  4.0   |  30  |  1.03    Ca    8.7      2023 06:25  Phos  3.0       Mg     1.80     11-26           Coagulation Studies-     Urinalysis Basic - ( 2023 06:25 )    Color: x / Appearance: x / SG: x / pH: x  Gluc: 76 mg/dL / Ketone: x  / Bili: x / Urobili: x   Blood: x / Protein: x / Nitrite: x   Leuk Esterase: x / RBC: x / WBC x   Sq Epi: x / Non Sq Epi: x / Bacteria: x      Endocrine Panel-  Calcium: 8.7 mg/dL ( @ 06:25)                MICROBIOLOGY:

## 2023-11-26 NOTE — PROGRESS NOTE ADULT - SUBJECTIVE AND OBJECTIVE BOX
SICU Daily Progress Note  =====================================================  Interval events:  - episodes of emesis with TFs --> CLD   - unasyn restarted for erythema noted by plastics   - Desat to 85%, put back on trach collar and then recapped after suctioning    ALLERGIES:  morphine (Hives)      --------------------------------------------------------------------------------------    MEDICATIONS:    Neurologic Medications  gabapentin Solution 100 milliGRAM(s) Oral three times a day  ondansetron Injectable 4 milliGRAM(s) IV Push every 4 hours PRN Nausea  oxyCODONE    Solution 5 milliGRAM(s) Oral every 4 hours PRN Moderate Pain (4 - 6)  oxyCODONE    Solution 10 milliGRAM(s) Oral every 4 hours PRN Severe Pain (7 - 10)    Respiratory Medications    Cardiovascular Medications    Gastrointestinal Medications  pantoprazole   Suspension 40 milliGRAM(s) Oral daily  polyethylene glycol 3350 17 Gram(s) Oral daily  senna 2 Tablet(s) Oral at bedtime    Genitourinary Medications    Hematologic/Oncologic Medications  enoxaparin Injectable 40 milliGRAM(s) SubCutaneous every 24 hours  influenza   Vaccine 0.5 milliLiter(s) IntraMuscular once    Antimicrobial/Immunologic Medications  ampicillin/sulbactam  IVPB 3 Gram(s) IV Intermittent every 6 hours  ampicillin/sulbactam  IVPB        Endocrine/Metabolic Medications    Topical/Other Medications  chlorhexidine 0.12% Liquid 15 milliLiter(s) Swish and Spit two times a day    --------------------------------------------------------------------------------------    VITAL SIGNS:  ICU Vital Signs Last 24 Hrs  T(C): 36.9 (25 Nov 2023 20:00), Max: 36.9 (25 Nov 2023 20:00)  T(F): 98.4 (25 Nov 2023 20:00), Max: 98.4 (25 Nov 2023 20:00)  HR: 100 (25 Nov 2023 22:30) (73 - 104)  BP: 120/73 (25 Nov 2023 20:00) (91/65 - 120/73)  BP(mean): 85 (25 Nov 2023 20:00) (74 - 86)  ABP: --  ABP(mean): --  RR: 13 (25 Nov 2023 22:30) (13 - 18)  SpO2: 93% (25 Nov 2023 22:30) (90% - 100%)    O2 Parameters below as of 25 Nov 2023 22:30  Patient On (Oxygen Delivery Method): room air    --------------------------------------------------------------------------------------    INS AND OUTS:  I&O's Detail    24 Nov 2023 07:01  -  25 Nov 2023 07:00  --------------------------------------------------------  IN:    Jevity 1.5: 250 mL  Total IN: 250 mL    OUT:    Bulb (mL): 15 mL    VAC (Vacuum Assisted Closure) System (mL): 0 mL    Voided (mL): 1900 mL  Total OUT: 1915 mL    Total NET: -1665 mL      25 Nov 2023 07:01  -  26 Nov 2023 00:07  --------------------------------------------------------  IN:  Total IN: 0 mL    OUT:    Voided (mL): 550 mL  Total OUT: 550 mL    Total NET: -550 mL    --------------------------------------------------------------------------------------    General: Resting comfortably in bed  HEENT: Trach capped, free flap to right mandible w/ external skin paddle, warm and well perfused, strong doppler signal.   Pulm: Equal chest rise and fall; trach  CV: RRR  Abdomen: nontender, nondistended  Neuro: moving all extremities spontaneously  Extremities: RLE wrapped with ace bandage, wound vac in place, skin graft donor site with overlying foam thrombin and tegederm.       METABOLIC / FLUIDS / ELECTROLYTES      HEMATOLOGIC  [x] VTE Prophylaxis: enoxaparin Injectable 40 milliGRAM(s) SubCutaneous every 24 hours    Transfusions:	[] PRBC	[] Platelets		[] FFP	[] Cryoprecipitate    INFECTIOUS DISEASE  Antimicrobials/Immunologic Medications:  ampicillin/sulbactam  IVPB      ampicillin/sulbactam  IVPB 3 Gram(s) IV Intermittent every 6 hours  influenza   Vaccine 0.5 milliLiter(s) IntraMuscular once    Day #      of     ***    TUBES / LINES / DRAINS  ***  [x] Peripheral IV  [] Central Venous Line     	[] R	[] L	[] IJ	[] Fem	[] SC	Date Placed:   [] Arterial Line		[] R	[] L	[] Fem	[] Rad	[] Ax	Date Placed:   [] PICC		[] Midline		[] Mediport  [] Urinary Catheter		Date Placed:   [x] Necessity of urinary, arterial, and venous catheters discussed    --------------------------------------------------------------------------------------    LABS                          10.3   6.57  )-----------( 237      ( 25 Nov 2023 17:12 )             32.1   11-25    137  |  97<L>  |  16  ----------------------------<  79  4.1   |  30  |  0.91    Ca    8.6      25 Nov 2023 07:49  Phos  2.5     11-25  Mg     1.90     11-25      --------------------------------------------------------------------------------------    OTHER LABORATORY:     IMAGING STUDIES:   CXR:      SICU Daily Progress Note  =====================================================  Interval events:  - episodes of emesis with TFs --> CLD   - unasyn restarted for erythema noted by plastics, will broaden to zosyn for 4 days  -transition to soft diet  -remove NGT  - Desat to 85%, put back on trach collar and then recapped after suctioning    ALLERGIES:  morphine (Hives)      --------------------------------------------------------------------------------------    MEDICATIONS:    Neurologic Medications  gabapentin Solution 100 milliGRAM(s) Oral three times a day  ondansetron Injectable 4 milliGRAM(s) IV Push every 4 hours PRN Nausea  oxyCODONE    Solution 5 milliGRAM(s) Oral every 4 hours PRN Moderate Pain (4 - 6)  oxyCODONE    Solution 10 milliGRAM(s) Oral every 4 hours PRN Severe Pain (7 - 10)    Respiratory Medications    Cardiovascular Medications    Gastrointestinal Medications  pantoprazole   Suspension 40 milliGRAM(s) Oral daily  polyethylene glycol 3350 17 Gram(s) Oral daily  senna 2 Tablet(s) Oral at bedtime    Genitourinary Medications    Hematologic/Oncologic Medications  enoxaparin Injectable 40 milliGRAM(s) SubCutaneous every 24 hours  influenza   Vaccine 0.5 milliLiter(s) IntraMuscular once    Antimicrobial/Immunologic Medications  ampicillin/sulbactam  IVPB 3 Gram(s) IV Intermittent every 6 hours  ampicillin/sulbactam  IVPB        Endocrine/Metabolic Medications    Topical/Other Medications  chlorhexidine 0.12% Liquid 15 milliLiter(s) Swish and Spit two times a day    --------------------------------------------------------------------------------------    VITAL SIGNS:  ICU Vital Signs Last 24 Hrs  T(C): 36.9 (25 Nov 2023 20:00), Max: 36.9 (25 Nov 2023 20:00)  T(F): 98.4 (25 Nov 2023 20:00), Max: 98.4 (25 Nov 2023 20:00)  HR: 100 (25 Nov 2023 22:30) (73 - 104)  BP: 120/73 (25 Nov 2023 20:00) (91/65 - 120/73)  BP(mean): 85 (25 Nov 2023 20:00) (74 - 86)  ABP: --  ABP(mean): --  RR: 13 (25 Nov 2023 22:30) (13 - 18)  SpO2: 93% (25 Nov 2023 22:30) (90% - 100%)    O2 Parameters below as of 25 Nov 2023 22:30  Patient On (Oxygen Delivery Method): room air    --------------------------------------------------------------------------------------    INS AND OUTS:  I&O's Detail    24 Nov 2023 07:01  -  25 Nov 2023 07:00  --------------------------------------------------------  IN:    Jevity 1.5: 250 mL  Total IN: 250 mL    OUT:    Bulb (mL): 15 mL    VAC (Vacuum Assisted Closure) System (mL): 0 mL    Voided (mL): 1900 mL  Total OUT: 1915 mL    Total NET: -1665 mL      25 Nov 2023 07:01  -  26 Nov 2023 00:07  --------------------------------------------------------  IN:  Total IN: 0 mL    OUT:    Voided (mL): 550 mL  Total OUT: 550 mL    Total NET: -550 mL    --------------------------------------------------------------------------------------    General: Resting comfortably in bed  HEENT: Trach capped, free flap to right mandible w/ external skin paddle, warm and well perfused, strong doppler signal.   Pulm: Equal chest rise and fall; trach  CV: RRR  Abdomen: nontender, nondistended  Neuro: moving all extremities spontaneously  Extremities: RLE wrapped with ace bandage, wound vac in place, skin graft donor site with overlying foam thrombin and tegederm.       METABOLIC / FLUIDS / ELECTROLYTES      HEMATOLOGIC  [x] VTE Prophylaxis: enoxaparin Injectable 40 milliGRAM(s) SubCutaneous every 24 hours    Transfusions:	[] PRBC	[] Platelets		[] FFP	[] Cryoprecipitate    INFECTIOUS DISEASE  Antimicrobials/Immunologic Medications:  ampicillin/sulbactam  IVPB      ampicillin/sulbactam  IVPB 3 Gram(s) IV Intermittent every 6 hours  influenza   Vaccine 0.5 milliLiter(s) IntraMuscular once    Day #      of     ***    TUBES / LINES / DRAINS  ***  [x] Peripheral IV  [] Central Venous Line     	[] R	[] L	[] IJ	[] Fem	[] SC	Date Placed:   [] Arterial Line		[] R	[] L	[] Fem	[] Rad	[] Ax	Date Placed:   [] PICC		[] Midline		[] Mediport  [] Urinary Catheter		Date Placed:   [x] Necessity of urinary, arterial, and venous catheters discussed    --------------------------------------------------------------------------------------    LABS                          10.3   6.57  )-----------( 237      ( 25 Nov 2023 17:12 )             32.1   11-25    137  |  97<L>  |  16  ----------------------------<  79  4.1   |  30  |  0.91    Ca    8.6      25 Nov 2023 07:49  Phos  2.5     11-25  Mg     1.90     11-25      --------------------------------------------------------------------------------------    OTHER LABORATORY:     IMAGING STUDIES:   CXR:

## 2023-11-26 NOTE — PROGRESS NOTE ADULT - ASSESSMENT
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 Pt s/p chemotherapy and radiation. S/p mandibulectomy, radical lymphadenectomy, trach, and right fibula osteocutaneous flap with microvascular flap with anastomosis, split thickness skin graft to RLE on 11/21.    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 Pt s/p chemotherapy and radiation. S/p mandibulectomy, radical lymphadenectomy, trach, and right fibula osteocutaneous flap with microvascular flap with anastomosis, split thickness skin graft to RLE on 11/21.    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 Pt s/p chemotherapy and radiation. S/p mandibulectomy, radical lymphadenectomy, trach, and right fibula osteocutaneous flap with microvascular flap with anastomosis, split thickness skin graft to RLE on 11/21.    Neuro:  -Pain: Tylenol, oxycodone PRN, gabapentin tid with dilaudid for breakthrough pain  -Flap checks Q4 hour    Resp:  -Tracheostomy; trach capped, put on collar if desat  -Maintain O2 saturation >92%    CV:  - MAP>65  - no active issues     GI/Nutrition:  - Diet: Soft diet  - remove NGT  - Protonix IV daily  - bowel regimen     Renal:  - monitor I&Os  - replete electrolytes prn    Heme:  - monitor H/H   -DVT ppx--lovenox    ID:  - Plastics noting erythema, zosyn for 4 days 11/25-11/29  - monitor WBC and trend fever curve    Endo:  - Monitor blood glucose   - ISS    Lines   PIV  Dispo: floor

## 2023-11-26 NOTE — PROGRESS NOTE ADULT - ATTENDING COMMENTS
I agree with the detailed interval history, physical, and plan, which I have reviewed and edited where appropriate'; also agree with notes/assessment with my team on service.  I have personally examined the patient.  I was physically present for the key portions of the evaluation and management (E/M) service provided.  I reviewed all the pertinent data.  The patient is a critical care patient with life threatening hemodynamic and metabolic instability in SICU.  The SICU team has a constant risk benefit analyzes discussion and coordinating care with the primary team and all consultants.   The patient is in SICU with the chief complaint and diagnosis mentioned in the note.   The plan will be specified in the note.  59 y/o female s/p mandibulectomy, radical lymphadenectomy, trach, and right fibula osteocutaneous flap with microvascular flap with anastomosis, split thickness skin graft to RLE in SICU for flap monitoring.   Exam:   General: NAD  HEENT: Trach, free flap+  Pulm: clear  CV: RR  Abdomen: nontender  PLAN  Neuro:  -Tylenol  -dilaudid   -Flap checks   Resp:  -Pulmonary toilet  CV:  -MAP>65  GI/Nutrition:  -Diet: Thick liquids  -Protonix   Renal:  -Dobbs  Heme:  -lovenox  ID:  -Zosyn  Endo:  -Monitor glucose    Dispo: DC FLOOR

## 2023-11-26 NOTE — PROGRESS NOTE ADULT - ASSESSMENT
A: Ms Dsouza is a 59yo F with previous hx of FFF for mandibular reconstruction c/b osteoradionecrosis who is now s/p RLE FFF for reconstruction on 11/21. Patient is recovering well.     Plan:  -ERAS protocol  -continue abx for skin cellulitis/erythema   -Cont vac  -Cont cook doppler  -OOB with CAM boot  -Monitor I&Os, JESSEE output  -Appreciate great care per SICU    [facility]  Pl[facility] Surgery  #[phone] [facility] pager  [phone] [facility] pager  Available on teams

## 2023-11-26 NOTE — PROGRESS NOTE ADULT - ASSESSMENT
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 Pt is s/p segmental mandibulectomy with R partial buccal soft tissue and FOM excision, tracheostomy, right neck dissection in 1/2022, and removal of mandibular hardware in 10/2022. Pt completed chemotherapy and radiation. Pt reports open areas to right mandible with bone exposed. Pt is progressing well.     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 Pt is s/p segmental mandibulectomy with R partial buccal soft tissue and FOM excision, tracheostomy, right neck dissection in 1/2022, and removal of mandibular hardware in 10/2022. Pt completed chemotherapy and radiation. Pt reports open areas to right mandible with bone exposed. Pt is progressing well.     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 Pt is s/p segmental mandibulectomy with R partial buccal soft tissue and FOM excision, tracheostomy, right neck dissection in 1/2022, and removal of mandibular hardware in 10/2022. Pt completed chemotherapy and radiation. Pt reports open areas to right mandible with bone exposed. Pt is progressing well.     Plan:   -ERAS Day 5 Protocol  -Trend JESSEE Outputs  -non-chew Diet, OOBTC  -Multimodal pain management  -Appreciate excellent care per SICU    Junior Valdez  Oral and Maxillofacial Surgery  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 Pt is s/p segmental mandibulectomy with R partial buccal soft tissue and FOM excision, tracheostomy, right neck dissection in 1/2022, and removal of mandibular hardware in 10/2022. Pt completed chemotherapy and radiation. Pt reports open areas to right mandible with bone exposed. Pt is progressing well.     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 Pt is s/p segmental mandibulectomy with R partial buccal soft tissue and FOM excision, tracheostomy, right neck dissection in 1/2022, and removal of mandibular hardware in 10/2022. Pt completed chemotherapy and radiation. Pt reports open areas to right mandible with bone exposed. Pt is progressing well.     Plan:   -ERAS Day 5 Protocol  -Trend JESSEE Outputs  -CLD and c/w TFs, OOBTC  -Multimodal pain management  -Appreciate excellent care per SICAYUSH Valdez  Oral and Maxillofacial Surgery  [facility] Pager #[phone]  Available on Teams

## 2023-11-26 NOTE — PROGRESS NOTE ADULT - SUBJECTIVE AND OBJECTIVE BOX
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 Pt is s/p segmental mandibulectomy with R partial buccal soft tissue and FOM excision, tracheostomy, right neck dissection in 1/2022, and removal of mandibular hardware in 10/2022. Pt completed chemotherapy and radiation. Pt reports open areas to right mandible with bone exposed.     Interval Events:   -Pt unable to tolerate capped trach and o/n desat to 85%, SICU put trach collar on and then recapped after suctioning.   -episodes of emesis with TFs --> CLD   - unasyn restarted for erythema noted by plastics, will broaden to zosyn for 4 days  -transition to pureed diet    Vital Signs Last 24 Hrs  T(C): 36.6 (26 Nov 2023 08:00), Max: 37.2 (26 Nov 2023 00:00)  T(F): 97.9 (26 Nov 2023 08:00), Max: 98.9 (26 Nov 2023 00:00)  HR: 70 (26 Nov 2023 08:00) (70 - 104)  BP: 101/63 (26 Nov 2023 08:00) (99/64 - 120/73)  BP(mean): 76 (26 Nov 2023 08:00) (72 - 85)  RR: 12 (26 Nov 2023 08:00) (10 - 18)  SpO2: 99% (26 Nov 2023 08:00) (90% - 100%)    Parameters below as of 26 Nov 2023 08:00  Patient On (Oxygen Delivery Method): tracheostomy collar  O2 Flow (L/min): 8  O2 Concentration (%): 50    I&O's Detail    25 Nov 2023 07:01  -  26 Nov 2023 07:00  --------------------------------------------------------  IN:  Total IN: 0 mL    OUT:    VAC (Vacuum Assisted Closure) System (mL): 0 mL    Voided (mL): 1050 mL  Total OUT: 1050 mL    Total NET: -1050 mL    26 Nov 2023 07:01  -  26 Nov 2023 09:44  --------------------------------------------------------  IN:    IV PiggyBack: 100 mL  Total IN: 100 mL    OUT:  Total OUT: 0 mL    Total NET: 100 mL    MEDICATIONS  (STANDING):  chlorhexidine 0.12% Liquid 15 milliLiter(s) Swish and Spit two times a day  enoxaparin Injectable 40 milliGRAM(s) SubCutaneous every 24 hours  gabapentin Solution 100 milliGRAM(s) Oral three times a day  influenza   Vaccine 0.5 milliLiter(s) IntraMuscular once  pantoprazole   Suspension 40 milliGRAM(s) Oral daily  piperacillin/tazobactam IVPB.- 3.375 Gram(s) IV Intermittent once  piperacillin/tazobactam IVPB.. 3.375 Gram(s) IV Intermittent every 8 hours  polyethylene glycol 3350 17 Gram(s) Oral daily  senna 2 Tablet(s) Oral at bedtime    MEDICATIONS  (PRN):  acetaminophen   Oral Liquid .. 1000 milliGRAM(s) Oral every 6 hours PRN Mild Pain (1 - 3)  ondansetron Injectable 4 milliGRAM(s) IV Push every 4 hours PRN Nausea  oxyCODONE    Solution 5 milliGRAM(s) Oral every 4 hours PRN Moderate Pain (4 - 6)  oxyCODONE    Solution 10 milliGRAM(s) Oral every 4 hours PRN Severe Pain (7 - 10)    Labs:                        9.1    6.65  )-----------( 215      ( 26 Nov 2023 06:25 )             28.2     11-26    136  |  97<L>  |  14  ----------------------------<  76  4.0   |  30  |  1.03    Ca    8.7      26 Nov 2023 06:25  Phos  3.0     11-26  Mg     1.80     11-26        PHYSICAL EXAM:  Gen: NAD  Resp: breathing easily, no stridor  CV: RRR  Abdomen: soft, nontender, nondistended  Skin: Incision c/d/i. Normal color, no rashes or lesions, skin paddle warm, pink, and well perfused.   IOE: flap warm, pink, well perfused. intraoral incisions clean, closed, and in tact. no signs of dehiscence intraoral sites healing well without signs of complication   extremities: donor site without complication sutures clean, closed, in tact. dressing in place.     Cook signal strong. Surgical sites hemostatic. Pain well controlled. JESSEE drain L. neck, R. 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 Pt is s/p segmental mandibulectomy with R partial buccal soft tissue and FOM excision, tracheostomy, right neck dissection in 1/2022, and removal of mandibular hardware in 10/2022. Pt completed chemotherapy and radiation. Pt reports open areas to right mandible with bone exposed.     Interval Events:   -Pt unable to tolerate capped trach and o/n desat to 85%, SICU put trach collar on and then recapped after suctioning.   -episodes of emesis with TFs --> CLD   - unasyn restarted for erythema noted by plastics, will broaden to zosyn for 4 days    Vital Signs Last 24 Hrs  T(C): 36.6 (26 Nov 2023 08:00), Max: 37.2 (26 Nov 2023 00:00)  T(F): 97.9 (26 Nov 2023 08:00), Max: 98.9 (26 Nov 2023 00:00)  HR: 70 (26 Nov 2023 08:00) (70 - 104)  BP: 101/63 (26 Nov 2023 08:00) (99/64 - 120/73)  BP(mean): 76 (26 Nov 2023 08:00) (72 - 85)  RR: 12 (26 Nov 2023 08:00) (10 - 18)  SpO2: 99% (26 Nov 2023 08:00) (90% - 100%)    Parameters below as of 26 Nov 2023 08:00  Patient On (Oxygen Delivery Method): tracheostomy collar  O2 Flow (L/min): 8  O2 Concentration (%): 50    I&O's Detail    25 Nov 2023 07:01  -  26 Nov 2023 07:00  --------------------------------------------------------  IN:  Total IN: 0 mL    OUT:    VAC (Vacuum Assisted Closure) System (mL): 0 mL    Voided (mL): 1050 mL  Total OUT: 1050 mL    Total NET: -1050 mL    26 Nov 2023 07:01  -  26 Nov 2023 09:44  --------------------------------------------------------  IN:    IV PiggyBack: 100 mL  Total IN: 100 mL    OUT:  Total OUT: 0 mL    Total NET: 100 mL    MEDICATIONS  (STANDING):  chlorhexidine 0.12% Liquid 15 milliLiter(s) Swish and Spit two times a day  enoxaparin Injectable 40 milliGRAM(s) SubCutaneous every 24 hours  gabapentin Solution 100 milliGRAM(s) Oral three times a day  influenza   Vaccine 0.5 milliLiter(s) IntraMuscular once  pantoprazole   Suspension 40 milliGRAM(s) Oral daily  piperacillin/tazobactam IVPB.- 3.375 Gram(s) IV Intermittent once  piperacillin/tazobactam IVPB.. 3.375 Gram(s) IV Intermittent every 8 hours  polyethylene glycol 3350 17 Gram(s) Oral daily  senna 2 Tablet(s) Oral at bedtime    MEDICATIONS  (PRN):  acetaminophen   Oral Liquid .. 1000 milliGRAM(s) Oral every 6 hours PRN Mild Pain (1 - 3)  ondansetron Injectable 4 milliGRAM(s) IV Push every 4 hours PRN Nausea  oxyCODONE    Solution 5 milliGRAM(s) Oral every 4 hours PRN Moderate Pain (4 - 6)  oxyCODONE    Solution 10 milliGRAM(s) Oral every 4 hours PRN Severe Pain (7 - 10)    Labs:                        9.1    6.65  )-----------( 215      ( 26 Nov 2023 06:25 )             28.2     11-26    136  |  97<L>  |  14  ----------------------------<  76  4.0   |  30  |  1.03    Ca    8.7      26 Nov 2023 06:25  Phos  3.0     11-26  Mg     1.80     11-26        PHYSICAL EXAM:  Gen: NAD  Resp: breathing easily, no stridor  CV: RRR  Abdomen: soft, nontender, nondistended  Skin: Incision c/d/i. Normal color, no rashes or lesions, skin paddle warm, pink, and well perfused.   IOE: flap warm, pink, well perfused. intraoral incisions clean, closed, and in tact. no signs of dehiscence intraoral sites healing well without signs of complication   extremities: donor site without complication sutures clean, closed, in tact. dressing in place.     Cook signal strong. Surgical sites hemostatic. Pain well controlled. JESSEE drain L. neck, R. leg

## 2023-11-27 LAB
ANION GAP SERPL CALC-SCNC: 9 MMOL/L — SIGNIFICANT CHANGE UP (ref 7–14)
ANION GAP SERPL CALC-SCNC: 9 MMOL/L — SIGNIFICANT CHANGE UP (ref 7–14)
BUN SERPL-MCNC: 15 MG/DL — SIGNIFICANT CHANGE UP (ref 7–23)
BUN SERPL-MCNC: 15 MG/DL — SIGNIFICANT CHANGE UP (ref 7–23)
CALCIUM SERPL-MCNC: 8.4 MG/DL — SIGNIFICANT CHANGE UP (ref 8.4–10.5)
CALCIUM SERPL-MCNC: 8.4 MG/DL — SIGNIFICANT CHANGE UP (ref 8.4–10.5)
CHLORIDE SERPL-SCNC: 95 MMOL/L — LOW (ref 98–107)
CHLORIDE SERPL-SCNC: 95 MMOL/L — LOW (ref 98–107)
CO2 SERPL-SCNC: 31 MMOL/L — SIGNIFICANT CHANGE UP (ref 22–31)
CO2 SERPL-SCNC: 31 MMOL/L — SIGNIFICANT CHANGE UP (ref 22–31)
CREAT SERPL-MCNC: 1.05 MG/DL — SIGNIFICANT CHANGE UP (ref 0.5–1.3)
CREAT SERPL-MCNC: 1.05 MG/DL — SIGNIFICANT CHANGE UP (ref 0.5–1.3)
EGFR: 61 ML/MIN/1.73M2 — SIGNIFICANT CHANGE UP
EGFR: 61 ML/MIN/1.73M2 — SIGNIFICANT CHANGE UP
GLUCOSE SERPL-MCNC: 129 MG/DL — HIGH (ref 70–99)
GLUCOSE SERPL-MCNC: 129 MG/DL — HIGH (ref 70–99)
HCT VFR BLD CALC: 27.4 % — LOW (ref 34.5–45)
HCT VFR BLD CALC: 27.4 % — LOW (ref 34.5–45)
HGB BLD-MCNC: 8.7 G/DL — LOW (ref 11.5–15.5)
HGB BLD-MCNC: 8.7 G/DL — LOW (ref 11.5–15.5)
MAGNESIUM SERPL-MCNC: 2.2 MG/DL — SIGNIFICANT CHANGE UP (ref 1.6–2.6)
MAGNESIUM SERPL-MCNC: 2.2 MG/DL — SIGNIFICANT CHANGE UP (ref 1.6–2.6)
MCHC RBC-ENTMCNC: 30.5 PG — SIGNIFICANT CHANGE UP (ref 27–34)
MCHC RBC-ENTMCNC: 30.5 PG — SIGNIFICANT CHANGE UP (ref 27–34)
MCHC RBC-ENTMCNC: 31.8 GM/DL — LOW (ref 32–36)
MCHC RBC-ENTMCNC: 31.8 GM/DL — LOW (ref 32–36)
MCV RBC AUTO: 96.1 FL — SIGNIFICANT CHANGE UP (ref 80–100)
MCV RBC AUTO: 96.1 FL — SIGNIFICANT CHANGE UP (ref 80–100)
NRBC # BLD: 0 /100 WBCS — SIGNIFICANT CHANGE UP (ref 0–0)
NRBC # BLD: 0 /100 WBCS — SIGNIFICANT CHANGE UP (ref 0–0)
NRBC # FLD: 0 K/UL — SIGNIFICANT CHANGE UP (ref 0–0)
NRBC # FLD: 0 K/UL — SIGNIFICANT CHANGE UP (ref 0–0)
PHOSPHATE SERPL-MCNC: 2.8 MG/DL — SIGNIFICANT CHANGE UP (ref 2.5–4.5)
PHOSPHATE SERPL-MCNC: 2.8 MG/DL — SIGNIFICANT CHANGE UP (ref 2.5–4.5)
PLATELET # BLD AUTO: 237 K/UL — SIGNIFICANT CHANGE UP (ref 150–400)
PLATELET # BLD AUTO: 237 K/UL — SIGNIFICANT CHANGE UP (ref 150–400)
POTASSIUM SERPL-MCNC: 4 MMOL/L — SIGNIFICANT CHANGE UP (ref 3.5–5.3)
POTASSIUM SERPL-MCNC: 4 MMOL/L — SIGNIFICANT CHANGE UP (ref 3.5–5.3)
POTASSIUM SERPL-SCNC: 4 MMOL/L — SIGNIFICANT CHANGE UP (ref 3.5–5.3)
POTASSIUM SERPL-SCNC: 4 MMOL/L — SIGNIFICANT CHANGE UP (ref 3.5–5.3)
RBC # BLD: 2.85 M/UL — LOW (ref 3.8–5.2)
RBC # BLD: 2.85 M/UL — LOW (ref 3.8–5.2)
RBC # FLD: 15.6 % — HIGH (ref 10.3–14.5)
RBC # FLD: 15.6 % — HIGH (ref 10.3–14.5)
SODIUM SERPL-SCNC: 135 MMOL/L — SIGNIFICANT CHANGE UP (ref 135–145)
SODIUM SERPL-SCNC: 135 MMOL/L — SIGNIFICANT CHANGE UP (ref 135–145)
WBC # BLD: 7.9 K/UL — SIGNIFICANT CHANGE UP (ref 3.8–10.5)
WBC # BLD: 7.9 K/UL — SIGNIFICANT CHANGE UP (ref 3.8–10.5)
WBC # FLD AUTO: 7.9 K/UL — SIGNIFICANT CHANGE UP (ref 3.8–10.5)
WBC # FLD AUTO: 7.9 K/UL — SIGNIFICANT CHANGE UP (ref 3.8–10.5)

## 2023-11-27 PROCEDURE — 71045 X-RAY EXAM CHEST 1 VIEW: CPT | Mod: 26

## 2023-11-27 PROCEDURE — 99233 SBSQ HOSP IP/OBS HIGH 50: CPT

## 2023-11-27 PROCEDURE — 74018 RADEX ABDOMEN 1 VIEW: CPT | Mod: 26,59

## 2023-11-27 PROCEDURE — 74019 RADEX ABDOMEN 2 VIEWS: CPT | Mod: 26

## 2023-11-27 RX ORDER — POLYETHYLENE GLYCOL 3350 17 G/17G
17 POWDER, FOR SOLUTION ORAL
Refills: 0 | Status: DISCONTINUED | OUTPATIENT
Start: 2023-11-27 | End: 2023-11-28

## 2023-11-27 RX ORDER — PANTOPRAZOLE SODIUM 20 MG/1
40 TABLET, DELAYED RELEASE ORAL DAILY
Refills: 0 | Status: DISCONTINUED | OUTPATIENT
Start: 2023-11-27 | End: 2023-12-04

## 2023-11-27 RX ORDER — ACETAMINOPHEN 500 MG
975 TABLET ORAL EVERY 6 HOURS
Refills: 0 | Status: DISCONTINUED | OUTPATIENT
Start: 2023-11-27 | End: 2023-11-30

## 2023-11-27 RX ADMIN — GABAPENTIN 100 MILLIGRAM(S): 400 CAPSULE ORAL at 21:21

## 2023-11-27 RX ADMIN — PANTOPRAZOLE SODIUM 40 MILLIGRAM(S): 20 TABLET, DELAYED RELEASE ORAL at 12:47

## 2023-11-27 RX ADMIN — OXYCODONE HYDROCHLORIDE 10 MILLIGRAM(S): 5 TABLET ORAL at 02:49

## 2023-11-27 RX ADMIN — SENNA PLUS 2 TABLET(S): 8.6 TABLET ORAL at 21:18

## 2023-11-27 RX ADMIN — Medication 975 MILLIGRAM(S): at 13:40

## 2023-11-27 RX ADMIN — ONDANSETRON 4 MILLIGRAM(S): 8 TABLET, FILM COATED ORAL at 11:06

## 2023-11-27 RX ADMIN — CHLORHEXIDINE GLUCONATE 15 MILLILITER(S): 213 SOLUTION TOPICAL at 05:42

## 2023-11-27 RX ADMIN — GABAPENTIN 100 MILLIGRAM(S): 400 CAPSULE ORAL at 05:42

## 2023-11-27 RX ADMIN — OXYCODONE HYDROCHLORIDE 10 MILLIGRAM(S): 5 TABLET ORAL at 07:50

## 2023-11-27 RX ADMIN — PIPERACILLIN AND TAZOBACTAM 25 GRAM(S): 4; .5 INJECTION, POWDER, LYOPHILIZED, FOR SOLUTION INTRAVENOUS at 13:38

## 2023-11-27 RX ADMIN — PIPERACILLIN AND TAZOBACTAM 25 GRAM(S): 4; .5 INJECTION, POWDER, LYOPHILIZED, FOR SOLUTION INTRAVENOUS at 21:18

## 2023-11-27 RX ADMIN — OXYCODONE HYDROCHLORIDE 10 MILLIGRAM(S): 5 TABLET ORAL at 15:45

## 2023-11-27 RX ADMIN — OXYCODONE HYDROCHLORIDE 10 MILLIGRAM(S): 5 TABLET ORAL at 23:54

## 2023-11-27 RX ADMIN — OXYCODONE HYDROCHLORIDE 10 MILLIGRAM(S): 5 TABLET ORAL at 20:22

## 2023-11-27 RX ADMIN — POLYETHYLENE GLYCOL 3350 17 GRAM(S): 17 POWDER, FOR SOLUTION ORAL at 12:48

## 2023-11-27 RX ADMIN — CHLORHEXIDINE GLUCONATE 15 MILLILITER(S): 213 SOLUTION TOPICAL at 17:54

## 2023-11-27 RX ADMIN — Medication 10 MILLIGRAM(S): at 19:52

## 2023-11-27 RX ADMIN — Medication 975 MILLIGRAM(S): at 17:52

## 2023-11-27 RX ADMIN — OXYCODONE HYDROCHLORIDE 10 MILLIGRAM(S): 5 TABLET ORAL at 11:36

## 2023-11-27 RX ADMIN — OXYCODONE HYDROCHLORIDE 10 MILLIGRAM(S): 5 TABLET ORAL at 19:52

## 2023-11-27 RX ADMIN — Medication 975 MILLIGRAM(S): at 12:48

## 2023-11-27 RX ADMIN — ENOXAPARIN SODIUM 40 MILLIGRAM(S): 100 INJECTION SUBCUTANEOUS at 21:18

## 2023-11-27 RX ADMIN — OXYCODONE HYDROCHLORIDE 10 MILLIGRAM(S): 5 TABLET ORAL at 12:30

## 2023-11-27 RX ADMIN — OXYCODONE HYDROCHLORIDE 10 MILLIGRAM(S): 5 TABLET ORAL at 03:30

## 2023-11-27 RX ADMIN — OXYCODONE HYDROCHLORIDE 10 MILLIGRAM(S): 5 TABLET ORAL at 16:35

## 2023-11-27 RX ADMIN — OXYCODONE HYDROCHLORIDE 10 MILLIGRAM(S): 5 TABLET ORAL at 06:53

## 2023-11-27 RX ADMIN — PIPERACILLIN AND TAZOBACTAM 25 GRAM(S): 4; .5 INJECTION, POWDER, LYOPHILIZED, FOR SOLUTION INTRAVENOUS at 05:42

## 2023-11-27 RX ADMIN — Medication 975 MILLIGRAM(S): at 23:58

## 2023-11-27 RX ADMIN — Medication 975 MILLIGRAM(S): at 23:28

## 2023-11-27 RX ADMIN — GABAPENTIN 100 MILLIGRAM(S): 400 CAPSULE ORAL at 13:40

## 2023-11-27 NOTE — PROGRESS NOTE ADULT - ASSESSMENT
A: Ms Dsouza is a 59yo F with previous hx of FFF for mandibular reconstruction c/b osteoradionecrosis who is now s/p RLE FFF for reconstruction on 11/21. Patient is recovering well.     Plan:  -ERAS protocol  -continue abx for skin cellulitis/erythema   -Cont vac  -Cont cook doppler  -OOB with CAM boot  -Monitor I&Os  -Appreciate great care per SICU  -Can apply xeroform or Aquacel to dehiscence    Ismael Chapa  Plastic Surgery  #[phone] pager  Available on teams

## 2023-11-27 NOTE — PROGRESS NOTE ADULT - SUBJECTIVE AND OBJECTIVE BOX
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 Pt is s/p segmental mandibulectomy with R partial buccal soft tissue and FOM excision, tracheostomy, right neck dissection in 1/2022, and removal of mandibular hardware in 10/2022. Pt completed chemotherapy and radiation. Pt unable to tolerate capped trach and o/n desat to 85%, SICU put trach collar on and then recapped after suctioning as of 11/25/23.     INTERVAL EVENTS:  - aVSS  - Zosyn started 11/25/23, will continue until 11/29/23  - Trach capped, put collar in desatting  - CLD w/ TF  - CXR confirming stable TF placement     ICU Vital Signs Last 24 Hrs  T(C): 36.6 (27 Nov 2023 04:00), Max: 36.9 (26 Nov 2023 12:00)  T(F): 97.9 (27 Nov 2023 04:00), Max: 98.5 (26 Nov 2023 12:00)  HR: 81 (27 Nov 2023 04:00) (70 - 96)  BP: 102/66 (27 Nov 2023 04:00) (95/69 - 112/67)  BP(mean): 79 (27 Nov 2023 04:00) (75 - 79)  ABP: --  ABP(mean): --  RR: 17 (27 Nov 2023 04:00) (12 - 19)  SpO2: 100% (27 Nov 2023 04:00) (86% - 100%)    O2 Parameters below as of 27 Nov 2023 04:00  Patient On (Oxygen Delivery Method): nasal cannula  O2 Flow (L/min): 3    I&O's Detail    26 Nov 2023 07:01  -  27 Nov 2023 07:00  --------------------------------------------------------  IN:    Enteral Tube Flush: 300 mL    IV PiggyBack: 300 mL    TwoCal HN: 500 mL  Total IN: 1100 mL    OUT:    Voided (mL): 300 mL  Total OUT: 300 mL    Total NET: 800 mL    MEDICATIONS  (STANDING):  chlorhexidine 0.12% Liquid 15 milliLiter(s) Swish and Spit two times a day  enoxaparin Injectable 40 milliGRAM(s) SubCutaneous every 24 hours  gabapentin Solution 100 milliGRAM(s) Oral three times a day  influenza   Vaccine 0.5 milliLiter(s) IntraMuscular once  pantoprazole   Suspension 40 milliGRAM(s) Oral daily  piperacillin/tazobactam IVPB.. 3.375 Gram(s) IV Intermittent every 8 hours  polyethylene glycol 3350 17 Gram(s) Oral daily  senna 2 Tablet(s) Oral at bedtime    MEDICATIONS  (PRN):  acetaminophen   Oral Liquid .. 1000 milliGRAM(s) Oral every 6 hours PRN Mild Pain (1 - 3)  ondansetron Injectable 4 milliGRAM(s) IV Push every 4 hours PRN Nausea  oxyCODONE    Solution 5 milliGRAM(s) Oral every 4 hours PRN Moderate Pain (4 - 6)  oxyCODONE    Solution 10 milliGRAM(s) Oral every 4 hours PRN Severe Pain (7 - 10)    PHYSICAL EXAM:  Gen: NAD  Resp: breathing easily, no stridor  CV: RRR  Abdomen: soft, nontender, nondistended  Skin: Incision c/d/i. Normal color, no rashes or lesions, skin paddle warm, pink, and well perfused.   IOE: flap warm, pink, well perfused. intraoral incisions clean, closed, and in tact. no signs of dehiscence intraoral sites healing well without signs of complication   extremities: donor site without complication sutures clean, closed, in tact. dressing in place.   Cook signal strong. Surgical sites hemostatic. Pain well controlled.                          8.7    7.90  )-----------( 237      ( 27 Nov 2023 05:40 )             27.4     11-26    136  |  97<L>  |  14  ----------------------------<  76  4.0   |  30  |  1.03    Ca    8.7      26 Nov 2023 06:25  Phos  3.0     11-26  Mg     1.80     11-26

## 2023-11-27 NOTE — PROGRESS NOTE ADULT - ASSESSMENT
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 Pt is s/p segmental mandibulectomy with R partial buccal soft tissue and FOM excision, tracheostomy, right neck dissection in 1/2022, and removal of mandibular hardware in 10/2022. Pt completed chemotherapy and radiation. Pt reports open areas to right mandible with bone exposed (11/25/23). Zosyn added (11/25/23 - 11/29/23). JESSEE neck and leg removed. Pt is progressing without acute events.    Plan:   -ERAS Day 6 Protocol  -CLD and c/w TFs, OOBTC  -Multimodal pain management  -Appreciate excellent care per FREDO Blair  Oral and Maxillofacial Surgery  [facility] OMFS Pager #[phone]  Available on Teams

## 2023-11-27 NOTE — PROGRESS NOTE ADULT - SUBJECTIVE AND OBJECTIVE BOX
Plastic Surgery Progress Note (pg [facility]: [id number], [place]: [phone])    SUBJECTIVE  The patient was seen and examined. No acute events overnight    OBJECTIVE  ___________________________________________________  VITAL SIGNS / I&O's   Vital Signs Last 24 Hrs  T(C): 36.6 (27 Nov 2023 04:00), Max: 36.9 (26 Nov 2023 12:00)  T(F): 97.9 (27 Nov 2023 04:00), Max: 98.5 (26 Nov 2023 12:00)  HR: 81 (27 Nov 2023 04:00) (70 - 96)  BP: 102/66 (27 Nov 2023 04:00) (95/69 - 112/67)  BP(mean): 79 (27 Nov 2023 04:00) (75 - 79)  RR: 17 (27 Nov 2023 04:00) (12 - 19)  SpO2: 100% (27 Nov 2023 04:00) (86% - 100%)    Parameters below as of 27 Nov 2023 04:00  Patient On (Oxygen Delivery Method): nasal cannula  O2 Flow (L/min): 3        26 Nov 2023 07:01  -  27 Nov 2023 07:00  --------------------------------------------------------  IN:    Enteral Tube Flush: 300 mL    IV PiggyBack: 300 mL    TwoCal HN: 500 mL  Total IN: 1100 mL    OUT:    Voided (mL): 300 mL  Total OUT: 300 mL    Total NET: 800 mL        ___________________________________________________  PHYSICAL EXAM    General: NAD, resting in bed comfortably.  HEENT: Chin suture line cdi, flap with good cap refill, soft, dobhoff sutured in place, cook in place (+), trach'd. Facial erythema on L aspect of face near incision improved this AM.  Small area of dehiscence at superior-lateral aspect of flap  Cardiac: regular rate, warm and well perfused  Respiratory: Nonlabored respirations, normal cw expansion.  Extremities:   RLE with ACE wrap in place, cdi, soft, vac in place and holding suction.    ___________________________________________________  LABS                        8.7    7.90  )-----------( 237      ( 27 Nov 2023 05:40 )             27.4     26 Nov 2023 06:25    136    |  97     |  14     ----------------------------<  76     4.0     |  30     |  1.03     Ca    8.7        26 Nov 2023 06:25  Phos  3.0       26 Nov 2023 06:25  Mg     1.80      26 Nov 2023 06:25        CAPILLARY BLOOD GLUCOSE            Urinalysis Basic - ( 26 Nov 2023 06:25 )    Color: x / Appearance: x / SG: x / pH: x  Gluc: 76 mg/dL / Ketone: x  / Bili: x / Urobili: x   Blood: x / Protein: x / Nitrite: x   Leuk Esterase: x / RBC: x / WBC x   Sq Epi: x / Non Sq Epi: x / Bacteria: x      ___________________________________________________  MICRO  Recent Cultures:    ___________________________________________________  MEDICATIONS  (STANDING):  chlorhexidine 0.12% Liquid 15 milliLiter(s) Swish and Spit two times a day  enoxaparin Injectable 40 milliGRAM(s) SubCutaneous every 24 hours  gabapentin Solution 100 milliGRAM(s) Oral three times a day  influenza   Vaccine 0.5 milliLiter(s) IntraMuscular once  pantoprazole   Suspension 40 milliGRAM(s) Oral daily  piperacillin/tazobactam IVPB.. 3.375 Gram(s) IV Intermittent every 8 hours  polyethylene glycol 3350 17 Gram(s) Oral daily  senna 2 Tablet(s) Oral at bedtime    MEDICATIONS  (PRN):  acetaminophen   Oral Liquid .. 1000 milliGRAM(s) Oral every 6 hours PRN Mild Pain (1 - 3)  ondansetron Injectable 4 milliGRAM(s) IV Push every 4 hours PRN Nausea  oxyCODONE    Solution 5 milliGRAM(s) Oral every 4 hours PRN Moderate Pain (4 - 6)  oxyCODONE    Solution 10 milliGRAM(s) Oral every 4 hours PRN Severe Pain (7 - 10)

## 2023-11-27 NOTE — CHART NOTE - NSCHARTNOTEFT_GEN_A_CORE
Patient signed out to OMFS Team on transfer to floor tonight. Verbal sign out between myself and Dr Traci Blair. All questions answered.     Laureano Nelson  [facility] [id number]

## 2023-11-27 NOTE — PROGRESS NOTE ADULT - ATTENDING COMMENTS
The patient was seen and examined, chart and notes reviewed.  The current diagnosis, plan of care and alternatives have been discussed with the patient.  All questions have been answered and updates have been discussed.  The case was discussed with SICU team residents/PA's and medical students at morning SICU surgical rounds and throughout the course of the day.    Respiratory abnormality/atelectasis  a.  Trach capped at this time  b.  With adequate oxygenation  c.  Encourage incentive spirometry    SCC   a. S/P mandibulectomy + RLND  b.  Flaps check Q 4  c.  Tylenol for pain    At risk for malnutrition  a.  Clears as tolerated  b.  TF to goal at this time    At risk for DVT  a.  Continue Lovenox for chemical DVT prophylaxis    Transfer to floor

## 2023-11-27 NOTE — PROGRESS NOTE ADULT - ASSESSMENT
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 Pt s/p chemotherapy and radiation.     - 11/21: s/p mandibulectomy, radical lymphadenectomy, trach, and right fibula osteocutaneous flap with microvascular flap with anastomosis, split thickness skin graft to RLE.   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 Pt s/p chemotherapy and radiation.     - 11/21: s/p mandibulectomy, radical lymphadenectomy, trach, and right fibula osteocutaneous flap with microvascular flap with anastomosis, split thickness skin graft to RLE.        Neuro:  -Pain: Tylenol, oxycodone PRN, gabapentin tid with dilaudid for breakthrough pain  -Flap checks Q4 hour    Resp:  -Tracheostomy; trach capped, put on collar if desat  -Maintain O2 saturation >92%    CV:  - MAP>65  - no active issues     GI/Nutrition:  - Diet: CLD with TFs  - Protonix daily  - bowel regimen     Renal:  - monitor I&Os  - replete electrolytes prn    Heme:  - monitor H/H   -DVT ppx--lovenox    ID:  - Plastics noting erythema, zosyn for 4 days 11/25-11/29  - monitor WBC and trend fever curve    Endo:  - Monitor blood glucose   - ISS    Dispo: floor

## 2023-11-27 NOTE — PROGRESS NOTE ADULT - SUBJECTIVE AND OBJECTIVE BOX
SICU Daily Progress Note  =====================================================  Interval/Overnight Events:       No acute changes overnight    ALLERGIES:  morphine (Hives)      --------------------------------------------------------------------------------------    MEDICATIONS:    Neurologic Medications  acetaminophen   Oral Liquid .. 1000 milliGRAM(s) Oral every 6 hours PRN Mild Pain (1 - 3)  gabapentin Solution 100 milliGRAM(s) Oral three times a day  ondansetron Injectable 4 milliGRAM(s) IV Push every 4 hours PRN Nausea  oxyCODONE    Solution 5 milliGRAM(s) Oral every 4 hours PRN Moderate Pain (4 - 6)  oxyCODONE    Solution 10 milliGRAM(s) Oral every 4 hours PRN Severe Pain (7 - 10)    Respiratory Medications    Cardiovascular Medications    Gastrointestinal Medications  pantoprazole   Suspension 40 milliGRAM(s) Oral daily  polyethylene glycol 3350 17 Gram(s) Oral daily  senna 2 Tablet(s) Oral at bedtime    Genitourinary Medications    Hematologic/Oncologic Medications  enoxaparin Injectable 40 milliGRAM(s) SubCutaneous every 24 hours  influenza   Vaccine 0.5 milliLiter(s) IntraMuscular once    Antimicrobial/Immunologic Medications  piperacillin/tazobactam IVPB.. 3.375 Gram(s) IV Intermittent every 8 hours    Endocrine/Metabolic Medications    Topical/Other Medications  chlorhexidine 0.12% Liquid 15 milliLiter(s) Swish and Spit two times a day    --------------------------------------------------------------------------------------    VITAL SIGNS:  T(C): 36.7 (11-26-23 @ 20:00), Max: 37.2 (11-26-23 @ 04:00)  HR: 87 (11-26-23 @ 20:00) (70 - 96)  BP: 112/67 (11-26-23 @ 20:00) (95/69 - 112/67)  RR: 19 (11-26-23 @ 20:00) (10 - 19)  SpO2: 97% (11-26-23 @ 20:00) (86% - 99%)  --------------------------------------------------------------------------------------    INS AND OUTS:    11-25-23 @ 07:01  -  11-26-23 @ 07:00  --------------------------------------------------------  IN: 0 mL / OUT: 1050 mL / NET: -1050 mL    11-26-23 @ 07:01  -  11-27-23 @ 00:36  --------------------------------------------------------  IN: 700 mL / OUT: 0 mL / NET: 700 mL      --------------------------------------------------------------------------------------    EXAM    NEURO: NAD,   HEENT: NC/AT  RESPIRATORY: nonlabored respirations, normal CW expansion  CARDIO: RRR, S1S2  ABDOMEN: soft, nontender, nondistended, +/- NGT, ostomy, surgical dressing c/d/i, JESSEE drain output  EXTREMITIES: normal strength, no deformities    --------------------------------------------------------------------------------------    LABS                        9.1    6.65  )-----------( 215      ( 26 Nov 2023 06:25 )             28.2     11-26    136  |  97<L>  |  14  ----------------------------<  76  4.0   |  30  |  1.03    Ca    8.7      26 Nov 2023 06:25  Phos  3.0     11-26  Mg     1.80     11-26        Urinalysis Basic - ( 26 Nov 2023 06:25 )    Color: x / Appearance: x / SG: x / pH: x  Gluc: 76 mg/dL / Ketone: x  / Bili: x / Urobili: x   Blood: x / Protein: x / Nitrite: x   Leuk Esterase: x / RBC: x / WBC x   Sq Epi: x / Non Sq Epi: x / Bacteria: x      T(C): 36.7 (11-26-23 @ 20:00), Max: 37.2 (11-26-23 @ 04:00)  HR: 87 (11-26-23 @ 20:00) (70 - 96)  BP: 112/67 (11-26-23 @ 20:00) (95/69 - 112/67)  RR: 19 (11-26-23 @ 20:00) (10 - 19)  SpO2: 97% (11-26-23 @ 20:00) (86% - 99%)  -------------------------------------------------------------------------------------- SICU Daily Progress Note  =====================================================  Interval/Overnight Events:       No acute changes overnight  - CLD with TFs  - zosyn started 11/25-11/29  - Trach capped, put collar on if desatting    ALLERGIES:  morphine (Hives)      --------------------------------------------------------------------------------------    MEDICATIONS:    Neurologic Medications  acetaminophen   Oral Liquid .. 1000 milliGRAM(s) Oral every 6 hours PRN Mild Pain (1 - 3)  gabapentin Solution 100 milliGRAM(s) Oral three times a day  ondansetron Injectable 4 milliGRAM(s) IV Push every 4 hours PRN Nausea  oxyCODONE    Solution 5 milliGRAM(s) Oral every 4 hours PRN Moderate Pain (4 - 6)  oxyCODONE    Solution 10 milliGRAM(s) Oral every 4 hours PRN Severe Pain (7 - 10)    Respiratory Medications    Cardiovascular Medications    Gastrointestinal Medications  pantoprazole   Suspension 40 milliGRAM(s) Oral daily  polyethylene glycol 3350 17 Gram(s) Oral daily  senna 2 Tablet(s) Oral at bedtime    Genitourinary Medications    Hematologic/Oncologic Medications  enoxaparin Injectable 40 milliGRAM(s) SubCutaneous every 24 hours  influenza   Vaccine 0.5 milliLiter(s) IntraMuscular once    Antimicrobial/Immunologic Medications  piperacillin/tazobactam IVPB.. 3.375 Gram(s) IV Intermittent every 8 hours    Endocrine/Metabolic Medications    Topical/Other Medications  chlorhexidine 0.12% Liquid 15 milliLiter(s) Swish and Spit two times a day    --------------------------------------------------------------------------------------    VITAL SIGNS:  T(C): 36.7 (11-26-23 @ 20:00), Max: 37.2 (11-26-23 @ 04:00)  HR: 87 (11-26-23 @ 20:00) (70 - 96)  BP: 112/67 (11-26-23 @ 20:00) (95/69 - 112/67)  RR: 19 (11-26-23 @ 20:00) (10 - 19)  SpO2: 97% (11-26-23 @ 20:00) (86% - 99%)  --------------------------------------------------------------------------------------    INS AND OUTS:    11-25-23 @ 07:01  -  11-26-23 @ 07:00  --------------------------------------------------------  IN: 0 mL / OUT: 1050 mL / NET: -1050 mL    11-26-23 @ 07:01  -  11-27-23 @ 00:36  --------------------------------------------------------  IN: 700 mL / OUT: 0 mL / NET: 700 mL      --------------------------------------------------------------------------------------    EXAM    NEURO: NAD,   HEENT: NC/AT  RESPIRATORY: nonlabored respirations, normal CW expansion  CARDIO: RRR, S1S2  ABDOMEN: soft, nontender, nondistended, +/- NGT, ostomy, surgical dressing c/d/i, JESSEE drain output  EXTREMITIES: normal strength, no deformities    --------------------------------------------------------------------------------------    LABS                        9.1    6.65  )-----------( 215      ( 26 Nov 2023 06:25 )             28.2     11-26    136  |  97<L>  |  14  ----------------------------<  76  4.0   |  30  |  1.03    Ca    8.7      26 Nov 2023 06:25  Phos  3.0     11-26  Mg     1.80     11-26        Urinalysis Basic - ( 26 Nov 2023 06:25 )    Color: x / Appearance: x / SG: x / pH: x  Gluc: 76 mg/dL / Ketone: x  / Bili: x / Urobili: x   Blood: x / Protein: x / Nitrite: x   Leuk Esterase: x / RBC: x / WBC x   Sq Epi: x / Non Sq Epi: x / Bacteria: x      T(C): 36.7 (11-26-23 @ 20:00), Max: 37.2 (11-26-23 @ 04:00)  HR: 87 (11-26-23 @ 20:00) (70 - 96)  BP: 112/67 (11-26-23 @ 20:00) (95/69 - 112/67)  RR: 19 (11-26-23 @ 20:00) (10 - 19)  SpO2: 97% (11-26-23 @ 20:00) (86% - 99%)  --------------------------------------------------------------------------------------

## 2023-11-28 LAB
ANION GAP SERPL CALC-SCNC: 8 MMOL/L — SIGNIFICANT CHANGE UP (ref 7–14)
ANION GAP SERPL CALC-SCNC: 8 MMOL/L — SIGNIFICANT CHANGE UP (ref 7–14)
BASOPHILS # BLD AUTO: 0.01 K/UL — SIGNIFICANT CHANGE UP (ref 0–0.2)
BASOPHILS # BLD AUTO: 0.01 K/UL — SIGNIFICANT CHANGE UP (ref 0–0.2)
BASOPHILS NFR BLD AUTO: 0.1 % — SIGNIFICANT CHANGE UP (ref 0–2)
BASOPHILS NFR BLD AUTO: 0.1 % — SIGNIFICANT CHANGE UP (ref 0–2)
BUN SERPL-MCNC: 14 MG/DL — SIGNIFICANT CHANGE UP (ref 7–23)
BUN SERPL-MCNC: 14 MG/DL — SIGNIFICANT CHANGE UP (ref 7–23)
CALCIUM SERPL-MCNC: 8.6 MG/DL — SIGNIFICANT CHANGE UP (ref 8.4–10.5)
CALCIUM SERPL-MCNC: 8.6 MG/DL — SIGNIFICANT CHANGE UP (ref 8.4–10.5)
CHLORIDE SERPL-SCNC: 97 MMOL/L — LOW (ref 98–107)
CHLORIDE SERPL-SCNC: 97 MMOL/L — LOW (ref 98–107)
CO2 SERPL-SCNC: 30 MMOL/L — SIGNIFICANT CHANGE UP (ref 22–31)
CO2 SERPL-SCNC: 30 MMOL/L — SIGNIFICANT CHANGE UP (ref 22–31)
CREAT SERPL-MCNC: 0.83 MG/DL — SIGNIFICANT CHANGE UP (ref 0.5–1.3)
CREAT SERPL-MCNC: 0.83 MG/DL — SIGNIFICANT CHANGE UP (ref 0.5–1.3)
EGFR: 81 ML/MIN/1.73M2 — SIGNIFICANT CHANGE UP
EGFR: 81 ML/MIN/1.73M2 — SIGNIFICANT CHANGE UP
EOSINOPHIL # BLD AUTO: 0.25 K/UL — SIGNIFICANT CHANGE UP (ref 0–0.5)
EOSINOPHIL # BLD AUTO: 0.25 K/UL — SIGNIFICANT CHANGE UP (ref 0–0.5)
EOSINOPHIL NFR BLD AUTO: 3.7 % — SIGNIFICANT CHANGE UP (ref 0–6)
EOSINOPHIL NFR BLD AUTO: 3.7 % — SIGNIFICANT CHANGE UP (ref 0–6)
GLUCOSE SERPL-MCNC: 109 MG/DL — HIGH (ref 70–99)
GLUCOSE SERPL-MCNC: 109 MG/DL — HIGH (ref 70–99)
HCT VFR BLD CALC: 31.4 % — LOW (ref 34.5–45)
HCT VFR BLD CALC: 31.4 % — LOW (ref 34.5–45)
HGB BLD-MCNC: 10 G/DL — LOW (ref 11.5–15.5)
HGB BLD-MCNC: 10 G/DL — LOW (ref 11.5–15.5)
IANC: 5.56 K/UL — SIGNIFICANT CHANGE UP (ref 1.8–7.4)
IANC: 5.56 K/UL — SIGNIFICANT CHANGE UP (ref 1.8–7.4)
IMM GRANULOCYTES NFR BLD AUTO: 0.4 % — SIGNIFICANT CHANGE UP (ref 0–0.9)
IMM GRANULOCYTES NFR BLD AUTO: 0.4 % — SIGNIFICANT CHANGE UP (ref 0–0.9)
LYMPHOCYTES # BLD AUTO: 0.23 K/UL — LOW (ref 1–3.3)
LYMPHOCYTES # BLD AUTO: 0.23 K/UL — LOW (ref 1–3.3)
LYMPHOCYTES # BLD AUTO: 3.4 % — LOW (ref 13–44)
LYMPHOCYTES # BLD AUTO: 3.4 % — LOW (ref 13–44)
MAGNESIUM SERPL-MCNC: 2 MG/DL — SIGNIFICANT CHANGE UP (ref 1.6–2.6)
MAGNESIUM SERPL-MCNC: 2 MG/DL — SIGNIFICANT CHANGE UP (ref 1.6–2.6)
MCHC RBC-ENTMCNC: 30.7 PG — SIGNIFICANT CHANGE UP (ref 27–34)
MCHC RBC-ENTMCNC: 30.7 PG — SIGNIFICANT CHANGE UP (ref 27–34)
MCHC RBC-ENTMCNC: 31.8 GM/DL — LOW (ref 32–36)
MCHC RBC-ENTMCNC: 31.8 GM/DL — LOW (ref 32–36)
MCV RBC AUTO: 96.3 FL — SIGNIFICANT CHANGE UP (ref 80–100)
MCV RBC AUTO: 96.3 FL — SIGNIFICANT CHANGE UP (ref 80–100)
MONOCYTES # BLD AUTO: 0.63 K/UL — SIGNIFICANT CHANGE UP (ref 0–0.9)
MONOCYTES # BLD AUTO: 0.63 K/UL — SIGNIFICANT CHANGE UP (ref 0–0.9)
MONOCYTES NFR BLD AUTO: 9.4 % — SIGNIFICANT CHANGE UP (ref 2–14)
MONOCYTES NFR BLD AUTO: 9.4 % — SIGNIFICANT CHANGE UP (ref 2–14)
NEUTROPHILS # BLD AUTO: 5.56 K/UL — SIGNIFICANT CHANGE UP (ref 1.8–7.4)
NEUTROPHILS # BLD AUTO: 5.56 K/UL — SIGNIFICANT CHANGE UP (ref 1.8–7.4)
NEUTROPHILS NFR BLD AUTO: 83 % — HIGH (ref 43–77)
NEUTROPHILS NFR BLD AUTO: 83 % — HIGH (ref 43–77)
NRBC # BLD: 0 /100 WBCS — SIGNIFICANT CHANGE UP (ref 0–0)
NRBC # BLD: 0 /100 WBCS — SIGNIFICANT CHANGE UP (ref 0–0)
NRBC # FLD: 0 K/UL — SIGNIFICANT CHANGE UP (ref 0–0)
NRBC # FLD: 0 K/UL — SIGNIFICANT CHANGE UP (ref 0–0)
PHOSPHATE SERPL-MCNC: 2.8 MG/DL — SIGNIFICANT CHANGE UP (ref 2.5–4.5)
PHOSPHATE SERPL-MCNC: 2.8 MG/DL — SIGNIFICANT CHANGE UP (ref 2.5–4.5)
PLATELET # BLD AUTO: 231 K/UL — SIGNIFICANT CHANGE UP (ref 150–400)
PLATELET # BLD AUTO: 231 K/UL — SIGNIFICANT CHANGE UP (ref 150–400)
POTASSIUM SERPL-MCNC: 3.9 MMOL/L — SIGNIFICANT CHANGE UP (ref 3.5–5.3)
POTASSIUM SERPL-MCNC: 3.9 MMOL/L — SIGNIFICANT CHANGE UP (ref 3.5–5.3)
POTASSIUM SERPL-SCNC: 3.9 MMOL/L — SIGNIFICANT CHANGE UP (ref 3.5–5.3)
POTASSIUM SERPL-SCNC: 3.9 MMOL/L — SIGNIFICANT CHANGE UP (ref 3.5–5.3)
RBC # BLD: 3.26 M/UL — LOW (ref 3.8–5.2)
RBC # BLD: 3.26 M/UL — LOW (ref 3.8–5.2)
RBC # FLD: 15.2 % — HIGH (ref 10.3–14.5)
RBC # FLD: 15.2 % — HIGH (ref 10.3–14.5)
SODIUM SERPL-SCNC: 135 MMOL/L — SIGNIFICANT CHANGE UP (ref 135–145)
SODIUM SERPL-SCNC: 135 MMOL/L — SIGNIFICANT CHANGE UP (ref 135–145)
WBC # BLD: 6.71 K/UL — SIGNIFICANT CHANGE UP (ref 3.8–10.5)
WBC # BLD: 6.71 K/UL — SIGNIFICANT CHANGE UP (ref 3.8–10.5)
WBC # FLD AUTO: 6.71 K/UL — SIGNIFICANT CHANGE UP (ref 3.8–10.5)
WBC # FLD AUTO: 6.71 K/UL — SIGNIFICANT CHANGE UP (ref 3.8–10.5)

## 2023-11-28 RX ORDER — GABAPENTIN 400 MG/1
100 CAPSULE ORAL THREE TIMES A DAY
Refills: 0 | Status: DISCONTINUED | OUTPATIENT
Start: 2023-11-28 | End: 2023-12-02

## 2023-11-28 RX ORDER — OXYCODONE HYDROCHLORIDE 5 MG/1
5 TABLET ORAL EVERY 4 HOURS
Refills: 0 | Status: DISCONTINUED | OUTPATIENT
Start: 2023-11-28 | End: 2023-11-29

## 2023-11-28 RX ORDER — POLYETHYLENE GLYCOL 3350 17 G/17G
17 POWDER, FOR SOLUTION ORAL
Refills: 0 | Status: DISCONTINUED | OUTPATIENT
Start: 2023-11-28 | End: 2023-12-04

## 2023-11-28 RX ORDER — OXYCODONE HYDROCHLORIDE 5 MG/1
10 TABLET ORAL EVERY 4 HOURS
Refills: 0 | Status: DISCONTINUED | OUTPATIENT
Start: 2023-11-28 | End: 2023-11-29

## 2023-11-28 RX ORDER — ONDANSETRON 8 MG/1
4 TABLET, FILM COATED ORAL ONCE
Refills: 0 | Status: COMPLETED | OUTPATIENT
Start: 2023-11-28 | End: 2023-11-30

## 2023-11-28 RX ADMIN — OXYCODONE HYDROCHLORIDE 10 MILLIGRAM(S): 5 TABLET ORAL at 21:31

## 2023-11-28 RX ADMIN — GABAPENTIN 100 MILLIGRAM(S): 400 CAPSULE ORAL at 06:39

## 2023-11-28 RX ADMIN — PIPERACILLIN AND TAZOBACTAM 25 GRAM(S): 4; .5 INJECTION, POWDER, LYOPHILIZED, FOR SOLUTION INTRAVENOUS at 21:01

## 2023-11-28 RX ADMIN — POLYETHYLENE GLYCOL 3350 17 GRAM(S): 17 POWDER, FOR SOLUTION ORAL at 06:39

## 2023-11-28 RX ADMIN — PIPERACILLIN AND TAZOBACTAM 25 GRAM(S): 4; .5 INJECTION, POWDER, LYOPHILIZED, FOR SOLUTION INTRAVENOUS at 14:02

## 2023-11-28 RX ADMIN — POLYETHYLENE GLYCOL 3350 17 GRAM(S): 17 POWDER, FOR SOLUTION ORAL at 18:48

## 2023-11-28 RX ADMIN — OXYCODONE HYDROCHLORIDE 10 MILLIGRAM(S): 5 TABLET ORAL at 10:35

## 2023-11-28 RX ADMIN — ENOXAPARIN SODIUM 40 MILLIGRAM(S): 100 INJECTION SUBCUTANEOUS at 21:02

## 2023-11-28 RX ADMIN — CHLORHEXIDINE GLUCONATE 15 MILLILITER(S): 213 SOLUTION TOPICAL at 18:49

## 2023-11-28 RX ADMIN — Medication 975 MILLIGRAM(S): at 12:30

## 2023-11-28 RX ADMIN — GABAPENTIN 100 MILLIGRAM(S): 400 CAPSULE ORAL at 14:50

## 2023-11-28 RX ADMIN — Medication 975 MILLIGRAM(S): at 19:30

## 2023-11-28 RX ADMIN — Medication 975 MILLIGRAM(S): at 06:38

## 2023-11-28 RX ADMIN — Medication 975 MILLIGRAM(S): at 18:48

## 2023-11-28 RX ADMIN — OXYCODONE HYDROCHLORIDE 10 MILLIGRAM(S): 5 TABLET ORAL at 04:39

## 2023-11-28 RX ADMIN — OXYCODONE HYDROCHLORIDE 10 MILLIGRAM(S): 5 TABLET ORAL at 00:24

## 2023-11-28 RX ADMIN — OXYCODONE HYDROCHLORIDE 10 MILLIGRAM(S): 5 TABLET ORAL at 16:50

## 2023-11-28 RX ADMIN — OXYCODONE HYDROCHLORIDE 10 MILLIGRAM(S): 5 TABLET ORAL at 16:09

## 2023-11-28 RX ADMIN — Medication 975 MILLIGRAM(S): at 11:51

## 2023-11-28 RX ADMIN — OXYCODONE HYDROCHLORIDE 10 MILLIGRAM(S): 5 TABLET ORAL at 10:03

## 2023-11-28 RX ADMIN — CHLORHEXIDINE GLUCONATE 15 MILLILITER(S): 213 SOLUTION TOPICAL at 06:38

## 2023-11-28 RX ADMIN — PANTOPRAZOLE SODIUM 40 MILLIGRAM(S): 20 TABLET, DELAYED RELEASE ORAL at 11:51

## 2023-11-28 RX ADMIN — GABAPENTIN 100 MILLIGRAM(S): 400 CAPSULE ORAL at 21:02

## 2023-11-28 RX ADMIN — OXYCODONE HYDROCHLORIDE 10 MILLIGRAM(S): 5 TABLET ORAL at 21:01

## 2023-11-28 RX ADMIN — PIPERACILLIN AND TAZOBACTAM 25 GRAM(S): 4; .5 INJECTION, POWDER, LYOPHILIZED, FOR SOLUTION INTRAVENOUS at 06:39

## 2023-11-28 RX ADMIN — SENNA PLUS 2 TABLET(S): 8.6 TABLET ORAL at 21:02

## 2023-11-28 RX ADMIN — OXYCODONE HYDROCHLORIDE 10 MILLIGRAM(S): 5 TABLET ORAL at 05:09

## 2023-11-28 NOTE — PROGRESS NOTE ADULT - SUBJECTIVE AND OBJECTIVE BOX
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 Pt is s/p segmental mandibulectomy with R partial buccal soft tissue and FOM excision, tracheostomy, right neck dissection in 1/2022, and removal of mandibular hardware in 10/2022. Pt completed chemotherapy and radiation  Pt c/o vomiting overnight after she eats     INTERVAL EVENTS:  - aVSS  - Zosyn started 11/25/23, will continue until 11/29/23  - Trach capped, on 4L oxygen NC, satting 99% and above, lowered to 2L this AM  - CLD w/ TF  - CXR confirming stable TF placement, L lung opacity         MEDICATIONS  (STANDING):  chlorhexidine 0.12% Liquid 15 milliLiter(s) Swish and Spit two times a day  enoxaparin Injectable 40 milliGRAM(s) SubCutaneous every 24 hours  gabapentin Solution 100 milliGRAM(s) Oral three times a day  influenza   Vaccine 0.5 milliLiter(s) IntraMuscular once  pantoprazole   Suspension 40 milliGRAM(s) Oral daily  piperacillin/tazobactam IVPB.. 3.375 Gram(s) IV Intermittent every 8 hours  polyethylene glycol 3350 17 Gram(s) Oral daily  senna 2 Tablet(s) Oral at bedtime    MEDICATIONS  (PRN):  acetaminophen   Oral Liquid .. 1000 milliGRAM(s) Oral every 6 hours PRN Mild Pain (1 - 3)  ondansetron Injectable 4 milliGRAM(s) IV Push every 4 hours PRN Nausea  oxyCODONE    Solution 5 milliGRAM(s) Oral every 4 hours PRN Moderate Pain (4 - 6)  oxyCODONE    Solution 10 milliGRAM(s) Oral every 4 hours PRN Severe Pain (7 - 10)    11/28 labs pending   11/27 labs:    135  |  95<L>  |  15  ----------------------------<  129<H>  4.0   |  31  |  1.05    Ca    8.4      27 Nov 2023 05:40  Phos  2.8     11-27  Mg     2.20     11-27        PHYSICAL EXAM:  Gen: NAD  Resp: breathing easily, no stridor  CV: RRR  CBC Full  -  ( 27 Nov 2023 05:40 )  WBC Count : 7.90 K/uL  RBC Count : 2.85 M/uL  Hemoglobin : 8.7 g/dL  Hematocrit : 27.4 %  Platelet Count - Automated : 237 K/uL  Mean Cell Volume : 96.1 fL  Mean Cell Hemoglobin : 30.5 pg  Mean Cell Hemoglobin Concentration : 31.8 gm/dL  Auto Neutrophil # : x  Auto Lymphocyte # : x  Auto Monocyte # : x  Auto Eosinophil # : x  Auto Basophil # : x  Auto Neutrophil % : x  Auto Lymphocyte % : x  Auto Monocyte % : x  Auto Eosinophil % : x  Auto Basophil % : x    ICU Vital Signs Last 24 Hrs  T(C): 36.7 (28 Nov 2023 06:35), Max: 36.8 (27 Nov 2023 20:00)  T(F): 98.1 (28 Nov 2023 06:35), Max: 98.3 (27 Nov 2023 20:00)  HR: 93 (28 Nov 2023 06:35) (77 - 115)  BP: 115/69 (28 Nov 2023 06:35) (100/61 - 117/71)  BP(mean): 74 (28 Nov 2023 00:00) (74 - 98)  ABP: --  ABP(mean): --  RR: 18 (28 Nov 2023 06:35) (12 - 27)  SpO2: 98% (28 Nov 2023 06:35) (78% - 100%)    Abdomen: soft, nontender, nondistended  Skin: Incision c/d/i. Normal color, no rashes or lesions, skin paddle warm, pink, and well perfused.   IOE: flap warm, pink, well perfused. intraoral incisions clean, closed, and in tact. no signs of dehiscence intraoral sites healing well without signs of complication   extremities: donor site without complication sutures clean, closed, in tact. dressing in place.   Cook signal strong. Surgical sites hemostatic. Pain well controlled.                  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 Pt is s/p segmental mandibulectomy with R partial buccal soft tissue and FOM excision, tracheostomy, right neck dissection in 1/2022, and removal of mandibular hardware in 10/2022. Pt completed chemotherapy and radiation  Pt c/o vomiting overnight after she eats     INTERVAL EVENTS:  - aVSS  - Zosyn started 11/25/23, will continue until 11/29/23  - Trach capped, on 4L oxygen NC, satting 99% and above, lowered to 2L this AM  - CLD w/ TF  - CXR confirming stable TF placement, L lung opacity/atelectasis         MEDICATIONS  (STANDING):  chlorhexidine 0.12% Liquid 15 milliLiter(s) Swish and Spit two times a day  enoxaparin Injectable 40 milliGRAM(s) SubCutaneous every 24 hours  gabapentin Solution 100 milliGRAM(s) Oral three times a day  influenza   Vaccine 0.5 milliLiter(s) IntraMuscular once  pantoprazole   Suspension 40 milliGRAM(s) Oral daily  piperacillin/tazobactam IVPB.. 3.375 Gram(s) IV Intermittent every 8 hours  polyethylene glycol 3350 17 Gram(s) Oral daily  senna 2 Tablet(s) Oral at bedtime    MEDICATIONS  (PRN):  acetaminophen   Oral Liquid .. 1000 milliGRAM(s) Oral every 6 hours PRN Mild Pain (1 - 3)  ondansetron Injectable 4 milliGRAM(s) IV Push every 4 hours PRN Nausea  oxyCODONE    Solution 5 milliGRAM(s) Oral every 4 hours PRN Moderate Pain (4 - 6)  oxyCODONE    Solution 10 milliGRAM(s) Oral every 4 hours PRN Severe Pain (7 - 10)    11/28 labs pending   11/27 labs:    135  |  95<L>  |  15  ----------------------------<  129<H>  4.0   |  31  |  1.05    Ca    8.4      27 Nov 2023 05:40  Phos  2.8     11-27  Mg     2.20 11-27        PHYSICAL EXAM:  Gen: NAD  Resp: breathing easily, no stridor  CV: RRR  CBC Full  -  ( 27 Nov 2023 05:40 )  WBC Count : 7.90 K/uL  RBC Count : 2.85 M/uL  Hemoglobin : 8.7 g/dL  Hematocrit : 27.4 %  Platelet Count - Automated : 237 K/uL  Mean Cell Volume : 96.1 fL  Mean Cell Hemoglobin : 30.5 pg  Mean Cell Hemoglobin Concentration : 31.8 gm/dL  Auto Neutrophil # : x  Auto Lymphocyte # : x  Auto Monocyte # : x  Auto Eosinophil # : x  Auto Basophil # : x  Auto Neutrophil % : x  Auto Lymphocyte % : x  Auto Monocyte % : x  Auto Eosinophil % : x  Auto Basophil % : x    ICU Vital Signs Last 24 Hrs  T(C): 36.7 (28 Nov 2023 06:35), Max: 36.8 (27 Nov 2023 20:00)  T(F): 98.1 (28 Nov 2023 06:35), Max: 98.3 (27 Nov 2023 20:00)  HR: 93 (28 Nov 2023 06:35) (77 - 115)  BP: 115/69 (28 Nov 2023 06:35) (100/61 - 117/71)  BP(mean): 74 (28 Nov 2023 00:00) (74 - 98)  ABP: --  ABP(mean): --  RR: 18 (28 Nov 2023 06:35) (12 - 27)  SpO2: 98% (28 Nov 2023 06:35) (78% - 100%)    Abdomen: soft, nontender, nondistended  Skin: Incision c/d/i. Normal color, no rashes or lesions, skin paddle warm, pink, and well perfused.   IOE: flap warm, pink, well perfused. intraoral incisions clean, closed, and in tact. no signs of dehiscence intraoral sites healing well without signs of complication   extremities: donor site without complication sutures clean, closed, in tact. dressing in place.   Cook signal strong. Surgical sites hemostatic. Pain well controlled.                  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 Pt is s/p segmental mandibulectomy with R partial buccal soft tissue and FOM excision, tracheostomy, right neck dissection in 1/2022, and removal of mandibular hardware in 10/2022. Pt completed chemotherapy and radiation  Pt c/o vomiting overnight after she eats     INTERVAL EVENTS:  - aVSS  - Zosyn started 11/25/23, will continue until 11/29/23  - Trach capped, on 4L oxygen NC, satting 99% and above, lowered to 2L this AM  - CLD w/ TF  - CXR confirming stable TF placement, L lung opacity/atelectasis         MEDICATIONS  (STANDING):  chlorhexidine 0.12% Liquid 15 milliLiter(s) Swish and Spit two times a day  enoxaparin Injectable 40 milliGRAM(s) SubCutaneous every 24 hours  gabapentin Solution 100 milliGRAM(s) Oral three times a day  influenza   Vaccine 0.5 milliLiter(s) IntraMuscular once  pantoprazole   Suspension 40 milliGRAM(s) Oral daily  piperacillin/tazobactam IVPB.. 3.375 Gram(s) IV Intermittent every 8 hours  polyethylene glycol 3350 17 Gram(s) Oral daily  senna 2 Tablet(s) Oral at bedtime    MEDICATIONS  (PRN):  acetaminophen   Oral Liquid .. 1000 milliGRAM(s) Oral every 6 hours PRN Mild Pain (1 - 3)  ondansetron Injectable 4 milliGRAM(s) IV Push every 4 hours PRN Nausea  oxyCODONE    Solution 5 milliGRAM(s) Oral every 4 hours PRN Moderate Pain (4 - 6)  oxyCODONE    Solution 10 milliGRAM(s) Oral every 4 hours PRN Severe Pain (7 - 10)    11/28 labs pending   11/27 labs:    135  |  95<L>  |  15  ----------------------------<  129<H>  4.0   |  31  |  1.05    Ca    8.4      27 Nov 2023 05:40  Phos  2.8     11-27  Mg     2.20 11-27        PHYSICAL EXAM:  Gen: NAD  Resp: breathing easily, no stridor  CV: RRR  CBC Full  -  ( 27 Nov 2023 05:40 )  WBC Count : 7.90 K/uL  RBC Count : 2.85 M/uL  Hemoglobin : 8.7 g/dL  Hematocrit : 27.4 %  Platelet Count - Automated : 237 K/uL  Mean Cell Volume : 96.1 fL  Mean Cell Hemoglobin : 30.5 pg  Mean Cell Hemoglobin Concentration : 31.8 gm/dL  Auto Neutrophil # : x  Auto Lymphocyte # : x  Auto Monocyte # : x  Auto Eosinophil # : x  Auto Basophil # : x  Auto Neutrophil % : x  Auto Lymphocyte % : x  Auto Monocyte % : x  Auto Eosinophil % : x  Auto Basophil % : x    ICU Vital Signs Last 24 Hrs  T(C): 36.7 (28 Nov 2023 06:35), Max: 36.8 (27 Nov 2023 20:00)  T(F): 98.1 (28 Nov 2023 06:35), Max: 98.3 (27 Nov 2023 20:00)  HR: 93 (28 Nov 2023 06:35) (77 - 115)  BP: 115/69 (28 Nov 2023 06:35) (100/61 - 117/71)  BP(mean): 74 (28 Nov 2023 00:00) (74 - 98)  ABP: --  ABP(mean): --  RR: 18 (28 Nov 2023 06:35) (12 - 27)  SpO2: 98% (28 Nov 2023 06:35) (78% - 100%)    Abdomen: soft, nontender, nondistended  Skin: Incision c/d/i. Normal color, no rashes or lesions  IOE: flap warm, pink, well perfused. intraoral incisions clean, closed, and in tact. no signs of dehiscence intraoral sites healing well without signs of complication   extremities: donor site without complication sutures clean, closed, in tact. ace dressing in place RLE. thigh skin graft site healing well. Surgical sites hemostatic. Pain well controlled. L PF 5/5, R PF 5/5

## 2023-11-28 NOTE — PROGRESS NOTE ADULT - ASSESSMENT
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 Pt is s/p segmental mandibulectomy with R partial buccal soft tissue and FOM excision, tracheostomy, right neck dissection in 1/2022, and removal of mandibular hardware in 10/2022. Pt completed chemotherapy and radiation. Pt reports open areas to right mandible with bone exposed (11/25/23). Zosyn added (11/25/23 - 11/29/23). JESSEE neck and leg removed. Wound vac removed. Pt is progressing without acute events.    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 Pt is s/p segmental mandibulectomy with R partial buccal soft tissue and FOM excision, tracheostomy, right neck dissection in 1/2022, and removal of mandibular hardware in 10/2022. Pt completed chemotherapy and radiation. Pt reports open areas to right mandible with bone exposed (11/25/23). Zosyn added (11/25/23 - 11/29/23). JESSEE neck and leg removed. Wound vac removed. Pt is progressing without acute events.    Plan:   -ERAS Day 7 Protocol, consider decannulating as patient has tolerated capping trial, however requiring supplemental oxygen via NC, lowered oxygen to 2L NC from 4L, will monitor   -CLD and c/w TFs, OOBTC, PT  -Multimodal pain management  -DVT ppx  -Chest PT    Fauzia Shrestha PA-C  Oral and Maxillofacial Surgery  [facility] Pager #[phone]  Available on Teams

## 2023-11-28 NOTE — PROGRESS NOTE ADULT - ASSESSMENT
A: Ms Dsouza is a 59yo F with previous hx of FFF for mandibular reconstruction c/b osteoradionecrosis who is now s/p RLE FFF for reconstruction on 11/21. Patient is recovering well.     Plan:  -ERAS protocol  -continue abx for skin cellulitis/erythema   -removed vac, will do daily xeroform/telfa/ace daily to skin graft  -removed cook doppler today  -leave skin graft donor site open to air today  -OOB with CAM boot  -Monitor I&Os  -Appreciate great care per primary  -Can apply xeroform or Aquacel to dehiscence    Plastic Surgery  #[phone] pager

## 2023-11-28 NOTE — PROGRESS NOTE ADULT - SUBJECTIVE AND OBJECTIVE BOX
Plastic Surgery Progress Note (pg [facility]: [id number], [facility]: [phone])    SUBJECTIVE  Pt comfortable in bed. Pain well controlled, no complaints.    OBJECTIVE  ___________________________________________________  VITAL SIGNS / I&O's   Vital Signs Last 24 Hrs  T(C): 36.3 (28 Nov 2023 02:02), Max: 37.4 (27 Nov 2023 08:00)  T(F): 97.4 (28 Nov 2023 02:02), Max: 99.4 (27 Nov 2023 08:00)  HR: 83 (28 Nov 2023 02:02) (77 - 115)  BP: 106/55 (28 Nov 2023 02:02) (100/61 - 117/71)  BP(mean): 74 (28 Nov 2023 00:00) (74 - 98)  RR: 19 (28 Nov 2023 02:02) (12 - 27)  SpO2: 95% (28 Nov 2023 02:02) (78% - 100%)    Parameters below as of 28 Nov 2023 02:02  Patient On (Oxygen Delivery Method): nasal cannula  O2 Flow (L/min): 3        27 Nov 2023 07:01  -  28 Nov 2023 07:00  --------------------------------------------------------  IN:    Enteral Tube Flush: 90 mL    IV PiggyBack: 200 mL    Oral Fluid: 150 mL    TwoCal HN: 420 mL  Total IN: 860 mL    OUT:    Voided (mL): 575 mL  Total OUT: 575 mL    Total NET: 285 mL        ___________________________________________________  PHYSICAL EXAM    General: NAD, resting in bed comfortably.  HEENT: Chin suture line cdi, flap with good cap refill, soft, dobhoff sutured in place, cook in place (+), trach'd. Facial erythema on L aspect of face near incision improved this AM.  Small area of dehiscence at superior-lateral aspect of flap. NeedFeed doppler with triphasic signal, removed  Cardiac: regular rate, warm and well perfused  Respiratory: Nonlabored respirations, normal cw expansion.  Extremities:   RLE with ACE wrap in place, cdi, soft, vac in place and holding suction, removed, skin graft with excellent take.  RLE skin graft donor site appropriate, left open toa ir.    ___________________________________________________  LABS                        8.7    7.90  )-----------( 237      ( 27 Nov 2023 05:40 )             27.4     27 Nov 2023 05:40    135    |  95     |  15     ----------------------------<  129    4.0     |  31     |  1.05     Ca    8.4        27 Nov 2023 05:40  Phos  2.8       27 Nov 2023 05:40  Mg     2.20      27 Nov 2023 05:40        CAPILLARY BLOOD GLUCOSE            Urinalysis Basic - ( 27 Nov 2023 05:40 )    Color: x / Appearance: x / SG: x / pH: x  Gluc: 129 mg/dL / Ketone: x  / Bili: x / Urobili: x   Blood: x / Protein: x / Nitrite: x   Leuk Esterase: x / RBC: x / WBC x   Sq Epi: x / Non Sq Epi: x / Bacteria: x      ___________________________________________________  MICRO  Recent Cultures:    ___________________________________________________  MEDICATIONS  (STANDING):  acetaminophen   Oral Liquid .. 975 milliGRAM(s) Enteral Tube every 6 hours  chlorhexidine 0.12% Liquid 15 milliLiter(s) Swish and Spit two times a day  enoxaparin Injectable 40 milliGRAM(s) SubCutaneous every 24 hours  gabapentin Solution 100 milliGRAM(s) Oral three times a day  influenza   Vaccine 0.5 milliLiter(s) IntraMuscular once  ondansetron Injectable 4 milliGRAM(s) IV Push once  pantoprazole  Injectable 40 milliGRAM(s) IV Push daily  piperacillin/tazobactam IVPB.. 3.375 Gram(s) IV Intermittent every 8 hours  polyethylene glycol 3350 17 Gram(s) Oral two times a day  senna 2 Tablet(s) Oral at bedtime    MEDICATIONS  (PRN):  ondansetron Injectable 4 milliGRAM(s) IV Push every 4 hours PRN Nausea  oxyCODONE    Solution 5 milliGRAM(s) Oral every 4 hours PRN Moderate Pain (4 - 6)  oxyCODONE    Solution 10 milliGRAM(s) Oral every 4 hours PRN Severe Pain (7 - 10)

## 2023-11-29 LAB
ANION GAP SERPL CALC-SCNC: 10 MMOL/L — SIGNIFICANT CHANGE UP (ref 7–14)
ANION GAP SERPL CALC-SCNC: 10 MMOL/L — SIGNIFICANT CHANGE UP (ref 7–14)
BUN SERPL-MCNC: 13 MG/DL — SIGNIFICANT CHANGE UP (ref 7–23)
BUN SERPL-MCNC: 13 MG/DL — SIGNIFICANT CHANGE UP (ref 7–23)
CALCIUM SERPL-MCNC: 8.6 MG/DL — SIGNIFICANT CHANGE UP (ref 8.4–10.5)
CALCIUM SERPL-MCNC: 8.6 MG/DL — SIGNIFICANT CHANGE UP (ref 8.4–10.5)
CHLORIDE SERPL-SCNC: 95 MMOL/L — LOW (ref 98–107)
CHLORIDE SERPL-SCNC: 95 MMOL/L — LOW (ref 98–107)
CO2 SERPL-SCNC: 31 MMOL/L — SIGNIFICANT CHANGE UP (ref 22–31)
CO2 SERPL-SCNC: 31 MMOL/L — SIGNIFICANT CHANGE UP (ref 22–31)
CREAT SERPL-MCNC: 0.92 MG/DL — SIGNIFICANT CHANGE UP (ref 0.5–1.3)
CREAT SERPL-MCNC: 0.92 MG/DL — SIGNIFICANT CHANGE UP (ref 0.5–1.3)
EGFR: 72 ML/MIN/1.73M2 — SIGNIFICANT CHANGE UP
EGFR: 72 ML/MIN/1.73M2 — SIGNIFICANT CHANGE UP
GLUCOSE SERPL-MCNC: 91 MG/DL — SIGNIFICANT CHANGE UP (ref 70–99)
GLUCOSE SERPL-MCNC: 91 MG/DL — SIGNIFICANT CHANGE UP (ref 70–99)
HCT VFR BLD CALC: 28.6 % — LOW (ref 34.5–45)
HCT VFR BLD CALC: 28.6 % — LOW (ref 34.5–45)
HGB BLD-MCNC: 9.1 G/DL — LOW (ref 11.5–15.5)
HGB BLD-MCNC: 9.1 G/DL — LOW (ref 11.5–15.5)
MAGNESIUM SERPL-MCNC: 1.9 MG/DL — SIGNIFICANT CHANGE UP (ref 1.6–2.6)
MAGNESIUM SERPL-MCNC: 1.9 MG/DL — SIGNIFICANT CHANGE UP (ref 1.6–2.6)
MCHC RBC-ENTMCNC: 31.2 PG — SIGNIFICANT CHANGE UP (ref 27–34)
MCHC RBC-ENTMCNC: 31.2 PG — SIGNIFICANT CHANGE UP (ref 27–34)
MCHC RBC-ENTMCNC: 31.8 GM/DL — LOW (ref 32–36)
MCHC RBC-ENTMCNC: 31.8 GM/DL — LOW (ref 32–36)
MCV RBC AUTO: 97.9 FL — SIGNIFICANT CHANGE UP (ref 80–100)
MCV RBC AUTO: 97.9 FL — SIGNIFICANT CHANGE UP (ref 80–100)
NRBC # BLD: 0 /100 WBCS — SIGNIFICANT CHANGE UP (ref 0–0)
NRBC # BLD: 0 /100 WBCS — SIGNIFICANT CHANGE UP (ref 0–0)
NRBC # FLD: 0 K/UL — SIGNIFICANT CHANGE UP (ref 0–0)
NRBC # FLD: 0 K/UL — SIGNIFICANT CHANGE UP (ref 0–0)
PHOSPHATE SERPL-MCNC: 2.9 MG/DL — SIGNIFICANT CHANGE UP (ref 2.5–4.5)
PHOSPHATE SERPL-MCNC: 2.9 MG/DL — SIGNIFICANT CHANGE UP (ref 2.5–4.5)
PLATELET # BLD AUTO: 261 K/UL — SIGNIFICANT CHANGE UP (ref 150–400)
PLATELET # BLD AUTO: 261 K/UL — SIGNIFICANT CHANGE UP (ref 150–400)
POTASSIUM SERPL-MCNC: 4.2 MMOL/L — SIGNIFICANT CHANGE UP (ref 3.5–5.3)
POTASSIUM SERPL-MCNC: 4.2 MMOL/L — SIGNIFICANT CHANGE UP (ref 3.5–5.3)
POTASSIUM SERPL-SCNC: 4.2 MMOL/L — SIGNIFICANT CHANGE UP (ref 3.5–5.3)
POTASSIUM SERPL-SCNC: 4.2 MMOL/L — SIGNIFICANT CHANGE UP (ref 3.5–5.3)
RBC # BLD: 2.92 M/UL — LOW (ref 3.8–5.2)
RBC # BLD: 2.92 M/UL — LOW (ref 3.8–5.2)
RBC # FLD: 15.3 % — HIGH (ref 10.3–14.5)
RBC # FLD: 15.3 % — HIGH (ref 10.3–14.5)
SODIUM SERPL-SCNC: 136 MMOL/L — SIGNIFICANT CHANGE UP (ref 135–145)
SODIUM SERPL-SCNC: 136 MMOL/L — SIGNIFICANT CHANGE UP (ref 135–145)
WBC # BLD: 6.93 K/UL — SIGNIFICANT CHANGE UP (ref 3.8–10.5)
WBC # BLD: 6.93 K/UL — SIGNIFICANT CHANGE UP (ref 3.8–10.5)
WBC # FLD AUTO: 6.93 K/UL — SIGNIFICANT CHANGE UP (ref 3.8–10.5)
WBC # FLD AUTO: 6.93 K/UL — SIGNIFICANT CHANGE UP (ref 3.8–10.5)

## 2023-11-29 RX ORDER — OXYCODONE HYDROCHLORIDE 5 MG/1
5 TABLET ORAL EVERY 4 HOURS
Refills: 0 | Status: DISCONTINUED | OUTPATIENT
Start: 2023-11-29 | End: 2023-11-30

## 2023-11-29 RX ORDER — OXYCODONE HYDROCHLORIDE 5 MG/1
10 TABLET ORAL EVERY 4 HOURS
Refills: 0 | Status: DISCONTINUED | OUTPATIENT
Start: 2023-11-29 | End: 2023-11-29

## 2023-11-29 RX ORDER — ONDANSETRON 8 MG/1
4 TABLET, FILM COATED ORAL EVERY 4 HOURS
Refills: 0 | Status: DISCONTINUED | OUTPATIENT
Start: 2023-11-29 | End: 2023-11-29

## 2023-11-29 RX ORDER — ONDANSETRON 8 MG/1
4 TABLET, FILM COATED ORAL EVERY 4 HOURS
Refills: 0 | Status: DISCONTINUED | OUTPATIENT
Start: 2023-11-29 | End: 2023-12-04

## 2023-11-29 RX ADMIN — OXYCODONE HYDROCHLORIDE 10 MILLIGRAM(S): 5 TABLET ORAL at 10:40

## 2023-11-29 RX ADMIN — Medication 975 MILLIGRAM(S): at 13:08

## 2023-11-29 RX ADMIN — OXYCODONE HYDROCHLORIDE 10 MILLIGRAM(S): 5 TABLET ORAL at 05:55

## 2023-11-29 RX ADMIN — GABAPENTIN 100 MILLIGRAM(S): 400 CAPSULE ORAL at 05:56

## 2023-11-29 RX ADMIN — CHLORHEXIDINE GLUCONATE 15 MILLILITER(S): 213 SOLUTION TOPICAL at 05:55

## 2023-11-29 RX ADMIN — GABAPENTIN 100 MILLIGRAM(S): 400 CAPSULE ORAL at 13:09

## 2023-11-29 RX ADMIN — OXYCODONE HYDROCHLORIDE 5 MILLIGRAM(S): 5 TABLET ORAL at 21:15

## 2023-11-29 RX ADMIN — OXYCODONE HYDROCHLORIDE 10 MILLIGRAM(S): 5 TABLET ORAL at 11:10

## 2023-11-29 RX ADMIN — PIPERACILLIN AND TAZOBACTAM 25 GRAM(S): 4; .5 INJECTION, POWDER, LYOPHILIZED, FOR SOLUTION INTRAVENOUS at 05:56

## 2023-11-29 RX ADMIN — OXYCODONE HYDROCHLORIDE 10 MILLIGRAM(S): 5 TABLET ORAL at 06:25

## 2023-11-29 RX ADMIN — Medication 975 MILLIGRAM(S): at 05:55

## 2023-11-29 RX ADMIN — ENOXAPARIN SODIUM 40 MILLIGRAM(S): 100 INJECTION SUBCUTANEOUS at 21:15

## 2023-11-29 RX ADMIN — OXYCODONE HYDROCHLORIDE 10 MILLIGRAM(S): 5 TABLET ORAL at 01:40

## 2023-11-29 RX ADMIN — GABAPENTIN 100 MILLIGRAM(S): 400 CAPSULE ORAL at 21:15

## 2023-11-29 RX ADMIN — PANTOPRAZOLE SODIUM 40 MILLIGRAM(S): 20 TABLET, DELAYED RELEASE ORAL at 13:08

## 2023-11-29 RX ADMIN — Medication 975 MILLIGRAM(S): at 06:25

## 2023-11-29 RX ADMIN — CHLORHEXIDINE GLUCONATE 15 MILLILITER(S): 213 SOLUTION TOPICAL at 17:59

## 2023-11-29 RX ADMIN — Medication 975 MILLIGRAM(S): at 13:38

## 2023-11-29 RX ADMIN — Medication 975 MILLIGRAM(S): at 17:58

## 2023-11-29 RX ADMIN — Medication 975 MILLIGRAM(S): at 00:01

## 2023-11-29 RX ADMIN — OXYCODONE HYDROCHLORIDE 10 MILLIGRAM(S): 5 TABLET ORAL at 02:10

## 2023-11-29 RX ADMIN — Medication 975 MILLIGRAM(S): at 18:28

## 2023-11-29 NOTE — PROGRESS NOTE ADULT - SUBJECTIVE AND OBJECTIVE BOX
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 Pt is s/p segmental mandibulectomy with R partial buccal soft tissue and FOM excision, tracheostomy, right neck dissection in 1/2022, and removal of mandibular hardware in 10/2022. Pt completed chemotherapy and radiation.    INTERVAL EVENTS: TENZINONJeri    Vital Signs Last 24 Hrs  T(C): 36.4 (29 Nov 2023 02:24), Max: 36.5 (28 Nov 2023 10:00)  T(F): 97.5 (29 Nov 2023 02:24), Max: 97.7 (28 Nov 2023 10:00)  HR: 77 (29 Nov 2023 02:24) (70 - 81)  BP: 112/63 (29 Nov 2023 02:24) (95/57 - 112/63)  BP(mean): --  RR: 18 (29 Nov 2023 02:24) (17 - 18)  SpO2: 98% (29 Nov 2023 02:24) (96% - 100%)    Parameters below as of 29 Nov 2023 02:24  Patient On (Oxygen Delivery Method): nasal cannula    I&O's Detail    28 Nov 2023 07:01  -  29 Nov 2023 07:00  --------------------------------------------------------  IN:    Enteral Tube Flush: 180 mL    IV PiggyBack: 100 mL    Oral Fluid: 390 mL    TwoCal HN: 500 mL  Total IN: 1170 mL    OUT:    Voided (mL): 300 mL  Total OUT: 300 mL  Total NET: 870 mL    MEDICATIONS  (STANDING):  acetaminophen   Oral Liquid .. 975 milliGRAM(s) Enteral Tube every 6 hours  chlorhexidine 0.12% Liquid 15 milliLiter(s) Swish and Spit two times a day  enoxaparin Injectable 40 milliGRAM(s) SubCutaneous every 24 hours  gabapentin Solution 100 milliGRAM(s) Oral three times a day  influenza   Vaccine 0.5 milliLiter(s) IntraMuscular once  ondansetron Injectable 4 milliGRAM(s) IV Push once  pantoprazole  Injectable 40 milliGRAM(s) IV Push daily  polyethylene glycol 3350 17 Gram(s) Oral two times a day  senna 2 Tablet(s) Oral at bedtime    MEDICATIONS  (PRN):  ondansetron Injectable 4 milliGRAM(s) IV Push every 4 hours PRN Nausea  oxyCODONE    Solution 5 milliGRAM(s) Oral every 4 hours PRN Moderate Pain (4 - 6)  oxyCODONE    Solution 10 milliGRAM(s) Oral every 4 hours PRN Severe Pain (7 - 10)    PHYSICAL EXAM:  Gen: NAD  Resp: breathing easily, no stridor  CV: RRR  Abdomen: soft, nontender, nondistended  Skin: Incision c/d/i. Normal color, no rashes or lesions  IOE: flap warm, pink, well perfused. intraoral incisions clean, closed, and in tact. intraoral sites healing well without signs of complication   extremities: donor site without complication sutures clean, closed, in tact. ace dressing in place RLE. thigh skin graft site healing well. Surgical sites hemostatic. Pain well controlled.

## 2023-11-29 NOTE — PROGRESS NOTE ADULT - ASSESSMENT
A: Ms Dsouza is a 59yo F with previous hx of FFF for mandibular reconstruction c/b osteoradionecrosis who is now s/p RLE FFF for reconstruction on 11/21. Patient is recovering well.     Plan:  -ERAS protocol  -continue abx for skin cellulitis/erythema   -removed vac, will do daily xeroform/telfa/ace daily to skin graft  -leave skin graft donor site open to air  -OOB with CAM boot  -Monitor I&Os  -Appreciate great care per primary  -Can apply xeroform or Aquacel to dehiscence    Plastic Surgery  #[phone] [facility] pager

## 2023-11-29 NOTE — PROGRESS NOTE ADULT - ASSESSMENT
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 Pt is s/p segmental mandibulectomy with R partial buccal soft tissue and FOM excision, tracheostomy, right neck dissection in 1/2022, and removal of mandibular hardware in 10/2022. Pt completed chemotherapy and radiation. Pt reports open areas to right mandible with bone exposed (11/25/23). Zosyn added (11/25/23 - 11/29/23). JESSEE neck and leg removed. Wound vac removed. Pt is progressing without acute events.    Plan:   -ERAS Day 8 Protocol  -Consider decannulation  -NPO w/ TFs  -OOBTC, PT  -Multimodal pain management  -DVT ppx      Oral and Maxillofacial Surgery  [facility] Pager [phone]

## 2023-11-29 NOTE — PROGRESS NOTE ADULT - SUBJECTIVE AND OBJECTIVE BOX
PARUL HERNANDEZ  [id number]    Subjective:    Patient seen and examined, no acute events overnight.        Objective:  T(C): 36.4 (11-29-23 @ 02:24), Max: 36.5 (11-28-23 @ 10:00)  HR: 77 (11-29-23 @ 02:24) (70 - 81)  BP: 112/63 (11-29-23 @ 02:24) (95/57 - 112/63)  RR: 18 (11-29-23 @ 02:24) (17 - 18)  SpO2: 98% (11-29-23 @ 02:24) (96% - 100%)  Wt(kg): --   11-29    136  |  95<L>  |  13  ----------------------------<  91  4.2   |  31  |  0.92    Ca    8.6      29 Nov 2023 05:12  Phos  2.9     11-29  Mg     1.90     11-29                          9.1    6.93  )-----------( 261      ( 29 Nov 2023 05:12 )             28.6       11-28 @ 07:01  -  11-29 @ 07:00  --------------------------------------------------------  IN: 1170 mL / OUT: 300 mL / NET: 870 mL      PHYSICAL EXAM:    General: NAD, resting in bed comfortably.  HEENT: Chin suture line cdi, lateral dehiscence stable. Flap with good cap refill. Facial erythema on L aspect of face near incision improved this AM.  Small area of dehiscence at superior-lateral aspect of flap.   Cardiac: regular rate, warm and well perfused  Respiratory: Nonlabored respirations, normal cw expansion.  Extremities:   RLE: skin graft with excellent take.  RLE skin graft donor site open to air             MEDICATIONS  (STANDING):  acetaminophen   Oral Liquid .. 975 milliGRAM(s) Enteral Tube every 6 hours  chlorhexidine 0.12% Liquid 15 milliLiter(s) Swish and Spit two times a day  enoxaparin Injectable 40 milliGRAM(s) SubCutaneous every 24 hours  gabapentin Solution 100 milliGRAM(s) Oral three times a day  influenza   Vaccine 0.5 milliLiter(s) IntraMuscular once  ondansetron Injectable 4 milliGRAM(s) IV Push once  pantoprazole  Injectable 40 milliGRAM(s) IV Push daily  polyethylene glycol 3350 17 Gram(s) Oral two times a day  senna 2 Tablet(s) Oral at bedtime    MEDICATIONS  (PRN):  ondansetron Injectable 4 milliGRAM(s) IV Push every 4 hours PRN Nausea  oxyCODONE    Solution 10 milliGRAM(s) Oral every 4 hours PRN Severe Pain (7 - 10)  oxyCODONE    Solution 5 milliGRAM(s) Oral every 4 hours PRN Moderate Pain (4 - 6)

## 2023-11-29 NOTE — CHART NOTE - NSCHARTNOTEFT_GEN_A_CORE
Source: Patient [X]    Family [ ]     Other (Chart Review) [X]    Patient is seen for nutrition follow-up assessment for nutrition consult, tube feeding.    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 Patient is status post segmental mandibulectomy with R partial buccal soft tissue and FOM excision, tracheostomy, right neck dissection in 1/2022, and removal of mandibular hardware in 10/2022. Patient completed chemotherapy and radiation. Patient reports open areas to right mandible with bone exposed (11/25/23). JESSEE neck and leg removed. Wound vac removed.     Nutrition Course:   - As per writer's discussion with physician via [employer] Teams, medical team hopes to initiate clear liquid diet and advance as tolerated, wean off enteral nutrition. Medical team proposed bolus tube feeding.  - Patient with limited ability to verbally communicate secondary to medical course. Patient able to communicate through head nodding, hand gestures, and writing down on paper; all of these communication methods were utilized by writer during this encounter to communicate with patient.  - Patient is currently ordered for enteral nutrition via NGT and supplementation with LPS BID as per previous RD recommendations. Patient seen at bedside, NGT in place, Jevity 1.5 Amor enteral nutrition formula hanging at bedside. Tube feeding is currently running at 40mL/hr x24 hours (ordered goal rate). Writer checked tube feeding pump history x48 hours; patient received 395mL formula. Compared to order x48 hours, patient received 21% of nutrition needs.   - Patient previously ordered for continuous tube feeding and clear liquid diet. Patient reports intolerance to clear liquid diet, experienced vomiting and leakage of ingested liquids through surgical incisions. Reports tube feeding was held overnight as a result, thus explaining reason for patient receiving <50% estimated energy needs.  - As per patient, previously had a PEG, twice, and during that time did not tolerate bolus feeding (experienced vomiting). Patient is not amenable to receive bolus feeds at this time, wishes to continue with continuous feeds. Additionally, patient expressed interest in re-attempting clear liquids in a few days after her incisions have had more time to heal. The patient's wishes and current nutrition care plan has been communicated with physician via [employer] Teams, agreeable. RD remains available, please consult as needed.    Diet : Diet, NPO with Tube Feed:   Tube Feeding Modality: Nasogastric  Jevity 1.5 Amor (JEVITY1.5RTH)  Total Volume for 24 Hours (mL): 960  Continuous  Until Goal Tube Feed Rate (mL per Hour): 40  Tube Feed Duration (in Hours): 24  Tube Feed Start Time: 12:00  Liquid Protein Supplement     Qty per Day:  2 (11-29-23 @ 06:00)    Anthropometrics  Height: 162.56cm/64inches  Weights per RN flowsheet: 55.3kg (11/27), 54kg (11/26), 54.7kg (11/23), 55.2kg (11/22)  BMI: 20.88kg/m^2  % Weight Change: +0.1kg (<1%) x4 days period of time    Pertinent Medications: MEDICATIONS  (STANDING):  acetaminophen   Oral Liquid .. 975 milliGRAM(s) Enteral Tube every 6 hours  chlorhexidine 0.12% Liquid 15 milliLiter(s) Swish and Spit two times a day  enoxaparin Injectable 40 milliGRAM(s) SubCutaneous every 24 hours  gabapentin Solution 100 milliGRAM(s) Oral three times a day  influenza   Vaccine 0.5 milliLiter(s) IntraMuscular once  ondansetron Injectable 4 milliGRAM(s) IV Push once  pantoprazole  Injectable 40 milliGRAM(s) IV Push daily  polyethylene glycol 3350 17 Gram(s) Oral two times a day  senna 2 Tablet(s) Oral at bedtime    MEDICATIONS  (PRN):  ondansetron Injectable 4 milliGRAM(s) IV Push every 4 hours PRN Nausea  oxyCODONE    Solution 5 milliGRAM(s) Oral every 4 hours PRN Moderate Pain (4 - 6)  oxyCODONE    Solution 10 milliGRAM(s) Oral every 4 hours PRN Severe Pain (7 - 10)    Pertinent Labs:  11-29 Na136 mmol/L Glu 91 mg/dL K+ 4.2 mmol/L Cr  0.92 mg/dL BUN 13 mg/dL 11-29 Phos 2.9 mg/dL    Skin: Surgical incisions, no pressure ulcers/injuries documented per RN flowsheets     Fluid: Edema 2+ (face) per RN flowsheet    GI: Last BM 11/27/23 per RN flowsheet documentation. Not noted to be on a bowel regimen.     Estimated Needs:   [X] no change since previous assessment  Weight Used: Dosing Weight 119.9lb/54.4kg  Estimated energy needs: 1632-1904kcal (based on 30-35kcal/kg)  Estimated protein needs: 76.16-97.92gms (based on 1.4-1.8gms/kg)    Previous Nutrition Diagnosis: Inadequate energy intake    Nutrition Diagnosis is [X] ongoing    New Nutrition Diagnosis: Not applicable    Education: [X] Given today    Type of education provided: Writer provided verbal education regarding current diet order and nutrition care plan. Patient nodded head in understanding to the discussion.     Nutrition Recommendations  - Continue current enteral nutrition regimen: Jevity 1.5 Amor via NGT @ 40mL/hr x24hrs + LPS Sugar-Free (provides 110 kcal, 15gms protein per serving) BID  --> This regimen provides a total of 960mL volume formula, 1640kcal (1440kcal from formula, 200kcal from modular), 91.2gms protein (61.2gms protein from formula, 30gms protein from modular), 730mL free water daily. This meets 30.1kcal/kg, 1.68gms protein/kg @ dosing weight 54.4kg  --> Defer free fluid management to medical team  - As medically feasible and as tolerated, may repeat trial of clear liquids at medical team's discretion  - Monitor weights, labs, BM's, skin integrity, enteral nutrition, diet progression   - Monitor electrolytes (K+, Mg, P) and replete to within desired limits as clinically indicated  - RD remains available, please consult as needed    Monitoring and Evaluation:   [ ] PO intake [X] Tolerance to diet prescription [X] weights [X] follow up per protocol    Bell Shipman MS RDN CDN  On [employer] Teams or Pager #[phone]

## 2023-11-29 NOTE — AIRWAY REMOVAL NOTE  ADULT & PEDS - ARTIFICAL AIRWAY REMOVAL COMMENTS
trach decannulated uneventfully, patient tolerated well and was observed for 20 minutes by provider following airway removal

## 2023-11-30 LAB
ANION GAP SERPL CALC-SCNC: 9 MMOL/L — SIGNIFICANT CHANGE UP (ref 7–14)
ANION GAP SERPL CALC-SCNC: 9 MMOL/L — SIGNIFICANT CHANGE UP (ref 7–14)
BUN SERPL-MCNC: 17 MG/DL — SIGNIFICANT CHANGE UP (ref 7–23)
BUN SERPL-MCNC: 17 MG/DL — SIGNIFICANT CHANGE UP (ref 7–23)
CALCIUM SERPL-MCNC: 8.6 MG/DL — SIGNIFICANT CHANGE UP (ref 8.4–10.5)
CALCIUM SERPL-MCNC: 8.6 MG/DL — SIGNIFICANT CHANGE UP (ref 8.4–10.5)
CHLORIDE SERPL-SCNC: 97 MMOL/L — LOW (ref 98–107)
CHLORIDE SERPL-SCNC: 97 MMOL/L — LOW (ref 98–107)
CO2 SERPL-SCNC: 29 MMOL/L — SIGNIFICANT CHANGE UP (ref 22–31)
CO2 SERPL-SCNC: 29 MMOL/L — SIGNIFICANT CHANGE UP (ref 22–31)
CREAT SERPL-MCNC: 0.89 MG/DL — SIGNIFICANT CHANGE UP (ref 0.5–1.3)
CREAT SERPL-MCNC: 0.89 MG/DL — SIGNIFICANT CHANGE UP (ref 0.5–1.3)
EGFR: 75 ML/MIN/1.73M2 — SIGNIFICANT CHANGE UP
EGFR: 75 ML/MIN/1.73M2 — SIGNIFICANT CHANGE UP
GLUCOSE SERPL-MCNC: 82 MG/DL — SIGNIFICANT CHANGE UP (ref 70–99)
GLUCOSE SERPL-MCNC: 82 MG/DL — SIGNIFICANT CHANGE UP (ref 70–99)
HCT VFR BLD CALC: 25.9 % — LOW (ref 34.5–45)
HCT VFR BLD CALC: 25.9 % — LOW (ref 34.5–45)
HGB BLD-MCNC: 8.3 G/DL — LOW (ref 11.5–15.5)
HGB BLD-MCNC: 8.3 G/DL — LOW (ref 11.5–15.5)
MAGNESIUM SERPL-MCNC: 1.9 MG/DL — SIGNIFICANT CHANGE UP (ref 1.6–2.6)
MAGNESIUM SERPL-MCNC: 1.9 MG/DL — SIGNIFICANT CHANGE UP (ref 1.6–2.6)
MCHC RBC-ENTMCNC: 30.9 PG — SIGNIFICANT CHANGE UP (ref 27–34)
MCHC RBC-ENTMCNC: 30.9 PG — SIGNIFICANT CHANGE UP (ref 27–34)
MCHC RBC-ENTMCNC: 32 GM/DL — SIGNIFICANT CHANGE UP (ref 32–36)
MCHC RBC-ENTMCNC: 32 GM/DL — SIGNIFICANT CHANGE UP (ref 32–36)
MCV RBC AUTO: 96.3 FL — SIGNIFICANT CHANGE UP (ref 80–100)
MCV RBC AUTO: 96.3 FL — SIGNIFICANT CHANGE UP (ref 80–100)
NRBC # BLD: 0 /100 WBCS — SIGNIFICANT CHANGE UP (ref 0–0)
NRBC # BLD: 0 /100 WBCS — SIGNIFICANT CHANGE UP (ref 0–0)
NRBC # FLD: 0 K/UL — SIGNIFICANT CHANGE UP (ref 0–0)
NRBC # FLD: 0 K/UL — SIGNIFICANT CHANGE UP (ref 0–0)
PHOSPHATE SERPL-MCNC: 2.8 MG/DL — SIGNIFICANT CHANGE UP (ref 2.5–4.5)
PHOSPHATE SERPL-MCNC: 2.8 MG/DL — SIGNIFICANT CHANGE UP (ref 2.5–4.5)
PLATELET # BLD AUTO: 324 K/UL — SIGNIFICANT CHANGE UP (ref 150–400)
PLATELET # BLD AUTO: 324 K/UL — SIGNIFICANT CHANGE UP (ref 150–400)
POTASSIUM SERPL-MCNC: 4.4 MMOL/L — SIGNIFICANT CHANGE UP (ref 3.5–5.3)
POTASSIUM SERPL-MCNC: 4.4 MMOL/L — SIGNIFICANT CHANGE UP (ref 3.5–5.3)
POTASSIUM SERPL-SCNC: 4.4 MMOL/L — SIGNIFICANT CHANGE UP (ref 3.5–5.3)
POTASSIUM SERPL-SCNC: 4.4 MMOL/L — SIGNIFICANT CHANGE UP (ref 3.5–5.3)
RBC # BLD: 2.69 M/UL — LOW (ref 3.8–5.2)
RBC # BLD: 2.69 M/UL — LOW (ref 3.8–5.2)
RBC # FLD: 15.6 % — HIGH (ref 10.3–14.5)
RBC # FLD: 15.6 % — HIGH (ref 10.3–14.5)
SODIUM SERPL-SCNC: 135 MMOL/L — SIGNIFICANT CHANGE UP (ref 135–145)
SODIUM SERPL-SCNC: 135 MMOL/L — SIGNIFICANT CHANGE UP (ref 135–145)
WBC # BLD: 7.71 K/UL — SIGNIFICANT CHANGE UP (ref 3.8–10.5)
WBC # BLD: 7.71 K/UL — SIGNIFICANT CHANGE UP (ref 3.8–10.5)
WBC # FLD AUTO: 7.71 K/UL — SIGNIFICANT CHANGE UP (ref 3.8–10.5)
WBC # FLD AUTO: 7.71 K/UL — SIGNIFICANT CHANGE UP (ref 3.8–10.5)

## 2023-11-30 RX ORDER — PETROLATUM,WHITE
1 JELLY (GRAM) TOPICAL DAILY
Refills: 0 | Status: DISCONTINUED | OUTPATIENT
Start: 2023-11-30 | End: 2023-12-04

## 2023-11-30 RX ORDER — OXYCODONE HYDROCHLORIDE 5 MG/1
5 TABLET ORAL ONCE
Refills: 0 | Status: DISCONTINUED | OUTPATIENT
Start: 2023-11-30 | End: 2023-11-30

## 2023-11-30 RX ADMIN — ENOXAPARIN SODIUM 40 MILLIGRAM(S): 100 INJECTION SUBCUTANEOUS at 21:06

## 2023-11-30 RX ADMIN — ONDANSETRON 4 MILLIGRAM(S): 8 TABLET, FILM COATED ORAL at 21:06

## 2023-11-30 RX ADMIN — CHLORHEXIDINE GLUCONATE 15 MILLILITER(S): 213 SOLUTION TOPICAL at 05:35

## 2023-11-30 RX ADMIN — Medication 975 MILLIGRAM(S): at 05:34

## 2023-11-30 RX ADMIN — OXYCODONE HYDROCHLORIDE 5 MILLIGRAM(S): 5 TABLET ORAL at 05:34

## 2023-11-30 RX ADMIN — OXYCODONE HYDROCHLORIDE 5 MILLIGRAM(S): 5 TABLET ORAL at 21:06

## 2023-11-30 RX ADMIN — Medication 1 APPLICATION(S): at 12:56

## 2023-11-30 RX ADMIN — POLYETHYLENE GLYCOL 3350 17 GRAM(S): 17 POWDER, FOR SOLUTION ORAL at 05:34

## 2023-11-30 RX ADMIN — GABAPENTIN 100 MILLIGRAM(S): 400 CAPSULE ORAL at 21:07

## 2023-11-30 RX ADMIN — OXYCODONE HYDROCHLORIDE 5 MILLIGRAM(S): 5 TABLET ORAL at 01:11

## 2023-11-30 RX ADMIN — GABAPENTIN 100 MILLIGRAM(S): 400 CAPSULE ORAL at 05:35

## 2023-11-30 RX ADMIN — Medication 975 MILLIGRAM(S): at 06:40

## 2023-11-30 RX ADMIN — OXYCODONE HYDROCHLORIDE 5 MILLIGRAM(S): 5 TABLET ORAL at 00:48

## 2023-11-30 RX ADMIN — OXYCODONE HYDROCHLORIDE 5 MILLIGRAM(S): 5 TABLET ORAL at 06:40

## 2023-11-30 RX ADMIN — Medication 975 MILLIGRAM(S): at 00:25

## 2023-11-30 RX ADMIN — Medication 975 MILLIGRAM(S): at 00:55

## 2023-11-30 RX ADMIN — CHLORHEXIDINE GLUCONATE 15 MILLILITER(S): 213 SOLUTION TOPICAL at 19:44

## 2023-11-30 RX ADMIN — GABAPENTIN 100 MILLIGRAM(S): 400 CAPSULE ORAL at 15:19

## 2023-11-30 RX ADMIN — PANTOPRAZOLE SODIUM 40 MILLIGRAM(S): 20 TABLET, DELAYED RELEASE ORAL at 12:56

## 2023-11-30 RX ADMIN — OXYCODONE HYDROCHLORIDE 5 MILLIGRAM(S): 5 TABLET ORAL at 01:54

## 2023-11-30 NOTE — PROGRESS NOTE ADULT - ASSESSMENT
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 Pt is s/p segmental mandibulectomy with R partial buccal soft tissue and FOM excision, tracheostomy, right neck dissection in 1/2022, and removal of mandibular hardware in 10/2022. Pt completed chemotherapy and radiation. Pt reports open areas to right mandible with bone exposed (11/25/23). Zosyn added (11/25/23 - 11/29/23). JESSEE neck and leg removed. Wound vac removed. Pt is progressing without acute events.    Plan:   -ERAS Day 9 Protocol  -NPO w/ TFs  -OOBTC, PT  -Multimodal pain management  -DVT ppx      Oral and Maxillofacial Surgery  [facility] Pager [phone]

## 2023-11-30 NOTE — PROGRESS NOTE ADULT - SUBJECTIVE AND OBJECTIVE BOX
Plastic Surgery Progress Note (pg [facility]: [id number], [facility]: [phone])    SUBJECTIVE  Pt comfortable in bed. Pain well controlled, no complaints.    OBJECTIVE  ___________________________________________________  VITAL SIGNS / I&O's   Vital Signs Last 24 Hrs  T(C): 36.4 (30 Nov 2023 06:00), Max: 37 (30 Nov 2023 02:00)  T(F): 97.5 (30 Nov 2023 06:00), Max: 98.6 (30 Nov 2023 02:00)  HR: 77 (30 Nov 2023 06:00) (75 - 84)  BP: 116/76 (30 Nov 2023 06:00) (99/64 - 119/68)  BP(mean): --  RR: 18 (30 Nov 2023 06:00) (17 - 18)  SpO2: 99% (30 Nov 2023 06:00) (95% - 99%)    Parameters below as of 30 Nov 2023 06:00  Patient On (Oxygen Delivery Method): room air          28 Nov 2023 07:01  -  29 Nov 2023 07:00  --------------------------------------------------------  IN:    Enteral Tube Flush: 480 mL    IV PiggyBack: 100 mL    Oral Fluid: 390 mL    TwoCal HN: 500 mL  Total IN: 1470 mL    OUT:    Voided (mL): 300 mL  Total OUT: 300 mL    Total NET: 1170 mL      29 Nov 2023 07:01  -  30 Nov 2023 06:54  --------------------------------------------------------  IN:    Enteral Tube Flush: 550 mL    Jevity 1.5: 400 mL  Total IN: 950 mL    OUT:    Oral Fluid: 0 mL    Voided (mL): 650 mL  Total OUT: 650 mL    Total NET: 300 mL        ___________________________________________________  PHYSICAL EXAM    General: NAD, resting in bed comfortably.  HEENT: Chin suture line cdi, lateral dehiscence stable. Flap with good cap refill. Facial erythema on L aspect of face near incision improved this AM.  Small area of dehiscence at superior-lateral aspect of flap, stable.   Cardiac: regular rate, warm and well perfused  Respiratory: Nonlabored respirations, normal cw expansion.  Extremities:   RLE: skin graft with excellent take. Wrapped with ace, abd, telfa, xeroform  RLE skin graft donor site open to air   ___________________________________________________  LABS                        9.1    6.93  )-----------( 261      ( 29 Nov 2023 05:12 )             28.6     29 Nov 2023 05:12    136    |  95     |  13     ----------------------------<  91     4.2     |  31     |  0.92     Ca    8.6        29 Nov 2023 05:12  Phos  2.9       29 Nov 2023 05:12  Mg     1.90      29 Nov 2023 05:12        CAPILLARY BLOOD GLUCOSE            Urinalysis Basic - ( 29 Nov 2023 05:12 )    Color: x / Appearance: x / SG: x / pH: x  Gluc: 91 mg/dL / Ketone: x  / Bili: x / Urobili: x   Blood: x / Protein: x / Nitrite: x   Leuk Esterase: x / RBC: x / WBC x   Sq Epi: x / Non Sq Epi: x / Bacteria: x      ___________________________________________________  MICRO  Recent Cultures:    ___________________________________________________  MEDICATIONS  (STANDING):  chlorhexidine 0.12% Liquid 15 milliLiter(s) Swish and Spit two times a day  enoxaparin Injectable 40 milliGRAM(s) SubCutaneous every 24 hours  gabapentin Solution 100 milliGRAM(s) Oral three times a day  influenza   Vaccine 0.5 milliLiter(s) IntraMuscular once  ondansetron Injectable 4 milliGRAM(s) IV Push once  pantoprazole  Injectable 40 milliGRAM(s) IV Push daily  polyethylene glycol 3350 17 Gram(s) Oral two times a day  senna 2 Tablet(s) Oral at bedtime    MEDICATIONS  (PRN):  ondansetron Injectable 4 milliGRAM(s) IV Push every 4 hours PRN Nausea  oxycodone    5 mG/acetaminophen 325 mG 1 Tablet(s) Oral every 6 hours PRN Moderate Pain (4 - 6)  oxycodone    5 mG/acetaminophen 325 mG 1 Tablet(s) Oral every 6 hours PRN Severe Pain (7 - 10)

## 2023-11-30 NOTE — PROGRESS NOTE ADULT - SUBJECTIVE AND OBJECTIVE BOX
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 Pt is s/p segmental mandibulectomy with R partial buccal soft tissue and FOM excision, tracheostomy, right neck dissection in 1/2022, and removal of mandibular hardware in 10/2022. Pt completed chemotherapy and radiation.    INTERVAL EVENTS:   - Decannulated, tolerating well w/o acute respiratory events  - morning: multiple episodes of emesis, dietician consulted, c/w current enteral feed regimen, will re-evaluate later this week if symp    ICU Vital Signs Last 24 Hrs  T(C): 36.4 (30 Nov 2023 06:00), Max: 37 (30 Nov 2023 02:00)  T(F): 97.5 (30 Nov 2023 06:00), Max: 98.6 (30 Nov 2023 02:00)  HR: 77 (30 Nov 2023 06:00) (75 - 84)  BP: 116/76 (30 Nov 2023 06:00) (99/64 - 119/68)  BP(mean): --  ABP: --  ABP(mean): --  RR: 18 (30 Nov 2023 06:00) (17 - 18)  SpO2: 99% (30 Nov 2023 06:00) (95% - 99%)    O2 Parameters below as of 30 Nov 2023 06:00  Patient On (Oxygen Delivery Method): room air    I&O's Detail    29 Nov 2023 07:01  -  30 Nov 2023 07:00  --------------------------------------------------------  IN:    Enteral Tube Flush: 550 mL    Jevity 1.5: 400 mL  Total IN: 950 mL    OUT:    Oral Fluid: 0 mL    Voided (mL): 650 mL  Total OUT: 650 mL    Total NET: 300 mL    MEDICATIONS  (STANDING):  AQUAPHOR (petrolatum Ointment) 1 Application(s) Topical daily  chlorhexidine 0.12% Liquid 15 milliLiter(s) Swish and Spit two times a day  enoxaparin Injectable 40 milliGRAM(s) SubCutaneous every 24 hours  gabapentin Solution 100 milliGRAM(s) Oral three times a day  influenza   Vaccine 0.5 milliLiter(s) IntraMuscular once  ondansetron Injectable 4 milliGRAM(s) IV Push once  pantoprazole  Injectable 40 milliGRAM(s) IV Push daily  polyethylene glycol 3350 17 Gram(s) Oral two times a day  senna 2 Tablet(s) Oral at bedtime    MEDICATIONS  (PRN):  ondansetron Injectable 4 milliGRAM(s) IV Push every 4 hours PRN Nausea  oxycodone    5 mG/acetaminophen 325 mG 1 Tablet(s) Oral every 6 hours PRN Moderate Pain (4 - 6)  oxycodone    5 mG/acetaminophen 325 mG 1 Tablet(s) Oral every 6 hours PRN Severe Pain (7 - 10)    PHYSICAL EXAM:  Gen: NAD  Resp: breathing easily, no stridor,   CV: RRR  Abdomen: soft, nontender, nondistended  Skin: Incision c/d/i. Normal color, no rashes or lesions  IOE: flap warm, pink, well perfused. intraoral incisions clean, closed, and in tact. intraoral sites healing well without signs of complication   extremities: donor site without complication sutures clean, closed, in tact. ace dressing in place RLE. thigh skin graft site healing well. Surgical sites hemostatic. Pain well controlled.

## 2023-11-30 NOTE — PROGRESS NOTE ADULT - ASSESSMENT
A: Ms Dsouza is a 59yo F with previous hx of FFF for mandibular reconstruction c/b osteoradionecrosis who is now s/p RLE FFF for reconstruction on 11/21. Patient is recovering well.     Plan:  -ERAS protocol  -continue abx for skin cellulitis/erythema   -daily xeroform/telfa/ace daily to skin graft  -leave skin graft donor site open to air, apply aquaphor daily  -OOB with CAM boot  -Monitor I&Os  -Appreciate great care per primary  -Can apply xeroform or Aquacel to dehiscence    Plastic Surgery  #[phone] [facility] pager

## 2023-12-01 LAB
ANION GAP SERPL CALC-SCNC: 10 MMOL/L — SIGNIFICANT CHANGE UP (ref 7–14)
ANION GAP SERPL CALC-SCNC: 10 MMOL/L — SIGNIFICANT CHANGE UP (ref 7–14)
BUN SERPL-MCNC: 24 MG/DL — HIGH (ref 7–23)
BUN SERPL-MCNC: 24 MG/DL — HIGH (ref 7–23)
CALCIUM SERPL-MCNC: 8.9 MG/DL — SIGNIFICANT CHANGE UP (ref 8.4–10.5)
CALCIUM SERPL-MCNC: 8.9 MG/DL — SIGNIFICANT CHANGE UP (ref 8.4–10.5)
CHLORIDE SERPL-SCNC: 96 MMOL/L — LOW (ref 98–107)
CHLORIDE SERPL-SCNC: 96 MMOL/L — LOW (ref 98–107)
CO2 SERPL-SCNC: 29 MMOL/L — SIGNIFICANT CHANGE UP (ref 22–31)
CO2 SERPL-SCNC: 29 MMOL/L — SIGNIFICANT CHANGE UP (ref 22–31)
CREAT SERPL-MCNC: 0.94 MG/DL — SIGNIFICANT CHANGE UP (ref 0.5–1.3)
CREAT SERPL-MCNC: 0.94 MG/DL — SIGNIFICANT CHANGE UP (ref 0.5–1.3)
EGFR: 70 ML/MIN/1.73M2 — SIGNIFICANT CHANGE UP
EGFR: 70 ML/MIN/1.73M2 — SIGNIFICANT CHANGE UP
GLUCOSE SERPL-MCNC: 100 MG/DL — HIGH (ref 70–99)
GLUCOSE SERPL-MCNC: 100 MG/DL — HIGH (ref 70–99)
HCT VFR BLD CALC: 29.6 % — LOW (ref 34.5–45)
HCT VFR BLD CALC: 29.6 % — LOW (ref 34.5–45)
HGB BLD-MCNC: 9.4 G/DL — LOW (ref 11.5–15.5)
HGB BLD-MCNC: 9.4 G/DL — LOW (ref 11.5–15.5)
MAGNESIUM SERPL-MCNC: 2 MG/DL — SIGNIFICANT CHANGE UP (ref 1.6–2.6)
MAGNESIUM SERPL-MCNC: 2 MG/DL — SIGNIFICANT CHANGE UP (ref 1.6–2.6)
MCHC RBC-ENTMCNC: 30 PG — SIGNIFICANT CHANGE UP (ref 27–34)
MCHC RBC-ENTMCNC: 30 PG — SIGNIFICANT CHANGE UP (ref 27–34)
MCHC RBC-ENTMCNC: 31.8 GM/DL — LOW (ref 32–36)
MCHC RBC-ENTMCNC: 31.8 GM/DL — LOW (ref 32–36)
MCV RBC AUTO: 94.6 FL — SIGNIFICANT CHANGE UP (ref 80–100)
MCV RBC AUTO: 94.6 FL — SIGNIFICANT CHANGE UP (ref 80–100)
NRBC # BLD: 0 /100 WBCS — SIGNIFICANT CHANGE UP (ref 0–0)
NRBC # BLD: 0 /100 WBCS — SIGNIFICANT CHANGE UP (ref 0–0)
NRBC # FLD: 0 K/UL — SIGNIFICANT CHANGE UP (ref 0–0)
NRBC # FLD: 0 K/UL — SIGNIFICANT CHANGE UP (ref 0–0)
PHOSPHATE SERPL-MCNC: 2.9 MG/DL — SIGNIFICANT CHANGE UP (ref 2.5–4.5)
PHOSPHATE SERPL-MCNC: 2.9 MG/DL — SIGNIFICANT CHANGE UP (ref 2.5–4.5)
PLATELET # BLD AUTO: 407 K/UL — HIGH (ref 150–400)
PLATELET # BLD AUTO: 407 K/UL — HIGH (ref 150–400)
POTASSIUM SERPL-MCNC: 4 MMOL/L — SIGNIFICANT CHANGE UP (ref 3.5–5.3)
POTASSIUM SERPL-MCNC: 4 MMOL/L — SIGNIFICANT CHANGE UP (ref 3.5–5.3)
POTASSIUM SERPL-SCNC: 4 MMOL/L — SIGNIFICANT CHANGE UP (ref 3.5–5.3)
POTASSIUM SERPL-SCNC: 4 MMOL/L — SIGNIFICANT CHANGE UP (ref 3.5–5.3)
RBC # BLD: 3.13 M/UL — LOW (ref 3.8–5.2)
RBC # BLD: 3.13 M/UL — LOW (ref 3.8–5.2)
RBC # FLD: 15.7 % — HIGH (ref 10.3–14.5)
RBC # FLD: 15.7 % — HIGH (ref 10.3–14.5)
SODIUM SERPL-SCNC: 135 MMOL/L — SIGNIFICANT CHANGE UP (ref 135–145)
SODIUM SERPL-SCNC: 135 MMOL/L — SIGNIFICANT CHANGE UP (ref 135–145)
WBC # BLD: 8.05 K/UL — SIGNIFICANT CHANGE UP (ref 3.8–10.5)
WBC # BLD: 8.05 K/UL — SIGNIFICANT CHANGE UP (ref 3.8–10.5)
WBC # FLD AUTO: 8.05 K/UL — SIGNIFICANT CHANGE UP (ref 3.8–10.5)
WBC # FLD AUTO: 8.05 K/UL — SIGNIFICANT CHANGE UP (ref 3.8–10.5)

## 2023-12-01 RX ORDER — DULOXETINE HYDROCHLORIDE 30 MG/1
60 CAPSULE, DELAYED RELEASE ORAL DAILY
Refills: 0 | Status: DISCONTINUED | OUTPATIENT
Start: 2023-12-01 | End: 2023-12-04

## 2023-12-01 RX ORDER — ACETAMINOPHEN 500 MG
1000 TABLET ORAL ONCE
Refills: 0 | Status: COMPLETED | OUTPATIENT
Start: 2023-12-01 | End: 2023-12-01

## 2023-12-01 RX ADMIN — Medication 1 APPLICATION(S): at 12:29

## 2023-12-01 RX ADMIN — GABAPENTIN 100 MILLIGRAM(S): 400 CAPSULE ORAL at 21:50

## 2023-12-01 RX ADMIN — Medication 1000 MILLIGRAM(S): at 18:15

## 2023-12-01 RX ADMIN — ENOXAPARIN SODIUM 40 MILLIGRAM(S): 100 INJECTION SUBCUTANEOUS at 21:50

## 2023-12-01 RX ADMIN — Medication 400 MILLIGRAM(S): at 17:53

## 2023-12-01 RX ADMIN — PANTOPRAZOLE SODIUM 40 MILLIGRAM(S): 20 TABLET, DELAYED RELEASE ORAL at 12:29

## 2023-12-01 RX ADMIN — POLYETHYLENE GLYCOL 3350 17 GRAM(S): 17 POWDER, FOR SOLUTION ORAL at 07:14

## 2023-12-01 RX ADMIN — CHLORHEXIDINE GLUCONATE 15 MILLILITER(S): 213 SOLUTION TOPICAL at 17:54

## 2023-12-01 RX ADMIN — CHLORHEXIDINE GLUCONATE 15 MILLILITER(S): 213 SOLUTION TOPICAL at 07:13

## 2023-12-01 RX ADMIN — GABAPENTIN 100 MILLIGRAM(S): 400 CAPSULE ORAL at 13:26

## 2023-12-01 RX ADMIN — GABAPENTIN 100 MILLIGRAM(S): 400 CAPSULE ORAL at 07:14

## 2023-12-01 NOTE — PROGRESS NOTE ADULT - ASSESSMENT
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 Pt is s/p segmental mandibulectomy with R partial buccal soft tissue and FOM excision, tracheostomy, right neck dissection in 1/2022, and removal of mandibular hardware in 10/2022. Pt completed chemotherapy and radiation. Pt reports open areas to right mandible with bone exposed (11/25/23). Zosyn added (11/25/23 - 11/29/23). JESSEE neck and leg removed. Wound vac removed. Pt is progressing without acute events.    Plan:   -OOBTC, PT  -Multimodal pain management  -Pending s/s re-evaluation,   -f/u about advancing pt's feed from NPO w/ TF -> PO CLD      Oral and Maxillofacial Surgery  [facility] Pager [phone]

## 2023-12-01 NOTE — PROGRESS NOTE ADULT - ASSESSMENT
A: Ms Dsouza is a 59yo F with previous hx of FFF for mandibular reconstruction c/b osteoradionecrosis who is now s/p RLE FFF for reconstruction on 11/21. Patient is recovering well.     Plan:  -ERAS protocol  -continue abx for skin cellulitis/erythema   -daily xeroform/telfa/ace daily to skin graft  -leave skin graft donor site open to air, apply aquaphor daily  -OOB with CAM boot  -Monitor I&Os  -Appreciate great care per primary  -Can apply xeroform or Aquacel to dehiscence    Plastic Surgery  #[phone] [facility] pager

## 2023-12-01 NOTE — PROGRESS NOTE ADULT - SUBJECTIVE AND OBJECTIVE BOX
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 Pt is s/p segmental mandibulectomy with R partial buccal soft tissue and FOM excision, tracheostomy, right neck dissection in 1/2022, and removal of mandibular hardware in 10/2022. Pt completed chemotherapy and radiation.    INTERVAL EVENTS:   -Pain meds on Percocet  -Pt with 8/10 pain overnight, given one time oxycodone 5mg dose (pre-medicated w/ zofran), tolerating well.    ICU Vital Signs Last 24 Hrs  T(C): 36.7 (01 Dec 2023 06:00), Max: 36.9 (30 Nov 2023 14:00)  T(F): 98.1 (01 Dec 2023 06:00), Max: 98.5 (30 Nov 2023 14:00)  HR: 71 (01 Dec 2023 06:00) (71 - 95)  BP: 117/81 (01 Dec 2023 06:00) (102/69 - 123/76)  BP(mean): --  ABP: --  ABP(mean): --  RR: 18 (01 Dec 2023 06:00) (18 - 18)  SpO2: 100% (01 Dec 2023 06:00) (95% - 100%)    O2 Parameters below as of 01 Dec 2023 06:00  Patient On (Oxygen Delivery Method): room air    I&O's Detail    30 Nov 2023 07:01  -  01 Dec 2023 07:00  --------------------------------------------------------  IN:    Enteral Tube Flush: 600 mL    Jevity 1.5: 400 mL  Total IN: 1000 mL    OUT:    Oral Fluid: 0 mL  Total OUT: 0 mL    Total NET: 1000 mL    MEDICATIONS  (STANDING):  AQUAPHOR (petrolatum Ointment) 1 Application(s) Topical daily  chlorhexidine 0.12% Liquid 15 milliLiter(s) Swish and Spit two times a day  enoxaparin Injectable 40 milliGRAM(s) SubCutaneous every 24 hours  gabapentin Solution 100 milliGRAM(s) Oral three times a day  influenza   Vaccine 0.5 milliLiter(s) IntraMuscular once  pantoprazole  Injectable 40 milliGRAM(s) IV Push daily  polyethylene glycol 3350 17 Gram(s) Oral two times a day  senna 2 Tablet(s) Oral at bedtime    MEDICATIONS  (PRN):  ondansetron Injectable 4 milliGRAM(s) IV Push every 4 hours PRN Nausea  oxycodone    5 mG/acetaminophen 325 mG 1 Tablet(s) Oral every 6 hours PRN Severe Pain (7 - 10)  oxycodone    5 mG/acetaminophen 325 mG 1 Tablet(s) Oral every 6 hours PRN Moderate Pain (4 - 6)    PHYSICAL EXAM:  Gen: NAD  Resp: breathing easily, no stridor,   CV: RRR  Abdomen: soft, nontender, nondistended  Skin: Incision c/d/i. Normal color, no rashes or lesions  IOE: flap warm, pink, well perfused. intraoral incisions clean, closed, and in tact. intraoral sites healing well without signs of complication   extremities: donor site without complication sutures clean, closed, in tact. ace dressing in place RLE. thigh skin graft site healing well. Surgical sites hemostatic. Pain well controlled.

## 2023-12-01 NOTE — PROGRESS NOTE ADULT - SUBJECTIVE AND OBJECTIVE BOX
PARUL HERNANDEZ  [id number]    Subjective:    Patient seen and examined, no acute events overnight.      (24 Nov 2023 09:32)       Objective:  T(C): 36.4 (12-01-23 @ 02:00), Max: 36.9 (11-30-23 @ 14:00)  HR: 74 (12-01-23 @ 02:00) (74 - 95)  BP: 105/60 (12-01-23 @ 02:00) (102/69 - 123/76)  RR: 18 (12-01-23 @ 02:00) (18 - 18)  SpO2: 98% (12-01-23 @ 02:00) (95% - 98%)  Wt(kg): --   11-30    135  |  97<L>  |  17  ----------------------------<  82  4.4   |  29  |  0.89    Ca    8.6      30 Nov 2023 05:51  Phos  2.8     11-30  Mg     1.90     11-30                          8.3    7.71  )-----------( 324      ( 30 Nov 2023 05:51 )             25.9       11-30 @ 07:01  -  12-01 @ 07:00  --------------------------------------------------------  IN: 640 mL / OUT: 0 mL / NET: 640 mL      PHYSICAL EXAM:    General: NAD, resting in bed comfortably.  HEENT: Chin suture line cdi, lateral dehiscence stable. Flap with good cap refill, soft.   Respiratory: Nonlabored respirations  Extremities:   RLE: skin graft with excellent take. Wrapped with ace, abd, telfa, xeroform  RLE skin graft donor site open to air             MEDICATIONS  (STANDING):  AQUAPHOR (petrolatum Ointment) 1 Application(s) Topical daily  chlorhexidine 0.12% Liquid 15 milliLiter(s) Swish and Spit two times a day  enoxaparin Injectable 40 milliGRAM(s) SubCutaneous every 24 hours  gabapentin Solution 100 milliGRAM(s) Oral three times a day  influenza   Vaccine 0.5 milliLiter(s) IntraMuscular once  pantoprazole  Injectable 40 milliGRAM(s) IV Push daily  polyethylene glycol 3350 17 Gram(s) Oral two times a day  senna 2 Tablet(s) Oral at bedtime    MEDICATIONS  (PRN):  ondansetron Injectable 4 milliGRAM(s) IV Push every 4 hours PRN Nausea  oxycodone    5 mG/acetaminophen 325 mG 1 Tablet(s) Oral every 6 hours PRN Moderate Pain (4 - 6)  oxycodone    5 mG/acetaminophen 325 mG 1 Tablet(s) Oral every 6 hours PRN Severe Pain (7 - 10)

## 2023-12-02 LAB
ANION GAP SERPL CALC-SCNC: 11 MMOL/L — SIGNIFICANT CHANGE UP (ref 7–14)
ANION GAP SERPL CALC-SCNC: 11 MMOL/L — SIGNIFICANT CHANGE UP (ref 7–14)
BASOPHILS # BLD AUTO: 0.04 K/UL — SIGNIFICANT CHANGE UP (ref 0–0.2)
BASOPHILS # BLD AUTO: 0.04 K/UL — SIGNIFICANT CHANGE UP (ref 0–0.2)
BASOPHILS NFR BLD AUTO: 0.6 % — SIGNIFICANT CHANGE UP (ref 0–2)
BASOPHILS NFR BLD AUTO: 0.6 % — SIGNIFICANT CHANGE UP (ref 0–2)
BUN SERPL-MCNC: 23 MG/DL — SIGNIFICANT CHANGE UP (ref 7–23)
BUN SERPL-MCNC: 23 MG/DL — SIGNIFICANT CHANGE UP (ref 7–23)
CALCIUM SERPL-MCNC: 8.8 MG/DL — SIGNIFICANT CHANGE UP (ref 8.4–10.5)
CALCIUM SERPL-MCNC: 8.8 MG/DL — SIGNIFICANT CHANGE UP (ref 8.4–10.5)
CHLORIDE SERPL-SCNC: 98 MMOL/L — SIGNIFICANT CHANGE UP (ref 98–107)
CHLORIDE SERPL-SCNC: 98 MMOL/L — SIGNIFICANT CHANGE UP (ref 98–107)
CO2 SERPL-SCNC: 27 MMOL/L — SIGNIFICANT CHANGE UP (ref 22–31)
CO2 SERPL-SCNC: 27 MMOL/L — SIGNIFICANT CHANGE UP (ref 22–31)
CREAT SERPL-MCNC: 0.84 MG/DL — SIGNIFICANT CHANGE UP (ref 0.5–1.3)
CREAT SERPL-MCNC: 0.84 MG/DL — SIGNIFICANT CHANGE UP (ref 0.5–1.3)
EGFR: 80 ML/MIN/1.73M2 — SIGNIFICANT CHANGE UP
EGFR: 80 ML/MIN/1.73M2 — SIGNIFICANT CHANGE UP
EOSINOPHIL # BLD AUTO: 0.16 K/UL — SIGNIFICANT CHANGE UP (ref 0–0.5)
EOSINOPHIL # BLD AUTO: 0.16 K/UL — SIGNIFICANT CHANGE UP (ref 0–0.5)
EOSINOPHIL NFR BLD AUTO: 2.6 % — SIGNIFICANT CHANGE UP (ref 0–6)
EOSINOPHIL NFR BLD AUTO: 2.6 % — SIGNIFICANT CHANGE UP (ref 0–6)
GLUCOSE SERPL-MCNC: 82 MG/DL — SIGNIFICANT CHANGE UP (ref 70–99)
GLUCOSE SERPL-MCNC: 82 MG/DL — SIGNIFICANT CHANGE UP (ref 70–99)
HCT VFR BLD CALC: 27.7 % — LOW (ref 34.5–45)
HCT VFR BLD CALC: 27.7 % — LOW (ref 34.5–45)
HGB BLD-MCNC: 9.1 G/DL — LOW (ref 11.5–15.5)
HGB BLD-MCNC: 9.1 G/DL — LOW (ref 11.5–15.5)
IANC: 4.42 K/UL — SIGNIFICANT CHANGE UP (ref 1.8–7.4)
IANC: 4.42 K/UL — SIGNIFICANT CHANGE UP (ref 1.8–7.4)
IMM GRANULOCYTES NFR BLD AUTO: 0.6 % — SIGNIFICANT CHANGE UP (ref 0–0.9)
IMM GRANULOCYTES NFR BLD AUTO: 0.6 % — SIGNIFICANT CHANGE UP (ref 0–0.9)
LYMPHOCYTES # BLD AUTO: 0.74 K/UL — LOW (ref 1–3.3)
LYMPHOCYTES # BLD AUTO: 0.74 K/UL — LOW (ref 1–3.3)
LYMPHOCYTES # BLD AUTO: 11.8 % — LOW (ref 13–44)
LYMPHOCYTES # BLD AUTO: 11.8 % — LOW (ref 13–44)
MAGNESIUM SERPL-MCNC: 2 MG/DL — SIGNIFICANT CHANGE UP (ref 1.6–2.6)
MAGNESIUM SERPL-MCNC: 2 MG/DL — SIGNIFICANT CHANGE UP (ref 1.6–2.6)
MCHC RBC-ENTMCNC: 30.8 PG — SIGNIFICANT CHANGE UP (ref 27–34)
MCHC RBC-ENTMCNC: 30.8 PG — SIGNIFICANT CHANGE UP (ref 27–34)
MCHC RBC-ENTMCNC: 32.9 GM/DL — SIGNIFICANT CHANGE UP (ref 32–36)
MCHC RBC-ENTMCNC: 32.9 GM/DL — SIGNIFICANT CHANGE UP (ref 32–36)
MCV RBC AUTO: 93.9 FL — SIGNIFICANT CHANGE UP (ref 80–100)
MCV RBC AUTO: 93.9 FL — SIGNIFICANT CHANGE UP (ref 80–100)
MONOCYTES # BLD AUTO: 0.87 K/UL — SIGNIFICANT CHANGE UP (ref 0–0.9)
MONOCYTES # BLD AUTO: 0.87 K/UL — SIGNIFICANT CHANGE UP (ref 0–0.9)
MONOCYTES NFR BLD AUTO: 13.9 % — SIGNIFICANT CHANGE UP (ref 2–14)
MONOCYTES NFR BLD AUTO: 13.9 % — SIGNIFICANT CHANGE UP (ref 2–14)
NEUTROPHILS # BLD AUTO: 4.42 K/UL — SIGNIFICANT CHANGE UP (ref 1.8–7.4)
NEUTROPHILS # BLD AUTO: 4.42 K/UL — SIGNIFICANT CHANGE UP (ref 1.8–7.4)
NEUTROPHILS NFR BLD AUTO: 70.5 % — SIGNIFICANT CHANGE UP (ref 43–77)
NEUTROPHILS NFR BLD AUTO: 70.5 % — SIGNIFICANT CHANGE UP (ref 43–77)
NRBC # BLD: 0 /100 WBCS — SIGNIFICANT CHANGE UP (ref 0–0)
NRBC # BLD: 0 /100 WBCS — SIGNIFICANT CHANGE UP (ref 0–0)
NRBC # FLD: 0 K/UL — SIGNIFICANT CHANGE UP (ref 0–0)
NRBC # FLD: 0 K/UL — SIGNIFICANT CHANGE UP (ref 0–0)
PHOSPHATE SERPL-MCNC: 3.5 MG/DL — SIGNIFICANT CHANGE UP (ref 2.5–4.5)
PHOSPHATE SERPL-MCNC: 3.5 MG/DL — SIGNIFICANT CHANGE UP (ref 2.5–4.5)
PLATELET # BLD AUTO: 318 K/UL — SIGNIFICANT CHANGE UP (ref 150–400)
PLATELET # BLD AUTO: 318 K/UL — SIGNIFICANT CHANGE UP (ref 150–400)
POTASSIUM SERPL-MCNC: 4.6 MMOL/L — SIGNIFICANT CHANGE UP (ref 3.5–5.3)
POTASSIUM SERPL-MCNC: 4.6 MMOL/L — SIGNIFICANT CHANGE UP (ref 3.5–5.3)
POTASSIUM SERPL-SCNC: 4.6 MMOL/L — SIGNIFICANT CHANGE UP (ref 3.5–5.3)
POTASSIUM SERPL-SCNC: 4.6 MMOL/L — SIGNIFICANT CHANGE UP (ref 3.5–5.3)
RBC # BLD: 2.95 M/UL — LOW (ref 3.8–5.2)
RBC # BLD: 2.95 M/UL — LOW (ref 3.8–5.2)
RBC # FLD: 15.6 % — HIGH (ref 10.3–14.5)
RBC # FLD: 15.6 % — HIGH (ref 10.3–14.5)
SODIUM SERPL-SCNC: 136 MMOL/L — SIGNIFICANT CHANGE UP (ref 135–145)
SODIUM SERPL-SCNC: 136 MMOL/L — SIGNIFICANT CHANGE UP (ref 135–145)
WBC # BLD: 6.27 K/UL — SIGNIFICANT CHANGE UP (ref 3.8–10.5)
WBC # BLD: 6.27 K/UL — SIGNIFICANT CHANGE UP (ref 3.8–10.5)
WBC # FLD AUTO: 6.27 K/UL — SIGNIFICANT CHANGE UP (ref 3.8–10.5)
WBC # FLD AUTO: 6.27 K/UL — SIGNIFICANT CHANGE UP (ref 3.8–10.5)

## 2023-12-02 RX ORDER — GABAPENTIN 400 MG/1
200 CAPSULE ORAL THREE TIMES A DAY
Refills: 0 | Status: DISCONTINUED | OUTPATIENT
Start: 2023-12-02 | End: 2023-12-04

## 2023-12-02 RX ORDER — ACETAMINOPHEN 500 MG
1000 TABLET ORAL ONCE
Refills: 0 | Status: COMPLETED | OUTPATIENT
Start: 2023-12-02 | End: 2023-12-02

## 2023-12-02 RX ADMIN — GABAPENTIN 200 MILLIGRAM(S): 400 CAPSULE ORAL at 21:11

## 2023-12-02 RX ADMIN — Medication 1000 MILLIGRAM(S): at 02:37

## 2023-12-02 RX ADMIN — PANTOPRAZOLE SODIUM 40 MILLIGRAM(S): 20 TABLET, DELAYED RELEASE ORAL at 11:15

## 2023-12-02 RX ADMIN — POLYETHYLENE GLYCOL 3350 17 GRAM(S): 17 POWDER, FOR SOLUTION ORAL at 04:53

## 2023-12-02 RX ADMIN — CHLORHEXIDINE GLUCONATE 15 MILLILITER(S): 213 SOLUTION TOPICAL at 17:08

## 2023-12-02 RX ADMIN — Medication 1 APPLICATION(S): at 11:16

## 2023-12-02 RX ADMIN — GABAPENTIN 200 MILLIGRAM(S): 400 CAPSULE ORAL at 13:46

## 2023-12-02 RX ADMIN — Medication 400 MILLIGRAM(S): at 02:02

## 2023-12-02 RX ADMIN — CHLORHEXIDINE GLUCONATE 15 MILLILITER(S): 213 SOLUTION TOPICAL at 04:53

## 2023-12-02 RX ADMIN — GABAPENTIN 100 MILLIGRAM(S): 400 CAPSULE ORAL at 06:11

## 2023-12-02 NOTE — PROGRESS NOTE ADULT - SUBJECTIVE AND OBJECTIVE BOX
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 Pt is s/p segmental mandibulectomy with R partial buccal soft tissue and FOM excision, tracheostomy, right neck dissection in 1/2022, and removal of mandibular hardware in 10/2022. Pt completed chemotherapy and radiation.    INTERVAL EVENTS:   - pain regimen changed to Percocet 5-325  - 2x 1000mg IV Tylenol overnight for pain   - Tolerating advancement of diet to clear liquids     Vital Signs Last 24 Hrs  T(C): 36.4 (02 Dec 2023 06:00), Max: 37.1 (01 Dec 2023 18:00)  T(F): 97.5 (02 Dec 2023 06:00), Max: 98.7 (01 Dec 2023 18:00)  HR: 79 (02 Dec 2023 06:00) (75 - 104)  BP: 106/68 (02 Dec 2023 06:00) (100/72 - 115/65)  BP(mean): --  RR: 18 (02 Dec 2023 06:00) (18 - 18)  SpO2: 100% (02 Dec 2023 06:00) (94% - 100%)    Parameters below as of 02 Dec 2023 06:00  Patient On (Oxygen Delivery Method): room air        PHYSICAL EXAM:  Gen: NAD  Resp: breathing easily, no stridor,   CV: RRR  Abdomen: soft, nontender, nondistended  Skin: Incision c/d/i. Normal color, no rashes or lesions  IOE: flap warm, pink, well perfused. intraoral incisions clean, closed, and in tact. intraoral sites healing well without signs of complication   extremities: donor site without complication sutures clean, closed, in tact. ace dressing in place RLE. thigh skin graft site healing well. Surgical sites hemostatic. Pain well controlled.                           9.4    8.05  )-----------( 407      ( 01 Dec 2023 06:15 )             29.6     12-01    135  |  96<L>  |  24<H>  ----------------------------<  100<H>  4.0   |  29  |  0.94    Ca    8.9      01 Dec 2023 06:15  Phos  2.9     12-01  Mg     2.00     12-01      I&O's Summary    01 Dec 2023 07:01  -  02 Dec 2023 07:00  --------------------------------------------------------  IN: 1980 mL / OUT: 600 mL / NET: 1380 mL          A/P: 58y Female s/p __. Patient recovering well.  - Continue abx  - Continue pain control  - Encourage PO fluids, voiding, ambulation.  - DVT PPX

## 2023-12-02 NOTE — PROGRESS NOTE ADULT - ASSESSMENT
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 Pt is s/p segmental mandibulectomy with R partial buccal soft tissue and FOM excision, tracheostomy, right neck dissection in 1/2022, and removal of mandibular hardware in 10/2022. Pt completed chemotherapy and radiation. Pt reports open areas to right mandible with bone exposed (11/25/23). Zosyn added (11/25/23 - 11/29/23). JESSEE neck and leg removed. Wound vac removed. Pt is progressing without acute events.    Plan:   -OOBTC, PT  -Multimodal pain management  -Pending s/s re-evaluation,       Linda Proctor  Oral and Maxillofacial Surgery [facility] Pager [phone]  Available on Teams

## 2023-12-02 NOTE — PROGRESS NOTE ADULT - ASSESSMENT
A: Ms Dsouza is a 59yo F with previous hx of FFF for mandibular reconstruction c/b osteoradionecrosis who is now s/p RLE FFF for reconstruction on 11/21. Patient is recovering well.     Plan:  -ERAS protocol  -daily xeroform/telfa/ace daily to skin graft  -leave skin graft donor site open to air, apply aquaphor daily  -OOB with CAM boot  -Monitor I&Os  -Appreciate great care per primary  -Can apply xeroform or Aquacel to dehiscence    Plastic Surgery  #[phone] [facility] pager

## 2023-12-02 NOTE — PROGRESS NOTE ADULT - SUBJECTIVE AND OBJECTIVE BOX
Plastic Surgery Progress Note (pg [facility]: [id number], [facility]: [phone])    SUBJECTIVE  Pt comfortable in bed. Pain well controlled, no complaints.    OBJECTIVE  ___________________________________________________  VITAL SIGNS / I&O's   Vital Signs Last 24 Hrs  T(C): 36.4 (02 Dec 2023 06:00), Max: 37.1 (01 Dec 2023 18:00)  T(F): 97.5 (02 Dec 2023 06:00), Max: 98.7 (01 Dec 2023 18:00)  HR: 79 (02 Dec 2023 06:00) (75 - 104)  BP: 106/68 (02 Dec 2023 06:00) (100/72 - 115/65)  BP(mean): --  RR: 18 (02 Dec 2023 06:00) (18 - 18)  SpO2: 100% (02 Dec 2023 06:00) (94% - 100%)    Parameters below as of 02 Dec 2023 06:00  Patient On (Oxygen Delivery Method): room air          01 Dec 2023 07:01  -  02 Dec 2023 07:00  --------------------------------------------------------  IN:    Enteral Tube Flush: 900 mL    IV PiggyBack: 200 mL    Jevity 1.5: 680 mL    Oral Fluid: 200 mL  Total IN: 1980 mL    OUT:    Voided (mL): 600 mL  Total OUT: 600 mL    Total NET: 1380 mL        ___________________________________________________  PHYSICAL EXAM    General: NAD, resting in bed comfortably.  HEENT: Chin suture line cdi, lateral dehiscence stable. Flap with good cap refill, soft.   Respiratory: Nonlabored respirations  Extremities:   RLE: skin graft with excellent take. Wrapped with ace, abd, telfa, xeroform  RLE skin graft donor site open to air     ___________________________________________________  LABS                        9.1    6.27  )-----------( 318      ( 02 Dec 2023 09:11 )             27.7     01 Dec 2023 06:15    135    |  96     |  24     ----------------------------<  100    4.0     |  29     |  0.94     Ca    8.9        01 Dec 2023 06:15  Phos  2.9       01 Dec 2023 06:15  Mg     2.00      01 Dec 2023 06:15        CAPILLARY BLOOD GLUCOSE            Urinalysis Basic - ( 01 Dec 2023 06:15 )    Color: x / Appearance: x / SG: x / pH: x  Gluc: 100 mg/dL / Ketone: x  / Bili: x / Urobili: x   Blood: x / Protein: x / Nitrite: x   Leuk Esterase: x / RBC: x / WBC x   Sq Epi: x / Non Sq Epi: x / Bacteria: x      ___________________________________________________  MICRO  Recent Cultures:    ___________________________________________________  MEDICATIONS  (STANDING):  AQUAPHOR (petrolatum Ointment) 1 Application(s) Topical daily  chlorhexidine 0.12% Liquid 15 milliLiter(s) Swish and Spit two times a day  DULoxetine 60 milliGRAM(s) Oral daily  enoxaparin Injectable 40 milliGRAM(s) SubCutaneous every 24 hours  gabapentin Solution 100 milliGRAM(s) Oral three times a day  influenza   Vaccine 0.5 milliLiter(s) IntraMuscular once  pantoprazole  Injectable 40 milliGRAM(s) IV Push daily  polyethylene glycol 3350 17 Gram(s) Oral two times a day  senna 2 Tablet(s) Oral at bedtime    MEDICATIONS  (PRN):  ondansetron Injectable 4 milliGRAM(s) IV Push every 4 hours PRN Nausea  oxycodone    5 mG/acetaminophen 325 mG 1 Tablet(s) Oral every 6 hours PRN Moderate Pain (4 - 6)  oxycodone    5 mG/acetaminophen 325 mG 1 Tablet(s) Oral every 6 hours PRN Severe Pain (7 - 10)

## 2023-12-03 ENCOUNTER — TRANSCRIPTION ENCOUNTER (OUTPATIENT)
Age: 58
End: 2023-12-03

## 2023-12-03 LAB
ANION GAP SERPL CALC-SCNC: 14 MMOL/L — SIGNIFICANT CHANGE UP (ref 7–14)
ANION GAP SERPL CALC-SCNC: 14 MMOL/L — SIGNIFICANT CHANGE UP (ref 7–14)
BUN SERPL-MCNC: 22 MG/DL — SIGNIFICANT CHANGE UP (ref 7–23)
BUN SERPL-MCNC: 22 MG/DL — SIGNIFICANT CHANGE UP (ref 7–23)
CALCIUM SERPL-MCNC: 9.2 MG/DL — SIGNIFICANT CHANGE UP (ref 8.4–10.5)
CALCIUM SERPL-MCNC: 9.2 MG/DL — SIGNIFICANT CHANGE UP (ref 8.4–10.5)
CHLORIDE SERPL-SCNC: 95 MMOL/L — LOW (ref 98–107)
CHLORIDE SERPL-SCNC: 95 MMOL/L — LOW (ref 98–107)
CO2 SERPL-SCNC: 24 MMOL/L — SIGNIFICANT CHANGE UP (ref 22–31)
CO2 SERPL-SCNC: 24 MMOL/L — SIGNIFICANT CHANGE UP (ref 22–31)
CREAT SERPL-MCNC: 0.84 MG/DL — SIGNIFICANT CHANGE UP (ref 0.5–1.3)
CREAT SERPL-MCNC: 0.84 MG/DL — SIGNIFICANT CHANGE UP (ref 0.5–1.3)
EGFR: 80 ML/MIN/1.73M2 — SIGNIFICANT CHANGE UP
EGFR: 80 ML/MIN/1.73M2 — SIGNIFICANT CHANGE UP
GLUCOSE SERPL-MCNC: 93 MG/DL — SIGNIFICANT CHANGE UP (ref 70–99)
GLUCOSE SERPL-MCNC: 93 MG/DL — SIGNIFICANT CHANGE UP (ref 70–99)
HCT VFR BLD CALC: 27.1 % — LOW (ref 34.5–45)
HCT VFR BLD CALC: 27.1 % — LOW (ref 34.5–45)
HGB BLD-MCNC: 8.7 G/DL — LOW (ref 11.5–15.5)
HGB BLD-MCNC: 8.7 G/DL — LOW (ref 11.5–15.5)
MAGNESIUM SERPL-MCNC: 2.1 MG/DL — SIGNIFICANT CHANGE UP (ref 1.6–2.6)
MAGNESIUM SERPL-MCNC: 2.1 MG/DL — SIGNIFICANT CHANGE UP (ref 1.6–2.6)
MCHC RBC-ENTMCNC: 30.1 PG — SIGNIFICANT CHANGE UP (ref 27–34)
MCHC RBC-ENTMCNC: 30.1 PG — SIGNIFICANT CHANGE UP (ref 27–34)
MCHC RBC-ENTMCNC: 32.1 GM/DL — SIGNIFICANT CHANGE UP (ref 32–36)
MCHC RBC-ENTMCNC: 32.1 GM/DL — SIGNIFICANT CHANGE UP (ref 32–36)
MCV RBC AUTO: 93.8 FL — SIGNIFICANT CHANGE UP (ref 80–100)
MCV RBC AUTO: 93.8 FL — SIGNIFICANT CHANGE UP (ref 80–100)
NRBC # BLD: 0 /100 WBCS — SIGNIFICANT CHANGE UP (ref 0–0)
NRBC # BLD: 0 /100 WBCS — SIGNIFICANT CHANGE UP (ref 0–0)
NRBC # FLD: 0 K/UL — SIGNIFICANT CHANGE UP (ref 0–0)
NRBC # FLD: 0 K/UL — SIGNIFICANT CHANGE UP (ref 0–0)
PHOSPHATE SERPL-MCNC: 3.9 MG/DL — SIGNIFICANT CHANGE UP (ref 2.5–4.5)
PHOSPHATE SERPL-MCNC: 3.9 MG/DL — SIGNIFICANT CHANGE UP (ref 2.5–4.5)
PLATELET # BLD AUTO: 297 K/UL — SIGNIFICANT CHANGE UP (ref 150–400)
PLATELET # BLD AUTO: 297 K/UL — SIGNIFICANT CHANGE UP (ref 150–400)
POTASSIUM SERPL-MCNC: 4.8 MMOL/L — SIGNIFICANT CHANGE UP (ref 3.5–5.3)
POTASSIUM SERPL-MCNC: 4.8 MMOL/L — SIGNIFICANT CHANGE UP (ref 3.5–5.3)
POTASSIUM SERPL-SCNC: 4.8 MMOL/L — SIGNIFICANT CHANGE UP (ref 3.5–5.3)
POTASSIUM SERPL-SCNC: 4.8 MMOL/L — SIGNIFICANT CHANGE UP (ref 3.5–5.3)
RBC # BLD: 2.89 M/UL — LOW (ref 3.8–5.2)
RBC # BLD: 2.89 M/UL — LOW (ref 3.8–5.2)
RBC # FLD: 15.6 % — HIGH (ref 10.3–14.5)
RBC # FLD: 15.6 % — HIGH (ref 10.3–14.5)
SODIUM SERPL-SCNC: 133 MMOL/L — LOW (ref 135–145)
SODIUM SERPL-SCNC: 133 MMOL/L — LOW (ref 135–145)
WBC # BLD: 6.19 K/UL — SIGNIFICANT CHANGE UP (ref 3.8–10.5)
WBC # BLD: 6.19 K/UL — SIGNIFICANT CHANGE UP (ref 3.8–10.5)
WBC # FLD AUTO: 6.19 K/UL — SIGNIFICANT CHANGE UP (ref 3.8–10.5)
WBC # FLD AUTO: 6.19 K/UL — SIGNIFICANT CHANGE UP (ref 3.8–10.5)

## 2023-12-03 RX ADMIN — DULOXETINE HYDROCHLORIDE 60 MILLIGRAM(S): 30 CAPSULE, DELAYED RELEASE ORAL at 11:57

## 2023-12-03 RX ADMIN — PANTOPRAZOLE SODIUM 40 MILLIGRAM(S): 20 TABLET, DELAYED RELEASE ORAL at 11:57

## 2023-12-03 RX ADMIN — Medication 1 APPLICATION(S): at 11:57

## 2023-12-03 RX ADMIN — ENOXAPARIN SODIUM 40 MILLIGRAM(S): 100 INJECTION SUBCUTANEOUS at 21:51

## 2023-12-03 RX ADMIN — CHLORHEXIDINE GLUCONATE 15 MILLILITER(S): 213 SOLUTION TOPICAL at 16:59

## 2023-12-03 RX ADMIN — GABAPENTIN 200 MILLIGRAM(S): 400 CAPSULE ORAL at 21:51

## 2023-12-03 RX ADMIN — CHLORHEXIDINE GLUCONATE 15 MILLILITER(S): 213 SOLUTION TOPICAL at 06:13

## 2023-12-03 RX ADMIN — GABAPENTIN 200 MILLIGRAM(S): 400 CAPSULE ORAL at 06:13

## 2023-12-03 RX ADMIN — GABAPENTIN 200 MILLIGRAM(S): 400 CAPSULE ORAL at 14:56

## 2023-12-03 NOTE — PROGRESS NOTE ADULT - ASSESSMENT
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 Pt is s/p segmental mandibulectomy with R partial buccal soft tissue and FOM excision, tracheostomy, right neck dissection in 1/2022, and removal of mandibular hardware in 10/2022. Pt completed chemotherapy and radiation. Pt reports open areas to right mandible with bone exposed (11/25/23). Zosyn added (11/25/23 - 11/29/23). JESSEE neck and leg removed. Wound vac removed. NG tube removed. Pt is progressing without acute events.    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 Pt is s/p segmental mandibulectomy with R partial buccal soft tissue and FOM excision, tracheostomy, right neck dissection in 1/2022, and removal of mandibular hardware in 10/2022. Pt completed chemotherapy and radiation. Pt reports open areas to right mandible with bone exposed (11/25/23). Zosyn added (11/25/23 - 11/29/23). JESSEE neck and leg removed. Wound vac removed. NG tube removed. Pt is progressing without acute events.    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 Pt is s/p segmental mandibulectomy with R partial buccal soft tissue and FOM excision, tracheostomy, right neck dissection in 1/2022, and removal of mandibular hardware in 10/2022. Pt completed chemotherapy and radiation. Pt reports open areas to right mandible with bone exposed (11/25/23). Zosyn added (11/25/23 - 11/29/23). JESSEE neck and leg removed. Wound vac removed. NG tube removed. Pt is progressing without acute events.    Plan:   -OOBTC, PT  -Multimodal pain management  -Pending s/s re-evaluation  -Nutrition consult         Oral and Maxillofacial Surgery   [facility] Pager [phone]

## 2023-12-03 NOTE — DISCHARGE NOTE PROVIDER - NSDCMRMEDTOKEN_GEN_ALL_CORE_FT
albuterol 90 mcg/inh inhalation powder: 2 puff(s) inhaled every 6 hours, As Needed  DULoxetine 60 mg oral delayed release capsule: 1 cap(s) orally once a day   acetaminophen 325 mg oral tablet: 2 tab(s) orally every 6 hours  albuterol 90 mcg/inh inhalation powder: 2 puff(s) inhaled every 6 hours, As Needed  DULoxetine 60 mg oral delayed release capsule: 1 cap(s) orally once a day  Ensure Plus High Protein: Take 1 Bottle of Ensure Plus HP twice daily  oxyCODONE 5 mg oral tablet: 1 tab(s) orally every 6 hours as needed for  severe pain MDD: 4  Peridex 0.12% mucous membrane liquid: 15 milliliter(s) orally 2 times a day Swish and spit, do not swallow

## 2023-12-03 NOTE — DISCHARGE NOTE PROVIDER - NSDCCPCAREPLAN_GEN_ALL_CORE_FT
PRINCIPAL DISCHARGE DIAGNOSIS  Diagnosis: H/O squamous cell carcinoma excision  Assessment and Plan of Treatment:      PRINCIPAL DISCHARGE DIAGNOSIS  Diagnosis: H/O squamous cell carcinoma excision  Assessment and Plan of Treatment: you had surgery to remove cancer. You have sites that must be cared daily- apply a thin layer of Aquacel to the donor site daily and leave open to air. When ambulating patient needs to wear CAM boot and can additionally place an ABD or other padding over the graft        PRINCIPAL DISCHARGE DIAGNOSIS  Diagnosis: H/O squamous cell carcinoma excision  Assessment and Plan of Treatment: you had surgery to remove cancer. You have sites that must be cared daily- apply a thin layer of Aquacel to the donor site on the leg daily and leave open to air. When ambulating patient needs to wear CAM boot and can additionally place an ABD or other padding over the graft

## 2023-12-03 NOTE — DISCHARGE NOTE PROVIDER - NSDCFUADDINST_GEN_ALL_CORE_FT
F/U w/ [facility] next week. Please call [phone] w/ any questions or concerns.  F/U w/ [facility] next week. Please call [phone] w/ any questions or concerns.  [facility]U w/ [facility] next week. Please call [phone] w/ any questions or concerns.

## 2023-12-03 NOTE — DISCHARGE NOTE PROVIDER - HOSPITAL COURSE
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 Home PT 3-5x/week. Jeri OLIVEIRA.   11/24: POD3: Passed TOV, merchant removed. voiding spontaneously now.  Jeri OLIVEIRA.   11/25: POD4: Downsized trach to 6 uncuffed Shiley. Unable to tolerate capping trial o/n.   11/26: POD5: Pt unable to tolerate capped trach and o/n desat to 85%, SICU put trach collar on and then recapped after suctioning. Episodes of emesis with TFs --> CLD. Unasyn restarted for erythema noted by plastics, will broaden to zosyn for 4 days  11/27: POD6: Zosyn started 11/25/23, will continue until 11/29/23. Trach capped, put collar in desatting. CLD w/ TF. CXR confirming stable TF placement   11/28: POD7: NAEON   11/29: POD8: NAEON  11/30: POD9: Decannulated, tolerating well w/o acute respiratory events. multiple episodes of emesis, dietician consulted, c/w current enteral feed regimen  12/1: POD10: Pt with 8/10 pain overnight, given one time oxycodone 5mg dose (pre-medicated w/ zofran), tolerating well.  12/2: POD11: Pain in regimen changed to Percocet 5-325, 2x 1000mg IV Tylenol overnight for pain. Tolerating advancement of diet to clear liquids   12/3: POD12: Advanced to pureed diet. 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 Home PT 3-5x/week. Jeri OLIVEIRA.   11/24: POD3: Passed TOV, merchant removed. voiding spontaneously now.  Jeri OLIVEIRA.   11/25: POD4: Downsized trach to 6 uncuffed Shiley. Unable to tolerate capping trial o/n.   11/26: POD5: Pt unable to tolerate capped trach and o/n desat to 85%, SICU put trach collar on and then recapped after suctioning. Episodes of emesis with TFs --> CLD. Unasyn restarted for erythema noted by plastics, will broaden to zosyn for 4 days  11/27: POD6: Zosyn started 11/25/23, will continue until 11/29/23. Trach capped, put collar in desatting. CLD w/ TF. CXR confirming stable TF placement   11/28: POD7: NAEON   11/29: POD8: NAEON  11/30: POD9: Decannulated, tolerating well w/o acute respiratory events. multiple episodes of emesis, dietician consulted, c/w current enteral feed regimen  12/1: POD10: Pt with 8/10 pain overnight, given one time oxycodone 5mg dose (pre-medicated w/ zofran), tolerating well.  12/2: POD11: Pain in regimen changed to Percocet 5-325, 2x 1000mg IV Tylenol overnight for pain. Tolerating advancement of diet to clear liquids   12/3: POD12: Advanced to pureed diet. NGT removed   12/4: POD13: TEE pt ready for discharge

## 2023-12-03 NOTE — PROGRESS NOTE ADULT - SUBJECTIVE AND OBJECTIVE BOX
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 Pt is s/p segmental mandibulectomy with R partial buccal soft tissue and FOM excision, tracheostomy, right neck dissection in 1/2022, and removal of mandibular hardware in 10/2022. Pt completed chemotherapy and radiation.    INTERVAL EVENTS:   - Advanced to pureed diet  - NGT removed     Vital Signs Last 24 Hrs  T(C): 36.3 (03 Dec 2023 06:00), Max: 36.7 (02 Dec 2023 17:30)  T(F): 97.4 (03 Dec 2023 06:00), Max: 98 (02 Dec 2023 17:30)  HR: 88 (03 Dec 2023 06:00) (80 - 99)  BP: 112/67 (03 Dec 2023 06:00) (101/67 - 122/71)  BP(mean): --  RR: 18 (03 Dec 2023 06:00) (18 - 18)  SpO2: 99% (03 Dec 2023 06:00) (99% - 100%)    Parameters below as of 03 Dec 2023 06:00  Patient On (Oxygen Delivery Method): room air    I&O's Detail    02 Dec 2023 07:01  -  03 Dec 2023 07:00  --------------------------------------------------------  IN:    Jevity 1.5: 200 mL    Oral Fluid: 730 mL  Total IN: 930 mL    OUT:    Voided (mL): 201 mL  Total OUT: 201 mL    Total NET: 729 mL      MEDICATIONS  (STANDING):  AQUAPHOR (petrolatum Ointment) 1 Application(s) Topical daily  chlorhexidine 0.12% Liquid 15 milliLiter(s) Swish and Spit two times a day  DULoxetine 60 milliGRAM(s) Oral daily  enoxaparin Injectable 40 milliGRAM(s) SubCutaneous every 24 hours  gabapentin Solution 200 milliGRAM(s) Oral three times a day  influenza   Vaccine 0.5 milliLiter(s) IntraMuscular once  pantoprazole  Injectable 40 milliGRAM(s) IV Push daily  polyethylene glycol 3350 17 Gram(s) Oral two times a day  senna 2 Tablet(s) Oral at bedtime    MEDICATIONS  (PRN):  ondansetron Injectable 4 milliGRAM(s) IV Push every 4 hours PRN Nausea  oxycodone    5 mG/acetaminophen 325 mG 1 Tablet(s) Oral every 6 hours PRN Moderate Pain (4 - 6)  oxycodone    5 mG/acetaminophen 325 mG 1 Tablet(s) Oral every 6 hours PRN Severe Pain (7 - 10)      PHYSICAL EXAM:  Gen: NAD  Resp: breathing easily, no stridor,   CV: RRR  Abdomen: soft, nontender, nondistended  Skin: Incision c/d/i. Normal color, no rashes or lesions  IOE: flap warm, pink, well perfused. intraoral incisions clean, closed, and in tact. intraoral sites healing well without signs of complication   extremities: donor site without complication sutures clean, closed, in tact. ace dressing in place RLE. thigh skin graft site healing well. Surgical sites hemostatic. Pain well controlled.

## 2023-12-03 NOTE — DISCHARGE NOTE PROVIDER - NSDCFUSCHEDAPPT_GEN_ALL_CORE_FT
Vic So  [facility]  [address] [facility]  Scheduled Appointment: 12/20/2023     Vic So  [facility]  [address] [facility]  Scheduled Appointment: 12/20/2023     Vic So  [facility]  [address] [facility]  Scheduled Appointment: 12/20/2023

## 2023-12-03 NOTE — PROGRESS NOTE ADULT - ASSESSMENT
A: Ms Dsouza is a 59yo F with previous hx of FFF for mandibular reconstruction c/b osteoradionecrosis who is now s/p RLE FFF for reconstruction on 11/21. Patient is recovering well.     Plan:  -ERAS protocol  -daily xeroform/telfa/ace daily to skin graft  -leave skin graft donor site open to air, apply aquaphor daily  -OOB with CAM boot  -Monitor I&Os  -Appreciate great care per primary  -Can apply xeroform or Aquacel to dehiscence    Plastic Surgery  #[phone] [facility] pager A: Ms Dsouza is a 57yo F with previous hx of FFF for mandibular reconstruction c/b osteoradionecrosis who is now s/p RLE FFF for reconstruction on 11/21. Patient is recovering well.     Plan:  -ERAS protocol  -daily xeroform/telfa/ace daily to skin graft  -leave skin graft donor site open to air, apply aquaphor daily  -OOB with CAM boot  -Monitor I&Os  -Appreciate great care per primary  -Can apply xeroform or Aquacel to dehiscence    Plastic Surgery  #[phone] [facility] pager A: Ms Dsouza is a 59yo F with previous hx of FFF for mandibular reconstruction c/b osteoradionecrosis who is now s/p RLE FFF for reconstruction on 11/21. Patient is recovering well.     Plan:  -ERAS protocol  -daily xeroform/telfa/ace daily to skin graft  -leave skin graft donor site open to air, apply aquaphor daily  -OOB with CAM boot  -Monitor I&Os  -Appreciate great care per primary  -Can apply xeroform or Aquacel to dehiscence    Plastic Surgery  #[phone] [facility] pager

## 2023-12-03 NOTE — DISCHARGE NOTE PROVIDER - CARE PROVIDER_API CALL
Neal Rowe  Oral/Maxillofacial Surgery  [address] [place] [id number]  Phone: [phone]  Fax: [phone]  Follow Up Time:    Neal Rowe  Oral/Maxillofacial Surgery  [address] [place] [id number]  Phone: [phone]  Fax: [phone]  Follow Up Time:    Neal Rowe  Oral/Maxillofacial Surgery  [address] [place] [id number]  Phone: [phone]  Fax: [phone]  Follow Up Time:

## 2023-12-03 NOTE — DISCHARGE NOTE PROVIDER - NSDCCPTREATMENT_GEN_ALL_CORE_FT
PRINCIPAL PROCEDURE  Procedure: Mandibulectomy, with neck dissection  Findings and Treatment:      PRINCIPAL PROCEDURE  Procedure: Mandibulectomy, with neck dissection  Findings and Treatment: You had removal of your lower jaw with a neck disection. You have a site on your leg we harvest bone. This area should be left open to air with a thin layer of Aquacel or covered with an abd pad when using the CAM boot while ambulating.

## 2023-12-03 NOTE — PROGRESS NOTE ADULT - SUBJECTIVE AND OBJECTIVE BOX
Plastic Surgery Progress Note (pg [facility]: [id number], [facility]: [phone])    SUBJECTIVE  The patient was seen and examined. No acute events    OBJECTIVE  ___________________________________________________  VITAL SIGNS / I&O's   Vital Signs Last 24 Hrs  T(C): 36.3 (03 Dec 2023 06:00), Max: 36.7 (02 Dec 2023 17:30)  T(F): 97.4 (03 Dec 2023 06:00), Max: 98 (02 Dec 2023 17:30)  HR: 88 (03 Dec 2023 06:00) (80 - 99)  BP: 112/67 (03 Dec 2023 06:00) (101/67 - 122/71)  BP(mean): --  RR: 18 (03 Dec 2023 06:00) (18 - 18)  SpO2: 99% (03 Dec 2023 06:00) (99% - 100%)    Parameters below as of 03 Dec 2023 06:00  Patient On (Oxygen Delivery Method): room air          02 Dec 2023 07:01  -  03 Dec 2023 07:00  --------------------------------------------------------  IN:    Jevity 1.5: 200 mL    Oral Fluid: 730 mL  Total IN: 930 mL    OUT:    Voided (mL): 201 mL  Total OUT: 201 mL    Total NET: 729 mL        ___________________________________________________  PHYSICAL EXAM    General: NAD, resting in bed comfortably.  HEENT: Chin suture line cdi, lateral dehiscence stable. Flap with good cap refill, soft.   Respiratory: Nonlabored respirations  Extremities:   RLE: skin graft with excellent take. Wrapped with ace, abd, telfa, xeroform  RLE skin graft donor site open to air   ___________________________________________________  LABS                        8.7    6.19  )-----------( 297      ( 03 Dec 2023 06:00 )             27.1     03 Dec 2023 06:00    133    |  95     |  22     ----------------------------<  93     4.8     |  24     |  0.84     Ca    9.2        03 Dec 2023 06:00  Phos  3.9       03 Dec 2023 06:00  Mg     2.10      03 Dec 2023 06:00        CAPILLARY BLOOD GLUCOSE            Urinalysis Basic - ( 03 Dec 2023 06:00 )    Color: x / Appearance: x / SG: x / pH: x  Gluc: 93 mg/dL / Ketone: x  / Bili: x / Urobili: x   Blood: x / Protein: x / Nitrite: x   Leuk Esterase: x / RBC: x / WBC x   Sq Epi: x / Non Sq Epi: x / Bacteria: x      ___________________________________________________  MICRO  Recent Cultures:    ___________________________________________________  MEDICATIONS  (STANDING):  AQUAPHOR (petrolatum Ointment) 1 Application(s) Topical daily  chlorhexidine 0.12% Liquid 15 milliLiter(s) Swish and Spit two times a day  DULoxetine 60 milliGRAM(s) Oral daily  enoxaparin Injectable 40 milliGRAM(s) SubCutaneous every 24 hours  gabapentin Solution 200 milliGRAM(s) Oral three times a day  influenza   Vaccine 0.5 milliLiter(s) IntraMuscular once  pantoprazole  Injectable 40 milliGRAM(s) IV Push daily  polyethylene glycol 3350 17 Gram(s) Oral two times a day  senna 2 Tablet(s) Oral at bedtime    MEDICATIONS  (PRN):  ondansetron Injectable 4 milliGRAM(s) IV Push every 4 hours PRN Nausea  oxycodone    5 mG/acetaminophen 325 mG 1 Tablet(s) Oral every 6 hours PRN Moderate Pain (4 - 6)  oxycodone    5 mG/acetaminophen 325 mG 1 Tablet(s) Oral every 6 hours PRN Severe Pain (7 - 10)   Plastic Surgery Progress Note (pg [facility]: [id number], [facility]: [phone])    SUBJECTIVE  The patient was seen and examined. No acute events    OBJECTIVE  ___________________________________________________  VITAL SIGNS / I&O's   Vital Signs Last 24 Hrs  T(C): 36.3 (03 Dec 2023 06:00), Max: 36.7 (02 Dec 2023 17:30)  T(F): 97.4 (03 Dec 2023 06:00), Max: 98 (02 Dec 2023 17:30)  HR: 88 (03 Dec 2023 06:00) (80 - 99)  BP: 112/67 (03 Dec 2023 06:00) (101/67 - 122/71)  BP(mean): --  RR: 18 (03 Dec 2023 06:00) (18 - 18)  SpO2: 99% (03 Dec 2023 06:00) (99% - 100%)    Parameters below as of 03 Dec 2023 06:00  Patient On (Oxygen Delivery Method): room air          02 Dec 2023 07:01  -  03 Dec 2023 07:00  --------------------------------------------------------  IN:    Jevity 1.5: 200 mL    Oral Fluid: 730 mL  Total IN: 930 mL    OUT:    Voided (mL): 201 mL  Total OUT: 201 mL    Total NET: 729 mL        ___________________________________________________  PHYSICAL EXAM    General: NAD, resting in bed comfortably.  HEENT: Chin suture line cdi, lateral dehiscence stable. Flap with good cap refill, soft.   Respiratory: Nonlabored respirations  Extremities:   RLE: skin graft with excellent take. Wrapped with ace, abd, telfa, xeroform  RLE skin graft donor site open to air   ___________________________________________________  LABS                        8.7    6.19  )-----------( 297      ( 03 Dec 2023 06:00 )             27.1     03 Dec 2023 06:00    133    |  95     |  22     ----------------------------<  93     4.8     |  24     |  0.84     Ca    9.2        03 Dec 2023 06:00  Phos  3.9       03 Dec 2023 06:00  Mg     2.10      03 Dec 2023 06:00        CAPILLARY BLOOD GLUCOSE            Urinalysis Basic - ( 03 Dec 2023 06:00 )    Color: x / Appearance: x / SG: x / pH: x  Gluc: 93 mg/dL / Ketone: x  / Bili: x / Urobili: x   Blood: x / Protein: x / Nitrite: x   Leuk Esterase: x / RBC: x / WBC x   Sq Epi: x / Non Sq Epi: x / Bacteria: x      ___________________________________________________  MICRO  Recent Cultures:    ___________________________________________________  MEDICATIONS  (STANDING):  AQUAPHOR (petrolatum Ointment) 1 Application(s) Topical daily  chlorhexidine 0.12% Liquid 15 milliLiter(s) Swish and Spit two times a day  DULoxetine 60 milliGRAM(s) Oral daily  enoxaparin Injectable 40 milliGRAM(s) SubCutaneous every 24 hours  gabapentin Solution 200 milliGRAM(s) Oral three times a day  influenza   Vaccine 0.5 milliLiter(s) IntraMuscular once  pantoprazole  Injectable 40 milliGRAM(s) IV Push daily  polyethylene glycol 3350 17 Gram(s) Oral two times a day  senna 2 Tablet(s) Oral at bedtime    MEDICATIONS  (PRN):  ondansetron Injectable 4 milliGRAM(s) IV Push every 4 hours PRN Nausea  oxycodone    5 mG/acetaminophen 325 mG 1 Tablet(s) Oral every 6 hours PRN Moderate Pain (4 - 6)  oxycodone    5 mG/acetaminophen 325 mG 1 Tablet(s) Oral every 6 hours PRN Severe Pain (7 - 10)   Plastic Surgery Progress Note (pg [facility]: [id number], [facility]: [phone])    SUBJECTIVE  The patient was seen and examined. No acute events    OBJECTIVE  ___________________________________________________  VITAL SIGNS / I&O's   Vital Signs Last 24 Hrs  T(C): 36.3 (03 Dec 2023 06:00), Max: 36.7 (02 Dec 2023 17:30)  T(F): 97.4 (03 Dec 2023 06:00), Max: 98 (02 Dec 2023 17:30)  HR: 88 (03 Dec 2023 06:00) (80 - 99)  BP: 112/67 (03 Dec 2023 06:00) (101/67 - 122/71)  BP(mean): --  RR: 18 (03 Dec 2023 06:00) (18 - 18)  SpO2: 99% (03 Dec 2023 06:00) (99% - 100%)    Parameters below as of 03 Dec 2023 06:00  Patient On (Oxygen Delivery Method): room air          02 Dec 2023 07:01  -  03 Dec 2023 07:00  --------------------------------------------------------  IN:    Jevity 1.5: 200 mL    Oral Fluid: 730 mL  Total IN: 930 mL    OUT:    Voided (mL): 201 mL  Total OUT: 201 mL    Total NET: 729 mL        ___________________________________________________  PHYSICAL EXAM    General: NAD, resting in bed comfortably.  HEENT: Chin suture line cdi, lateral dehiscence stable. Flap with good cap refill, soft.   Respiratory: Nonlabored respirations  Extremities:   RLE: skin graft with excellent take. Wrapped with ace, abd, telfa, xeroform  RLE skin graft donor site open to air   ___________________________________________________  LABS                        8.7    6.19  )-----------( 297      ( 03 Dec 2023 06:00 )             27.1     03 Dec 2023 06:00    133    |  95     |  22     ----------------------------<  93     4.8     |  24     |  0.84     Ca    9.2        03 Dec 2023 06:00  Phos  3.9       03 Dec 2023 06:00  Mg     2.10      03 Dec 2023 06:00        CAPILLARY BLOOD GLUCOSE            Urinalysis Basic - ( 03 Dec 2023 06:00 )    Color: x / Appearance: x / SG: x / pH: x  Gluc: 93 mg/dL / Ketone: x  / Bili: x / Urobili: x   Blood: x / Protein: x / Nitrite: x   Leuk Esterase: x / RBC: x / WBC x   Sq Epi: x / Non Sq Epi: x / Bacteria: x      ___________________________________________________  MICRO  Recent Cultures:    ___________________________________________________  MEDICATIONS  (STANDING):  AQUAPHOR (petrolatum Ointment) 1 Application(s) Topical daily  chlorhexidine 0.12% Liquid 15 milliLiter(s) Swish and Spit two times a day  DULoxetine 60 milliGRAM(s) Oral daily  enoxaparin Injectable 40 milliGRAM(s) SubCutaneous every 24 hours  gabapentin Solution 200 milliGRAM(s) Oral three times a day  influenza   Vaccine 0.5 milliLiter(s) IntraMuscular once  pantoprazole  Injectable 40 milliGRAM(s) IV Push daily  polyethylene glycol 3350 17 Gram(s) Oral two times a day  senna 2 Tablet(s) Oral at bedtime    MEDICATIONS  (PRN):  ondansetron Injectable 4 milliGRAM(s) IV Push every 4 hours PRN Nausea  oxycodone    5 mG/acetaminophen 325 mG 1 Tablet(s) Oral every 6 hours PRN Moderate Pain (4 - 6)  oxycodone    5 mG/acetaminophen 325 mG 1 Tablet(s) Oral every 6 hours PRN Severe Pain (7 - 10)

## 2023-12-04 ENCOUNTER — TRANSCRIPTION ENCOUNTER (OUTPATIENT)
Age: 58
End: 2023-12-04

## 2023-12-04 VITALS
SYSTOLIC BLOOD PRESSURE: 145 MMHG | DIASTOLIC BLOOD PRESSURE: 79 MMHG | OXYGEN SATURATION: 95 % | HEART RATE: 93 BPM | TEMPERATURE: 98 F | RESPIRATION RATE: 17 BRPM

## 2023-12-04 LAB
ANION GAP SERPL CALC-SCNC: 14 MMOL/L — SIGNIFICANT CHANGE UP (ref 7–14)
ANION GAP SERPL CALC-SCNC: 14 MMOL/L — SIGNIFICANT CHANGE UP (ref 7–14)
BUN SERPL-MCNC: 22 MG/DL — SIGNIFICANT CHANGE UP (ref 7–23)
BUN SERPL-MCNC: 22 MG/DL — SIGNIFICANT CHANGE UP (ref 7–23)
CALCIUM SERPL-MCNC: 9.5 MG/DL — SIGNIFICANT CHANGE UP (ref 8.4–10.5)
CALCIUM SERPL-MCNC: 9.5 MG/DL — SIGNIFICANT CHANGE UP (ref 8.4–10.5)
CHLORIDE SERPL-SCNC: 98 MMOL/L — SIGNIFICANT CHANGE UP (ref 98–107)
CHLORIDE SERPL-SCNC: 98 MMOL/L — SIGNIFICANT CHANGE UP (ref 98–107)
CO2 SERPL-SCNC: 25 MMOL/L — SIGNIFICANT CHANGE UP (ref 22–31)
CO2 SERPL-SCNC: 25 MMOL/L — SIGNIFICANT CHANGE UP (ref 22–31)
CREAT SERPL-MCNC: 0.92 MG/DL — SIGNIFICANT CHANGE UP (ref 0.5–1.3)
CREAT SERPL-MCNC: 0.92 MG/DL — SIGNIFICANT CHANGE UP (ref 0.5–1.3)
EGFR: 72 ML/MIN/1.73M2 — SIGNIFICANT CHANGE UP
EGFR: 72 ML/MIN/1.73M2 — SIGNIFICANT CHANGE UP
GLUCOSE SERPL-MCNC: 87 MG/DL — SIGNIFICANT CHANGE UP (ref 70–99)
GLUCOSE SERPL-MCNC: 87 MG/DL — SIGNIFICANT CHANGE UP (ref 70–99)
HCT VFR BLD CALC: 27.2 % — LOW (ref 34.5–45)
HCT VFR BLD CALC: 27.2 % — LOW (ref 34.5–45)
HGB BLD-MCNC: 8.9 G/DL — LOW (ref 11.5–15.5)
HGB BLD-MCNC: 8.9 G/DL — LOW (ref 11.5–15.5)
MAGNESIUM SERPL-MCNC: 2.1 MG/DL — SIGNIFICANT CHANGE UP (ref 1.6–2.6)
MAGNESIUM SERPL-MCNC: 2.1 MG/DL — SIGNIFICANT CHANGE UP (ref 1.6–2.6)
MCHC RBC-ENTMCNC: 30.2 PG — SIGNIFICANT CHANGE UP (ref 27–34)
MCHC RBC-ENTMCNC: 30.2 PG — SIGNIFICANT CHANGE UP (ref 27–34)
MCHC RBC-ENTMCNC: 32.7 GM/DL — SIGNIFICANT CHANGE UP (ref 32–36)
MCHC RBC-ENTMCNC: 32.7 GM/DL — SIGNIFICANT CHANGE UP (ref 32–36)
MCV RBC AUTO: 92.2 FL — SIGNIFICANT CHANGE UP (ref 80–100)
MCV RBC AUTO: 92.2 FL — SIGNIFICANT CHANGE UP (ref 80–100)
NRBC # BLD: 0 /100 WBCS — SIGNIFICANT CHANGE UP (ref 0–0)
NRBC # BLD: 0 /100 WBCS — SIGNIFICANT CHANGE UP (ref 0–0)
NRBC # FLD: 0 K/UL — SIGNIFICANT CHANGE UP (ref 0–0)
NRBC # FLD: 0 K/UL — SIGNIFICANT CHANGE UP (ref 0–0)
PHOSPHATE SERPL-MCNC: 4.3 MG/DL — SIGNIFICANT CHANGE UP (ref 2.5–4.5)
PHOSPHATE SERPL-MCNC: 4.3 MG/DL — SIGNIFICANT CHANGE UP (ref 2.5–4.5)
PLATELET # BLD AUTO: 492 K/UL — HIGH (ref 150–400)
PLATELET # BLD AUTO: 492 K/UL — HIGH (ref 150–400)
POTASSIUM SERPL-MCNC: 4.5 MMOL/L — SIGNIFICANT CHANGE UP (ref 3.5–5.3)
POTASSIUM SERPL-MCNC: 4.5 MMOL/L — SIGNIFICANT CHANGE UP (ref 3.5–5.3)
POTASSIUM SERPL-SCNC: 4.5 MMOL/L — SIGNIFICANT CHANGE UP (ref 3.5–5.3)
POTASSIUM SERPL-SCNC: 4.5 MMOL/L — SIGNIFICANT CHANGE UP (ref 3.5–5.3)
RBC # BLD: 2.95 M/UL — LOW (ref 3.8–5.2)
RBC # BLD: 2.95 M/UL — LOW (ref 3.8–5.2)
RBC # FLD: 15.2 % — HIGH (ref 10.3–14.5)
RBC # FLD: 15.2 % — HIGH (ref 10.3–14.5)
SODIUM SERPL-SCNC: 137 MMOL/L — SIGNIFICANT CHANGE UP (ref 135–145)
SODIUM SERPL-SCNC: 137 MMOL/L — SIGNIFICANT CHANGE UP (ref 135–145)
WBC # BLD: 8.01 K/UL — SIGNIFICANT CHANGE UP (ref 3.8–10.5)
WBC # BLD: 8.01 K/UL — SIGNIFICANT CHANGE UP (ref 3.8–10.5)
WBC # FLD AUTO: 8.01 K/UL — SIGNIFICANT CHANGE UP (ref 3.8–10.5)
WBC # FLD AUTO: 8.01 K/UL — SIGNIFICANT CHANGE UP (ref 3.8–10.5)

## 2023-12-04 RX ORDER — OXYCODONE HYDROCHLORIDE 5 MG/1
1 TABLET ORAL
Qty: 12 | Refills: 0
Start: 2023-12-04 | End: 2023-12-06

## 2023-12-04 RX ORDER — ACETAMINOPHEN 500 MG
2 TABLET ORAL
Qty: 56 | Refills: 0
Start: 2023-12-04 | End: 2023-12-10

## 2023-12-04 RX ORDER — CHLORHEXIDINE GLUCONATE 213 G/1000ML
15 SOLUTION TOPICAL
Qty: 1 | Refills: 0
Start: 2023-12-04 | End: 2023-12-10

## 2023-12-04 RX ADMIN — GABAPENTIN 200 MILLIGRAM(S): 400 CAPSULE ORAL at 05:47

## 2023-12-04 RX ADMIN — Medication 1 APPLICATION(S): at 12:11

## 2023-12-04 RX ADMIN — PANTOPRAZOLE SODIUM 40 MILLIGRAM(S): 20 TABLET, DELAYED RELEASE ORAL at 12:12

## 2023-12-04 RX ADMIN — CHLORHEXIDINE GLUCONATE 15 MILLILITER(S): 213 SOLUTION TOPICAL at 06:54

## 2023-12-04 RX ADMIN — DULOXETINE HYDROCHLORIDE 60 MILLIGRAM(S): 30 CAPSULE, DELAYED RELEASE ORAL at 12:11

## 2023-12-04 NOTE — CHART NOTE - NSCHARTNOTESELECT_GEN_ALL_CORE
Follow-up/Nutrition Services
OMFS Post-Op Note/Event Note
SICU to floor/Transfer Note
Nutrition Follow-up Note/Nutrition Services

## 2023-12-04 NOTE — DISCHARGE NOTE NURSING/CASE MANAGEMENT/SOCIAL WORK - CAREGIVER ADDRESS
[facility]

## 2023-12-04 NOTE — PROGRESS NOTE ADULT - ASSESSMENT
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 Pt is s/p segmental mandibulectomy with R partial buccal soft tissue and FOM excision, tracheostomy, right neck dissection in 1/2022, and removal of mandibular hardware in 10/2022. Pt completed chemotherapy and radiation. Pt reports open areas to right mandible with bone exposed (11/25/23). Zosyn added (11/25/23 - 11/29/23). JESSEE neck and leg removed. Wound vac removed. NG tube removed. Pt is progressing without acute events.    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 Pt is s/p segmental mandibulectomy with R partial buccal soft tissue and FOM excision, tracheostomy, right neck dissection in 1/2022, and removal of mandibular hardware in 10/2022. Pt completed chemotherapy and radiation. Pt reports open areas to right mandible with bone exposed (11/25/23). Zosyn added (11/25/23 - 11/29/23). JESSEE neck and leg removed. Wound vac removed. NG tube removed. Pt is progressing without acute events.    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 Pt is s/p segmental mandibulectomy with R partial buccal soft tissue and FOM excision, tracheostomy, right neck dissection in 1/2022, and removal of mandibular hardware in 10/2022. Pt completed chemotherapy and radiation. Pt reports open areas to right mandible with bone exposed (11/25/23). Zosyn added (11/25/23 - 11/29/23). JESSEE neck and leg removed. Wound vac removed. NG tube removed. Pt is progressing without acute events.    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 Pt is s/p segmental mandibulectomy with R partial buccal soft tissue and FOM excision, tracheostomy, right neck dissection in 1/2022, and removal of mandibular hardware in 10/2022. Pt completed chemotherapy and radiation. Pt reports open areas to right mandible with bone exposed (11/25/23). Zosyn added (11/25/23 - 11/29/23). JESSEE neck and leg removed. Wound vac removed. NG tube removed. Pt is progressing without acute events.    Plan:   - Nutrition consult  - Multimodal pain management  - Diet: Pureed  - DVT ppx: Lovenox 40mg QD, PT  - Dispo: Possibly home today (12/4) pending SW/CM    Burton Smalls DDS, MD  Resident, Oral & Maxillofacial Surgery  [facility] Pager: [phone]  [facility] Pager: [phone]  [facility] Pager: [phone]  Available on [employer] TEAMS

## 2023-12-04 NOTE — PROGRESS NOTE ADULT - SUBJECTIVE AND OBJECTIVE BOX
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 Pt is s/p segmental mandibulectomy with R partial buccal soft tissue and FOM excision, tracheostomy, right neck dissection in 1/2022, and removal of mandibular hardware in 10/2022. Pt completed chemotherapy and radiation.    INTERVAL EVENTS:   NAEON      Vital Signs Last 24 Hrs  T(C): 36.6 (04 Dec 2023 02:12), Max: 36.9 (03 Dec 2023 09:27)  T(F): 97.8 (04 Dec 2023 02:12), Max: 98.4 (03 Dec 2023 09:27)  HR: 83 (04 Dec 2023 02:12) (83 - 106)  BP: 113/72 (04 Dec 2023 02:12) (97/65 - 124/78)  BP(mean): --  RR: 18 (04 Dec 2023 02:12) (17 - 18)  SpO2: 97% (04 Dec 2023 02:12) (94% - 98%)    Parameters below as of 04 Dec 2023 02:12  Patient On (Oxygen Delivery Method): room air      Physical Exam:   Gen: NAD  Resp: breathing easily, no stridor,   CV: RRR  Abdomen: soft, nontender, nondistended  Skin: Incision c/d/i. Normal color, no rashes or lesions  IOE: flap warm, pink, well perfused. intraoral incisions clean, closed, and in tact. intraoral sites healing well without signs of complication   Skin: donor site without complication sutures clean, closed, in tact. ace dressing in place RLE. thigh skin graft site healing well. Surgical sites hemostatic                              8.7    6.19  )-----------( 297      ( 03 Dec 2023 06:00 )             27.1     12-03    133<L>  |  95<L>  |  22  ----------------------------<  93  4.8   |  24  |  0.84    Ca    9.2      03 Dec 2023 06:00  Phos  3.9     12-03  Mg     2.10     12-03      I&O's Summary    02 Dec 2023 07:01  -  03 Dec 2023 07:00  --------------------------------------------------------  IN: 930 mL / OUT: 201 mL / NET: 729 mL    03 Dec 2023 07:01  -  04 Dec 2023 06:07  --------------------------------------------------------  IN: 820 mL / OUT: 0 mL / NET: 820 mL           ORAL & MAXILLOFACIAL SURGERY PROGRESS NOTE:    58F w/PMH of ORN and 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 Patient's oral pain controlled with PO meds.    Vital Signs Last 24 Hrs  T(C): 36.5 (04 Dec 2023 06:00), Max: 36.9 (03 Dec 2023 09:27)  T(F): 97.7 (04 Dec 2023 06:00), Max: 98.4 (03 Dec 2023 09:27)  HR: 93 (04 Dec 2023 06:00) (83 - 106)  BP: 113/75 (04 Dec 2023 06:00) (97/65 - 124/78)  RR: 18 (04 Dec 2023 06:00) (17 - 18)  SpO2: 98% (04 Dec 2023 06:00) (94% - 98%)  Parameters below as of 04 Dec 2023 06:00  Patient On (Oxygen Delivery Method): room air    I&O's Summary  03 Dec 2023 07:01  -  04 Dec 2023 07:00  --------------------------------------------------------  IN: 940 mL / OUT: 0 mL / NET: 940 mL    Physical Exam:   Gen: AAOx3, NAD  Resp: breathing easily, no stridor. Mucus secretions from trach site  CV: RRR  Abdomen: soft, nontender, nondistended  Skin: Incision c/d/i, normal color, no rashes or lesions; donor site without complication sutures clean, closed, intact. Ace dressing removed.  IOE: flap warm, pink, well perfused, incisions clean, closed, and intact. No evidence of infection.    LABS:                      8.9    8.01  )-----------( 492      ( 04 Dec 2023 05:55 )             27.2     12-03    133<L>  |  95<L>  |  22  ----------------------------<  93  4.8   |  24  |  0.84    Ca    9.2      03 Dec 2023 06:00  Phos  3.9     12-03  Mg     2.10     12-03

## 2023-12-04 NOTE — PROGRESS NOTE ADULT - SUBJECTIVE AND OBJECTIVE BOX
Plastic Surgery Progress Note (pg [facility]: [id number], [facility]: [phone])    SUBJECTIVE  The patient was seen and examined.     OBJECTIVE  ___________________________________________________  VITAL SIGNS / I&O's   Vital Signs Last 24 Hrs  T(C): 36.5 (04 Dec 2023 06:00), Max: 36.9 (03 Dec 2023 09:27)  T(F): 97.7 (04 Dec 2023 06:00), Max: 98.4 (03 Dec 2023 09:27)  HR: 93 (04 Dec 2023 06:00) (83 - 106)  BP: 113/75 (04 Dec 2023 06:00) (97/65 - 124/78)  BP(mean): --  RR: 18 (04 Dec 2023 06:00) (17 - 18)  SpO2: 98% (04 Dec 2023 06:00) (94% - 98%)    Parameters below as of 04 Dec 2023 06:00  Patient On (Oxygen Delivery Method): room air          03 Dec 2023 07:01  -  04 Dec 2023 07:00  --------------------------------------------------------  IN:    Oral Fluid: 940 mL  Total IN: 940 mL    OUT:  Total OUT: 0 mL    Total NET: 940 mL        ___________________________________________________  PHYSICAL EXAM    General: NAD, resting in bed comfortably.  HEENT: Chin suture line cdi, lateral dehiscence stable. Flap with good cap refill, soft.   Respiratory: Nonlabored respirations  Extremities:   RLE: skin graft with excellent take. Wrapped with ace, abd, telfa, xeroform  RLE skin graft donor site open to air     ___________________________________________________  LABS                        8.9    8.01  )-----------( 492      ( 04 Dec 2023 05:55 )             27.2     04 Dec 2023 05:55    137    |  98     |  22     ----------------------------<  87     4.5     |  25     |  0.92     Ca    9.5        04 Dec 2023 05:55  Phos  4.3       04 Dec 2023 05:55  Mg     2.10      04 Dec 2023 05:55        CAPILLARY BLOOD GLUCOSE            Urinalysis Basic - ( 04 Dec 2023 05:55 )    Color: x / Appearance: x / SG: x / pH: x  Gluc: 87 mg/dL / Ketone: x  / Bili: x / Urobili: x   Blood: x / Protein: x / Nitrite: x   Leuk Esterase: x / RBC: x / WBC x   Sq Epi: x / Non Sq Epi: x / Bacteria: x      ___________________________________________________  MICRO  Recent Cultures:    ___________________________________________________  MEDICATIONS  (STANDING):  AQUAPHOR (petrolatum Ointment) 1 Application(s) Topical daily  chlorhexidine 0.12% Liquid 15 milliLiter(s) Swish and Spit two times a day  DULoxetine 60 milliGRAM(s) Oral daily  enoxaparin Injectable 40 milliGRAM(s) SubCutaneous every 24 hours  gabapentin Solution 200 milliGRAM(s) Oral three times a day  influenza   Vaccine 0.5 milliLiter(s) IntraMuscular once  pantoprazole  Injectable 40 milliGRAM(s) IV Push daily  polyethylene glycol 3350 17 Gram(s) Oral two times a day  senna 2 Tablet(s) Oral at bedtime    MEDICATIONS  (PRN):  ondansetron Injectable 4 milliGRAM(s) IV Push every 4 hours PRN Nausea  oxycodone    5 mG/acetaminophen 325 mG 1 Tablet(s) Oral every 6 hours PRN Moderate Pain (4 - 6)  oxycodone    5 mG/acetaminophen 325 mG 1 Tablet(s) Oral every 6 hours PRN Severe Pain (7 - 10)          Assessment & Plan      [facility]  Plastic & Reconstructive Surgery, PGY-1  #[id number] Plastic Surgery Progress Note (pg [facility]: [id number], [facility]: [phone])    SUBJECTIVE  The patient was seen and examined.     OBJECTIVE  ___________________________________________________  VITAL SIGNS / I&O's   Vital Signs Last 24 Hrs  T(C): 36.5 (04 Dec 2023 06:00), Max: 36.9 (03 Dec 2023 09:27)  T(F): 97.7 (04 Dec 2023 06:00), Max: 98.4 (03 Dec 2023 09:27)  HR: 93 (04 Dec 2023 06:00) (83 - 106)  BP: 113/75 (04 Dec 2023 06:00) (97/65 - 124/78)  BP(mean): --  RR: 18 (04 Dec 2023 06:00) (17 - 18)  SpO2: 98% (04 Dec 2023 06:00) (94% - 98%)    Parameters below as of 04 Dec 2023 06:00  Patient On (Oxygen Delivery Method): room air          03 Dec 2023 07:01  -  04 Dec 2023 07:00  --------------------------------------------------------  IN:    Oral Fluid: 940 mL  Total IN: 940 mL    OUT:  Total OUT: 0 mL    Total NET: 940 mL        ___________________________________________________  PHYSICAL EXAM    General: NAD, resting in bed comfortably.  HEENT: Chin suture line cdi, lateral dehiscence stable. Flap with good cap refill, soft.   Respiratory: Nonlabored respirations  Extremities:   RLE: skin graft with excellent take. Wrapped with ace, abd, telfa, xeroform  RLE skin graft donor site open to air     ___________________________________________________  LABS                        8.9    8.01  )-----------( 492      ( 04 Dec 2023 05:55 )             27.2     04 Dec 2023 05:55    137    |  98     |  22     ----------------------------<  87     4.5     |  25     |  0.92     Ca    9.5        04 Dec 2023 05:55  Phos  4.3       04 Dec 2023 05:55  Mg     2.10      04 Dec 2023 05:55        CAPILLARY BLOOD GLUCOSE            Urinalysis Basic - ( 04 Dec 2023 05:55 )    Color: x / Appearance: x / SG: x / pH: x  Gluc: 87 mg/dL / Ketone: x  / Bili: x / Urobili: x   Blood: x / Protein: x / Nitrite: x   Leuk Esterase: x / RBC: x / WBC x   Sq Epi: x / Non Sq Epi: x / Bacteria: x      ___________________________________________________  MICRO  Recent Cultures:    ___________________________________________________  MEDICATIONS  (STANDING):  AQUAPHOR (petrolatum Ointment) 1 Application(s) Topical daily  chlorhexidine 0.12% Liquid 15 milliLiter(s) Swish and Spit two times a day  DULoxetine 60 milliGRAM(s) Oral daily  enoxaparin Injectable 40 milliGRAM(s) SubCutaneous every 24 hours  gabapentin Solution 200 milliGRAM(s) Oral three times a day  influenza   Vaccine 0.5 milliLiter(s) IntraMuscular once  pantoprazole  Injectable 40 milliGRAM(s) IV Push daily  polyethylene glycol 3350 17 Gram(s) Oral two times a day  senna 2 Tablet(s) Oral at bedtime    MEDICATIONS  (PRN):  ondansetron Injectable 4 milliGRAM(s) IV Push every 4 hours PRN Nausea  oxycodone    5 mG/acetaminophen 325 mG 1 Tablet(s) Oral every 6 hours PRN Moderate Pain (4 - 6)  oxycodone    5 mG/acetaminophen 325 mG 1 Tablet(s) Oral every 6 hours PRN Severe Pain (7 - 10)          Assessment & Plan      [facility]  Plastic & Reconstructive Surgery, PGY-1  #[id number]

## 2023-12-04 NOTE — DISCHARGE NOTE NURSING/CASE MANAGEMENT/SOCIAL WORK - NSDCPEFALRISK_GEN_ALL_CORE
For information on Fall & Injury Prevention, visit: [email or web address] OR  [email or web address] OR  https://www.cdc.gov/steadi/patient.html For information on Fall & Injury Prevention, visit: [email or web address] OR  [email or web address] OR  https://www.cdc.gov/steadi/patient.html

## 2023-12-04 NOTE — PROGRESS NOTE ADULT - ASSESSMENT
A: Ms Dsouza is a 57yo F with previous hx of FFF for mandibular reconstruction c/b osteoradionecrosis who is now s/p RLE FFF for reconstruction on 11/21. Patient is recovering well.     Plan:  -ERAS protocol  -daily xeroform/telfa/ace daily to skin graft  -leave skin graft donor site open to air, apply aquaphor daily  -OOB with CAM boot  -Monitor I&Os  -Appreciate great care per primary  -Can apply xeroform or Aquacel to dehiscence    On d/c - patient to apply a thin layer of Aquacel daily and leave open to air. When ambulating patient needs to wear CAM boot and can additionally place an ABD or other padding over the graft     Plastic Surgery  #[phone] [facility] pager A: Ms Dsouza is a 59yo F with previous hx of FFF for mandibular reconstruction c/b osteoradionecrosis who is now s/p RLE FFF for reconstruction on 11/21. Patient is recovering well.     Plan:  -ERAS protocol  -daily xeroform/telfa/ace daily to skin graft  -leave skin graft donor site open to air, apply aquaphor daily  -OOB with CAM boot  -Monitor I&Os  -Appreciate great care per primary  -Can apply xeroform or Aquacel to dehiscence    On d/c - patient to apply a thin layer of Aquacel daily and leave open to air. When ambulating patient needs to wear CAM boot and can additionally place an ABD or other padding over the graft     Plastic Surgery  #[phone] [facility] pager

## 2023-12-04 NOTE — DISCHARGE NOTE NURSING/CASE MANAGEMENT/SOCIAL WORK - PATIENT PORTAL LINK FT
You can access the FollowMyHealth Patient Portal offered by [facility] by registering at the following website: [email or web address]. By joining [facility]’s FollowMyHealth portal, you will also be able to view your health information using other applications (apps) compatible with our system. You can access the FollowMyHealth Patient Portal offered by [facility] by registering at the following website: [email or web address]. By joining [facility] [employer] [employer] [facility] [employer] [facility]’s FollowMyHealth portal, you will also be able to view your health information using other applications (apps) compatible with our system.

## 2023-12-04 NOTE — DISCHARGE NOTE NURSING/CASE MANAGEMENT/SOCIAL WORK - NSSCNAMETXT_GEN_ALL_CORE
Home care services were arranged / [facility]. The visiting RN will call to schedule a visit.  Home care services were arranged / [facility]. The visiting RN will call to schedule a visit.

## 2023-12-04 NOTE — PROGRESS NOTE ADULT - PROVIDER SPECIALTY LIST ADULT
Anesthesia
ENT
OMFS
Plastic Surgery
ENT
ENT
OMFS
OMFS
Plastic Surgery
SICU
ENT
ENT
OMFS
Plastic Surgery
SICU
OMFS
OMFS

## 2023-12-04 NOTE — CHART NOTE - NSCHARTNOTEFT_GEN_A_CORE
Source: Patient [X ]    Family [ ]     other [X ] electronic chart, RN, [facility]    Diet : Diet, Pureed (12-02-23 @ 22:03)      Nutrition Follow-up note. Consulted for assessment. 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      Patient currently on Pureed diet, tolerating well. Consuming ~50% of meals. Patient denies any nausea/vomiting/diarrhea/constipation or difficulty chewing and swallowing to current consistency. +BM 12/2. Continue small frequent po intake as tolerated encouraged. Strategies to increase kcal and protein intake discussed. RD spoke to OMFS team during IDR to add PO supplement Ensure Plus HP for added calories and protein.       Height: 162.56cm/64inches  Weights per RN flowsheet: 55.3kg (11/27), 54kg (11/26), 54.7kg (11/23), 55.2kg (11/22)  BMI: 20.88kg/m^2  % Weight Change: +0.1kg (<1%) x4 days period of time  Fluctuating weight likely scale related discrepancy      Pertinent Medications: MEDICATIONS  (STANDING):  AQUAPHOR (petrolatum Ointment) 1 Application(s) Topical daily  chlorhexidine 0.12% Liquid 15 milliLiter(s) Swish and Spit two times a day  DULoxetine 60 milliGRAM(s) Oral daily  enoxaparin Injectable 40 milliGRAM(s) SubCutaneous every 24 hours  gabapentin Solution 200 milliGRAM(s) Oral three times a day  influenza   Vaccine 0.5 milliLiter(s) IntraMuscular once  pantoprazole  Injectable 40 milliGRAM(s) IV Push daily  polyethylene glycol 3350 17 Gram(s) Oral two times a day  senna 2 Tablet(s) Oral at bedtime    MEDICATIONS  (PRN):  ondansetron Injectable 4 milliGRAM(s) IV Push every 4 hours PRN Nausea  oxycodone    5 mG/acetaminophen 325 mG 1 Tablet(s) Oral every 6 hours PRN Moderate Pain (4 - 6)  oxycodone    5 mG/acetaminophen 325 mG 1 Tablet(s) Oral every 6 hours PRN Severe Pain (7 - 10)    Pertinent Labs:  12-04 Na137 mmol/L Glu 87 mg/dL K+ 4.5 mmol/L Cr  0.92 mg/dL BUN 22 mg/dL 12-04 Phos 4.3 mg/dL      Skin: Surgical incision.   No edema per nursing flowsheet.    Estimated Needs:   [X ] no change since previous assessment  [ ] recalculated:     Estimated Needs:   [X] no change since previous assessment  Weight Used: Dosing Weight 119.9lb/54.4kg  Estimated energy needs: 1632-1904kcal (based on 30-35kcal/kg)  Estimated protein needs: 76.16-97.92gms (based on 1.4-1.8gms/kg)    Previous Nutrition Diagnosis: Inadequate energy intake    Nutrition Diagnosis is [X] ongoing    New Nutrition Diagnosis: Not applicable    [X  ] Given today-mentioned above    Type of education provided:    [  ] Given on previous assessment by RD    [  ] Not applicable 2/2 cognitive deficit    [  ] Pt refusal of education offered    [  ] Not applicable 2/2 current prognosis    [  ] Not warranted at present    Recommend:  1. Continue current diet order, which remains appropriate at this time.   2. Recommend add Ensure Plus HP BID.   3. Food and nutrition department will provide Magic Cup daily (290kcal, 9gm pro) TID.   4. Please document % PO intake in nursing flowsheet.   5. Monitor weights, labs, BM's, skin integrity, p.o. intake.  6. Please Encourage po intake, assist with meals and menu selections, provide alternatives PRN.       Venkatesh Isaacs, MS, CDN, RDN     pager [phone] or TEAMS Source: Patient [X ]    Family [ ]     other [X ] electronic chart, RN, [facility]    Diet : Diet, Pureed (12-02-23 @ 22:03)      Nutrition Follow-up note. Consulted for assessment. 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      Patient currently on Pureed diet, tolerating well. Consuming ~50% of meals. Patient denies any nausea/vomiting/diarrhea/constipation or difficulty chewing and swallowing to current consistency. +BM 12/2. Continue small frequent po intake as tolerated encouraged. Strategies to increase kcal and protein intake discussed. RD spoke to OMFS team during IDR to add PO supplement Ensure Plus HP for added calories and protein.       Height: 162.56cm/64inches  Weights per RN flowsheet: 55.3kg (11/27), 54kg (11/26), 54.7kg (11/23), 55.2kg (11/22)  BMI: 20.88kg/m^2  % Weight Change: +0.1kg (<1%) x4 days period of time  Fluctuating weight likely scale related discrepancy      Pertinent Medications: MEDICATIONS  (STANDING):  AQUAPHOR (petrolatum Ointment) 1 Application(s) Topical daily  chlorhexidine 0.12% Liquid 15 milliLiter(s) Swish and Spit two times a day  DULoxetine 60 milliGRAM(s) Oral daily  enoxaparin Injectable 40 milliGRAM(s) SubCutaneous every 24 hours  gabapentin Solution 200 milliGRAM(s) Oral three times a day  influenza   Vaccine 0.5 milliLiter(s) IntraMuscular once  pantoprazole  Injectable 40 milliGRAM(s) IV Push daily  polyethylene glycol 3350 17 Gram(s) Oral two times a day  senna 2 Tablet(s) Oral at bedtime    MEDICATIONS  (PRN):  ondansetron Injectable 4 milliGRAM(s) IV Push every 4 hours PRN Nausea  oxycodone    5 mG/acetaminophen 325 mG 1 Tablet(s) Oral every 6 hours PRN Moderate Pain (4 - 6)  oxycodone    5 mG/acetaminophen 325 mG 1 Tablet(s) Oral every 6 hours PRN Severe Pain (7 - 10)    Pertinent Labs:  12-04 Na137 mmol/L Glu 87 mg/dL K+ 4.5 mmol/L Cr  0.92 mg/dL BUN 22 mg/dL 12-04 Phos 4.3 mg/dL      Skin: Surgical incision.   No edema per nursing flowsheet.    Estimated Needs:   [X ] no change since previous assessment  [ ] recalculated:     Estimated Needs:   [X] no change since previous assessment  Weight Used: Dosing Weight 119.9lb/54.4kg  Estimated energy needs: 1632-1904kcal (based on 30-35kcal/kg)  Estimated protein needs: 76.16-97.92gms (based on 1.4-1.8gms/kg)    Previous Nutrition Diagnosis: Inadequate energy intake    Nutrition Diagnosis is [X] ongoing    New Nutrition Diagnosis: Not applicable    [X  ] Given today-mentioned above    Type of education provided:    [  ] Given on previous assessment by RD    [  ] Not applicable 2/2 cognitive deficit    [  ] Pt refusal of education offered    [  ] Not applicable 2/2 current prognosis    [  ] Not warranted at present    Recommend:  1. Continue current diet order, which remains appropriate at this time.   2. Recommend add Ensure Plus HP BID.   3. Food and nutrition department will provide Magic Cup daily (290kcal, 9gm pro) TID.   4. Please document % PO intake in nursing flowsheet.   5. Monitor weights, labs, BM's, skin integrity, p.o. intake.  6. Please Encourage po intake, assist with meals and menu selections, provide alternatives PRN.       Venkatesh Isaacs, MS, CDN, RDN     pager [phone] or TEAMS

## 2023-12-06 LAB
SURGICAL PATHOLOGY STUDY: SIGNIFICANT CHANGE UP
SURGICAL PATHOLOGY STUDY: SIGNIFICANT CHANGE UP

## 2023-12-08 ENCOUNTER — APPOINTMENT (OUTPATIENT)
Dept: PHYSICAL MEDICINE AND REHAB | Facility: CLINIC | Age: 58
End: 2023-12-08
Payer: COMMERCIAL

## 2023-12-08 PROBLEM — C41.1 MALIGNANT NEOPLASM OF MANDIBLE: Chronic | Status: ACTIVE | Noted: 2023-11-15

## 2023-12-08 PROCEDURE — 99443: CPT

## 2023-12-08 RX ORDER — NALOXONE HYDROCHLORIDE 4 MG/.1ML
4 SPRAY NASAL
Qty: 1 | Refills: 0 | Status: ACTIVE | COMMUNITY
Start: 2023-12-08 | End: 1900-01-01

## 2023-12-08 NOTE — CONSULT NOTE ADULT - PROBLEM SELECTOR RECOMMENDATION 5
DVT ppx: per primary team  Diet: TF via PEG Assistance OOB with selected safe patient handling equipment if applicable/Assistance with ambulation/Communicate risk of Fall with Harm to all staff, patient, and family/Monitor gait and stability/Provide patient with walking aids/Provide visual cue: red socks, yellow wristband, yellow gown, etc/Reinforce activity limits and safety measures with patient and family/Bed in lowest position, wheels locked, appropriate side rails in place/Call bell, personal items and telephone in reach/Instruct patient to call for assistance before getting out of bed/chair/stretcher/Non-slip footwear applied when patient is off stretcher/[place] to call system/Physically safe environment - no spills, clutter or unnecessary equipment/Purposeful Proactive Rounding/Room/bathroom lighting operational, light cord in reach Assistance OOB with selected safe patient handling equipment if applicable/Assistance with ambulation/Communicate risk of Fall with Harm to all staff, patient, and family/Monitor gait and stability/Provide patient with walking aids/Provide visual cue: red socks, yellow wristband, yellow gown, etc/Reinforce activity limits and safety measures with patient and family/Bed in lowest position, wheels locked, appropriate side rails in place/Call bell, personal items and telephone in reach/Instruct patient to call for assistance before getting out of bed/chair/stretcher/Non-slip footwear applied when patient is off stretcher/[place] to call system/Physically safe environment - no spills, clutter or unnecessary equipment/Purposeful Proactive Rounding/Room/bathroom lighting operational, light cord in reach

## 2023-12-15 ENCOUNTER — APPOINTMENT (OUTPATIENT)
Dept: PLASTIC SURGERY | Facility: CLINIC | Age: 58
End: 2023-12-15
Payer: MEDICAID

## 2023-12-15 ENCOUNTER — APPOINTMENT (OUTPATIENT)
Dept: PHYSICAL MEDICINE AND REHAB | Facility: CLINIC | Age: 58
End: 2023-12-15
Payer: COMMERCIAL

## 2023-12-15 VITALS
WEIGHT: 113 LBS | OXYGEN SATURATION: 96 % | SYSTOLIC BLOOD PRESSURE: 123 MMHG | HEART RATE: 109 BPM | TEMPERATURE: 97.6 F | DIASTOLIC BLOOD PRESSURE: 86 MMHG | BODY MASS INDEX: 19.29 KG/M2 | HEIGHT: 64 IN

## 2023-12-15 PROCEDURE — 99024 POSTOP FOLLOW-UP VISIT: CPT

## 2023-12-15 PROCEDURE — 99214 OFFICE O/P EST MOD 30 MIN: CPT

## 2023-12-15 RX ORDER — OXYCODONE 5 MG/1
5 TABLET ORAL
Qty: 56 | Refills: 0 | Status: ACTIVE | COMMUNITY
Start: 2023-12-08 | End: 1900-01-01

## 2023-12-15 RX ORDER — AMOXICILLIN 250 MG/5ML
250 POWDER, FOR SUSPENSION ORAL
Qty: 3 | Refills: 0 | Status: ACTIVE | COMMUNITY
Start: 2023-12-15 | End: 1900-01-01

## 2023-12-15 RX ORDER — MUPIROCIN 20 MG/G
2 OINTMENT TOPICAL
Qty: 2 | Refills: 3 | Status: ACTIVE | COMMUNITY
Start: 2023-12-15 | End: 1900-01-01

## 2023-12-15 NOTE — HISTORY OF PRESENT ILLNESS
[FreeTextEntry1] : Ms. Dsouza is a 57 year old female diagnosed with squamous cell carcinoma of the oral cavity, status post right composite resection (segmental mandibulectomy with a partial right buccal soft tissue and floor of mouth excision), right neck dissection levels I-IV on 1/13/22 completing adjuvant chemoradiation with weekly cisplatin on 4/8/22. S/p plate removal with Dr. Evans on 10/31/22.  Now s/p mandibular reconstruction with right free osteocutaneous flap, anterior vestibuloplasty, split-thickness skin graft, and local tissue rearrangement of the neck DOS: 11/21/23.  She still is reporting significant pain in her right leg.  Reports the worst pain feels like burning in her leg.  She increased her gabapentin.  States it helped slightly.  She takes oxycodone.  Reports she is taking approximately 3 a day.  States this mildly helps with the pain.  Denies any oversedation or constipation.  She is having facial swelling.

## 2023-12-15 NOTE — ASSESSMENT
[FreeTextEntry1] : 58 year old female presenting for evaluation.  #Chronic pain after cancer treatment: -She reports significant exacerbation of pain worse in her right lower extremity.  After recent surgery.  We again had an extensive discussion regarding ultimate goal to wean opiate medications.  Given she still has extensive wound healing.  Continue oxycodone 5 mg every 6 hours as needed for severe pain.  She is in agreement to take as prescribed.  Her son is aware and is helping with management of medication.  She does know that we will be performing pill counts and she did bring in today and it was appropriate.  Denies any side effects or oversedation. -I Stop # [id number] -Patient has narcan at home, re-educated on use   #Neuropathic pain: -Increase gabapentin to 800mg TID  #Lympedema: -Likely exacerbation, consider MLD after wounds heal   Follow up in 2 weeks.

## 2023-12-15 NOTE — PHYSICAL EXAM
[FreeTextEntry1] : Gen: Patient is A&O x 3, NAD HEENT: EOMI, hearing grossly normal Resp: regular, non- labored CV: Pulses regular  Skin: Surgical sites healing in face and leg  Lymph: +Right facial edema  ROM: limited in jaw to pain Sensation: +Dysesthesia in right foot  Reflexes: 1+ and symmetric throughout Strength: 5/5 throughout Gait: normal

## 2023-12-16 NOTE — REASON FOR VISIT
[Post Op: _________] : a [unfilled] post op visit [FreeTextEntry1] : s/p mandibular reconstruction with right free osteocutaneous flap, anterior vestibuloplasty, split-thickness skin graft, and local tissue rearrangement of the neck DOS: 11/21/23.

## 2023-12-16 NOTE — PHYSICAL EXAM
[TextEntry] : Alert, oriented, cooperative  Normal, nonlabored breathing.  Face demonstrates a large 15.0cm x 8.0cm flap over the chin which demonstrates pallor, capillary refill and moderate edema. The incisions demonstrate a crusting, scab with purulent drainage upon palpation. Right lateral region of the flap demonstrates a mobile eschar that was debrided. Flap was bleeding and there was significant fibrinous material present.  Neck demonstrates a patent, tracheostomy with thick, mucus discharge.  Right lower extremity demonstrates edema, erythema, and pain to palpation. Skin graft is well adhered and healing appropriately.

## 2023-12-16 NOTE — HISTORY OF PRESENT ILLNESS
[FreeTextEntry1] : PARUL HERNANDEZ is a 58 year old female status post right fibula free flap on 11/21/23 presents for follow up. Patient reports the nurse was concerned. She said the flap looks more swollen and pale. There is crusting along the incisions and a large eschar on the right, lateral area of the skin paddle. Patient reports that nothing is bothering her in the head and neck area, rather the right lower leg is burning and painful.   Hx:   01/13/2022: Patient underwent resection of squamous cell carcinoma of the oral cavity, composite resection of mandible, and neck dissection with Dr. So with a left fibula free flap reconstruction.   04/08/2022: Completed RT  10/31/2022: Removal of mandibular hardware   07/25/2023: New onset of redness, drainage, and persistent, non-healing wounds of the mandible

## 2023-12-20 ENCOUNTER — APPOINTMENT (OUTPATIENT)
Age: 58
End: 2023-12-20
Payer: MEDICAID

## 2023-12-20 ENCOUNTER — APPOINTMENT (OUTPATIENT)
Dept: OTOLARYNGOLOGY | Facility: CLINIC | Age: 58
End: 2023-12-20

## 2023-12-20 PROCEDURE — 99214 OFFICE O/P EST MOD 30 MIN: CPT

## 2023-12-22 ENCOUNTER — APPOINTMENT (OUTPATIENT)
Dept: PLASTIC SURGERY | Facility: CLINIC | Age: 58
End: 2023-12-22
Payer: MEDICAID

## 2023-12-22 VITALS
DIASTOLIC BLOOD PRESSURE: 75 MMHG | OXYGEN SATURATION: 96 % | HEIGHT: 64 IN | HEART RATE: 103 BPM | SYSTOLIC BLOOD PRESSURE: 123 MMHG | BODY MASS INDEX: 19.29 KG/M2 | TEMPERATURE: 97.8 F | WEIGHT: 113 LBS

## 2023-12-22 PROCEDURE — 99024 POSTOP FOLLOW-UP VISIT: CPT

## 2023-12-26 NOTE — ASU PATIENT PROFILE, ADULT - NSICDXPASTSURGICALHX_GEN_ALL_CORE_FT
Discharge Note  Short Stay      SUMMARY     Admit Date: 12/26/2023    Attending Physician: Lopez Mendoza MD        Discharge Physician: Lopez Mendoza MD        Discharge Date: 12/26/2023 10:24 AM    Procedure(s) (LRB):  Bilateral Lumbar L5/S1 TF UVALDO with RN IV sedation- Insurance only will pay for 1 level (Bilateral)    Final Diagnosis: Lumbar radiculopathy [M54.16]  DDD (degenerative disc disease), lumbar [M51.36]  Weakness of right lower extremity [R29.898]    Disposition: Home or self care    Patient Instructions:   Current Discharge Medication List        CONTINUE these medications which have NOT CHANGED    Details   amLODIPine-benazepriL (LOTREL) 10-40 mg per capsule Take 1 capsule by mouth once daily.  Qty: 90 capsule, Refills: 3    Associated Diagnoses: Essential hypertension      metoprolol succinate (TOPROL-XL) 25 MG 24 hr tablet Take 1 tablet (25 mg total) by mouth once daily.  Qty: 90 tablet, Refills: 3    Associated Diagnoses: Essential hypertension      allopurinoL (ZYLOPRIM) 100 MG tablet TAKE ONE TABLET BY MOUTH ONCE DAILY  Qty: 90 tablet, Refills: 2    Comments: This prescription was filled on 5/15/2023. Any refills authorized will be placed on file.  Associated Diagnoses: Idiopathic chronic gout of right foot without tophus      fluticasone propionate (FLONASE) 50 mcg/actuation nasal spray 2 sprays (100 mcg total) by Each Nostril route once daily.  Qty: 16 g, Refills: 11      hydroCHLOROthiazide (HYDRODIURIL) 12.5 MG Tab Take 1 tablet (12.5 mg total) by mouth once daily.  Qty: 30 tablet, Refills: 11    Comments: This prescription was filled on 2/13/2023. Any refills authorized will be placed on file.  Associated Diagnoses: Parenchymal renal hypertension, stage 1-4 or unspecified chronic kidney disease      levocetirizine (XYZAL) 5 MG tablet Take 1 tablet (5 mg total) by mouth every evening.  Qty: 30 tablet, Refills: 11      potassium chloride (KLOR-CON) 10 MEQ TbSR Take 10 mEq by mouth every Mon,  Wed, Fri, Sun.      potassium chloride SA (K-DUR,KLOR-CON M) 10 MEQ tablet Take 1 tablet (10 mEq) BY MOUTH ONCE DAILY.  Qty: 90 tablet, Refills: 3    Associated Diagnoses: Other specified disorders of kidney and ureter      pravastatin (PRAVACHOL) 40 MG tablet Take 1 tablet (40 mg total) by mouth once daily.  Qty: 90 tablet, Refills: 3    Associated Diagnoses: Mixed hyperlipidemia      pregabalin (LYRICA) 225 MG Cap Take 1 capsule (225 mg total) by mouth 2 (two) times daily.  Qty: 60 capsule, Refills: 3      sodium bicarbonate 650 MG tablet Take 1 tablet (650 mg total) by mouth before breakfast. With food  Qty: 180 tablet, Refills: 1    Associated Diagnoses: Stage 3b chronic kidney disease; S/p nephrectomy; Metabolic acidosis      testosterone cypionate (DEPOTESTOTERONE CYPIONATE) 200 mg/mL injection INJECT ONE ML INTO MUSCLE EVERY 14 DAYS  Qty: 2 mL, Refills: 2    Associated Diagnoses: Testosterone deficiency      traZODone (DESYREL) 50 MG tablet Take 1 tablet (50 mg total) by mouth once daily.  Qty: 90 tablet, Refills: 3    Associated Diagnoses: Other insomnia      VITAMIN D2 1,250 mcg (50,000 unit) capsule TAKE ONE CAPSULE BY MOUTH ONCE WEEKLY  Qty: 12 capsule, Refills: 0    Associated Diagnoses: Vitamin D deficiency                 Discharge Diagnosis: Lumbar radiculopathy [M54.16]  DDD (degenerative disc disease), lumbar [M51.36]  Weakness of right lower extremity [R29.898]  Condition on Discharge: Stable with no complications to procedure   Diet on Discharge: Same as before.  Activity: as per instruction sheet.  Discharge to: Home with a responsible adult.  Follow up: 2-4 weeks       Please call the office at [phone] if you experience any weakness or loss of sensation, fever > 101.5, pain uncontrolled with oral medications, persistent nausea/vomiting/or diarrhea, redness or drainage from the incisions, or any other worrisome concerns. If physician on call was not reached or could not communicate with  our office for any reason please go to the nearest emergency department         PAST SURGICAL HISTORY:  H/O  section     H/O neck surgery partial right mandibulectomy with fibular free flap reconstriction and right neck lymphnode dissection 22    History of hip replacement left hip    History of partial hysterectomy

## 2023-12-28 NOTE — PHYSICAL THERAPY INITIAL EVALUATION ADULT - WEIGHT-BEARING RESTRICTIONS: STAND/SIT, REHAB EVAL
POD #1  Pt comfortable on PCA  VSS  No complications from GA. No prolonged block from TAP. Will continue to observe.   Jan Gee RLE in CAM BOOT/weight-bearing as tolerated

## 2023-12-29 ENCOUNTER — APPOINTMENT (OUTPATIENT)
Dept: PHYSICAL MEDICINE AND REHAB | Facility: CLINIC | Age: 58
End: 2023-12-29
Payer: COMMERCIAL

## 2023-12-29 VITALS
HEART RATE: 108 BPM | BODY MASS INDEX: 17.69 KG/M2 | SYSTOLIC BLOOD PRESSURE: 117 MMHG | RESPIRATION RATE: 14 BRPM | HEIGHT: 64 IN | WEIGHT: 103.62 LBS | DIASTOLIC BLOOD PRESSURE: 81 MMHG

## 2023-12-29 DIAGNOSIS — G89.3 NEOPLASM RELATED PAIN (ACUTE) (CHRONIC): ICD-10-CM

## 2023-12-29 PROCEDURE — 99214 OFFICE O/P EST MOD 30 MIN: CPT

## 2023-12-29 RX ORDER — GABAPENTIN 250 MG/5ML
250 SOLUTION ORAL
Qty: 1440 | Refills: 1 | Status: ACTIVE | COMMUNITY
Start: 2023-12-08 | End: 1900-01-01

## 2023-12-29 RX ORDER — OXYCODONE 5 MG/1
5 TABLET ORAL
Qty: 45 | Refills: 0 | Status: ACTIVE | COMMUNITY
Start: 2023-12-29 | End: 1900-01-01

## 2023-12-29 RX ORDER — DULOXETINE HYDROCHLORIDE 30 MG/1
30 CAPSULE, DELAYED RELEASE PELLETS ORAL
Qty: 30 | Refills: 2 | Status: ACTIVE | COMMUNITY
Start: 2023-12-29 | End: 1900-01-01

## 2023-12-29 NOTE — HISTORY OF PRESENT ILLNESS
[FreeTextEntry1] : Ms. Dsouza is a 57 year old female diagnosed with squamous cell carcinoma of the oral cavity, status post right composite resection (segmental mandibulectomy with a partial right buccal soft tissue and floor of mouth excision), right neck dissection levels I-IV on 1/13/22 completing adjuvant chemoradiation with weekly cisplatin on 4/8/22. S/p plate removal with Dr. Evans on 10/31/22.  Now s/p mandibular reconstruction with right free osteocutaneous flap, anterior vestibuloplasty, split-thickness skin graft, and local tissue rearrangement of the neck DOS: 11/21/23.  She reports overall her symptoms are improving.  She reports the burning pain in her leg is doing better although she does feel it.  She is able to ambulate.  She does feel like the pain in her jaws doing better too.  Swelling is decreasing.  She is reducing her oxycodone dose.  Taking sometimes 3 times a day.  Denies any major side effects or constipation.  Denies any oversedation.  Gabapentin is also helping no side effects.  Having bowel movements.

## 2023-12-29 NOTE — ASSESSMENT
[FreeTextEntry1] : 58 year old female presenting for evaluation.  #Chronic pain after cancer treatment: -Wean oxycodone to 5mg TID, again discussed goal is to wean, she has been taking as prescribed, no side effects  -I Stop # [id number] -Patient has narcan at home, re-educated on use   #Neuropathic pain: -Continue gabapentin 800mg TID -Start duloxetine 30mg daily-rx sent   #Lympedema: -Likely exacerbation, consider MLD after wounds heal  -Follow up with plastic surgery   Follow up in 2 weeks.

## 2024-01-08 ENCOUNTER — APPOINTMENT (OUTPATIENT)
Dept: PLASTIC SURGERY | Facility: CLINIC | Age: 59
End: 2024-01-08
Payer: COMMERCIAL

## 2024-01-08 ENCOUNTER — APPOINTMENT (OUTPATIENT)
Age: 59
End: 2024-01-08

## 2024-01-08 PROCEDURE — 99024 POSTOP FOLLOW-UP VISIT: CPT

## 2024-01-08 NOTE — HISTORY OF PRESENT ILLNESS
[FreeTextEntry1] : 58-year-old female status post composite resection of osteoradionecrosis with free fibular osteocutaneous flap reconstruction. Patient postoperative course complicated by right neck wound.  And partial full-thickness skin graft failure of the right leg.  Patient is currently receiving local wound care by nurse

## 2024-01-08 NOTE — REASON FOR VISIT
[Post Op: _________] : a [unfilled] post op visit [FreeTextEntry1] : s/p mandibular reconstruction with right free osteocutaneous flap, anterior vestibuloplasty, split-thickness skin graft, and local tissue rearrangement of the neck DOS: 11/21/23. Patient complains of bleeding, drainage and pain, denies having fever or chills.

## 2024-01-08 NOTE — PHYSICAL EXAM
[NI] : Normal [de-identified] : Right neck wound measuring 8 x 3 cm with exposed reconstruction plate and jaw with surrounding granulation tissue wet-to-dry dressing applied.  Right leg skin graft partial failure Xeroform and gauze applied

## 2024-01-12 ENCOUNTER — APPOINTMENT (OUTPATIENT)
Dept: PHYSICAL MEDICINE AND REHAB | Facility: CLINIC | Age: 59
End: 2024-01-12

## 2024-01-17 ENCOUNTER — EMERGENCY (EMERGENCY)
Facility: HOSPITAL | Age: 59
LOS: 1 days | Discharge: HOME | End: 2024-01-17
Attending: STUDENT IN AN ORGANIZED HEALTH CARE EDUCATION/TRAINING PROGRAM
Payer: COMMERCIAL

## 2024-01-17 VITALS
HEIGHT: 64 IN | DIASTOLIC BLOOD PRESSURE: 71 MMHG | TEMPERATURE: 98 F | OXYGEN SATURATION: 95 % | HEART RATE: 108 BPM | SYSTOLIC BLOOD PRESSURE: 90 MMHG | WEIGHT: 119.93 LBS | RESPIRATION RATE: 18 BRPM

## 2024-01-17 VITALS
TEMPERATURE: 98 F | OXYGEN SATURATION: 97 % | SYSTOLIC BLOOD PRESSURE: 90 MMHG | DIASTOLIC BLOOD PRESSURE: 66 MMHG | RESPIRATION RATE: 18 BRPM | HEART RATE: 92 BPM

## 2024-01-17 DIAGNOSIS — Z90.711 ACQUIRED ABSENCE OF UTERUS WITH REMAINING CERVICAL STUMP: Chronic | ICD-10-CM

## 2024-01-17 DIAGNOSIS — Z98.890 OTHER SPECIFIED POSTPROCEDURAL STATES: Chronic | ICD-10-CM

## 2024-01-17 DIAGNOSIS — Z96.649 PRESENCE OF UNSPECIFIED ARTIFICIAL HIP JOINT: Chronic | ICD-10-CM

## 2024-01-17 DIAGNOSIS — Z98.891 HISTORY OF UTERINE SCAR FROM PREVIOUS SURGERY: Chronic | ICD-10-CM

## 2024-01-17 LAB
ANION GAP SERPL CALC-SCNC: 14 MMOL/L — SIGNIFICANT CHANGE UP (ref 5–17)
APTT BLD: 35.4 SEC — SIGNIFICANT CHANGE UP (ref 24.5–35.6)
BASOPHILS # BLD AUTO: 0.07 K/UL — SIGNIFICANT CHANGE UP (ref 0–0.2)
BASOPHILS NFR BLD AUTO: 0.5 % — SIGNIFICANT CHANGE UP (ref 0–2)
BUN SERPL-MCNC: 21.9 MG/DL — HIGH (ref 8–20)
CALCIUM SERPL-MCNC: 8.9 MG/DL — SIGNIFICANT CHANGE UP (ref 8.4–10.5)
CHLORIDE SERPL-SCNC: 104 MMOL/L — SIGNIFICANT CHANGE UP (ref 96–108)
CO2 SERPL-SCNC: 22 MMOL/L — SIGNIFICANT CHANGE UP (ref 22–29)
CREAT SERPL-MCNC: 1.13 MG/DL — SIGNIFICANT CHANGE UP (ref 0.5–1.3)
EGFR: 56 ML/MIN/1.73M2 — LOW
EOSINOPHIL # BLD AUTO: 0.22 K/UL — SIGNIFICANT CHANGE UP (ref 0–0.5)
EOSINOPHIL NFR BLD AUTO: 1.5 % — SIGNIFICANT CHANGE UP (ref 0–6)
GLUCOSE SERPL-MCNC: 133 MG/DL — HIGH (ref 70–99)
HCT VFR BLD CALC: 32.6 % — LOW (ref 34.5–45)
HGB BLD-MCNC: 10.3 G/DL — LOW (ref 11.5–15.5)
IMM GRANULOCYTES NFR BLD AUTO: 0.4 % — SIGNIFICANT CHANGE UP (ref 0–0.9)
INR BLD: 1.07 RATIO — SIGNIFICANT CHANGE UP (ref 0.85–1.18)
LYMPHOCYTES # BLD AUTO: 0.63 K/UL — LOW (ref 1–3.3)
LYMPHOCYTES # BLD AUTO: 4.2 % — LOW (ref 13–44)
MCHC RBC-ENTMCNC: 29.7 PG — SIGNIFICANT CHANGE UP (ref 27–34)
MCHC RBC-ENTMCNC: 31.6 GM/DL — LOW (ref 32–36)
MCV RBC AUTO: 93.9 FL — SIGNIFICANT CHANGE UP (ref 80–100)
MONOCYTES # BLD AUTO: 1.07 K/UL — HIGH (ref 0–0.9)
MONOCYTES NFR BLD AUTO: 7.2 % — SIGNIFICANT CHANGE UP (ref 2–14)
NEUTROPHILS # BLD AUTO: 12.86 K/UL — HIGH (ref 1.8–7.4)
NEUTROPHILS NFR BLD AUTO: 86.2 % — HIGH (ref 43–77)
PLATELET # BLD AUTO: 281 K/UL — SIGNIFICANT CHANGE UP (ref 150–400)
POTASSIUM SERPL-MCNC: 3.6 MMOL/L — SIGNIFICANT CHANGE UP (ref 3.5–5.3)
POTASSIUM SERPL-SCNC: 3.6 MMOL/L — SIGNIFICANT CHANGE UP (ref 3.5–5.3)
PROTHROM AB SERPL-ACNC: 11.9 SEC — SIGNIFICANT CHANGE UP (ref 9.5–13)
RBC # BLD: 3.47 M/UL — LOW (ref 3.8–5.2)
RBC # FLD: 15.7 % — HIGH (ref 10.3–14.5)
SODIUM SERPL-SCNC: 140 MMOL/L — SIGNIFICANT CHANGE UP (ref 135–145)
WBC # BLD: 14.91 K/UL — HIGH (ref 3.8–10.5)
WBC # FLD AUTO: 14.91 K/UL — HIGH (ref 3.8–10.5)

## 2024-01-17 PROCEDURE — 85025 COMPLETE CBC W/AUTO DIFF WBC: CPT

## 2024-01-17 PROCEDURE — 99283 EMERGENCY DEPT VISIT LOW MDM: CPT

## 2024-01-17 PROCEDURE — 85610 PROTHROMBIN TIME: CPT

## 2024-01-17 PROCEDURE — 80048 BASIC METABOLIC PNL TOTAL CA: CPT

## 2024-01-17 PROCEDURE — 99285 EMERGENCY DEPT VISIT HI MDM: CPT

## 2024-01-17 PROCEDURE — 85730 THROMBOPLASTIN TIME PARTIAL: CPT

## 2024-01-17 PROCEDURE — 36415 COLL VENOUS BLD VENIPUNCTURE: CPT

## 2024-01-17 RX ORDER — SODIUM CHLORIDE 9 MG/ML
1000 INJECTION, SOLUTION INTRAVENOUS ONCE
Refills: 0 | Status: COMPLETED | OUTPATIENT
Start: 2024-01-17 | End: 2024-01-17

## 2024-01-17 RX ADMIN — SODIUM CHLORIDE 1000 MILLILITER(S): 9 INJECTION, SOLUTION INTRAVENOUS at 08:56

## 2024-01-17 NOTE — ED ADULT TRIAGE NOTE - CHIEF COMPLAINT QUOTE
pt reports bleeding from bone graft site @ jaw done in november. states woke up in " a lot of blood " denies blood thinner, lightheadedness, dizziness, fatigue

## 2024-01-17 NOTE — ED PROVIDER NOTE - PHYSICAL EXAMINATION
Constitutional: Awake, Alert, non-toxic. chronically ill appearing  HEAD: Normocephalic  EYES: PERRL, EOM intact, conjunctiva and sclera are clear bilaterally.  ENT: right mandibular area with exposed metal present, open wound with no active bleeding  NECK: Supple, non-tender  CARDIOVASCULAR: tachycardic rate and rhythm.  RESPIRATORY: Normal respiratory effort; breath sounds CTAB, no wheezes, rhonchi, or rales. Speaking in full sentences. No accessory muscle use.   ABDOMEN: Soft; non-tender, non-distended. No rebound or guarding.   MSK:  no lower extremity edema, no deformities  SKIN: Warm, dry  NEURO: A&O x3. Sensory and motor functions are grossly intact. Speech is normal. No facial droop.  PSYCH: Appearance and judgement seem appropriate for gender and age.

## 2024-01-17 NOTE — ED PROVIDER NOTE - PROGRESS NOTE DETAILS
spoke with dr. marley gutierrez from [facility], patient okay to follow up in clinic tomorrow. Hgb stable. Karsten Santiago MD.

## 2024-01-17 NOTE — ED PROVIDER NOTE - CLINICAL SUMMARY MEDICAL DECISION MAKING FREE TEXT BOX
Patient with bleeding from previous surgical site.  Appears to be controlled at this time with dressing placed by EMS.  Given her tachycardia and borderline hypotension, will check lab work to assess for anemia.  There is no active bleeding at this time.  Pending labs, will plan to call OMFS team from [facility] to determine neck step in management and possible need for imaging.

## 2024-01-17 NOTE — ED PROVIDER NOTE - PATIENT PORTAL LINK FT
You can access the FollowMyHealth Patient Portal offered by [facility] by registering at the following website: [email or web address]. By joining [facility]’s FollowMyHealth portal, you will also be able to view your health information using other applications (apps) compatible with our system.

## 2024-01-17 NOTE — ED PROVIDER NOTE - OBJECTIVE STATEMENT
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  Follows with Dr. Sanderson at [facility].  States that last night she went to bed in her normal state of health, and woke up this morning with blood coming from the wound as well as significant blood on her sheets.  Denies any pain or changes from yesterday till today.  Not on any anticoagulation.  Denies any chest pain, shortness of breath, lightheadedness or dizziness.

## 2024-01-18 ENCOUNTER — INPATIENT (INPATIENT)
Facility: HOSPITAL | Age: 59
LOS: 5 days | Discharge: HOME HEALTH | End: 2024-01-24
Attending: ORAL & MAXILLOFACIAL SURGERY | Admitting: ORAL & MAXILLOFACIAL SURGERY
Payer: COMMERCIAL

## 2024-01-18 ENCOUNTER — EMERGENCY (EMERGENCY)
Facility: HOSPITAL | Age: 59
LOS: 1 days | Discharge: OTHER INSTITUTION | End: 2024-01-18
Attending: STUDENT IN AN ORGANIZED HEALTH CARE EDUCATION/TRAINING PROGRAM
Payer: COMMERCIAL

## 2024-01-18 VITALS
OXYGEN SATURATION: 98 % | WEIGHT: 139.99 LBS | SYSTOLIC BLOOD PRESSURE: 103 MMHG | HEART RATE: 92 BPM | HEIGHT: 64 IN | TEMPERATURE: 98 F | DIASTOLIC BLOOD PRESSURE: 70 MMHG | RESPIRATION RATE: 16 BRPM

## 2024-01-18 VITALS
OXYGEN SATURATION: 95 % | HEART RATE: 95 BPM | DIASTOLIC BLOOD PRESSURE: 46 MMHG | HEIGHT: 64 IN | SYSTOLIC BLOOD PRESSURE: 98 MMHG | RESPIRATION RATE: 18 BRPM

## 2024-01-18 DIAGNOSIS — Z90.711 ACQUIRED ABSENCE OF UTERUS WITH REMAINING CERVICAL STUMP: Chronic | ICD-10-CM

## 2024-01-18 DIAGNOSIS — Z98.890 OTHER SPECIFIED POSTPROCEDURAL STATES: Chronic | ICD-10-CM

## 2024-01-18 DIAGNOSIS — Z96.649 PRESENCE OF UNSPECIFIED ARTIFICIAL HIP JOINT: Chronic | ICD-10-CM

## 2024-01-18 DIAGNOSIS — Z98.891 HISTORY OF UTERINE SCAR FROM PREVIOUS SURGERY: Chronic | ICD-10-CM

## 2024-01-18 DIAGNOSIS — D62 ACUTE POSTHEMORRHAGIC ANEMIA: ICD-10-CM

## 2024-01-18 DIAGNOSIS — T81.30XA DISRUPTION OF WOUND, UNSPECIFIED, INITIAL ENCOUNTER: ICD-10-CM

## 2024-01-18 LAB
ANION GAP SERPL CALC-SCNC: 16 MMOL/L — SIGNIFICANT CHANGE UP (ref 5–17)
BASOPHILS # BLD AUTO: 0.02 K/UL — SIGNIFICANT CHANGE UP (ref 0–0.2)
BASOPHILS # BLD AUTO: 0.02 K/UL — SIGNIFICANT CHANGE UP (ref 0–0.2)
BASOPHILS # BLD AUTO: 0.03 K/UL — SIGNIFICANT CHANGE UP (ref 0–0.2)
BASOPHILS NFR BLD AUTO: 0.2 % — SIGNIFICANT CHANGE UP (ref 0–2)
BASOPHILS NFR BLD AUTO: 0.2 % — SIGNIFICANT CHANGE UP (ref 0–2)
BASOPHILS NFR BLD AUTO: 0.3 % — SIGNIFICANT CHANGE UP (ref 0–2)
BLD GP AB SCN SERPL QL: NEGATIVE — SIGNIFICANT CHANGE UP
BUN SERPL-MCNC: 22.4 MG/DL — HIGH (ref 8–20)
CALCIUM SERPL-MCNC: 8.6 MG/DL — SIGNIFICANT CHANGE UP (ref 8.4–10.5)
CHLORIDE SERPL-SCNC: 101 MMOL/L — SIGNIFICANT CHANGE UP (ref 96–108)
CO2 SERPL-SCNC: 21 MMOL/L — LOW (ref 22–29)
CREAT SERPL-MCNC: 1.08 MG/DL — SIGNIFICANT CHANGE UP (ref 0.5–1.3)
EGFR: 59 ML/MIN/1.73M2 — LOW
EOSINOPHIL # BLD AUTO: 0.02 K/UL — SIGNIFICANT CHANGE UP (ref 0–0.5)
EOSINOPHIL # BLD AUTO: 0.04 K/UL — SIGNIFICANT CHANGE UP (ref 0–0.5)
EOSINOPHIL # BLD AUTO: 0.04 K/UL — SIGNIFICANT CHANGE UP (ref 0–0.5)
EOSINOPHIL NFR BLD AUTO: 0.2 % — SIGNIFICANT CHANGE UP (ref 0–6)
EOSINOPHIL NFR BLD AUTO: 0.4 % — SIGNIFICANT CHANGE UP (ref 0–6)
EOSINOPHIL NFR BLD AUTO: 0.5 % — SIGNIFICANT CHANGE UP (ref 0–6)
GLUCOSE SERPL-MCNC: 121 MG/DL — HIGH (ref 70–99)
HCT VFR BLD CALC: 18.2 % — CRITICAL LOW (ref 34.5–45)
HCT VFR BLD CALC: 21.8 % — LOW (ref 34.5–45)
HCT VFR BLD CALC: 23.7 % — LOW (ref 34.5–45)
HGB BLD-MCNC: 5.9 G/DL — CRITICAL LOW (ref 11.5–15.5)
HGB BLD-MCNC: 7.2 G/DL — LOW (ref 11.5–15.5)
HGB BLD-MCNC: 7.9 G/DL — LOW (ref 11.5–15.5)
IANC: 6.33 K/UL — SIGNIFICANT CHANGE UP (ref 1.8–7.4)
IANC: 6.89 K/UL — SIGNIFICANT CHANGE UP (ref 1.8–7.4)
IMM GRANULOCYTES NFR BLD AUTO: 0.4 % — SIGNIFICANT CHANGE UP (ref 0–0.9)
IMM GRANULOCYTES NFR BLD AUTO: 0.6 % — SIGNIFICANT CHANGE UP (ref 0–0.9)
IMM GRANULOCYTES NFR BLD AUTO: 0.7 % — SIGNIFICANT CHANGE UP (ref 0–0.9)
LYMPHOCYTES # BLD AUTO: 0.78 K/UL — LOW (ref 1–3.3)
LYMPHOCYTES # BLD AUTO: 0.88 K/UL — LOW (ref 1–3.3)
LYMPHOCYTES # BLD AUTO: 1.13 K/UL — SIGNIFICANT CHANGE UP (ref 1–3.3)
LYMPHOCYTES # BLD AUTO: 12.5 % — LOW (ref 13–44)
LYMPHOCYTES # BLD AUTO: 8.5 % — LOW (ref 13–44)
LYMPHOCYTES # BLD AUTO: 9.7 % — LOW (ref 13–44)
MCHC RBC-ENTMCNC: 29.9 PG — SIGNIFICANT CHANGE UP (ref 27–34)
MCHC RBC-ENTMCNC: 30.8 PG — SIGNIFICANT CHANGE UP (ref 27–34)
MCHC RBC-ENTMCNC: 31.1 PG — SIGNIFICANT CHANGE UP (ref 27–34)
MCHC RBC-ENTMCNC: 32.4 GM/DL — SIGNIFICANT CHANGE UP (ref 32–36)
MCHC RBC-ENTMCNC: 33 GM/DL — SIGNIFICANT CHANGE UP (ref 32–36)
MCHC RBC-ENTMCNC: 33.3 GM/DL — SIGNIFICANT CHANGE UP (ref 32–36)
MCV RBC AUTO: 92.4 FL — SIGNIFICANT CHANGE UP (ref 80–100)
MCV RBC AUTO: 93.2 FL — SIGNIFICANT CHANGE UP (ref 80–100)
MCV RBC AUTO: 93.3 FL — SIGNIFICANT CHANGE UP (ref 80–100)
MONOCYTES # BLD AUTO: 0.85 K/UL — SIGNIFICANT CHANGE UP (ref 0–0.9)
MONOCYTES # BLD AUTO: 0.87 K/UL — SIGNIFICANT CHANGE UP (ref 0–0.9)
MONOCYTES # BLD AUTO: 0.91 K/UL — HIGH (ref 0–0.9)
MONOCYTES NFR BLD AUTO: 10.5 % — SIGNIFICANT CHANGE UP (ref 2–14)
MONOCYTES NFR BLD AUTO: 8.8 % — SIGNIFICANT CHANGE UP (ref 2–14)
MONOCYTES NFR BLD AUTO: 9.6 % — SIGNIFICANT CHANGE UP (ref 2–14)
NEUTROPHILS # BLD AUTO: 6.33 K/UL — SIGNIFICANT CHANGE UP (ref 1.8–7.4)
NEUTROPHILS # BLD AUTO: 6.89 K/UL — SIGNIFICANT CHANGE UP (ref 1.8–7.4)
NEUTROPHILS # BLD AUTO: 8.49 K/UL — HIGH (ref 1.8–7.4)
NEUTROPHILS NFR BLD AUTO: 76.5 % — SIGNIFICANT CHANGE UP (ref 43–77)
NEUTROPHILS NFR BLD AUTO: 78.5 % — HIGH (ref 43–77)
NEUTROPHILS NFR BLD AUTO: 81.9 % — HIGH (ref 43–77)
NRBC # BLD: 0 /100 WBCS — SIGNIFICANT CHANGE UP (ref 0–0)
NRBC # BLD: 0 /100 WBCS — SIGNIFICANT CHANGE UP (ref 0–0)
NRBC # FLD: 0 K/UL — SIGNIFICANT CHANGE UP (ref 0–0)
NRBC # FLD: 0 K/UL — SIGNIFICANT CHANGE UP (ref 0–0)
PLATELET # BLD AUTO: 234 K/UL — SIGNIFICANT CHANGE UP (ref 150–400)
PLATELET # BLD AUTO: 284 K/UL — SIGNIFICANT CHANGE UP (ref 150–400)
PLATELET # BLD AUTO: 302 K/UL — SIGNIFICANT CHANGE UP (ref 150–400)
POTASSIUM SERPL-MCNC: 4.3 MMOL/L — SIGNIFICANT CHANGE UP (ref 3.5–5.3)
POTASSIUM SERPL-SCNC: 4.3 MMOL/L — SIGNIFICANT CHANGE UP (ref 3.5–5.3)
RBC # BLD: 1.97 M/UL — LOW (ref 3.8–5.2)
RBC # BLD: 2.34 M/UL — LOW (ref 3.8–5.2)
RBC # BLD: 2.54 M/UL — LOW (ref 3.8–5.2)
RBC # FLD: 15.7 % — HIGH (ref 10.3–14.5)
RBC # FLD: 15.7 % — HIGH (ref 10.3–14.5)
RBC # FLD: 15.8 % — HIGH (ref 10.3–14.5)
RH IG SCN BLD-IMP: POSITIVE — SIGNIFICANT CHANGE UP
SODIUM SERPL-SCNC: 137 MMOL/L — SIGNIFICANT CHANGE UP (ref 135–145)
WBC # BLD: 10.36 K/UL — SIGNIFICANT CHANGE UP (ref 3.8–10.5)
WBC # BLD: 8.07 K/UL — SIGNIFICANT CHANGE UP (ref 3.8–10.5)
WBC # BLD: 9.02 K/UL — SIGNIFICANT CHANGE UP (ref 3.8–10.5)
WBC # FLD AUTO: 10.36 K/UL — SIGNIFICANT CHANGE UP (ref 3.8–10.5)
WBC # FLD AUTO: 8.07 K/UL — SIGNIFICANT CHANGE UP (ref 3.8–10.5)
WBC # FLD AUTO: 9.02 K/UL — SIGNIFICANT CHANGE UP (ref 3.8–10.5)

## 2024-01-18 PROCEDURE — 86901 BLOOD TYPING SEROLOGIC RH(D): CPT

## 2024-01-18 PROCEDURE — 86850 RBC ANTIBODY SCREEN: CPT

## 2024-01-18 PROCEDURE — 80048 BASIC METABOLIC PNL TOTAL CA: CPT

## 2024-01-18 PROCEDURE — 99285 EMERGENCY DEPT VISIT HI MDM: CPT

## 2024-01-18 PROCEDURE — 85025 COMPLETE CBC W/AUTO DIFF WBC: CPT

## 2024-01-18 PROCEDURE — 36415 COLL VENOUS BLD VENIPUNCTURE: CPT

## 2024-01-18 PROCEDURE — 96374 THER/PROPH/DIAG INJ IV PUSH: CPT

## 2024-01-18 PROCEDURE — 86900 BLOOD TYPING SEROLOGIC ABO: CPT

## 2024-01-18 PROCEDURE — 99282 EMERGENCY DEPT VISIT SF MDM: CPT

## 2024-01-18 PROCEDURE — 99285 EMERGENCY DEPT VISIT HI MDM: CPT | Mod: 25

## 2024-01-18 PROCEDURE — 70498 CT ANGIOGRAPHY NECK: CPT | Mod: 26,MA

## 2024-01-18 RX ORDER — ACETAMINOPHEN 500 MG
1000 TABLET ORAL ONCE
Refills: 0 | Status: COMPLETED | OUTPATIENT
Start: 2024-01-18 | End: 2024-01-18

## 2024-01-18 RX ORDER — SODIUM CHLORIDE 9 MG/ML
1000 INJECTION, SOLUTION INTRAVENOUS
Refills: 0 | Status: DISCONTINUED | OUTPATIENT
Start: 2024-01-18 | End: 2024-01-24

## 2024-01-18 RX ADMIN — Medication 400 MILLIGRAM(S): at 22:24

## 2024-01-18 RX ADMIN — Medication 1000 MILLIGRAM(S): at 23:00

## 2024-01-18 RX ADMIN — Medication 400 MILLIGRAM(S): at 14:37

## 2024-01-18 NOTE — ED PROVIDER NOTE - OBJECTIVE STATEMENT
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  Patient seen at [facility] and transferred to [facility] to be evaluated by Dr. Sanderson/team. 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  Patient seen at [facility] and transferred to [facility] to be evaluated by Dr. Sanderson/OMFS team. Patient denies CP, SOB, palp, dysphagia.

## 2024-01-18 NOTE — H&P ADULT - REASON FOR ADMISSION
wound dehiscence and erosion with exposed hardware. Recent hx of hemorrhaging from wound, transferred from [place] to [facility]

## 2024-01-18 NOTE — H&P ADULT - HISTORY OF PRESENT ILLNESS
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 Since d/c from the hospital on 12/4/23, post-op course c/b some dehiscence on the mandible and low grade infection on RLE, for which transcutaneous mandibular dehiscence area was cleaned w/ saline, packed w/ Kerlix, protected w/ gauze on 12/20/2023 in the outpatient clinic.     Pt presents today to [facility] ED as a transfer from [facility] ED w/ cc of hemorrhaging from the wound, wound dehiscence and erosion w/ exposed hardware. Pt otherwise denies recent hx of f/c/n/v, SOB, CP, changes in pain, trismus, dysphagia.

## 2024-01-18 NOTE — ED PROVIDER NOTE - PHYSICAL EXAMINATION
Gen: Well appearing in NAD  Head: R jaw w/ exposed graft at  the TMJ, clotted blood, no active bleeding.   Neck: trachea midline  Card: regular rate and rhythm  Resp:  CTAB  Abd: soft, non-distended, non-tender  Ext: no deformities above reported baseline  Neuro:  A&O, no motor or sensory deficits above reported baseline  Skin:  Warm and dry as visualized  Psych:  Normal affect and mood

## 2024-01-18 NOTE — H&P ADULT - ASSESSMENT
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 Post-op course c/b some dehiscence on the right mandible and low grade infection on RLE. Today, pt prsents to [facility] ED as a transfer from [facility] ED w/ cc of hemorrhaging from the wound, wound dehiscence and erosion w/ exposed hardware. Dehiscence site w/ no active bleeding, no purulent drainage, pt not in acute distress.     Mandibular dehiscence flap site packed w/ xeroform gauze, secured w/ Bae pressure dressing wrapped around the head. CTA neck findings significant for a subtle hyperdensity associated with a branch of the ascending palatine artery, may represent a subtle site of active bleeding. 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 Post-op course c/b some dehiscence on the right mandible and low grade infection on RLE. Today, pt prsents to [facility] ED as a transfer from [facility] ED w/ cc of hemorrhaging from the wound, wound dehiscence and erosion w/ exposed hardware. Dehiscence site w/ no active bleeding, no purulent drainage, pt not in acute distress.     Mandibular dehiscence flap site packed w/ xeroform gauze, secured w/ Bae pressure dressing wrapped around the head. CTA neck findings significant for a subtle hyperdensity associated with a branch of the ascending palatine artery, may represent a subtle site of active bleeding. Recent H/H 5.9/18.2,     Plan:  - 2x pRBC in the ED  - Maintain pressure dressing (Bae dressing) around the pt's face overnight  - Admit to OMFS  - CBC, BMP MUST BE DRAWN AT 4AM  - NPO  - LR 70cc/hr  - No Antibiotics  - No Lovenox      Traci Blair  Oral and Maxillofacial Surgery  #[id number]  Available on Teams

## 2024-01-18 NOTE — ED ADULT NURSE NOTE - NSFALLUNIVINTERV_ED_ALL_ED
Bed/Stretcher in lowest position, wheels locked, appropriate side rails in place/Call bell, personal items and telephone in reach/Instruct patient to call for assistance before getting out of bed/chair/stretcher/Non-slip footwear applied when patient is off stretcher/[place] to call system/Physically safe environment - no spills, clutter or unnecessary equipment/Purposeful proactive rounding/Room/bathroom lighting operational, light cord in reach

## 2024-01-18 NOTE — ED PROVIDER NOTE - CLINICAL SUMMARY MEDICAL DECISION MAKING FREE TEXT BOX
57 y/o female pmh osteoradionecrosis and squamous cell carcinoma of mandible s/p mandibulectomy c/o bleeding from surgical site. Pt was transferred from [facility] to be seen by ENT and OMFS at [facility]. 57 y/o female pmh osteoradionecrosis and squamous cell carcinoma of mandible s/p mandibulectomy c/o bleeding from surgical site. Pt was transferred from [facility] to be seen by ENT and OMFS at [facility].  Relevant EMR reviewed.

## 2024-01-18 NOTE — ED ADULT NURSE NOTE - OBJECTIVE STATEMENT
alert and oriented x4. pt presenting to the ED complaining of wound dehiscence. Pt reports having surgery on her jaw to remove cancer . pt states the wound opened up last night while sleeping and then again this morning. Bleeding controlled at this time ace bandage in place. ENT at bedside to assess pt. 20g IV placed in LAC labs drawn and sent.  Denies chest pain, shortness of breath, weakness, n/v/d, fever, chills, adbominal pain. RR even/unlabored.

## 2024-01-18 NOTE — ED ADULT TRIAGE NOTE - CHIEF COMPLAINT QUOTE
Pt transfer from [facility] c/o bleeding to R mandibulectomy site x 2 days. Pt c/o pain to site. Airway patent. PHx squamous cell carcinoma with mets to neck, former trach

## 2024-01-18 NOTE — ED PROVIDER NOTE - ATTENDING CONTRIBUTION TO CARE
Pt states that she had a surgery to her R mandible 2 mos ago at [facility].  Pt has been healing well.  Pt states that yesterday she was here as she woke up in a pool of blood and came here. the wound was dressed and labs were checked andpatient was sent home with instructions to f/up in clinic today. Pt had another episode of bleeding this morning so returned to the ED. no other complaints.    physical - rrr. ctab. abd - soft, nt. no edema. no rash. R lateral jaw with open wound with bone and metal visible.  no active bleeding. airway patent.    plan - labs reviewed and patient with 2 g drop in hgb.  case d/w ENT PA Sharmin Mcintyre who evaluated patient in ED and was able to talk to Dr. Rowe from [facility] who recommended xfer back to [facility] for definitive management. transfer arranged for.

## 2024-01-18 NOTE — ED ADULT NURSE NOTE - EXTENSIONS OF SELF_ADULT
Subjective





- Date & Time of Evaluation


Date of Evaluation: 03/03/18


Time of Evaluation: 07:33





- Subjective


Subjective: 





s/o no f/c, n/v/d. no other complaints.calm and cooperative. no abd pain, 


a/p cont plan, cont placement, cont all medical management, cont psych meds


remains unable to make medical decisians for self. pending state disability/

guardianship/placement








Objective





- Vital Signs/Intake and Output


Vital Signs (last 24 hours): 


 











Temp Pulse Resp BP Pulse Ox


 


 97.7 F   70   20   104/71   99 


 


 03/03/18 07:30  03/03/18 07:30  03/03/18 07:30  03/03/18 07:30  03/03/18 07:30











- Medications


Medications: 


 Current Medications





Atorvastatin Calcium (Lipitor)  20 mg PO HS [facility]


   Last Admin: 03/02/18 21:26 Dose:  20 mg


Divalproex Sodium (Depakote Dr(*Bid*))  500 mg PO BID [facility]


   Last Admin: 03/02/18 17:19 Dose:  500 mg


Enalapril Maleate (Vasotec)  10 mg PO DAILY [facility]


   Last Admin: 03/02/18 08:30 Dose:  10 mg


Fenofibrate (Tricor)  145 mg PO DAILY [facility]


   Last Admin: 03/02/18 08:31 Dose:  145 mg


Metoprolol Tartrate (Lopressor)  50 mg PO Q12 [facility]


   Last Admin: 03/02/18 21:24 Dose:  50 mg


Quetiapine Fumarate (Seroquel)  50 mg PO Q12 [facility]


   Last Admin: 03/02/18 21:26 Dose:  50 mg











- Labs


Labs: 


 





 12/19/17 05:45 





 12/19/17 05:45 





 











PT  11.2 SECONDS (9.4-12.0)   12/14/15  05:20    


 


INR  1.05  (0.89-1.20)   12/14/15  05:20    


 


APTT  36.2 SECONDS (23.1-32.0)  H  12/14/15  05:20    














- Constitutional


Appears: Well, Non-toxic, No Acute Distress





- Head Exam


Head Exam: ATRAUMATIC, NORMAL INSPECTION, NORMOCEPHALIC





- Eye Exam


Eye Exam: EOMI, Normal appearance, PERRL


Pupil Exam: NORMAL ACCOMODATION, PERRL





- ENT Exam


ENT Exam: Mucous Membranes Moist, Normal Exam





- Neck Exam


Neck Exam: Full ROM, Normal Inspection.  absent: Lymphadenopathy





- Respiratory Exam


Respiratory Exam: Clear to Ausculation Bilateral, NORMAL BREATHING PATTERN





- Cardiovascular Exam


Cardiovascular Exam: REGULAR RHYTHM, RRR, +S1, +S2.  absent: Murmur





- GI/Abdominal Exam


GI & Abdominal Exam: Soft, Normal Bowel Sounds.  absent: Tenderness





- Extremities Exam


Extremities Exam: Full ROM, Normal Capillary Refill, Normal Inspection.  absent

: Joint Swelling, Pedal Edema





- Back Exam


Back Exam: NORMAL INSPECTION





- Neurological Exam


Neurological Exam: Alert, Awake, CN II-XII Intact, Normal Gait, Oriented x3





- Psychiatric Exam


Psychiatric exam: Normal Affect, Normal Mood





- Skin


Skin Exam: Dry, Intact, Normal Color, Warm





Assessment and Plan


(1) DVT prophylaxis


Status: Chronic   





(2) Scrotal edema


Status: Chronic   





(3) CAD (coronary artery disease)


Status: Chronic   





(4) Smoking


Status: Chronic   





(5) Low back pain


Status: Chronic   





(6) Prediabetes


Status: Chronic   





(7) Neurocognitive disorder


Status: Chronic None

## 2024-01-18 NOTE — H&P ADULT - NSHPLABSRESULTS_GEN_ALL_CORE
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    TECHNIQUE:  CT images were acquired through the neck and head during the arterial phase.  Intravenous contrast: 88 cc of Omnipaque-350 mg/ml were administered; 12 cc were discarded.  Three-dimensional MIP reformats were generated.    COMPARISON STUDY: CT soft tissue neck 7/29/2023    FINDINGS:  CT ANGIOGRAPHY NECK:  Thoracic aorta and branch vessels: Calcifications of the aortic arch without flow-limiting stenosis. No evidence of dissection.  Right carotid system: No hemodynamically significant stenosis using NASCET criteria. No evidence of dissection.  Left carotid system: No hemodynamically significant stenosis using NASCET criteria. No evidence of dissection.  Vertebral arteries: No flow-limiting stenosis. No evidence of dissection.  Soft tissues of the neck: Status post left thyroidectomy. Right subcentimeter thyroid hypodensities. Status post total mandibulectomy with mandibular reconstruction and fixation hardware which is intact without any periprosthetic lucency. There is a soft tissue defect adjacent to the lateral aspect of the right mandible with an exposed portion of the right mandibular condyle as well as fixation hardware. There is mild soft tissue edema surrounding the in the right neck. No significant lymphadenopathy. No drainable fluid collections. There is a subtle hyperdensity adjacent to the inferior aspect of the right mandibular ramus ([id number]) which appears to arise from the ascending palatine artery although complete evaluation is limited on this single phase exam. Status post right submandibular resection.  Visualized spine: Degenerative changes.  Visualized upper chest: Unremarkable.    CT ANGIOGRAPHY BRAIN:  Internal carotid arteries: Patent bilaterally. No flow limiting stenosis.  Anterior cerebral arteries: Patent bilaterally without flow limiting stenosis.  Middle cerebral arteries: Patent bilaterally without flow limiting stenosis.    Posterior cerebral arteries: Patent bilaterally without stenosis.  Vertebrobasilar: Patent without stenosis.  Dural venous sinuses: Grossly patent.    IMPRESSION:  CTA Neck:  1. Status post total mandibulectomy with mandibular reconstruction.  2. Evaluation for active bleeding is significantly limited on this single phase exam however there is a subtle hyperdensity adjacent to the inferior aspect of the right mandibular ramus which appears to arise from a branch of the ascending palatine artery and may represent a subtle site of active bleeding.  3. Soft tissue defect about the lateral right mandible with midportion of the inferior right mandibular ramus and a portion of the mandibular fixation hardware exposed.    CTA Head: No large vessel occlusion about the [place].    Dr. Hess discussed urgent findings with Dr. Carver on 1/18/2024 9:04 PM with read back confirmation.    --- End of Report ---          MICHELLE HESS MD; Resident Radiologist  This document has been electronically signed.  CONY SALGADO MD; Attending Radiologist  This document has been electronically signed. Jan 18 2024 9:24PM

## 2024-01-18 NOTE — H&P ADULT - NSHPPHYSICALEXAM_GEN_ALL_CORE
Physical Exam  Gen: AAOX3, NAD,   H: Surgical site on the right sided transcutaneous dehiscence in the mandibular border, no active bleeding, no purulent drainage, minimal tenderness to palpation.  E: PERRL, EOMI b/l  E: no otorrhea, hearing at baseline  N: nares patent b/l, no rhinorrhea  Maxillofacial: introral surgical sites intact, no erythema, no purulent drainage, no tenderness on palpation, soft, OMER~35mm  Neck: Flat, supple, no lymphadenopathy, trachea midline, no masses  Lymphatic: No lymphadenopathy  Resp: breathing easily, no stridor  CV: no peripheral edema/cyanosis  GI: nondistended   Neuro: CN7 intact b/l, no facial droop

## 2024-01-18 NOTE — CONSULT NOTE ADULT - ASSESSMENT
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 Post-op course c/b some dehiscence on the right mandible and low grade infection on RLE. Today, pt prsents to [facility] ED as a transfer from [facility] ED w/ cc of hemorrhaging from the wound, wound dehiscence and erosion w/ exposed hardware. Dehiscence site w/ no active bleeding, no purulent drainage, pt not in acute distress.     Mandibular dehiscence flap site packed w/ xeroform gauze, secured w/ Bae pressure dressing wrapped around the head.     Plan:  - CTA neck  - Maintain pressure dressing (Bae dressing) around the pt's face overnight  - CDU observation overnight, will re-evaluate AM  - CBC  - NPO  - IV fluid  - No Antibiotics  - multimodal pain control PRN  - Bolus       Traci Blair  Oral and Maxillofacial Surgery  #[id number]  Available on Teams

## 2024-01-18 NOTE — ED PROVIDER NOTE - CLINICAL SUMMARY MEDICAL DECISION MAKING FREE TEXT BOX
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  Follows with Dr. Sanderson at [facility]. Labs and ENT consult.

## 2024-01-18 NOTE — CONSULT NOTE ADULT - SUBJECTIVE AND OBJECTIVE BOX
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  Follows with Dr. Sanderson at [facility]. Was here yesterday for the same and was DC home. Home nurse redressed wound today and sent her back to ED for recurrent bleeding. She is otherwise without complaint      PAST MEDICAL & SURGICAL HISTORY:  H/O malignant neoplasm of gum      Hypertension      Hyperlipidemia      Mild asthma      Malignant neoplasm of mandible      H/O  section      History of hip replacement  left hip      History of partial hysterectomy      H/O neck surgery  partial right mandibulectomy with fibular free flap reconstriction and right neck lymphnode dissection 22      History of vascular access device        Allergies    morphine (Hives)    Intolerances      MEDICATIONS  (STANDING):    MEDICATIONS  (PRN):      Social History: Former smoker     Family history:   Family history of CVA (Mother)    FH: thyroid disease (Mother)        ROS:   ENT: all negative except as noted in HPI   Skin: No itching, dryness, rash, changes to hair, or skin masses  CV: denies palpitations  Pulm: denies SOB, cough, hemoptysis  GI: denies change in appetite, indigestion, n/v  : denies pertinent urinary symptoms, urgency  Neuro: denies numbness/tingling, loss of sensation  Psych: denies anxiety  MS: denies muscle weakness, instability  Heme: denies easy bruising or bleeding  Endo: denies heat/cold intolerance, excessive sweating  Vascular: denies LE edema    Vital Signs Last 24 Hrs  T(C): 36.7 (2024 11:42), Max: 36.7 (2024 11:42)  T(F): 98 (2024 11:42), Max: 98 (2024 11:42)  HR: 92 (2024 11:42) (92 - 92)  BP: 103/70 (2024 11:42) (103/70 - 103/70)  BP(mean): --  RR: 16 (2024 11:42) (16 - 16)  SpO2: 98% (2024 11:42) (98% - 98%)    Parameters below as of 2024 11:42  Patient On (Oxygen Delivery Method): room air                              7.9    10.36 )-----------( 284      ( 2024 12:05 )             23.7    01-18    137  |  101  |  22.4<H>  ----------------------------<  121<H>  4.3   |  21.0<L>  |  1.08    Ca    8.6      2024 12:05     PT/INR - ( 2024 09:08 )   PT: 11.9 sec;   INR: 1.07 ratio         PTT - ( 2024 09:08 )  PTT:35.4 sec    PHYSICAL EXAM:  Gen: NAD  Skin: No rashes, bruises, or lesions  Head: Normocephalic, Atraumatic  Face: s/p left manibulectomy with fibular flap dehiscence with exposed hardware, no active bleeding   Eyes: no scleral injection  Nose: Nares bilaterally patent, no discharge  Mouth: No Stridor / Drooling / Trismus   Neck: Flat, supple, no lymphadenopathy, trachea midline, no masses, well-healed stoma site   Lymphatic: No lymphadenopathy  Resp: breathing easily, no stridor  CV: no peripheral edema/cyanosis  GI: nondistended   Peripheral vascular: no JVD or edema  Neuro: facial nerve intact, no facial droop       no

## 2024-01-18 NOTE — CONSULT NOTE ADULT - ASSESSMENT
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  Follows with Dr. Sanderson at [facility]. Was here yesterday for the same and was DC home. Home nurse redressed wound today and sent her back to ED for recurrent bleeding. Currently stable no bleeding +exposed hardware

## 2024-01-18 NOTE — ED PROVIDER NOTE - OBJECTIVE STATEMENT
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  Follows with Dr. Sanderson at [facility]. Was here yesterday for the same and was DC home. Home nurse redressed wound today and sent her back to ED for recurrent bleeding. She is otherwise without complaint.

## 2024-01-18 NOTE — ED ADULT TRIAGE NOTE - CHIEF COMPLAINT QUOTE
Patient BIBEMS for bleeding from bone graft incision site on her jaw. Patient's bandage changed on arrival.

## 2024-01-18 NOTE — ED PROVIDER NOTE - PHYSICAL EXAMINATION
No active bleeding from surgical wound No active bleeding from surgical wound    Attending/Justen: NAD; PERRL/EOMI, non-icterus, slightly pale, bandage bleeding, supple, no NORMA, no JVD, RRR, CTAB; Abd-soft, NT/ND, no HSM; no LE edema, A&Ox3, nonfocal; Skin-warm/dry

## 2024-01-19 ENCOUNTER — TRANSCRIPTION ENCOUNTER (OUTPATIENT)
Age: 59
End: 2024-01-19

## 2024-01-19 LAB
ANION GAP SERPL CALC-SCNC: 13 MMOL/L — SIGNIFICANT CHANGE UP (ref 7–14)
ANION GAP SERPL CALC-SCNC: 17 MMOL/L — HIGH (ref 7–14)
BASOPHILS # BLD AUTO: 0.02 K/UL — SIGNIFICANT CHANGE UP (ref 0–0.2)
BASOPHILS # BLD AUTO: 0.02 K/UL — SIGNIFICANT CHANGE UP (ref 0–0.2)
BASOPHILS NFR BLD AUTO: 0.2 % — SIGNIFICANT CHANGE UP (ref 0–2)
BASOPHILS NFR BLD AUTO: 0.2 % — SIGNIFICANT CHANGE UP (ref 0–2)
BLD GP AB SCN SERPL QL: NEGATIVE — SIGNIFICANT CHANGE UP
BUN SERPL-MCNC: 22 MG/DL — SIGNIFICANT CHANGE UP (ref 7–23)
BUN SERPL-MCNC: 23 MG/DL — SIGNIFICANT CHANGE UP (ref 7–23)
CALCIUM SERPL-MCNC: 8.3 MG/DL — LOW (ref 8.4–10.5)
CALCIUM SERPL-MCNC: 8.3 MG/DL — LOW (ref 8.4–10.5)
CHLORIDE SERPL-SCNC: 101 MMOL/L — SIGNIFICANT CHANGE UP (ref 98–107)
CHLORIDE SERPL-SCNC: 102 MMOL/L — SIGNIFICANT CHANGE UP (ref 98–107)
CO2 SERPL-SCNC: 18 MMOL/L — LOW (ref 22–31)
CO2 SERPL-SCNC: 21 MMOL/L — LOW (ref 22–31)
CREAT SERPL-MCNC: 0.93 MG/DL — SIGNIFICANT CHANGE UP (ref 0.5–1.3)
CREAT SERPL-MCNC: 0.98 MG/DL — SIGNIFICANT CHANGE UP (ref 0.5–1.3)
EGFR: 66 ML/MIN/1.73M2 — SIGNIFICANT CHANGE UP
EGFR: 71 ML/MIN/1.73M2 — SIGNIFICANT CHANGE UP
EOSINOPHIL # BLD AUTO: 0.03 K/UL — SIGNIFICANT CHANGE UP (ref 0–0.5)
EOSINOPHIL # BLD AUTO: 0.03 K/UL — SIGNIFICANT CHANGE UP (ref 0–0.5)
EOSINOPHIL NFR BLD AUTO: 0.3 % — SIGNIFICANT CHANGE UP (ref 0–6)
EOSINOPHIL NFR BLD AUTO: 0.3 % — SIGNIFICANT CHANGE UP (ref 0–6)
GLUCOSE SERPL-MCNC: 77 MG/DL — SIGNIFICANT CHANGE UP (ref 70–99)
GLUCOSE SERPL-MCNC: 79 MG/DL — SIGNIFICANT CHANGE UP (ref 70–99)
HCT VFR BLD CALC: 26 % — LOW (ref 34.5–45)
HCT VFR BLD CALC: 28.6 % — LOW (ref 34.5–45)
HGB BLD-MCNC: 8.3 G/DL — LOW (ref 11.5–15.5)
HGB BLD-MCNC: 9.4 G/DL — LOW (ref 11.5–15.5)
IANC: 8.72 K/UL — HIGH (ref 1.8–7.4)
IANC: 9.73 K/UL — HIGH (ref 1.8–7.4)
IMM GRANULOCYTES NFR BLD AUTO: 0.4 % — SIGNIFICANT CHANGE UP (ref 0–0.9)
IMM GRANULOCYTES NFR BLD AUTO: 0.4 % — SIGNIFICANT CHANGE UP (ref 0–0.9)
LYMPHOCYTES # BLD AUTO: 0.52 K/UL — LOW (ref 1–3.3)
LYMPHOCYTES # BLD AUTO: 0.55 K/UL — LOW (ref 1–3.3)
LYMPHOCYTES # BLD AUTO: 4.6 % — LOW (ref 13–44)
LYMPHOCYTES # BLD AUTO: 5.5 % — LOW (ref 13–44)
MAGNESIUM SERPL-MCNC: 1.8 MG/DL — SIGNIFICANT CHANGE UP (ref 1.6–2.6)
MAGNESIUM SERPL-MCNC: 1.8 MG/DL — SIGNIFICANT CHANGE UP (ref 1.6–2.6)
MCHC RBC-ENTMCNC: 28.7 PG — SIGNIFICANT CHANGE UP (ref 27–34)
MCHC RBC-ENTMCNC: 29 PG — SIGNIFICANT CHANGE UP (ref 27–34)
MCHC RBC-ENTMCNC: 31.9 GM/DL — LOW (ref 32–36)
MCHC RBC-ENTMCNC: 32.9 GM/DL — SIGNIFICANT CHANGE UP (ref 32–36)
MCV RBC AUTO: 88.3 FL — SIGNIFICANT CHANGE UP (ref 80–100)
MCV RBC AUTO: 90 FL — SIGNIFICANT CHANGE UP (ref 80–100)
MONOCYTES # BLD AUTO: 0.72 K/UL — SIGNIFICANT CHANGE UP (ref 0–0.9)
MONOCYTES # BLD AUTO: 0.94 K/UL — HIGH (ref 0–0.9)
MONOCYTES NFR BLD AUTO: 7.1 % — SIGNIFICANT CHANGE UP (ref 2–14)
MONOCYTES NFR BLD AUTO: 8.3 % — SIGNIFICANT CHANGE UP (ref 2–14)
NEUTROPHILS # BLD AUTO: 8.72 K/UL — HIGH (ref 1.8–7.4)
NEUTROPHILS # BLD AUTO: 9.73 K/UL — HIGH (ref 1.8–7.4)
NEUTROPHILS NFR BLD AUTO: 86.2 % — HIGH (ref 43–77)
NEUTROPHILS NFR BLD AUTO: 86.5 % — HIGH (ref 43–77)
NRBC # BLD: 0 /100 WBCS — SIGNIFICANT CHANGE UP (ref 0–0)
NRBC # BLD: 0 /100 WBCS — SIGNIFICANT CHANGE UP (ref 0–0)
NRBC # FLD: 0 K/UL — SIGNIFICANT CHANGE UP (ref 0–0)
NRBC # FLD: 0 K/UL — SIGNIFICANT CHANGE UP (ref 0–0)
PHOSPHATE SERPL-MCNC: 3.4 MG/DL — SIGNIFICANT CHANGE UP (ref 2.5–4.5)
PHOSPHATE SERPL-MCNC: 3.6 MG/DL — SIGNIFICANT CHANGE UP (ref 2.5–4.5)
PLATELET # BLD AUTO: 233 K/UL — SIGNIFICANT CHANGE UP (ref 150–400)
PLATELET # BLD AUTO: 273 K/UL — SIGNIFICANT CHANGE UP (ref 150–400)
POTASSIUM SERPL-MCNC: 4.2 MMOL/L — SIGNIFICANT CHANGE UP (ref 3.5–5.3)
POTASSIUM SERPL-MCNC: 4.6 MMOL/L — SIGNIFICANT CHANGE UP (ref 3.5–5.3)
POTASSIUM SERPL-SCNC: 4.2 MMOL/L — SIGNIFICANT CHANGE UP (ref 3.5–5.3)
POTASSIUM SERPL-SCNC: 4.6 MMOL/L — SIGNIFICANT CHANGE UP (ref 3.5–5.3)
RBC # BLD: 2.89 M/UL — LOW (ref 3.8–5.2)
RBC # BLD: 3.24 M/UL — LOW (ref 3.8–5.2)
RBC # FLD: 17.3 % — HIGH (ref 10.3–14.5)
RBC # FLD: 18.3 % — HIGH (ref 10.3–14.5)
RH IG SCN BLD-IMP: POSITIVE — SIGNIFICANT CHANGE UP
SODIUM SERPL-SCNC: 136 MMOL/L — SIGNIFICANT CHANGE UP (ref 135–145)
SODIUM SERPL-SCNC: 136 MMOL/L — SIGNIFICANT CHANGE UP (ref 135–145)
WBC # BLD: 10.08 K/UL — SIGNIFICANT CHANGE UP (ref 3.8–10.5)
WBC # BLD: 11.28 K/UL — HIGH (ref 3.8–10.5)
WBC # FLD AUTO: 10.08 K/UL — SIGNIFICANT CHANGE UP (ref 3.8–10.5)
WBC # FLD AUTO: 11.28 K/UL — HIGH (ref 3.8–10.5)

## 2024-01-19 RX ORDER — IBUPROFEN 200 MG
600 TABLET ORAL EVERY 6 HOURS
Refills: 0 | Status: DISCONTINUED | OUTPATIENT
Start: 2024-01-19 | End: 2024-01-24

## 2024-01-19 RX ORDER — INFLUENZA VIRUS VACCINE 15; 15; 15; 15 UG/.5ML; UG/.5ML; UG/.5ML; UG/.5ML
0.5 SUSPENSION INTRAMUSCULAR ONCE
Refills: 0 | Status: DISCONTINUED | OUTPATIENT
Start: 2024-01-19 | End: 2024-01-24

## 2024-01-19 RX ORDER — OXYCODONE HYDROCHLORIDE 5 MG/1
2.5 TABLET ORAL EVERY 4 HOURS
Refills: 0 | Status: DISCONTINUED | OUTPATIENT
Start: 2024-01-19 | End: 2024-01-20

## 2024-01-19 RX ORDER — OXYCODONE HYDROCHLORIDE 5 MG/1
5 TABLET ORAL EVERY 4 HOURS
Refills: 0 | Status: DISCONTINUED | OUTPATIENT
Start: 2024-01-19 | End: 2024-01-20

## 2024-01-19 RX ORDER — ACETAMINOPHEN 500 MG
1000 TABLET ORAL ONCE
Refills: 0 | Status: COMPLETED | OUTPATIENT
Start: 2024-01-19 | End: 2024-01-19

## 2024-01-19 RX ORDER — ACETAMINOPHEN 500 MG
650 TABLET ORAL EVERY 6 HOURS
Refills: 0 | Status: DISCONTINUED | OUTPATIENT
Start: 2024-01-19 | End: 2024-01-24

## 2024-01-19 RX ADMIN — Medication 650 MILLIGRAM(S): at 23:05

## 2024-01-19 RX ADMIN — Medication 600 MILLIGRAM(S): at 18:42

## 2024-01-19 RX ADMIN — SODIUM CHLORIDE 70 MILLILITER(S): 9 INJECTION, SOLUTION INTRAVENOUS at 04:56

## 2024-01-19 RX ADMIN — Medication 650 MILLIGRAM(S): at 18:42

## 2024-01-19 RX ADMIN — Medication 400 MILLIGRAM(S): at 04:09

## 2024-01-19 RX ADMIN — OXYCODONE HYDROCHLORIDE 5 MILLIGRAM(S): 5 TABLET ORAL at 20:24

## 2024-01-19 RX ADMIN — Medication 650 MILLIGRAM(S): at 18:12

## 2024-01-19 RX ADMIN — Medication 1000 MILLIGRAM(S): at 04:30

## 2024-01-19 RX ADMIN — Medication 600 MILLIGRAM(S): at 18:12

## 2024-01-19 RX ADMIN — SODIUM CHLORIDE 70 MILLILITER(S): 9 INJECTION, SOLUTION INTRAVENOUS at 14:10

## 2024-01-19 RX ADMIN — OXYCODONE HYDROCHLORIDE 5 MILLIGRAM(S): 5 TABLET ORAL at 14:40

## 2024-01-19 RX ADMIN — OXYCODONE HYDROCHLORIDE 5 MILLIGRAM(S): 5 TABLET ORAL at 20:59

## 2024-01-19 RX ADMIN — OXYCODONE HYDROCHLORIDE 5 MILLIGRAM(S): 5 TABLET ORAL at 14:10

## 2024-01-19 NOTE — PATIENT PROFILE ADULT - FALL HARM RISK - RISK INTERVENTIONS
Monitor for mental status changes/Monitor gait and stability/Bed in lowest position, wheels locked, appropriate side rails in place/Call bell, personal items and telephone in reach/Instruct patient to call for assistance before getting out of bed or chair/Non-slip footwear when patient is out of bed/[place] to call system/Physically safe environment - no spills, clutter or unnecessary equipment/Purposeful Proactive Rounding/Room/bathroom lighting operational, light cord in reach

## 2024-01-19 NOTE — ED ADULT NURSE NOTE - CHIEF COMPLAINT QUOTE
Pt transfer from [facility] c/o bleeding to R mandibulectomy site x 2 days. Pt c/o pain to site. Airway patent. PHx squamous cell carcinoma with mets to neck, former trach

## 2024-01-19 NOTE — DISCHARGE NOTE PROVIDER - REASON FOR ADMISSION
wound dehiscence and erosion with exposed hardware. Recent hx of hemorrhaging from wound, transferred from [place] to [facility]

## 2024-01-19 NOTE — ED ADULT NURSE REASSESSMENT NOTE - PAIN RATING/NUMBER SCALE (0-10): ACTIVITY
0 (no pain/absence of nonverbal indicators of pain)
0 (no pain/absence of nonverbal indicators of pain)
5 (moderate pain)
6 (moderate pain)
0 (no pain/absence of nonverbal indicators of pain)

## 2024-01-19 NOTE — DISCHARGE NOTE PROVIDER - NSDCFUADDINST_GEN_ALL_CORE_FT
WOUND CARE:   Right mandibular wound vac: Cover wound with Adaptic cut to size. Cut SILVER granulofoam sponge to wound dimensions and apply over wound. Cover with vac tape, with wound vac at 125mmHg continuous therapy. Change wound vac on a MWF schedule. If wound vac malfunctions, can apply xeroform and a WTD moist saline dressing changed q24h.     R lateral leg wound: Apply collagenase (Santyl) to superior aspect of skin graft where wound is. Cover with moist saline WTD and wrap with Dusty. Change dressing every 24 hours.

## 2024-01-19 NOTE — DISCHARGE NOTE PROVIDER - DETAILS OF MALNUTRITION DIAGNOSIS/DIAGNOSES
This patient has been assessed with a concern for Malnutrition and was treated during this hospitalization for the following Nutrition diagnosis/diagnoses:     -  01/22/2024: Severe protein-calorie malnutrition   -  01/22/2024: Underweight (BMI < 19)

## 2024-01-19 NOTE — CONSULT NOTE ADULT - ASSESSMENT
AP:     57 year old female with history of SCC and right mandibulectomy, R SND, R FFF complicated by dehiscence who presents after hemorrhage from wound.     Plan to be discussed with attending.    Assessment:    57 year old female with history of SCC and right mandibulectomy, R SND, R FFF complicated by dehiscence who presents after hemorrhage from wound. Wound currently hemostatic w/ exposed plate and bone    Recommendations:    - Recommending daily dressings with the following: Hydrogel, xeroform over bone and plate. Aquacel over soft tissues of wound. Gauze over everything  - Monitor for bleeding over weekend before considering possible wound vac  - CTA w/ ascending palatine artery as possible source of bleed, consider possible IR embolization      Plastic Surgery   [facility]: [id number]  [facility]: [phone]

## 2024-01-19 NOTE — PROGRESS NOTE ADULT - SUBJECTIVE AND OBJECTIVE BOX
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 Since d/c from the hospital on 12/4/23, post-op course c/b some dehiscence on the mandible and low grade infection on RLE, for which transcutaneous mandibular dehiscence area was cleaned w/ saline, packed w/ Kerlix, protected w/ gauze on 12/20/2023 in the outpatient clinic.     Pt presented  to [facility] ED as a transfer from [facility] ED w/ cc of hemorrhaging from the wound, wound dehiscence and erosion w/ exposed hardware (1/18/24). Pt otherwise denies recent hx of f/c/n/v, SOB, CP, changes in pain, trismus, dysphagia. Pt now s/p 1 unit pRBC in ED, pending repeat CBC, Xeroform packing in dehiscence site. NAEON.         Physical exam  Gen: AAOX3, NAD,   H: Surgical site on the right sided transcutaneous dehiscence in the mandibular border, hemostatic, no purulent drainage, minimal tenderness to palpation.  E: PERRL, EOMI b/l  E: no otorrhea, hearing at baseline  N: nares patent b/l, no rhinorrhea  Maxillofacial: introral surgical sites intact, no erythema, no purulent drainage, no tenderness on palpation, soft, OMER~35mm  Neck: Flat, supple, no lymphadenopathy, trachea midline, no masses  Lymphatic: No lymphadenopathy  Resp: breathing easily, no stridor  CV: no peripheral edema/cyanosis  GI: nondistended   Neuro: CN7 intact b/l, no facial droop    ICU Vital Signs Last 24 Hrs  T(C): 36.6 (19 Jan 2024 05:05), Max: 36.7 (18 Jan 2024 11:42)  T(F): 97.8 (19 Jan 2024 05:05), Max: 98 (18 Jan 2024 11:42)  HR: 85 (19 Jan 2024 05:05) (85 - 95)  BP: 109/64 (19 Jan 2024 05:05) (95/65 - 109/64)  BP(mean): --  ABP: --  ABP(mean): --  RR: 17 (19 Jan 2024 05:05) (15 - 19)  SpO2: 100% (19 Jan 2024 05:05) (95% - 100%)    O2 Parameters below as of 19 Jan 2024 05:05  Patient On (Oxygen Delivery Method): room air

## 2024-01-19 NOTE — DISCHARGE NOTE PROVIDER - HOSPITAL COURSE
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 Since d/c from the hospital on 12/4/23, post-op course c/b some dehiscence on the mandible and low grade infection on RLE, for which transcutaneous mandibular dehiscence area was cleaned w/ saline, packed w/ Kerlix, protected w/ gauze on 12/20/2023 in the outpatient clinic.   Pt presents today to [facility] ED as a transfer from [facility] ED w/ cc of hemorrhaging from the wound, wound dehiscence and erosion w/ exposed hardware. Pt otherwise denies recent hx of f/c/n/v, SOB, CP, changes in pain, trismus, dysphagia.   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 Since d/c from the hospital on 12/4/23, post-op course c/b some dehiscence on the mandible and low grade infection on RLE, for which transcutaneous mandibular dehiscence area was cleaned w/ saline, packed w/ Kerlix, protected w/ gauze on 12/20/2023 in the outpatient clinic.   Pt presents today to [facility] ED as a transfer from [facility] ED w/ cc of hemorrhaging from the wound, wound dehiscence and erosion w/ exposed hardware. Pt otherwise denies recent hx of f/c/n/v, SOB, CP, changes in pain, trismus, dysphagia.   1/19/2024: Pt's H/H 5.9/18.2; Administered 2 units of pRBCs, after repeat labs, H/H stable at 9.4/28.6  1/20/2024: BP was 83/46 o/n, pt asymptomatic. Given 500cc bolus of LR w/ resolution of BP to 94/61 (normal range of pt). Given 4 mg of Zofran o/n for Nausea.   1/21/2024: Resolution of prior day's cough: CXR, RVP, EKG negative  1/22/2024: TEE pt progressing well and ready for discharge home today.    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 Since d/c from the hospital on 12/4/23, post-op course c/b some dehiscence on the mandible and low grade infection on RLE, for which transcutaneous mandibular dehiscence area was cleaned w/ saline, packed w/ Kerlix, protected w/ gauze on 12/20/2023 in the outpatient clinic.   Pt presents today to [facility] ED as a transfer from [facility] ED w/ cc of hemorrhaging from the wound, wound dehiscence and erosion w/ exposed hardware. Pt otherwise denies recent hx of f/c/n/v, SOB, CP, changes in pain, trismus, dysphagia.   1/19/2024: Pt's H/H 5.9/18.2; Administered 2 units of pRBCs, after repeat labs, H/H stable at 9.4/28.6  1/20/2024: BP was 83/46 o/n, pt asymptomatic. Given 500cc bolus of LR w/ resolution of BP to 94/61 (normal range of pt). Given 4 mg of Zofran o/n for Nausea.   1/21/2024: Resolution of prior day's cough: CXR, RVP, EKG negative  1/22/2024: NAEON. Wound vac placed at site of dehiscence.  1/23/2024: NAEON. Pt stable and ready for discharge home today.    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 Since d/c from the hospital on 12/4/23, post-op course c/b some dehiscence on the mandible and low grade infection on RLE, for which transcutaneous mandibular dehiscence area was cleaned w/ saline, packed w/ Kerlix, protected w/ gauze on 12/20/2023 in the outpatient clinic.   Pt presents today to [facility] ED as a transfer from [facility] ED w/ cc of hemorrhaging from the wound, wound dehiscence and erosion w/ exposed hardware. Pt otherwise denies recent hx of f/c/n/v, SOB, CP, changes in pain, trismus, dysphagia.   1/19/2024: Pt's H/H 5.9/18.2; Administered 2 units of pRBCs, after repeat labs, H/H stable at 9.4/28.6  1/20/2024: BP was 83/46 o/n, pt asymptomatic. Given 500cc bolus of LR w/ resolution of BP to 94/61 (normal range of pt). Given 4 mg of Zofran o/n for Nausea.   1/21/2024: Resolution of prior day's cough: CXR, RVP, EKG negative  1/22/2024: NAEON. Wound vac placed at site of dehiscence.  1/23/2024: NAEON. AVSS.   1/24/2024: NAEON. Pt stable and ready for discharge home today.

## 2024-01-19 NOTE — CONSULT NOTE ADULT - SUBJECTIVE AND OBJECTIVE BOX
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 Since d/c from the hospital on 23, post-op course c/b some dehiscence on the mandible and low grade infection on RLE, for which transcutaneous mandibular dehiscence area was cleaned w/ saline, packed w/ Kerlix, protected w/ gauze on 2023 in the outpatient clinic.     Pt presents today to [facility] ED as a transfer from [facility] ED w/ cc of hemorrhaging from the wound, wound dehiscence and erosion w/ exposed hardware. Pt otherwise denies recent hx of f/c/n/v, SOB, CP, changes in pain, trismus, dysphagia.    (2024 23:06)    PAST MEDICAL & SURGICAL HISTORY:  H/O malignant neoplasm of gum      Hypertension      Hyperlipidemia      Mild asthma      Malignant neoplasm of mandible      H/O  section      History of hip replacement  left hip      History of partial hysterectomy      H/O neck surgery  partial right mandibulectomy with fibular free flap reconstriction and right neck lymphnode dissection 22      History of vascular access device        Allergies    morphine (Hives)    Intolerances      Home Medications:  albuterol 90 mcg/inh inhalation powder: 2 puff(s) inhaled every 6 hours, As Needed (2023 06:09)  DULoxetine 60 mg oral delayed release capsule: 1 cap(s) orally once a day (2023 06:09)    MEDICATIONS  (STANDING):  lactated ringers. 1000 milliLiter(s) (70 mL/Hr) IV Continuous <Continuous>      SOCIAL HISTORY:  FAMILY HISTORY:  Family history of CVA (Mother)    FH: thyroid disease (Mother)        ___________________________________________  OBJECTIVE:  Vital Signs Last 24 Hrs  T(C): 36.1 (2024 09:18), Max: 36.7 (2024 11:42)  T(F): 97 (2024 09:18), Max: 98 (2024 11:42)  HR: 82 (2024 09:18) (82 - 95)  BP: 113/71 (2024 09:18) (95/65 - 113/71)  BP(mean): --  RR: 18 (2024 09:18) (15 - 19)  SpO2: 100% (2024 09:18) (95% - 100%)    Parameters below as of 2024 07:30  Patient On (Oxygen Delivery Method): room air    CAPILLARY BLOOD GLUCOSE        I&O's Detail    General: Well developed, well nourished, NAD  Neuro: Alert and oriented, no focal deficits, moves all extremities spontaneously  HEENT: NCAT, EOMI, anicteric, mucosa moist  Respiratory: Airway patent, respirations unlabored  CVS: Regular rate and rhythm  Abdomen: Soft, nontender, nondistended  Extremities: No edema, sensation and movement grossly intact  Skin: Warm, dry, appropriate color  ____________________________________________  LABS:  CBC Full  -  ( 2024 10:03 )  WBC Count : 10.08 K/uL  RBC Count : 3.24 M/uL  Hemoglobin : 9.4 g/dL  Hematocrit : 28.6 %  Platelet Count - Automated : 233 K/uL  Mean Cell Volume : 88.3 fL  Mean Cell Hemoglobin : 29.0 pg  Mean Cell Hemoglobin Concentration : 32.9 gm/dL  Auto Neutrophil # : 8.72 K/uL  Auto Lymphocyte # : 0.55 K/uL  Auto Monocyte # : 0.72 K/uL  Auto Eosinophil # : 0.03 K/uL  Auto Basophil # : 0.02 K/uL  Auto Neutrophil % : 86.5 %  Auto Lymphocyte % : 5.5 %  Auto Monocyte % : 7.1 %  Auto Eosinophil % : 0.3 %  Auto Basophil % : 0.2 %        136  |  102  |  22  ----------------------------<  79  4.2   |  21<L>  |  0.93    Ca    8.3<L>      2024 10:03  Phos  3.4       Mg     1.80               Urinalysis Basic - ( 2024 10:03 )    Color: x / Appearance: x / SG: x / pH: x  Gluc: 79 mg/dL / Ketone: x  / Bili: x / Urobili: x   Blood: x / Protein: x / Nitrite: x   Leuk Esterase: x / RBC: x / WBC x   Sq Epi: x / Non Sq Epi: x / Bacteria: x               Plastic Surgery Consult Note  (pg [facility]: [id number], NS: [phone])    HPI:  58 y/o F w/ hx of SCC 2 years s/p R mandibulectomy, R SND, and R Free Fibula Flap s/p 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 Since d/c from the hospital on 23, post-op course c/b some dehiscence on the mandible and low grade infection on RLE, for which transcutaneous mandibular dehiscence area was cleaned w/ saline, packed w/ Kerlix, protected w/ gauze on 2023 in the outpatient clinic.     Pt presents today to [facility] ED as a transfer from [facility] ED w/ cc of hemorrhaging from the wound, wound dehiscence and erosion w/ exposed hardware. Pt otherwise denies recent hx of f/c/n/v, SOB, CP, changes in pain, trismus, dysphagia.    (2024 23:06)    PAST MEDICAL & SURGICAL HISTORY:  H/O malignant neoplasm of gum      Hypertension      Hyperlipidemia      Mild asthma      Malignant neoplasm of mandible      H/O  section      History of hip replacement  left hip      History of partial hysterectomy      H/O neck surgery  partial right mandibulectomy with fibular free flap reconstriction and right neck lymphnode dissection 22      History of vascular access device        Allergies    morphine (Hives)    Intolerances      Home Medications:  albuterol 90 mcg/inh inhalation powder: 2 puff(s) inhaled every 6 hours, As Needed (2023 06:09)  DULoxetine 60 mg oral delayed release capsule: 1 cap(s) orally once a day (2023 06:09)    MEDICATIONS  (STANDING):  lactated ringers. 1000 milliLiter(s) (70 mL/Hr) IV Continuous <Continuous>      SOCIAL HISTORY:  FAMILY HISTORY:  Family history of CVA (Mother)    FH: thyroid disease (Mother)        ___________________________________________  OBJECTIVE:  Vital Signs Last 24 Hrs  T(C): 36.1 (2024 09:18), Max: 36.7 (2024 11:42)  T(F): 97 (2024 09:18), Max: 98 (2024 11:42)  HR: 82 (2024 09:18) (82 - 95)  BP: 113/71 (2024 09:18) (95/65 - 113/71)  BP(mean): --  RR: 18 (2024 09:18) (15 - 19)  SpO2: 100% (2024 09:18) (95% - 100%)    Parameters below as of 2024 07:30  Patient On (Oxygen Delivery Method): room air    CAPILLARY BLOOD GLUCOSE        I&O's Detail    General: Well developed, well nourished, NAD  Neuro: Alert and oriented, no focal deficits, moves all extremities spontaneously  HEENT:  Open right mandible wound, currently no signs of active bleeding.  Dressing is clean dry and intact.   Respiratory: Airway patent, respirations unlabored  CVS: Regular rate and rhythm  Abdomen: Soft, nontender, nondistended  Extremities: No edema, sensation and movement grossly intact  Skin: Warm, dry, appropriate color  ____________________________________________  LABS:  CBC Full  -  ( 2024 10:03 )  WBC Count : 10.08 K/uL  RBC Count : 3.24 M/uL  Hemoglobin : 9.4 g/dL  Hematocrit : 28.6 %  Platelet Count - Automated : 233 K/uL  Mean Cell Volume : 88.3 fL  Mean Cell Hemoglobin : 29.0 pg  Mean Cell Hemoglobin Concentration : 32.9 gm/dL  Auto Neutrophil # : 8.72 K/uL  Auto Lymphocyte # : 0.55 K/uL  Auto Monocyte # : 0.72 K/uL  Auto Eosinophil # : 0.03 K/uL  Auto Basophil # : 0.02 K/uL  Auto Neutrophil % : 86.5 %  Auto Lymphocyte % : 5.5 %  Auto Monocyte % : 7.1 %  Auto Eosinophil % : 0.3 %  Auto Basophil % : 0.2 %        136  |  102  |  22  ----------------------------<  79  4.2   |  21<L>  |  0.93    Ca    8.3<L>      2024 10:03  Phos  3.4       Mg     1.80               Urinalysis Basic - ( 2024 10:03 )    Color: x / Appearance: x / SG: x / pH: x  Gluc: 79 mg/dL / Ketone: x  / Bili: x / Urobili: x   Blood: x / Protein: x / Nitrite: x   Leuk Esterase: x / RBC: x / WBC x   Sq Epi: x / Non Sq Epi: x / Bacteria: x               Plastic Surgery Consult Note  (pg [facility]: [id number], NS: [phone])    HPI:  56 y/o F w/ hx of SCC 2 years s/p R mandibulectomy, R SND, and R Free Fibula Flap s/p 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 Since d/c from the hospital on 23, post-op course c/b some dehiscence on the mandible and low grade infection on RLE, for which transcutaneous mandibular dehiscence area was cleaned w/ saline, packed w/ Kerlix, protected w/ gauze on 2023 in the outpatient clinic.     Pt presents today to [facility] ED as a transfer from [facility] ED w/ cc of hemorrhaging from the wound, wound dehiscence and erosion w/ exposed hardware. Pt otherwise denies recent hx of f/c/n/v, SOB, CP, changes in pain, trismus, dysphagia.    (2024 23:06)    PAST MEDICAL & SURGICAL HISTORY:  H/O malignant neoplasm of gum      Hypertension      Hyperlipidemia      Mild asthma      Malignant neoplasm of mandible      H/O  section      History of hip replacement  left hip      History of partial hysterectomy      H/O neck surgery  partial right mandibulectomy with fibular free flap reconstriction and right neck lymphnode dissection 22      History of vascular access device        Allergies    morphine (Hives)    Intolerances      Home Medications:  albuterol 90 mcg/inh inhalation powder: 2 puff(s) inhaled every 6 hours, As Needed (2023 06:09)  DULoxetine 60 mg oral delayed release capsule: 1 cap(s) orally once a day (2023 06:09)    MEDICATIONS  (STANDING):  lactated ringers. 1000 milliLiter(s) (70 mL/Hr) IV Continuous <Continuous>      SOCIAL HISTORY:  FAMILY HISTORY:  Family history of CVA (Mother)    FH: thyroid disease (Mother)        ___________________________________________  OBJECTIVE:  Vital Signs Last 24 Hrs  T(C): 36.1 (2024 09:18), Max: 36.7 (2024 11:42)  T(F): 97 (2024 09:18), Max: 98 (2024 11:42)  HR: 82 (2024 09:18) (82 - 95)  BP: 113/71 (2024 09:18) (95/65 - 113/71)  BP(mean): --  RR: 18 (2024 09:18) (15 - 19)  SpO2: 100% (2024 09:18) (95% - 100%)    Parameters below as of 2024 07:30  Patient On (Oxygen Delivery Method): room air    CAPILLARY BLOOD GLUCOSE        I&O's Detail    General: Well developed, well nourished, NAD  Neuro: Alert and oriented, no focal deficits, moves all extremities spontaneously  HEENT:  Open right mandible wound, currently no signs of active bleeding. Visible bone and metal plating. Dressing is clean dry and intact.   Respiratory: Airway patent, respirations unlabored  CVS: Regular rate and rhythm  Abdomen: Soft, nontender, nondistended  Extremities: No edema, sensation and movement grossly intact  Skin: Warm, dry, appropriate color  ____________________________________________  LABS:  CBC Full  -  ( 2024 10:03 )  WBC Count : 10.08 K/uL  RBC Count : 3.24 M/uL  Hemoglobin : 9.4 g/dL  Hematocrit : 28.6 %  Platelet Count - Automated : 233 K/uL  Mean Cell Volume : 88.3 fL  Mean Cell Hemoglobin : 29.0 pg  Mean Cell Hemoglobin Concentration : 32.9 gm/dL  Auto Neutrophil # : 8.72 K/uL  Auto Lymphocyte # : 0.55 K/uL  Auto Monocyte # : 0.72 K/uL  Auto Eosinophil # : 0.03 K/uL  Auto Basophil # : 0.02 K/uL  Auto Neutrophil % : 86.5 %  Auto Lymphocyte % : 5.5 %  Auto Monocyte % : 7.1 %  Auto Eosinophil % : 0.3 %  Auto Basophil % : 0.2 %        136  |  102  |  22  ----------------------------<  79  4.2   |  21<L>  |  0.93    Ca    8.3<L>      2024 10:03  Phos  3.4       Mg     1.80               Urinalysis Basic - ( 2024 10:03 )    Color: x / Appearance: x / SG: x / pH: x  Gluc: 79 mg/dL / Ketone: x  / Bili: x / Urobili: x   Blood: x / Protein: x / Nitrite: x   Leuk Esterase: x / RBC: x / WBC x   Sq Epi: x / Non Sq Epi: x / Bacteria: x

## 2024-01-19 NOTE — DISCHARGE NOTE PROVIDER - NSDCMRMEDTOKEN_GEN_ALL_CORE_FT
acetaminophen 325 mg oral tablet: 2 tab(s) orally every 6 hours  albuterol 90 mcg/inh inhalation powder: 2 puff(s) inhaled every 6 hours, As Needed  DULoxetine 60 mg oral delayed release capsule: 1 cap(s) orally once a day  Ensure Plus High Protein: Take 1 Bottle of Ensure Plus HP twice daily  oxyCODONE 5 mg oral tablet: 1 tab(s) orally every 6 hours as needed for  severe pain MDD: 4  Peridex 0.12% mucous membrane liquid: 15 milliliter(s) orally 2 times a day Swish and spit, do not swallow

## 2024-01-19 NOTE — DISCHARGE NOTE PROVIDER - NSDCCPCAREPLAN_GEN_ALL_CORE_FT
PRINCIPAL DISCHARGE DIAGNOSIS  Diagnosis: Surgical wound dehiscence  Assessment and Plan of Treatment:

## 2024-01-19 NOTE — ED ADULT NURSE REASSESSMENT NOTE - AS PAIN REST
0 (no pain/absence of nonverbal indicators of pain)
5 (moderate pain)
6 (moderate pain)
0 (no pain/absence of nonverbal indicators of pain)

## 2024-01-19 NOTE — DISCHARGE NOTE PROVIDER - NSDCHHATTENDCERT_GEN_ALL_CORE
Detail Level: Detailed My signature below certifies that the above stated patient is homebound and upon completion of the Face-To-Face encounter, has the need for intermittent skilled nursing, physical therapy and/or speech or occupational therapy services in their home for their current diagnosis as outlined in their initial plan of care. These services will continue to be monitored by myself or another physician.

## 2024-01-19 NOTE — DISCHARGE NOTE PROVIDER - NSDCFUSCHEDAPPT_GEN_ALL_CORE_FT
[facility]  DENTAL [address]  Scheduled Appointment: 01/23/2024    Neo Evans  [facility]  PLASTICSUR [address] [facility]  Scheduled Appointment: 02/02/2024     Neo EvansFormerly [facility] Physician [facility]  PLASTICSUR [address] [place] [address]  Scheduled Appointment: 02/02/2024

## 2024-01-19 NOTE — ED ADULT NURSE REASSESSMENT NOTE - NS ED NURSE REASSESS COMMENT FT1
Blood transfusion infusing through pt, initiated by SHASHANK Lozano. Pt tolerating blood well. Pt denies chest pain, SOB, dizziness, headache, blurry vision, chills. Bed in lowest position, call bell within reach. Pt on tele monitor showing NSR.
Pt 15 minutes into blood transfusion. Pt denies any s/s of transfusion reaction. Pt denies chest pain, SOB, dizziness, headache, blurry vision, chills. Bed in lowest position, call bell within reach.
Pt c/o jaw and facial pain, MD notified, pt to be medicated as per eMar.
Pt c/o jaw pains again, MD Blair notified. Pt given Ofirmev as per orders.
Pt completed second unit of PRBC. Pt tolerated transfusion well, denies any s/s of transfusion reaction. Pt denies chest pain, SOB, dizziness, headache, blurry vision, chills. Bed in lowest position, call bell within reach.
Pt started on second unit blood transfusion. Pt at baseline mental status, breathing even and unlabored in bed. Pt denies chest pain, SOB, dizziness, headache, blurry vision, chills. Bed in lowest position, call bell within reach.
Pt states the pain is now 0/10. Pt denies chest pain, SOB, dizziness, headache, blurry vision, chills. Bed in lowest position, call bell within reach.
Report received from day shift RN. Pt at baseline mental status, breathing even and unlabored in bed. Pt denies chest pain, SOB, dizziness, headache, blurry vision, chills. Bed in lowest position, call bell within reach. No active bleeding noted.
PRBCs given. Consent in chart. Risks and benefits explained to patient. Patient verbalized understanding of risks and benefits. Patient aware of possible side effects. Second RN at bedside for confirmation.
Patient vital signs stable at 15 Min sly for blood transfusion, patient denies any n/v, itchiness, chest, SOB, at this time. NSR on tele, RA, will continue to monitor.
Pt states that the medication helped and she no longer feels pain.

## 2024-01-19 NOTE — ED ADULT NURSE NOTE - OBJECTIVE STATEMENT
Day shift RN did not complete nurse note. Night Nurse: Pt came to ED with c/o bleeding from surgical site. Pt had mandible surgery and has been bleeding excessively from the site. Upon arrival pt not bleeding. Pt able to speak full sentences and breathing clearly. Pt denies chest pain, SOB, dizziness, headache, blurry vision, chills. Bed in lowest position, call bell within reach. Pt has bilateral #20G IV in the AC.

## 2024-01-19 NOTE — DISCHARGE NOTE PROVIDER - PROVIDER TOKENS
PROVIDER:[TOKEN:[[id number]]] PROVIDER:[TOKEN:[[id number]],SCHEDULEDAPPT:[01/23/2024],SCHEDULEDAPPTTIME:[03:15 PM]]

## 2024-01-19 NOTE — PROGRESS NOTE ADULT - SUBJECTIVE AND OBJECTIVE BOX
SUBJECTIVE:  Pt seen & examined at bedside. No acute overnight events. Right mandibular dehiscence/wound is packed with xeroform with pressure dressing by OMFS.     Vital Signs Last 24 Hrs  T(C): 36.6 (19 Jan 2024 05:05), Max: 36.7 (18 Jan 2024 11:42)  T(F): 97.8 (19 Jan 2024 05:05), Max: 98 (18 Jan 2024 11:42)  HR: 85 (19 Jan 2024 05:05) (85 - 95)  BP: 109/64 (19 Jan 2024 05:05) (95/65 - 109/64)  BP(mean): --  RR: 17 (19 Jan 2024 05:05) (15 - 19)  SpO2: 100% (19 Jan 2024 05:05) (95% - 100%)    Parameters below as of 19 Jan 2024 05:05  Patient On (Oxygen Delivery Method): room air    PE:  Right mandible dehiscence with exposure of mandible bone, hardware, black eschar. Ace/pressure dressing applied. Xeroform displaced and replaced with wet to dry temporarily.     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    - care per OMFS  - ENT available for assistance

## 2024-01-19 NOTE — DISCHARGE NOTE PROVIDER - CARE PROVIDER_API CALL
Neal Rowe  Oral/Maxillofacial Surgery  [address] [place] [id number]  Phone: [phone]  Fax: [phone]  Follow Up Time:    Neal Rowe  Oral/Maxillofacial Surgery  [address] [address] [place] [id number]  Phone: [phone]  Fax: [phone]  Scheduled Appointment: 01/23/2024 03:15 PM

## 2024-01-19 NOTE — PATIENT PROFILE ADULT - NSPROIMPLANTSMEDDEV_GEN_A_NUR
[FreeTextEntry1] : 53 yo M with mental disability, TITO (CPAP), Hypothyroidism, HTN and HLD presents to clinic for follow up for TITO. Aid at bedside, reports that patient is doing well on CPAP. \par \par \par TITO\par -c/w CPAP qhs. \par - Told the aid at bedside that the supplies (mask/tubing, etc) should be changed every 3-6 months. \par - RTC in 1 year. 
None

## 2024-01-19 NOTE — ED ADULT NURSE NOTE - ALCOHOL PRE SCREEN (AUDIT - C)
[Walking] : worsened by walking [Rest] : relieved by rest [4] : an average pain level of 4/10 [3] : a minimum pain level of 3/10 [6] : a maximum pain level of 6/10 [Constant] : ~He/She~ states the symptoms seem to be constant Statement Selected [de-identified] : Pt with severe lumbar stenosis and DDD presents with c/o right sided low back pain for the past 1 year, pain radiates to right buttocks, lateral aspect of RLE to knee. She denies numbness,tingling to B/L LE. Pt takes NSAIDs and Tylenol, these provides no relief in pain. She denies any numbness or tingling or weakness to B/L LE. Pt plays tennis during the summer. performs stretching exercises, this provides no relief in pain. Does not participate with PT at this time, she denies having DHARMESH in the past. [Bending] : not worsened by bending [Lifting] : not worsened by lifting [Prolonged Sitting] : not worsened by prolonged sitting [Prolonged Standing] : not worsened by prolonged standing [Acetaminophen] : not relieved by acetaminophen [Acupuncture] : not relieved by acupuncture [Chiropractic] : not relieved by chiropractic manipulation [Heat] : not relieved by heat [NSAIDs] : not relieved by nonsteroidal anti-inflammatory drugs [Incontinence] : no incontinence [Urinary Ret.] : no urinary retention

## 2024-01-20 LAB
ANION GAP SERPL CALC-SCNC: 8 MMOL/L — SIGNIFICANT CHANGE UP (ref 7–14)
B PERT DNA SPEC QL NAA+PROBE: SIGNIFICANT CHANGE UP
B PERT+PARAPERT DNA PNL SPEC NAA+PROBE: SIGNIFICANT CHANGE UP
BORDETELLA PARAPERTUSSIS (RAPRVP): SIGNIFICANT CHANGE UP
BUN SERPL-MCNC: 18 MG/DL — SIGNIFICANT CHANGE UP (ref 7–23)
C PNEUM DNA SPEC QL NAA+PROBE: SIGNIFICANT CHANGE UP
CALCIUM SERPL-MCNC: 8 MG/DL — LOW (ref 8.4–10.5)
CHLORIDE SERPL-SCNC: 101 MMOL/L — SIGNIFICANT CHANGE UP (ref 98–107)
CO2 SERPL-SCNC: 23 MMOL/L — SIGNIFICANT CHANGE UP (ref 22–31)
CREAT SERPL-MCNC: 0.83 MG/DL — SIGNIFICANT CHANGE UP (ref 0.5–1.3)
EGFR: 81 ML/MIN/1.73M2 — SIGNIFICANT CHANGE UP
FLUAV SUBTYP SPEC NAA+PROBE: SIGNIFICANT CHANGE UP
FLUBV RNA SPEC QL NAA+PROBE: SIGNIFICANT CHANGE UP
GLUCOSE SERPL-MCNC: 78 MG/DL — SIGNIFICANT CHANGE UP (ref 70–99)
HADV DNA SPEC QL NAA+PROBE: SIGNIFICANT CHANGE UP
HCOV 229E RNA SPEC QL NAA+PROBE: SIGNIFICANT CHANGE UP
HCOV HKU1 RNA SPEC QL NAA+PROBE: SIGNIFICANT CHANGE UP
HCOV NL63 RNA SPEC QL NAA+PROBE: SIGNIFICANT CHANGE UP
HCOV OC43 RNA SPEC QL NAA+PROBE: SIGNIFICANT CHANGE UP
HCT VFR BLD CALC: 22.8 % — LOW (ref 34.5–45)
HGB BLD-MCNC: 7.6 G/DL — LOW (ref 11.5–15.5)
HMPV RNA SPEC QL NAA+PROBE: SIGNIFICANT CHANGE UP
HPIV1 RNA SPEC QL NAA+PROBE: SIGNIFICANT CHANGE UP
HPIV2 RNA SPEC QL NAA+PROBE: SIGNIFICANT CHANGE UP
HPIV3 RNA SPEC QL NAA+PROBE: SIGNIFICANT CHANGE UP
HPIV4 RNA SPEC QL NAA+PROBE: SIGNIFICANT CHANGE UP
M PNEUMO DNA SPEC QL NAA+PROBE: SIGNIFICANT CHANGE UP
MAGNESIUM SERPL-MCNC: 1.7 MG/DL — SIGNIFICANT CHANGE UP (ref 1.6–2.6)
MCHC RBC-ENTMCNC: 29.2 PG — SIGNIFICANT CHANGE UP (ref 27–34)
MCHC RBC-ENTMCNC: 33.3 GM/DL — SIGNIFICANT CHANGE UP (ref 32–36)
MCV RBC AUTO: 87.7 FL — SIGNIFICANT CHANGE UP (ref 80–100)
NRBC # BLD: 0 /100 WBCS — SIGNIFICANT CHANGE UP (ref 0–0)
NRBC # FLD: 0 K/UL — SIGNIFICANT CHANGE UP (ref 0–0)
PHOSPHATE SERPL-MCNC: 2.6 MG/DL — SIGNIFICANT CHANGE UP (ref 2.5–4.5)
PLATELET # BLD AUTO: 221 K/UL — SIGNIFICANT CHANGE UP (ref 150–400)
POTASSIUM SERPL-MCNC: 3.9 MMOL/L — SIGNIFICANT CHANGE UP (ref 3.5–5.3)
POTASSIUM SERPL-SCNC: 3.9 MMOL/L — SIGNIFICANT CHANGE UP (ref 3.5–5.3)
RAPID RVP RESULT: SIGNIFICANT CHANGE UP
RBC # BLD: 2.6 M/UL — LOW (ref 3.8–5.2)
RBC # FLD: 19.1 % — HIGH (ref 10.3–14.5)
RSV RNA SPEC QL NAA+PROBE: SIGNIFICANT CHANGE UP
RV+EV RNA SPEC QL NAA+PROBE: SIGNIFICANT CHANGE UP
SARS-COV-2 RNA SPEC QL NAA+PROBE: SIGNIFICANT CHANGE UP
SODIUM SERPL-SCNC: 132 MMOL/L — LOW (ref 135–145)
WBC # BLD: 9.01 K/UL — SIGNIFICANT CHANGE UP (ref 3.8–10.5)
WBC # FLD AUTO: 9.01 K/UL — SIGNIFICANT CHANGE UP (ref 3.8–10.5)

## 2024-01-20 PROCEDURE — 99223 1ST HOSP IP/OBS HIGH 75: CPT

## 2024-01-20 PROCEDURE — 71045 X-RAY EXAM CHEST 1 VIEW: CPT | Mod: 26

## 2024-01-20 PROCEDURE — 93010 ELECTROCARDIOGRAM REPORT: CPT

## 2024-01-20 RX ORDER — SODIUM CHLORIDE 9 MG/ML
500 INJECTION, SOLUTION INTRAVENOUS ONCE
Refills: 0 | Status: COMPLETED | OUTPATIENT
Start: 2024-01-20 | End: 2024-01-20

## 2024-01-20 RX ORDER — ONDANSETRON 8 MG/1
4 TABLET, FILM COATED ORAL ONCE
Refills: 0 | Status: COMPLETED | OUTPATIENT
Start: 2024-01-20 | End: 2024-01-20

## 2024-01-20 RX ORDER — ENOXAPARIN SODIUM 100 MG/ML
40 INJECTION SUBCUTANEOUS EVERY 24 HOURS
Refills: 0 | Status: DISCONTINUED | OUTPATIENT
Start: 2024-01-20 | End: 2024-01-24

## 2024-01-20 RX ADMIN — SODIUM CHLORIDE 70 MILLILITER(S): 9 INJECTION, SOLUTION INTRAVENOUS at 21:17

## 2024-01-20 RX ADMIN — Medication 600 MILLIGRAM(S): at 15:30

## 2024-01-20 RX ADMIN — OXYCODONE HYDROCHLORIDE 5 MILLIGRAM(S): 5 TABLET ORAL at 06:14

## 2024-01-20 RX ADMIN — SODIUM CHLORIDE 70 MILLILITER(S): 9 INJECTION, SOLUTION INTRAVENOUS at 09:23

## 2024-01-20 RX ADMIN — OXYCODONE HYDROCHLORIDE 5 MILLIGRAM(S): 5 TABLET ORAL at 05:44

## 2024-01-20 RX ADMIN — SODIUM CHLORIDE 500 MILLILITER(S): 9 INJECTION, SOLUTION INTRAVENOUS at 01:48

## 2024-01-20 RX ADMIN — ENOXAPARIN SODIUM 40 MILLIGRAM(S): 100 INJECTION SUBCUTANEOUS at 11:18

## 2024-01-20 RX ADMIN — Medication 600 MILLIGRAM(S): at 09:23

## 2024-01-20 RX ADMIN — Medication 650 MILLIGRAM(S): at 11:44

## 2024-01-20 RX ADMIN — Medication 650 MILLIGRAM(S): at 18:33

## 2024-01-20 RX ADMIN — ONDANSETRON 4 MILLIGRAM(S): 8 TABLET, FILM COATED ORAL at 03:17

## 2024-01-20 RX ADMIN — Medication 600 MILLIGRAM(S): at 16:15

## 2024-01-20 RX ADMIN — Medication 650 MILLIGRAM(S): at 18:04

## 2024-01-20 RX ADMIN — Medication 650 MILLIGRAM(S): at 12:30

## 2024-01-20 RX ADMIN — Medication 600 MILLIGRAM(S): at 21:17

## 2024-01-20 RX ADMIN — Medication 600 MILLIGRAM(S): at 10:00

## 2024-01-20 RX ADMIN — Medication 600 MILLIGRAM(S): at 22:17

## 2024-01-20 NOTE — PROGRESS NOTE ADULT - SUBJECTIVE AND OBJECTIVE BOX
Plastic Surgery Progress Note (pg [facility]: [id number], NS: [phone])    SUBJECTIVE  The patient was seen and examined. No acute events overnight. Pain controlled, afebrile w/ stable vitals.     OBJECTIVE  ___________________________________________________  VITAL SIGNS / I&O's   Vital Signs Last 24 Hrs  T(C): 36.4 (20 Jan 2024 06:00), Max: 37.2 (19 Jan 2024 17:39)  T(F): 97.5 (20 Jan 2024 06:00), Max: 98.9 (19 Jan 2024 17:39)  HR: 63 (20 Jan 2024 06:00) (62 - 90)  BP: 99/59 (20 Jan 2024 06:00) (82/43 - 113/71)  BP(mean): --  RR: 18 (20 Jan 2024 06:00) (18 - 18)  SpO2: 99% (20 Jan 2024 06:00) (94% - 100%)    Parameters below as of 20 Jan 2024 06:00  Patient On (Oxygen Delivery Method): room air          19 Jan 2024 07:01  -  20 Jan 2024 07:00  --------------------------------------------------------  IN:    Lactated Ringers: 1010 mL    Lactated Ringers Bolus: 500 mL    Oral Fluid: 780 mL  Total IN: 2290 mL    OUT:    Voided (mL): 500 mL  Total OUT: 500 mL    Total NET: 1790 mL        ___________________________________________________  PHYSICAL EXAM    -- CONSTITUTIONAL: NAD, lying in bed  -- NEURO: Awake, alert  -- HEENT: Open right mandible wound, currently no signs of active bleeding. Visible bone and metal plating. Dressing is clean dry and intact.   -- NECK: Soft, no asymmetry  -- PULM: Non-labored respirations, equal chest rise bilaterally, patient with intermittent cough  -- ABDOMEN: Nondistended  -- EXTREMITIES: Warm and well perfused  -- PSYCH: Affect normal, A&Ox3    ___________________________________________________  LABS                        7.6    9.01  )-----------( 221      ( 20 Jan 2024 05:15 )             22.8     20 Jan 2024 05:15    132    |  101    |  18     ----------------------------<  78     3.9     |  23     |  0.83     Ca    8.0        20 Jan 2024 05:15  Phos  2.6       20 Jan 2024 05:15  Mg     1.70      20 Jan 2024 05:15        CAPILLARY BLOOD GLUCOSE            Urinalysis Basic - ( 20 Jan 2024 05:15 )    Color: x / Appearance: x / SG: x / pH: x  Gluc: 78 mg/dL / Ketone: x  / Bili: x / Urobili: x   Blood: x / Protein: x / Nitrite: x   Leuk Esterase: x / RBC: x / WBC x   Sq Epi: x / Non Sq Epi: x / Bacteria: x      ___________________________________________________  MICRO  Recent Cultures:    ___________________________________________________  MEDICATIONS  (STANDING):  acetaminophen   Oral Liquid .. 650 milliGRAM(s) Oral every 6 hours  enoxaparin Injectable 40 milliGRAM(s) SubCutaneous every 24 hours  ibuprofen  Suspension. 600 milliGRAM(s) Oral every 6 hours  influenza   Vaccine 0.5 milliLiter(s) IntraMuscular once  lactated ringers. 1000 milliLiter(s) (70 mL/Hr) IV Continuous <Continuous>    MEDICATIONS  (PRN):  guaiFENesin Oral Liquid (Sugar-Free) 100 milliGRAM(s) Oral every 6 hours PRN Cough

## 2024-01-20 NOTE — PROGRESS NOTE ADULT - ASSESSMENT
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 Post-op course c/b some dehiscence on the right mandible and low grade infection on RLE. Yesterday, pt presented to [facility] ED as a transfer from [facility] ED w/ cc of hemorrhaging from the wound, wound dehiscence and erosion w/ exposed hardware. Dehiscence site w/ no active bleeding, no purulent drainage, pt not in acute distress.     Plan:  - C/w dressing changes   - FLD  - LR 70cc/hr  - No Antibiotics  - No Lovenox      Dany Porter DMD  Oral and Maxillofacial Surgery  [facility] Pager [phone]  Available on Microsoft Teams

## 2024-01-20 NOTE — PROGRESS NOTE ADULT - SUBJECTIVE AND OBJECTIVE BOX
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 Since d/c from the hospital on 12/4/23, post-op course c/b some dehiscence on the mandible and low grade infection on RLE, for which transcutaneous mandibular dehiscence area was cleaned w/ saline, packed w/ Kerlix, protected w/ gauze on 12/20/2023 in the outpatient clinic.   Pt presented to [facility] ED as a transfer from [facility] ED w/ cc of hemorrhaging from the wound, wound dehiscence and erosion w/ exposed hardware (1/18/24). Pt otherwise denies recent hx of f/c/n/v, SOB, CP, changes in pain, trismus, dysphagia.    Interval Events: BP was 83/46 o/n, pt asymptomatic. Given 500cc bolus of LR w/ resolution of BP to 94/61 (normal range of pt). Given 4 mg of Zofran o/n for Nausea.       Vital Signs Last 24 Hrs  T(C): 36.4 (20 Jan 2024 06:00), Max: 37.2 (19 Jan 2024 17:39)  T(F): 97.5 (20 Jan 2024 06:00), Max: 98.9 (19 Jan 2024 17:39)  HR: 63 (20 Jan 2024 06:00) (62 - 90)  BP: 99/59 (20 Jan 2024 06:00) (82/43 - 113/71)  BP(mean): --  RR: 18 (20 Jan 2024 06:00) (17 - 18)  SpO2: 99% (20 Jan 2024 06:00) (94% - 100%)    Parameters below as of 20 Jan 2024 06:00  Patient On (Oxygen Delivery Method): room air      I&O's Detail    19 Jan 2024 07:01  -  20 Jan 2024 06:55  --------------------------------------------------------  IN:    Lactated Ringers: 1010 mL    Lactated Ringers Bolus: 500 mL    Oral Fluid: 780 mL  Total IN: 2290 mL    OUT:    Voided (mL): 500 mL  Total OUT: 500 mL    Total NET: 1790 mL      Medications:    MEDICATIONS  (STANDING):  acetaminophen   Oral Liquid .. 650 milliGRAM(s) Oral every 6 hours  ibuprofen  Suspension. 600 milliGRAM(s) Oral every 6 hours  influenza   Vaccine 0.5 milliLiter(s) IntraMuscular once  lactated ringers. 1000 milliLiter(s) (70 mL/Hr) IV Continuous <Continuous>    MEDICATIONS  (PRN):        Labs:                          9.4    10.08 )-----------( 233      ( 19 Jan 2024 10:03 )             28.6       01-19    136  |  102  |  22  ----------------------------<  79  4.2   |  21<L>  |  0.93    Ca    8.3<L>      19 Jan 2024 10:03  Phos  3.4     01-19  Mg     1.80     01-19      Physical exam  Gen: AAOX3, NAD,   H: Surgical site on the right sided transcutaneous dehiscence in the mandibular border, hemostatic, no purulent drainage, minimal tenderness to palpation.  E: PERRL, EOMI b/l  E: no otorrhea, hearing at baseline  N: nares patent b/l, no rhinorrhea  Maxillofacial: introral surgical sites intact, no erythema, no purulent drainage, no tenderness on palpation, soft, OMER~35mm  Neck: Flat, supple, no lymphadenopathy, trachea midline, no masses  Lymphatic: No lymphadenopathy  Resp: breathing easily, no stridor  CV: no peripheral edema/cyanosis  GI: nondistended   Neuro: CN7 intact b/l, no facial droop

## 2024-01-20 NOTE — PROGRESS NOTE ADULT - ASSESSMENT
57 year old female with history of SCC and right mandibulectomy, R SND, R FFF complicated by dehiscence who presents after hemorrhage from wound. Wound currently hemostatic w/ exposed plate and bone    Recommendations:    - Recommending daily dressings with the following: Hydrogel, xeroform over bone and plate. Aquacel over soft tissues of wound. Gauze over everything  - Monitor for bleeding over weekend before considering possible wound vac  - CTA w/ ascending palatine artery as possible source of bleed, consider possible IR embolization

## 2024-01-20 NOTE — PROVIDER CONTACT NOTE (OTHER) - BACKGROUND
patient admitted for cellulitis and abscess of the mouth
patient admitted for cellulitis and abscess of the mouth

## 2024-01-21 DIAGNOSIS — J45.909 UNSPECIFIED ASTHMA, UNCOMPLICATED: ICD-10-CM

## 2024-01-21 DIAGNOSIS — E87.1 HYPO-OSMOLALITY AND HYPONATREMIA: ICD-10-CM

## 2024-01-21 DIAGNOSIS — Z29.9 ENCOUNTER FOR PROPHYLACTIC MEASURES, UNSPECIFIED: ICD-10-CM

## 2024-01-21 DIAGNOSIS — D62 ACUTE POSTHEMORRHAGIC ANEMIA: ICD-10-CM

## 2024-01-21 LAB
ANION GAP SERPL CALC-SCNC: 10 MMOL/L — SIGNIFICANT CHANGE UP (ref 7–14)
BUN SERPL-MCNC: 14 MG/DL — SIGNIFICANT CHANGE UP (ref 7–23)
CALCIUM SERPL-MCNC: 8.2 MG/DL — LOW (ref 8.4–10.5)
CHLORIDE SERPL-SCNC: 103 MMOL/L — SIGNIFICANT CHANGE UP (ref 98–107)
CO2 SERPL-SCNC: 22 MMOL/L — SIGNIFICANT CHANGE UP (ref 22–31)
CREAT SERPL-MCNC: 0.79 MG/DL — SIGNIFICANT CHANGE UP (ref 0.5–1.3)
EGFR: 86 ML/MIN/1.73M2 — SIGNIFICANT CHANGE UP
GLUCOSE SERPL-MCNC: 73 MG/DL — SIGNIFICANT CHANGE UP (ref 70–99)
HCT VFR BLD CALC: 26 % — LOW (ref 34.5–45)
HGB BLD-MCNC: 8.5 G/DL — LOW (ref 11.5–15.5)
MAGNESIUM SERPL-MCNC: 1.7 MG/DL — SIGNIFICANT CHANGE UP (ref 1.6–2.6)
MCHC RBC-ENTMCNC: 28.6 PG — SIGNIFICANT CHANGE UP (ref 27–34)
MCHC RBC-ENTMCNC: 32.7 GM/DL — SIGNIFICANT CHANGE UP (ref 32–36)
MCV RBC AUTO: 87.5 FL — SIGNIFICANT CHANGE UP (ref 80–100)
NRBC # BLD: 0 /100 WBCS — SIGNIFICANT CHANGE UP (ref 0–0)
NRBC # FLD: 0 K/UL — SIGNIFICANT CHANGE UP (ref 0–0)
PHOSPHATE SERPL-MCNC: 2.7 MG/DL — SIGNIFICANT CHANGE UP (ref 2.5–4.5)
PLATELET # BLD AUTO: 233 K/UL — SIGNIFICANT CHANGE UP (ref 150–400)
POTASSIUM SERPL-MCNC: 3.8 MMOL/L — SIGNIFICANT CHANGE UP (ref 3.5–5.3)
POTASSIUM SERPL-SCNC: 3.8 MMOL/L — SIGNIFICANT CHANGE UP (ref 3.5–5.3)
RBC # BLD: 2.97 M/UL — LOW (ref 3.8–5.2)
RBC # FLD: 18.4 % — HIGH (ref 10.3–14.5)
SODIUM SERPL-SCNC: 135 MMOL/L — SIGNIFICANT CHANGE UP (ref 135–145)
WBC # BLD: 7.08 K/UL — SIGNIFICANT CHANGE UP (ref 3.8–10.5)
WBC # FLD AUTO: 7.08 K/UL — SIGNIFICANT CHANGE UP (ref 3.8–10.5)

## 2024-01-21 RX ADMIN — SODIUM CHLORIDE 70 MILLILITER(S): 9 INJECTION, SOLUTION INTRAVENOUS at 10:17

## 2024-01-21 RX ADMIN — Medication 650 MILLIGRAM(S): at 19:15

## 2024-01-21 RX ADMIN — Medication 650 MILLIGRAM(S): at 01:30

## 2024-01-21 RX ADMIN — Medication 600 MILLIGRAM(S): at 10:14

## 2024-01-21 RX ADMIN — Medication 650 MILLIGRAM(S): at 11:26

## 2024-01-21 RX ADMIN — Medication 650 MILLIGRAM(S): at 00:17

## 2024-01-21 RX ADMIN — Medication 600 MILLIGRAM(S): at 03:00

## 2024-01-21 RX ADMIN — Medication 600 MILLIGRAM(S): at 22:00

## 2024-01-21 RX ADMIN — Medication 650 MILLIGRAM(S): at 05:34

## 2024-01-21 RX ADMIN — Medication 600 MILLIGRAM(S): at 21:00

## 2024-01-21 RX ADMIN — Medication 600 MILLIGRAM(S): at 15:21

## 2024-01-21 RX ADMIN — Medication 650 MILLIGRAM(S): at 18:37

## 2024-01-21 RX ADMIN — ENOXAPARIN SODIUM 40 MILLIGRAM(S): 100 INJECTION SUBCUTANEOUS at 10:14

## 2024-01-21 RX ADMIN — Medication 600 MILLIGRAM(S): at 11:00

## 2024-01-21 RX ADMIN — Medication 600 MILLIGRAM(S): at 16:00

## 2024-01-21 RX ADMIN — Medication 650 MILLIGRAM(S): at 11:56

## 2024-01-21 NOTE — CONSULT NOTE ADULT - CONSULT REASON
Wound management
wound dehiscence and erosion with exposed hardware. Recent hx of hemorrhaging from wound, transferred from [place] to [facility]
shortness of breath and cough

## 2024-01-21 NOTE — CONSULT NOTE ADULT - ASSESSMENT
Ms. 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 Medicine consulted for concern of cough and shortness of breath. When interviewed patient not complaining of any cough or shortness of breath.

## 2024-01-21 NOTE — CONSULT NOTE ADULT - PROBLEM SELECTOR RECOMMENDATION 2
-Na 132 (from 136)  -recommend serum osm, urine osm, and urine Na  -continue BMP daily   -monitor intake and output every 4-6 hours   -may be acute blood loss, transfuse per above.

## 2024-01-21 NOTE — CONSULT NOTE ADULT - REASON FOR ADMISSION
wound dehiscence and erosion with exposed hardware. Recent hx of hemorrhaging from wound, transferred from [facility] to [facility]
wound dehiscence and erosion with exposed hardware. Recent hx of hemorrhaging from wound, transferred from [place] to [facility]

## 2024-01-21 NOTE — PROGRESS NOTE ADULT - ASSESSMENT
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 Post-op course c/b some dehiscence on the right mandible and low grade infection on RLE. Yesterday, pt presented to [facility] ED as a transfer from [facility] ED w/ cc of hemorrhaging from the wound, wound dehiscence and erosion w/ exposed hardware. Dehiscence site w/ no active bleeding, no purulent drainage, pt not in acute distress.   - C/w wound care  - FLD  - No Antibiotics  - No Lovenox  - Pending d/c tomorrow

## 2024-01-21 NOTE — CONSULT NOTE ADULT - PROBLEM SELECTOR RECOMMENDATION 4
DVT prophylaxis- currently not on any, consider SCD to left leg, consider chemical DVT prophylaxis once active bleed ruled out.   GI prophylaxis: none   Diet: per oral surgery and speech DVT prophylaxis- currently not on any, consider SCD to left leg, consider chemical DVT prophylaxis once active bleed ruled out.   GI prophylaxis: none   Diet: per oral surgery and speech.

## 2024-01-21 NOTE — PROGRESS NOTE ADULT - SUBJECTIVE AND OBJECTIVE BOX
Patient examined bedside resting comfortably , AVSS, pain well controlled. No evidence of bleeding. R. neck w/ hydrogel, xeroform over bone and plate, aquacel over soft tissues of wound.    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 Since d/c from the hospital on 12/4/23, post-op course c/b some dehiscence on the mandible and low grade infection on RLE, for which transcutaneous mandibular dehiscence area was cleaned w/ saline, packed w/ Kerlix, protected w/ gauze on 12/20/2023 in the outpatient clinic.     Pt presents today to [facility] ED as a transfer from [facility] ED w/ cc of hemorrhaging from the wound, wound dehiscence and erosion w/ exposed hardware. Pt otherwise denies recent hx of f/c/n/v, SOB, CP, changes in pain, trismus, dysphagia.    (18 Jan 2024 23:06)      Labs:                         8.5    7.08  )-----------( 233      ( 21 Jan 2024 05:05 )             26.0     I&O's Detail    20 Jan 2024 07:01  -  21 Jan 2024 07:00  --------------------------------------------------------  IN:    Lactated Ringers: 1540 mL  Total IN: 1540 mL    OUT:  Total OUT: 0 mL    Total NET: 1540 mL        Vital Signs Last 24 Hrs  T(C): 36.3 (21 Jan 2024 05:30), Max: 36.4 (20 Jan 2024 13:53)  T(F): 97.4 (21 Jan 2024 05:30), Max: 97.5 (20 Jan 2024 13:53)  HR: 67 (21 Jan 2024 05:30) (66 - 73)  BP: 104/68 (21 Jan 2024 05:30) (86/56 - 104/68)  BP(mean): --  RR: 14 (21 Jan 2024 05:30) (14 - 18)  SpO2: 97% (21 Jan 2024 05:30) (96% - 99%)    Parameters below as of 21 Jan 2024 05:30  Patient On (Oxygen Delivery Method): room air        Physical Exam:   Gen: AAOX3, NAD,   H: Surgical site on the right sided transcutaneous dehiscence in the mandibular border, hemostatic, no purulent drainage, minimal tenderness to palpation.  E: PERRL, EOMI b/l  E: no otorrhea, hearing at baseline  N: nares patent b/l, no rhinorrhea  Maxillofacial: introral surgical sites intact, no erythema, no purulent drainage, no tenderness on palpation, soft, OMER~35mm  Neck: Flat, supple, no lymphadenopathy, trachea midline, no masses  Lymphatic: No lymphadenopathy  Resp: breathing easily, no stridor  CV: no peripheral edema/cyanosis  GI: nondistended   Neuro: CN7 intact b/l, no facial droop    CT Maxillofacial     Assessment:    Plan:  Burton Hollingsworth  Oral and Maxillofacial Surgery   Pager #[phone]  Available on [employer] Teams          Patient examined bedside resting comfortably , AVSS, pain well controlled. No evidence of bleeding. R. neck w/ hydrogel, xeroform over bone and plate, aquacel over soft tissues of wound.    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 Since d/c from the hospital on 12/4/23, post-op course c/b some dehiscence on the mandible and low grade infection on RLE, for which transcutaneous mandibular dehiscence area was cleaned w/ saline, packed w/ Kerlix, protected w/ gauze on 12/20/2023 in the outpatient clinic.     Pt presents today to [facility] ED as a transfer from [facility] ED w/ cc of hemorrhaging from the wound, wound dehiscence and erosion w/ exposed hardware. Pt otherwise denies recent hx of f/c/n/v, SOB, CP, changes in pain, trismus, dysphagia.    (18 Jan 2024 23:06)      Labs:                         8.5    7.08  )-----------( 233      ( 21 Jan 2024 05:05 )             26.0     I&O's Detail    20 Jan 2024 07:01  -  21 Jan 2024 07:00  --------------------------------------------------------  IN:    Lactated Ringers: 1540 mL  Total IN: 1540 mL    OUT:  Total OUT: 0 mL    Total NET: 1540 mL        Vital Signs Last 24 Hrs  T(C): 36.3 (21 Jan 2024 05:30), Max: 36.4 (20 Jan 2024 13:53)  T(F): 97.4 (21 Jan 2024 05:30), Max: 97.5 (20 Jan 2024 13:53)  HR: 67 (21 Jan 2024 05:30) (66 - 73)  BP: 104/68 (21 Jan 2024 05:30) (86/56 - 104/68)  BP(mean): --  RR: 14 (21 Jan 2024 05:30) (14 - 18)  SpO2: 97% (21 Jan 2024 05:30) (96% - 99%)    Parameters below as of 21 Jan 2024 05:30  Patient On (Oxygen Delivery Method): room air        Physical Exam:   Gen: AAOX3, NAD,   H: Surgical site on the right sided transcutaneous dehiscence in the mandibular border, hemostatic, no purulent drainage, minimal tenderness to palpation.  E: PERRL, EOMI b/l  E: no otorrhea, hearing at baseline  N: nares patent b/l, no rhinorrhea  Maxillofacial: introral surgical sites intact, no erythema, no purulent drainage, no tenderness on palpation, soft, OMER~35mm  Neck: Flat, supple, no lymphadenopathy, trachea midline, no masses  Lymphatic: No lymphadenopathy  Resp: breathing easily, no stridor  CV: no peripheral edema/cyanosis  GI: nondistended   Neuro: CN7 intact b/l, no facial droop

## 2024-01-21 NOTE — PROGRESS NOTE ADULT - SUBJECTIVE AND OBJECTIVE BOX
SUBJECTIVE:  Pt seen & examined at bedside. No acute events overnight.       Vital Signs Last 24 Hrs  T(C): 36.8 (21 Jan 2024 09:44), Max: 36.8 (21 Jan 2024 09:44)  T(F): 98.2 (21 Jan 2024 09:44), Max: 98.2 (21 Jan 2024 09:44)  HR: 71 (21 Jan 2024 09:44) (66 - 73)  BP: 103/62 (21 Jan 2024 09:44) (92/62 - 104/68)  BP(mean): --  RR: 17 (21 Jan 2024 09:44) (14 - 18)  SpO2: 98% (21 Jan 2024 09:44) (96% - 99%)    Parameters below as of 21 Jan 2024 09:44  Patient On (Oxygen Delivery Method): room air      PE:  Right mandible dehiscence with exposure of mandible bone, hardware, black eschar. Xeroform dressing applied by PRS.    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    - care per OMFS/PRS  - ENT available for assistance  - Medicine recs appreciated

## 2024-01-21 NOTE — CONSULT NOTE ADULT - PROBLEM SELECTOR RECOMMENDATION 9
-Hb 5.9-> 9.4 with 2 units PRBCs followed by drop to 7.6  -CBC interval per OMFS, consider more frequent CBCs  -CTA neck showed subtle hyperdensity adjacent to inferior aspect of the right mandibular ramus which appears to arise from the ascending palatine rtery may represent subtle active bleed.  -workup of bleed per OMFS and transfusion per oral surgery     #Cough/SOB   -currently does not have any. To avoid shortness of breath and cough consider incentive spirometry and OOB to avoid atelectasis. -Hb 5.9-> 9.4 with 2 units PRBCs followed by drop to 7.6  -CBC interval per OMFS, consider more frequent CBCs  -CTA neck showed subtle hyperdensity adjacent to inferior aspect of the right mandibular ramus which appears to arise from the ascending palatine rtery may represent subtle active bleed.  -workup of bleed per OMFS and transfusion per oral surgery       #Cough/SOB   -currently does not have any. To avoid shortness of breath and cough consider incentive spirometry and OOB to avoid atelectasis.

## 2024-01-21 NOTE — CONSULT NOTE ADULT - SUBJECTIVE AND OBJECTIVE BOX
HPI: Ms. 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 Initially when the patient came in the Hb was 5.9 and BP was 83/46. She was given 500 cc of IVF and 2 units of PRBCs with correction to Hb 9.4. Per the patient, she had a coughing fit this morning, but denies any persistent symptoms and currently denies any cough and denies any shortness of breath, chest pain, LE edema, or orthopnea. She is only complaining of pain at the flap site and states she wants her oxycodone that was recently discontinued. CXR was consistent with RIJ mediport in place and left paracardiac opacity which may represent subsegmental lower lobe atelectasis. CTA neck showed subtle hyperdensity adjacent to inferior aspect of the right mandibular ramus which appears to arise from the ascending palatine rtery may represent subtle active bleed. Labs were sig for the following: Hb drop from 9.4 to 7.6, Na 132 from 136, RDW 19.1. EKG as interpreted by me shows the following: NSR, low voltage, QTc 434 ms and no ST/T changes.         Past medical History:  PMHx: mild asthma- per chart, she denies any other medical problems, HTN and HLD-also listed in chart   SHx: , lymph node dissection and FOM resection and flap repair, left hip replacement, partial hysterectomy   PSHx: duloxetine 60 mg daily-takes for nerve pain per her.   Allergies: none   Family history: CVA. thyroid disease  Social history: Former smoker 20 PPD, denies alcohol, drug use.       Medications:  Home Medications:  albuterol 90 mcg/inh inhalation powder: 2 puff(s) inhaled every 6 hours, As Needed (2023 06:09)  DULoxetine 60 mg oral delayed release capsule: 1 cap(s) orally once a day (2023 06:09)    MEDICATIONS  (STANDING):  acetaminophen   Oral Liquid .. 650 milliGRAM(s) Oral every 6 hours  enoxaparin Injectable 40 milliGRAM(s) SubCutaneous every 24 hours  ibuprofen  Suspension. 600 milliGRAM(s) Oral every 6 hours  influenza   Vaccine 0.5 milliLiter(s) IntraMuscular once  lactated ringers. 1000 milliLiter(s) (70 mL/Hr) IV Continuous <Continuous>    MEDICATIONS  (PRN):  guaiFENesin Oral Liquid (Sugar-Free) 100 milliGRAM(s) Oral every 6 hours PRN Cough      ROS:   Review of Systems:   CONSTITUTIONAL: No fever  RESPIRATORY: No SOB. No cough, chills  CARDIOVASCULAR: No chest pain, palpitations, dizziness, or leg swelling  GASTROINTESTINAL: No abdominal or epigastric pain. No nausea, vomiting.   GENITOURINARY: No dysuria, frequency,   MUSCULOSKELETAL: No joint pain or swelling; No muscle, back pain  PSYCHIATRIC: No depression, anxiety    Exam:    Vital Signs Last 24 Hrs  T(C): 36.4 (2024 02:00), Max: 36.4 (2024 06:00)  T(F): 97.5 (2024 02:00), Max: 97.5 (2024 06:00)  HR: 66 (2024 02:00) (63 - 73)  BP: 93/53 (2024 02:00) (86/56 - 104/55)  BP(mean): --  RR: 18 (2024 02:00) (16 - 18)  SpO2: 96% (2024 02:00) (96% - 99%)    Parameters below as of 2024 02:00  Patient On (Oxygen Delivery Method): room air        CONSTITUTIONAL: Well-groomed, in no apparent distress  EYES: No conjunctival or scleral injection, non-icteric; PERRLA and symmetric  ENMT: No external nasal lesions; nasal mucosa not inflamed, Flap visible externally to right neck/mouth  NECK: Trachea midline without palpable neck mass; thyroid not enlarged and non-tender  RESPIRATORY: Breathing comfortably;  lungs CTA without wheeze/rhonchi/rales  CARDIOVASCULAR: +S1S2, RRR, no M/G/R;  no lower extremity edema bilaterally   GASTROINTESTINAL: No palpable masses or tenderness, +BS throughout, no rebound/guarding; no hepatosplenomegaly; no hernia palpated  MUSCULOSKELETAL: no digital clubbing or cyanosis; normal strength and tone of extremities  SKIN: right leg wrapped with ace bandage (site of infection from flap donor site)  NEUROLOGIC: grossly CN II-XII intact except for  V3 on the right diminished compared to left; sensation intact in LEs b/l to light touch   PSYCHIATRIC: A+O x 3; mood and affect appropriate; appropriate insight and judgment

## 2024-01-22 LAB
ANION GAP SERPL CALC-SCNC: 9 MMOL/L — SIGNIFICANT CHANGE UP (ref 7–14)
BUN SERPL-MCNC: 10 MG/DL — SIGNIFICANT CHANGE UP (ref 7–23)
CALCIUM SERPL-MCNC: 8 MG/DL — LOW (ref 8.4–10.5)
CHLORIDE SERPL-SCNC: 103 MMOL/L — SIGNIFICANT CHANGE UP (ref 98–107)
CO2 SERPL-SCNC: 24 MMOL/L — SIGNIFICANT CHANGE UP (ref 22–31)
CREAT SERPL-MCNC: 0.73 MG/DL — SIGNIFICANT CHANGE UP (ref 0.5–1.3)
EGFR: 95 ML/MIN/1.73M2 — SIGNIFICANT CHANGE UP
GLUCOSE SERPL-MCNC: 72 MG/DL — SIGNIFICANT CHANGE UP (ref 70–99)
HCT VFR BLD CALC: 26.5 % — LOW (ref 34.5–45)
HGB BLD-MCNC: 8.6 G/DL — LOW (ref 11.5–15.5)
MAGNESIUM SERPL-MCNC: 1.6 MG/DL — SIGNIFICANT CHANGE UP (ref 1.6–2.6)
MCHC RBC-ENTMCNC: 28.6 PG — SIGNIFICANT CHANGE UP (ref 27–34)
MCHC RBC-ENTMCNC: 32.5 GM/DL — SIGNIFICANT CHANGE UP (ref 32–36)
MCV RBC AUTO: 88 FL — SIGNIFICANT CHANGE UP (ref 80–100)
NRBC # BLD: 0 /100 WBCS — SIGNIFICANT CHANGE UP (ref 0–0)
NRBC # FLD: 0 K/UL — SIGNIFICANT CHANGE UP (ref 0–0)
PHOSPHATE SERPL-MCNC: 2.7 MG/DL — SIGNIFICANT CHANGE UP (ref 2.5–4.5)
PLATELET # BLD AUTO: 302 K/UL — SIGNIFICANT CHANGE UP (ref 150–400)
POTASSIUM SERPL-MCNC: 3.5 MMOL/L — SIGNIFICANT CHANGE UP (ref 3.5–5.3)
POTASSIUM SERPL-SCNC: 3.5 MMOL/L — SIGNIFICANT CHANGE UP (ref 3.5–5.3)
RBC # BLD: 3.01 M/UL — LOW (ref 3.8–5.2)
RBC # FLD: 17.9 % — HIGH (ref 10.3–14.5)
SODIUM SERPL-SCNC: 136 MMOL/L — SIGNIFICANT CHANGE UP (ref 135–145)
WBC # BLD: 5.98 K/UL — SIGNIFICANT CHANGE UP (ref 3.8–10.5)
WBC # FLD AUTO: 5.98 K/UL — SIGNIFICANT CHANGE UP (ref 3.8–10.5)

## 2024-01-22 RX ORDER — COLLAGENASE CLOSTRIDIUM HIST. 250 UNIT/G
1 OINTMENT (GRAM) TOPICAL DAILY
Refills: 0 | Status: DISCONTINUED | OUTPATIENT
Start: 2024-01-22 | End: 2024-01-24

## 2024-01-22 RX ADMIN — Medication 650 MILLIGRAM(S): at 12:00

## 2024-01-22 RX ADMIN — ENOXAPARIN SODIUM 40 MILLIGRAM(S): 100 INJECTION SUBCUTANEOUS at 05:15

## 2024-01-22 RX ADMIN — Medication 650 MILLIGRAM(S): at 17:58

## 2024-01-22 RX ADMIN — Medication 650 MILLIGRAM(S): at 00:16

## 2024-01-22 RX ADMIN — Medication 650 MILLIGRAM(S): at 11:26

## 2024-01-22 RX ADMIN — Medication 600 MILLIGRAM(S): at 21:29

## 2024-01-22 RX ADMIN — Medication 600 MILLIGRAM(S): at 10:00

## 2024-01-22 RX ADMIN — Medication 650 MILLIGRAM(S): at 00:46

## 2024-01-22 RX ADMIN — Medication 600 MILLIGRAM(S): at 16:00

## 2024-01-22 RX ADMIN — Medication 650 MILLIGRAM(S): at 18:30

## 2024-01-22 RX ADMIN — Medication 600 MILLIGRAM(S): at 15:23

## 2024-01-22 RX ADMIN — Medication 600 MILLIGRAM(S): at 09:21

## 2024-01-22 RX ADMIN — SODIUM CHLORIDE 70 MILLILITER(S): 9 INJECTION, SOLUTION INTRAVENOUS at 09:24

## 2024-01-22 NOTE — PROGRESS NOTE ADULT - SUBJECTIVE AND OBJECTIVE BOX
SUBJECTIVE:  Pt seen & examined at bedside. No acute events overnight.  Afebrile, hypotensive overnight to 90s/50s    Vital Signs Last 24 Hrs  T(C): 36.3 (22 Jan 2024 05:55), Max: 36.8 (21 Jan 2024 09:44)  T(F): 97.4 (22 Jan 2024 05:55), Max: 98.2 (21 Jan 2024 09:44)  HR: 78 (22 Jan 2024 05:55) (61 - 78)  BP: 109/74 (22 Jan 2024 05:55) (93/59 - 109/74)  BP(mean): --  RR: 17 (22 Jan 2024 05:55) (16 - 18)  SpO2: 100% (22 Jan 2024 05:55) (95% - 100%)    Parameters below as of 22 Jan 2024 05:55  Patient On (Oxygen Delivery Method): room air      PE:  Right mandible dehiscence with exposure of mandible bone, hardware, black eschar. Xeroform dressing applied by PRS.    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    - care per OMFS/PRS  - ENT available for assistance  - Medicine recs appreciated

## 2024-01-22 NOTE — DIETITIAN INITIAL EVALUATION ADULT - PERTINENT MEDS FT
MEDICATIONS  (STANDING):  acetaminophen   Oral Liquid .. 650 milliGRAM(s) Oral every 6 hours  enoxaparin Injectable 40 milliGRAM(s) SubCutaneous every 24 hours  ibuprofen  Suspension. 600 milliGRAM(s) Oral every 6 hours  influenza   Vaccine 0.5 milliLiter(s) IntraMuscular once  lactated ringers. 1000 milliLiter(s) (70 mL/Hr) IV Continuous <Continuous>    MEDICATIONS  (PRN):  guaiFENesin Oral Liquid (Sugar-Free) 100 milliGRAM(s) Oral every 6 hours PRN Cough

## 2024-01-22 NOTE — DIETITIAN INITIAL EVALUATION ADULT - OTHER INFO
Patient is A&O x3 at baseline.  Seen for nutrition risk of low BMI.  Patient is tolerating a diet order of soft and bite size due to bleeding s/p R mandibulectomy.  Pt reported poor PO intake due to dislike the texture of foods, had been tolerating full liquids since admission 1/18/24 x3 days.  Food and fluid preferences discussed; see above.  No nausea, vomit, diarrhea, constipation, not on bowel regimen at this time, last BM reported ( 1/21 ) per RN flowsheets.  Skin noted no edema with surgical wound on R mandible and R neck to midline per RN flowsheets.    CBW 42 kg/92.4 lbs (1-22), RD remeasured via bed scale today confirmed @ 41.7kg (1-22), height 162.6cm , BMI 15.8,  UBW reported~ 115-120 lbs.  Per [facility] weight history: 54.4kg (1-17-24), 54.4kg (11-15-23), 53.5 kg (7-15-23), 54.2kg (1-20-23).  Noted a 12.4 kg/27.2 lbs (22.7%) weight loss in 5 days? and 2 months, a -11.5 kg /25.3 lbs (21.4%) loss in 6 month, and -12.2kg/26.8 lbs (22.5%) in 1 year.  Patient states she gained some weight PTA (@126 lbs) via standing scale at home.  Unable to identify accurate weight change due to great discrepancy and limited information.  Patient visually observed s/s fat/muscle wasting from appearance.  Labs ( 1/22 ) reviewed Ca 8L.  Patient is on lactated ringers for hydration, amenable for supplement Ensure Plus HP 2x/day (700kcal, 40gm protein) -chocolate flavor.  Pt is meeting criteria for malnutrition at this time.

## 2024-01-22 NOTE — DIETITIAN INITIAL EVALUATION ADULT - RD TO REMAIN AVAILABLE
OFFICE VISIT    ASSESSMENT & PLAN    Larry Tadeo is a 49 year old male who presents for:  1. Annual physical exam  - CBC WITH DIFFERENTIAL; Future  - COMPREHENSIVE METABOLIC PANEL; Future  - THYROID STIMULATING HORMONE REFLEX; Future  - LIPID PANEL WITH REFLEX; Future    2. Skull enlargement - patient with large bilateral temples, right larger than left. Likely normal anatomy vs lipoma. Patient sent for imaging to rule out other pathology.   - OFFICE OR OTHER OUTPT VISIT EST PT 30 TO 39 MINS MOD MDM LVL 4  - US SOFT TISSUE NECK OR HEAD; Future    3. Dystrophic nail - bilateral   - OTC antifungal x 6 months    4. Essential hypertension - at goal   - Continue amlodipine, losartan     5. Need for hepatitis C screening test  - HEPATITIS C ANTIBODY WITH REFLEX; Future    6. Need for vaccination  - TETANUS DIPHTHERIA ACELLULAR PERTUSSIS VACC, 10+ YRS (BOOSTRIX)     Health Maintenance Summary     Hepatitis B Vaccine (1 of 3 - 3-dose series)  Overdue - never done    COVID-19 Vaccine (3 - Booster for Pfizer series)  Overdue since 12/21/2021    Shingles Vaccine (1 of 2)  Overdue - never done    Influenza Vaccine (Season Ended)  Next due on 9/1/2023    Depression Screening (Yearly)  Next due on 3/28/2024    Colorectal Cancer Risk - Colonoscopy (Every 10 Years)  Next due on 8/29/2032    DTaP/Tdap/Td Vaccine (7 - Td or Tdap)  Next due on 3/28/2033    Hepatitis C Screening   Completed    Meningococcal Vaccine   Aged Out    HPV Vaccine   Aged Out    Pneumococcal Vaccine 0-64   Aged Out          Follow-up: Return in about 6 months (around 9/28/2023) for follow up; 1  year cpx.   Seek additional medical care if worsening or not improving. Red flags discussed.  Instructed patient on correct medication dosing, usage and adverse effects (if applicable).  All questions and concerns discussed. Patient / representative agreeable to plan.    ANNUAL PHYSICAL EXAM    HISTORY    Larry Tadeo is a 49 year old male who presents  for:    # Physical exam    # Essential hypertension   MEDS: amlodipine, losartan     # Bilateral temple enlargement  DURATION: 3 years  HISTORY: patient noticed his temples are larger, the right side more than the left  He eats a lot of trail mix and thinks it may be due to that  PAIN: 0/10  MEDS tried for this issue: none    # Toenail fungus  DURATION: 3 years  HISTORY: has tried OTC medication on and off, which has not helped  PAIN: 0/10    ADDITIONAL Review of systems  Denies: Fever, chest pain, shortness of breath    PHYSICAL EXAM    Vital Signs:    Visit Vitals  /86   Pulse 83   Temp 98.1 °F (36.7 °C) (Temporal)   Ht 5' 9\" (1.753 m)   Wt 90.5 kg (199 lb 9.6 oz)   SpO2 98%   BMI 29.48 kg/m²     Constitutional:  No acute distress. Well-developed.  Skin:  Warm. Dry.   - Left foot: dystrophic toenails 2-5  - Right foot: dystrophic toenails 1-5  Head: Normocephalic, atraumatic    - Bilateral temples enlarged, right more than left, non tender to palpation, no discrete palpable masses  Eyes:  Normal conjunctivae. Pupils equal, round, reactive to light and accommodation. Extraocular muscles intact.   Ears, nose, mouth and throat:  Oral mucosa moist. No pharyngeal erythema or tonsillar exudate. Normal external ears and auditory canals. Tympanic membranes gray, translucent bilaterally. Normal external nose. Normal nasal turbinates.   Neck:  Supple. Nontender to palpation.  Lymphatic:  No cervical or supraclavicular lymphadenopathy.  Respiratory:  Lungs are clear to auscultation bilaterally. Respirations are nonlabored. Breath sounds are equal. No wheezing, rales, or rhonchi.   Cardiovascular:  Regular rate and rhythm. No murmurs. No edema. 2+ dorsalis pedis pulses.  Gastrointestinal:  Soft. Nontender. Nondistended. Normal bowel sounds.  Musculoskeletal:  Normal gait. Calves nontender to palpation. Moves extremities spontaneously.  Neurologic:  No focal neurological deficits observed. Normal speech observed. Cranial  nerves II-XII grossly intact. 2+ patellar reflexes.  Psychiatric:  Alert and awake. Normal mood and affect. Responds appropriately to questions and commands.    Current Outpatient Medications   Medication Sig Dispense Refill   • amLODIPine (NORVASC) 10 MG tablet TAKE ONE TABLET BY MOUTH ONE TIME DAILY 90 tablet 1   • losartan (COZAAR) 50 MG tablet TAKE ONE TABLET BY MOUTH ONE TIME DAILY 90 tablet 3     No current facility-administered medications for this visit.     ALLERGIES:  No Known Allergies  Past Medical History:   Diagnosis Date   • Bronchitis    • Essential (primary) hypertension    • Fracture     right ankle   • Substance abuse (CMD)      History reviewed. No pertinent surgical history.  Social History     Socioeconomic History   • Marital status: /Civil Union     Spouse name: Not on file   • Number of children: Not on file   • Years of education: Not on file   • Highest education level: Not on file   Occupational History   • Not on file   Tobacco Use   • Smoking status: Former     Packs/day: 0.25     Years: 3.00     Pack years: 0.75     Types: Cigarettes     Start date: 3/22/1995     Quit date: 1997     Years since quittin.2   • Smokeless tobacco: Never   Vaping Use   • Vaping Use: never used   Substance and Sexual Activity   • Alcohol use: Not Currently   • Drug use: No   • Sexual activity: Not on file   Other Topics Concern   • Not on file   Social History Narrative   • Not on file     Social Determinants of Health     Financial Resource Strain: Not on file   Food Insecurity: Not on file   Transportation Needs: Not on file   Physical Activity: Not on file   Stress: Not on file   Social Connections: Not on file   Intimate Partner Violence: Not At Risk   • Social Determinants: Intimate Partner Violence Past Fear: No   • Social Determinants: Intimate Partner Violence Current Fear: No     Family History   Problem Relation Age of Onset   • Patient is unaware of any medical problems Mother    •  Diabetes Father      Family Status   Relation Name Status   • Mo  Alive   • Fa  Alive       Explained to patient who is agreeable to separate charge for acute issue.    Carl Torres MD, MA  Family Medicine with Obstetrics  [address] [address] [place] [address]  Telephone: [phone]           Fax: [phone]    yes

## 2024-01-22 NOTE — DIETITIAN INITIAL EVALUATION ADULT - ETIOLOGY
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

## 2024-01-22 NOTE — DIETITIAN INITIAL EVALUATION ADULT - ORAL INTAKE PTA/DIET HISTORY
NKFA.  Dislike foods textures, likes mashed potatoes, chocolate/strawberry pudding/shakes/ice cream.  No coffee, give tea.  Prefers whole milk.

## 2024-01-22 NOTE — DIETITIAN INITIAL EVALUATION ADULT - ADD RECOMMEND
1. Advance diet consistency when medically feasible per MD and team  2. Recommend diet add Ensure Plus HP 2x/day (700kcal, 40gm protein)   3. Nursing to document PO in RN flow sheets and monitor weekly weights.   4. Continue to monitor skin integrity, bowel regimen, and nutrition pertinent labs.

## 2024-01-22 NOTE — PROGRESS NOTE ADULT - ASSESSMENT
57 year old female with history of SCC and right mandibulectomy, R SND, R FFF complicated by dehiscence who presents after hemorrhage from wound. Wound currently hemostatic w/ exposed plate and bone    Recommendations:    - Recommending wound vac with adaptic and silver sponge over R mandible wound, change q3d  - OK for d/c from PRS perspective after wound vac placed  - Daily collagenase with saline gauze WTD dressings over RLE skin graft  - Monitor for bleeding over weekend before considering possible wound vac  - CTA w/ ascending palatine artery as possible source of bleed, consider possible IR embolization

## 2024-01-22 NOTE — PROGRESS NOTE ADULT - ASSESSMENT
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 Post-op course c/b some dehiscence on the right mandible and low grade infection on RLE. Yesterday, pt presented to [facility] ED as a transfer from [facility] ED w/ cc of hemorrhaging from the wound, wound dehiscence and erosion w/ exposed hardware. Dehiscence site w/ no active bleeding, no purulent drainage, pt not in acute distress.   - C/w wound care  - No Antibiotics  - No Lovenox  - Pending d/c

## 2024-01-22 NOTE — DIETITIAN INITIAL EVALUATION ADULT - REASON FOR ADMISSION
Anemia due to acute blood loss.  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 Post-op course c/b some dehiscence on the right mandible and low grade infection on RLE. Yesterday, pt presented to [facility] ED as a transfer from [facility] ED w/ cc of hemorrhaging from the wound, wound dehiscence and erosion w/ exposed hardware. Dehiscence site w/ no active bleeding, no purulent drainage, pt not in acute distress.

## 2024-01-22 NOTE — PROGRESS NOTE ADULT - SUBJECTIVE AND OBJECTIVE BOX
Plastic Surgery Progress Note (pg [facility]: [id number], [facility]: [phone])    SUBJECTIVE  The patient was seen and examined. No acute events overnight. Pt states her wound has not bled over the weekend at all. Pain controlled, afebrile w/ stable vitals.     OBJECTIVE  ___________________________________________________  VITAL SIGNS / I&O's   Vital Signs Last 24 Hrs  T(C): 36.3 (22 Jan 2024 05:55), Max: 36.8 (21 Jan 2024 09:44)  T(F): 97.4 (22 Jan 2024 05:55), Max: 98.2 (21 Jan 2024 09:44)  HR: 78 (22 Jan 2024 05:55) (61 - 78)  BP: 109/74 (22 Jan 2024 05:55) (93/59 - 109/74)  BP(mean): --  RR: 17 (22 Jan 2024 05:55) (16 - 18)  SpO2: 100% (22 Jan 2024 05:55) (95% - 100%)    Parameters below as of 22 Jan 2024 05:55  Patient On (Oxygen Delivery Method): room air          21 Jan 2024 07:01  -  22 Jan 2024 07:00  --------------------------------------------------------  IN:    Lactated Ringers: 1470 mL    Oral Fluid: 100 mL  Total IN: 1570 mL    OUT:    Voided (mL): 0 mL  Total OUT: 0 mL    Total NET: 1570 mL        ___________________________________________________  PHYSICAL EXAM    -- CONSTITUTIONAL: NAD, lying in bed  -- NEURO: Awake, alert  -- HEENT: Open right mandible wound, currently no signs of active bleeding. Visible bone and metal plating. Xeroform/gauze gressing is clean dry and intact.   -- NECK: Soft, no asymmetry  -- PULM: Non-labored respirations, equal chest rise bilaterally, patient with intermittent cough  -- ABDOMEN: Nondistended  -- EXTREMITIES: RLE skin graft w/ small areas of poor take proximally  -- PSYCH: Affect normal, A&Ox3    ___________________________________________________  LABS                        8.5    7.08  )-----------( 233      ( 21 Jan 2024 05:05 )             26.0     21 Jan 2024 05:05    135    |  103    |  14     ----------------------------<  73     3.8     |  22     |  0.79     Ca    8.2        21 Jan 2024 05:05  Phos  2.7       21 Jan 2024 05:05  Mg     1.70      21 Jan 2024 05:05        CAPILLARY BLOOD GLUCOSE            Urinalysis Basic - ( 21 Jan 2024 05:05 )    Color: x / Appearance: x / SG: x / pH: x  Gluc: 73 mg/dL / Ketone: x  / Bili: x / Urobili: x   Blood: x / Protein: x / Nitrite: x   Leuk Esterase: x / RBC: x / WBC x   Sq Epi: x / Non Sq Epi: x / Bacteria: x      ___________________________________________________  MICRO  Recent Cultures:    ___________________________________________________  MEDICATIONS  (STANDING):  acetaminophen   Oral Liquid .. 650 milliGRAM(s) Oral every 6 hours  enoxaparin Injectable 40 milliGRAM(s) SubCutaneous every 24 hours  ibuprofen  Suspension. 600 milliGRAM(s) Oral every 6 hours  influenza   Vaccine 0.5 milliLiter(s) IntraMuscular once  lactated ringers. 1000 milliLiter(s) (70 mL/Hr) IV Continuous <Continuous>    MEDICATIONS  (PRN):  guaiFENesin Oral Liquid (Sugar-Free) 100 milliGRAM(s) Oral every 6 hours PRN Cough

## 2024-01-22 NOTE — DIETITIAN INITIAL EVALUATION ADULT - PERTINENT LABORATORY DATA
01-22    136  |  103  |  10  ----------------------------<  72  3.5   |  24  |  0.73    Ca    8.0<L>      22 Jan 2024 06:08  Phos  2.7     01-22  Mg     1.60     01-22    A1C with Estimated Average Glucose Result: 5.3 % (11-21-23 @ 17:14)

## 2024-01-22 NOTE — PROGRESS NOTE ADULT - SUBJECTIVE AND OBJECTIVE BOX
Patient examined bedside resting comfortably , AVSS, pain well controlled. No evidence of bleeding. R. neck w/ hydrogel, xeroform over bone and plate, aquacel over soft tissues of wound.      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 Since d/c from the hospital on 12/4/23, post-op course c/b some dehiscence on the mandible and low grade infection on RLE, for which transcutaneous mandibular dehiscence area was cleaned w/ saline, packed w/ Kerlix, protected w/ gauze on 12/20/2023 in the outpatient clinic.     Pt presents today to [facility] ED as a transfer from [facility] ED w/ cc of hemorrhaging from the wound, wound dehiscence and erosion w/ exposed hardware. Pt otherwise denies recent hx of f/c/n/v, SOB, CP, changes in pain, trismus, dysphagia.    (18 Jan 2024 23:06)      Labs:                         8.5    7.08  )-----------( 233      ( 21 Jan 2024 05:05 )             26.0     I&O's Detail    20 Jan 2024 07:01  -  21 Jan 2024 07:00  --------------------------------------------------------  IN:    Lactated Ringers: 1540 mL  Total IN: 1540 mL    OUT:  Total OUT: 0 mL    Total NET: 1540 mL      21 Jan 2024 07:01  -  22 Jan 2024 06:53  --------------------------------------------------------  IN:    Lactated Ringers: 1470 mL    Oral Fluid: 100 mL  Total IN: 1570 mL    OUT:    Voided (mL): 0 mL  Total OUT: 0 mL    Total NET: 1570 mL        Vital Signs Last 24 Hrs  T(C): 36.3 (22 Jan 2024 05:55), Max: 36.8 (21 Jan 2024 09:44)  T(F): 97.4 (22 Jan 2024 05:55), Max: 98.2 (21 Jan 2024 09:44)  HR: 78 (22 Jan 2024 05:55) (61 - 78)  BP: 109/74 (22 Jan 2024 05:55) (93/59 - 109/74)  BP(mean): --  RR: 17 (22 Jan 2024 05:55) (16 - 18)  SpO2: 100% (22 Jan 2024 05:55) (95% - 100%)    Parameters below as of 22 Jan 2024 05:55  Patient On (Oxygen Delivery Method): room air          01-20-24 @ 07:01 - 01-21-24 @ 07:00  --------------------------------------------------------  IN: 1540 mL / OUT: 0 mL / NET: 1540 mL    01-21-24 @ 07:01  - 01-22-24 @ 06:53  --------------------------------------------------------  IN: 1570 mL / OUT: 0 mL / NET: 1570 mL          Physical Exam:   Gen: AAOX3, NAD,   H: Surgical site on the right sided transcutaneous dehiscence in the mandibular border, hemostatic, no purulent drainage, minimal tenderness to palpation.  E: PERRL, EOMI b/l  E: no otorrhea, hearing at baseline  N: nares patent b/l, no rhinorrhea  Maxillofacial: introral surgical sites intact, no erythema, no purulent drainage, no tenderness on palpation, soft, OMER~35mm  Neck: Flat, supple, no lymphadenopathy, trachea midline, no masses  Lymphatic: No lymphadenopathy  Resp: breathing easily, no stridor  CV: no peripheral edema/cyanosis  GI: nondistended   Neuro: CN7 intact b/l, no facial droop

## 2024-01-23 ENCOUNTER — APPOINTMENT (OUTPATIENT)
Age: 59
End: 2024-01-23

## 2024-01-23 ENCOUNTER — TRANSCRIPTION ENCOUNTER (OUTPATIENT)
Age: 59
End: 2024-01-23

## 2024-01-23 LAB
ANION GAP SERPL CALC-SCNC: 9 MMOL/L — SIGNIFICANT CHANGE UP (ref 7–14)
BUN SERPL-MCNC: 11 MG/DL — SIGNIFICANT CHANGE UP (ref 7–23)
CALCIUM SERPL-MCNC: 8.1 MG/DL — LOW (ref 8.4–10.5)
CHLORIDE SERPL-SCNC: 103 MMOL/L — SIGNIFICANT CHANGE UP (ref 98–107)
CO2 SERPL-SCNC: 24 MMOL/L — SIGNIFICANT CHANGE UP (ref 22–31)
CREAT SERPL-MCNC: 0.79 MG/DL — SIGNIFICANT CHANGE UP (ref 0.5–1.3)
EGFR: 86 ML/MIN/1.73M2 — SIGNIFICANT CHANGE UP
GLUCOSE SERPL-MCNC: 77 MG/DL — SIGNIFICANT CHANGE UP (ref 70–99)
HCT VFR BLD CALC: 27.1 % — LOW (ref 34.5–45)
HGB BLD-MCNC: 9 G/DL — LOW (ref 11.5–15.5)
MAGNESIUM SERPL-MCNC: 1.6 MG/DL — SIGNIFICANT CHANGE UP (ref 1.6–2.6)
MCHC RBC-ENTMCNC: 28.9 PG — SIGNIFICANT CHANGE UP (ref 27–34)
MCHC RBC-ENTMCNC: 33.2 GM/DL — SIGNIFICANT CHANGE UP (ref 32–36)
MCV RBC AUTO: 87.1 FL — SIGNIFICANT CHANGE UP (ref 80–100)
NRBC # BLD: 0 /100 WBCS — SIGNIFICANT CHANGE UP (ref 0–0)
NRBC # FLD: 0 K/UL — SIGNIFICANT CHANGE UP (ref 0–0)
PHOSPHATE SERPL-MCNC: 2.5 MG/DL — SIGNIFICANT CHANGE UP (ref 2.5–4.5)
PLATELET # BLD AUTO: 328 K/UL — SIGNIFICANT CHANGE UP (ref 150–400)
POTASSIUM SERPL-MCNC: 3.6 MMOL/L — SIGNIFICANT CHANGE UP (ref 3.5–5.3)
POTASSIUM SERPL-SCNC: 3.6 MMOL/L — SIGNIFICANT CHANGE UP (ref 3.5–5.3)
RBC # BLD: 3.11 M/UL — LOW (ref 3.8–5.2)
RBC # FLD: 17.2 % — HIGH (ref 10.3–14.5)
SODIUM SERPL-SCNC: 136 MMOL/L — SIGNIFICANT CHANGE UP (ref 135–145)
WBC # BLD: 4.93 K/UL — SIGNIFICANT CHANGE UP (ref 3.8–10.5)
WBC # FLD AUTO: 4.93 K/UL — SIGNIFICANT CHANGE UP (ref 3.8–10.5)

## 2024-01-23 RX ADMIN — ENOXAPARIN SODIUM 40 MILLIGRAM(S): 100 INJECTION SUBCUTANEOUS at 07:45

## 2024-01-23 RX ADMIN — Medication 650 MILLIGRAM(S): at 00:41

## 2024-01-23 RX ADMIN — SODIUM CHLORIDE 70 MILLILITER(S): 9 INJECTION, SOLUTION INTRAVENOUS at 20:30

## 2024-01-23 RX ADMIN — Medication 650 MILLIGRAM(S): at 17:46

## 2024-01-23 RX ADMIN — Medication 650 MILLIGRAM(S): at 12:00

## 2024-01-23 RX ADMIN — Medication 650 MILLIGRAM(S): at 11:33

## 2024-01-23 RX ADMIN — Medication 650 MILLIGRAM(S): at 18:05

## 2024-01-23 RX ADMIN — SODIUM CHLORIDE 70 MILLILITER(S): 9 INJECTION, SOLUTION INTRAVENOUS at 11:33

## 2024-01-23 RX ADMIN — Medication 1 APPLICATION(S): at 17:50

## 2024-01-23 NOTE — CHART NOTE - NSCHARTNOTEFT_GEN_A_CORE
Wound V.A.C Dressing Change Note    Date: 01-23-24    Wound Etiology: Surgically created    Wound Location:  R mandible    Wound Description: Full thickness wound. Cleanbase, tan/pink tissue visible. Visible bone, metal hardware plating.     Wound Size: 2.2 cm vertical x 4cm horizontal x  0.4cm Deep.     Vac Dressing Used: Silver granulofoam sponge over Adaptic    Vac settings: -125mm Hg, continuous    Next dressing Change: Wednesday Jan 24 Wound V.A.C Dressing Change Note    Date: 01-22-24    Wound Etiology: Surgically created    Wound Location:  R mandible    Wound Description: Full thickness wound. Cleanbase, tan/pink tissue visible. Visible bone, metal hardware plating.     Wound Size: 2.2 cm vertical x 4cm horizontal x  0.4cm Deep.     Vac Dressing Used: Silver granulofoam sponge over Adaptic    Vac settings: -125mm Hg, continuous    Next dressing Change: Wednesday Jan 24

## 2024-01-23 NOTE — PROGRESS NOTE ADULT - SUBJECTIVE AND OBJECTIVE BOX
Plastic Surgery Progress Note (pg [facility]: [id number], NS: [phone])    SUBJECTIVE  The patient was seen and examined. No acute events overnight. Pain controlled, afebrile w/ stable vitals. Wound vac placed yesterday in PM    OBJECTIVE  ___________________________________________________  VITAL SIGNS / I&O's   Vital Signs Last 24 Hrs  T(C): 36.4 (23 Jan 2024 06:00), Max: 36.5 (22 Jan 2024 10:00)  T(F): 97.5 (23 Jan 2024 06:00), Max: 97.7 (22 Jan 2024 10:00)  HR: 68 (23 Jan 2024 06:00) (64 - 76)  BP: 103/65 (23 Jan 2024 06:00) (103/65 - 112/66)  BP(mean): --  RR: 17 (23 Jan 2024 06:00) (17 - 18)  SpO2: 95% (23 Jan 2024 06:00) (95% - 99%)    Parameters below as of 23 Jan 2024 06:00  Patient On (Oxygen Delivery Method): room air          22 Jan 2024 07:01  -  23 Jan 2024 07:00  --------------------------------------------------------  IN:    Lactated Ringers: 1540 mL    Oral Fluid: 1800 mL  Total IN: 3340 mL    OUT:  Total OUT: 0 mL    Total NET: 3340 mL        ___________________________________________________  PHYSICAL EXAM    -- CONSTITUTIONAL: NAD, lying in bed  -- NEURO: Awake, alert  -- HEENT: Wound vac over R mandible holding suction w/ minimal output  -- NECK: Soft, no asymmetry  -- PULM: Non-labored respirations, equal chest rise bilaterally, patient with intermittent cough  -- ABDOMEN: Nondistended  -- EXTREMITIES: RLE skin graft w/ small areas of poor take proximally  -- PSYCH: Affect normal, A&Ox3    ___________________________________________________  LABS                        8.6    5.98  )-----------( 302      ( 22 Jan 2024 06:08 )             26.5     22 Jan 2024 06:08    136    |  103    |  10     ----------------------------<  72     3.5     |  24     |  0.73     Ca    8.0        22 Jan 2024 06:08  Phos  2.7       22 Jan 2024 06:08  Mg     1.60      22 Jan 2024 06:08        CAPILLARY BLOOD GLUCOSE            Urinalysis Basic - ( 22 Jan 2024 06:08 )    Color: x / Appearance: x / SG: x / pH: x  Gluc: 72 mg/dL / Ketone: x  / Bili: x / Urobili: x   Blood: x / Protein: x / Nitrite: x   Leuk Esterase: x / RBC: x / WBC x   Sq Epi: x / Non Sq Epi: x / Bacteria: x      ___________________________________________________  MICRO  Recent Cultures:    ___________________________________________________  MEDICATIONS  (STANDING):  acetaminophen   Oral Liquid .. 650 milliGRAM(s) Oral every 6 hours  collagenase Ointment 1 Application(s) Topical daily  enoxaparin Injectable 40 milliGRAM(s) SubCutaneous every 24 hours  ibuprofen  Suspension. 600 milliGRAM(s) Oral every 6 hours  influenza   Vaccine 0.5 milliLiter(s) IntraMuscular once  lactated ringers. 1000 milliLiter(s) (70 mL/Hr) IV Continuous <Continuous>    MEDICATIONS  (PRN):  guaiFENesin Oral Liquid (Sugar-Free) 100 milliGRAM(s) Oral every 6 hours PRN Cough

## 2024-01-23 NOTE — PROGRESS NOTE ADULT - ASSESSMENT
57 year old female with history of SCC and right mandibulectomy, R SND, R FFF complicated by dehiscence who presents after hemorrhage from wound. Wound currently hemostatic w/ exposed plate and bone    Recommendations:    - S/p wound vac with adaptic and silver sponge over R mandible wound, change MWF  - OK for d/c from PRS perspective after wound vac placed. Vac paperwork completed  - Daily collagenase with saline gauze WTD dressings over RLE skin graft  - Monitor for bleeding over weekend before considering possible wound vac  - CTA w/ ascending palatine artery as possible source of bleed, consider possible IR embolization 57 year old female with history of SCC and right mandibulectomy, R SND, R FFF complicated by dehiscence who presents after hemorrhage from wound. Wound currently hemostatic w/ exposed plate and bone    Recommendations:    - S/p wound vac with adaptic and silver sponge over R mandible wound, change MWF  - OK for d/c from PRS perspective after 1st wound vac change on Wednesday 1/24  . Vac paperwork completed  - Daily collagenase with saline gauze WTD dressings over RLE skin graft  - Monitor for bleeding over weekend before considering possible wound vac  - CTA w/ ascending palatine artery as possible source of bleed, consider possible IR embolization

## 2024-01-23 NOTE — DISCHARGE NOTE NURSING/CASE MANAGEMENT/SOCIAL WORK - PATIENT PORTAL LINK FT
You can access the FollowMyHealth Patient Portal offered by [facility] by registering at the following website: [email or web address]. By joining [facility]’s FollowMyHealth portal, you will also be able to view your health information using other applications (apps) compatible with our system.

## 2024-01-23 NOTE — DISCHARGE NOTE NURSING/CASE MANAGEMENT/SOCIAL WORK - NSDCPEFALRISK_GEN_ALL_CORE
For information on Fall & Injury Prevention, visit: [email or web address] OR  [email or web address] OR  https://www.cdc.gov/steadi/patient.html

## 2024-01-23 NOTE — PROGRESS NOTE ADULT - ASSESSMENT
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 Post-op course c/b some dehiscence on the right mandible and low grade infection on RLE. Pt presented to [facility] ED as a transfer from [facility] ED w/ cc of hemorrhaging from the wound, wound dehiscence and erosion w/ exposed hardware. Dehiscence site w/ no active bleeding, no purulent drainage, pt not in acute distress.     Plan:  - C/w wound care  - No Antibiotics  - No Lovenox  - D/c likely tomorrow following wound vac change by Plastics      Dany Porter DMD  Oral and Maxillofacial Surgery  [facility] Pager [phone]  Available on Microsoft Teams

## 2024-01-23 NOTE — DISCHARGE NOTE NURSING/CASE MANAGEMENT/SOCIAL WORK - NSDCDMETYPESERV_GEN_ALL_CORE_FT
Your wound vac was ordered from [facility] [phone], scheduled to be delivered to your home on 1/24/24. Your wound vac was ordered from [facility] [phone], scheduled to be delivered to your home on 1/25/24.

## 2024-01-23 NOTE — PROGRESS NOTE ADULT - SUBJECTIVE AND OBJECTIVE BOX
SUBJECTIVE:  Pt seen & examined at bedside. No acute events overnight.  AVSS, no bleeding    ICU Vital Signs Last 24 Hrs  T(C): 36.4 (23 Jan 2024 06:00), Max: 36.5 (22 Jan 2024 10:00)  T(F): 97.5 (23 Jan 2024 06:00), Max: 97.7 (22 Jan 2024 10:00)  HR: 68 (23 Jan 2024 06:00) (64 - 76)  BP: 103/65 (23 Jan 2024 06:00) (103/65 - 112/66)  BP(mean): --  ABP: --  ABP(mean): --  RR: 17 (23 Jan 2024 06:00) (17 - 18)  SpO2: 95% (23 Jan 2024 06:00) (95% - 99%)    O2 Parameters below as of 23 Jan 2024 06:00  Patient On (Oxygen Delivery Method): room air              PE:  Right mandible dehiscence with exposure of mandible bone, hardware, black eschar. Xeroform dressing applied by PRS.    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    - care per OMFS/PRS  - ENT available for assistance  - Medicine recs appreciated

## 2024-01-24 VITALS
SYSTOLIC BLOOD PRESSURE: 115 MMHG | RESPIRATION RATE: 18 BRPM | TEMPERATURE: 98 F | OXYGEN SATURATION: 98 % | DIASTOLIC BLOOD PRESSURE: 70 MMHG | HEART RATE: 80 BPM

## 2024-01-24 RX ADMIN — Medication 1 APPLICATION(S): at 11:49

## 2024-01-24 RX ADMIN — Medication 650 MILLIGRAM(S): at 11:49

## 2024-01-24 RX ADMIN — Medication 650 MILLIGRAM(S): at 00:50

## 2024-01-24 RX ADMIN — Medication 650 MILLIGRAM(S): at 12:11

## 2024-01-24 RX ADMIN — ENOXAPARIN SODIUM 40 MILLIGRAM(S): 100 INJECTION SUBCUTANEOUS at 08:04

## 2024-01-24 RX ADMIN — Medication 650 MILLIGRAM(S): at 05:50

## 2024-01-24 NOTE — PROGRESS NOTE ADULT - ASSESSMENT
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 Post-op course c/b some dehiscence on the right mandible and low grade infection on RLE. Pt presented to [facility] ED as a transfer from [facility] ED w/ cc of hemorrhaging from the wound, wound dehiscence and erosion w/ exposed hardware. Dehiscence site w/ no active bleeding, no purulent drainage, pt not in acute distress.     Plan:  - C/w wound care  - No Antibiotics  - No Lovenox  - D/c this afternoon following wound vac change by Plastics

## 2024-01-24 NOTE — PROGRESS NOTE ADULT - NUTRITIONAL ASSESSMENT
This patient has been assessed with a concern for Malnutrition and has been determined to have a diagnosis/diagnoses of Severe protein-calorie malnutrition and Underweight (BMI < 19).    This patient is being managed with:   Diet Soft and Bite Sized-  Supplement Feeding Modality:  Oral  Ensure Plus High Protein Cans or Servings Per Day:  2       Frequency:  Daily  Entered: Jan 22 2024  3:03PM  
This patient has been assessed with a concern for Malnutrition and has been determined to have a diagnosis/diagnoses of Severe protein-calorie malnutrition and Underweight (BMI < 19).    This patient is being managed with:   Diet Regular-  Entered: Jan 23 2024  3:44PM  

## 2024-01-24 NOTE — PROGRESS NOTE ADULT - REASON FOR ADMISSION
wound dehiscence and erosion with exposed hardware. Recent hx of hemorrhaging from wound, transferred from [place] to [facility]
wound dehiscence and erosion with exposed hardware. Recent hx of hemorrhaging from wound, transferred from [place] to [facility]
wound dehiscence and erosion with exposed hardware. Recent hx of hemorrhaging from wound, transferred from [place] to [facility]
wound dehiscence and erosion with exposed hardware. Recent hx of hemorrhaging from wound, transferred from [place] to [facility]
wound dehiscence and erosion with exposed hardware. Recent hx of hemorrhaging from wound, transferred from [place] to [facility]
wound dehiscence and erosion with exposed hardware. Recent hx of hemorrhaging from wound, transferred from [place] to [facility]
wound dehiscence and erosion with exposed hardware. Recent hx of hemorrhaging from wound, transferred from [place] to [facility]
wound dehiscence and erosion with exposed hardware. Recent hx of hemorrhaging from wound, transferred from [place] to [facility]
wound dehiscence and erosion with exposed hardware. Recent hx of hemorrhaging from wound, transferred from [place] to [facility]
wound dehiscence and erosion with exposed hardware. Recent hx of hemorrhaging from wound, transferred from [place] to [facility]
wound dehiscence and erosion with exposed hardware. Recent hx of hemorrhaging from wound, transferred from [place] to [facility]
wound dehiscence and erosion with exposed hardware. Recent hx of hemorrhaging from wound, transferred from [place] [facility]
wound dehiscence and erosion with exposed hardware. Recent hx of hemorrhaging from wound, transferred from [place] to [facility]
wound dehiscence and erosion with exposed hardware. Recent hx of hemorrhaging from wound, transferred from [place] to [facility]
wound dehiscence and erosion with exposed hardware. Recent hx of hemorrhaging from wound, transferred from [place] to [facility]
wound dehiscence and erosion with exposed hardware. Recent hx of hemorrhaging from wound, transferred from [place] to [facility]

## 2024-01-24 NOTE — PROGRESS NOTE ADULT - SUBJECTIVE AND OBJECTIVE BOX
Patient examined bedside resting comfortably, AVSS, pain well controlled.    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 Since d/c from the hospital on 12/4/23, post-op course c/b some dehiscence on the mandible and low grade infection on RLE, for which transcutaneous mandibular dehiscence area was cleaned w/ saline, packed w/ Kerlix, protected w/ gauze on 12/20/2023 in the outpatient clinic.     Pt presents today to [facility] ED as a transfer from [facility] [place] [facility] ED w/ cc of hemorrhaging from the wound, wound dehiscence and erosion w/ exposed hardware. Pt otherwise denies recent hx of f/c/n/v, SOB, CP, changes in pain, trismus, dysphagia.    (18 Jan 2024 23:06)      Labs:                         9.0    4.93  )-----------( 328      ( 23 Jan 2024 06:45 )             27.1     I&O's Detail    22 Jan 2024 07:01  -  23 Jan 2024 07:00  --------------------------------------------------------  IN:    Lactated Ringers: 1540 mL    Oral Fluid: 1800 mL  Total IN: 3340 mL    OUT:  Total OUT: 0 mL    Total NET: 3340 mL      23 Jan 2024 07:01  -  24 Jan 2024 06:54  --------------------------------------------------------  IN:    Lactated Ringers: 1540 mL    Oral Fluid: 1920 mL  Total IN: 3460 mL    OUT:  Total OUT: 0 mL    Total NET: 3460 mL        Vital Signs Last 24 Hrs  T(C): 36.3 (24 Jan 2024 06:00), Max: 36.6 (23 Jan 2024 14:00)  T(F): 97.3 (24 Jan 2024 06:00), Max: 97.8 (23 Jan 2024 14:00)  HR: 67 (24 Jan 2024 06:00) (60 - 100)  BP: 105/82 (24 Jan 2024 06:00) (105/82 - 127/79)  BP(mean): --  RR: 17 (24 Jan 2024 06:00) (17 - 18)  SpO2: 100% (24 Jan 2024 06:00) (95% - 100%)    Parameters below as of 24 Jan 2024 06:00  Patient On (Oxygen Delivery Method): room air          01-22-24 @ 07:01 - 01-23-24 @ 07:00  --------------------------------------------------------  IN: 3340 mL / OUT: 0 mL / NET: 3340 mL    01-23-24 @ 07:01  - 01-24-24 @ 06:54  --------------------------------------------------------  IN: 3460 mL / OUT: 0 mL / NET: 3460 mL        Physical Exam:   Gen: AAOX3, NAD,   H: Surgical site on the right sided transcutaneous dehiscence in the mandibular border, hemostatic, no purulent drainage, minimal tenderness to palpation. Wound Vac in place.   E: PERRL, EOMI b/l  E: no otorrhea, hearing at baseline  N: nares patent b/l, no rhinorrhea  Maxillofacial: introral surgical sites intact, no erythema, no purulent drainage, no tenderness on palpation, soft, OMER~35mm  Neck: Flat, supple, no lymphadenopathy, trachea midline, no masses  Lymphatic: No lymphadenopathy  Resp: breathing easily, no stridor  CV: no peripheral edema/cyanosis  GI: nondistended   Neuro: CN7 intact b/l, no facial droop

## 2024-01-24 NOTE — PROGRESS NOTE ADULT - ASSESSMENT
57 year old female with history of SCC and right mandibulectomy, R SND, R FFF complicated by dehiscence who presents after hemorrhage from wound. Wound currently hemostatic w/ exposed plate and bone    Recommendations:    - S/p wound vac with adaptic and silver sponge over R mandible wound, change MWF  - OK for d/c from PRS perspective after 1st wound vac change on Wednesday 1/24  . Vac paperwork completed  - Daily collagenase with saline gauze WTD dressings over RLE skin graft  - Monitor for bleeding over weekend before considering possible wound vac  - CTA w/ ascending palatine artery as possible source of bleed, consider possible IR embolization 57 year old female with history of SCC and right mandibulectomy, R SND, R FFF complicated by dehiscence who presents after hemorrhage from wound. Wound currently hemostatic w/ exposed plate and bone    Recommendations:    - S/p wound vac with adaptic and silver sponge over R mandible wound, change MWF  - Vac to be delivered home 1/25; can leave today 1/24 after wound vac taken down and replaced with WTD  . Vac paperwork completed  - Daily collagenase with saline gauze WTD dressings over RLE skin graft  - CTA w/ ascending palatine artery as possible source of bleed, consider possible IR embolization if re-bleed    Plastic Surgery   [facility]: [id number]  [facility]: [phone]

## 2024-01-24 NOTE — PROGRESS NOTE ADULT - SUBJECTIVE AND OBJECTIVE BOX
SUBJECTIVE:  Pt seen & examined at bedside. TEE MOSHER.  Pain well controlled, transferred to floor    Vital Signs Last 24 Hrs  T(C): 36.3 (24 Jan 2024 06:00), Max: 36.6 (23 Jan 2024 14:00)  T(F): 97.3 (24 Jan 2024 06:00), Max: 97.8 (23 Jan 2024 14:00)  HR: 67 (24 Jan 2024 06:00) (60 - 100)  BP: 105/82 (24 Jan 2024 06:00) (105/82 - 127/79)  BP(mean): --  RR: 17 (24 Jan 2024 06:00) (17 - 18)  SpO2: 100% (24 Jan 2024 06:00) (95% - 100%)    Parameters below as of 24 Jan 2024 06:00  Patient On (Oxygen Delivery Method): room air      PE:  Right mandible dehiscence with exposure of mandible bone, hardware, black eschar.  WV holding suction    A/P:  58F PMH mandibular ARISTIDES and SCCa mandibular gingiva s/p mandibulectomy with FFF p/w wound dehiscence/erosion with exposed hardware and hemorrhage, now controlled.    - care per OMFS/PRS  - ENT available for assistance  - Medicine recs appreciated

## 2024-01-24 NOTE — PROGRESS NOTE ADULT - SUBJECTIVE AND OBJECTIVE BOX
Plastic Surgery Progress Note (pg [facility]: [id number], [facility]: [phone])    SUBJECTIVE  The patient was seen and examined. No acute events overnight. Pain controlled, afebrile w/ stable vitals.     OBJECTIVE  ___________________________________________________  VITAL SIGNS / I&O's   Vital Signs Last 24 Hrs  T(C): 36.3 (24 Jan 2024 06:00), Max: 36.6 (23 Jan 2024 14:00)  T(F): 97.3 (24 Jan 2024 06:00), Max: 97.8 (23 Jan 2024 14:00)  HR: 67 (24 Jan 2024 06:00) (60 - 100)  BP: 105/82 (24 Jan 2024 06:00) (105/82 - 127/79)  BP(mean): --  RR: 17 (24 Jan 2024 06:00) (17 - 18)  SpO2: 100% (24 Jan 2024 06:00) (95% - 100%)    Parameters below as of 24 Jan 2024 06:00  Patient On (Oxygen Delivery Method): room air          23 Jan 2024 07:01  -  24 Jan 2024 07:00  --------------------------------------------------------  IN:    Lactated Ringers: 1540 mL    Oral Fluid: 1920 mL  Total IN: 3460 mL    OUT:  Total OUT: 0 mL    Total NET: 3460 mL        ___________________________________________________  PHYSICAL EXAM  -- CONSTITUTIONAL: NAD, lying in bed  -- NEURO: Awake, alert  -- HEENT: Wound vac over R mandible holding suction w/ minimal output  -- NECK: Soft, no asymmetry  -- PULM: Non-labored respirations, equal chest rise bilaterally, patient with intermittent cough  -- ABDOMEN: Nondistended  -- EXTREMITIES: RLE skin graft w/ small areas of poor take proximally  -- PSYCH: Affect normal, A&Ox3  ___________________________________________________  LABS                        9.0    4.93  )-----------( 328      ( 23 Jan 2024 06:45 )             27.1     23 Jan 2024 06:45    136    |  103    |  11     ----------------------------<  77     3.6     |  24     |  0.79     Ca    8.1        23 Jan 2024 06:45  Phos  2.5       23 Jan 2024 06:45  Mg     1.60      23 Jan 2024 06:45        CAPILLARY BLOOD GLUCOSE            Urinalysis Basic - ( 23 Jan 2024 06:45 )    Color: x / Appearance: x / SG: x / pH: x  Gluc: 77 mg/dL / Ketone: x  / Bili: x / Urobili: x   Blood: x / Protein: x / Nitrite: x   Leuk Esterase: x / RBC: x / WBC x   Sq Epi: x / Non Sq Epi: x / Bacteria: x      ___________________________________________________  MICRO  Recent Cultures:    ___________________________________________________  MEDICATIONS  (STANDING):  acetaminophen   Oral Liquid .. 650 milliGRAM(s) Oral every 6 hours  collagenase Ointment 1 Application(s) Topical daily  enoxaparin Injectable 40 milliGRAM(s) SubCutaneous every 24 hours  ibuprofen  Suspension. 600 milliGRAM(s) Oral every 6 hours  influenza   Vaccine 0.5 milliLiter(s) IntraMuscular once  lactated ringers. 1000 milliLiter(s) (70 mL/Hr) IV Continuous <Continuous>    MEDICATIONS  (PRN):  guaiFENesin Oral Liquid (Sugar-Free) 100 milliGRAM(s) Oral every 6 hours PRN Cough

## 2024-01-24 NOTE — PROGRESS NOTE ADULT - PROVIDER SPECIALTY LIST ADULT
OMFS
ENT
OMFS
Plastic Surgery
Plastic Surgery
ENT
OMFS
OMFS
Plastic Surgery
ENT
ENT
OMFS
OMFS
Plastic Surgery

## 2024-02-02 ENCOUNTER — APPOINTMENT (OUTPATIENT)
Dept: PLASTIC SURGERY | Facility: CLINIC | Age: 59
End: 2024-02-02
Payer: COMMERCIAL

## 2024-02-02 VITALS — TEMPERATURE: 96.5 F | SYSTOLIC BLOOD PRESSURE: 124 MMHG | DIASTOLIC BLOOD PRESSURE: 87 MMHG | HEART RATE: 100 BPM

## 2024-02-02 PROCEDURE — 99024 POSTOP FOLLOW-UP VISIT: CPT

## 2024-02-02 NOTE — PHYSICAL EXAM
[NI] : Normal [de-identified] : Right wound with good granulation tissue measuring 3 x 3 cm with exposed mandible and plate VAC dressing reapplied

## 2024-02-02 NOTE — HISTORY OF PRESENT ILLNESS
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  Postoperative course was complicated by right neck wound.  Patient recently admitted to the hospital for bleeding.  No active blood vessels were identified wound VAC was placed.  Patient was monitored in the hospital no bleeding occurred and was discharged now presents for follow-up visit

## 2024-02-13 NOTE — PRE-OP CHECKLIST - DENTURES
Physical Therapy Evaluation    Visit Count: 1  Plan of Care: Initial: 2/13/2024 Through: 2/13/2024  Insurance Information: physical and speech therapy combined cap of $2040 per calendar year  Referred by: Alo Seaman MD  Medical Diagnosis (from order):   Diagnosis Information        Diagnosis    M16.11 (ICD-10-CM) - Unilateral primary osteoarthritis, right hip    R26.9 (ICD-10-CM) - Abnormality of gait and mobility    R68.89 (ICD-10-CM) - Activity intolerance                  Treatment Diagnosis: hip Symptoms with increased pain/symptoms, impaired strength, impaired joint mobility/play, impaired gait, impaired rnage of motion    Anticipated Date of Surgery: 3/7/2024; Surgery to be performed:  Right Total Hip Arthroplasty  Diagnosis Precautions: anterior total hip arthroplasty precautions   Chart reviewed at time of initial evaluation (relevant co-morbidities, allergies, tests and medications listed):   Past Medical History:   Diagnosis Date    Essential hypertension 12/14/2017    Fasting hyperglycemia 01/09/2019    History of colon polyps 12/14/2017    Hyperlipidemia, mixed 12/14/2017    Microscopic hematuria 12/14/2017    Other emphysema (CMD) 12/14/2017    Former Smoker          SUBJECTIVE   Description of Problem and/or Mechanism of Injury:  Patient states that that the hip has been bothering him for a while. He has been walking straight cane for the last year and half. Has a hard time standing and walking for prolonged.   Pain:  Intensity (0-10 scale): Current: 7/10; Pain Range (best-worst): 3-7  Location: right hip lateral hip  Quality/Description: Ache, Stiff  Relieving/Alleviating factors: rest, ice, over the counter medication    Function:  Limitations and exacerbating factors: pain, difficulty with all weight bearing activities/tasks with involved extremity  Prior level: increasing pain and difficulty with function, therefore planning surgery to regain function  Patient Goal: Get Ready for  surgery    Prior Treatment: no therapies in the past year for current condition. Hospitalization, home health services or skilled nursing facility in the last 30 days: No, per patient.  Home Environment/Social Support: Patient lives  condo with his wife .   Patient has intermittent assist from family/friends.    Safety/Health:  Do you feel safe at home, work and/or school? yes, per patient  Patient denies 2 or more falls or an unexplained fall with injury in the last year, further testing not required     Anticipated discharge disposition post-operatively: home with caregiver wife    OBJECTIVE   Gait:   Walking with straight cane in (L) UE. Antalgic gait when WB    Range of Motion (degrees)   Left Right   Date Initial Initial   Hip Flexion (110-120) WNL 90   Hip Extension (10-15)     Hip Abduction (30-50)     Hip Adduction (30)     Hip Internal Rotation (30-40) 30 10   Hip External Rotation (40-60) 35 25   Reported in degrees, active range of motion recorded unless noted as AA=active assistive or P=passive; standard testing positions unless otherwise noted, norms included in ( ); *=pain   Only those motions that were assessed are noted.    Strength: (out of 5)   Left Right   Date Initial Initial   Hip Flexors 4 3   Hip Extensors     Hip Abductors 4 3   Glut Medius     Hip Adductors     Hip Internal Rotators 4 4   Hip External Rotators 4 4   Knee Flexors 5 5   Knee Extensors 5 5   standard testing positions unless otherwise noted; *=pain  Only muscle strength that was assessed are noted.     5x sit to stand score: 9.3 sec    Initial Treatment   Initial evaluation completed.    Therapeutic Exercise:  Sets: 1, Repetitions: 5 of each below, unless otherwise indicated  supine hip abduction  hip abduction/adduction  glut sets  quad sets  straight leg raise  heel slides  Written handout provided for future reference.*      Therapeutic Activity:  Patient was instructed in the following topics, written handouts provided for  future reference:*   Weightbearing status: weight bearing as tolerated  Precautions: Hip Total Hip Anterior Approach: no extension, no abduction, no external rotation   Assistive Device: 2 wheeled walker  Deep breathing: exercises should be done 3-6 times a day starting after surgery follow-up to avoid pneumonia  Edema Control: patient instructed to elevate surgical site above level of heart, and educated to use ice often at surgical site to assist with edema  Driving restrictions: patient was educated that they will not be able to drive while they are taking narcotics or for approximately 4-6 weeks if they are having surgery performed on their right lower extremity.    Patient instruction in the following activities of daily living while maintaining precautions of Total Hip Anterior Approach: no extension, no abduction, no external rotation, Weight Bearing As Tolerated  using 2 wheeled walker as appropriate:  Sit to/from Stand Transfers   Car Transfer   Bed Mobility  Home Safety/Preparation: how to safely set up home in order to optimize post-operative recovery and prevent falls post-operatively.   Precautions: proper positions that can be utilized after surgery to ensure maintenance of precautions in supine and sitting positions.    Gait Training:  Patient instructed in safe ambulation with 2 wheeled walker while maintaining Weight Bearing As Tolerated of Right Lower Extremity   Patient instructed in safe negotiation of 1 stairs utilizing step to pattern with the use of 2 wheeled walker    Skilled input: verbal instruction/cues, tactile instruction/cues    Writer verbally educated the patient and received verbal consent from the patient on hand placement, positioning of patient, and techniques to be performed today including clothing adjustments for techniques, therapist position for techniques as described above and how they are pertinent to the patient's plan of care.     ASSESSMENT   76 year old patient has  signs and symptoms consistent with hip pain and deficits that has reported functional limitations listed above. Patient would benefit from skilled inpatient physical and occupational therapy consults post operatively to ensure safety for discharge from hospital. Recommend home program as instructed above before and after surgery..    Patient will benefit from skilled therapy and rehab potential is good based on assessment above   Predicted patient presentation: Low (stable) - Patient comorbidities and complexities, as defined above, will have little effect on progress for prescribed plan of care.    PLAN   Goals:  To be obtained by end of this plan of care:  Patient independent with modified and progressed home exercise program. (MET)  Demonstrates safe and appropriate use of assistive device/equipment for ambulation, stairs, and transfers. (MET)  Verbalizes understanding of precautions. (MET)  Verbalizes understanding of how to best modify home for safe discharge. (MET)  Demonstrates understanding of how to complete bed mobility while maintaining precautions. (MET)    The following skilled interventions to be implemented to achieve above:  Gait Training (63274)  Therapeutic Activity (91000)  Therapeutic Exercise (51798)    Frequency/Duration: patient seen one time for instruction per above prior to surgery    The plan of care and goals were established with the patient who concurs.  Patient has been given attendance policy at time of initial evaluation.    Patient Education:  Who will be receiving education: patient  Are they ready to learn: yes  Preferred learning style: written, verbal, demonstration  Barriers to learning: no barriers apparent at this time   Result of initial outlined education: Verbalizes understanding and Demonstrates understanding    Therapy procedure time and total treatment time can be found documented on the Time Entry flowsheet.   no

## 2024-02-20 NOTE — ASU PREOP CHECKLIST - PATIENT SENT TO
Verbal consent obtained.  Influenza and Pneumococcal vaccine was administered.  Ok to take acetaminophen for soreness of arm.    operating room

## 2024-03-08 ENCOUNTER — APPOINTMENT (OUTPATIENT)
Dept: PLASTIC SURGERY | Facility: CLINIC | Age: 59
End: 2024-03-08
Payer: COMMERCIAL

## 2024-03-08 VITALS
SYSTOLIC BLOOD PRESSURE: 124 MMHG | TEMPERATURE: 97 F | DIASTOLIC BLOOD PRESSURE: 79 MMHG | OXYGEN SATURATION: 99 % | WEIGHT: 103 LBS | BODY MASS INDEX: 17.58 KG/M2 | HEIGHT: 64 IN | HEART RATE: 99 BPM

## 2024-03-08 PROCEDURE — 99213 OFFICE O/P EST LOW 20 MIN: CPT

## 2024-03-08 NOTE — HISTORY OF PRESENT ILLNESS
[FreeTextEntry1] :  Pt is a 58 year female s/p mandibular reconstruction with right free osteocutaneous flap, anterior vestibuloplasty, split-thickness skin graft, and local tissue rearrangement of the neck DOS: 11/21/23. Pt with vac along right jaw. Pt doing well. No complaints at this time. Denies fever, chills, pain, rash.

## 2024-03-08 NOTE — PHYSICAL EXAM
[de-identified] : Right jaw wound vac removed, wound healing well with some granulation tissue present, wound dressed with bacitracin and gauze, no surrounding erythema, no drainage/bleeding noted [de-identified] : Right lower leg superficial wound healing well, covered with sterile gauze

## 2024-03-19 ENCOUNTER — APPOINTMENT (OUTPATIENT)
Dept: PLASTIC SURGERY | Facility: CLINIC | Age: 59
End: 2024-03-19
Payer: COMMERCIAL

## 2024-03-19 VITALS
BODY MASS INDEX: 16.73 KG/M2 | SYSTOLIC BLOOD PRESSURE: 126 MMHG | HEART RATE: 85 BPM | DIASTOLIC BLOOD PRESSURE: 86 MMHG | RESPIRATION RATE: 15 BRPM | HEIGHT: 64 IN | OXYGEN SATURATION: 99 % | WEIGHT: 98 LBS | TEMPERATURE: 98 F

## 2024-03-19 PROCEDURE — 99213 OFFICE O/P EST LOW 20 MIN: CPT

## 2024-03-19 NOTE — PHYSICAL EXAM
[de-identified] : Right jaw wound with some fibrotic tissue. granulating tissue present, wound dressed wet to dry normal saline and gauze, no surrounding erythema [de-identified] : right lower leg superficial wound healing covered in sterile gauze

## 2024-03-22 NOTE — ASU PATIENT PROFILE, ADULT - BARIATRIC
Cardiac Rehabilitation: Discharge instructions        Cardiac rehabilitation is a program for people who have a heart problem, such as a heart attack, coronary stent placed, heart failure, or a heart valve disease. The program includes exercise, lifestyle changes, education, and emotional support. Cardiac rehab can help you improve the quality of your life through better overall health. It can help you lose weight and feel better about yourself.    On your cardiac rehab team, you may have your doctor, a nurse specialist, an exercise physiologist, and a dietitian. They will design your cardiac rehab program specifically for you. You will learn how to reduce your risk for heart problems, how to manage stress, and how to eat a heart-healthy diet. By the end of the program, you will be ready to maintain a healthier lifestyle on your own.    Follow-up care is a key part of your treatment and safety. Be sure to make and go to all appointments, and call your doctor if you are having problems. It's also a good idea to know your test results and keep a list of the medicines you take.    Please call to schedule your first appointment once you have been cleared by your cardiologist to attend Phase II Outpatient Cardiac Rehabilitation.       Cardiac Rehabilitation Options:  [facility]             [facility]  [address]                                                                                          Cardiology Services  [place], [place] [address]                                                                              [address]  P- [phone]                                                                                         [place] [address]  Hours: M/W/F 7am-7pm & T/Th 7am-12pm                                                  [phone]                                                                                    no

## 2024-04-03 NOTE — PROGRESS NOTE ADULT - SUBJECTIVE AND OBJECTIVE BOX
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 Since d/c from the hospital on 12/4/23, post-op course c/b some dehiscence on the mandible and low grade infection on RLE, for which transcutaneous mandibular dehiscence area was cleaned w/ saline, packed w/ Kerlix, protected w/ gauze on 12/20/2023 in the outpatient clinic.  Pt presented to [facility] ED this admission as a transfer from [facility] w/ cc of hemorrhaging from the wound, wound dehiscence and erosion w/ exposed hardware. Pt otherwise denies recent hx of f/c/n/v, SOB, CP, changes in pain, trismus, dysphagia.       Interval Events: NAEON. aVSS.       Vital Signs Last 24 Hrs  T(C): 36.4 (23 Jan 2024 06:00), Max: 36.5 (22 Jan 2024 10:00)  T(F): 97.5 (23 Jan 2024 06:00), Max: 97.7 (22 Jan 2024 10:00)  HR: 68 (23 Jan 2024 06:00) (64 - 76)  BP: 103/65 (23 Jan 2024 06:00) (103/65 - 112/66)  BP(mean): --  RR: 17 (23 Jan 2024 06:00) (17 - 18)  SpO2: 95% (23 Jan 2024 06:00) (95% - 99%)    Parameters below as of 23 Jan 2024 06:00  Patient On (Oxygen Delivery Method): room air    I&O's Detail    22 Jan 2024 07:01  -  23 Jan 2024 07:00  --------------------------------------------------------  IN:    Lactated Ringers: 1540 mL    Oral Fluid: 1800 mL  Total IN: 3340 mL    OUT:  Total OUT: 0 mL    Total NET: 3340 mL      Medications:    MEDICATIONS  (STANDING):  acetaminophen   Oral Liquid .. 650 milliGRAM(s) Oral every 6 hours  collagenase Ointment 1 Application(s) Topical daily  enoxaparin Injectable 40 milliGRAM(s) SubCutaneous every 24 hours  ibuprofen  Suspension. 600 milliGRAM(s) Oral every 6 hours  influenza   Vaccine 0.5 milliLiter(s) IntraMuscular once  lactated ringers. 1000 milliLiter(s) (70 mL/Hr) IV Continuous <Continuous>    MEDICATIONS  (PRN):  guaiFENesin Oral Liquid (Sugar-Free) 100 milliGRAM(s) Oral every 6 hours PRN Cough        Labs:                          8.6    5.98  )-----------( 302      ( 22 Jan 2024 06:08 )             26.5       01-22    136  |  103  |  10  ----------------------------<  72  3.5   |  24  |  0.73    Ca    8.0<L>      22 Jan 2024 06:08  Phos  2.7     01-22  Mg     1.60     01-22      Physical Exam:   Gen: AAOX3, NAD,   H: Surgical site on the right sided transcutaneous dehiscence in the mandibular border, hemostatic, no purulent drainage, minimal tenderness to palpation. Wound Vac in place.   E: PERRL, EOMI b/l  E: no otorrhea, hearing at baseline  N: nares patent b/l, no rhinorrhea  Maxillofacial: introral surgical sites intact, no erythema, no purulent drainage, no tenderness on palpation, soft, OMER~35mm  Neck: Flat, supple, no lymphadenopathy, trachea midline, no masses  Lymphatic: No lymphadenopathy  Resp: breathing easily, no stridor  CV: no peripheral edema/cyanosis  GI: nondistended   Neuro: CN7 intact b/l, no facial droop yes

## 2024-04-05 ENCOUNTER — APPOINTMENT (OUTPATIENT)
Dept: PLASTIC SURGERY | Facility: CLINIC | Age: 59
End: 2024-04-05
Payer: COMMERCIAL

## 2024-04-05 PROCEDURE — 99213 OFFICE O/P EST LOW 20 MIN: CPT

## 2024-04-05 NOTE — REASON FOR VISIT
[Follow-Up: _____] : a [unfilled] follow-up visit [FreeTextEntry1] : s/p mandibular reconstruction with right free osteocutaneous flap, anterior vestibuloplasty, split-thickness skin graft, and local tissue rearrangement of the neck DOS: 11/21/23. Patient reports drainage, denies having fever or chills.

## 2024-04-05 NOTE — PHYSICAL EXAM
[de-identified] : Right jaw wound with some fibrotic tissue. granulating tissue present, wound dressed wet to dry normal saline and gauze, no surrounding erythema [de-identified] : right lower leg superficial wound healing covered in sterile gauze

## 2024-04-05 NOTE — HISTORY OF PRESENT ILLNESS
[FreeTextEntry1] :  Pt is a 58 year female s/p  mandibular reconstruction with right free osteocutaneous flap, anterior vestibuloplasty, split-thickness skin graft, and local tissue rearrangement of the neck DOS: 11/21/23.  Patient reports a new draining sinus. Denies fever, chills, pain, rash.

## 2024-04-12 ENCOUNTER — APPOINTMENT (OUTPATIENT)
Dept: HEMATOLOGY ONCOLOGY | Facility: CLINIC | Age: 59
End: 2024-04-12

## 2024-04-12 ENCOUNTER — APPOINTMENT (OUTPATIENT)
Dept: PHYSICAL MEDICINE AND REHAB | Facility: CLINIC | Age: 59
End: 2024-04-12
Payer: COMMERCIAL

## 2024-04-12 VITALS — BODY MASS INDEX: 18.2 KG/M2 | WEIGHT: 106.06 LBS

## 2024-04-12 DIAGNOSIS — M79.2 NEURALGIA AND NEURITIS, UNSPECIFIED: ICD-10-CM

## 2024-04-12 DIAGNOSIS — I89.0 LYMPHEDEMA, NOT ELSEWHERE CLASSIFIED: ICD-10-CM

## 2024-04-12 DIAGNOSIS — Z74.09 OTHER REDUCED MOBILITY: ICD-10-CM

## 2024-04-12 PROCEDURE — 99214 OFFICE O/P EST MOD 30 MIN: CPT

## 2024-04-12 RX ORDER — GABAPENTIN 800 MG/1
800 TABLET, COATED ORAL 3 TIMES DAILY
Qty: 90 | Refills: 2 | Status: ACTIVE | COMMUNITY
Start: 2024-04-12 | End: 1900-01-01

## 2024-04-12 NOTE — HISTORY OF PRESENT ILLNESS
[FreeTextEntry1] : Ms. Dsouza is a 57 year old female diagnosed with squamous cell carcinoma of the oral cavity, status post right composite resection (segmental mandibulectomy with a partial right buccal soft tissue and floor of mouth excision), right neck dissection levels I-IV on 1/13/22 completing adjuvant chemoradiation with weekly cisplatin on 4/8/22. S/p plate removal with Dr. Evans on 10/31/22.  Now s/p mandibular reconstruction with right free osteocutaneous flap, anterior vestibuloplasty, split-thickness skin graft, and local tissue rearrangement of the neck DOS: 11/21/23.  She reports she still having significant burning pain in her right ankle.  Has difficulty with walking feels generalized weakness.  Still has healing wound which she is doing wound care for on her cervical region.  Has swelling in her facial region.  Taking tylenol as needed for pain.

## 2024-04-12 NOTE — ASSESSMENT
[FreeTextEntry1] : 58 year old female presenting for evaluation.  #Neuropathic pain: -Start gabapentin 400mg TID, titrate up to 800mg TID if tolerating-rx sent   #Lympedema: -Likely exacerbation, consider MLD after wounds heal  -Follow up with plastic surgery   #Impaired mobility: -Generalized weakness, discussed PT, will start after clearance by plastic surgery   Follow up in 1 month.

## 2024-04-12 NOTE — PHYSICAL EXAM
[FreeTextEntry1] : Gen: Patient is A&O x 3, NAD HEENT: EOMI, hearing grossly normal Resp: regular, non- labored CV: Pulses regular  Skin: Surgical sites healing in face and leg  Lymph: +Right facial edema  ROM: limited in jaw to pain Sensation: +Dysesthesia in right foot/leg  Reflexes: 1+ and symmetric throughout Strength: 5/5 throughout Gait: normal

## 2024-04-15 ENCOUNTER — OUTPATIENT (OUTPATIENT)
Dept: OUTPATIENT SERVICES | Facility: HOSPITAL | Age: 59
LOS: 1 days | Discharge: HOME | End: 2024-04-15

## 2024-04-15 DIAGNOSIS — C03.9 MALIGNANT NEOPLASM OF GUM, UNSPECIFIED: ICD-10-CM

## 2024-04-15 DIAGNOSIS — Z96.649 PRESENCE OF UNSPECIFIED ARTIFICIAL HIP JOINT: Chronic | ICD-10-CM

## 2024-04-15 DIAGNOSIS — Z98.890 OTHER SPECIFIED POSTPROCEDURAL STATES: Chronic | ICD-10-CM

## 2024-04-15 DIAGNOSIS — C76.0 MALIGNANT NEOPLASM OF HEAD, FACE AND NECK: ICD-10-CM

## 2024-04-15 DIAGNOSIS — Z90.711 ACQUIRED ABSENCE OF UTERUS WITH REMAINING CERVICAL STUMP: Chronic | ICD-10-CM

## 2024-04-15 DIAGNOSIS — Z98.891 HISTORY OF UTERINE SCAR FROM PREVIOUS SURGERY: Chronic | ICD-10-CM

## 2024-04-19 ENCOUNTER — RESULT REVIEW (OUTPATIENT)
Age: 59
End: 2024-04-19

## 2024-04-19 ENCOUNTER — APPOINTMENT (OUTPATIENT)
Dept: HEMATOLOGY ONCOLOGY | Facility: CLINIC | Age: 59
End: 2024-04-19
Payer: COMMERCIAL

## 2024-04-19 VITALS
TEMPERATURE: 98.2 F | SYSTOLIC BLOOD PRESSURE: 123 MMHG | OXYGEN SATURATION: 99 % | RESPIRATION RATE: 17 BRPM | WEIGHT: 107.13 LBS | HEART RATE: 84 BPM | BODY MASS INDEX: 18.29 KG/M2 | DIASTOLIC BLOOD PRESSURE: 83 MMHG | HEIGHT: 64 IN

## 2024-04-19 LAB
BASOPHILS # BLD AUTO: 0.1 K/UL — SIGNIFICANT CHANGE UP (ref 0–0.2)
BASOPHILS NFR BLD AUTO: 0.9 % — SIGNIFICANT CHANGE UP (ref 0–2)
EOSINOPHIL # BLD AUTO: 0.2 K/UL — SIGNIFICANT CHANGE UP (ref 0–0.5)
EOSINOPHIL NFR BLD AUTO: 2.3 % — SIGNIFICANT CHANGE UP (ref 0–6)
HCT VFR BLD CALC: 32.8 % — LOW (ref 34.5–45)
HGB BLD-MCNC: 10.4 G/DL — LOW (ref 11.5–15.5)
LYMPHOCYTES # BLD AUTO: 1.3 K/UL — SIGNIFICANT CHANGE UP (ref 1–3.3)
LYMPHOCYTES # BLD AUTO: 19 % — SIGNIFICANT CHANGE UP (ref 13–44)
MCHC RBC-ENTMCNC: 28 PG — SIGNIFICANT CHANGE UP (ref 27–34)
MCHC RBC-ENTMCNC: 31.7 G/DL — LOW (ref 32–36)
MCV RBC AUTO: 88.2 FL — SIGNIFICANT CHANGE UP (ref 80–100)
MONOCYTES # BLD AUTO: 0.5 K/UL — SIGNIFICANT CHANGE UP (ref 0–0.9)
MONOCYTES NFR BLD AUTO: 7.2 % — SIGNIFICANT CHANGE UP (ref 2–14)
NEUTROPHILS # BLD AUTO: 4.9 K/UL — SIGNIFICANT CHANGE UP (ref 1.8–7.4)
NEUTROPHILS NFR BLD AUTO: 70.6 % — SIGNIFICANT CHANGE UP (ref 43–77)
PLATELET # BLD AUTO: 311 K/UL — SIGNIFICANT CHANGE UP (ref 150–400)
RBC # BLD: 3.72 M/UL — LOW (ref 3.8–5.2)
RBC # FLD: 15.7 % — HIGH (ref 10.3–14.5)
WBC # BLD: 6.9 K/UL — SIGNIFICANT CHANGE UP (ref 3.8–10.5)
WBC # FLD AUTO: 6.9 K/UL — SIGNIFICANT CHANGE UP (ref 3.8–10.5)

## 2024-04-19 PROCEDURE — 99214 OFFICE O/P EST MOD 30 MIN: CPT

## 2024-04-24 NOTE — ASU DISCHARGE PLAN (ADULT/PEDIATRIC) - NURSING INSTRUCTIONS
DO NOT take any Tylenol (Acetaminophen) or narcotics containing Tylenol until after  11pm . You received Tylenol during your operation and it can cause damage to your liver if too much is taken within a 24 hour time period. Detail Level: Zone - pt is tolerating the tretinoin 0.05% weel amd would like to increase to tretinoin 0.1%\\n- disc r/b/sed of Tretinoin\\n- disc r/b/sed of microneedling (referred to Dr. Butterfield for a consult) - disc r/b/sed of excision\\n- referred to Dr. Olmedo for cosmetic removal

## 2024-05-03 ENCOUNTER — APPOINTMENT (OUTPATIENT)
Dept: PLASTIC SURGERY | Facility: CLINIC | Age: 59
End: 2024-05-03
Payer: COMMERCIAL

## 2024-05-03 PROCEDURE — 99212 OFFICE O/P EST SF 10 MIN: CPT

## 2024-05-04 NOTE — ED PROVIDER NOTE - IV ALTEPLASE EXCL ABS HIDDEN
No care due was identified.  [facility] Embedded Care Due Messages. Reference number: [id number].   5/04/2024 9:20:45 AM CDT  
show

## 2024-05-07 NOTE — PHYSICAL EXAM
[de-identified] : Right jaw wound with some fibrotic tissue. granulating tissue present, wound dressed wet to dry normal saline and gauze, no surrounding erythema [de-identified] : right lower leg superficial wound healing covered in sterile gauze

## 2024-05-10 ENCOUNTER — APPOINTMENT (OUTPATIENT)
Dept: PLASTIC SURGERY | Facility: CLINIC | Age: 59
End: 2024-05-10
Payer: COMMERCIAL

## 2024-05-10 VITALS
WEIGHT: 107 LBS | DIASTOLIC BLOOD PRESSURE: 85 MMHG | OXYGEN SATURATION: 97 % | BODY MASS INDEX: 18.27 KG/M2 | HEART RATE: 88 BPM | TEMPERATURE: 97.8 F | SYSTOLIC BLOOD PRESSURE: 156 MMHG | HEIGHT: 64 IN

## 2024-05-10 PROCEDURE — 99213 OFFICE O/P EST LOW 20 MIN: CPT

## 2024-05-10 NOTE — REASON FOR VISIT
[Follow-Up: _____] : a [unfilled] follow-up visit [FreeTextEntry1] :  s/p mandibular reconstruction with right free osteocutaneous flap, anterior vestibuloplasty, split-thickness skin graft, and local tissue rearrangement of the neck DOS: 11/21/23.

## 2024-05-10 NOTE — PHYSICAL EXAM
[de-identified] : Right jaw wound with some fibrotic tissue. granulating tissue present, wound dressed wet to dry normal saline and gauze, no surrounding erythema [de-identified] : right lower leg superficial wound healing covered in sterile gauze

## 2024-05-10 NOTE — PHYSICAL EXAM
[de-identified] : Right jaw wound with some fibrotic tissue. granulating tissue present, wound dressed wet to dry normal saline and gauze, no surrounding erythema [de-identified] : right lower leg superficial wound healing covered in sterile gauze

## 2024-05-14 NOTE — ED PROVIDER NOTE - WR ORDER DATE AND TIME 1
Gynecology Preoperative H&P      DATE:  5/14/2024  ATTENDING PHYSICIAN:  Inge Rodriguez DO  PROCEDURE: HYSTEROSCOPY, WITH DILATION AND CURETTAGE OF UTERUS   CODE STATUS:  No Order    S: Ms. Fang Tapia was seen at bedside this morning. Feeling nervous but ready for surgery. No changes or updates since last visit.    NPO since 2030 yesterday. She denies vaginal bleeding today.     Past Medical History:   Diagnosis Date    Anemia     Bronchitis     years ago    Eczema     Hyperlipidemia     Low vitamin D level     Sciatic neuralgia       Past Surgical History:   Procedure Laterality Date    Essure      Hysteroscopy endometrial ablation      Rotator cuff repair Right       Current Outpatient Medications   Medication Instructions    atorvastatin (LIPITOR) 20 mg, Oral, DAILY    Cholecalciferol (Vitamin D3) 50 mcg (2,000 units) tablet Oral, DAILY    ferrous sulfate 325 mg, Oral, 2 TIMES DAILY    ibuprofen (MOTRIN) 600 mg, Oral, EVERY 8 HOURS PRN      ALLERGIES:   Allergen Reactions    Cheese   (Food Or Med) RASH and SWELLING     Parmesan cheese only    Hydrocodone-Acetaminophen RASH    Penicillins RASH        Vitals:    05/14/24 1100   BP: 137/69   Pulse: (!) 58   Resp: 16   Temp: 97.9 °F (36.6 °C)       PHYSICAL EXAM  General: appears well, no acute distress, A&O x 3  Cardiac: RRR  Pulm: CTAB  Abdomen: soft, non distended, non tender, BS+  Pelvic: deferred to OR  Extremities: SCDs on, non edematous, non erythematous    Recent Labs   Lab 05/10/24  1122   WBC 4.2   RBC 4.63   HGB 13.4   HCT 42.6        Recent Labs   Lab 05/10/24  1122   SODIUM 139   POTASSIUM 4.3   CHLORIDE 106   CO2 27   BUN 12   CREATININE 0.62   GLUCOSE 93   CALCIUM 9.8     Imaging:  TVUS 11/2023:  Uterus: It measures 15.5 x 9.9 x 11.5 cm   Evidence of intramural fibroid seen transabdominally within the fundus measuring 2.4 x 1.9 x 1.9 cm and a posterior intramural fibroid measuring 5.0 x 4.0 x 4.6 cm seen transvaginally.   Endometrium:  15 mm.  Simple left ovarian cyst measuring up to 3 cm.    Pathology:  Endometrial biopsy 24:  -Rare inactive endometrium with stromal breakdown.  -Benign endocervical epithelium.    Pap 24: NILM    HPV 24: Negative    ASSESSMENT AND PLAN:  Ms. Fang Tapia is a 50 year old  who presents to the preoperative area for hysteroscopy, with dilation and curettage of uterus 2/2 AUB.     - Consented verbally for the above-mentioned procedure. Risks/benefits/alternatives discussed including risk for but not limited to: bleeding, infection, damage to surrounding organs, and rare risk of death were explained to the patient. Patient understood, questions were addressed, and gave verbal consent. Formal written consent for procedure and blood transfusion to be obtained by Dr. Rodriguez  - CBC, T&S ordered/reviewed prior to surgery  - UPT negative  - NPO after midnight prior to surgery  - LR @ 125 cc/hr  - SCDs for DVT prophylaxis to be placed in OR  - Anesthesia to see   - No antibiotics indicated  - Post-op expectations discussed with the patient. Pt instructed to contact her physician and go to the emergency department if she is soaking through 1 pad per hour, develops signs of infection including fever > 100.4F, chills, foul smelling discharge, or intractable abdominal pain not well controlled with pain    To be discussed with Dr. Michael George MD PGY-1  ---  Gyn      17-Apr-2022 13:19

## 2024-05-17 ENCOUNTER — APPOINTMENT (OUTPATIENT)
Dept: PHYSICAL MEDICINE AND REHAB | Facility: CLINIC | Age: 59
End: 2024-05-17

## 2024-05-19 LAB
ALBUMIN SERPL ELPH-MCNC: 3.9 G/DL
ALP BLD-CCNC: 66 U/L
ALT SERPL-CCNC: 6 U/L
ANION GAP SERPL CALC-SCNC: 13 MMOL/L
AST SERPL-CCNC: 8 U/L
BILIRUB SERPL-MCNC: <0.2 MG/DL
BUN SERPL-MCNC: 21 MG/DL
CALCIUM SERPL-MCNC: 9.4 MG/DL
CHLORIDE SERPL-SCNC: 101 MMOL/L
CO2 SERPL-SCNC: 23 MMOL/L
CREAT SERPL-MCNC: 1.01 MG/DL
EGFR: 64 ML/MIN/1.73M2
GLUCOSE SERPL-MCNC: 90 MG/DL
MAGNESIUM SERPL-MCNC: 1.9 MG/DL
POTASSIUM SERPL-SCNC: 5.8 MMOL/L
PROT SERPL-MCNC: 6.2 G/DL
SODIUM SERPL-SCNC: 137 MMOL/L

## 2024-05-28 ENCOUNTER — OUTPATIENT (OUTPATIENT)
Dept: OUTPATIENT SERVICES | Facility: HOSPITAL | Age: 59
LOS: 1 days | End: 2024-05-28

## 2024-05-28 ENCOUNTER — APPOINTMENT (OUTPATIENT)
Dept: CT IMAGING | Facility: CLINIC | Age: 59
End: 2024-05-28
Payer: COMMERCIAL

## 2024-05-28 DIAGNOSIS — Z98.891 HISTORY OF UTERINE SCAR FROM PREVIOUS SURGERY: Chronic | ICD-10-CM

## 2024-05-28 DIAGNOSIS — Z00.8 ENCOUNTER FOR OTHER GENERAL EXAMINATION: ICD-10-CM

## 2024-05-28 DIAGNOSIS — Z96.649 PRESENCE OF UNSPECIFIED ARTIFICIAL HIP JOINT: Chronic | ICD-10-CM

## 2024-05-28 DIAGNOSIS — M27.2 INFLAMMATORY CONDITIONS OF JAWS: ICD-10-CM

## 2024-05-28 DIAGNOSIS — Z98.890 OTHER SPECIFIED POSTPROCEDURAL STATES: Chronic | ICD-10-CM

## 2024-05-28 DIAGNOSIS — Z90.711 ACQUIRED ABSENCE OF UTERUS WITH REMAINING CERVICAL STUMP: Chronic | ICD-10-CM

## 2024-05-28 PROCEDURE — 70498 CT ANGIOGRAPHY NECK: CPT | Mod: 26

## 2024-06-04 ENCOUNTER — APPOINTMENT (OUTPATIENT)
Dept: PLASTIC SURGERY | Facility: CLINIC | Age: 59
End: 2024-06-04
Payer: COMMERCIAL

## 2024-06-04 VITALS
HEART RATE: 83 BPM | HEIGHT: 64 IN | SYSTOLIC BLOOD PRESSURE: 142 MMHG | BODY MASS INDEX: 18.27 KG/M2 | DIASTOLIC BLOOD PRESSURE: 87 MMHG | TEMPERATURE: 98.6 F | WEIGHT: 107 LBS | OXYGEN SATURATION: 96 %

## 2024-06-04 DIAGNOSIS — M27.2 INFLAMMATORY CONDITIONS OF JAWS: ICD-10-CM

## 2024-06-04 PROCEDURE — 99214 OFFICE O/P EST MOD 30 MIN: CPT

## 2024-06-04 NOTE — PHYSICAL EXAM
[NI] : Normal [de-identified] : Right neck exposed ramus measuring 3 x 3 cm with exposed hardware [de-identified] : Left forearm Felipe test positive

## 2024-06-04 NOTE — HISTORY OF PRESENT ILLNESS
[FreeTextEntry1] : 59-year-old female status post resection of osteoradionecrosis with fibular reconstruction.  Postoperative course has been complicated with exposed bone with ability to heal.  Patient presents today to discuss surgical options

## 2024-06-05 ENCOUNTER — APPOINTMENT (OUTPATIENT)
Age: 59
End: 2024-06-05
Payer: COMMERCIAL

## 2024-06-05 PROCEDURE — 99213 OFFICE O/P EST LOW 20 MIN: CPT

## 2024-06-06 NOTE — ASSESSMENT
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 Pt is s/p segmental mandibulectomy with R partial buccal soft tissue and FOM excision, tracheostomy, right neck dissection in 1/2022, and removal of mandibular hardware in 10/2022. Pt completed chemotherapy and radiation. Pt reports open areas to right mandible with bone exposed (11/25/23).   Patient underwent additional procedures however with continued persistent right neck drainage. Patient saw Dr. Evans for consultation and evaluation. Dr. Evans sent patient for CTA of neck and explained to patient ideal treatment plan would be a radial forarm flap for coverage of dehised area. Patient presenting today for further evaluation and follow up with Dr. Rowe.    no significant changes in the pain, swelling, ADL since last visit. 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 Post-op course c/b some dehiscence on the mandible and low grade infection on RLE.  Patient planned for a radial forarm flap with Dr. Evans for extraoral dehiscence  Plan: - f/u after discussion with Dr. Evans Pt is a 28 yo M w/PMH of Negin Kay Tear in 2010( admitted in the ICU for several days and surgically operated), presenting to the ED with feeling of obstruction in his throat since yesterday 5 pm. According to the patient he had a piece of steak around 5 pm yesterday, after which he felt like he could not swallow it and since then has been experiencing a feeling of obstruction in his throat. He tried to swallow down the piece of steak with fluids and vegetables but ended up sitting up and regurgitating most of it. He has not been able to tolerate oral intake ever since. He has been experiencing pain on the left side of his chest ever since radiating to his back and had difficulty breathing when he presented which has now decreased ever since he received Valium in the ED.  No Hx of vomiting, hematemesis, melena, hematochezia. C/o mild abdominal pain. Pt denies any difficulty while swallowing but states that any solids and liquids he takes ever since the episode started regurgitates back from his distal esophagus and he feels like he needs to spit it out.   PMH of Negin Kay tear in 2010, which he describes as he went out to eat and drink with some friends, after which he had severe hematemsis, he was brought to the hospital where he was told he had a Negin Kay tear which he got operated for. Ever since he complains that he has been having similar episodes of food getting stuck in the throat which resolve spontaneously within 30-45 minutes every 6 weeks. No hematemesis ever since after the Negin Kay tear episode.   Denies fever, chest pain, SOB, urinary complaints, headache.   Pt does not have a gastroenterologist and has not been scoped since his tear back in 2010. Pt is a 28 yo M w/PMH of Negin Kay Tear in 2010( admitted in the ICU for several days and surgically operated), presenting to the ED with feeling of obstruction in his throat since yesterday 5 pm. According to the patient he had a piece of steak around 5 pm yesterday, after which he felt like he could not swallow it and since then has been experiencing a feeling of obstruction in his throat. He tried to swallow down the piece of steak with fluids and vegetables but ended up sitting up and regurgitating most of it. He has not been able to tolerate oral intake ever since. He has been experiencing pain on the left side of his chest ever since radiating to his back and had difficulty breathing when he presented which has now decreased ever since he received Valium in the ED. C/o mild abdominal epigastric pain. Pt denies vomiting ever since this episode started but states he had vomiting on Friday secondary to alcohol at a party. Pt denies any difficulty while swallowing but states that any solids and liquids he takes ever since the episode started regurgitates back from his distal esophagus and he feels like he needs to spit it out. Tried Zantac and PPI, but did not feel better as he felt he could not swallow the pills down.  PMH of Negin Kay tear in 2010, which he describes as he went out to eat and drink with some friends, after which he had some food poisoning leading to severe hematemesis, he was brought to the hospital where he was told he had a Negin Kay tear which he got operated for. Ever since he complains that he has been having similar episodes of food getting stuck in the throat which resolve spontaneously within 30-45 minutes every 6 weeks. No hematemesis ever since after the Negin Kay tear episode.   Denies fever, chest pain, SOB, urinary complaints, headache. Denies melena, hematemesis and hematochezia.  Pt does not have a gastroenterologist and has not been scoped since his tear back in 2010. Pt is a 28 yo M w/PMH of Negin Kay Tear in 2010( admitted in the ICU for several days and surgically operated), presenting to the ED with feeling of obstruction in his throat since yesterday 5 pm. According to the patient he had a piece of steak around 5 pm yesterday, after which he felt like he could not swallow it and since then has been experiencing a feeling of obstruction in his throat. He tried to swallow down the piece of steak with fluids and vegetables but ended up spitting up and regurgitating most of it. He has not been able to tolerate oral intake ever since. He has been experiencing pain on the left side of his chest ever since radiating to his back and had difficulty breathing when he presented which has now decreased ever since he received Valium in the ED. C/o mild abdominal epigastric pain. Pt denies vomiting ever since this episode started but states he had vomiting on Friday secondary to alcohol at a party. Pt denies any difficulty while swallowing but states that any solids and liquids he takes ever since the episode started regurgitates back from his distal esophagus and he feels like he needs to spit it out. Tried Zantac and PPI, but did not feel better as he felt he could not swallow the pills down.  PMH of Negin Kay tear in 2010, which he describes as he went out to eat and drink with some friends, after which he had some food poisoning leading to severe hematemesis, he was brought to the hospital where he was told he had a Negin Kay tear which he got operated for. Ever since he complains that he has been having similar episodes of food getting stuck in the throat which resolve spontaneously within 30-45 minutes every 6 weeks. No hematemesis ever since after the Negin Kay tear episode.   Denies fever, chest pain, SOB, urinary complaints, headache. Denies melena, hematemesis and hematochezia.  Pt does not have a gastroenterologist and has not been scoped since his tear back in 2010. Pt is a 30 yo M w/PMH of Negin Kay Tear in 2010( admitted in the ICU for several days and surgically operated), presenting to the ED with feeling of obstruction in his throat since yesterday 5 pm. According to the patient he had a piece of steak around 5 pm yesterday, after which he felt like he could not swallow it and since then has been experiencing a feeling of obstruction in his throat. He tried to swallow down the piece of steak with fluids and vegetables but ended up spitting up and regurgitating most of it. He has not been able to tolerate oral intake ever since. He has been experiencing pain on the left side of his chest ever since radiating to his back and had difficulty breathing when he presented which has now decreased ever since he received Valium in the ED. C/o mild abdominal epigastric pain. Pt denies vomiting ever since this episode started but states he had vomiting on Friday secondary to alcohol at a party. Pt denies any difficulty while swallowing but states that any solids and liquids he takes ever since the episode started regurgitates back from his distal esophagus and he feels like he needs to spit it out. Tried Zantac and PPI, but did not feel better as he felt he could not swallow the pills down.  PMH of Negin Kay tear in 2010, which he describes as he went out to eat and drink with some friends, after which he had some food poisoning leading to severe hematemesis, he was brought to the hospital where he was told he had a Negin Kay tear which he got operated for. Ever since he complains that he has been having similar episodes of food getting stuck in the throat which resolve spontaneously within 30-45 minutes every 6 weeks. No hematemesis ever since after the Negin Kay tear episode.   Denies fever, chest pain, SOB, urinary complaints, headache. Denies melena, hematemesis and hematochezia  Pt does not have a gastroenterologist and has not been scoped since his tear back in 2010. Pt is a 28 yo M w/PMH of Negin Kay Tear in 2010( admitted in the ICU for several days and surgically operated), presenting to the ED with feeling of obstruction in his throat since yesterday 5 pm. According to the patient he had a piece of steak around 5 pm yesterday, after which he felt like he could not swallow it and since then has been experiencing a feeling of obstruction in his throat. He tried to swallow down the piece of steak with fluids and vegetables but ended up spitting up and regurgitating most of it. He has not been able to tolerate oral intake ever since. He has been experiencing pain on the left side of his chest ever since radiating to his back and had difficulty breathing when he presented which has now decreased ever since he received Valium in the ED. C/o mild abdominal epigastric pain. Pt denies vomiting ever since this episode started but states he had vomiting on Friday secondary to alcohol at a party. Pt denies any difficulty while swallowing but states that any solids and liquids he takes ever since the episode started regurgitates back from his distal esophagus and he feels like he needs to spit it out. Tried Zantac and PPI, but did not feel better as he felt he could not swallow the pills down.  PMH of Negin Kay tear in 2010, which he describes as he went out to eat and drink with some friends, after which he had some food poisoning leading to severe hematemesis, he was brought to the hospital where he was told he had a Negin Kay tear which he got operated for. Ever since he complains that he has been having similar episodes of food getting stuck in the throat which resolve spontaneously within 30-45 minutes every 6 weeks. No hematemesis ever since after the Negin Kay tear episode.   Pt has a known hx of elevated bilirubin levels, the cause of which has not been evaluated for.   Denies fever, chest pain, SOB, urinary complaints, headache. Denies melena, hematemesis and hematochezia  Pt does not have a gastroenterologist and has not been scoped since his tear back in 2010.

## 2024-06-24 ENCOUNTER — NON-APPOINTMENT (OUTPATIENT)
Age: 59
End: 2024-06-24

## 2024-06-25 ENCOUNTER — APPOINTMENT (OUTPATIENT)
Dept: OTOLARYNGOLOGY | Facility: CLINIC | Age: 59
End: 2024-06-25
Payer: COMMERCIAL

## 2024-06-25 ENCOUNTER — APPOINTMENT (OUTPATIENT)
Dept: PLASTIC SURGERY | Facility: CLINIC | Age: 59
End: 2024-06-25
Payer: COMMERCIAL

## 2024-06-25 VITALS
DIASTOLIC BLOOD PRESSURE: 88 MMHG | WEIGHT: 121.5 LBS | OXYGEN SATURATION: 99 % | HEART RATE: 87 BPM | TEMPERATURE: 98.2 F | HEIGHT: 64 IN | SYSTOLIC BLOOD PRESSURE: 130 MMHG | BODY MASS INDEX: 20.74 KG/M2

## 2024-06-25 VITALS
WEIGHT: 121.25 LBS | SYSTOLIC BLOOD PRESSURE: 130 MMHG | TEMPERATURE: 98.2 F | HEIGHT: 64 IN | BODY MASS INDEX: 20.7 KG/M2 | HEART RATE: 85 BPM | DIASTOLIC BLOOD PRESSURE: 84 MMHG | OXYGEN SATURATION: 98 %

## 2024-06-25 DIAGNOSIS — E04.1 NONTOXIC SINGLE THYROID NODULE: ICD-10-CM

## 2024-06-25 DIAGNOSIS — C76.0 MALIGNANT NEOPLASM OF HEAD, FACE AND NECK: ICD-10-CM

## 2024-06-25 PROCEDURE — 99214 OFFICE O/P EST MOD 30 MIN: CPT

## 2024-06-25 PROCEDURE — 99024 POSTOP FOLLOW-UP VISIT: CPT

## 2024-07-02 ENCOUNTER — OUTPATIENT (OUTPATIENT)
Dept: OUTPATIENT SERVICES | Facility: HOSPITAL | Age: 59
LOS: 1 days | End: 2024-07-02

## 2024-07-02 ENCOUNTER — APPOINTMENT (OUTPATIENT)
Dept: ULTRASOUND IMAGING | Facility: CLINIC | Age: 59
End: 2024-07-02
Payer: COMMERCIAL

## 2024-07-02 DIAGNOSIS — Z96.649 PRESENCE OF UNSPECIFIED ARTIFICIAL HIP JOINT: Chronic | ICD-10-CM

## 2024-07-02 DIAGNOSIS — Z98.890 OTHER SPECIFIED POSTPROCEDURAL STATES: Chronic | ICD-10-CM

## 2024-07-02 DIAGNOSIS — Z98.891 HISTORY OF UTERINE SCAR FROM PREVIOUS SURGERY: Chronic | ICD-10-CM

## 2024-07-02 DIAGNOSIS — Z00.8 ENCOUNTER FOR OTHER GENERAL EXAMINATION: ICD-10-CM

## 2024-07-02 DIAGNOSIS — Z90.711 ACQUIRED ABSENCE OF UTERUS WITH REMAINING CERVICAL STUMP: Chronic | ICD-10-CM

## 2024-07-02 PROCEDURE — 76536 US EXAM OF HEAD AND NECK: CPT | Mod: 26

## 2024-07-09 ENCOUNTER — APPOINTMENT (OUTPATIENT)
Dept: PLASTIC SURGERY | Facility: CLINIC | Age: 59
End: 2024-07-09
Payer: MEDICAID

## 2024-07-09 VITALS
DIASTOLIC BLOOD PRESSURE: 83 MMHG | HEIGHT: 64 IN | BODY MASS INDEX: 20.92 KG/M2 | HEART RATE: 75 BPM | TEMPERATURE: 98 F | OXYGEN SATURATION: 95 % | SYSTOLIC BLOOD PRESSURE: 138 MMHG | WEIGHT: 122.56 LBS

## 2024-07-09 DIAGNOSIS — C76.0 MALIGNANT NEOPLASM OF HEAD, FACE AND NECK: ICD-10-CM

## 2024-07-09 PROCEDURE — 99213 OFFICE O/P EST LOW 20 MIN: CPT

## 2024-07-12 DIAGNOSIS — E04.1 NONTOXIC SINGLE THYROID NODULE: ICD-10-CM

## 2024-07-12 DIAGNOSIS — C03.9 MALIGNANT NEOPLASM OF GUM, UNSPECIFIED: ICD-10-CM

## 2024-07-18 NOTE — H&P PST ADULT - BP NONINVASIVE SYSTOLIC (MM HG)
Outgoing settings:     BIPOLAR CONFIGURATION (DEFAULT) : (+, -, +)    Outgoing amplitude 1.7 volts. Range adjusted to ( 1.5 - 1.7 volts) . Therapeutic amplitude 1.7 volts. Rate 33 Hz. Pulse Width 90 µs    Start delay 30   minutes. Pause/delay 20  minutes . Duration of therapy 8 hours .    The waveform was evaluated. An adequate rise and fall was observed.   110

## 2024-07-19 ENCOUNTER — APPOINTMENT (OUTPATIENT)
Dept: HEMATOLOGY ONCOLOGY | Facility: CLINIC | Age: 59
End: 2024-07-19

## 2024-07-30 ENCOUNTER — INPATIENT (INPATIENT)
Facility: HOSPITAL | Age: 59
LOS: 6 days | Discharge: HOME | End: 2024-08-06
Attending: ORAL & MAXILLOFACIAL SURGERY | Admitting: ORAL & MAXILLOFACIAL SURGERY
Payer: MEDICARE

## 2024-07-30 ENCOUNTER — APPOINTMENT (OUTPATIENT)
Age: 59
End: 2024-07-30

## 2024-07-30 ENCOUNTER — APPOINTMENT (OUTPATIENT)
Dept: ULTRASOUND IMAGING | Facility: IMAGING CENTER | Age: 59
End: 2024-07-30

## 2024-07-30 VITALS
HEART RATE: 88 BPM | HEIGHT: 64 IN | RESPIRATION RATE: 15 BRPM | WEIGHT: 121.92 LBS | SYSTOLIC BLOOD PRESSURE: 107 MMHG | DIASTOLIC BLOOD PRESSURE: 79 MMHG | TEMPERATURE: 98 F | OXYGEN SATURATION: 100 %

## 2024-07-30 DIAGNOSIS — M27.2 INFLAMMATORY CONDITIONS OF JAWS: ICD-10-CM

## 2024-07-30 DIAGNOSIS — Z98.891 HISTORY OF UTERINE SCAR FROM PREVIOUS SURGERY: Chronic | ICD-10-CM

## 2024-07-30 DIAGNOSIS — Z96.642 PRESENCE OF LEFT ARTIFICIAL HIP JOINT: Chronic | ICD-10-CM

## 2024-07-30 DIAGNOSIS — Z98.890 OTHER SPECIFIED POSTPROCEDURAL STATES: Chronic | ICD-10-CM

## 2024-07-30 DIAGNOSIS — Z96.649 PRESENCE OF UNSPECIFIED ARTIFICIAL HIP JOINT: Chronic | ICD-10-CM

## 2024-07-30 DIAGNOSIS — C44.92 SQUAMOUS CELL CARCINOMA OF SKIN, UNSPECIFIED: Chronic | ICD-10-CM

## 2024-07-30 DIAGNOSIS — Z90.711 ACQUIRED ABSENCE OF UTERUS WITH REMAINING CERVICAL STUMP: Chronic | ICD-10-CM

## 2024-07-30 DIAGNOSIS — I77.1 STRICTURE OF ARTERY: ICD-10-CM

## 2024-07-30 LAB
ALBUMIN SERPL ELPH-MCNC: 3.8 G/DL — SIGNIFICANT CHANGE UP (ref 3.3–5)
ALP SERPL-CCNC: 61 U/L — SIGNIFICANT CHANGE UP (ref 40–120)
ALT FLD-CCNC: 6 U/L — SIGNIFICANT CHANGE UP (ref 4–33)
ANION GAP SERPL CALC-SCNC: 14 MMOL/L — SIGNIFICANT CHANGE UP (ref 7–14)
AST SERPL-CCNC: 9 U/L — SIGNIFICANT CHANGE UP (ref 4–32)
BILIRUB SERPL-MCNC: 0.3 MG/DL — SIGNIFICANT CHANGE UP (ref 0.2–1.2)
BUN SERPL-MCNC: 18 MG/DL — SIGNIFICANT CHANGE UP (ref 7–23)
CALCIUM SERPL-MCNC: 8.4 MG/DL — SIGNIFICANT CHANGE UP (ref 8.4–10.5)
CHLORIDE SERPL-SCNC: 99 MMOL/L — SIGNIFICANT CHANGE UP (ref 98–107)
CO2 SERPL-SCNC: 22 MMOL/L — SIGNIFICANT CHANGE UP (ref 22–31)
CREAT SERPL-MCNC: 1.3 MG/DL — SIGNIFICANT CHANGE UP (ref 0.5–1.3)
EGFR: 47 ML/MIN/1.73M2 — LOW
GAS PNL BLDA: SIGNIFICANT CHANGE UP
GAS PNL BLDA: SIGNIFICANT CHANGE UP
GLUCOSE SERPL-MCNC: 146 MG/DL — HIGH (ref 70–99)
GRAM STN FLD: ABNORMAL
HCT VFR BLD CALC: 29.6 % — LOW (ref 34.5–45)
HGB BLD-MCNC: 9.8 G/DL — LOW (ref 11.5–15.5)
MCHC RBC-ENTMCNC: 27.4 PG — SIGNIFICANT CHANGE UP (ref 27–34)
MCHC RBC-ENTMCNC: 33.1 GM/DL — SIGNIFICANT CHANGE UP (ref 32–36)
MCV RBC AUTO: 82.7 FL — SIGNIFICANT CHANGE UP (ref 80–100)
NRBC # BLD: 0 /100 WBCS — SIGNIFICANT CHANGE UP (ref 0–0)
NRBC # FLD: 0 K/UL — SIGNIFICANT CHANGE UP (ref 0–0)
PLATELET # BLD AUTO: 173 K/UL — SIGNIFICANT CHANGE UP (ref 150–400)
POTASSIUM SERPL-MCNC: 4.5 MMOL/L — SIGNIFICANT CHANGE UP (ref 3.5–5.3)
POTASSIUM SERPL-SCNC: 4.5 MMOL/L — SIGNIFICANT CHANGE UP (ref 3.5–5.3)
PROT SERPL-MCNC: 6.4 G/DL — SIGNIFICANT CHANGE UP (ref 6–8.3)
RBC # BLD: 3.58 M/UL — LOW (ref 3.8–5.2)
RBC # FLD: 14.9 % — HIGH (ref 10.3–14.5)
SODIUM SERPL-SCNC: 135 MMOL/L — SIGNIFICANT CHANGE UP (ref 135–145)
SPECIMEN SOURCE: SIGNIFICANT CHANGE UP
WBC # BLD: 7.01 K/UL — SIGNIFICANT CHANGE UP (ref 3.8–10.5)
WBC # FLD AUTO: 7.01 K/UL — SIGNIFICANT CHANGE UP (ref 3.8–10.5)

## 2024-07-30 PROCEDURE — 99223 1ST HOSP IP/OBS HIGH 75: CPT | Mod: 25

## 2024-07-30 PROCEDURE — 20680 REMOVAL OF IMPLANT DEEP: CPT

## 2024-07-30 PROCEDURE — 35701 EXPL N/FLWD SURG NECK ART: CPT | Mod: LT

## 2024-07-30 DEVICE — LIGATING CLIPS WECK HORIZON SMALL-WIDE (RED) 24: Type: IMPLANTABLE DEVICE | Status: FUNCTIONAL

## 2024-07-30 DEVICE — DOPPLER PROBE DISPOSABLE: Type: IMPLANTABLE DEVICE | Status: FUNCTIONAL

## 2024-07-30 DEVICE — COUPLER VESSEL ANASTOMOTIC 2.5MM: Type: IMPLANTABLE DEVICE | Status: FUNCTIONAL

## 2024-07-30 DEVICE — CARTRIDGE MICROCLIP 30: Type: IMPLANTABLE DEVICE | Status: FUNCTIONAL

## 2024-07-30 DEVICE — LIGATING CLIPS WECK HORIZON MEDIUM (BLUE) 24: Type: IMPLANTABLE DEVICE | Status: FUNCTIONAL

## 2024-07-30 RX ORDER — OXYCODONE HYDROCHLORIDE 30 MG/1
2.5 TABLET ORAL EVERY 4 HOURS
Refills: 0 | Status: DISCONTINUED | OUTPATIENT
Start: 2024-07-30 | End: 2024-07-30

## 2024-07-30 RX ORDER — HYDROMORPHONE HCL IN 0.9% NACL 0.2 MG/ML
0.2 PLASTIC BAG, INJECTION (ML) INTRAVENOUS EVERY 4 HOURS
Refills: 0 | Status: DISCONTINUED | OUTPATIENT
Start: 2024-07-30 | End: 2024-07-30

## 2024-07-30 RX ORDER — OXYCODONE HYDROCHLORIDE 30 MG/1
2.5 TABLET ORAL EVERY 4 HOURS
Refills: 0 | Status: DISCONTINUED | OUTPATIENT
Start: 2024-07-30 | End: 2024-08-06

## 2024-07-30 RX ORDER — ENOXAPARIN SODIUM 120 MG/.8ML
40 INJECTION SUBCUTANEOUS EVERY 24 HOURS
Refills: 0 | Status: DISCONTINUED | OUTPATIENT
Start: 2024-07-30 | End: 2024-07-30

## 2024-07-30 RX ORDER — SENNOSIDES 8.6 MG/1
2 TABLET ORAL AT BEDTIME
Refills: 0 | Status: DISCONTINUED | OUTPATIENT
Start: 2024-07-30 | End: 2024-08-06

## 2024-07-30 RX ORDER — AMPICILLIN SOD/SULBACTAM SOD 3 G
3 VIAL (EA) INJECTION EVERY 6 HOURS
Refills: 0 | Status: DISCONTINUED | OUTPATIENT
Start: 2024-07-30 | End: 2024-08-06

## 2024-07-30 RX ORDER — CHLORHEXIDINE GLUCONATE 500 MG/1
15 CLOTH TOPICAL
Refills: 0 | Status: DISCONTINUED | OUTPATIENT
Start: 2024-07-30 | End: 2024-08-06

## 2024-07-30 RX ORDER — GABAPENTIN 400 MG/1
600 CAPSULE ORAL DAILY
Refills: 0 | Status: DISCONTINUED | OUTPATIENT
Start: 2024-07-30 | End: 2024-08-06

## 2024-07-30 RX ORDER — ONDANSETRON HCL/PF 4 MG/2 ML
4 VIAL (ML) INJECTION EVERY 8 HOURS
Refills: 0 | Status: DISCONTINUED | OUTPATIENT
Start: 2024-07-30 | End: 2024-08-06

## 2024-07-30 RX ORDER — OXYCODONE HYDROCHLORIDE 30 MG/1
5 TABLET ORAL EVERY 4 HOURS
Refills: 0 | Status: DISCONTINUED | OUTPATIENT
Start: 2024-07-30 | End: 2024-07-30

## 2024-07-30 RX ORDER — HYDROMORPHONE HCL IN 0.9% NACL 0.2 MG/ML
0.5 PLASTIC BAG, INJECTION (ML) INTRAVENOUS EVERY 4 HOURS
Refills: 0 | Status: DISCONTINUED | OUTPATIENT
Start: 2024-07-30 | End: 2024-07-30

## 2024-07-30 RX ORDER — DEXTROSE MONOHYDRATE, SODIUM CHLORIDE, SODIUM LACTATE, CALCIUM CHLORIDE, MAGNESIUM CHLORIDE 1.5; 538; 448; 18.4; 5.08 G/100ML; MG/100ML; MG/100ML; MG/100ML; MG/100ML
1000 SOLUTION INTRAPERITONEAL
Refills: 0 | Status: DISCONTINUED | OUTPATIENT
Start: 2024-07-30 | End: 2024-08-01

## 2024-07-30 RX ORDER — DEXTROSE MONOHYDRATE, SODIUM CHLORIDE, SODIUM LACTATE, CALCIUM CHLORIDE, MAGNESIUM CHLORIDE 1.5; 538; 448; 18.4; 5.08 G/100ML; MG/100ML; MG/100ML; MG/100ML; MG/100ML
1000 SOLUTION INTRAPERITONEAL
Refills: 0 | Status: DISCONTINUED | OUTPATIENT
Start: 2024-07-30 | End: 2024-07-30

## 2024-07-30 RX ORDER — ACETAMINOPHEN 500 MG
975 TABLET ORAL EVERY 6 HOURS
Refills: 0 | Status: DISCONTINUED | OUTPATIENT
Start: 2024-07-30 | End: 2024-08-06

## 2024-07-30 RX ORDER — LORATADINE 10 MG
17 TABLET,DISINTEGRATING ORAL DAILY
Refills: 0 | Status: DISCONTINUED | OUTPATIENT
Start: 2024-07-30 | End: 2024-08-06

## 2024-07-30 RX ORDER — ENOXAPARIN SODIUM 120 MG/.8ML
40 INJECTION SUBCUTANEOUS EVERY 24 HOURS
Refills: 0 | Status: DISCONTINUED | OUTPATIENT
Start: 2024-07-30 | End: 2024-08-06

## 2024-07-30 RX ORDER — OXYCODONE HYDROCHLORIDE 30 MG/1
5 TABLET ORAL EVERY 4 HOURS
Refills: 0 | Status: DISCONTINUED | OUTPATIENT
Start: 2024-07-30 | End: 2024-08-06

## 2024-07-30 RX ADMIN — Medication 4 MILLIGRAM(S): at 16:31

## 2024-07-30 RX ADMIN — Medication 200 GRAM(S): at 21:36

## 2024-07-30 RX ADMIN — Medication 975 MILLIGRAM(S): at 21:36

## 2024-07-30 RX ADMIN — DEXTROSE MONOHYDRATE, SODIUM CHLORIDE, SODIUM LACTATE, CALCIUM CHLORIDE, MAGNESIUM CHLORIDE 75 MILLILITER(S): 1.5; 538; 448; 18.4; 5.08 SOLUTION INTRAPERITONEAL at 17:00

## 2024-07-30 RX ADMIN — OXYCODONE HYDROCHLORIDE 5 MILLIGRAM(S): 30 TABLET ORAL at 20:30

## 2024-07-30 RX ADMIN — CHLORHEXIDINE GLUCONATE 15 MILLILITER(S): 500 CLOTH TOPICAL at 17:00

## 2024-07-30 RX ADMIN — ENOXAPARIN SODIUM 40 MILLIGRAM(S): 120 INJECTION SUBCUTANEOUS at 17:04

## 2024-07-30 RX ADMIN — OXYCODONE HYDROCHLORIDE 5 MILLIGRAM(S): 30 TABLET ORAL at 20:00

## 2024-07-30 NOTE — BRIEF OPERATIVE NOTE - NSICDXBRIEFPOSTOP_GEN_ALL_CORE_FT
POST-OP DIAGNOSIS:  Hardware complicating wound infection 30-Jul-2024 15:15:14  Abebe Power  
POST-OP DIAGNOSIS:  Hardware complicating wound infection 30-Jul-2024 15:15:14  Abebe Power

## 2024-07-30 NOTE — BRIEF OPERATIVE NOTE - NSICDXBRIEFPREOP_GEN_ALL_CORE_FT
PRE-OP DIAGNOSIS:  Wound infection complicating hardware 30-Jul-2024 15:14:58  Abebe Power  
PRE-OP DIAGNOSIS:  Wound infection complicating hardware 30-Jul-2024 15:14:58  Abebe Power

## 2024-07-30 NOTE — BRIEF OPERATIVE NOTE - OPERATION/FINDINGS
Dr Rowe debridement of non-viable skin, removal of large mandibular plate.  Dr Evans recon w/ ALT from L thigh to neck.  Hooked up to L facial and L IJ.  JPx1 in neck and 1 in leg.  mayur Rowland aline
Repair of right facial dehiscence via debridement of right mandibular bone and overlying skin, removal of mandibular hardware, left neck exploration for vessels, reconstruction via left anterolateral thigh flap.

## 2024-07-30 NOTE — PATIENT PROFILE ADULT - LIVING ENVIRONMENT
Patient reports wanting to continue cancer treatments and hopes would receive second dose of chemo during this admission.   Management as per primary team and oncology. no

## 2024-07-30 NOTE — PATIENT PROFILE ADULT - FUNCTIONAL ASSESSMENT - DAILY ACTIVITY 1.
4 = No assist / stand by assistance

## 2024-07-30 NOTE — ASU PREOP CHECKLIST - 1.
pt has misquito bites to b/l ankles, and knees pt has mesquito bites to b/l anles and knees and has some redness and swelling to b/l cheeks pt has mosquito bites to b/l anles and knees and has some redness and swelling to b/l cheeks

## 2024-07-30 NOTE — CONSULT NOTE ADULT - SUBJECTIVE AND OBJECTIVE BOX
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 Presents for RYNE and left ALT flap . Admitted to SICU for q1h flap checks.    Surgery Information  OR time:      EBL:          IV Fluids:       Blood Products:   UOP:          PAST MEDICAL & SURGICAL HISTORY:  H/O malignant neoplasm of gum      Hypertension      Hyperlipidemia      Mild asthma      Malignant neoplasm of mandible      Right cataract      Thyroid nodule      Seasonal allergies      History of chemotherapy      History of head and neck radiation      H/O  section      History of partial hysterectomy      H/O neck surgery  partial right mandibulectomy with fibular free flap reconstriction and right neck lymphnode dissection 22      History of vascular access device      History of total left hip replacement      SCC (squamous cell carcinoma)        Home Meds: Home Medications:  patient state she takes NO medication:  (2024 06:28)    Allergies: Allergies    morphine (Hives)    Intolerances      Soc:   Advanced Directives: Presumed Full Code     ROS:    REVIEW OF SYSTEMS    [ ] A ten-point review of systems was otherwise negative except as noted.  [x] Due to altered mental status/intubation, subjective information were not able to be obtained from the patient. History was obtained, to the extent possible, from review of the chart and collateral sources of information.      CURRENT MEDICATIONS:   --------------------------------------------------------------------------------------  Neurologic Medications    Respiratory Medications    Cardiovascular Medications    Gastrointestinal Medications    Genitourinary Medications    Hematologic/Oncologic Medications    Antimicrobial/Immunologic Medications    Endocrine/Metabolic Medications    Topical/Other Medications    --------------------------------------------------------------------------------------    VITAL SIGNS, INS/OUTS (last 24 hours):  --------------------------------------------------------------------------------------  ICU Vital Signs Last 24 Hrs  T(C): 36.7 (2024 06:10), Max: 36.7 (2024 06:10)  T(F): 98.1 (2024 06:10), Max: 98.1 (2024 06:10)  HR: 88 (2024 06:10) (88 - 88)  BP: 107/79 (2024 06:10) (107/79 - 107/79)  BP(mean): --  ABP: --  ABP(mean): --  RR: 15 (2024 06:10) (15 - 15)  SpO2: 100% (2024 06:10) (100% - 100%)    O2 Parameters below as of 2024 06:10  Patient On (Oxygen Delivery Method): room air          I&O's Summary    --------------------------------------------------------------------------------------    EXAM:  General/Neuro  RASS:   GCS:   Exam: Normal, NAD, alert, oriented x 3, no focal deficits. PERRLA  ***    Respiratory  Exam: Lungs clear to auscultation, Normal expansion/effort.  ***  [] Tracheostomy   [] Intubated  Mechanical Ventilation:     Cardiovascular  Exam: S1, S2.  Regular rate and rhythm.  Peripheral edema  ***  Cardiac Rhythm: Normal Sinus Rhythm  ECHO:     GI  Exam: Abdomen soft, Non-tender, Non-distended.  Gastrostomy / Jejunostomy tube in place.  Nasogastric tube in place.  Colostomy / Ileostomy.  ***  Wound:   ***  Current Diet:  NPO***      Tubes/Lines/Drains  ***  [x] Peripheral IV  [] Central Venous Line     	[] R	[] L	[] IJ	[] Fem	[] SC        Type:	    Date Placed:   [] Arterial Line		[] R	[] L	[] Fem	[] Rad	[] Ax	Date Placed:   [] PICC:         	[] Midline		[] Mediport           [] Urinary Catheter		Date Placed:     Extremities  Exam: Extremities warm, pink, well-perfused.        Derm:  Exam: Good skin turgor, no skin breakdown.      :   Exam: Dobbs catheter in place.     LABS  --------------------------------------------------------------------------------------  Labs:  CAPILLARY BLOOD GLUCOSE                      LFTs:     Blood Gas Arterial, Lactate: 0.5 mmol/L (24 @ 12:23)  Blood Gas Arterial, Lactate: 0.5 mmol/L (24 @ 10:38)    ABG - ( 2024 12:23 )  pH: 7.36  /  pCO2: 46    /  pO2: 120   / HCO3: 26    / Base Excess: 0.3   /  SaO2: 98.9            ABG - ( 2024 10:38 )  pH: 7.34  /  pCO2: 50    /  pO2: 261   / HCO3: 27    / Base Excess: 0.9   /  SaO2: 99.2              Coags:                  --------------------------------------------------------------------------------------    OTHER LABS    IMAGING RESULTS       SICU Consultation Note  =====================================================  HPI:  59y Female hx HTN, HLD, and SCC s/p R mandibulectomy, R SND, and R Free Fibula Flap s/p 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 Presents for RYNE and left ALT flap . Admitted to SICU for q1h flap checks.    Surgery Information  OR time:      EBL:  300        IV Fluids:  3alb  Blood Products:  1u PRBC   UOP:    520      PAST MEDICAL & SURGICAL HISTORY:  H/O malignant neoplasm of gum      Hypertension      Hyperlipidemia      Mild asthma      Malignant neoplasm of mandible      Right cataract      Thyroid nodule      Seasonal allergies      History of chemotherapy      History of head and neck radiation      H/O  section      History of partial hysterectomy      H/O neck surgery  partial right mandibulectomy with fibular free flap reconstriction and right neck lymphnode dissection 22      History of vascular access device      History of total left hip replacement      SCC (squamous cell carcinoma)        Home Meds: Home Medications:  patient state she takes NO medication:  (2024 06:28)    Allergies: Allergies    morphine (Hives)    Intolerances      Soc:   Advanced Directives: Presumed Full Code     ROS:    REVIEW OF SYSTEMS    [ ] A ten-point review of systems was otherwise negative except as noted.  [x] Due to altered mental status/intubation, subjective information were not able to be obtained from the patient. History was obtained, to the extent possible, from review of the chart and collateral sources of information.      CURRENT MEDICATIONS:   --------------------------------------------------------------------------------------  Neurologic Medications    Respiratory Medications    Cardiovascular Medications    Gastrointestinal Medications    Genitourinary Medications    Hematologic/Oncologic Medications    Antimicrobial/Immunologic Medications    Endocrine/Metabolic Medications    Topical/Other Medications    --------------------------------------------------------------------------------------    VITAL SIGNS, INS/OUTS (last 24 hours):  --------------------------------------------------------------------------------------  ICU Vital Signs Last 24 Hrs  T(C): 36.7 (2024 06:10), Max: 36.7 (2024 06:10)  T(F): 98.1 (2024 06:10), Max: 98.1 (2024 06:10)  HR: 88 (2024 06:10) (88 - 88)  BP: 107/79 (2024 06:10) (107/79 - 107/79)  BP(mean): --  ABP: --  ABP(mean): --  RR: 15 (2024 06:10) (15 - 15)  SpO2: 100% (2024 06:10) (100% - 100%)    O2 Parameters below as of 2024 06:10  Patient On (Oxygen Delivery Method): room air          I&O's Summary    --------------------------------------------------------------------------------------    EXAM:  General/Neuro  Exam: Normal, NAD, alert, oriented x 3, no focal deficits. PERRLA      HEENT:   free flap skin paddle with good color, cap refill, warmth; cook doppler with strong signal    Respiratory  Exam: Lungs clear to auscultation, Normal expansion/effort.   [] Tracheostomy   [] Intubated  Mechanical Ventilation:     Cardiovascular  Exam: S1, S2.  Regular rate and rhythm.  Peripheral edema  Cardiac Rhythm: Normal Sinus Rhythm  ECHO:     GI  Exam: Abdomen soft, Non-tender, Non-distended.    Current Diet:  NPO      Tubes/Lines/Drains  2 IV  [x] Peripheral IV  [] Central Venous Line     	[] R	[] L	[] IJ	[] Fem	[] SC        Type:	    Date Placed:   [x] Arterial Line		[x] R	[] L	[] Fem	[x] Rad	[] Ax	Date Placed:   [] PICC:         	[] Midline		[] Mediport           [x] Urinary Catheter		Date Placed:     Extremities  Exam: Extremities warm, pink, well-perfused.    L thigh dressing c/d/i. JESSEE x1 with no output.    Derm:  Exam: Good skin turgor, no skin breakdown.      :   Exam: Dobbs catheter in place.     LABS  --------------------------------------------------------------------------------------  Labs:  CAPILLARY BLOOD GLUCOSE                      LFTs:     Blood Gas Arterial, Lactate: 0.5 mmol/L (24 @ 12:23)  Blood Gas Arterial, Lactate: 0.5 mmol/L (24 @ 10:38)    ABG - ( 2024 12:23 )  pH: 7.36  /  pCO2: 46    /  pO2: 120   / HCO3: 26    / Base Excess: 0.3   /  SaO2: 98.9            ABG - ( 2024 10:38 )  pH: 7.34  /  pCO2: 50    /  pO2: 261   / HCO3: 27    / Base Excess: 0.9   /  SaO2: 99.2              Coags:                  --------------------------------------------------------------------------------------    OTHER LABS    IMAGING RESULTS

## 2024-07-30 NOTE — CONSULT NOTE ADULT - ASSESSMENT
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 Presents for RYNE and left ALT flap 7/30. Admitted to SICU for q1h flap checks.    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 Presents for RYNE and left ALT flap 7/30. Admitted to SICU for q1h flap checks.    PLAN:   Neurologic:   - gabapentin  - tylenol    Respiratory:   - >95% on RA    Cardiovascular:   - MAP >65    Gastrointestinal/Nutrition:   - bedside s&s  - zofran 4mg q8  - senna/miralax  - pantoprazol 40mg qd    Renal/Genitourinary:   - merchant in place    Hematologic:   - lovenox   - sp 1u pRBC in OR  - trend h&h    Infectious Disease:   - Unasyn q8 for infected hardware  - peridex QID    Lines/Tubes:  - 2 PIV  - right radial a-line  - JESSEEx2  - marlon    Endocrine:   - No insulin needs    Disposition:   SICU   ASSESSMENT:  59y Female hx of SCCs/p R mandibulectomy, R SND, and R Free Fibula Flap s/p 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 Presents for RYNE and left ALT flap 7/30. Admitted to SICU for q1h flap checks.    PLAN:   Neurologic:   - gabapentin  - tylenol    Respiratory:   - >95% on RA    Cardiovascular:   - MAP >65    Gastrointestinal/Nutrition:   - bedside s&s  - zofran 4mg q8  - senna/miralax  - pantoprazol 40mg qd    Renal/Genitourinary:   - merchant in place    Hematologic:   - lovenox   - sp 1u pRBC in OR  - trend h&h    Infectious Disease:   - Unasyn q8 for infected hardware  - peridex QID  - f/u OR cultures    Lines/Tubes:  - 2 PIV  - right radial a-line  - JPx2  - marlon    Endocrine:   - No insulin needs    Disposition:   SICU   ASSESSMENT:  59y Female hx of SCCs/p R mandibulectomy, R SND, and R Free Fibula Flap s/p 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 Presents for RYNE and left ALT flap 7/30. Admitted to SICU for q1h flap checks.    PLAN:   Neurologic:   - gabapentin  - tylenol  - oxy PRN    Respiratory:   - >95% on RA    Cardiovascular:   - MAP >65    Gastrointestinal/Nutrition:   - NPO + IVF @75cc  - zofran 4mg q8  - senna/miralax  - pantoprazol 40mg qd    Renal/Genitourinary:   - merchant in place    Hematologic:   - lovenox   - sp 1u pRBC in OR  - trend h&h    Infectious Disease:   - Unasyn q8 for infected hardware  - peridex QID  - f/u OR cultures    Lines/Tubes:  - 2 PIV  - right radial a-line  - JESSEEx2  - marlon    Endocrine:   - No insulin needs    Disposition:   SICU   ASSESSMENT:  59y Female hx of SCCs/p R mandibulectomy, R SND, and R Free Fibula Flap s/p 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 Presents for RYNE and left ALT flap 7/30. Admitted to SICU for q1h flap checks.    PLAN:   Neurologic:   - gabapentin  - tylenol  - oxy PRN    Respiratory:   - >95% on RA    Cardiovascular:   - MAP >65    Gastrointestinal/Nutrition:   - NPO + IVF @75cc  - zofran 4mg q8  - senna/miralax  - pantoprazol 40mg qd    Renal/Genitourinary:   - merchant in place    Hematologic:   - lovenox   - sp 1u pRBC in OR  - trend h&h    Infectious Disease:   - Unasyn q6 for infected hardware  - peridex QID  - f/u OR cultures    Lines/Tubes:  - 2 PIV  - right radial a-line  - JESSEEx2  - marlon    Endocrine:   - No insulin needs    Disposition:   SICU

## 2024-07-30 NOTE — ASU PREOP CHECKLIST - SKIN PREP
I have re-evaluated the patient's fluid status and reviewed vital signs. Clinical perfusion assessment was performed.
n/a

## 2024-07-30 NOTE — BRIEF OPERATIVE NOTE - NSICDXBRIEFPROCEDURE_GEN_ALL_CORE_FT
PROCEDURES:  Fasciocutaneous flap, head 30-Jul-2024 15:14:36  Abebe Power  
PROCEDURES:  Fasciocutaneous flap, head 30-Jul-2024 15:14:36  Abebe oPwer

## 2024-07-31 LAB
ANION GAP SERPL CALC-SCNC: 12 MMOL/L — SIGNIFICANT CHANGE UP (ref 7–14)
BUN SERPL-MCNC: 19 MG/DL — SIGNIFICANT CHANGE UP (ref 7–23)
CALCIUM SERPL-MCNC: 7.8 MG/DL — LOW (ref 8.4–10.5)
CHLORIDE SERPL-SCNC: 101 MMOL/L — SIGNIFICANT CHANGE UP (ref 98–107)
CO2 SERPL-SCNC: 21 MMOL/L — LOW (ref 22–31)
CREAT SERPL-MCNC: 1.23 MG/DL — SIGNIFICANT CHANGE UP (ref 0.5–1.3)
EGFR: 50 ML/MIN/1.73M2 — LOW
GLUCOSE SERPL-MCNC: 125 MG/DL — HIGH (ref 70–99)
HCT VFR BLD CALC: 28.1 % — LOW (ref 34.5–45)
HGB BLD-MCNC: 9.3 G/DL — LOW (ref 11.5–15.5)
MAGNESIUM SERPL-MCNC: 1.8 MG/DL — SIGNIFICANT CHANGE UP (ref 1.6–2.6)
MCHC RBC-ENTMCNC: 27.1 PG — SIGNIFICANT CHANGE UP (ref 27–34)
MCHC RBC-ENTMCNC: 33.1 GM/DL — SIGNIFICANT CHANGE UP (ref 32–36)
MCV RBC AUTO: 81.9 FL — SIGNIFICANT CHANGE UP (ref 80–100)
NIGHT BLUE STAIN TISS: SIGNIFICANT CHANGE UP
NRBC # BLD: 0 /100 WBCS — SIGNIFICANT CHANGE UP (ref 0–0)
NRBC # FLD: 0 K/UL — SIGNIFICANT CHANGE UP (ref 0–0)
PHOSPHATE SERPL-MCNC: 4.9 MG/DL — HIGH (ref 2.5–4.5)
PLATELET # BLD AUTO: 170 K/UL — SIGNIFICANT CHANGE UP (ref 150–400)
POTASSIUM SERPL-MCNC: 4.8 MMOL/L — SIGNIFICANT CHANGE UP (ref 3.5–5.3)
POTASSIUM SERPL-SCNC: 4.8 MMOL/L — SIGNIFICANT CHANGE UP (ref 3.5–5.3)
RBC # BLD: 3.43 M/UL — LOW (ref 3.8–5.2)
RBC # FLD: 15.1 % — HIGH (ref 10.3–14.5)
SODIUM SERPL-SCNC: 134 MMOL/L — LOW (ref 135–145)
SPECIMEN SOURCE: SIGNIFICANT CHANGE UP
WBC # BLD: 6.53 K/UL — SIGNIFICANT CHANGE UP (ref 3.8–10.5)
WBC # FLD AUTO: 6.53 K/UL — SIGNIFICANT CHANGE UP (ref 3.8–10.5)

## 2024-07-31 PROCEDURE — 99233 SBSQ HOSP IP/OBS HIGH 50: CPT

## 2024-07-31 RX ORDER — MAGNESIUM SULFATE 500 MG/ML
1 VIAL (ML) INJECTION ONCE
Refills: 0 | Status: COMPLETED | OUTPATIENT
Start: 2024-07-31 | End: 2024-07-31

## 2024-07-31 RX ORDER — PANTOPRAZOLE SODIUM 20 MG/1
40 TABLET, DELAYED RELEASE ORAL
Refills: 0 | Status: DISCONTINUED | OUTPATIENT
Start: 2024-07-31 | End: 2024-08-06

## 2024-07-31 RX ADMIN — CHLORHEXIDINE GLUCONATE 15 MILLILITER(S): 500 CLOTH TOPICAL at 05:25

## 2024-07-31 RX ADMIN — OXYCODONE HYDROCHLORIDE 5 MILLIGRAM(S): 30 TABLET ORAL at 19:55

## 2024-07-31 RX ADMIN — GABAPENTIN 600 MILLIGRAM(S): 400 CAPSULE ORAL at 12:36

## 2024-07-31 RX ADMIN — Medication 4 MILLIGRAM(S): at 17:42

## 2024-07-31 RX ADMIN — Medication 975 MILLIGRAM(S): at 18:06

## 2024-07-31 RX ADMIN — Medication 200 GRAM(S): at 15:22

## 2024-07-31 RX ADMIN — OXYCODONE HYDROCHLORIDE 5 MILLIGRAM(S): 30 TABLET ORAL at 01:01

## 2024-07-31 RX ADMIN — OXYCODONE HYDROCHLORIDE 5 MILLIGRAM(S): 30 TABLET ORAL at 08:22

## 2024-07-31 RX ADMIN — DEXTROSE MONOHYDRATE, SODIUM CHLORIDE, SODIUM LACTATE, CALCIUM CHLORIDE, MAGNESIUM CHLORIDE 75 MILLILITER(S): 1.5; 538; 448; 18.4; 5.08 SOLUTION INTRAPERITONEAL at 19:55

## 2024-07-31 RX ADMIN — Medication 100 GRAM(S): at 01:59

## 2024-07-31 RX ADMIN — OXYCODONE HYDROCHLORIDE 5 MILLIGRAM(S): 30 TABLET ORAL at 13:02

## 2024-07-31 RX ADMIN — Medication 975 MILLIGRAM(S): at 04:13

## 2024-07-31 RX ADMIN — Medication 975 MILLIGRAM(S): at 15:22

## 2024-07-31 RX ADMIN — Medication 200 GRAM(S): at 08:25

## 2024-07-31 RX ADMIN — Medication 975 MILLIGRAM(S): at 20:00

## 2024-07-31 RX ADMIN — DEXTROSE MONOHYDRATE, SODIUM CHLORIDE, SODIUM LACTATE, CALCIUM CHLORIDE, MAGNESIUM CHLORIDE 75 MILLILITER(S): 1.5; 538; 448; 18.4; 5.08 SOLUTION INTRAPERITONEAL at 08:25

## 2024-07-31 RX ADMIN — Medication 975 MILLIGRAM(S): at 08:24

## 2024-07-31 RX ADMIN — OXYCODONE HYDROCHLORIDE 5 MILLIGRAM(S): 30 TABLET ORAL at 09:15

## 2024-07-31 RX ADMIN — OXYCODONE HYDROCHLORIDE 5 MILLIGRAM(S): 30 TABLET ORAL at 01:31

## 2024-07-31 RX ADMIN — OXYCODONE HYDROCHLORIDE 5 MILLIGRAM(S): 30 TABLET ORAL at 21:00

## 2024-07-31 RX ADMIN — ENOXAPARIN SODIUM 40 MILLIGRAM(S): 120 INJECTION SUBCUTANEOUS at 17:38

## 2024-07-31 RX ADMIN — Medication 975 MILLIGRAM(S): at 09:15

## 2024-07-31 RX ADMIN — Medication 975 MILLIGRAM(S): at 21:00

## 2024-07-31 RX ADMIN — Medication 200 GRAM(S): at 20:00

## 2024-07-31 RX ADMIN — OXYCODONE HYDROCHLORIDE 5 MILLIGRAM(S): 30 TABLET ORAL at 14:00

## 2024-07-31 RX ADMIN — CHLORHEXIDINE GLUCONATE 15 MILLILITER(S): 500 CLOTH TOPICAL at 17:38

## 2024-07-31 RX ADMIN — Medication 200 GRAM(S): at 04:13

## 2024-07-31 NOTE — PROGRESS NOTE ADULT - ATTENDING COMMENTS
I agree with the detailed interval history, physical, and plan, which I have reviewed and edited where appropriate'; also agree with notes/assessment with my team on service.  I have personally examined the patient.  I was physically present for the key portions of the evaluation and management (E/M) service provided.  I reviewed all the pertinent data.  The patient is a critical care patient with life threatening hemodynamic and metabolic instability in SICU.  The SICU team has a constant risk benefit analyzes discussion and coordinating care with the primary team and all consultants.   The patient is in SICU with the chief complaint and diagnosis mentioned in the note.   The plan will be specified in the note.  59y Female s/p completion of chemotherapy and radiation and RYNE and left ALT flap. Admitted to SICU for q1h flap checks.  Awake and alert  Heart RR  Lung clear  Abd soft    PLAN:   Neurologic:   - Gabapentin  - Tylenol  Respiratory:   - RA  Cardiovascular:   - MAP >65  Gastrointestinal/Nutrition:   - IVF @75cc  - Zofran  -Pantoprazol   Renal/Genitourinary:   - DC merchant   Hematologic:   - lovenox   Infectious Disease:   - Unasyn   Endocrine:   - FU Glucose  Disposition:   SICU

## 2024-07-31 NOTE — PROGRESS NOTE ADULT - ASSESSMENT
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 Presents for RYNE and left ALT flap 7/30. Admitted to SICU for q1h flap checks.    PLAN:   Neurologic:   - A&Ox3  - Pain control: gabapentin, tylenol, and oxy PRN    Respiratory:   - >95% on RA    Cardiovascular:   - MAP >65    Gastrointestinal/Nutrition:   - NPO + IVF @75cc  - zofran 4mg q8  - senna/miralax  - protonix 40mg qd    Renal/Genitourinary:   - merchant in place    Hematologic:   - DVT ppx: lovenox   - trend h&h    Infectious Disease:   - Unasyn q8 for infected hardware  - peridex QID  - f/u OR cultures    Lines/Tubes:  - 2 PIV  - right radial a-line  - Onel mason    Endocrine:   - No insulin needs    Disposition:   SICU

## 2024-07-31 NOTE — DIETITIAN INITIAL EVALUATION ADULT - PERTINENT MEDS FT
MEDICATIONS  (STANDING):  acetaminophen     Tablet .. 975 milliGRAM(s) Oral every 6 hours  ampicillin/sulbactam  IVPB 3 Gram(s) IV Intermittent every 6 hours  chlorhexidine 0.12% Liquid 15 milliLiter(s) Swish and Spit two times a day  enoxaparin Injectable 40 milliGRAM(s) SubCutaneous every 24 hours  gabapentin 600 milliGRAM(s) Oral daily  lactated ringers. 1000 milliLiter(s) (75 mL/Hr) IV Continuous <Continuous>    MEDICATIONS  (PRN):  ondansetron Injectable 4 milliGRAM(s) IV Push every 8 hours PRN Nausea and/or Vomiting  oxyCODONE    IR 2.5 milliGRAM(s) Oral every 4 hours PRN Moderate Pain (4 - 6)  oxyCODONE    IR 5 milliGRAM(s) Oral every 4 hours PRN Severe Pain (7 - 10)  polyethylene glycol 3350 17 Gram(s) Oral daily PRN Constipation  senna 2 Tablet(s) Oral at bedtime PRN Constipation

## 2024-07-31 NOTE — DIETITIAN INITIAL EVALUATION ADULT - NSICDXPASTSURGICALHX_GEN_ALL_CORE_FT
PAST SURGICAL HISTORY:  H/O  section     H/O neck surgery partial right mandibulectomy with fibular free flap reconstriction and right neck lymphnode dissection 22    History of partial hysterectomy     History of total left hip replacement     History of vascular access device     SCC (squamous cell carcinoma)

## 2024-07-31 NOTE — DIETITIAN INITIAL EVALUATION ADULT - OTHER INFO
58 y/o female with hx HTN, HLD and malignant gum ca admitted for RYNE and L ALT flap. Pt was unable to speak in sentences due to her recent surgical procedure. She nodded her head and shook her head to "yes" and "no" questions asked regarding her nutrition hx. Pt had an [facility] admission January 2024 with admit wt of 42 kg (92 lbs) and RD assessment of poor oral intake with related wt loss which diagnosed patient with severe malnutrition. Per EMR review, Heme/Onc MD outpatient note from April 2024 stated that patient had been eating better and was gaining wt with wt taken that date of 120 lbs. Her current admit wt is 121 lbs. Patient confirmed by nodding that this information verbally stated to her was accurate. She affirmed that she gained approx 30 lbs over the past 6 months intentionally. Pt was just started on an oral diet earlier today. RN reported that pt is consuming small amounts as she is having a hard time opening her mouth. Explained to patient that oral supplements would be added to her meals to help optimize her oral intake and overall nutrition. Pt nodded in acceptance. No GI distress at present. Education deferred at this time as patient is medically acute in the critical care setting. RDN services to remain available as needed.

## 2024-07-31 NOTE — PROGRESS NOTE ADULT - SUBJECTIVE AND OBJECTIVE BOX
Plastic Surgery Progress Note (pg [facility]: [id number], [facility]: [phone])    SUBJECTIVE  The patient was seen and examined. No acute events overnight. Pain controlled, afebrile w/ stable vitals.     OBJECTIVE  ___________________________________________________  VITAL SIGNS / I&O's   Vital Signs Last 24 Hrs  T(C): 35.9 (31 Jul 2024 04:00), Max: 36.1 (31 Jul 2024 00:00)  T(F): 96.7 (31 Jul 2024 04:00), Max: 97 (31 Jul 2024 00:00)  HR: 74 (31 Jul 2024 07:00) (65 - 87)  BP: 99/59 (31 Jul 2024 06:00) (93/65 - 114/66)  BP(mean): 71 (31 Jul 2024 06:00) (70 - 90)  RR: 15 (31 Jul 2024 07:00) (11 - 21)  SpO2: 97% (31 Jul 2024 07:00) (91% - 99%)    Parameters below as of 31 Jul 2024 07:00  Patient On (Oxygen Delivery Method): room air          30 Jul 2024 07:01  -  31 Jul 2024 07:00  --------------------------------------------------------  IN:    Lactated Ringers: 1200 mL  Total IN: 1200 mL    OUT:    Bulb (mL): 21 mL    Bulb (mL): 20 mL    Indwelling Catheter - Urethral (mL): 1150 mL  Total OUT: 1191 mL    Total NET: 9 mL        ___________________________________________________  PHYSICAL EXAM    -- CONSTITUTIONAL: NAD, lying in bed  -- NEURO: Awake, alert  -- HEENT: NC/AT, mucous membranes moist  -- NECK: Soft, no asymmetry,  ALT flap with strong cook signals, well perfused color, incisions c/d/i  -- PULM: Non-labored respirations, equal chest rise bilaterally  -- ABDOMEN: Nondistended  -- EXTREMITIES: Warm and well perfused  -- PSYCH: Affect normal, A&Ox3    ___________________________________________________  LABS                        9.3    6.53  )-----------( 170      ( 31 Jul 2024 00:50 )             28.1     31 Jul 2024 00:50    134    |  101    |  19     ----------------------------<  125    4.8     |  21     |  1.23     Ca    7.8        31 Jul 2024 00:50  Phos  4.9       31 Jul 2024 00:50  Mg     1.80      31 Jul 2024 00:50    TPro  6.4    /  Alb  3.8    /  TBili  0.3    /  DBili  x      /  AST  9      /  ALT  6      /  AlkPhos  61     30 Jul 2024 15:47      CAPILLARY BLOOD GLUCOSE            Urinalysis Basic - ( 31 Jul 2024 00:50 )    Color: x / Appearance: x / SG: x / pH: x  Gluc: 125 mg/dL / Ketone: x  / Bili: x / Urobili: x   Blood: x / Protein: x / Nitrite: x   Leuk Esterase: x / RBC: x / WBC x   Sq Epi: x / Non Sq Epi: x / Bacteria: x      ___________________________________________________  MICRO  Recent Cultures:  Specimen Source: .Other 2 RIGHT MANDIBLE TISSUE CULTURE, 07-30 @ 10:50; Results   Testing in progress; Gram Stain: --; Organism: --  Specimen Source: .Tissue 1  RIGHT MANDIBLE BONE CULTURE, 07-30 @ 10:45; Results   Testing in progress; Gram Stain:   Few polymorphonuclear leukocytes seen per low power field  Numerous Gram positive cocci in pairs seen per oil power field  Moderate Gram Negative Rods seen per oil power field  Rare Gram Positive Rods seen per oil power field<!>; Organism: --    ___________________________________________________  MEDICATIONS  (STANDING):  acetaminophen     Tablet .. 975 milliGRAM(s) Oral every 6 hours  ampicillin/sulbactam  IVPB 3 Gram(s) IV Intermittent every 6 hours  chlorhexidine 0.12% Liquid 15 milliLiter(s) Swish and Spit two times a day  enoxaparin Injectable 40 milliGRAM(s) SubCutaneous every 24 hours  gabapentin 600 milliGRAM(s) Oral daily  lactated ringers. 1000 milliLiter(s) (75 mL/Hr) IV Continuous <Continuous>    MEDICATIONS  (PRN):  ondansetron Injectable 4 milliGRAM(s) IV Push every 8 hours PRN Nausea and/or Vomiting  oxyCODONE    IR 5 milliGRAM(s) Oral every 4 hours PRN Severe Pain (7 - 10)  oxyCODONE    IR 2.5 milliGRAM(s) Oral every 4 hours PRN Moderate Pain (4 - 6)  polyethylene glycol 3350 17 Gram(s) Oral daily PRN Constipation  senna 2 Tablet(s) Oral at bedtime PRN Constipation

## 2024-07-31 NOTE — DIETITIAN INITIAL EVALUATION ADULT - PERTINENT LABORATORY DATA
07-31    134<L>  |  101  |  19  ----------------------------<  125<H>  4.8   |  21<L>  |  1.23    Ca    7.8<L>      31 Jul 2024 00:50  Phos  4.9     07-31  Mg     1.80     07-31    TPro  6.4  /  Alb  3.8  /  TBili  0.3  /  DBili  x   /  AST  9   /  ALT  6   /  AlkPhos  61  07-30  A1C with Estimated Average Glucose Result: 5.3 % (11-21-23 @ 17:14)

## 2024-07-31 NOTE — PROGRESS NOTE ADULT - ASSESSMENT
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 Now s/p debridement, RYNE and left ALT flap 7/30 with Joseph Rowe and Cristina.    - Q1h flap checks per ERAS protocol  - flap management per ERAS protocol  - Care per SICU/OMFS/PRS

## 2024-07-31 NOTE — PROGRESS NOTE ADULT - SUBJECTIVE AND OBJECTIVE BOX
POST ANESTHESIA EVALUATION    59y Female POSTOP DAY 1   MENTAL STATUS: Patient participation [x  ] Awake     [  ] Arousable     [  ] Sedated    AIRWAY PATENCY: [  ] Satisfactory  [  ] Other:     Vital Signs Last 24 Hrs  T(C): 35.6 (31 Jul 2024 08:00), Max: 36.1 (31 Jul 2024 00:00)  T(F): 96.1 (31 Jul 2024 08:00), Max: 97 (31 Jul 2024 00:00)  HR: 77 (31 Jul 2024 09:00) (65 - 87)  BP: 90/56 (31 Jul 2024 09:00) (84/63 - 114/66)  BP(mean): 67 (31 Jul 2024 09:00) (67 - 90)  RR: 17 (31 Jul 2024 09:00) (11 - 21)  SpO2: 96% (31 Jul 2024 09:00) (91% - 99%)    Parameters below as of 31 Jul 2024 08:00  Patient On (Oxygen Delivery Method): room air      I&O's Summary    30 Jul 2024 07:01  -  31 Jul 2024 07:00  --------------------------------------------------------  IN: 1200 mL / OUT: 1191 mL / NET: 9 mL    31 Jul 2024 07:01  -  31 Jul 2024 09:56  --------------------------------------------------------  IN: 75 mL / OUT: 69 mL / NET: 6 mL          HYDRATION STATUS:  [  x] SATISFACTORY   [  ] OTHER    NAUSEA/ VOMITTING:  [ x ] NONE  [  ] CONTROLLED [  ] OTHER     PAIN: [ x ] CONTROLLED WITH CURRENT REGIMEN  [  ] OTHER    [ x ] NO APPARENT ANESTHESIA COMPLICATIONS      Comments:

## 2024-07-31 NOTE — DIETITIAN INITIAL EVALUATION ADULT - NSICDXPASTMEDICALHX_GEN_ALL_CORE_FT
PAST MEDICAL HISTORY:  H/O malignant neoplasm of gum     History of chemotherapy     History of head and neck radiation     Hyperlipidemia     Hypertension     Malignant neoplasm of mandible     Mild asthma     Right cataract     Seasonal allergies     Thyroid nodule

## 2024-07-31 NOTE — PROGRESS NOTE ADULT - ASSESSMENT
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 Now s/p debridement, RYNE and left ALT flap 7/30 with Joseph Rowe and Cristina.    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 Now s/p debridement, RYNE and left ALT flap 7/30 with Joseph Rowe and Cristina.    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 Now s/p debridement, RYNE and left ALT flap 7/30 with Joseph Rowe and Cristina.      Plan:  - Q1h flap checks with [employer] doppler, monitor flap color  - Monitor JESSEE drain output  - Monitor wound vac output, will take down on POD7  - Skin graft donor site dressing down on POD7, reinforce PRN  - Continue ERAS protocol care  - Pain control  - Appreciate SICU care

## 2024-07-31 NOTE — PROGRESS NOTE ADULT - SUBJECTIVE AND OBJECTIVE BOX
HPI/Interval:  Pt seen and examined at bedside. Awake and alerts this morning, pt in good spirits. Flap with strong cook doppler signals, good color and turgor.    Physical Exam:  General: well-developed, NAD  OU: EOMI; PERRL; no drainage or redness  AU: external ears normal  Nose: nares patent  Mouth: No oral lesions; no gross abnormalities  Neck: trachea midline, ALT free flap with good color and turgor, strong cook and venous doppler signals   Respiratory: unlabored respirations  Cardiovascular: regular rate  Gastrointestinal: Soft, nondistended  Extremities: No edema, warm and well perfused  Skin: No lesions; no rash

## 2024-07-31 NOTE — PROGRESS NOTE ADULT - SUBJECTIVE AND OBJECTIVE BOX
SICU Daily Progress Note  =====================================================  Interval events:  - aislinn ovn    ALLERGIES:  morphine (Hives)      --------------------------------------------------------------------------------------    MEDICATIONS:    Neurologic Medications  acetaminophen     Tablet .. 975 milliGRAM(s) Oral every 6 hours  gabapentin 600 milliGRAM(s) Oral daily  ondansetron Injectable 4 milliGRAM(s) IV Push every 8 hours PRN Nausea and/or Vomiting  oxyCODONE    IR 2.5 milliGRAM(s) Oral every 4 hours PRN Moderate Pain (4 - 6)  oxyCODONE    IR 5 milliGRAM(s) Oral every 4 hours PRN Severe Pain (7 - 10)    Respiratory Medications    Cardiovascular Medications    Gastrointestinal Medications  lactated ringers. 1000 milliLiter(s) IV Continuous <Continuous>  polyethylene glycol 3350 17 Gram(s) Oral daily PRN Constipation  senna 2 Tablet(s) Oral at bedtime PRN Constipation    Genitourinary Medications    Hematologic/Oncologic Medications  enoxaparin Injectable 40 milliGRAM(s) SubCutaneous every 24 hours    Antimicrobial/Immunologic Medications  ampicillin/sulbactam  IVPB 3 Gram(s) IV Intermittent every 6 hours    Endocrine/Metabolic Medications    Topical/Other Medications  chlorhexidine 0.12% Liquid 15 milliLiter(s) Swish and Spit two times a day    --------------------------------------------------------------------------------------    VITAL SIGNS:  ICU Vital Signs Last 24 Hrs  T(C): 36.1 (31 Jul 2024 00:00), Max: 36.7 (30 Jul 2024 06:10)  T(F): 97 (31 Jul 2024 00:00), Max: 98.1 (30 Jul 2024 06:10)  HR: 65 (31 Jul 2024 00:00) (65 - 88)  BP: 93/65 (31 Jul 2024 00:00) (93/65 - 113/80)  BP(mean): 76 (31 Jul 2024 00:00) (70 - 90)  ABP: 112/56 (31 Jul 2024 00:00) (112/56 - 124/66)  ABP(mean): 78 (31 Jul 2024 00:00) (78 - 90)  RR: 13 (31 Jul 2024 00:00) (11 - 21)  SpO2: 98% (31 Jul 2024 00:00) (91% - 100%)    O2 Parameters below as of 31 Jul 2024 00:00  Patient On (Oxygen Delivery Method): room air    --------------------------------------------------------------------------------------    INS AND OUTS:  I&O's Detail    30 Jul 2024 07:01  -  31 Jul 2024 00:25  --------------------------------------------------------  IN:    Lactated Ringers: 750 mL  Total IN: 750 mL    OUT:    Bulb (mL): 16 mL    Bulb (mL): 20 mL    Indwelling Catheter - Urethral (mL): 740 mL  Total OUT: 776 mL    Total NET: -26 mL    --------------------------------------------------------------------------------------      EXAM:  General/Neuro  Exam: Normal, NAD, alert, oriented x 3, no focal deficits. PERRLA      HEENT:   free flap skin paddle with good color, cap refill, warmth; cook doppler with strong signal    Respiratory  Exam: Lungs clear to auscultation, Normal expansion/effort.     Cardiovascular  Exam: S1, S2.  Regular rate and rhythm.  Peripheral edema  Cardiac Rhythm: Normal Sinus Rhythm  ECHO:     GI  Exam: Abdomen soft, Non-tender, Non-distended.    Current Diet:  NPO      METABOLIC / FLUIDS / ELECTROLYTES  lactated ringers. 1000 milliLiter(s) IV Continuous <Continuous>      HEMATOLOGIC  [x] VTE Prophylaxis: enoxaparin Injectable 40 milliGRAM(s) SubCutaneous every 24 hours    Transfusions:	[] PRBC	[] Platelets		[] FFP	[] Cryoprecipitate    INFECTIOUS DISEASE  Antimicrobials/Immunologic Medications:  ampicillin/sulbactam  IVPB 3 Gram(s) IV Intermittent every 6 hours    Day #      of     ***    TUBES / LINES / DRAINS  ***  [x] Peripheral IV  [] Central Venous Line     	[] R	[] L	[] IJ	[] Fem	[] SC	Date Placed:   [] Arterial Line		[] R	[] L	[] Fem	[] Rad	[] Ax	Date Placed:   [] PICC		[] Midline		[] Mediport  [] Urinary Catheter		Date Placed:   [x] Necessity of urinary, arterial, and venous catheters discussed    --------------------------------------------------------------------------------------    LABS                          9.8    7.01  )-----------( 173      ( 30 Jul 2024 15:47 )             29.6   07-30    135  |  99  |  18  ----------------------------<  146<H>  4.5   |  22  |  1.30    Ca    8.4      30 Jul 2024 15:47    TPro  6.4  /  Alb  3.8  /  TBili  0.3  /  DBili  x   /  AST  9   /  ALT  6   /  AlkPhos  61  07-30    --------------------------------------------------------------------------------------    OTHER LABORATORY:     IMAGING STUDIES:   CXR:

## 2024-07-31 NOTE — PROGRESS NOTE ADULT - ASSESSMENT
59F HD2 POD1 presented for RYNE and left ALT flap 7/30.    - Q1h flap checks with [employer] doppler, monitor flap color  - Monitor JESSEE drain output  - Full liquid diet   - Continue ERAS  - Multimodal Pain management

## 2024-07-31 NOTE — PROGRESS NOTE ADULT - SUBJECTIVE AND OBJECTIVE BOX
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 Presents for RYNE and left ALT flap 7/30. Patient was visited bedside this am and is doing well. Patient           Neck:, range of motion intact, ALT flap good color, strong cook, Mild oozing, JESSEE drain in place  Leg: Wrapped with serosanguinous output, JESSEE drain in place     Extra oral exam: OMER 30, no trismus, no LAD, no palpable step offs of the zygomatic arches,  Intra oral exam: uvula midline, no vestibular pathology,FOM s/nt/ne, no ecchymosis    Vitals:     Vital Signs Last 24 Hrs  T(C): 35.6 (31 Jul 2024 08:00), Max: 36.1 (31 Jul 2024 00:00)  T(F): 96.1 (31 Jul 2024 08:00), Max: 97 (31 Jul 2024 00:00)  HR: 72 (31 Jul 2024 08:00) (65 - 87)  BP: 84/63 (31 Jul 2024 08:00) (84/63 - 114/66)  BP(mean): 72 (31 Jul 2024 08:00) (70 - 90)  RR: 11 (31 Jul 2024 08:00) (11 - 21)  SpO2: 95% (31 Jul 2024 08:00) (91% - 99%)    Parameters below as of 31 Jul 2024 08:00  Patient On (Oxygen Delivery Method): room air        I&O's Detail    30 Jul 2024 07:01  -  31 Jul 2024 07:00  --------------------------------------------------------  IN:    Lactated Ringers: 1200 mL  Total IN: 1200 mL    OUT:    Bulb (mL): 21 mL    Bulb (mL): 20 mL    Indwelling Catheter - Urethral (mL): 1150 mL  Total OUT: 1191 mL    Total NET: 9 mL      31 Jul 2024 07:01  -  31 Jul 2024 08:34  --------------------------------------------------------  IN:    Lactated Ringers: 75 mL  Total IN: 75 mL    OUT:    Bulb (mL): 5 mL    Bulb (mL): 4 mL    Indwelling Catheter - Urethral (mL): 60 mL  Total OUT: 69 mL    Total NET: 6 mL          Medications:    MEDICATIONS  (STANDING):  acetaminophen     Tablet .. 975 milliGRAM(s) Oral every 6 hours  ampicillin/sulbactam  IVPB 3 Gram(s) IV Intermittent every 6 hours  chlorhexidine 0.12% Liquid 15 milliLiter(s) Swish and Spit two times a day  enoxaparin Injectable 40 milliGRAM(s) SubCutaneous every 24 hours  gabapentin 600 milliGRAM(s) Oral daily  lactated ringers. 1000 milliLiter(s) (75 mL/Hr) IV Continuous <Continuous>    MEDICATIONS  (PRN):  ondansetron Injectable 4 milliGRAM(s) IV Push every 8 hours PRN Nausea and/or Vomiting  oxyCODONE    IR 5 milliGRAM(s) Oral every 4 hours PRN Severe Pain (7 - 10)  oxyCODONE    IR 2.5 milliGRAM(s) Oral every 4 hours PRN Moderate Pain (4 - 6)  polyethylene glycol 3350 17 Gram(s) Oral daily PRN Constipation  senna 2 Tablet(s) Oral at bedtime PRN Constipation      Labs:                          9.3    6.53  )-----------( 170      ( 31 Jul 2024 00:50 )             28.1       07-31    134<L>  |  101  |  19  ----------------------------<  125<H>  4.8   |  21<L>  |  1.23    Ca    7.8<L>      31 Jul 2024 00:50  Phos  4.9     07-31  Mg     1.80     07-31    TPro  6.4  /  Alb  3.8  /  TBili  0.3  /  DBili  x   /  AST  9   /  ALT  6   /  AlkPhos  61  07-30   59y Female hx of SCCs/p R mandibulectomy, R SND, and R Free Fibula Flap s/p 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 Presents for RYNE and left ALT flap 7/30. Patient was visited bedside this am and is doing well. Patient denies any f/c/n/v          Neck:, range of motion intact, ALT flap good color, strong cook, Mild oozing, JESSEE drain in place  Leg: Wrapped with serosanguinous output, JESSEE drain in place     Extra oral exam: OMER 30, no trismus, no LAD, no palpable step offs of the zygomatic arches,  Intra oral exam: uvula midline, no vestibular pathology,FOM s/nt/ne, no ecchymosis    Vitals:     Vital Signs Last 24 Hrs  T(C): 35.6 (31 Jul 2024 08:00), Max: 36.1 (31 Jul 2024 00:00)  T(F): 96.1 (31 Jul 2024 08:00), Max: 97 (31 Jul 2024 00:00)  HR: 72 (31 Jul 2024 08:00) (65 - 87)  BP: 84/63 (31 Jul 2024 08:00) (84/63 - 114/66)  BP(mean): 72 (31 Jul 2024 08:00) (70 - 90)  RR: 11 (31 Jul 2024 08:00) (11 - 21)  SpO2: 95% (31 Jul 2024 08:00) (91% - 99%)    Parameters below as of 31 Jul 2024 08:00  Patient On (Oxygen Delivery Method): room air        I&O's Detail    30 Jul 2024 07:01  -  31 Jul 2024 07:00  --------------------------------------------------------  IN:    Lactated Ringers: 1200 mL  Total IN: 1200 mL    OUT:    Bulb (mL): 21 mL    Bulb (mL): 20 mL    Indwelling Catheter - Urethral (mL): 1150 mL  Total OUT: 1191 mL    Total NET: 9 mL      31 Jul 2024 07:01  -  31 Jul 2024 08:34  --------------------------------------------------------  IN:    Lactated Ringers: 75 mL  Total IN: 75 mL    OUT:    Bulb (mL): 5 mL    Bulb (mL): 4 mL    Indwelling Catheter - Urethral (mL): 60 mL  Total OUT: 69 mL    Total NET: 6 mL          Medications:    MEDICATIONS  (STANDING):  acetaminophen     Tablet .. 975 milliGRAM(s) Oral every 6 hours  ampicillin/sulbactam  IVPB 3 Gram(s) IV Intermittent every 6 hours  chlorhexidine 0.12% Liquid 15 milliLiter(s) Swish and Spit two times a day  enoxaparin Injectable 40 milliGRAM(s) SubCutaneous every 24 hours  gabapentin 600 milliGRAM(s) Oral daily  lactated ringers. 1000 milliLiter(s) (75 mL/Hr) IV Continuous <Continuous>    MEDICATIONS  (PRN):  ondansetron Injectable 4 milliGRAM(s) IV Push every 8 hours PRN Nausea and/or Vomiting  oxyCODONE    IR 5 milliGRAM(s) Oral every 4 hours PRN Severe Pain (7 - 10)  oxyCODONE    IR 2.5 milliGRAM(s) Oral every 4 hours PRN Moderate Pain (4 - 6)  polyethylene glycol 3350 17 Gram(s) Oral daily PRN Constipation  senna 2 Tablet(s) Oral at bedtime PRN Constipation      Labs:                          9.3    6.53  )-----------( 170      ( 31 Jul 2024 00:50 )             28.1       07-31    134<L>  |  101  |  19  ----------------------------<  125<H>  4.8   |  21<L>  |  1.23    Ca    7.8<L>      31 Jul 2024 00:50  Phos  4.9     07-31  Mg     1.80     07-31    TPro  6.4  /  Alb  3.8  /  TBili  0.3  /  DBili  x   /  AST  9   /  ALT  6   /  AlkPhos  61  07-30

## 2024-07-31 NOTE — DIETITIAN INITIAL EVALUATION ADULT - ADD RECOMMEND
1. Provide encouragement with PO intake and assistance with meals as needed. Continue to monitor nutritional intake, labs, weights, BM, skin, edema and clinical course. 2. Follow pt as per protocol.

## 2024-08-01 LAB
-  AMOXICILLIN/CLAVULANIC ACID: SIGNIFICANT CHANGE UP
-  AMPICILLIN/SULBACTAM: SIGNIFICANT CHANGE UP
-  AMPICILLIN: SIGNIFICANT CHANGE UP
-  AZTREONAM: SIGNIFICANT CHANGE UP
-  CEFAZOLIN: SIGNIFICANT CHANGE UP
-  CEFEPIME: SIGNIFICANT CHANGE UP
-  CEFOXITIN: SIGNIFICANT CHANGE UP
-  CEFTRIAXONE: SIGNIFICANT CHANGE UP
-  CIPROFLOXACIN: SIGNIFICANT CHANGE UP
-  CLINDAMYCIN: SIGNIFICANT CHANGE UP
-  ERTAPENEM: SIGNIFICANT CHANGE UP
-  ERYTHROMYCIN: SIGNIFICANT CHANGE UP
-  GENTAMICIN: SIGNIFICANT CHANGE UP
-  GENTAMICIN: SIGNIFICANT CHANGE UP
-  LEVOFLOXACIN: SIGNIFICANT CHANGE UP
-  MEROPENEM: SIGNIFICANT CHANGE UP
-  OXACILLIN: SIGNIFICANT CHANGE UP
-  PENICILLIN: SIGNIFICANT CHANGE UP
-  PIPERACILLIN/TAZOBACTAM: SIGNIFICANT CHANGE UP
-  RIFAMPIN: SIGNIFICANT CHANGE UP
-  TETRACYCLINE: SIGNIFICANT CHANGE UP
-  TOBRAMYCIN: SIGNIFICANT CHANGE UP
-  TRIMETHOPRIM/SULFAMETHOXAZOLE: SIGNIFICANT CHANGE UP
-  TRIMETHOPRIM/SULFAMETHOXAZOLE: SIGNIFICANT CHANGE UP
-  VANCOMYCIN: SIGNIFICANT CHANGE UP
ANION GAP SERPL CALC-SCNC: 9 MMOL/L — SIGNIFICANT CHANGE UP (ref 7–14)
BUN SERPL-MCNC: 17 MG/DL — SIGNIFICANT CHANGE UP (ref 7–23)
CALCIUM SERPL-MCNC: 8.4 MG/DL — SIGNIFICANT CHANGE UP (ref 8.4–10.5)
CHLORIDE SERPL-SCNC: 102 MMOL/L — SIGNIFICANT CHANGE UP (ref 98–107)
CO2 SERPL-SCNC: 25 MMOL/L — SIGNIFICANT CHANGE UP (ref 22–31)
CREAT SERPL-MCNC: 1.34 MG/DL — HIGH (ref 0.5–1.3)
CULTURE RESULTS: ABNORMAL
EGFR: 45 ML/MIN/1.73M2 — LOW
GLUCOSE BLDC GLUCOMTR-MCNC: 106 MG/DL — HIGH (ref 70–99)
GLUCOSE BLDC GLUCOMTR-MCNC: 126 MG/DL — HIGH (ref 70–99)
GLUCOSE BLDC GLUCOMTR-MCNC: 144 MG/DL — HIGH (ref 70–99)
GLUCOSE BLDC GLUCOMTR-MCNC: 68 MG/DL — LOW (ref 70–99)
GLUCOSE BLDC GLUCOMTR-MCNC: 76 MG/DL — SIGNIFICANT CHANGE UP (ref 70–99)
GLUCOSE BLDC GLUCOMTR-MCNC: 90 MG/DL — SIGNIFICANT CHANGE UP (ref 70–99)
GLUCOSE SERPL-MCNC: 69 MG/DL — LOW (ref 70–99)
HCT VFR BLD CALC: 30.1 % — LOW (ref 34.5–45)
HGB BLD-MCNC: 9.3 G/DL — LOW (ref 11.5–15.5)
MAGNESIUM SERPL-MCNC: 2 MG/DL — SIGNIFICANT CHANGE UP (ref 1.6–2.6)
MCHC RBC-ENTMCNC: 26.8 PG — LOW (ref 27–34)
MCHC RBC-ENTMCNC: 30.9 GM/DL — LOW (ref 32–36)
MCV RBC AUTO: 86.7 FL — SIGNIFICANT CHANGE UP (ref 80–100)
METHOD TYPE: SIGNIFICANT CHANGE UP
METHOD TYPE: SIGNIFICANT CHANGE UP
NRBC # BLD: 0 /100 WBCS — SIGNIFICANT CHANGE UP (ref 0–0)
NRBC # FLD: 0 K/UL — SIGNIFICANT CHANGE UP (ref 0–0)
ORGANISM # SPEC MICROSCOPIC CNT: ABNORMAL
ORGANISM # SPEC MICROSCOPIC CNT: ABNORMAL
PHOSPHATE SERPL-MCNC: 3.5 MG/DL — SIGNIFICANT CHANGE UP (ref 2.5–4.5)
PLATELET # BLD AUTO: 194 K/UL — SIGNIFICANT CHANGE UP (ref 150–400)
POTASSIUM SERPL-MCNC: 4.8 MMOL/L — SIGNIFICANT CHANGE UP (ref 3.5–5.3)
POTASSIUM SERPL-SCNC: 4.8 MMOL/L — SIGNIFICANT CHANGE UP (ref 3.5–5.3)
RBC # BLD: 3.47 M/UL — LOW (ref 3.8–5.2)
RBC # FLD: 15.4 % — HIGH (ref 10.3–14.5)
SODIUM SERPL-SCNC: 136 MMOL/L — SIGNIFICANT CHANGE UP (ref 135–145)
SPECIMEN SOURCE: SIGNIFICANT CHANGE UP
WBC # BLD: 5.01 K/UL — SIGNIFICANT CHANGE UP (ref 3.8–10.5)
WBC # FLD AUTO: 5.01 K/UL — SIGNIFICANT CHANGE UP (ref 3.8–10.5)

## 2024-08-01 PROCEDURE — 99232 SBSQ HOSP IP/OBS MODERATE 35: CPT

## 2024-08-01 RX ORDER — DEXTROSE 4 G
50 TABLET,CHEWABLE ORAL ONCE
Refills: 0 | Status: COMPLETED | OUTPATIENT
Start: 2024-08-01 | End: 2024-08-01

## 2024-08-01 RX ORDER — DEXTROSE 4 G
25 TABLET,CHEWABLE ORAL ONCE
Refills: 0 | Status: COMPLETED | OUTPATIENT
Start: 2024-08-01 | End: 2024-08-01

## 2024-08-01 RX ORDER — DEXTROSE MONOHYDRATE, SODIUM CHLORIDE, SODIUM LACTATE, CALCIUM CHLORIDE, MAGNESIUM CHLORIDE 1.5; 538; 448; 18.4; 5.08 G/100ML; MG/100ML; MG/100ML; MG/100ML; MG/100ML
1000 SOLUTION INTRAPERITONEAL
Refills: 0 | Status: DISCONTINUED | OUTPATIENT
Start: 2024-08-01 | End: 2024-08-06

## 2024-08-01 RX ADMIN — Medication 200 GRAM(S): at 23:35

## 2024-08-01 RX ADMIN — PANTOPRAZOLE SODIUM 40 MILLIGRAM(S): 20 TABLET, DELAYED RELEASE ORAL at 05:31

## 2024-08-01 RX ADMIN — Medication 200 GRAM(S): at 08:25

## 2024-08-01 RX ADMIN — Medication 975 MILLIGRAM(S): at 05:32

## 2024-08-01 RX ADMIN — Medication 975 MILLIGRAM(S): at 06:30

## 2024-08-01 RX ADMIN — Medication 200 GRAM(S): at 18:59

## 2024-08-01 RX ADMIN — OXYCODONE HYDROCHLORIDE 5 MILLIGRAM(S): 30 TABLET ORAL at 06:30

## 2024-08-01 RX ADMIN — OXYCODONE HYDROCHLORIDE 5 MILLIGRAM(S): 30 TABLET ORAL at 05:31

## 2024-08-01 RX ADMIN — Medication 25 MILLILITER(S): at 07:00

## 2024-08-01 RX ADMIN — Medication 975 MILLIGRAM(S): at 17:36

## 2024-08-01 RX ADMIN — CHLORHEXIDINE GLUCONATE 15 MILLILITER(S): 500 CLOTH TOPICAL at 05:31

## 2024-08-01 RX ADMIN — OXYCODONE HYDROCHLORIDE 5 MILLIGRAM(S): 30 TABLET ORAL at 23:26

## 2024-08-01 RX ADMIN — Medication 17 GRAM(S): at 12:11

## 2024-08-01 RX ADMIN — DEXTROSE MONOHYDRATE, SODIUM CHLORIDE, SODIUM LACTATE, CALCIUM CHLORIDE, MAGNESIUM CHLORIDE 50 MILLILITER(S): 1.5; 538; 448; 18.4; 5.08 SOLUTION INTRAPERITONEAL at 19:35

## 2024-08-01 RX ADMIN — Medication 975 MILLIGRAM(S): at 12:09

## 2024-08-01 RX ADMIN — Medication 50 MILLILITER(S): at 12:10

## 2024-08-01 RX ADMIN — GABAPENTIN 600 MILLIGRAM(S): 400 CAPSULE ORAL at 12:09

## 2024-08-01 RX ADMIN — Medication 975 MILLIGRAM(S): at 13:00

## 2024-08-01 RX ADMIN — Medication 975 MILLIGRAM(S): at 18:30

## 2024-08-01 RX ADMIN — CHLORHEXIDINE GLUCONATE 15 MILLILITER(S): 500 CLOTH TOPICAL at 17:31

## 2024-08-01 RX ADMIN — DEXTROSE MONOHYDRATE, SODIUM CHLORIDE, SODIUM LACTATE, CALCIUM CHLORIDE, MAGNESIUM CHLORIDE 50 MILLILITER(S): 1.5; 538; 448; 18.4; 5.08 SOLUTION INTRAPERITONEAL at 17:40

## 2024-08-01 RX ADMIN — DEXTROSE MONOHYDRATE, SODIUM CHLORIDE, SODIUM LACTATE, CALCIUM CHLORIDE, MAGNESIUM CHLORIDE 75 MILLILITER(S): 1.5; 538; 448; 18.4; 5.08 SOLUTION INTRAPERITONEAL at 08:02

## 2024-08-01 RX ADMIN — Medication 975 MILLIGRAM(S): at 23:26

## 2024-08-01 RX ADMIN — Medication 200 GRAM(S): at 03:26

## 2024-08-01 RX ADMIN — ENOXAPARIN SODIUM 40 MILLIGRAM(S): 120 INJECTION SUBCUTANEOUS at 17:31

## 2024-08-01 RX ADMIN — Medication 200 GRAM(S): at 01:41

## 2024-08-01 RX ADMIN — OXYCODONE HYDROCHLORIDE 5 MILLIGRAM(S): 30 TABLET ORAL at 17:36

## 2024-08-01 RX ADMIN — OXYCODONE HYDROCHLORIDE 5 MILLIGRAM(S): 30 TABLET ORAL at 18:30

## 2024-08-01 NOTE — PROGRESS NOTE ADULT - SUBJECTIVE AND OBJECTIVE BOX
Plastic Surgery Progress Note (pg [facility]: [id number], [facility]: [phone])    SUBJECTIVE  The patient was seen and examined. No acute events overnight. Pain controlled, afebrile w/ stable vitals.     OBJECTIVE  ___________________________________________________  VITAL SIGNS / I&O's   Vital Signs Last 24 Hrs  T(C): 36.3 (01 Aug 2024 08:00), Max: 36.6 (01 Aug 2024 04:00)  T(F): 97.3 (01 Aug 2024 08:00), Max: 97.8 (01 Aug 2024 04:00)  HR: 80 (01 Aug 2024 11:00) (69 - 90)  BP: 112/71 (01 Aug 2024 11:00) (82/57 - 124/74)  BP(mean): 82 (01 Aug 2024 11:00) (64 - 90)  RR: 17 (01 Aug 2024 11:00) (11 - 23)  SpO2: 98% (01 Aug 2024 11:00) (86% - 100%)    Parameters below as of 01 Aug 2024 11:00    O2 Flow (L/min): 1        31 Jul 2024 07:01  -  01 Aug 2024 07:00  --------------------------------------------------------  IN:    IV PiggyBack: 250 mL    Lactated Ringers: 1650 mL    Oral Fluid: 100 mL  Total IN: 2000 mL    OUT:    Bulb (mL): 10 mL    Bulb (mL): 9 mL    Indwelling Catheter - Urethral (mL): 250 mL    Voided (mL): 1525 mL  Total OUT: 1794 mL    Total NET: 206 mL      01 Aug 2024 07:01  -  01 Aug 2024 11:33  --------------------------------------------------------  IN:    Lactated Ringers: 375 mL    Oral Fluid: 150 mL  Total IN: 525 mL    OUT:    Bulb (mL): 0 mL    Bulb (mL): 0 mL    Voided (mL): 400 mL  Total OUT: 400 mL    Total NET: 125 mL        ___________________________________________________  PHYSICAL EXAM      -- CONSTITUTIONAL: NAD, lying in bed  -- NEURO: Awake, alert  -- HEENT: NC/AT, mucous membranes moist  -- NECK: Soft, no asymmetry,  ALT flap with strong cook signals, well perfused color, incisions c/d/i  -- PULM: Non-labored respirations, equal chest rise bilaterally  -- ABDOMEN: Nondistended  -- EXTREMITIES: Warm and well perfused, donor site soft without fluid collections  -- PSYCH: Affect normal, A&Ox3  ___________________________________________________  LABS                        9.3    5.01  )-----------( 194      ( 01 Aug 2024 05:08 )             30.1     01 Aug 2024 05:08    136    |  102    |  17     ----------------------------<  69     4.8     |  25     |  1.34     Ca    8.4        01 Aug 2024 05:08  Phos  3.5       01 Aug 2024 05:08  Mg     2.00      01 Aug 2024 05:08    TPro  6.4    /  Alb  3.8    /  TBili  0.3    /  DBili  x      /  AST  9      /  ALT  6      /  AlkPhos  61     30 Jul 2024 15:47      CAPILLARY BLOOD GLUCOSE      POCT Blood Glucose.: 126 mg/dL (01 Aug 2024 07:22)        Urinalysis Basic - ( 01 Aug 2024 05:08 )    Color: x / Appearance: x / SG: x / pH: x  Gluc: 69 mg/dL / Ketone: x  / Bili: x / Urobili: x   Blood: x / Protein: x / Nitrite: x   Leuk Esterase: x / RBC: x / WBC x   Sq Epi: x / Non Sq Epi: x / Bacteria: x      ___________________________________________________  MICRO  Recent Cultures:  Specimen Source: .Surgical Swab 2 RIGHT MANDIBLE TISSUE CULTURE, 07-30 @ 10:50; Results   Few Staphylococcus aureus<!>; Gram Stain: --; Organism: Staphylococcus aureus<!>  Specimen Source: .Tissue 1  RIGHT MANDIBLE BONE CULTURE, 07-30 @ 10:45; Results   Culture is being performed. Fungal cultures are held for 4 weeks.; Gram Stain:   Few polymorphonuclear leukocytes seen per low power field  Numerous Gram positive cocci in pairs seen per oil power field  Moderate Gram Negative Rods seen per oil power field  Rare Gram Positive Rods seen per oil power field<!>; Organism: Proteus mirabilis<!>    ___________________________________________________  MEDICATIONS  (STANDING):  acetaminophen     Tablet .. 975 milliGRAM(s) Oral every 6 hours  ampicillin/sulbactam  IVPB 3 Gram(s) IV Intermittent every 6 hours  chlorhexidine 0.12% Liquid 15 milliLiter(s) Swish and Spit two times a day  enoxaparin Injectable 40 milliGRAM(s) SubCutaneous every 24 hours  gabapentin 600 milliGRAM(s) Oral daily  lactated ringers. 1000 milliLiter(s) (75 mL/Hr) IV Continuous <Continuous>  pantoprazole    Tablet 40 milliGRAM(s) Oral before breakfast    MEDICATIONS  (PRN):  ondansetron Injectable 4 milliGRAM(s) IV Push every 8 hours PRN Nausea and/or Vomiting  oxyCODONE    IR 2.5 milliGRAM(s) Oral every 4 hours PRN Moderate Pain (4 - 6)  oxyCODONE    IR 5 milliGRAM(s) Oral every 4 hours PRN Severe Pain (7 - 10)  polyethylene glycol 3350 17 Gram(s) Oral daily PRN Constipation  senna 2 Tablet(s) Oral at bedtime PRN Constipation

## 2024-08-01 NOTE — PROGRESS NOTE ADULT - SUBJECTIVE AND OBJECTIVE BOX
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 Presents for RYNE and left ALT flap 7/30. Patient was visited bedside this am and is doing well. A Line and Dobbs have been removed over night Patient denies any f/c/n/v          Neck:, range of motion intact, ALT flap good color, strong cook, Hemostatic, JESSEE drain in place  Leg: Wrapped with serosanguinous output, JESSEE drain in place     Extra oral exam: OMER 30, no trismus, no LAD, no palpable step offs of the zygomatic arches,  Intra oral exam: uvula midline, no vestibular pathology,FOM s/nt/ne, no ecchymosis    Vitals:     Vital Signs Last 24 Hrs  T(C): 36.6 (01 Aug 2024 04:00), Max: 36.6 (01 Aug 2024 04:00)  T(F): 97.8 (01 Aug 2024 04:00), Max: 97.8 (01 Aug 2024 04:00)  HR: 83 (01 Aug 2024 07:00) (69 - 90)  BP: 104/58 (01 Aug 2024 07:00) (82/57 - 108/68)  BP(mean): 70 (01 Aug 2024 07:00) (64 - 84)  RR: 20 (01 Aug 2024 07:00) (11 - 23)  SpO2: 93% (01 Aug 2024 07:00) (86% - 100%)    Parameters below as of 01 Aug 2024 04:00  Patient On (Oxygen Delivery Method): nasal cannula  O2 Flow (L/min): 1      I&O's Detail    31 Jul 2024 07:01  -  01 Aug 2024 07:00  --------------------------------------------------------  IN:    IV PiggyBack: 250 mL    Lactated Ringers: 1650 mL    Oral Fluid: 100 mL  Total IN: 2000 mL    OUT:    Bulb (mL): 10 mL    Bulb (mL): 9 mL    Indwelling Catheter - Urethral (mL): 250 mL    Voided (mL): 1525 mL  Total OUT: 1794 mL    Total NET: 206 mL          Medications:    MEDICATIONS  (STANDING):  acetaminophen     Tablet .. 975 milliGRAM(s) Oral every 6 hours  ampicillin/sulbactam  IVPB 3 Gram(s) IV Intermittent every 6 hours  chlorhexidine 0.12% Liquid 15 milliLiter(s) Swish and Spit two times a day  enoxaparin Injectable 40 milliGRAM(s) SubCutaneous every 24 hours  gabapentin 600 milliGRAM(s) Oral daily  lactated ringers. 1000 milliLiter(s) (75 mL/Hr) IV Continuous <Continuous>  pantoprazole    Tablet 40 milliGRAM(s) Oral before breakfast    MEDICATIONS  (PRN):  ondansetron Injectable 4 milliGRAM(s) IV Push every 8 hours PRN Nausea and/or Vomiting  oxyCODONE    IR 5 milliGRAM(s) Oral every 4 hours PRN Severe Pain (7 - 10)  oxyCODONE    IR 2.5 milliGRAM(s) Oral every 4 hours PRN Moderate Pain (4 - 6)  polyethylene glycol 3350 17 Gram(s) Oral daily PRN Constipation  senna 2 Tablet(s) Oral at bedtime PRN Constipation      Labs:                          9.3    5.01  )-----------( 194      ( 01 Aug 2024 05:08 )             30.1       08-01    136  |  102  |  17  ----------------------------<  69<L>  4.8   |  25  |  1.34<H>    Ca    8.4      01 Aug 2024 05:08  Phos  3.5     08-01  Mg     2.00     08-01    TPro  6.4  /  Alb  3.8  /  TBili  0.3  /  DBili  x   /  AST  9   /  ALT  6   /  AlkPhos  61  07-30    59F HD3 POD2 presented for RYNE and left ALT flap 7/30.    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 Presents for RYNE and left ALT flap 7/30. Patient was visited bedside this am and is doing well. A Line and Dobbs have been removed over night Patient denies any f/c/n/v          Neck:, range of motion intact, ALT flap good color, strong cook, Hemostatic, JESSEE drain in place  Leg: Wrapped with serosanguinous output, JESSEE drain in place     Extra oral exam: OMER 30, no trismus, no LAD, no palpable step offs of the zygomatic arches,  Intra oral exam: uvula midline, no vestibular pathology,FOM s/nt/ne, no ecchymosis    Vitals:     Vital Signs Last 24 Hrs  T(C): 36.6 (01 Aug 2024 04:00), Max: 36.6 (01 Aug 2024 04:00)  T(F): 97.8 (01 Aug 2024 04:00), Max: 97.8 (01 Aug 2024 04:00)  HR: 83 (01 Aug 2024 07:00) (69 - 90)  BP: 104/58 (01 Aug 2024 07:00) (82/57 - 108/68)  BP(mean): 70 (01 Aug 2024 07:00) (64 - 84)  RR: 20 (01 Aug 2024 07:00) (11 - 23)  SpO2: 93% (01 Aug 2024 07:00) (86% - 100%)    Parameters below as of 01 Aug 2024 04:00  Patient On (Oxygen Delivery Method): nasal cannula  O2 Flow (L/min): 1      I&O's Detail    31 Jul 2024 07:01  -  01 Aug 2024 07:00  --------------------------------------------------------  IN:    IV PiggyBack: 250 mL    Lactated Ringers: 1650 mL    Oral Fluid: 100 mL  Total IN: 2000 mL    OUT:    Bulb (mL): 10 mL    Bulb (mL): 9 mL    Indwelling Catheter - Urethral (mL): 250 mL    Voided (mL): 1525 mL  Total OUT: 1794 mL    Total NET: 206 mL          Medications:    MEDICATIONS  (STANDING):  acetaminophen     Tablet .. 975 milliGRAM(s) Oral every 6 hours  ampicillin/sulbactam  IVPB 3 Gram(s) IV Intermittent every 6 hours  chlorhexidine 0.12% Liquid 15 milliLiter(s) Swish and Spit two times a day  enoxaparin Injectable 40 milliGRAM(s) SubCutaneous every 24 hours  gabapentin 600 milliGRAM(s) Oral daily  lactated ringers. 1000 milliLiter(s) (75 mL/Hr) IV Continuous <Continuous>  pantoprazole    Tablet 40 milliGRAM(s) Oral before breakfast    MEDICATIONS  (PRN):  ondansetron Injectable 4 milliGRAM(s) IV Push every 8 hours PRN Nausea and/or Vomiting  oxyCODONE    IR 5 milliGRAM(s) Oral every 4 hours PRN Severe Pain (7 - 10)  oxyCODONE    IR 2.5 milliGRAM(s) Oral every 4 hours PRN Moderate Pain (4 - 6)  polyethylene glycol 3350 17 Gram(s) Oral daily PRN Constipation  senna 2 Tablet(s) Oral at bedtime PRN Constipation      Labs:                          9.3    5.01  )-----------( 194      ( 01 Aug 2024 05:08 )             30.1       08-01    136  |  102  |  17  ----------------------------<  69<L>  4.8   |  25  |  1.34<H>    Ca    8.4      01 Aug 2024 05:08  Phos  3.5     08-01  Mg     2.00     08-01    TPro  6.4  /  Alb  3.8  /  TBili  0.3  /  DBili  x   /  AST  9   /  ALT  6   /  AlkPhos  61  07-30    59F HD3 POD2 presented for RYNE and left ALT flap 7/30.    - Q2h flap checks with Cook doppler -> Q4h checks at 4pm, monitor flap color  - Monitor JESSEE drain output Plan to DC L neck drain tomorrow  - Consult PT   - Full liquid diet   - Continue ERAS  - Multimodal Pain management    Carie Vale  Oral and Maxillofacial Surgery  [facility] Pager #[phone]  [facility] Pager: [phone]  Available on Teams

## 2024-08-01 NOTE — PROGRESS NOTE ADULT - ATTENDING COMMENTS
The patient was seen and examined, chart and notes reviewed.  The current diagnosis, plan of care and alternatives have been discussed with the patient.  All questions have been answered and updates have been discussed.  The case was discussed with B team residents/PA's and medical students at morning B surgical rounds and throughout the course of the day.    SCCA  a/ s/p Right mandibulectomy  b,.  Flap check q 4  c.  Transfer to floor    Respiratory abnormality  a.  With adequate oxygenation  b.  Encourage IS/ pulmonary toilet  c.  May supplement with o2 lisandro NC    At risk for malnutrition  A.  On dysphagia III diet  b,  May have high caloric/protein shakes    At risk for DVT  a,  On lovenox  b.  SCDS in place    DC merchant  Transfer to floor

## 2024-08-01 NOTE — PROGRESS NOTE ADULT - ASSESSMENT
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 Now s/p debridement, RYNE and left ALT flap 7/30 with Joseph Rowe and Cristina.      Plan:  - Q1h flap checks with [email or web address] doppler, monitor flap color  - Monitor JESSEE drain output  - Monitor wound vac output, will take down on POD7  - Skin graft donor site dressing down on POD7, reinforce PRN  - Continue ERAS protocol care  - Pain control  - Cultures with S. aureus   - Appreciate SICU care

## 2024-08-01 NOTE — PROGRESS NOTE ADULT - SUBJECTIVE AND OBJECTIVE BOX
SICU Daily Progress Note  =====================================================  Interval events:  - q2hr flap check --> q4 tomorrow at 4pm  - D/C mayur, pending TOV  - D/C a-line      ALLERGIES:  morphine (Hives)      --------------------------------------------------------------------------------------    MEDICATIONS:    Neurologic Medications  acetaminophen     Tablet .. 975 milliGRAM(s) Oral every 6 hours  gabapentin 600 milliGRAM(s) Oral daily  ondansetron Injectable 4 milliGRAM(s) IV Push every 8 hours PRN Nausea and/or Vomiting  oxyCODONE    IR 2.5 milliGRAM(s) Oral every 4 hours PRN Moderate Pain (4 - 6)  oxyCODONE    IR 5 milliGRAM(s) Oral every 4 hours PRN Severe Pain (7 - 10)    Respiratory Medications    Cardiovascular Medications    Gastrointestinal Medications  lactated ringers. 1000 milliLiter(s) IV Continuous <Continuous>  pantoprazole    Tablet 40 milliGRAM(s) Oral before breakfast  polyethylene glycol 3350 17 Gram(s) Oral daily PRN Constipation  senna 2 Tablet(s) Oral at bedtime PRN Constipation    Genitourinary Medications    Hematologic/Oncologic Medications  enoxaparin Injectable 40 milliGRAM(s) SubCutaneous every 24 hours    Antimicrobial/Immunologic Medications  ampicillin/sulbactam  IVPB 3 Gram(s) IV Intermittent every 6 hours    Endocrine/Metabolic Medications    Topical/Other Medications  chlorhexidine 0.12% Liquid 15 milliLiter(s) Swish and Spit two times a day    --------------------------------------------------------------------------------------    VITAL SIGNS:  ICU Vital Signs Last 24 Hrs  T(C): 35.9 (01 Aug 2024 00:00), Max: 36 (31 Jul 2024 20:00)  T(F): 96.6 (01 Aug 2024 00:00), Max: 96.8 (31 Jul 2024 20:00)  HR: 82 (01 Aug 2024 01:00) (65 - 83)  BP: 88/63 (01 Aug 2024 01:00) (82/57 - 114/66)  BP(mean): 72 (01 Aug 2024 01:00) (64 - 82)  ABP: 83/62 (31 Jul 2024 12:00) (83/62 - 117/58)  ABP(mean): 72 (31 Jul 2024 12:00) (67 - 85)  RR: 14 (01 Aug 2024 01:00) (11 - 21)  SpO2: 99% (01 Aug 2024 01:00) (86% - 100%)    O2 Parameters below as of 01 Aug 2024 00:00  Patient On (Oxygen Delivery Method): nasal cannula  O2 Flow (L/min): 1  --------------------------------------------------------------------------------------    INS AND OUTS:                        9.3    6.53  )-----------( 170      ( 31 Jul 2024 00:50 )             28.1   07-31    134<L>  |  101  |  19  ----------------------------<  125<H>  4.8   |  21<L>  |  1.23    Ca    7.8<L>      31 Jul 2024 00:50  Phos  4.9     07-31  Mg     1.80     07-31    TPro  6.4  /  Alb  3.8  /  TBili  0.3  /  DBili  x   /  AST  9   /  ALT  6   /  AlkPhos  61  07-30  --------------------------------------------------------------------------------------      EXAM:  General/Neuro  Exam: Normal, NAD, alert, oriented x 3, no focal deficits. PERRLA      HEENT:   free flap skin paddle with good color, cap refill, warmth; cook doppler with strong signal    Respiratory  Exam: Lungs clear to auscultation, Normal expansion/effort.     Cardiovascular  Exam: S1, S2.  Regular rate and rhythm.  Peripheral edema  Cardiac Rhythm: Normal Sinus Rhythm  ECHO:     GI  Exam: Abdomen soft, Non-tender, Non-distended.    Current Diet:  NPO        METABOLIC / FLUIDS / ELECTROLYTES  lactated ringers. 1000 milliLiter(s) IV Continuous <Continuous>      HEMATOLOGIC  [x] VTE Prophylaxis: enoxaparin Injectable 40 milliGRAM(s) SubCutaneous every 24 hours    Transfusions:	[] PRBC	[] Platelets		[] FFP	[] Cryoprecipitate    INFECTIOUS DISEASE  Antimicrobials/Immunologic Medications:  ampicillin/sulbactam  IVPB 3 Gram(s) IV Intermittent every 6 hours    Day #      of     ***    TUBES / LINES / DRAINS  ***  [x] Peripheral IV  [] Central Venous Line     	[] R	[] L	[] IJ	[] Fem	[] SC	Date Placed:   [] Arterial Line		[] R	[] L	[] Fem	[] Rad	[] Ax	Date Placed:   [] PICC		[] Midline		[] Mediport  [] Urinary Catheter		Date Placed:   [x] Necessity of urinary, arterial, and venous catheters discussed    --------------------------------------------------------------------------------------    LABS                          9.3    6.53  )-----------( 170      ( 31 Jul 2024 00:50 )             28.1   07-31    134<L>  |  101  |  19  ----------------------------<  125<H>  4.8   |  21<L>  |  1.23    Ca    7.8<L>      31 Jul 2024 00:50  Phos  4.9     07-31  Mg     1.80     07-31    TPro  6.4  /  Alb  3.8  /  TBili  0.3  /  DBili  x   /  AST  9   /  ALT  6   /  AlkPhos  61  07-30    --------------------------------------------------------------------------------------    OTHER LABORATORY:     IMAGING STUDIES:   CXR:

## 2024-08-01 NOTE — PROGRESS NOTE ADULT - ASSESSMENT
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 Presents for RYNE and left ALT flap 7/30. Admitted to SICU for q1h flap checks.      PLAN:   Neurologic:   - A&Ox3  - Pain control: gabapentin, tylenol, and oxy PRN  - Q2 NC    Respiratory:   - >95% on RA    Cardiovascular:   - MAP >65    Gastrointestinal/Nutrition:   - thickened fluids  - zofran 4mg q8  - senna/miralax  - protonix 40mg qd    Renal/Genitourinary:   - merchant in place    Hematologic:   - DVT ppx: lovenox   - trend h&h    Infectious Disease:   - Unasyn q8 for infected hardware  - peridex QID  - f/u OR cultures    Endocrine:   - No insulin needs    Lines/Tubes:  - 2 PIV  - JPx2  Ofelia mason    Disposition:   SICU

## 2024-08-01 NOTE — PROGRESS NOTE ADULT - ASSESSMENT
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 Now s/p debridement, RYNE and left ALT flap 7/30 with Joseph Rowe and Cristina.    - flap management per ERAS protocol  - Care per SICU/OMFS/PRS

## 2024-08-01 NOTE — PROGRESS NOTE ADULT - SUBJECTIVE AND OBJECTIVE BOX
HPI/Interval:  Pt seen and examined at bedside. Awake and alerts this morning, pt in good spirits. Flap with strong cook doppler signals, good color and turgor    Physical Exam:  General: well-developed, NAD  OU: EOMI; PERRL; no drainage or redness  AU: external ears normal  Nose: nares patent  Mouth: No oral lesions; no gross abnormalities  Neck: trachea midline, ALT free flap with good color and turgor, strong cook and venous doppler signals, mild oozing at edges  Respiratory: unlabored respirations  Cardiovascular: regular rate  Gastrointestinal: Soft, nondistended  Extremities: No edema, warm and well perfused  Skin: No lesions; no rash

## 2024-08-01 NOTE — PROVIDER CONTACT NOTE (HYPOGLYCEMIA EVENT) - NS PROVIDER CONTACT BACKGROUND-HYPO
Age: 59y    Gender: Female    Blood Glucose (via blood draw):  69 mg/dL (08-01-24 @ 06:22)      eMAR:dextrose 50% Injectable   25 milliLiter(s) IV Push (08-01-24 @ 07:00)    POCT Blood Glucose:  126 mg/dL (08- @ 07:22)

## 2024-08-02 LAB
ANION GAP SERPL CALC-SCNC: 8 MMOL/L — SIGNIFICANT CHANGE UP (ref 7–14)
BUN SERPL-MCNC: 14 MG/DL — SIGNIFICANT CHANGE UP (ref 7–23)
CALCIUM SERPL-MCNC: 8.8 MG/DL — SIGNIFICANT CHANGE UP (ref 8.4–10.5)
CHLORIDE SERPL-SCNC: 99 MMOL/L — SIGNIFICANT CHANGE UP (ref 98–107)
CO2 SERPL-SCNC: 28 MMOL/L — SIGNIFICANT CHANGE UP (ref 22–31)
CREAT SERPL-MCNC: 1.22 MG/DL — SIGNIFICANT CHANGE UP (ref 0.5–1.3)
EGFR: 51 ML/MIN/1.73M2 — LOW
GLUCOSE BLDC GLUCOMTR-MCNC: 117 MG/DL — HIGH (ref 70–99)
GLUCOSE BLDC GLUCOMTR-MCNC: 84 MG/DL — SIGNIFICANT CHANGE UP (ref 70–99)
GLUCOSE SERPL-MCNC: 80 MG/DL — SIGNIFICANT CHANGE UP (ref 70–99)
HCT VFR BLD CALC: 30.5 % — LOW (ref 34.5–45)
HGB BLD-MCNC: 9.6 G/DL — LOW (ref 11.5–15.5)
MAGNESIUM SERPL-MCNC: 1.8 MG/DL — SIGNIFICANT CHANGE UP (ref 1.6–2.6)
MCHC RBC-ENTMCNC: 27.3 PG — SIGNIFICANT CHANGE UP (ref 27–34)
MCHC RBC-ENTMCNC: 31.5 GM/DL — LOW (ref 32–36)
MCV RBC AUTO: 86.6 FL — SIGNIFICANT CHANGE UP (ref 80–100)
MRSA PCR RESULT.: SIGNIFICANT CHANGE UP
NRBC # BLD: 0 /100 WBCS — SIGNIFICANT CHANGE UP (ref 0–0)
NRBC # FLD: 0 K/UL — SIGNIFICANT CHANGE UP (ref 0–0)
PHOSPHATE SERPL-MCNC: 2.8 MG/DL — SIGNIFICANT CHANGE UP (ref 2.5–4.5)
PLATELET # BLD AUTO: 221 K/UL — SIGNIFICANT CHANGE UP (ref 150–400)
POTASSIUM SERPL-MCNC: 4.6 MMOL/L — SIGNIFICANT CHANGE UP (ref 3.5–5.3)
POTASSIUM SERPL-SCNC: 4.6 MMOL/L — SIGNIFICANT CHANGE UP (ref 3.5–5.3)
RBC # BLD: 3.52 M/UL — LOW (ref 3.8–5.2)
RBC # FLD: 15.3 % — HIGH (ref 10.3–14.5)
S AUREUS DNA NOSE QL NAA+PROBE: DETECTED
SODIUM SERPL-SCNC: 135 MMOL/L — SIGNIFICANT CHANGE UP (ref 135–145)
WBC # BLD: 4.43 K/UL — SIGNIFICANT CHANGE UP (ref 3.8–10.5)
WBC # FLD AUTO: 4.43 K/UL — SIGNIFICANT CHANGE UP (ref 3.8–10.5)

## 2024-08-02 PROCEDURE — 99223 1ST HOSP IP/OBS HIGH 75: CPT | Mod: GC

## 2024-08-02 RX ORDER — VANCOMYCIN HYDROCHLORIDE 5 G/100ML
1000 INJECTION, POWDER, LYOPHILIZED, FOR SOLUTION INTRAVENOUS EVERY 24 HOURS
Refills: 0 | Status: DISCONTINUED | OUTPATIENT
Start: 2024-08-02 | End: 2024-08-06

## 2024-08-02 RX ADMIN — Medication 975 MILLIGRAM(S): at 12:11

## 2024-08-02 RX ADMIN — OXYCODONE HYDROCHLORIDE 5 MILLIGRAM(S): 30 TABLET ORAL at 23:00

## 2024-08-02 RX ADMIN — Medication 975 MILLIGRAM(S): at 00:00

## 2024-08-02 RX ADMIN — Medication 975 MILLIGRAM(S): at 18:30

## 2024-08-02 RX ADMIN — Medication 200 GRAM(S): at 06:20

## 2024-08-02 RX ADMIN — OXYCODONE HYDROCHLORIDE 5 MILLIGRAM(S): 30 TABLET ORAL at 20:20

## 2024-08-02 RX ADMIN — Medication 200 GRAM(S): at 18:00

## 2024-08-02 RX ADMIN — OXYCODONE HYDROCHLORIDE 5 MILLIGRAM(S): 30 TABLET ORAL at 10:26

## 2024-08-02 RX ADMIN — VANCOMYCIN HYDROCHLORIDE 250 MILLIGRAM(S): 5 INJECTION, POWDER, LYOPHILIZED, FOR SOLUTION INTRAVENOUS at 16:11

## 2024-08-02 RX ADMIN — Medication 4 MILLIGRAM(S): at 06:46

## 2024-08-02 RX ADMIN — OXYCODONE HYDROCHLORIDE 5 MILLIGRAM(S): 30 TABLET ORAL at 04:00

## 2024-08-02 RX ADMIN — CHLORHEXIDINE GLUCONATE 15 MILLILITER(S): 500 CLOTH TOPICAL at 18:00

## 2024-08-02 RX ADMIN — Medication 975 MILLIGRAM(S): at 18:00

## 2024-08-02 RX ADMIN — OXYCODONE HYDROCHLORIDE 5 MILLIGRAM(S): 30 TABLET ORAL at 17:30

## 2024-08-02 RX ADMIN — Medication 200 GRAM(S): at 12:11

## 2024-08-02 RX ADMIN — Medication 975 MILLIGRAM(S): at 12:34

## 2024-08-02 RX ADMIN — OXYCODONE HYDROCHLORIDE 5 MILLIGRAM(S): 30 TABLET ORAL at 03:12

## 2024-08-02 RX ADMIN — Medication 975 MILLIGRAM(S): at 06:22

## 2024-08-02 RX ADMIN — PANTOPRAZOLE SODIUM 40 MILLIGRAM(S): 20 TABLET, DELAYED RELEASE ORAL at 06:22

## 2024-08-02 RX ADMIN — GABAPENTIN 600 MILLIGRAM(S): 400 CAPSULE ORAL at 12:11

## 2024-08-02 RX ADMIN — DEXTROSE MONOHYDRATE, SODIUM CHLORIDE, SODIUM LACTATE, CALCIUM CHLORIDE, MAGNESIUM CHLORIDE 50 MILLILITER(S): 1.5; 538; 448; 18.4; 5.08 SOLUTION INTRAPERITONEAL at 20:06

## 2024-08-02 RX ADMIN — OXYCODONE HYDROCHLORIDE 5 MILLIGRAM(S): 30 TABLET ORAL at 09:46

## 2024-08-02 RX ADMIN — CHLORHEXIDINE GLUCONATE 15 MILLILITER(S): 500 CLOTH TOPICAL at 06:22

## 2024-08-02 RX ADMIN — OXYCODONE HYDROCHLORIDE 5 MILLIGRAM(S): 30 TABLET ORAL at 00:00

## 2024-08-02 RX ADMIN — ENOXAPARIN SODIUM 40 MILLIGRAM(S): 120 INJECTION SUBCUTANEOUS at 16:11

## 2024-08-02 RX ADMIN — DEXTROSE MONOHYDRATE, SODIUM CHLORIDE, SODIUM LACTATE, CALCIUM CHLORIDE, MAGNESIUM CHLORIDE 50 MILLILITER(S): 1.5; 538; 448; 18.4; 5.08 SOLUTION INTRAPERITONEAL at 18:00

## 2024-08-02 RX ADMIN — OXYCODONE HYDROCHLORIDE 5 MILLIGRAM(S): 30 TABLET ORAL at 16:17

## 2024-08-02 NOTE — CONSULT NOTE ADULT - SUBJECTIVE AND OBJECTIVE BOX
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 She was undergone removal of hardware in 10/22 and completed chemotherapy and radiation at the time. She most recently underwent supraomohyoid neck dissection, mandibulectomy with skin graft, tracheostomy on . She underwent for removal of hardware with left anterolateral thigh flap on 24.       REVIEW OF SYSTEMS:      Prior hospital charts reviewed [Yes]  Primary team notes reviewed [Yes]  Other consultant notes reviewed [Yes]    PAST MEDICAL & SURGICAL HISTORY:  H/O malignant neoplasm of gum  Hypertension  Hyperlipidemia  Mild asthma  Malignant neoplasm of mandible  Right cataract  Thyroid nodule  Seasonal allergies  History of chemotherapy  History of head and neck radiation  H/O  section  History of partial hysterectomy  H/O neck surgery  partial right mandibulectomy with fibular free flap reconstriction and right neck lymphnode dissection 22  History of vascular access device  History of total left hip replacement  SCC (squamous cell carcinoma)          SOCIAL HISTORY:      FAMILY HISTORY:  Family history of CVA (Mother)    FH: thyroid disease (Mother)        Allergies:  morphine (Hives)      ANTIMICROBIALS:  ampicillin/sulbactam  IVPB 3 every 6 hours      ANTIMICROBIALS (past 90 days):  MEDICATIONS  (STANDING):  ampicillin/sulbactam  IVPB   200 mL/Hr IV Intermittent (24 @ 06:20)   200 mL/Hr IV Intermittent (24 @ 23:35)   200 mL/Hr IV Intermittent (24 @ 18:59)   200 mL/Hr IV Intermittent (24 @ 08:25)   200 mL/Hr IV Intermittent (24 @ 03:26)   200 mL/Hr IV Intermittent (24 @ 01:41)   200 mL/Hr IV Intermittent (24 @ 20:00)   200 mL/Hr IV Intermittent (07-31-24 @ 15:22)   200 mL/Hr IV Intermittent (24 @ 08:25)   200 mL/Hr IV Intermittent (24 @ 04:13)   200 mL/Hr IV Intermittent (24 @ 21:36)        OTHER MEDS:   MEDICATIONS  (STANDING):  acetaminophen     Tablet .. 975 every 6 hours  enoxaparin Injectable 40 every 24 hours  gabapentin 600 daily  ondansetron Injectable 4 every 8 hours PRN  oxyCODONE    IR 2.5 every 4 hours PRN  oxyCODONE    IR 5 every 4 hours PRN  pantoprazole    Tablet 40 before breakfast  polyethylene glycol 3350 17 daily PRN  senna 2 at bedtime PRN      VITALS:  Vital Signs Last 24 Hrs  T(F): 96.5 (24 @ 04:00), Max: 98.1 (24 @ 06:10)    Vital Signs Last 24 Hrs  HR: 81 (24 @ 08:00) (80 - 88)  BP: 116/79 (24 @ 08:00) (91/58 - 131/66)  RR: 21 (24 @ 08:00)  SpO2: 95% (24 @ 08:00) (91% - 99%)  Wt(kg): --    EXAM:      Labs:                        9.6    4.43  )-----------( 221      ( 02 Aug 2024 06:31 )             30.5     0802    135  |  99  |  14  ----------------------------<  80  4.6   |  28  |  1.22    Ca    8.8      02 Aug 2024 06:31  Phos  2.8       Mg     1.80     08-        WBC Trend:  WBC Count: 4.43 (24 @ 06:31)  WBC Count: 5.01 (24 @ 05:08)  WBC Count: 6.53 (24 @ 00:50)  WBC Count: 7.01 (24 @ 15:47)      Auto Neutrophil #: 4.9 K/uL (24 @ 09:31)  Auto Neutrophil #: 8.72 K/uL (24 @ 10:03)  Auto Neutrophil #: 9.73 K/uL (24 @ 04:01)  Auto Neutrophil #: 6.33 K/uL (24 @ 21:09)  Auto Neutrophil #: 6.89 K/uL (24 @ 19:16)      Creatine Trend:  Creatinine: 1.22 ()  Creatinine: 1.34 ()  Creatinine: 1.23 ()  Creatinine: 1.30 ()      Liver Biochemical Testing Trend:  Alanine Aminotransferase (ALT/SGPT): 6 ()  Alanine Aminotransferase (ALT/SGPT): 6 ()  Alanine Aminotransferase (ALT/SGPT): 6 (11-15)  Aspartate Aminotransferase (AST/SGOT): 9 (24 @ 15:47)  Aspartate Aminotransferase (AST/SGOT): 11 (23 @ 01:35)  Aspartate Aminotransferase (AST/SGOT): 12 (11-15-23 @ 16:42)  Bilirubin Total: 0.3 ()  Bilirubin Total: 0.2 ()  Bilirubin Total: <0.2 (11-15)      Trend LDH          Urinalysis Basic - ( 02 Aug 2024 06:31 )            MICROBIOLOGY:    MRSA PCR Result.: NotDetec (24 @ 12:30)      Culture - Fungal, Other (collected 2024 10:50)  Source: .Other 2 RIGHT MANDIBLE TISSUE CULTURE  Preliminary Report:    Culture is being performed. Fungal cultures are held for 4 weeks.    Culture - Surgical Swab (collected 2024 10:50)  Source: .Surgical Swab 2 RIGHT MANDIBLE TISSUE CULTURE  Preliminary Report:    Few Staphylococcus aureus  Organism: Staphylococcus aureus  Organism: Staphylococcus aureus    Sensitivities:      -  Tetracycline: S <=1      Method Type: DHEERAJ      -  Penicillin: R 8      -  Rifampin: S <=1 Should not be used as monotherapy      -  Erythromycin: R >4      -  Trimethoprim/Sulfamethoxazole: S <=0.5/9.5      -  Clindamycin: R <=0.25 This isolate is presumed to be clindamycin resistant based on detection of inducible resistance. Clindamycin may still be effective in some patients.      -  Oxacillin: S <=0.25 Oxacillin predicts susceptibility for dicloxacillin, methicillin, and nafcillin      -  Gentamicin: S <=1 Should not be used as monotherapy      -  Vancomycin: S 1    Culture - Fungal, Tissue (collected 2024 10:45)  Source: .Tissue 1  RIGHT MANDIBLE BONE CULTURE  Preliminary Report:    Culture is being performed. Fungal cultures are held for 4 weeks.    Culture - Acid Fast - Tissue w/Smear (collected 2024 10:45)  Source: .Tissue 1  RIGHT MANDIBLE BONE CULTURE  Preliminary Report:    Culture is being performed.    Culture - Tissue with Gram Stain (collected 2024 10:45)  Source: .Tissue 1  RIGHT MANDIBLE BONE CULTURE  Final Report:    Moderate Proteus mirabilis    Moderate Streptococcus dysgalactiae (Group C/G)    Few Corynebacterium striatum group "Susceptibilities not performed"    Moderate Bacteroides thetaiotaomicron group "Susceptibilities not    performed"  Organism: Proteus mirabilis  Organism: Proteus mirabilis    Sensitivities:      -  Ciprofloxacin: S <=0.25      -  Ceftriaxone: S <=1      -  Ampicillin: S <=8 These ampicillin results predict results for amoxicillin      Method Type: DHEERAJ      -  Meropenem: S <=1      -  Ampicillin/Sulbactam: S <=4/2      -  Cefoxitin: S <=8      -  Amoxicillin/Clavulanic Acid: S <=8/4      -  Trimethoprim/Sulfamethoxazole: R >2/38      -  Ertapenem: S <=0.5      -  Levofloxacin: S <=0.5      -  Tobramycin: S <=2      -  Aztreonam: S <=4      -  Gentamicin: S <=2      -  Cefazolin: S <=2      -  Cefepime: S <=2      -  Piperacillin/Tazobactam: S <=8      Blood Gas Arterial, Lactate: 0.5 ( @ 12:23)  Blood Gas Arterial, Lactate: 0.5 ( @ 10:38)        RADIOLOGY:  imaging below personally reviewed   INFECTIOUS DISEASE CONSULT NOTE    Patient is a 59y old  Female who presents with a chief complaint of      (2024 16:04)    HPI:  59 year old female with history of squamous cell carcinoma s/p right mandibulectomy, L free fibula flap  reconstruction of R mandibular and floor of mouth defect in s/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 She was undergone removal of hardware in 10/22 and completed chemotherapy and radiation at the time. She most recently underwent supraomohyoid neck dissection, mandibulectomy with skin graft, tracheostomy on . She underwent for removal of hardware with left anterolateral thigh flap on 24.     Surgical cultures are growing Staph aureus, proteus mirabilis, strep dysgalactiae corynebacterium and bacteroides. ID consulted for antibiotic recommendations.       REVIEW OF SYSTEMS:  Constitutional: No fevers, No chills, No weight loss, No fatigue   Eyes:  No change in vision	  Respiratory:  no SOB  Cardiovascular:  No chest pain, No palpitations   Gastrointestinal: No pain, No nausea, No vomiting,   Genitourinary: No dysuria,  MSK: +left leg pain  Neurological: No HA, no weakness,     Prior hospital charts reviewed [Yes]  Primary team notes reviewed [Yes]  Other consultant notes reviewed [Yes]    PAST MEDICAL & SURGICAL HISTORY:  H/O malignant neoplasm of gum  Hypertension  Hyperlipidemia  Mild asthma  Malignant neoplasm of mandible  Right cataract  Thyroid nodule  Seasonal allergies  History of chemotherapy  History of head and neck radiation  H/O  section  History of partial hysterectomy  H/O neck surgery  partial right mandibulectomy with fibular free flap reconstriction and right neck lymphnode dissection 22  History of vascular access device  History of total left hip replacement  SCC (squamous cell carcinoma)          SOCIAL HISTORY:  Smoked 1 ppd for 20 years. Quit 2 month ago  Denies alcohol use  Previously worked as a . From [place]    FAMILY HISTORY:  Family history of CVA (Mother)    FH: thyroid disease (Mother)        Allergies:  morphine (Hives)      ANTIMICROBIALS:  ampicillin/sulbactam  IVPB 3 every 6 hours      ANTIMICROBIALS (past 90 days):  MEDICATIONS  (STANDING):  ampicillin/sulbactam  IVPB   200 mL/Hr IV Intermittent (08-02-24 @ 06:20)   200 mL/Hr IV Intermittent (24 @ 23:35)   200 mL/Hr IV Intermittent (24 @ 18:59)   200 mL/Hr IV Intermittent (24 @ 08:25)   200 mL/Hr IV Intermittent (24 @ 03:26)   200 mL/Hr IV Intermittent (24 @ 01:41)   200 mL/Hr IV Intermittent (24 @ 20:00)   200 mL/Hr IV Intermittent (24 @ 15:22)   200 mL/Hr IV Intermittent (24 @ 08:25)   200 mL/Hr IV Intermittent (24 @ 04:13)   200 mL/Hr IV Intermittent (24 @ 21:36)        OTHER MEDS:   MEDICATIONS  (STANDING):  acetaminophen     Tablet .. 975 every 6 hours  enoxaparin Injectable 40 every 24 hours  gabapentin 600 daily  ondansetron Injectable 4 every 8 hours PRN  oxyCODONE    IR 2.5 every 4 hours PRN  oxyCODONE    IR 5 every 4 hours PRN  pantoprazole    Tablet 40 before breakfast  polyethylene glycol 3350 17 daily PRN  senna 2 at bedtime PRN      VITALS:  Vital Signs Last 24 Hrs  T(F): 96.5 (24 @ 04:00), Max: 98.1 (24 @ 06:10)    Vital Signs Last 24 Hrs  HR: 81 (24 @ 08:00) (80 - 88)  BP: 116/79 (24 @ 08:00) (91/58 - 131/66)  RR: 21 (24 @ 08:00)  SpO2: 95% (24 @ 08:00) (91% - 99%)  Wt(kg): --    EXAM:    General: NAD  HEENT: PERRL, anicteric sclera  Neck: right mandible skin flap with staples +JESSEE drains  CV: +S1/S2, RRR, no murmurs heard  Lungs: No respiratory distress, CTA b/l. Right chest wall port  Abd:  BS4+, Soft, NTND, no guarding  : No suprapubic tenderness  Neuro: AAOx3.   Ext: LLE with +JESSEE drain  Msk: freely moving upper and lower extremities  Skin: No rash, no phlebitis, No erythema    Labs:                        9.6    4.43  )-----------( 221      ( 02 Aug 2024 06:31 )             30.5         135  |  99  |  14  ----------------------------<  80  4.6   |  28  |  1.22    Ca    8.8      02 Aug 2024 06:31  Phos  2.8       Mg     1.80             WBC Trend:  WBC Count: 4.43 (24 @ 06:31)  WBC Count: 5.01 (24 @ 05:08)  WBC Count: 6.53 (24 @ 00:50)  WBC Count: 7.01 (24 @ 15:47)      Auto Neutrophil #: 4.9 K/uL (24 @ 09:31)  Auto Neutrophil #: 8.72 K/uL (24 @ 10:03)  Auto Neutrophil #: 9.73 K/uL (24 @ 04:01)  Auto Neutrophil #: 6.33 K/uL (24 @ 21:09)  Auto Neutrophil #: 6.89 K/uL (24 @ 19:16)      Creatine Trend:  Creatinine: 1.22 ()  Creatinine: 1.34 ()  Creatinine: 1.23 ()  Creatinine: 1.30 ()      Liver Biochemical Testing Trend:  Alanine Aminotransferase (ALT/SGPT): 6 ()  Alanine Aminotransferase (ALT/SGPT): 6 ()  Alanine Aminotransferase (ALT/SGPT): 6 (11-15)  Aspartate Aminotransferase (AST/SGOT): 9 (24 @ 15:47)  Aspartate Aminotransferase (AST/SGOT): 11 (23 @ 01:35)  Aspartate Aminotransferase (AST/SGOT): 12 (11-15-23 @ 16:42)  Bilirubin Total: 0.3 ()  Bilirubin Total: 0.2 ()  Bilirubin Total: <0.2 (11-15)      Trend LDH          Urinalysis Basic - ( 02 Aug 2024 06:31 )            MICROBIOLOGY:    MRSA PCR Result.: [facility] (24 @ 12:30)      Culture - Fungal, Other (collected 2024 10:50)  Source: .Other 2 RIGHT MANDIBLE TISSUE CULTURE  Preliminary Report:    Culture is being performed. Fungal cultures are held for 4 weeks.    Culture - Surgical Swab (collected 2024 10:50)  Source: .Surgical Swab 2 RIGHT MANDIBLE TISSUE CULTURE  Preliminary Report:    Few Staphylococcus aureus  Organism: Staphylococcus aureus  Organism: Staphylococcus aureus    Sensitivities:      -  Tetracycline: S <=1      Method Type: DHEERAJ      -  Penicillin: R 8      -  Rifampin: S <=1 Should not be used as monotherapy      -  Erythromycin: R >4      -  Trimethoprim/Sulfamethoxazole: S <=0.5/9.5      -  Clindamycin: R <=0.25 This isolate is presumed to be clindamycin resistant based on detection of inducible resistance. Clindamycin may still be effective in some patients.      -  Oxacillin: S <=0.25 Oxacillin predicts susceptibility for dicloxacillin, methicillin, and nafcillin      -  Gentamicin: S <=1 Should not be used as monotherapy      -  Vancomycin: S 1    Culture - Fungal, Tissue (collected 2024 10:45)  Source: .Tissue 1  RIGHT MANDIBLE BONE CULTURE  Preliminary Report:    Culture is being performed. Fungal cultures are held for 4 weeks.    Culture - Acid Fast - Tissue w/Smear (collected 2024 10:45)  Source: .Tissue 1  RIGHT MANDIBLE BONE CULTURE  Preliminary Report:    Culture is being performed.    Culture - Tissue with Gram Stain (collected 2024 10:45)  Source: .Tissue 1  RIGHT MANDIBLE BONE CULTURE  Final Report:    Moderate Proteus mirabilis    Moderate Streptococcus dysgalactiae (Group C/G)    Few Corynebacterium striatum group "Susceptibilities not performed"    Moderate Bacteroides thetaiotaomicron group "Susceptibilities not    performed"  Organism: Proteus mirabilis  Organism: Proteus mirabilis    Sensitivities:      -  Ciprofloxacin: S <=0.25      -  Ceftriaxone: S <=1      -  Ampicillin: S <=8 These ampicillin results predict results for amoxicillin      Method Type: DHEERAJ      -  Meropenem: S <=1      -  Ampicillin/Sulbactam: S <=4/2      -  Cefoxitin: S <=8      -  Amoxicillin/Clavulanic Acid: S <=8/4      -  Trimethoprim/Sulfamethoxazole: R >      -  Ertapenem: S <=0.5      -  Levofloxacin: S <=0.5      -  Tobramycin: S <=2      -  Aztreonam: S <=4      -  Gentamicin: S <=2      -  Cefazolin: S <=2      -  Cefepime: S <=2      -  Piperacillin/Tazobactam: S <=8      Blood Gas Arterial, Lactate: 0.5 ( @ 12:23)  Blood Gas Arterial, Lactate: 0.5 ( @ 10:38)           INFECTIOUS DISEASE CONSULT NOTE    Patient is a 59y old  Female who presents with a chief complaint of      (2024 16:04)    HPI:  59 year old female with history of squamous cell carcinoma s/p right mandibulectomy, L free fibula flap  reconstruction of R mandibular and floor of mouth defect in s/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 She was undergone removal of hardware in 10/22 and completed chemotherapy and radiation at the time. She most recently underwent supraomohyoid neck dissection, mandibulectomy with skin graft, tracheostomy on . She underwent for removal of hardware with left anterolateral thigh flap on 24.     Surgical cultures are growing Staph aureus, proteus mirabilis, strep dysgalactiae corynebacterium and bacteroides. ID consulted for antibiotic recommendations.       REVIEW OF SYSTEMS:  Constitutional: No fevers, No chills, No weight loss, No fatigue   Eyes:  No change in vision	  Respiratory:  no SOB  Cardiovascular:  No chest pain, No palpitations   Gastrointestinal: No pain, No nausea, No vomiting,   Genitourinary: No dysuria,  MSK: +left leg pain  Neurological: No HA, no weakness,     Prior hospital charts reviewed [Yes]  Primary team notes reviewed [Yes]  Other consultant notes reviewed [Yes]    PAST MEDICAL & SURGICAL HISTORY:  H/O malignant neoplasm of gum  Hypertension  Hyperlipidemia  Mild asthma  Malignant neoplasm of mandible  Right cataract  Thyroid nodule  Seasonal allergies  History of chemotherapy  History of head and neck radiation  H/O  section  History of partial hysterectomy  H/O neck surgery  partial right mandibulectomy with fibular free flap reconstriction and right neck lymphnode dissection 22  History of vascular access device  History of total left hip replacement  SCC (squamous cell carcinoma)          SOCIAL HISTORY:  Smoked 1 ppd for 20 years. Quit 2 month ago  Denies alcohol use  Previously worked as a . From [place]    FAMILY HISTORY:  Family history of CVA (Mother)    FH: thyroid disease (Mother)        Allergies:  morphine (Hives)      ANTIMICROBIALS:  ampicillin/sulbactam  IVPB 3 every 6 hours      ANTIMICROBIALS (past 90 days):  MEDICATIONS  (STANDING):  ampicillin/sulbactam  IVPB   200 mL/Hr IV Intermittent (08-02-24 @ 06:20)   200 mL/Hr IV Intermittent (24 @ 23:35)   200 mL/Hr IV Intermittent (24 @ 18:59)   200 mL/Hr IV Intermittent (24 @ 08:25)   200 mL/Hr IV Intermittent (24 @ 03:26)   200 mL/Hr IV Intermittent (24 @ 01:41)   200 mL/Hr IV Intermittent (24 @ 20:00)   200 mL/Hr IV Intermittent (24 @ 15:22)   200 mL/Hr IV Intermittent (24 @ 08:25)   200 mL/Hr IV Intermittent (24 @ 04:13)   200 mL/Hr IV Intermittent (24 @ 21:36)        OTHER MEDS:   MEDICATIONS  (STANDING):  acetaminophen     Tablet .. 975 every 6 hours  enoxaparin Injectable 40 every 24 hours  gabapentin 600 daily  ondansetron Injectable 4 every 8 hours PRN  oxyCODONE    IR 2.5 every 4 hours PRN  oxyCODONE    IR 5 every 4 hours PRN  pantoprazole    Tablet 40 before breakfast  polyethylene glycol 3350 17 daily PRN  senna 2 at bedtime PRN      VITALS:  Vital Signs Last 24 Hrs  T(F): 96.5 (24 @ 04:00), Max: 98.1 (24 @ 06:10)    Vital Signs Last 24 Hrs  HR: 81 (24 @ 08:00) (80 - 88)  BP: 116/79 (24 @ 08:00) (91/58 - 131/66)  RR: 21 (24 @ 08:00)  SpO2: 95% (24 @ 08:00) (91% - 99%)  Wt(kg): --    EXAM:    General: NAD  HEENT: PERRL, anicteric sclera  Neck: right mandible skin flap with staples +JESSEE drains  CV: +S1/S2, RRR, no murmurs heard  Lungs: No respiratory distress, CTA b/l. Right chest wall port  Abd:  BS4+, Soft, NTND, no guarding  : No suprapubic tenderness  Neuro: AAOx3.   Ext: LLE with +JESSEE drain  Msk: freely moving upper and lower extremities  Skin: No rash, no phlebitis, No erythema    Labs:                        9.6    4.43  )-----------( 221      ( 02 Aug 2024 06:31 )             30.5         135  |  99  |  14  ----------------------------<  80  4.6   |  28  |  1.22    Ca    8.8      02 Aug 2024 06:31  Phos  2.8       Mg     1.80             WBC Trend:  WBC Count: 4.43 (24 @ 06:31)  WBC Count: 5.01 (24 @ 05:08)  WBC Count: 6.53 (24 @ 00:50)  WBC Count: 7.01 (24 @ 15:47)      Auto Neutrophil #: 4.9 K/uL (24 @ 09:31)  Auto Neutrophil #: 8.72 K/uL (24 @ 10:03)  Auto Neutrophil #: 9.73 K/uL (24 @ 04:01)  Auto Neutrophil #: 6.33 K/uL (24 @ 21:09)  Auto Neutrophil #: 6.89 K/uL (24 @ 19:16)      Creatine Trend:  Creatinine: 1.22 ()  Creatinine: 1.34 ()  Creatinine: 1.23 ()  Creatinine: 1.30 ()      Liver Biochemical Testing Trend:  Alanine Aminotransferase (ALT/SGPT): 6 ()  Alanine Aminotransferase (ALT/SGPT): 6 ()  Alanine Aminotransferase (ALT/SGPT): 6 (11-15)  Aspartate Aminotransferase (AST/SGOT): 9 (24 @ 15:47)  Aspartate Aminotransferase (AST/SGOT): 11 (23 @ 01:35)  Aspartate Aminotransferase (AST/SGOT): 12 (11-15-23 @ 16:42)  Bilirubin Total: 0.3 ()  Bilirubin Total: 0.2 ()  Bilirubin Total: <0.2 (11-15)      Trend LDH          Urinalysis Basic - ( 02 Aug 2024 06:31 )            MICROBIOLOGY:    MRSA PCR Result.: [facility] (24 @ 12:30)      Culture - Fungal, Other (collected 2024 10:50)  Source: .Other 2 RIGHT MANDIBLE TISSUE CULTURE  Preliminary Report:    Culture is being performed. Fungal cultures are held for 4 weeks.    Culture - Surgical Swab (collected 2024 10:50)  Source: .Surgical Swab 2 RIGHT MANDIBLE TISSUE CULTURE  Preliminary Report:    Few Staphylococcus aureus  Organism: Staphylococcus aureus  Organism: Staphylococcus aureus    Sensitivities:      -  Tetracycline: S <=1      Method Type: DHEERAJ      -  Penicillin: R 8      -  Rifampin: S <=1 Should not be used as monotherapy      -  Erythromycin: R >4      -  Trimethoprim/Sulfamethoxazole: S <=0.5/9.5      -  Clindamycin: R <=0.25 This isolate is presumed to be clindamycin resistant based on detection of inducible resistance. Clindamycin may still be effective in some patients.      -  Oxacillin: S <=0.25 Oxacillin predicts susceptibility for dicloxacillin, methicillin, and nafcillin      -  Gentamicin: S <=1 Should not be used as monotherapy      -  Vancomycin: S 1    Culture - Fungal, Tissue (collected 2024 10:45)  Source: .Tissue 1  RIGHT MANDIBLE BONE CULTURE  Preliminary Report:    Culture is being performed. Fungal cultures are held for 4 weeks.    Culture - Acid Fast - Tissue w/Smear (collected 2024 10:45)  Source: .Tissue 1  RIGHT MANDIBLE BONE CULTURE  Preliminary Report:    Culture is being performed.    Culture - Tissue with Gram Stain (collected 2024 10:45)  Source: .Tissue 1  RIGHT MANDIBLE BONE CULTURE  Final Report:    Moderate Proteus mirabilis    Moderate Streptococcus dysgalactiae (Group C/G)    Few Corynebacterium striatum group "Susceptibilities not performed"    Moderate Bacteroides thetaiotaomicron group "Susceptibilities not    performed"  Organism: Proteus mirabilis  Organism: Proteus mirabilis    Sensitivities:      -  Ciprofloxacin: S <=0.25      -  Ceftriaxone: S <=1      -  Ampicillin: S <=8 These ampicillin results predict results for amoxicillin      Method Type: DHEERAJ      -  Meropenem: S <=1      -  Ampicillin/Sulbactam: S <=4/2      -  Cefoxitin: S <=8      -  Amoxicillin/Clavulanic Acid: S <=8/4      -  Trimethoprim/Sulfamethoxazole: R >      -  Ertapenem: S <=0.5      -  Levofloxacin: S <=0.5      -  Tobramycin: S <=2      -  Aztreonam: S <=4      -  Gentamicin: S <=2      -  Cefazolin: S <=2      -  Cefepime: S <=2      -  Piperacillin/Tazobactam: S <=8      Blood Gas Arterial, Lactate: 0.5 ( @ 12:23)  Blood Gas Arterial, Lactate: 0.5 ( @ 10:38)          Radiology:  No imaging available this admission.

## 2024-08-02 NOTE — CONSULT NOTE ADULT - TIME BILLING
- Chart review  - Independent review and interpretation of data  - Bedside physical examination, patient assessment, and evaluation.  - Discussion with patient  - Medical decision making  - Discussion with other providers  - Discussion with ID pharmacy  - Communication with microbiology laboratory  - Care coordination

## 2024-08-02 NOTE — PROGRESS NOTE ADULT - SUBJECTIVE AND OBJECTIVE BOX
SICU Daily Progress Note  =====================================================  Interval/Overnight Events: NAEO    MEDICATIONS:   --------------------------------------------------------------------------------------  acetaminophen     Tablet .. 975 milliGRAM(s) Oral every 6 hours  ampicillin/sulbactam  IVPB 3 Gram(s) IV Intermittent every 6 hours  chlorhexidine 0.12% Liquid 15 milliLiter(s) Swish and Spit two times a day  dextrose 5% + sodium chloride 0.45%. 1000 milliLiter(s) IV Continuous <Continuous>  enoxaparin Injectable 40 milliGRAM(s) SubCutaneous every 24 hours  gabapentin 600 milliGRAM(s) Oral daily  ondansetron Injectable 4 milliGRAM(s) IV Push every 8 hours PRN  oxyCODONE    IR 2.5 milliGRAM(s) Oral every 4 hours PRN  oxyCODONE    IR 5 milliGRAM(s) Oral every 4 hours PRN  pantoprazole    Tablet 40 milliGRAM(s) Oral before breakfast  polyethylene glycol 3350 17 Gram(s) Oral daily PRN  senna 2 Tablet(s) Oral at bedtime PRN    --------------------------------------------------------------------------------------    VITAL SIGNS, INS/OUTS (last 24 hours):  --------------------------------------------------------------------------------------  T(C): 36.3 (08-02-24 @ 00:00), Max: 36.6 (08-01-24 @ 04:00)  HR: 80 (08-02-24 @ 00:00) (79 - 90)  BP: 116/72 (08-02-24 @ 00:00) (88/63 - 124/74)  RR: 22 (08-02-24 @ 00:00) (13 - 23)  SpO2: 99% (08-02-24 @ 00:00) (86% - 100%)      07-31-24 @ 07:01  -  08-01-24 @ 07:00  --------------------------------------------------------  IN: 2000 mL / OUT: 1794 mL / NET: 206 mL    08-01-24 @ 07:01  -  08-02-24 @ 00:51  --------------------------------------------------------  IN: 1800 mL / OUT: 805 mL / NET: 995 mL      --------------------------------------------------------------------------------------    EXAM:  General/Neuro  Exam: Normal, NAD, alert, oriented x 3, no focal deficits. PERRLA      HEENT:   free flap skin paddle with good color, cap refill, warmth; cook doppler with strong signal    Respiratory  Exam: Lungs clear to auscultation, Normal expansion/effort.     Cardiovascular  Exam: S1, S2.  Regular rate and rhythm.  Peripheral edema  Cardiac Rhythm: Normal Sinus Rhythm    GI  Exam: Abdomen soft, Non-tender, Non-distended.    Current Diet:  Puree    TUBES / LINES / DRAINS  [x] Peripheral IV  [] Central Venous Line     	[] R	[] L	[] IJ	[] Fem	[] SC	Date Placed:   [] Arterial Line		[] R	[] L	[] Fem	[] Rad	[] Ax	Date Placed:   [] PICC		[] Midline		[] Mediport  [] Urinary Catheter		Date Placed:   [x] Necessity of urinary, arterial, and venous catheters discussed    LABS  --------------------------------------------------------------------------------------  08-01    136  |  102  |  17  ----------------------------<  69<L>  4.8   |  25  |  1.34<H>    Ca    8.4      01 Aug 2024 05:08  Phos  3.5     08-01  Mg     2.00     08-01    --------------------------------------------------------------------------------------    IMAGING STUDIES:      SICU Daily Progress Note  =====================================================  Interval/Overnight Events: TENZINROLY  - listed  - ID: vanc 1g qd for now, zosyn for 6wk  - poor po intake, would like to advance diet discussed with [facility], f/u pending  eval    MEDICATIONS:   --------------------------------------------------------------------------------------  acetaminophen     Tablet .. 975 milliGRAM(s) Oral every 6 hours  ampicillin/sulbactam  IVPB 3 Gram(s) IV Intermittent every 6 hours  chlorhexidine 0.12% Liquid 15 milliLiter(s) Swish and Spit two times a day  dextrose 5% + sodium chloride 0.45%. 1000 milliLiter(s) IV Continuous <Continuous>  enoxaparin Injectable 40 milliGRAM(s) SubCutaneous every 24 hours  gabapentin 600 milliGRAM(s) Oral daily  ondansetron Injectable 4 milliGRAM(s) IV Push every 8 hours PRN  oxyCODONE    IR 2.5 milliGRAM(s) Oral every 4 hours PRN  oxyCODONE    IR 5 milliGRAM(s) Oral every 4 hours PRN  pantoprazole    Tablet 40 milliGRAM(s) Oral before breakfast  polyethylene glycol 3350 17 Gram(s) Oral daily PRN  senna 2 Tablet(s) Oral at bedtime PRN    --------------------------------------------------------------------------------------    VITAL SIGNS, INS/OUTS (last 24 hours):  --------------------------------------------------------------------------------------  ICU Vital Signs Last 24 Hrs  T(C): 35.8 (02 Aug 2024 08:00), Max: 36.3 (02 Aug 2024 00:00)  T(F): 96.5 (02 Aug 2024 08:00), Max: 97.4 (02 Aug 2024 00:00)  HR: 85 (02 Aug 2024 12:00) (80 - 85)  BP: 141/72 (02 Aug 2024 12:00) (91/59 - 141/72)  BP(mean): 90 (02 Aug 2024 12:00) (70 - 91)  ABP: --  ABP(mean): --  RR: 14 (02 Aug 2024 12:00) (12 - 22)  SpO2: 97% (02 Aug 2024 12:00) (91% - 99%)    O2 Parameters below as of 02 Aug 2024 12:00  Patient On (Oxygen Delivery Method): nasal cannula  O2 Flow (L/min): 1          07-31-24 @ 07:01  -  08-01-24 @ 07:00  --------------------------------------------------------  I&O's Summary    01 Aug 2024 07:01  -  02 Aug 2024 07:00  --------------------------------------------------------  IN: 2250 mL / OUT: 1506 mL / NET: 744 mL    02 Aug 2024 07:01  -  02 Aug 2024 14:37  --------------------------------------------------------  IN: 300 mL / OUT: 300 mL / NET: 0 mL      --------------------------------------------------------------------------------------    EXAM:  General/Neuro  Exam: Normal, NAD, alert, oriented x 3, no focal deficits. PERRLA      HEENT:   free flap skin paddle with good color, cap refill, warmth; cook doppler with strong signal    Respiratory  Exam: Lungs clear to auscultation, Normal expansion/effort.     Cardiovascular  Exam: S1, S2.  Regular rate and rhythm.  Peripheral edema  Cardiac Rhythm: Normal Sinus Rhythm    GI  Exam: Abdomen soft, Non-tender, Non-distended.    Current Diet:  Puree    TUBES / LINES / DRAINS  [x] Peripheral IV  [] Central Venous Line     	[] R	[] L	[] IJ	[] Fem	[] SC	Date Placed:   [] Arterial Line		[] R	[] L	[] Fem	[] Rad	[] Ax	Date Placed:   [] PICC		[] Midline		[] Mediport  [] Urinary Catheter		Date Placed:   [x] Necessity of urinary, arterial, and venous catheters discussed    LABS  --------------------------------------------------------------------------------------                        9.6    4.43  )-----------( 221      ( 02 Aug 2024 06:31 )             30.5     08-02    135  |  99  |  14  ----------------------------<  80  4.6   |  28  |  1.22    Ca    8.8      02 Aug 2024 06:31  Phos  2.8     08-02  Mg     1.80     08-02    --------------------------------------------------------------------------------------    IMAGING STUDIES:      SICU Daily Progress Note  =====================================================  Interval/Overnight Events: TENZINROLY  - listed  - ID: vanc 1g qd for now, zosyn for 6wk  - poor po intake, would like to advance diet discussed with [facility], f/u pending  eval    MEDICATIONS:   --------------------------------------------------------------------------------------  acetaminophen     Tablet .. 975 milliGRAM(s) Oral every 6 hours  ampicillin/sulbactam  IVPB 3 Gram(s) IV Intermittent every 6 hours  chlorhexidine 0.12% Liquid 15 milliLiter(s) Swish and Spit two times a day  dextrose 5% + sodium chloride 0.45%. 1000 milliLiter(s) IV Continuous <Continuous>  enoxaparin Injectable 40 milliGRAM(s) SubCutaneous every 24 hours  gabapentin 600 milliGRAM(s) Oral daily  ondansetron Injectable 4 milliGRAM(s) IV Push every 8 hours PRN  oxyCODONE    IR 2.5 milliGRAM(s) Oral every 4 hours PRN  oxyCODONE    IR 5 milliGRAM(s) Oral every 4 hours PRN  pantoprazole    Tablet 40 milliGRAM(s) Oral before breakfast  polyethylene glycol 3350 17 Gram(s) Oral daily PRN  senna 2 Tablet(s) Oral at bedtime PRN    --------------------------------------------------------------------------------------    VITAL SIGNS, INS/OUTS (last 24 hours):  --------------------------------------------------------------------------------------  ICU Vital Signs Last 24 Hrs  T(C): 35.8 (02 Aug 2024 08:00), Max: 36.3 (02 Aug 2024 00:00)  T(F): 96.5 (02 Aug 2024 08:00), Max: 97.4 (02 Aug 2024 00:00)  HR: 85 (02 Aug 2024 12:00) (80 - 85)  BP: 141/72 (02 Aug 2024 12:00) (91/59 - 141/72)  BP(mean): 90 (02 Aug 2024 12:00) (70 - 91)  ABP: --  ABP(mean): --  RR: 14 (02 Aug 2024 12:00) (12 - 22)  SpO2: 97% (02 Aug 2024 12:00) (91% - 99%)    O2 Parameters below as of 02 Aug 2024 12:00  Patient On (Oxygen Delivery Method): nasal cannula  O2 Flow (L/min): 1        --------------------------------------------------------  I&O's Summary    01 Aug 2024 07:01  -  02 Aug 2024 07:00  --------------------------------------------------------  IN: 2250 mL / OUT: 1506 mL / NET: 744 mL    02 Aug 2024 07:01  -  02 Aug 2024 14:37  --------------------------------------------------------  IN: 300 mL / OUT: 300 mL / NET: 0 mL      --------------------------------------------------------------------------------------    EXAM:  General/Neuro  Exam: Normal, NAD, alert, oriented x 3, no focal deficits. PERRLA      HEENT:   free flap skin paddle with good color, cap refill, warmth; cook doppler with strong signal    Respiratory  Exam: Lungs clear to auscultation, Normal expansion/effort.     Cardiovascular  Exam: S1, S2.  Regular rate and rhythm.  Peripheral edema  Cardiac Rhythm: Normal Sinus Rhythm    GI  Exam: Abdomen soft, Non-tender, Non-distended.    Current Diet:  Puree    TUBES / LINES / DRAINS  [x] Peripheral IV  [] Central Venous Line     	[] R	[] L	[] IJ	[] Fem	[] SC	Date Placed:   [] Arterial Line		[] R	[] L	[] Fem	[] Rad	[] Ax	Date Placed:   [] PICC		[] Midline		[] Mediport  [] Urinary Catheter		Date Placed:   [x] Necessity of urinary, arterial, and venous catheters discussed    LABS  --------------------------------------------------------------------------------------                        9.6    4.43  )-----------( 221      ( 02 Aug 2024 06:31 )             30.5     08-02    135  |  99  |  14  ----------------------------<  80  4.6   |  28  |  1.22    Ca    8.8      02 Aug 2024 06:31  Phos  2.8     08-02  Mg     1.80     08-02    --------------------------------------------------------------------------------------    IMAGING STUDIES:

## 2024-08-02 NOTE — CONSULT NOTE ADULT - ASSESSMENT
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 She was undergone removal of hardware in 10/22 and completed chemotherapy and radiation at the time. She most recently underwent supraomohyoid neck dissection, mandibulectomy with skin graft, tracheostomy on 11/23. She underwent for removal of hardware with left anterolateral thigh flap on 7/30/24.     She underwent debridement of non-viable skin and removal of large mandibular plate and reconstruction with left anterolateral thigh to neck 7/30    Surgical cultures are growing Staph aureus, proteus mirabilis, strep dysgalactiae corynebacterium and bacteroides  Antimicrobials:  Unasyn 7/30 -       Assessment:  - Hardware infection       Recommendations: PLEASE DEFER ALL CHANGES IN PLAN UNTIL SIGNED BY ATTENDING. All recommendations are tentative pending Attending Attestation.    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 She was undergone removal of hardware in 10/22 and completed chemotherapy and radiation at the time. She most recently underwent supraomohyoid neck dissection, mandibulectomy with skin graft, tracheostomy on 11/23. She underwent debridement of non-viable skin and removal of large mandibular plate and reconstruction with left anterolateral thigh to neck 7/30    Surgical cultures are growing Staph aureus, proteus mirabilis, strep dysgalactiae corynebacterium and bacteroides  Antimicrobials:  Unasyn 7/30 -       Assessment:  - Hardware infection       Recommendations: PLEASE DEFER ALL CHANGES IN PLAN UNTIL SIGNED BY ATTENDING. All recommendations are tentative pending Attending Attestation.    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 She was undergone removal of hardware in 10/22 and completed chemotherapy and radiation at the time. She most recently underwent supraomohyoid neck dissection, mandibulectomy with skin graft, tracheostomy on 11/23. She underwent debridement of non-viable skin and removal of large mandibular plate and reconstruction with left anterolateral thigh to neck 7/30. Surgical cultures are growing Staph aureus, proteus mirabilis, strep dysgalactiae corynebacterium  and bacteroides. ID consulted for antibiotic recommendation      Antimicrobials:  Unasyn 7/30 -       Assessment:  - Hardware infection s/p debridement and removal of hardware      Recommend:  - Continue Unasyn 3g IV Q6H   - Start Vancomycin 1g IV Q24H  - Monitor Vanc trough   - Follow up final culture and sensitivities. 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 She was undergone removal of hardware in 10/22 and completed chemotherapy and radiation at the time. She most recently underwent supraomohyoid neck dissection, mandibulectomy with skin graft, tracheostomy on 11/23. She underwent debridement of non-viable skin and removal of large mandibular plate and reconstruction with left anterolateral thigh to neck 7/30. Surgical cultures are growing Staph aureus, proteus mirabilis, strep dysgalactiae corynebacterium  and bacteroides. 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 Micro lab contacted to get susceptibilities of Corynebacterium       Case discussed with attending    Josi Barreto DO, PGY-4  Infectious Disease Fellow  [employer] Preferred  After 5pm/weekends call [phone]

## 2024-08-02 NOTE — PROGRESS NOTE ADULT - ASSESSMENT
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 Presents for RYNE and left ALT flap 7/30. Admitted to SICU for q1h flap checks. Patient doing well, transitioned to q4h flap checks and listed for the floor.     PLAN:   Neurologic:   - A&Ox3  - Pain control: gabapentin, tylenol, and oxy PRN  - q4 neuro check    Respiratory:   - >95% on RA    Cardiovascular:   - MAP >65    Gastrointestinal/Nutrition:   - Diet: pureed, thickened fluids  - Zofran 4mg q8  - Senna, Miralax  - Protonix 40mg qd    Renal/Genitourinary:   - merchant     Hematologic:   - DVT ppx: lovenox   - trend h&h    Infectious Disease:   - Unasyn q8 for infected hardware  - peridex QID  - f/u OR cultures, ID consulted     Endocrine:   - No insulin needs  - 1 amp D50    Lines/Tubes:  - 2 PIV  - JPx2  - marlon    Disposition: listed     59y Female hx of SCCs/p R mandibulectomy, R SND, and R Free Fibula Flap s/p 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 Presents for RYNE and left ALT flap 7/30. Admitted to SICU for q1h flap checks. Patient doing well, transitioned to q4h flap checks and listed for the floor.     PLAN:   Neurologic:   - A&Ox3  - Pain control: gabapentin, tylenol, and oxy PRN  - q4 neuro check    Respiratory:   - >95% on RA    Cardiovascular:   - MAP >65    Gastrointestinal/Nutrition:   - Diet: pureed, thickened fluids  - f/u SS eval  - Zofran 4mg q8  - Senna, Miralax  - Protonix 40mg qd    Renal/Genitourinary:   - merchant     Hematologic:   - DVT ppx: lovenox   - trend h&h    Infectious Disease:   - Unasyn q8 for infected hardware  - peridex QID  - f/u OR cultures, ID consulted     Endocrine:   - No insulin needs  - 1 amp D50    Lines/Tubes:  - 2 PIV  - JPx2  - marlon    Disposition: listed     59y Female hx of SCCs/p R mandibulectomy, R SND, and R Free Fibula Flap s/p 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 Presents for RYNE and left ALT flap 7/30. Admitted to SICU for q1h flap checks. Patient doing well, transitioned to q4h flap checks and listed for the floor.     PLAN:   Neurologic:   - A&Ox3  - Pain control: gabapentin, tylenol, and oxy PRN  - q4 neuro check    Respiratory:   - >95% on RA    Cardiovascular:   - MAP >65    Gastrointestinal/Nutrition:   - Diet: pureed, thickened fluids  - f/u SS eval  - Zofran 4mg q8  - Senna, Miralax  - Protonix 40mg qd    Renal/Genitourinary:   - Monitor electrolytes, replete PRN    Hematologic:   - DVT ppx: lovenox   - trend h&h    Infectious Disease:   - Unasyn q8 for infected hardware  - peridex QID  - f/u OR cultures, ID consulted     Endocrine:   - No insulin needs  - 1 amp D50    Lines/Tubes:  - 2 PIV  - JPx2  - marlon    Disposition: listed

## 2024-08-02 NOTE — PROGRESS NOTE ADULT - SUBJECTIVE AND OBJECTIVE BOX
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 Presents for RYNE and left ALT flap 7/30. Pt reports pain and denies f/n/v/c.    Neck:, range of motion intact, ALT flap good color, strong cook, Hemostatic, JESSEE drain in place  Leg: Wrapped with serosanguinous output, JESSEE drain in place.    Extra oral exam: OMER 30, no trismus, no LAD, no palpable step offs of the zygomatic arches,  Intra oral exam: uvula midline, no vestibular pathology,FOM s/nt/ne, no ecchymosis    Vitals:     Vital Signs Last 24 Hrs  T(C): 35.8 (02 Aug 2024 04:00), Max: 36.4 (01 Aug 2024 12:00)  T(F): 96.5 (02 Aug 2024 04:00), Max: 97.5 (01 Aug 2024 12:00)  HR: 83 (02 Aug 2024 06:40) (79 - 88)  BP: 131/66 (02 Aug 2024 04:00) (91/58 - 131/66)  BP(mean): 87 (02 Aug 2024 04:00) (69 - 90)  RR: 13 (02 Aug 2024 06:40) (12 - 23)  SpO2: 91% (02 Aug 2024 06:40) (86% - 99%)    Parameters below as of 02 Aug 2024 06:40  Patient On (Oxygen Delivery Method): room air        I&O's Detail    01 Aug 2024 07:01  -  02 Aug 2024 07:00  --------------------------------------------------------  IN:    dextrose 5% + sodium chloride 0.45%: 700 mL    IV PiggyBack: 300 mL    Lactated Ringers: 750 mL    Oral Fluid: 500 mL  Total IN: 2250 mL    OUT:    Bulb (mL): 1 mL    Bulb (mL): 5 mL    Voided (mL): 1500 mL  Total OUT: 1506 mL    Total NET: 744 mL    Medications:    MEDICATIONS  (STANDING):  acetaminophen     Tablet .. 975 milliGRAM(s) Oral every 6 hours  ampicillin/sulbactam  IVPB 3 Gram(s) IV Intermittent every 6 hours  chlorhexidine 0.12% Liquid 15 milliLiter(s) Swish and Spit two times a day  dextrose 5% + sodium chloride 0.45%. 1000 milliLiter(s) (50 mL/Hr) IV Continuous <Continuous>  enoxaparin Injectable 40 milliGRAM(s) SubCutaneous every 24 hours  gabapentin 600 milliGRAM(s) Oral daily  pantoprazole    Tablet 40 milliGRAM(s) Oral before breakfast    MEDICATIONS  (PRN):  ondansetron Injectable 4 milliGRAM(s) IV Push every 8 hours PRN Nausea and/or Vomiting  oxyCODONE    IR 2.5 milliGRAM(s) Oral every 4 hours PRN Moderate Pain (4 - 6)  oxyCODONE    IR 5 milliGRAM(s) Oral every 4 hours PRN Severe Pain (7 - 10)  polyethylene glycol 3350 17 Gram(s) Oral daily PRN Constipation  senna 2 Tablet(s) Oral at bedtime PRN Constipation      Labs:                          9.3    5.01  )-----------( 194      ( 01 Aug 2024 05:08 )             30.1       08-01    136  |  102  |  17  ----------------------------<  69<L>  4.8   |  25  |  1.34<H>    Ca    8.4      01 Aug 2024 05:08  Phos  3.5     08-01  Mg     2.00     08-01

## 2024-08-02 NOTE — PROGRESS NOTE ADULT - SUBJECTIVE AND OBJECTIVE BOX
Plastic Surgery Progress Note (pg [facility]: [id number], [facility]: [phone])    SUBJECTIVE  The patient was seen and examined. No acute events overnight. Pain controlled, afebrile w/ stable vitals.     OBJECTIVE  ___________________________________________________  VITAL SIGNS / I&O's   Vital Signs Last 24 Hrs  T(C): 35.8 (02 Aug 2024 08:00), Max: 36.3 (02 Aug 2024 00:00)  T(F): 96.5 (02 Aug 2024 08:00), Max: 97.4 (02 Aug 2024 00:00)  HR: 85 (02 Aug 2024 12:00) (80 - 85)  BP: 141/72 (02 Aug 2024 12:00) (110/64 - 141/72)  BP(mean): 90 (02 Aug 2024 12:00) (74 - 91)  RR: 14 (02 Aug 2024 12:00) (12 - 22)  SpO2: 97% (02 Aug 2024 12:00) (91% - 99%)    Parameters below as of 02 Aug 2024 12:00  Patient On (Oxygen Delivery Method): nasal cannula  O2 Flow (L/min): 1        01 Aug 2024 07:01  -  02 Aug 2024 07:00  --------------------------------------------------------  IN:    dextrose 5% + sodium chloride 0.45%: 700 mL    IV PiggyBack: 300 mL    Lactated Ringers: 750 mL    Oral Fluid: 500 mL  Total IN: 2250 mL    OUT:    Bulb (mL): 1 mL    Bulb (mL): 5 mL    Voided (mL): 1500 mL  Total OUT: 1506 mL    Total NET: 744 mL      02 Aug 2024 07:01  -  02 Aug 2024 15:21  --------------------------------------------------------  IN:    dextrose 5% + sodium chloride 0.45%: 300 mL  Total IN: 300 mL    OUT:    Voided (mL): 300 mL  Total OUT: 300 mL    Total NET: 0 mL        ___________________________________________________  PHYSICAL EXAM      -- CONSTITUTIONAL: NAD, lying in bed  -- NEURO: Awake, alert  -- HEENT: NC/AT, mucous membranes moist  -- NECK: Soft, no asymmetry,  ALT flap with strong cook signals, well perfused color, incisions c/d/i  -- PULM: Non-labored respirations, equal chest rise bilaterally  -- ABDOMEN: Nondistended  -- EXTREMITIES: Warm and well perfused, donor site c/d/i  -- PSYCH: Affect normal, A&Ox3    ___________________________________________________  LABS                        9.6    4.43  )-----------( 221      ( 02 Aug 2024 06:31 )             30.5     02 Aug 2024 06:31    135    |  99     |  14     ----------------------------<  80     4.6     |  28     |  1.22     Ca    8.8        02 Aug 2024 06:31  Phos  2.8       02 Aug 2024 06:31  Mg     1.80      02 Aug 2024 06:31        CAPILLARY BLOOD GLUCOSE      POCT Blood Glucose.: 84 mg/dL (02 Aug 2024 06:41)  POCT Blood Glucose.: 106 mg/dL (01 Aug 2024 23:31)  POCT Blood Glucose.: 90 mg/dL (01 Aug 2024 17:48)        Urinalysis Basic - ( 02 Aug 2024 06:31 )    Color: x / Appearance: x / SG: x / pH: x  Gluc: 80 mg/dL / Ketone: x  / Bili: x / Urobili: x   Blood: x / Protein: x / Nitrite: x   Leuk Esterase: x / RBC: x / WBC x   Sq Epi: x / Non Sq Epi: x / Bacteria: x      ___________________________________________________  MICRO  Recent Cultures:  Specimen Source: .Surgical Swab 2 RIGHT MANDIBLE TISSUE CULTURE, 07-30 @ 10:50; Results   Few Staphylococcus aureus<!>; Gram Stain: --; Organism: Staphylococcus aureus<!>  Specimen Source: .Tissue 1  RIGHT MANDIBLE BONE CULTURE, 07-30 @ 10:45; Results   Moderate Proteus mirabilis  Moderate Streptococcus dysgalactiae (Group C/G)  Few Corynebacterium striatum group "Susceptibilities not performed"  Moderate Bacteroides thetaiotaomicron group "Susceptibilities not  performed"<!>; Gram Stain:   Few polymorphonuclear leukocytes seen per low power field  Numerous Gram positive cocci in pairs seen per oil power field  Moderate Gram Negative Rods seen per oil power field  Rare Gram Positive Rods seen per oil power field<!>; Organism: Proteus mirabilis<!>    ___________________________________________________  MEDICATIONS  (STANDING):  acetaminophen     Tablet .. 975 milliGRAM(s) Oral every 6 hours  ampicillin/sulbactam  IVPB 3 Gram(s) IV Intermittent every 6 hours  chlorhexidine 0.12% Liquid 15 milliLiter(s) Swish and Spit two times a day  dextrose 5% + sodium chloride 0.45%. 1000 milliLiter(s) (50 mL/Hr) IV Continuous <Continuous>  enoxaparin Injectable 40 milliGRAM(s) SubCutaneous every 24 hours  gabapentin 600 milliGRAM(s) Oral daily  pantoprazole    Tablet 40 milliGRAM(s) Oral before breakfast  vancomycin  IVPB 1000 milliGRAM(s) IV Intermittent every 24 hours    MEDICATIONS  (PRN):  ondansetron Injectable 4 milliGRAM(s) IV Push every 8 hours PRN Nausea and/or Vomiting  oxyCODONE    IR 5 milliGRAM(s) Oral every 4 hours PRN Severe Pain (7 - 10)  oxyCODONE    IR 2.5 milliGRAM(s) Oral every 4 hours PRN Moderate Pain (4 - 6)  polyethylene glycol 3350 17 Gram(s) Oral daily PRN Constipation  senna 2 Tablet(s) Oral at bedtime PRN Constipation

## 2024-08-02 NOTE — PROGRESS NOTE ADULT - ASSESSMENT
59y Female hx of SCCs/p R mandibulectomy, R SND, chemotherapy and osetonecrosis NOW POD3 s/p RYNE and left ALT flap on 7/30/24. Pt reports pain and denies f/n/v/c. Flap is well perfused with strong doppler signal.     - Q4h flap checks with Enjoyor doppler   - Monitor JESSEE drain output Plan to DC L neck drain  - Multimodal Pain management  - Consult PT   - Consult Infectious Diseases  - Pureed diet  - Continue TIMMY Cummings  Oral and Maxillofacial Surgery  [facility] Pager #[phone]  [facility] Pager: [phone]  Available on Teams

## 2024-08-02 NOTE — PROGRESS NOTE ADULT - ASSESSMENT
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 Now s/p debridement, RYNE and left ALT flap 7/30 with Joseph Rowe and Cristina.      Plan:  - Q4h flap checks with HackPad doppler, monitor flap color  - Monitor JESSEE drain output  - Monitor wound vac output, will take down on POD7  - Skin graft donor site dressing down on POD7, reinforce PRN  - Continue ERAS protocol care  - Pain control  - Cultures with S. aureus   - Appreciate SICU care

## 2024-08-02 NOTE — CONSULT NOTE ADULT - ATTENDING COMMENTS
I agree with the detailed interval history, physical, and plan, which I have reviewed and edited where appropriate'; also agree with notes/assessment with my team on service.  I have personally examined the patient.  I was physically present for the key portions of the evaluation and management (E/M) service provided.  I reviewed all the pertinent data.  The patient is a critical care patient with life threatening hemodynamic and metabolic instability in SICU.  The SICU team has a constant risk benefit analyzes discussion and coordinating care with the primary team and all consultants.   The patient is in SICU with the chief complaint and diagnosis mentioned in the note.   The plan will be specified in the note.  59y Female s/p completion of chemotherapy and radiation and RYNE and left ALT flap. Admitted to SICU for q1h flap checks.  Awake and alert  Heart RR  Lung clear  Abd soft    PLAN:   Neurologic:   - gabapentin  - tylenol  Respiratory:   - RA  Cardiovascular:   - MAP >65  Gastrointestinal/Nutrition:   - NPO + IVF @75cc  - zofran  -pantoprazol   Renal/Genitourinary:   - merchant   Hematologic:   - lovenox   Infectious Disease:   - Unasyn   Endocrine:   - No insulin needs    Disposition:   SICU
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 Per report, patient is s/p RYNE. Surgical cultures grew Staph aureus, Proteus mirabilis, Strep dysgalactiae, Corynebacterium, and Bacteroides.    #OM  #SCC  #Hardware infection  #Polymicrobial infection    Recommendations:  - Continue with Unasyn 3g IV Q6H  - Start vancomycin 1g IV Q24H. Check pre-3rd dose trough  - Communicated with micro lab to run susceptibility of Corynebacterium. Likely will take many days to come back.  - Likely will treat x6 weeks for bone infection, per bone tissue culture growth.    Plan discussed with SICU house staff and ID pharmacist.  Thank you for this consult. Inpatient ID team will follow.    Ady Frye MD, PhD  Attending Physician  Division of Infectious Diseases  Department of Medicine    Please contact through MS Teams message.  Office: [phone] (after 5 PM or weekend)

## 2024-08-03 LAB
ANION GAP SERPL CALC-SCNC: 9 MMOL/L — SIGNIFICANT CHANGE UP (ref 7–14)
BUN SERPL-MCNC: 13 MG/DL — SIGNIFICANT CHANGE UP (ref 7–23)
CALCIUM SERPL-MCNC: 8.9 MG/DL — SIGNIFICANT CHANGE UP (ref 8.4–10.5)
CHLORIDE SERPL-SCNC: 96 MMOL/L — LOW (ref 98–107)
CO2 SERPL-SCNC: 30 MMOL/L — SIGNIFICANT CHANGE UP (ref 22–31)
CREAT SERPL-MCNC: 1.02 MG/DL — SIGNIFICANT CHANGE UP (ref 0.5–1.3)
EGFR: 63 ML/MIN/1.73M2 — SIGNIFICANT CHANGE UP
GLUCOSE BLDC GLUCOMTR-MCNC: 108 MG/DL — HIGH (ref 70–99)
GLUCOSE BLDC GLUCOMTR-MCNC: 117 MG/DL — HIGH (ref 70–99)
GLUCOSE BLDC GLUCOMTR-MCNC: 120 MG/DL — HIGH (ref 70–99)
GLUCOSE BLDC GLUCOMTR-MCNC: 156 MG/DL — HIGH (ref 70–99)
GLUCOSE SERPL-MCNC: 111 MG/DL — HIGH (ref 70–99)
HCT VFR BLD CALC: 29.7 % — LOW (ref 34.5–45)
HGB BLD-MCNC: 9.4 G/DL — LOW (ref 11.5–15.5)
MAGNESIUM SERPL-MCNC: 1.6 MG/DL — SIGNIFICANT CHANGE UP (ref 1.6–2.6)
MCHC RBC-ENTMCNC: 27.2 PG — SIGNIFICANT CHANGE UP (ref 27–34)
MCHC RBC-ENTMCNC: 31.6 GM/DL — LOW (ref 32–36)
MCV RBC AUTO: 85.8 FL — SIGNIFICANT CHANGE UP (ref 80–100)
NRBC # BLD: 0 /100 WBCS — SIGNIFICANT CHANGE UP (ref 0–0)
NRBC # FLD: 0 K/UL — SIGNIFICANT CHANGE UP (ref 0–0)
PHOSPHATE SERPL-MCNC: 2.8 MG/DL — SIGNIFICANT CHANGE UP (ref 2.5–4.5)
PLATELET # BLD AUTO: 252 K/UL — SIGNIFICANT CHANGE UP (ref 150–400)
POTASSIUM SERPL-MCNC: 4.7 MMOL/L — SIGNIFICANT CHANGE UP (ref 3.5–5.3)
POTASSIUM SERPL-SCNC: 4.7 MMOL/L — SIGNIFICANT CHANGE UP (ref 3.5–5.3)
RBC # BLD: 3.46 M/UL — LOW (ref 3.8–5.2)
RBC # FLD: 14.8 % — HIGH (ref 10.3–14.5)
SODIUM SERPL-SCNC: 135 MMOL/L — SIGNIFICANT CHANGE UP (ref 135–145)
WBC # BLD: 3.85 K/UL — SIGNIFICANT CHANGE UP (ref 3.8–10.5)
WBC # FLD AUTO: 3.85 K/UL — SIGNIFICANT CHANGE UP (ref 3.8–10.5)

## 2024-08-03 RX ADMIN — Medication 975 MILLIGRAM(S): at 00:00

## 2024-08-03 RX ADMIN — VANCOMYCIN HYDROCHLORIDE 250 MILLIGRAM(S): 5 INJECTION, POWDER, LYOPHILIZED, FOR SOLUTION INTRAVENOUS at 15:46

## 2024-08-03 RX ADMIN — Medication 975 MILLIGRAM(S): at 11:10

## 2024-08-03 RX ADMIN — Medication 975 MILLIGRAM(S): at 00:04

## 2024-08-03 RX ADMIN — Medication 200 GRAM(S): at 00:04

## 2024-08-03 RX ADMIN — Medication 200 GRAM(S): at 11:10

## 2024-08-03 RX ADMIN — Medication 200 GRAM(S): at 17:24

## 2024-08-03 RX ADMIN — OXYCODONE HYDROCHLORIDE 5 MILLIGRAM(S): 30 TABLET ORAL at 17:16

## 2024-08-03 RX ADMIN — CHLORHEXIDINE GLUCONATE 15 MILLILITER(S): 500 CLOTH TOPICAL at 17:24

## 2024-08-03 RX ADMIN — PANTOPRAZOLE SODIUM 40 MILLIGRAM(S): 20 TABLET, DELAYED RELEASE ORAL at 05:52

## 2024-08-03 RX ADMIN — OXYCODONE HYDROCHLORIDE 5 MILLIGRAM(S): 30 TABLET ORAL at 16:46

## 2024-08-03 RX ADMIN — Medication 975 MILLIGRAM(S): at 06:13

## 2024-08-03 RX ADMIN — Medication 975 MILLIGRAM(S): at 17:24

## 2024-08-03 RX ADMIN — Medication 975 MILLIGRAM(S): at 05:52

## 2024-08-03 RX ADMIN — DEXTROSE MONOHYDRATE, SODIUM CHLORIDE, SODIUM LACTATE, CALCIUM CHLORIDE, MAGNESIUM CHLORIDE 50 MILLILITER(S): 1.5; 538; 448; 18.4; 5.08 SOLUTION INTRAPERITONEAL at 00:52

## 2024-08-03 RX ADMIN — CHLORHEXIDINE GLUCONATE 15 MILLILITER(S): 500 CLOTH TOPICAL at 05:52

## 2024-08-03 RX ADMIN — Medication 975 MILLIGRAM(S): at 12:03

## 2024-08-03 RX ADMIN — Medication 200 GRAM(S): at 05:51

## 2024-08-03 RX ADMIN — ENOXAPARIN SODIUM 40 MILLIGRAM(S): 120 INJECTION SUBCUTANEOUS at 16:46

## 2024-08-03 RX ADMIN — GABAPENTIN 600 MILLIGRAM(S): 400 CAPSULE ORAL at 11:11

## 2024-08-03 NOTE — PHYSICAL THERAPY INITIAL EVALUATION ADULT - GENERAL OBSERVATIONS, REHAB EVAL
Patient found semi-reclined in bed, NAD, A&Ox4, +JESSEE drain x 2, OK for PT per RN Nohemi, patient agreeable to PT evaluation, /70

## 2024-08-03 NOTE — PROVIDER CONTACT NOTE (EICU) - ASSESSMENT
patient AOx4, VSS. patient had episode of emesis after eating lunch and dinner, patient on soft bite diet

## 2024-08-03 NOTE — PHYSICAL THERAPY INITIAL EVALUATION ADULT - DID THE PATIENT HAVE SURGERY?
Repair of right facial dehiscence via debridement of right mandibular bone and overlying skin, removal of mandibular hardware, left neck exploration for vessels, reconstruction via left anterolateral thigh flap./yes

## 2024-08-03 NOTE — PROGRESS NOTE ADULT - SUBJECTIVE AND OBJECTIVE BOX
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 Presents for RYNE and left ALT flap 7/30 (POD4). Pt reports pain and denies f/n/v/c.    Neck:, range of motion intact, ALT flap good color, strong cook, Hemostatic, JESSEE drain in place  Leg: Wrapped with serosanguinous output, JESSEE drain in place.    Extra oral exam: OMER 30, no trismus, no LAD, no palpable step offs of the zygomatic arches. Right chest/shoulder area shows edema from infiltration of IV.   Intra oral exam: uvula midline, no vestibular pathology, FOM s/nt/ne, no ecchymosis    Vitals:   Vital Signs Last 24 Hrs  T(C): 36.2 (03 Aug 2024 05:00), Max: 36.4 (02 Aug 2024 12:00)  T(F): 97.2 (03 Aug 2024 05:00), Max: 97.5 (02 Aug 2024 12:00)  HR: 74 (03 Aug 2024 05:00) (74 - 85)  BP: 131/69 (03 Aug 2024 05:00) (114/71 - 141/72)  BP(mean): 93 (03 Aug 2024 00:00) (85 - 95)  RR: 17 (03 Aug 2024 05:00) (12 - 18)  SpO2: 97% (03 Aug 2024 05:00) (95% - 99%)    Parameters below as of 03 Aug 2024 05:00  Patient On (Oxygen Delivery Method): room air    I&O's Detail    02 Aug 2024 07:01  -  03 Aug 2024 07:00  --------------------------------------------------------  IN:    dextrose 5% + sodium chloride 0.45%: 1150 mL    IV PiggyBack: 100 mL    Oral Fluid: 290 mL  Total IN: 1540 mL    OUT:    Bulb (mL): 2 mL    Bulb (mL): 1 mL    Voided (mL): 1050 mL  Total OUT: 1053 mL    Total NET: 487 mL          Medications:    MEDICATIONS  (STANDING):  acetaminophen     Tablet .. 975 milliGRAM(s) Oral every 6 hours  ampicillin/sulbactam  IVPB 3 Gram(s) IV Intermittent every 6 hours  chlorhexidine 0.12% Liquid 15 milliLiter(s) Swish and Spit two times a day  dextrose 5% + sodium chloride 0.45%. 1000 milliLiter(s) (50 mL/Hr) IV Continuous <Continuous>  enoxaparin Injectable 40 milliGRAM(s) SubCutaneous every 24 hours  gabapentin 600 milliGRAM(s) Oral daily  pantoprazole    Tablet 40 milliGRAM(s) Oral before breakfast  vancomycin  IVPB 1000 milliGRAM(s) IV Intermittent every 24 hours    MEDICATIONS  (PRN):  ondansetron Injectable 4 milliGRAM(s) IV Push every 8 hours PRN Nausea and/or Vomiting  oxyCODONE    IR 5 milliGRAM(s) Oral every 4 hours PRN Severe Pain (7 - 10)  oxyCODONE    IR 2.5 milliGRAM(s) Oral every 4 hours PRN Moderate Pain (4 - 6)  polyethylene glycol 3350 17 Gram(s) Oral daily PRN Constipation  senna 2 Tablet(s) Oral at bedtime PRN Constipation      Labs:                          9.4    3.85  )-----------( 252      ( 03 Aug 2024 06:18 )             29.7       08-03    135  |  96<L>  |  13  ----------------------------<  111<H>  4.7   |  30  |  1.02    Ca    8.9      03 Aug 2024 06:18  Phos  2.8     08-03  Mg     1.60     08-03

## 2024-08-03 NOTE — PHYSICAL THERAPY INITIAL EVALUATION ADULT - RANGE OF MOTION EXAMINATION, REHAB EVAL
limitations in right shoulder flexion and left hip flexion due to surgical incisions/Left UE ROM was WNL (within normal limits)/Right LE ROM was WNL (within normal limits)/Right UE ROM was WFL (within functional limits)/Left LE ROM was WFL (within functional limits)

## 2024-08-03 NOTE — PHYSICAL THERAPY INITIAL EVALUATION ADULT - NSPTDISCHREC_GEN_A_CORE
home with outpatient services to address balance deficits once medically cleared by MD/Outpatient PT

## 2024-08-03 NOTE — PHYSICAL THERAPY INITIAL EVALUATION ADULT - ADDITIONAL COMMENTS
Patient lives in an apartment with 15 steps to enter with her son. Reports no falls, denies the use of any assistive devices.     Patient left in bed, NAD, all lines and tubes intact, bed alarm on, call bell within reach, head of bed elevated >30 degrees, SHASHANK Song aware of PT

## 2024-08-03 NOTE — PROGRESS NOTE ADULT - ASSESSMENT
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 Now s/p debridement, RYNE and left ALT flap 7/30 with Joseph Rowe and Cristina.    - flap management per ERAS protocol  - Care per OMFS/PRS

## 2024-08-03 NOTE — PHYSICAL THERAPY INITIAL EVALUATION ADULT - PERTINENT HX OF CURRENT PROBLEM, REHAB EVAL
Patient is a 59 year old Female hx of SCC s/p right mandibulectomy, right sinus node dysfunction, chemotherapy and osetonecrosis NOW POD#4 s/p removal of hardware and left anterolateral thigh flap on 7/30/24.

## 2024-08-03 NOTE — CHART NOTE - NSCHARTNOTEFT_GEN_A_CORE
Patient will be downgraded from SICU to floor bed, 8S 866B. No acute events in the past 24 hours. Plan of care discussed with OMFS resident on call.    SICU Resident  [id number]

## 2024-08-03 NOTE — PROGRESS NOTE ADULT - SUBJECTIVE AND OBJECTIVE BOX
Plastic Surgery    SUBJECTIVE: Pt seen and examined on rounds with team. NAEON.      VITALS  T(C): 36.2 (08-03-24 @ 05:00), Max: 36.4 (08-02-24 @ 12:00)  HR: 74 (08-03-24 @ 05:00) (74 - 85)  BP: 131/69 (08-03-24 @ 05:00) (114/71 - 141/72)  RR: 17 (08-03-24 @ 05:00) (12 - 18)  SpO2: 97% (08-03-24 @ 05:00) (95% - 99%)  CAPILLARY BLOOD GLUCOSE      POCT Blood Glucose.: 117 mg/dL (03 Aug 2024 06:21)  POCT Blood Glucose.: 156 mg/dL (03 Aug 2024 00:04)  POCT Blood Glucose.: 117 mg/dL (02 Aug 2024 17:56)      Is/Os    08-02 @ 07:01  -  08-03 @ 07:00  --------------------------------------------------------  IN:    dextrose 5% + sodium chloride 0.45%: 1150 mL    IV PiggyBack: 100 mL    Oral Fluid: 290 mL  Total IN: 1540 mL    OUT:    Bulb (mL): 2 mL    Bulb (mL): 1 mL    Voided (mL): 1050 mL  Total OUT: 1053 mL    Total NET: 487 mL          PHYSICAL EXAM      -- CONSTITUTIONAL: NAD, lying in bed  -- NEURO: Awake, alert  -- HEENT: NC/AT, mucous membranes moist  -- NECK: Soft, no asymmetry,  ALT flap with strong cook signals, well perfused color, incisions c/d/i  -- PULM: Non-labored respirations, equal chest rise bilaterally  -- ABDOMEN: Nondistended  -- EXTREMITIES: Warm and well perfused, donor site c/d/i  -- PSYCH: Affect normal, A&Ox3      LABS  CBC (08-03 @ 06:18)                              9.4<L>                         3.85    )----------------(  252        --    % Neutrophils, --    % Lymphocytes, ANC: --                                  29.7<L>  CBC (08-02 @ 06:31)                              9.6<L>                         4.43    )----------------(  221        --    % Neutrophils, --    % Lymphocytes, ANC: --                                  30.5<L>    BMP (08-03 @ 06:18)             135     |  96<L>   |  13    		Ca++ --      Ca 8.9                ---------------------------------( 111<H>		Mg 1.60               4.7     |  30      |  1.02  			Ph 2.8     BMP (08-02 @ 06:31)             135     |  99      |  14    		Ca++ --      Ca 8.8                ---------------------------------( 80    		Mg 1.80               4.6     |  28      |  1.22  			Ph 2.8

## 2024-08-03 NOTE — PROGRESS NOTE ADULT - ASSESSMENT
59y Female hx of SCCs/p R mandibulectomy, R SND, chemotherapy and osetonecrosis NOW POD4 s/p RYNE and left ALT flap on 7/30/24. Pt reports pain and denies f/n/v/c. Flap is well perfused with strong doppler signal    - Q4h flap checks with PENRITH doppler   - Will discuss drain removals with Plastics   - Multimodal Pain management  - PT consult pending  - Vancomycin trough prior to 4th dose (scheduled for 8/5/24)  - Advance diet to soft chew, bite size   - Continue TIMMY Warren  Oral and Maxillofacial Surgery  [facility] OMFS Pager #[phone]  [facility] Pager: [phone]  Available on Teams

## 2024-08-03 NOTE — PROGRESS NOTE ADULT - ASSESSMENT
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 Now s/p debridement, RYNE and left ALT flap 7/30 with Joseph Rowe and Cristina.      Plan:  - Q4h flap checks with Cook doppler, monitor flap color  - Monitor JESSEE drain output  - Monitor wound vac output, will take down on POD7  - Skin graft donor site dressing down on POD7, reinforce PRN  - Continue ERAS protocol care  - Pain control  - Cultures with S. aureus   - Appreciate SICU care    Cristopher Carpenter PGY4  Plastic Surgery  [id number]# [facility] pager  [phone] [facility] pager  Available on Teams

## 2024-08-04 LAB
ANION GAP SERPL CALC-SCNC: 8 MMOL/L — SIGNIFICANT CHANGE UP (ref 7–14)
BUN SERPL-MCNC: 12 MG/DL — SIGNIFICANT CHANGE UP (ref 7–23)
CALCIUM SERPL-MCNC: 8.9 MG/DL — SIGNIFICANT CHANGE UP (ref 8.4–10.5)
CHLORIDE SERPL-SCNC: 97 MMOL/L — LOW (ref 98–107)
CO2 SERPL-SCNC: 31 MMOL/L — SIGNIFICANT CHANGE UP (ref 22–31)
CREAT SERPL-MCNC: 1 MG/DL — SIGNIFICANT CHANGE UP (ref 0.5–1.3)
CULTURE RESULTS: ABNORMAL
EGFR: 64 ML/MIN/1.73M2 — SIGNIFICANT CHANGE UP
GLUCOSE BLDC GLUCOMTR-MCNC: 109 MG/DL — HIGH (ref 70–99)
GLUCOSE BLDC GLUCOMTR-MCNC: 93 MG/DL — SIGNIFICANT CHANGE UP (ref 70–99)
GLUCOSE BLDC GLUCOMTR-MCNC: 97 MG/DL — SIGNIFICANT CHANGE UP (ref 70–99)
GLUCOSE SERPL-MCNC: 101 MG/DL — HIGH (ref 70–99)
HCT VFR BLD CALC: 29.2 % — LOW (ref 34.5–45)
HGB BLD-MCNC: 9.5 G/DL — LOW (ref 11.5–15.5)
MAGNESIUM SERPL-MCNC: 1.5 MG/DL — LOW (ref 1.6–2.6)
MCHC RBC-ENTMCNC: 27.3 PG — SIGNIFICANT CHANGE UP (ref 27–34)
MCHC RBC-ENTMCNC: 32.5 GM/DL — SIGNIFICANT CHANGE UP (ref 32–36)
MCV RBC AUTO: 83.9 FL — SIGNIFICANT CHANGE UP (ref 80–100)
NRBC # BLD: 0 /100 WBCS — SIGNIFICANT CHANGE UP (ref 0–0)
NRBC # FLD: 0 K/UL — SIGNIFICANT CHANGE UP (ref 0–0)
ORGANISM # SPEC MICROSCOPIC CNT: ABNORMAL
ORGANISM # SPEC MICROSCOPIC CNT: ABNORMAL
PHOSPHATE SERPL-MCNC: 2.3 MG/DL — LOW (ref 2.5–4.5)
PLATELET # BLD AUTO: 252 K/UL — SIGNIFICANT CHANGE UP (ref 150–400)
POTASSIUM SERPL-MCNC: 4.1 MMOL/L — SIGNIFICANT CHANGE UP (ref 3.5–5.3)
POTASSIUM SERPL-SCNC: 4.1 MMOL/L — SIGNIFICANT CHANGE UP (ref 3.5–5.3)
RBC # BLD: 3.48 M/UL — LOW (ref 3.8–5.2)
RBC # FLD: 14.5 % — SIGNIFICANT CHANGE UP (ref 10.3–14.5)
SODIUM SERPL-SCNC: 136 MMOL/L — SIGNIFICANT CHANGE UP (ref 135–145)
SPECIMEN SOURCE: SIGNIFICANT CHANGE UP
WBC # BLD: 5.22 K/UL — SIGNIFICANT CHANGE UP (ref 3.8–10.5)
WBC # FLD AUTO: 5.22 K/UL — SIGNIFICANT CHANGE UP (ref 3.8–10.5)

## 2024-08-04 RX ORDER — SODIUM PHOSPHATE, MONOBASIC, MONOHYDRATE 276; 142 MG/ML; MG/ML
30 INJECTION, SOLUTION INTRAVENOUS ONCE
Refills: 0 | Status: COMPLETED | OUTPATIENT
Start: 2024-08-04 | End: 2024-08-04

## 2024-08-04 RX ORDER — MAGNESIUM SULFATE 500 MG/ML
2 VIAL (ML) INJECTION ONCE
Refills: 0 | Status: COMPLETED | OUTPATIENT
Start: 2024-08-04 | End: 2024-08-04

## 2024-08-04 RX ADMIN — CHLORHEXIDINE GLUCONATE 15 MILLILITER(S): 500 CLOTH TOPICAL at 17:15

## 2024-08-04 RX ADMIN — SODIUM PHOSPHATE, MONOBASIC, MONOHYDRATE 85 MILLIMOLE(S): 276; 142 INJECTION, SOLUTION INTRAVENOUS at 22:28

## 2024-08-04 RX ADMIN — Medication 200 GRAM(S): at 17:15

## 2024-08-04 RX ADMIN — OXYCODONE HYDROCHLORIDE 5 MILLIGRAM(S): 30 TABLET ORAL at 05:55

## 2024-08-04 RX ADMIN — Medication 25 GRAM(S): at 22:02

## 2024-08-04 RX ADMIN — PANTOPRAZOLE SODIUM 40 MILLIGRAM(S): 20 TABLET, DELAYED RELEASE ORAL at 06:15

## 2024-08-04 RX ADMIN — Medication 975 MILLIGRAM(S): at 12:00

## 2024-08-04 RX ADMIN — Medication 200 GRAM(S): at 00:02

## 2024-08-04 RX ADMIN — Medication 200 GRAM(S): at 11:20

## 2024-08-04 RX ADMIN — OXYCODONE HYDROCHLORIDE 5 MILLIGRAM(S): 30 TABLET ORAL at 00:08

## 2024-08-04 RX ADMIN — OXYCODONE HYDROCHLORIDE 5 MILLIGRAM(S): 30 TABLET ORAL at 06:25

## 2024-08-04 RX ADMIN — VANCOMYCIN HYDROCHLORIDE 250 MILLIGRAM(S): 5 INJECTION, POWDER, LYOPHILIZED, FOR SOLUTION INTRAVENOUS at 17:14

## 2024-08-04 RX ADMIN — Medication 975 MILLIGRAM(S): at 06:09

## 2024-08-04 RX ADMIN — OXYCODONE HYDROCHLORIDE 5 MILLIGRAM(S): 30 TABLET ORAL at 00:38

## 2024-08-04 RX ADMIN — GABAPENTIN 600 MILLIGRAM(S): 400 CAPSULE ORAL at 11:15

## 2024-08-04 RX ADMIN — Medication 975 MILLIGRAM(S): at 18:00

## 2024-08-04 RX ADMIN — Medication 975 MILLIGRAM(S): at 11:15

## 2024-08-04 RX ADMIN — Medication 975 MILLIGRAM(S): at 17:16

## 2024-08-04 RX ADMIN — CHLORHEXIDINE GLUCONATE 15 MILLILITER(S): 500 CLOTH TOPICAL at 05:40

## 2024-08-04 RX ADMIN — DEXTROSE MONOHYDRATE, SODIUM CHLORIDE, SODIUM LACTATE, CALCIUM CHLORIDE, MAGNESIUM CHLORIDE 50 MILLILITER(S): 1.5; 538; 448; 18.4; 5.08 SOLUTION INTRAPERITONEAL at 11:21

## 2024-08-04 RX ADMIN — OXYCODONE HYDROCHLORIDE 5 MILLIGRAM(S): 30 TABLET ORAL at 21:39

## 2024-08-04 RX ADMIN — OXYCODONE HYDROCHLORIDE 5 MILLIGRAM(S): 30 TABLET ORAL at 21:09

## 2024-08-04 RX ADMIN — Medication 200 GRAM(S): at 05:40

## 2024-08-04 RX ADMIN — Medication 975 MILLIGRAM(S): at 05:40

## 2024-08-04 RX ADMIN — ENOXAPARIN SODIUM 40 MILLIGRAM(S): 120 INJECTION SUBCUTANEOUS at 17:15

## 2024-08-04 NOTE — PROGRESS NOTE ADULT - SUBJECTIVE AND OBJECTIVE BOX
Plastic Surgery Progress Note (pg [facility]: [id number], [facility]: [phone])    SUBJECTIVE  The patient was seen and examined. No acute events overnight. Pain controlled, afebrile w/ stable vitals.     OBJECTIVE  ___________________________________________________  VITAL SIGNS / I&O's   Vital Signs Last 24 Hrs  T(C): 36.5 (04 Aug 2024 06:08), Max: 36.6 (03 Aug 2024 13:43)  T(F): 97.7 (04 Aug 2024 06:08), Max: 97.8 (03 Aug 2024 13:43)  HR: 80 (04 Aug 2024 06:08) (74 - 81)  BP: 140/74 (04 Aug 2024 06:08) (112/70 - 140/74)  BP(mean): --  RR: 17 (04 Aug 2024 06:08) (16 - 18)  SpO2: 99% (04 Aug 2024 06:08) (97% - 99%)    Parameters below as of 04 Aug 2024 06:08  Patient On (Oxygen Delivery Method): room air          03 Aug 2024 07:01  -  04 Aug 2024 07:00  --------------------------------------------------------  IN:    dextrose 5% + sodium chloride 0.45%: 1050 mL    IV PiggyBack: 300 mL    Oral Fluid: 1190 mL  Total IN: 2540 mL    OUT:    Bulb (mL): 9.5 mL    Bulb (mL): 25 mL    Voided (mL): 2350 mL  Total OUT: 2384.5 mL    Total NET: 155.5 mL        ___________________________________________________  PHYSICAL EXAM      -- CONSTITUTIONAL: NAD, lying in bed  -- NEURO: Awake, alert  -- HEENT: NC/AT, mucous membranes moist  -- NECK: Soft, no asymmetry,  ALT flap with strong cook signals, well perfused color, incisions c/d/i  -- PULM: Non-labored respirations, equal chest rise bilaterally  -- ABDOMEN: Nondistended  -- EXTREMITIES: Warm and well perfused, donor site c/d/i  -- PSYCH: Affect normal, A&Ox3      ___________________________________________________  LABS                        9.4    3.85  )-----------( 252      ( 03 Aug 2024 06:18 )             29.7     03 Aug 2024 06:18    135    |  96     |  13     ----------------------------<  111    4.7     |  30     |  1.02     Ca    8.9        03 Aug 2024 06:18  Phos  2.8       03 Aug 2024 06:18  Mg     1.60      03 Aug 2024 06:18        CAPILLARY BLOOD GLUCOSE      POCT Blood Glucose.: 109 mg/dL (04 Aug 2024 06:38)  POCT Blood Glucose.: 108 mg/dL (03 Aug 2024 23:26)  POCT Blood Glucose.: 120 mg/dL (03 Aug 2024 19:11)        Urinalysis Basic - ( 03 Aug 2024 06:18 )    Color: x / Appearance: x / SG: x / pH: x  Gluc: 111 mg/dL / Ketone: x  / Bili: x / Urobili: x   Blood: x / Protein: x / Nitrite: x   Leuk Esterase: x / RBC: x / WBC x   Sq Epi: x / Non Sq Epi: x / Bacteria: x      ___________________________________________________  MICRO  Recent Cultures:  Specimen Source: .Surgical Swab 2 RIGHT MANDIBLE TISSUE CULTURE, 07-30 @ 10:50; Results   Few Staphylococcus aureus<!>; Gram Stain: --; Organism: Staphylococcus aureus<!>  Specimen Source: .Tissue 1  RIGHT MANDIBLE BONE CULTURE, 07-30 @ 10:45; Results   Moderate Proteus mirabilis  Moderate Streptococcus dysgalactiae (Group C/G)  Few Corynebacterium striatum group "Susceptibilities not performed"  Moderate Bacteroides thetaiotaomicron group "Susceptibilities not  performed"<!>; Gram Stain:   Few polymorphonuclear leukocytes seen per low power field  Numerous Gram positive cocci in pairs seen per oil power field  Moderate Gram Negative Rods seen per oil power field  Rare Gram Positive Rods seen per oil power field<!>; Organism: Proteus mirabilis<!>    ___________________________________________________  MEDICATIONS  (STANDING):  acetaminophen     Tablet .. 975 milliGRAM(s) Oral every 6 hours  ampicillin/sulbactam  IVPB 3 Gram(s) IV Intermittent every 6 hours  chlorhexidine 0.12% Liquid 15 milliLiter(s) Swish and Spit two times a day  dextrose 5% + sodium chloride 0.45%. 1000 milliLiter(s) (50 mL/Hr) IV Continuous <Continuous>  enoxaparin Injectable 40 milliGRAM(s) SubCutaneous every 24 hours  gabapentin 600 milliGRAM(s) Oral daily  pantoprazole    Tablet 40 milliGRAM(s) Oral before breakfast  vancomycin  IVPB 1000 milliGRAM(s) IV Intermittent every 24 hours    MEDICATIONS  (PRN):  ondansetron Injectable 4 milliGRAM(s) IV Push every 8 hours PRN Nausea and/or Vomiting  oxyCODONE    IR 2.5 milliGRAM(s) Oral every 4 hours PRN Moderate Pain (4 - 6)  oxyCODONE    IR 5 milliGRAM(s) Oral every 4 hours PRN Severe Pain (7 - 10)  polyethylene glycol 3350 17 Gram(s) Oral daily PRN Constipation  senna 2 Tablet(s) Oral at bedtime PRN Constipation

## 2024-08-04 NOTE — PROGRESS NOTE ADULT - SUBJECTIVE AND OBJECTIVE BOX
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 Presents for RYNE and left ALT flap 7/30 (POD5).    Interval: Vomitedx2 yesterday, denies nausea     H&N: goof neck ROM, ALT flap good color, soft edema c/w post-op, strong cook, Hemostatic, JESSEE removed   I/O:  OMER 30, no trismus, no edema, no signs of infection   Leg: Wrapped with serosanguinous output, JESSEE removed    Vital Signs Last 24 Hrs  T(C): 36.5 (04 Aug 2024 06:08), Max: 36.6 (03 Aug 2024 13:43)  T(F): 97.7 (04 Aug 2024 06:08), Max: 97.8 (03 Aug 2024 13:43)  HR: 80 (04 Aug 2024 06:08) (74 - 81)  BP: 140/74 (04 Aug 2024 06:08) (112/70 - 140/74)  BP(mean): --  RR: 17 (04 Aug 2024 06:08) (16 - 18)  SpO2: 99% (04 Aug 2024 06:08) (97% - 99%)    Parameters below as of 04 Aug 2024 06:08  Patient On (Oxygen Delivery Method): room air    I&O's Detail    03 Aug 2024 07:01  -  04 Aug 2024 07:00  --------------------------------------------------------  IN:    dextrose 5% + sodium chloride 0.45%: 1050 mL    IV PiggyBack: 300 mL    Oral Fluid: 1190 mL  Total IN: 2540 mL    OUT:    Bulb (mL): 9.5 mL    Bulb (mL): 25 mL    Voided (mL): 2350 mL  Total OUT: 2384.5 mL    Total NET: 155.5 mL    MEDICATIONS  (STANDING):  acetaminophen     Tablet .. 975 milliGRAM(s) Oral every 6 hours  ampicillin/sulbactam  IVPB 3 Gram(s) IV Intermittent every 6 hours  chlorhexidine 0.12% Liquid 15 milliLiter(s) Swish and Spit two times a day  dextrose 5% + sodium chloride 0.45%. 1000 milliLiter(s) (50 mL/Hr) IV Continuous <Continuous>  enoxaparin Injectable 40 milliGRAM(s) SubCutaneous every 24 hours  gabapentin 600 milliGRAM(s) Oral daily  pantoprazole    Tablet 40 milliGRAM(s) Oral before breakfast  vancomycin  IVPB 1000 milliGRAM(s) IV Intermittent every 24 hours    MEDICATIONS  (PRN):  ondansetron Injectable 4 milliGRAM(s) IV Push every 8 hours PRN Nausea and/or Vomiting  oxyCODONE    IR 2.5 milliGRAM(s) Oral every 4 hours PRN Moderate Pain (4 - 6)  oxyCODONE    IR 5 milliGRAM(s) Oral every 4 hours PRN Severe Pain (7 - 10)  polyethylene glycol 3350 17 Gram(s) Oral daily PRN Constipation  senna 2 Tablet(s) Oral at bedtime PRN Constipation    Labs:                        9.5    5.22  )-----------( 252      ( 04 Aug 2024 05:42 )             29.2   08-04    136  |  97<L>  |  12  ----------------------------<  101<H>  4.1   |  31  |  1.00    Ca    8.9      04 Aug 2024 05:42  Phos  2.3     08-04  Mg     1.50     08-04

## 2024-08-04 NOTE — PROGRESS NOTE ADULT - ASSESSMENT
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 Now s/p debridement, RYNE and left ALT flap 7/30 with Joseph Rowe and Cristina.      Plan:  - Q4h flap checks with Cook doppler, monitor flap color  - Monitor JESSEE drain output  - Monitor wound vac output, will take down on POD7  - Skin graft donor site dressing down on POD7, reinforce PRN  - Continue ERAS protocol care  - Pain control  - Cultures with S. 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 Now s/p debridement, RYNE and left ALT flap 7/30 with Joseph Rowe and Cristina.      Plan:  - Q4h flap checks with Cook doppler, monitor flap color  - Monitor wound vac output, will take down on POD7  - Skin graft donor site dressing down on POD7, reinforce PRN  - Continue ERAS protocol care  - Pain control  - Cultures with S. aureus   - Appreciate SICU care      Plastic Surgery  [phone]# [facility] pager  [phone] [facility] pager

## 2024-08-04 NOTE — PROGRESS NOTE ADULT - ASSESSMENT
59y Female hx of SCCs/p R mandibulectomy, R SND, chemotherapy and osetonecrosis NOW POD5 s/p RYNE and left ALT flap on 7/30/24. Pt vomitedx2, denies f/n/c. Flap is well perfused with strong doppler signal    Plan:  - Q4h flap checks with 1Ring doppler   - Multimodal Pain management  - FU with PT, patient will need a walker   - Vancomycin trough prior to 4th dose (scheduled for 8/5/24)  - Patient will require a PICC line for IV Vancomycin [id number] as per ID.   - Cont. diet to soft chew, bite size   - Continue TIMMY Yates, PGY-1  Oral and Maxillofacial Surgery  J FS Pager #[phone]  [facility] Pager: [phone]  [facility] Pager: [phone]  Available on Teams

## 2024-08-04 NOTE — PROGRESS NOTE ADULT - SUBJECTIVE AND OBJECTIVE BOX
HPI/Interval:  Pt seen and examined at bedside. Awake and alerts this morning, pt in good spirits. Flap with strong cook doppler signals, good color and turgor    Physical Exam:  General: well-developed, NAD  OU: EOMI; PERRL; no drainage or redness  AU: external ears normal  Nose: nares patent  Mouth: No oral lesions; no gross abnormalities  Neck: trachea midline, ALT free flap with good color and turgor, strong cook and venous doppler signals, mild fullness left submandibular area   Respiratory: unlabored respirations  Cardiovascular: regular rate  Gastrointestinal: Soft, nondistended  Extremities: No edema, warm and well perfused  Skin: No lesions; no rash

## 2024-08-05 LAB
ANION GAP SERPL CALC-SCNC: 13 MMOL/L — SIGNIFICANT CHANGE UP (ref 7–14)
BUN SERPL-MCNC: 12 MG/DL — SIGNIFICANT CHANGE UP (ref 7–23)
CALCIUM SERPL-MCNC: 8.9 MG/DL — SIGNIFICANT CHANGE UP (ref 8.4–10.5)
CHLORIDE SERPL-SCNC: 96 MMOL/L — LOW (ref 98–107)
CO2 SERPL-SCNC: 30 MMOL/L — SIGNIFICANT CHANGE UP (ref 22–31)
CREAT SERPL-MCNC: 1.05 MG/DL — SIGNIFICANT CHANGE UP (ref 0.5–1.3)
CULTURE RESULTS: ABNORMAL
EGFR: 61 ML/MIN/1.73M2 — SIGNIFICANT CHANGE UP
GLUCOSE SERPL-MCNC: 96 MG/DL — SIGNIFICANT CHANGE UP (ref 70–99)
HCT VFR BLD CALC: 29.4 % — LOW (ref 34.5–45)
HGB BLD-MCNC: 9.6 G/DL — LOW (ref 11.5–15.5)
MAGNESIUM SERPL-MCNC: 2.2 MG/DL — SIGNIFICANT CHANGE UP (ref 1.6–2.6)
MCHC RBC-ENTMCNC: 27.2 PG — SIGNIFICANT CHANGE UP (ref 27–34)
MCHC RBC-ENTMCNC: 32.7 GM/DL — SIGNIFICANT CHANGE UP (ref 32–36)
MCV RBC AUTO: 83.3 FL — SIGNIFICANT CHANGE UP (ref 80–100)
NRBC # BLD: 0 /100 WBCS — SIGNIFICANT CHANGE UP (ref 0–0)
NRBC # FLD: 0 K/UL — SIGNIFICANT CHANGE UP (ref 0–0)
PHOSPHATE SERPL-MCNC: 5.2 MG/DL — HIGH (ref 2.5–4.5)
PLATELET # BLD AUTO: 286 K/UL — SIGNIFICANT CHANGE UP (ref 150–400)
POTASSIUM SERPL-MCNC: 4.2 MMOL/L — SIGNIFICANT CHANGE UP (ref 3.5–5.3)
POTASSIUM SERPL-SCNC: 4.2 MMOL/L — SIGNIFICANT CHANGE UP (ref 3.5–5.3)
RBC # BLD: 3.53 M/UL — LOW (ref 3.8–5.2)
RBC # FLD: 14.7 % — HIGH (ref 10.3–14.5)
SODIUM SERPL-SCNC: 139 MMOL/L — SIGNIFICANT CHANGE UP (ref 135–145)
VANCOMYCIN TROUGH SERPL-MCNC: 10.4 UG/ML — SIGNIFICANT CHANGE UP (ref 10–20)
WBC # BLD: 4.83 K/UL — SIGNIFICANT CHANGE UP (ref 3.8–10.5)
WBC # FLD AUTO: 4.83 K/UL — SIGNIFICANT CHANGE UP (ref 3.8–10.5)

## 2024-08-05 PROCEDURE — 99232 SBSQ HOSP IP/OBS MODERATE 35: CPT

## 2024-08-05 RX ADMIN — Medication 975 MILLIGRAM(S): at 17:01

## 2024-08-05 RX ADMIN — PANTOPRAZOLE SODIUM 40 MILLIGRAM(S): 20 TABLET, DELAYED RELEASE ORAL at 05:14

## 2024-08-05 RX ADMIN — OXYCODONE HYDROCHLORIDE 5 MILLIGRAM(S): 30 TABLET ORAL at 04:52

## 2024-08-05 RX ADMIN — GABAPENTIN 600 MILLIGRAM(S): 400 CAPSULE ORAL at 11:07

## 2024-08-05 RX ADMIN — Medication 200 GRAM(S): at 17:06

## 2024-08-05 RX ADMIN — VANCOMYCIN HYDROCHLORIDE 250 MILLIGRAM(S): 5 INJECTION, POWDER, LYOPHILIZED, FOR SOLUTION INTRAVENOUS at 17:06

## 2024-08-05 RX ADMIN — CHLORHEXIDINE GLUCONATE 15 MILLILITER(S): 500 CLOTH TOPICAL at 17:01

## 2024-08-05 RX ADMIN — Medication 200 GRAM(S): at 00:43

## 2024-08-05 RX ADMIN — Medication 200 GRAM(S): at 23:21

## 2024-08-05 RX ADMIN — Medication 975 MILLIGRAM(S): at 23:21

## 2024-08-05 RX ADMIN — Medication 200 GRAM(S): at 05:17

## 2024-08-05 RX ADMIN — Medication 975 MILLIGRAM(S): at 17:43

## 2024-08-05 RX ADMIN — Medication 975 MILLIGRAM(S): at 09:14

## 2024-08-05 RX ADMIN — ENOXAPARIN SODIUM 40 MILLIGRAM(S): 120 INJECTION SUBCUTANEOUS at 17:01

## 2024-08-05 RX ADMIN — OXYCODONE HYDROCHLORIDE 5 MILLIGRAM(S): 30 TABLET ORAL at 12:15

## 2024-08-05 RX ADMIN — OXYCODONE HYDROCHLORIDE 5 MILLIGRAM(S): 30 TABLET ORAL at 05:22

## 2024-08-05 RX ADMIN — CHLORHEXIDINE GLUCONATE 15 MILLILITER(S): 500 CLOTH TOPICAL at 05:15

## 2024-08-05 RX ADMIN — DEXTROSE MONOHYDRATE, SODIUM CHLORIDE, SODIUM LACTATE, CALCIUM CHLORIDE, MAGNESIUM CHLORIDE 50 MILLILITER(S): 1.5; 538; 448; 18.4; 5.08 SOLUTION INTRAPERITONEAL at 11:03

## 2024-08-05 RX ADMIN — Medication 975 MILLIGRAM(S): at 10:00

## 2024-08-05 RX ADMIN — Medication 200 GRAM(S): at 11:04

## 2024-08-05 RX ADMIN — OXYCODONE HYDROCHLORIDE 5 MILLIGRAM(S): 30 TABLET ORAL at 21:00

## 2024-08-05 RX ADMIN — OXYCODONE HYDROCHLORIDE 5 MILLIGRAM(S): 30 TABLET ORAL at 12:45

## 2024-08-05 RX ADMIN — OXYCODONE HYDROCHLORIDE 5 MILLIGRAM(S): 30 TABLET ORAL at 20:30

## 2024-08-05 NOTE — PROGRESS NOTE ADULT - SUBJECTIVE AND OBJECTIVE BOX
HPI/Interval:  Pt seen and examined at bedside. Awake and alerts this morning, pt in good spirits. Flap with strong cook doppler signals, good color and turgor    Physical Exam:  General: well-developed, NAD  OU: EOMI; PERRL; no drainage or redness  AU: external ears normal  Nose: nares patent  Mouth: No oral lesions; no gross abnormalitie  Neck: trachea midline, ALT free flap with good color and turgor, strong cook and venous doppler signals, mild fullness left submandibular area   Respiratory: unlabored respirations  Cardiovascular: regular rate  Gastrointestinal: Soft, nondistended  Extremities: No edema, warm and well perfused  Skin: No lesions; no rash

## 2024-08-05 NOTE — PROGRESS NOTE ADULT - SUBJECTIVE AND OBJECTIVE BOX
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 Presents for RYNE and left ALT flap 7/30 (POD6).    Interval: NAEON, NAD    H&N: good neck ROM, ALT flap good color, soft edema c/w post-op, strong cook, Hemostatic  I/O:  OMER 30, no trismus, no edema, no signs of infection   Leg: Wrapped with serosanguinous output, JESSEE removed    Vital Signs Last 24 Hrs  T(C): 36.3 (05 Aug 2024 05:00), Max: 36.5 (04 Aug 2024 13:32)  T(F): 97.4 (05 Aug 2024 05:00), Max: 97.7 (04 Aug 2024 13:32)  HR: 71 (05 Aug 2024 05:00) (70 - 81)  BP: 139/78 (05 Aug 2024 05:00) (115/72 - 139/78)  BP(mean): --  RR: 17 (05 Aug 2024 05:00) (16 - 18)  SpO2: 98% (05 Aug 2024 05:00) (95% - 98%)    Parameters below as of 05 Aug 2024 05:00  Patient On (Oxygen Delivery Method): room air    I&O's Detail    04 Aug 2024 07:01  -  05 Aug 2024 07:00  --------------------------------------------------------  IN:    dextrose 5% + sodium chloride 0.45%: 1100 mL    IV PiggyBack: 1200 mL    Oral Fluid: 1040 mL  Total IN: 3340 mL    OUT:    Voided (mL): 600 mL  Total OUT: 600 mL    Total NET: 2740 mL    MEDICATIONS  (STANDING):  acetaminophen     Tablet .. 975 milliGRAM(s) Oral every 6 hours  ampicillin/sulbactam  IVPB 3 Gram(s) IV Intermittent every 6 hours  chlorhexidine 0.12% Liquid 15 milliLiter(s) Swish and Spit two times a day  dextrose 5% + sodium chloride 0.45%. 1000 milliLiter(s) (50 mL/Hr) IV Continuous <Continuous>  enoxaparin Injectable 40 milliGRAM(s) SubCutaneous every 24 hours  gabapentin 600 milliGRAM(s) Oral daily  pantoprazole    Tablet 40 milliGRAM(s) Oral before breakfast  vancomycin  IVPB 1000 milliGRAM(s) IV Intermittent every 24 hours    MEDICATIONS  (PRN):  ondansetron Injectable 4 milliGRAM(s) IV Push every 8 hours PRN Nausea and/or Vomiting  oxyCODONE    IR 2.5 milliGRAM(s) Oral every 4 hours PRN Moderate Pain (4 - 6)  oxyCODONE    IR 5 milliGRAM(s) Oral every 4 hours PRN Severe Pain (7 - 10)  polyethylene glycol 3350 17 Gram(s) Oral daily PRN Constipation  senna 2 Tablet(s) Oral at bedtime PRN Constipation      Labs:                          9.5    5.22  )-----------( 252      ( 04 Aug 2024 05:42 )             29.2       08-04    136  |  97<L>  |  12  ----------------------------<  101<H>  4.1   |  31  |  1.00    Ca    8.9      04 Aug 2024 05:42  Phos  2.3     08-04  Mg     1.50     08-04

## 2024-08-05 NOTE — PROGRESS NOTE ADULT - SUBJECTIVE AND OBJECTIVE BOX
Follow Up:    Hardware infection    Interval History/ROS:  Transferred from SICU to floor. VSS ON. Patient was seen and examined at bedside. +PO. Denies fever and surgical site pain. No diarrhea.    Allergies  morphine (Hives)        ANTIMICROBIALS:  ampicillin/sulbactam  IVPB 3 every 6 hours  vancomycin  IVPB 1000 every 24 hours      OTHER MEDS:  MEDICATIONS  (STANDING):  acetaminophen     Tablet .. 975 every 6 hours  enoxaparin Injectable 40 every 24 hours  gabapentin 600 daily  ondansetron Injectable 4 every 8 hours PRN  oxyCODONE    IR 2.5 every 4 hours PRN  oxyCODONE    IR 5 every 4 hours PRN  pantoprazole    Tablet 40 before breakfast  polyethylene glycol 3350 17 daily PRN  senna 2 at bedtime PRN      Vital Signs Last 24 Hrs  T(C): 36.4 (05 Aug 2024 13:40), Max: 36.9 (05 Aug 2024 10:29)  T(F): 97.6 (05 Aug 2024 13:40), Max: 98.4 (05 Aug 2024 10:29)  HR: 70 (05 Aug 2024 13:40) (70 - 79)  BP: 128/75 (05 Aug 2024 13:40) (115/72 - 141/67)  BP(mean): --  RR: 18 (05 Aug 2024 13:40) (16 - 18)  SpO2: 97% (05 Aug 2024 13:40) (95% - 98%)    Parameters below as of 05 Aug 2024 13:40  Patient On (Oxygen Delivery Method): room air        PHYSICAL EXAM:  Constitutional: non-toxic, no distress  ENT:  Moist oral mucosa; chin s/p skin grafting, stitch sites nontender, no purulence  Cardiovascular:   normal S1, S2, no murmur, RRR  Respiratory:  clear BS bilaterally, no wheezes, no rales  GI:  soft  Skin:  upper L thigh w/ graft incision, nonerythematous surrounding, no purulence  Psychiatric:  awake, alert, appropriate mood                                9.6    4.83  )-----------( 286      ( 05 Aug 2024 05:40 )             29.4       08-05    139  |  96<L>  |  12  ----------------------------<  96  4.2   |  30  |  1.05    Ca    8.9      05 Aug 2024 05:40  Phos  5.2     08-05  Mg     2.20     08-05        Urinalysis Basic - ( 05 Aug 2024 05:40 )    Color: x / Appearance: x / SG: x / pH: x  Gluc: 96 mg/dL / Ketone: x  / Bili: x / Urobili: x   Blood: x / Protein: x / Nitrite: x   Leuk Esterase: x / RBC: x / WBC x   Sq Epi: x / Non Sq Epi: x / Bacteria: x        MICROBIOLOGY:  v  .Surgical Swab 2 RIGHT MANDIBLE TISSUE CULTURE  07-30-24   Few Staphylococcus aureus  Few Finegoldia magna "Susceptibilities not performed"  --  Staphylococcus aureus      .Tissue 1  RIGHT MANDIBLE BONE CULTURE  07-30-24   Moderate Proteus mirabilis  Moderate Streptococcus dysgalactiae (Group C/G)  Few Corynebacterium striatum group "Susceptibilities not performed"  Moderate Bacteroides thetaiotaomicron group "Susceptibilities not  performed"  --  Proteus mirabilis                RADIOLOGY:  No radiography done this admission

## 2024-08-05 NOTE — PROGRESS NOTE ADULT - ASSESSMENT
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 Per report, patient is s/p RYNE. Surgical cultures grew Staph aureus, Proteus mirabilis, Strep dysgalactiae, Corynebacterium, and Bacteroides.    #OM  #SCC  #Hardware infection  #Polymicrobial infection    Recommendations:  - Continue with Unasyn 3g IV Q6H and vancomycin 1g IV Q24H while inpatient  - Communicated with micro lab to run susceptibility of Corynebacterium. Likely will take many days to come back.  - Upon discharge, please provide Augmentin 875/125 mg PO Q12H x6 weeks and linezolid 600 mg PO Q12H x2 weeks  - Please call [phone] to set up a follow-up appointment to see me at ID clinic ([address] [place] [address]) within 4 weeks of discharge.    Plan discussed with OMFS provider.  Thank you for this consult. Inpatient ID team will follow.    Ady Frye MD, PhD  Attending Physician  Division of Infectious Diseases  Department of Medicine    Please contact through MS Teams message.  Office: [phone] (after 5 PM or weekend) .

## 2024-08-05 NOTE — PROGRESS NOTE ADULT - ASSESSMENT
59y Female hx of SCCs/p R mandibulectomy, R SND, chemotherapy and osetonecrosis NOW POD5 s/p RYNE and left ALT flap on 7/30/24. Denies f/n/c. Flap is well perfused with strong doppler signal. Pt progressing well with no acute concerns in the post-operative period and healing WNL.    Plan:  - Multimodal Pain management  - Patient will require a PICC line for IV Vancomycin [id number] as per ID, consult request submitted for PICC line insertion  - Cont. diet to soft chew, bite size   - Continue TIMMY Valdez  Oral and Maxillofacial Surgery  J OMFS Pager #[phone]  [facility] Pager: [phone]  [facility] Pager: [phone]  Available on Teams 59y Female hx of SCCs/p R mandibulectomy, R SND, chemotherapy and osetonecrosis NOW POD5 s/p RYNE and left ALT flap on 7/30/24. Denies f/n/c. Flap is well perfused with strong doppler signal. Pt progressing well with no acute concerns in the post-operative period and healing WNL.    Plan:  - Multimodal Pain management  - Unasyn/vanc while here, and discharge on Augmentin 875/125 mg PO Q12H x6w + linezolid 600 mg PO Q12H  x2w Per ID recommendations  - Pt need to F/U ID clinic in 2-4 weeks - [phone]  - Cont. diet to soft chew, bite size   - Continue TIMMY Valdez  Oral and Maxillofacial Surgery  J FS Pager #[phone]  [facility] Pager: [phone]  [facility] Pager: [phone]  Available on Teams

## 2024-08-05 NOTE — PROGRESS NOTE ADULT - SUBJECTIVE AND OBJECTIVE BOX
Plastic Surgery Progress Note (pg [facility]: [id number], [facility]: [phone])    SUBJECTIVE  The patient was seen and examined. No acute events overnight. Pain controlled, afebrile w/ stable vitals.     OBJECTIVE  ___________________________________________________  VITAL SIGNS / I&O's   Vital Signs Last 24 Hrs  T(C): 36.3 (05 Aug 2024 05:00), Max: 36.5 (04 Aug 2024 13:32)  T(F): 97.4 (05 Aug 2024 05:00), Max: 97.7 (04 Aug 2024 13:32)  HR: 71 (05 Aug 2024 05:00) (70 - 81)  BP: 139/78 (05 Aug 2024 05:00) (115/72 - 139/78)  BP(mean): --  RR: 17 (05 Aug 2024 05:00) (16 - 18)  SpO2: 98% (05 Aug 2024 05:00) (95% - 98%)    Parameters below as of 05 Aug 2024 05:00  Patient On (Oxygen Delivery Method): room air          03 Aug 2024 07:01  -  04 Aug 2024 07:00  --------------------------------------------------------  IN:    dextrose 5% + sodium chloride 0.45%: 1050 mL    IV PiggyBack: 300 mL    Oral Fluid: 1190 mL  Total IN: 2540 mL    OUT:    Bulb (mL): 9.5 mL    Bulb (mL): 25 mL    Voided (mL): 2350 mL  Total OUT: 2384.5 mL    Total NET: 155.5 mL      04 Aug 2024 07:01  -  05 Aug 2024 06:45  --------------------------------------------------------  IN:    dextrose 5% + sodium chloride 0.45%: 1100 mL    IV PiggyBack: 1200 mL    Oral Fluid: 1040 mL  Total IN: 3340 mL    OUT:    Voided (mL): 600 mL  Total OUT: 600 mL    Total NET: 2740 mL        ___________________________________________________  PHYSICAL EXAM    -- CONSTITUTIONAL: NAD, lying in bed  -- NEURO: Awake, alert  -- HEENT: NC/AT, mucous membranes moist  -- NECK: Soft, no asymmetry,  ALT flap with strong cook signals, well perfused color, incisions c/d/i  -- PULM: Non-labored respirations, equal chest rise bilaterally  -- ABDOMEN: Nondistended  -- EXTREMITIES: Warm and well perfused, donor site c/d/i  -- PSYCH: Affect normal, A&Ox3    ___________________________________________________  LABS                        9.5    5.22  )-----------( 252      ( 04 Aug 2024 05:42 )             29.2     04 Aug 2024 05:42    136    |  97     |  12     ----------------------------<  101    4.1     |  31     |  1.00     Ca    8.9        04 Aug 2024 05:42  Phos  2.3       04 Aug 2024 05:42  Mg     1.50      04 Aug 2024 05:42        CAPILLARY BLOOD GLUCOSE      POCT Blood Glucose.: 97 mg/dL (04 Aug 2024 17:12)  POCT Blood Glucose.: 93 mg/dL (04 Aug 2024 12:28)        Urinalysis Basic - ( 04 Aug 2024 05:42 )    Color: x / Appearance: x / SG: x / pH: x  Gluc: 101 mg/dL / Ketone: x  / Bili: x / Urobili: x   Blood: x / Protein: x / Nitrite: x   Leuk Esterase: x / RBC: x / WBC x   Sq Epi: x / Non Sq Epi: x / Bacteria: x      ___________________________________________________  MICRO  Recent Cultures:  Specimen Source: .Surgical Swab 2 RIGHT MANDIBLE TISSUE CULTURE, 07-30 @ 10:50; Results   Few Staphylococcus aureus  Few Finegoldia magna "Susceptibilities not performed"<!>; Gram Stain: --; Organism: Staphylococcus aureus<!>  Specimen Source: .Tissue 1  RIGHT MANDIBLE BONE CULTURE, 07-30 @ 10:45; Results   Moderate Proteus mirabilis  Moderate Streptococcus dysgalactiae (Group C/G)  Few Corynebacterium striatum group "Susceptibilities not performed"  Moderate Bacteroides thetaiotaomicron group "Susceptibilities not  performed"<!>; Gram Stain:   Few polymorphonuclear leukocytes seen per low power field  Numerous Gram positive cocci in pairs seen per oil power field  Moderate Gram Negative Rods seen per oil power field  Rare Gram Positive Rods seen per oil power field<!>; Organism: Proteus mirabilis<!>    ___________________________________________________  MEDICATIONS  (STANDING):  acetaminophen     Tablet .. 975 milliGRAM(s) Oral every 6 hours  ampicillin/sulbactam  IVPB 3 Gram(s) IV Intermittent every 6 hours  chlorhexidine 0.12% Liquid 15 milliLiter(s) Swish and Spit two times a day  dextrose 5% + sodium chloride 0.45%. 1000 milliLiter(s) (50 mL/Hr) IV Continuous <Continuous>  enoxaparin Injectable 40 milliGRAM(s) SubCutaneous every 24 hours  gabapentin 600 milliGRAM(s) Oral daily  pantoprazole    Tablet 40 milliGRAM(s) Oral before breakfast  vancomycin  IVPB 1000 milliGRAM(s) IV Intermittent every 24 hours    MEDICATIONS  (PRN):  ondansetron Injectable 4 milliGRAM(s) IV Push every 8 hours PRN Nausea and/or Vomiting  oxyCODONE    IR 2.5 milliGRAM(s) Oral every 4 hours PRN Moderate Pain (4 - 6)  oxyCODONE    IR 5 milliGRAM(s) Oral every 4 hours PRN Severe Pain (7 - 10)  polyethylene glycol 3350 17 Gram(s) Oral daily PRN Constipation  senna 2 Tablet(s) Oral at bedtime PRN Constipation

## 2024-08-05 NOTE — PROGRESS NOTE ADULT - ASSESSMENT
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 Now s/p debridement, RYNE and left ALT flap 7/30 with Joseph Rowe and Cristina.      Plan:  - Q4h flap checks with Cook doppler, monitor flap color  - Monitor wound vac output, will take down on POD7  - Skin graft donor site dressing down on POD7, reinforce PRN  - Continue ERAS protocol care  - Pain control  - Cultures with few S. aureus   - Appreciate SICU care      Plastic Surgery  [phone]# [facility] pager  [phone] [facility] pager

## 2024-08-06 ENCOUNTER — TRANSCRIPTION ENCOUNTER (OUTPATIENT)
Age: 59
End: 2024-08-06

## 2024-08-06 VITALS
HEART RATE: 75 BPM | DIASTOLIC BLOOD PRESSURE: 78 MMHG | SYSTOLIC BLOOD PRESSURE: 133 MMHG | OXYGEN SATURATION: 93 % | TEMPERATURE: 98 F | RESPIRATION RATE: 17 BRPM

## 2024-08-06 PROBLEM — J30.2 OTHER SEASONAL ALLERGIC RHINITIS: Chronic | Status: ACTIVE | Noted: 2024-07-24

## 2024-08-06 PROBLEM — Z92.21 PERSONAL HISTORY OF ANTINEOPLASTIC CHEMOTHERAPY: Chronic | Status: ACTIVE | Noted: 2024-07-24

## 2024-08-06 PROBLEM — Z92.3 PERSONAL HISTORY OF IRRADIATION: Chronic | Status: ACTIVE | Noted: 2024-07-24

## 2024-08-06 LAB
-  CLINDAMYCIN: SIGNIFICANT CHANGE UP
-  ERYTHROMYCIN: SIGNIFICANT CHANGE UP
-  GENTAMICIN: SIGNIFICANT CHANGE UP
-  OXACILLIN: SIGNIFICANT CHANGE UP
-  PENICILLIN: SIGNIFICANT CHANGE UP
-  RIFAMPIN: SIGNIFICANT CHANGE UP
-  TETRACYCLINE: SIGNIFICANT CHANGE UP
-  TRIMETHOPRIM/SULFAMETHOXAZOLE: SIGNIFICANT CHANGE UP
-  VANCOMYCIN: SIGNIFICANT CHANGE UP
CULTURE RESULTS: ABNORMAL
METHOD TYPE: SIGNIFICANT CHANGE UP
ORGANISM # SPEC MICROSCOPIC CNT: ABNORMAL
ORGANISM # SPEC MICROSCOPIC CNT: ABNORMAL

## 2024-08-06 PROCEDURE — 99232 SBSQ HOSP IP/OBS MODERATE 35: CPT

## 2024-08-06 RX ORDER — ACETAMINOPHEN 500 MG
2 TABLET ORAL
Qty: 56 | Refills: 0
Start: 2024-08-06 | End: 2024-08-12

## 2024-08-06 RX ORDER — OXYCODONE HYDROCHLORIDE 30 MG/1
2.5 TABLET ORAL EVERY 4 HOURS
Refills: 0 | Status: DISCONTINUED | OUTPATIENT
Start: 2024-08-06 | End: 2024-08-06

## 2024-08-06 RX ORDER — OXYCODONE HYDROCHLORIDE 30 MG/1
5 TABLET ORAL EVERY 4 HOURS
Refills: 0 | Status: DISCONTINUED | OUTPATIENT
Start: 2024-08-06 | End: 2024-08-06

## 2024-08-06 RX ORDER — OXYCODONE HYDROCHLORIDE 30 MG/1
1 TABLET ORAL
Qty: 20 | Refills: 0
Start: 2024-08-06 | End: 2024-08-10

## 2024-08-06 RX ORDER — LINEZOLID 600 MG/300ML
1 INJECTION, SOLUTION INTRAVENOUS
Qty: 28 | Refills: 0
Start: 2024-08-06 | End: 2024-08-19

## 2024-08-06 RX ORDER — ENOXAPARIN SODIUM 120 MG/.8ML
40 INJECTION SUBCUTANEOUS EVERY 24 HOURS
Refills: 0 | Status: DISCONTINUED | OUTPATIENT
Start: 2024-08-06 | End: 2024-08-06

## 2024-08-06 RX ORDER — IBUPROFEN 200 MG
1 TABLET ORAL
Qty: 28 | Refills: 0
Start: 2024-08-06 | End: 2024-08-12

## 2024-08-06 RX ORDER — CHLORHEXIDINE GLUCONATE 500 MG/1
15 CLOTH TOPICAL
Refills: 0 | Status: DISCONTINUED | OUTPATIENT
Start: 2024-08-06 | End: 2024-08-06

## 2024-08-06 RX ADMIN — OXYCODONE HYDROCHLORIDE 5 MILLIGRAM(S): 30 TABLET ORAL at 01:27

## 2024-08-06 RX ADMIN — ENOXAPARIN SODIUM 40 MILLIGRAM(S): 120 INJECTION SUBCUTANEOUS at 10:39

## 2024-08-06 RX ADMIN — Medication 200 GRAM(S): at 06:39

## 2024-08-06 RX ADMIN — OXYCODONE HYDROCHLORIDE 5 MILLIGRAM(S): 30 TABLET ORAL at 10:40

## 2024-08-06 RX ADMIN — Medication 975 MILLIGRAM(S): at 05:34

## 2024-08-06 RX ADMIN — OXYCODONE HYDROCHLORIDE 5 MILLIGRAM(S): 30 TABLET ORAL at 11:10

## 2024-08-06 RX ADMIN — PANTOPRAZOLE SODIUM 40 MILLIGRAM(S): 20 TABLET, DELAYED RELEASE ORAL at 05:34

## 2024-08-06 RX ADMIN — CHLORHEXIDINE GLUCONATE 15 MILLILITER(S): 500 CLOTH TOPICAL at 05:34

## 2024-08-06 RX ADMIN — OXYCODONE HYDROCHLORIDE 5 MILLIGRAM(S): 30 TABLET ORAL at 01:57

## 2024-08-06 NOTE — CHART NOTE - NSCHARTNOTEFT_GEN_A_CORE
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

## 2024-08-06 NOTE — DISCHARGE NOTE PROVIDER - CARE PROVIDER_API CALL
Neal Rowe  Oral/Maxillofacial Surgery  [id number] [place] [id number]  Phone: [phone]  Fax: [phone]  Follow Up Time:

## 2024-08-06 NOTE — PROGRESS NOTE ADULT - PROVIDER SPECIALTY LIST ADULT
Anesthesia
ENT
ENT
Infectious Disease
OMFS
Plastic Surgery
SICU
ENT
ENT
OMFS
Plastic Surgery
SICU
ENT
ENT
Infectious Disease
OMFS
Plastic Surgery
Plastic Surgery
SICU
OMFS

## 2024-08-06 NOTE — PROGRESS NOTE ADULT - SUBJECTIVE AND OBJECTIVE BOX
Plastic Surgery Progress Note (pg [facility]: [id number], [place]: [phone])    SUBJECTIVE  The patient was seen and examined. No acute events overnight. Pain controlled, afebrile w/ stable vitals.     OBJECTIVE  ___________________________________________________  VITAL SIGNS / I&O's   Vital Signs Last 24 Hrs  T(C): 36.3 (06 Aug 2024 06:00), Max: 36.9 (05 Aug 2024 10:29)  T(F): 97.4 (06 Aug 2024 06:00), Max: 98.4 (05 Aug 2024 10:29)  HR: 68 (06 Aug 2024 06:00) (66 - 71)  BP: 128/77 (06 Aug 2024 06:00) (115/73 - 141/67)  BP(mean): --  RR: 17 (06 Aug 2024 06:00) (17 - 18)  SpO2: 98% (06 Aug 2024 06:00) (97% - 99%)    Parameters below as of 06 Aug 2024 06:00  Patient On (Oxygen Delivery Method): room air          05 Aug 2024 07:01  -  06 Aug 2024 07:00  --------------------------------------------------------  IN:    dextrose 5% + sodium chloride 0.45%: 1100 mL    IV PiggyBack: 650 mL    Oral Fluid: 1040 mL  Total IN: 2790 mL    OUT:    Voided (mL): 1440 mL  Total OUT: 1440 mL    Total NET: 1350 mL        ___________________________________________________  PHYSICAL EXAM    -- CONSTITUTIONAL: NAD, lying in bed  -- NEURO: Awake, alert  -- HEENT: NC/AT, mucous membranes moist  -- NECK: Soft, no asymmetry,  ALT flap with strong cook signals, well perfused color, incisions c/d/i  -- PULM: Non-labored respirations, equal chest rise bilaterally  -- ABDOMEN: Nondistended  -- EXTREMITIES: Warm and well perfused, donor site c/d/i  -- PSYCH: Affect normal, A&Ox3    ___________________________________________________  LABS                        9.6    4.83  )-----------( 286      ( 05 Aug 2024 05:40 )             29.4     05 Aug 2024 05:40    139    |  96     |  12     ----------------------------<  96     4.2     |  30     |  1.05     Ca    8.9        05 Aug 2024 05:40  Phos  5.2       05 Aug 2024 05:40  Mg     2.20      05 Aug 2024 05:40        CAPILLARY BLOOD GLUCOSE            Urinalysis Basic - ( 05 Aug 2024 05:40 )    Color: x / Appearance: x / SG: x / pH: x  Gluc: 96 mg/dL / Ketone: x  / Bili: x / Urobili: x   Blood: x / Protein: x / Nitrite: x   Leuk Esterase: x / RBC: x / WBC x   Sq Epi: x / Non Sq Epi: x / Bacteria: x      ___________________________________________________  MICRO  Recent Cultures:  Specimen Source: .Surgical Swab 2 RIGHT MANDIBLE TISSUE CULTURE, 07-30 @ 10:50; Results   Few Staphylococcus aureus  Few Finegoldia magna "Susceptibilities not performed"<!>; Gram Stain: --; Organism: Staphylococcus aureus<!>  Specimen Source: .Tissue 1  RIGHT MANDIBLE BONE CULTURE, 07-30 @ 10:45; Results   Moderate Proteus mirabilis  Moderate Streptococcus dysgalactiae (Group C/G)  Few Corynebacterium striatum group **unable to perform susceptibilities  due to Proteus overgrowth  Moderate Bacteroides thetaiotaomicron group "Susceptibilities not  performed"  Growth in fluid media only Staphylococcus aureus Susceptibility to follow.<!>; Gram Stain:   Few polymorphonuclear leukocytes seen per low power field  Numerous Gram positive cocci in pairs seen per oil power field  Moderate Gram Negative Rods seen per oil power field  Rare Gram Positive Rods seen per oil power field<!>; Organism: Proteus mirabilis<!>    ___________________________________________________  MEDICATIONS  (STANDING):  acetaminophen     Tablet .. 975 milliGRAM(s) Oral every 6 hours  ampicillin/sulbactam  IVPB 3 Gram(s) IV Intermittent every 6 hours  chlorhexidine 0.12% Liquid 15 milliLiter(s) Swish and Spit two times a day  enoxaparin Injectable 40 milliGRAM(s) SubCutaneous every 24 hours  gabapentin 600 milliGRAM(s) Oral daily  pantoprazole    Tablet 40 milliGRAM(s) Oral before breakfast  vancomycin  IVPB 1000 milliGRAM(s) IV Intermittent every 24 hours    MEDICATIONS  (PRN):  ondansetron Injectable 4 milliGRAM(s) IV Push every 8 hours PRN Nausea and/or Vomiting  oxyCODONE    IR 2.5 milliGRAM(s) Oral every 4 hours PRN Moderate Pain (4 - 6)  oxyCODONE    IR 5 milliGRAM(s) Oral every 4 hours PRN Severe Pain (7 - 10)  polyethylene glycol 3350 17 Gram(s) Oral daily PRN Constipation  senna 2 Tablet(s) Oral at bedtime PRN Constipation

## 2024-08-06 NOTE — DISCHARGE NOTE PROVIDER - HOSPITAL COURSE
HOD1-debridement of non-viable skin, removal of large mandibular plate w/ ALT from L thigh to neck. JPx1 in neck and 1 in leg.  mayur Rowland A-line.  HOD2 POD1-NAEON, flap well perfused, multimodal pain management with Q1 flap checks, clear liquid diet  [id number], Q2 flap checks, PT consulted, continue clear liquid diet  [id number], Q4 flap checks, Consulted infectious disease, advanced to pureed diet  [id number], Q4 flap checks, advanced diet to soft, chew bite size diet, downgraded to floor  [id number], well perfused, multimodal pain management  [id number], no flap checks, multimodal pain management, plan to discharge

## 2024-08-06 NOTE — DISCHARGE NOTE PROVIDER - NSDCACTIVITY_GEN_ALL_CORE
Bathing allowed/Do not drive or operate machinery/Activity as tolerated Bathing allowed/Do not drive or operate machinery/Showering allowed/Activity as tolerated

## 2024-08-06 NOTE — PROGRESS NOTE ADULT - ASSESSMENT
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 Now s/p debridement, RYNE and left ALT flap 7/30 with Joseph Rowe and Cristina.      Plan:  - Q4h flap checks, monitor flap color  - Skin graft donor site reinforce PRN  - Continue ERAS protocol care  - Pain control  - Cultures with staph, proteus, strep, corynebacterium, bacteroides  - ID recs: Augmentin and linezolid on discharge, appreciate recommendations   - Appreciate SICU care      Plastic Surgery  [phone]# [phone] pager  [phone] [facility] pager

## 2024-08-06 NOTE — DISCHARGE NOTE NURSING/CASE MANAGEMENT/SOCIAL WORK - PATIENT PORTAL LINK FT
You can access the FollowMyHealth Patient Portal offered by [facility] by registering at the following website: [email or web address]. By joining [employer]’s FollowMyHealth portal, you will also be able to view your health information using other applications (apps) compatible with our system.

## 2024-08-06 NOTE — PROGRESS NOTE ADULT - SUBJECTIVE AND OBJECTIVE BOX
Follow Up:    Hardware infection    Interval History/ROS:  VSS ON. Patient was seen and examined at bedside. Denies fever. +PO. No diarrhea.    Allergies  morphine (Hives)        ANTIMICROBIALS:  ampicillin/sulbactam  IVPB 3 every 6 hours  vancomycin  IVPB 1000 every 24 hours      OTHER MEDS:  MEDICATIONS  (STANDING):  acetaminophen     Tablet .. 975 every 6 hours  enoxaparin Injectable 40 every 24 hours  gabapentin 600 daily  ondansetron Injectable 4 every 8 hours PRN  oxyCODONE    IR 2.5 every 4 hours PRN  oxyCODONE    IR 5 every 4 hours PRN  pantoprazole    Tablet 40 before breakfast  polyethylene glycol 3350 17 daily PRN  senna 2 at bedtime PRN      Vital Signs Last 24 Hrs  T(C): 36.6 (06 Aug 2024 10:03), Max: 36.6 (05 Aug 2024 21:30)  T(F): 97.8 (06 Aug 2024 10:03), Max: 97.8 (05 Aug 2024 21:30)  HR: 75 (06 Aug 2024 10:03) (66 - 75)  BP: 133/78 (06 Aug 2024 10:03) (115/73 - 133/78)  BP(mean): --  RR: 17 (06 Aug 2024 10:03) (17 - 18)  SpO2: 93% (06 Aug 2024 10:03) (93% - 99%)    Parameters below as of 06 Aug 2024 10:03  Patient On (Oxygen Delivery Method): room air        PHYSICAL EXAM:  Constitutional: non-toxic, no distress  ENT:  Moist oral mucosa; chin s/p skin grafting, stitch sites nontender, no purulence  Cardiovascular:   normal S1, S2, no murmur, RRR  Respiratory:  clear BS bilaterally, no wheezes, no rales  GI:  soft  Skin:  upper L thigh w/ graft incision, nonerythematous surrounding, no purulence  Psychiatric:  awake, alert, appropriate mood                            9.6    4.83  )-----------( 286      ( 05 Aug 2024 05:40 )             29.4       08-05    139  |  96<L>  |  12  ----------------------------<  96  4.2   |  30  |  1.05    Ca    8.9      05 Aug 2024 05:40  Phos  5.2     08-05  Mg     2.20     08-05        Urinalysis Basic - ( 05 Aug 2024 05:40 )    Color: x / Appearance: x / SG: x / pH: x  Gluc: 96 mg/dL / Ketone: x  / Bili: x / Urobili: x   Blood: x / Protein: x / Nitrite: x   Leuk Esterase: x / RBC: x / WBC x   Sq Epi: x / Non Sq Epi: x / Bacteria: x        MICROBIOLOGY:  Vancomycin Level, Trough: 10.4 ug/mL (08-05-24 @ 14:51)  v  .Surgical Swab 2 RIGHT MANDIBLE TISSUE CULTURE  07-30-24   Few Staphylococcus aureus  Few Finegoldia magna "Susceptibilities not performed"  --  Staphylococcus aureus      .Tissue 1  RIGHT MANDIBLE BONE CULTURE  07-30-24   Moderate Proteus mirabilis  Moderate Streptococcus dysgalactiae (Group C/G)  Few Corynebacterium striatum group **unable to perform susceptibilities  due to Proteus overgrowth  Moderate Bacteroides thetaiotaomicron group "Susceptibilities not  performed"  Growth in fluid media only Staphylococcus aureus Susceptibility to follow.  --  Proteus mirabilis                RADIOLOGY:  No imaging during this admission

## 2024-08-06 NOTE — DISCHARGE NOTE PROVIDER - NSDCCPCAREPLAN_GEN_ALL_CORE_FT
PRINCIPAL DISCHARGE DIAGNOSIS  Diagnosis: Status post hardware removal  Assessment and Plan of Treatment:

## 2024-08-06 NOTE — PROGRESS NOTE ADULT - ASSESSMENT
59y Female hx of SCCs/p R mandibulectomy, R SND, chemotherapy and osetonecrosis NOW POD6 s/p RYNE and left ALT flap on 7/30/24. Denies f/n/c. Flap is well perfused with capillary refill <3sec.  Pt progressing well with no acute concerns in the post-operative period and healing WNL.     Plan:  -D/C to home  -Multimodal Pain management  -Augmentin 875/125 mg PO Q12H x6w + linezolid 600 mg PO Q12H x2w Per ID recommendations  -F/U August 13 at 9:15 am at [facility] clinic  -Cont. soft chew, bite size diet      Shweta Cummings  Oral and Maxillofacial Surgery  [facility] Pager #[phone]  [facility] Pager: [phone]  [facility] Pager: [phone]  Available on Teams

## 2024-08-06 NOTE — PROGRESS NOTE ADULT - SUBJECTIVE AND OBJECTIVE BOX
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 Now POD7 s/p RYNE and left ALT flap 7/30.    Extra oral exam: OMER 30, no trismus, no LAD  Intra oral exam: uvula midline, no vestibular pathology, no pain on occlusion, no mandibular  flexion appreciable, no avulsion of teeth, no intraoral lacerations, no edema, Flap well perfused      Vitals:     Vital Signs Last 24 Hrs  T(C): 36.3 (06 Aug 2024 06:00), Max: 36.9 (05 Aug 2024 10:29)  T(F): 97.4 (06 Aug 2024 06:00), Max: 98.4 (05 Aug 2024 10:29)  HR: 68 (06 Aug 2024 06:00) (66 - 71)  BP: 128/77 (06 Aug 2024 06:00) (115/73 - 141/67)  BP(mean): --  RR: 17 (06 Aug 2024 06:00) (17 - 18)  SpO2: 98% (06 Aug 2024 06:00) (97% - 99%)    Parameters below as of 06 Aug 2024 06:00  Patient On (Oxygen Delivery Method): room air        I&O's Detail    05 Aug 2024 07:01  -  06 Aug 2024 07:00  --------------------------------------------------------  IN:    dextrose 5% + sodium chloride 0.45%: 1100 mL    IV PiggyBack: 650 mL    Oral Fluid: 1040 mL  Total IN: 2790 mL    OUT:    Voided (mL): 1440 mL  Total OUT: 1440 mL    Total NET: 1350 mL          Medications:    MEDICATIONS  (STANDING):  acetaminophen     Tablet .. 975 milliGRAM(s) Oral every 6 hours  ampicillin/sulbactam  IVPB 3 Gram(s) IV Intermittent every 6 hours  chlorhexidine 0.12% Liquid 15 milliLiter(s) Swish and Spit two times a day  enoxaparin Injectable 40 milliGRAM(s) SubCutaneous every 24 hours  gabapentin 600 milliGRAM(s) Oral daily  pantoprazole    Tablet 40 milliGRAM(s) Oral before breakfast  vancomycin  IVPB 1000 milliGRAM(s) IV Intermittent every 24 hours    MEDICATIONS  (PRN):  ondansetron Injectable 4 milliGRAM(s) IV Push every 8 hours PRN Nausea and/or Vomiting  oxyCODONE    IR 2.5 milliGRAM(s) Oral every 4 hours PRN Moderate Pain (4 - 6)  oxyCODONE    IR 5 milliGRAM(s) Oral every 4 hours PRN Severe Pain (7 - 10)  polyethylene glycol 3350 17 Gram(s) Oral daily PRN Constipation  senna 2 Tablet(s) Oral at bedtime PRN Constipation      Labs:                          9.6    4.83  )-----------( 286      ( 05 Aug 2024 05:40 )             29.4       08-05    139  |  96<L>  |  12  ----------------------------<  96  4.2   |  30  |  1.05    Ca    8.9      05 Aug 2024 05:40  Phos  5.2     08-05  Mg     2.20     08-05         59y Female hx of SCCs/p R mandibulectomy, R SND, and R Free Fibula Flap s/p 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 Now POD7 s/p RYNE and left ALT flap 7/30.    Interval- BJ OLIVEIRA  Extra oral exam: OMER 30, no trismus, no LAD  Intra oral exam: uvula midline, no vestibular pathology, no pain on occlusion, no mandibular  flexion appreciable, no avulsion of teeth, no intraoral lacerations, no edema, Flap well perfused      Vitals:     Vital Signs Last 24 Hrs  T(C): 36.3 (06 Aug 2024 06:00), Max: 36.9 (05 Aug 2024 10:29)  T(F): 97.4 (06 Aug 2024 06:00), Max: 98.4 (05 Aug 2024 10:29)  HR: 68 (06 Aug 2024 06:00) (66 - 71)  BP: 128/77 (06 Aug 2024 06:00) (115/73 - 141/67)  BP(mean): --  RR: 17 (06 Aug 2024 06:00) (17 - 18)  SpO2: 98% (06 Aug 2024 06:00) (97% - 99%)    Parameters below as of 06 Aug 2024 06:00  Patient On (Oxygen Delivery Method): room air        I&O's Detail    05 Aug 2024 07:01  -  06 Aug 2024 07:00  --------------------------------------------------------  IN:    dextrose 5% + sodium chloride 0.45%: 1100 mL    IV PiggyBack: 650 mL    Oral Fluid: 1040 mL  Total IN: 2790 mL    OUT:    Voided (mL): 1440 mL  Total OUT: 1440 mL    Total NET: 1350 mL          Medications:    MEDICATIONS  (STANDING):  acetaminophen     Tablet .. 975 milliGRAM(s) Oral every 6 hours  ampicillin/sulbactam  IVPB 3 Gram(s) IV Intermittent every 6 hours  chlorhexidine 0.12% Liquid 15 milliLiter(s) Swish and Spit two times a day  enoxaparin Injectable 40 milliGRAM(s) SubCutaneous every 24 hours  gabapentin 600 milliGRAM(s) Oral daily  pantoprazole    Tablet 40 milliGRAM(s) Oral before breakfast  vancomycin  IVPB 1000 milliGRAM(s) IV Intermittent every 24 hours    MEDICATIONS  (PRN):  ondansetron Injectable 4 milliGRAM(s) IV Push every 8 hours PRN Nausea and/or Vomiting  oxyCODONE    IR 2.5 milliGRAM(s) Oral every 4 hours PRN Moderate Pain (4 - 6)  oxyCODONE    IR 5 milliGRAM(s) Oral every 4 hours PRN Severe Pain (7 - 10)  polyethylene glycol 3350 17 Gram(s) Oral daily PRN Constipation  senna 2 Tablet(s) Oral at bedtime PRN Constipation      Labs:                          9.6    4.83  )-----------( 286      ( 05 Aug 2024 05:40 )             29.4       08-05    139  |  96<L>  |  12  ----------------------------<  96  4.2   |  30  |  1.05    Ca    8.9      05 Aug 2024 05:40  Phos  5.2     08-05  Mg     2.20     08-05

## 2024-08-06 NOTE — PROGRESS NOTE ADULT - ASSESSMENT
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 Now s/p debridement, RYNE and left ALT flap 7/30 with Joseph Rowe and Cristina.    - flap management per ERAS protocol  - Care per OMFS/PRS

## 2024-08-06 NOTE — DISCHARGE NOTE PROVIDER - NSDCMRMEDTOKEN_GEN_ALL_CORE_FT
acetaminophen 650 mg oral tablet, extended release: 2 tab(s) orally every 6 hours  amoxicillin-clavulanate 875 mg-125 mg oral tablet: 875 milligram(s) orally every 12 hours  amoxicillin-clavulanate 875 mg-125 mg oral tablet: 875 milligram(s) orally every 12 hours  ibuprofen 600 mg oral tablet: 1 tab(s) orally every 6 hours  linezolid 600 mg oral tablet: 1 tab(s) orally every 12 hours  Physical Therapy: Physical Therapy 2-3x/week  Rolling Walker: Kvng Matias

## 2024-08-06 NOTE — DISCHARGE NOTE PROVIDER - NSDCFUSCHEDAPPT_GEN_ALL_CORE_FT
[facility] Physician [facility]  DENTAL [phone]  Scheduled Appointment: 08/13/2024

## 2024-08-06 NOTE — PROGRESS NOTE ADULT - ASSESSMENT
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 Per report, patient is s/p RYNE. Surgical cultures grew Staph aureus, Proteus mirabilis, Strep dysgalactiae, Corynebacterium, and Bacteroides.    #OM  #SCC  #Hardware infection  #Polymicrobial infection    Recommendations:  - Continue with Unasyn 3g IV Q6H and vancomycin 1g IV Q24H while inpatient  - Communicated with micro lab to run susceptibility of Corynebacterium. Likely will take many days to come back.  - Upon discharge, please provide Augmentin 875/125 mg PO Q12H x6 weeks and linezolid 600 mg PO Q12H x2 weeks. Discussed with patient re: possible side effects and patient accepts.  - Please call [phone] to set up a follow-up appointment to see me at ID clinic ([address] [place] [address]) within 4 weeks of discharge.    Plan discussed with FS provider.  Thank you for this consult. Inpatient ID team will follow.    Ady Frye MD, PhD  Attending Physician  Division of Infectious Diseases  Department of Medicine    Please contact through MS Teams message.  Office: [phone] (after 5 PM or weekend) .

## 2024-08-06 NOTE — PROGRESS NOTE ADULT - SUBJECTIVE AND OBJECTIVE BOX
OTOLARYNGOLOGY (ENT) PROGRESS NOTE    PATIENT: PARUL HERNANDEZ  MRN: [id number]  : 63  [id number]  DATE OF SERVICE:  24  	  Subjective/ Interval:   AFVSS. No acute events overnight. Patient seen and examined at bedside. Awake and alert this morning, no issues reported.    Physical Exam:  General: well-developed, NAD  OU: EOMI; PERRL; no drainage or redness  AU: external ears normal  Nose: nares patent  Mouth: No oral lesions; no gross abnormalitie  Neck: trachea midline, ALT free flap with good color and turgor, strong cook and venous doppler signals, mild fullness left submandibular area   Respiratory: unlabored respirations  Cardiovascular: regular rate  Gastrointestinal: Soft, nondistended  Extremities: No edema, warm and well perfused  Skin: No lesions; no rash          LABS                       9.6    4.83  )-----------( 286      ( 05 Aug 2024 05:40 )             29.4    08-    139  |  96<L>  |  12  ----------------------------<  96  4.2   |  30  |  1.05    Ca    8.9      05 Aug 2024 05:40  Phos  5.2     08-  Mg     2.20     08-05       Coagulation Studies-     Urinalysis Basic - ( 05 Aug 2024 05:40 )    Color: x / Appearance: x / SG: x / pH: x  Gluc: 96 mg/dL / Ketone: x  / Bili: x / Urobili: x   Blood: x / Protein: x / Nitrite: x   Leuk Esterase: x / RBC: x / WBC x   Sq Epi: x / Non Sq Epi: x / Bacteria: x      Endocrine Panel-      MICROBIOLOGY:  Culture - Surgical Swab (collected 24 @ 10:50)  Source: .Surgical Swab 2 RIGHT MANDIBLE TISSUE CULTURE  Final Report (24 @ 12:06):    Few Staphylococcus aureus    Few Finegoldia magna "Susceptibilities not performed"  Organism: Staphylococcus aureus (24 @ 12:06)  Organism: Staphylococcus aureus (24 @ 12:06)      Method Type: DHEERAJ      -  Clindamycin: R <=0.25 This isolate is presumed to be clindamycin resistant based on detection of inducible resistance. Clindamycin may still be effective in some patients.      -  Erythromycin: R >4      -  Gentamicin: S <=1 Should not be used as monotherapy      -  Oxacillin: S <=0.25 Oxacillin predicts susceptibility for dicloxacillin, methicillin, and nafcillin      -  Penicillin: R 8      -  Rifampin: S <=1 Should not be used as monotherapy      -  Tetracycline: S <=1      -  Trimethoprim/Sulfamethoxazole: S <=0.5/9.5      -  Vancomycin: S 1    Culture - Tissue with Gram Stain (collected 24 @ 10:45)  Source: .Tissue 1  RIGHT MANDIBLE BONE CULTURE  Gram Stain (24 @ 19:14):    Few polymorphonuclear leukocytes seen per low power field    Numerous Gram positive cocci in pairs seen per oil power field    Moderate Gram Negative Rods seen per oil power field    Rare Gram Positive Rods seen per oil power field  Final Report (24 @ 23:39):    Moderate Proteus mirabilis    Moderate Streptococcus dysgalactiae (Group C/G)    Few Corynebacterium striatum group **unable to perform susceptibilities    due to Proteus overgrowth    Moderate Bacteroides thetaiotaomicron group "Susceptibilities not    performed"    Growth in fluid media only Staphylococcus aureus Susceptibility to follow.  Organism: Proteus mirabilis (24 @ 16:18)  Organism: Proteus mirabilis (24 @ 16:18)      Method Type: DHEERAJ      -  Amoxicillin/Clavulanic Acid: S <=8/4      -  Ampicillin: S <=8 These ampicillin results predict results for amoxicillin      -  Ampicillin/Sulbactam: S <=4/2      -  Aztreonam: S <=4      -  Cefazolin: S <=2      -  Cefepime: S <=2      -  Cefoxitin: S <=8      -  Ceftriaxone: S <=1      -  Ciprofloxacin: S <=0.25      -  Ertapenem: S <=0.5      -  Gentamicin: S <=2      -  Levofloxacin: S <=0.5      -  Meropenem: S <=1      -  Piperacillin/Tazobactam: S <=8      -  Tobramycin: S <=2      -  Trimethoprim/Sulfamethoxazole: R >2/38      Culture Results:   Few Staphylococcus aureus  Few Finegoldia magna "Susceptibilities not performed" (24 @ 10:50)  Culture Results:   Culture is being performed. Fungal cultures are held for 4 weeks. (24 @ 10:50)  Culture Results:   Moderate Proteus mirabilis  Moderate Streptococcus dysgalactiae (Group C/G)  Few Corynebacterium striatum group **unable to perform susceptibilities  due to Proteus overgrowth  Moderate Bacteroides thetaiotaomicron group "Susceptibilities not  performed"  Growth in fluid media only Staphylococcus aureus Susceptibility to follow. (24 @ 10:45)  Culture Results:   Culture is being performed. Fungal cultures are held for 4 weeks. (24 @ 10:45)  Culture Results:   Culture is being performed. (24 @ 10:45)      Culture - Fungal, Other (collected 24 @ 10:50)  Source: .Other 2 RIGHT MANDIBLE TISSUE CULTURE  Preliminary Report (24 @ 23:04):    Culture is being performed. Fungal cultures are held for 4 weeks.    Culture - Surgical Swab (collected 24 @ 10:50)  Source: .Surgical Swab 2 RIGHT MANDIBLE TISSUE CULTURE  Final Report (24 @ 12:06):    Few Staphylococcus aureus    Few Finegoldia magna "Susceptibilities not performed"  Organism: Staphylococcus aureus (24 @ 12:06)  Organism: Staphylococcus aureus (24 @ 12:06)      Method Type: DHEERAJ      -  Clindamycin: R <=0.25 This isolate is presumed to be clindamycin resistant based on detection of inducible resistance. Clindamycin may still be effective in some patients.      -  Erythromycin: R >4      -  Gentamicin: S <=1 Should not be used as monotherapy      -  Oxacillin: S <=0.25 Oxacillin predicts susceptibility for dicloxacillin, methicillin, and nafcillin      -  Penicillin: R 8      -  Rifampin: S <=1 Should not be used as monotherapy      -  Tetracycline: S <=1      -  Trimethoprim/Sulfamethoxazole: S <=0.5/9.5      -  Vancomycin: S 1    Culture - Acid Fast - Tissue w/Smear (collected 24 @ 10:45)  Source: .Tissue 1  RIGHT MANDIBLE BONE CULTURE  Preliminary Report (24 @ 23:10):    Culture is being performed.    Culture - Fungal, Tissue (collected 24 @ 10:45)  Source: .Tissue 1  RIGHT MANDIBLE BONE CULTURE  Preliminary Report (24 @ 23:04):    Culture is being performed. Fungal cultures are held for 4 weeks.    Culture - Tissue with Gram Stain (collected 24 @ 10:45)  Source: .Tissue 1  RIGHT MANDIBLE BONE CULTURE  Gram Stain (24 @ 19:14):    Few polymorphonuclear leukocytes seen per low power field    Numerous Gram positive cocci in pairs seen per oil power field    Moderate Gram Negative Rods seen per oil power field    Rare Gram Positive Rods seen per oil power field  Final Report (24 @ 23:39):    Moderate Proteus mirabilis    Moderate Streptococcus dysgalactiae (Group C/G)    Few Corynebacterium striatum group **unable to perform susceptibilities    due to Proteus overgrowth    Moderate Bacteroides thetaiotaomicron group "Susceptibilities not    performed"    Growth in fluid media only Staphylococcus aureus Susceptibility to follow.  Organism: Proteus mirabilis (24 @ 16:18)  Organism: Proteus mirabilis (24 @ 16:18)      Method Type: DHEERAJ      -  Amoxicillin/Clavulanic Acid: S <=8/4      -  Ampicillin: S <=8 These ampicillin results predict results for amoxicillin      -  Ampicillin/Sulbactam: S <=4/2      -  Aztreonam: S <=4      -  Cefazolin: S <=2      -  Cefepime: S <=2      -  Cefoxitin: S <=8      -  Ceftriaxone: S <=1      -  Ciprofloxacin: S <=0.25      -  Ertapenem: S <=0.5      -  Gentamicin: S <=2      -  Levofloxacin: S <=0.5      -  Meropenem: S <=1      -  Piperacillin/Tazobactam: S <=8      -  Tobramycin: S <=2      -  Trimethoprim/Sulfamethoxazole: R >238

## 2024-08-12 PROBLEM — E04.1 NONTOXIC SINGLE THYROID NODULE: Chronic | Status: ACTIVE | Noted: 2024-07-24

## 2024-08-12 PROBLEM — H26.9 UNSPECIFIED CATARACT: Chronic | Status: ACTIVE | Noted: 2024-07-24

## 2024-08-13 ENCOUNTER — APPOINTMENT (OUTPATIENT)
Age: 59
End: 2024-08-13
Payer: MEDICARE

## 2024-08-13 PROCEDURE — 99024 POSTOP FOLLOW-UP VISIT: CPT

## 2024-08-21 ENCOUNTER — APPOINTMENT (OUTPATIENT)
Age: 59
End: 2024-08-21
Payer: MEDICARE

## 2024-08-21 PROCEDURE — 99024 POSTOP FOLLOW-UP VISIT: CPT

## 2024-08-28 LAB
CULTURE RESULTS: SIGNIFICANT CHANGE UP
CULTURE RESULTS: SIGNIFICANT CHANGE UP
SPECIMEN SOURCE: SIGNIFICANT CHANGE UP
SPECIMEN SOURCE: SIGNIFICANT CHANGE UP

## 2024-09-13 NOTE — ED PROCEDURE NOTE - CPROC ED COMPLICATIONS1
no Detail Level: Zone Initiate Treatment: Apply to affected area on fingers BID x 2 weeks on/1 week off. Do not apply to face or body folds

## 2024-09-23 ENCOUNTER — APPOINTMENT (OUTPATIENT)
Age: 59
End: 2024-09-23
Payer: MEDICARE

## 2024-09-23 PROCEDURE — 99024 POSTOP FOLLOW-UP VISIT: CPT

## 2024-10-03 ENCOUNTER — APPOINTMENT (OUTPATIENT)
Dept: PLASTIC SURGERY | Facility: CLINIC | Age: 59
End: 2024-10-03
Payer: MEDICARE

## 2024-10-03 VITALS
OXYGEN SATURATION: 97 % | HEIGHT: 64 IN | DIASTOLIC BLOOD PRESSURE: 84 MMHG | HEART RATE: 79 BPM | WEIGHT: 124 LBS | TEMPERATURE: 98 F | SYSTOLIC BLOOD PRESSURE: 146 MMHG | BODY MASS INDEX: 21.17 KG/M2

## 2024-10-03 DIAGNOSIS — C76.0 MALIGNANT NEOPLASM OF HEAD, FACE AND NECK: ICD-10-CM

## 2024-10-03 PROCEDURE — 99024 POSTOP FOLLOW-UP VISIT: CPT

## 2024-10-03 NOTE — PHYSICAL EXAM
[de-identified] : ALT flap stable, incisions healing well, post operative swelling, no drainage/bleeding noted

## 2024-10-04 ENCOUNTER — APPOINTMENT (OUTPATIENT)
Dept: INFECTIOUS DISEASE | Facility: CLINIC | Age: 59
End: 2024-10-04

## 2024-10-13 NOTE — H&P PST ADULT - NEGATIVE CARDIOVASCULAR SYMPTOMS
no chest pain/no palpitations/no dyspnea on exertion/no orthopnea/no paroxysmal nocturnal dyspnea/no peripheral edema Normal for race

## 2024-11-06 ENCOUNTER — APPOINTMENT (OUTPATIENT)
Age: 59
End: 2024-11-06

## 2024-11-14 ENCOUNTER — APPOINTMENT (OUTPATIENT)
Dept: ULTRASOUND IMAGING | Facility: IMAGING CENTER | Age: 59
End: 2024-11-14
Payer: MEDICARE

## 2024-11-14 ENCOUNTER — OUTPATIENT (OUTPATIENT)
Dept: OUTPATIENT SERVICES | Facility: HOSPITAL | Age: 59
LOS: 1 days | End: 2024-11-14
Payer: COMMERCIAL

## 2024-11-14 ENCOUNTER — RESULT REVIEW (OUTPATIENT)
Age: 59
End: 2024-11-14

## 2024-11-14 DIAGNOSIS — C44.92 SQUAMOUS CELL CARCINOMA OF SKIN, UNSPECIFIED: Chronic | ICD-10-CM

## 2024-11-14 DIAGNOSIS — Z98.891 HISTORY OF UTERINE SCAR FROM PREVIOUS SURGERY: Chronic | ICD-10-CM

## 2024-11-14 DIAGNOSIS — Z98.890 OTHER SPECIFIED POSTPROCEDURAL STATES: Chronic | ICD-10-CM

## 2024-11-14 DIAGNOSIS — Z00.8 ENCOUNTER FOR OTHER GENERAL EXAMINATION: ICD-10-CM

## 2024-11-14 DIAGNOSIS — Z96.642 PRESENCE OF LEFT ARTIFICIAL HIP JOINT: Chronic | ICD-10-CM

## 2024-11-14 DIAGNOSIS — C03.9 MALIGNANT NEOPLASM OF GUM, UNSPECIFIED: ICD-10-CM

## 2024-11-14 DIAGNOSIS — Z90.711 ACQUIRED ABSENCE OF UTERUS WITH REMAINING CERVICAL STUMP: Chronic | ICD-10-CM

## 2024-11-14 PROCEDURE — 88173 CYTOPATH EVAL FNA REPORT: CPT | Mod: 26

## 2024-11-14 PROCEDURE — 88173 CYTOPATH EVAL FNA REPORT: CPT

## 2024-11-14 PROCEDURE — 10005 FNA BX W/US GDN 1ST LES: CPT

## 2024-11-14 PROCEDURE — 88172 CYTP DX EVAL FNA 1ST EA SITE: CPT

## 2024-11-18 LAB — NON-GYNECOLOGICAL CYTOLOGY STUDY: SIGNIFICANT CHANGE UP

## 2024-11-26 ENCOUNTER — APPOINTMENT (OUTPATIENT)
Dept: OTOLARYNGOLOGY | Facility: CLINIC | Age: 59
End: 2024-11-26

## 2024-11-26 ENCOUNTER — NON-APPOINTMENT (OUTPATIENT)
Age: 59
End: 2024-11-26

## 2025-01-06 NOTE — PATIENT PROFILE ADULT - NS PRO AD ANY ON CHART
"Returned call to pt  12/12/24: Nexplanon placed  Pt states she woke up and is bleeding \"a lot\"  States has bled through one tampon this morning, states it had been in for a couple of hours already before this happened.  Reassured to     Discussed normal irregular bleeding and cramping can occur up to 90 day after insertion.  Discussed ER precautions and when to be concerned.  Pt verbalized understanding, in agreement with plan, and voiced no further questions.  Amadeo Watts RN on 1/6/2025 at 11:02 AM   [facility]    " Yes

## 2025-01-14 ENCOUNTER — APPOINTMENT (OUTPATIENT)
Dept: PLASTIC SURGERY | Facility: CLINIC | Age: 60
End: 2025-01-14

## 2025-01-28 ENCOUNTER — APPOINTMENT (OUTPATIENT)
Dept: OTOLARYNGOLOGY | Facility: CLINIC | Age: 60
End: 2025-01-28

## 2025-02-04 ENCOUNTER — APPOINTMENT (OUTPATIENT)
Dept: HEMATOLOGY ONCOLOGY | Facility: CLINIC | Age: 60
End: 2025-02-04

## 2025-02-04 NOTE — ED ADULT NURSE NOTE - AS PAIN REST
Pt was called to reschedule appt on 3/10/25 for psg. LVM for them to call back. [employer] message sent also.    Pooja Gipson    [facility]       5 (moderate pain)

## 2025-02-18 ENCOUNTER — APPOINTMENT (OUTPATIENT)
Dept: PLASTIC SURGERY | Facility: CLINIC | Age: 60
End: 2025-02-18
Payer: MEDICARE

## 2025-02-18 VITALS
TEMPERATURE: 98.4 F | HEIGHT: 64 IN | HEART RATE: 86 BPM | WEIGHT: 132 LBS | BODY MASS INDEX: 22.53 KG/M2 | OXYGEN SATURATION: 96 % | SYSTOLIC BLOOD PRESSURE: 145 MMHG | DIASTOLIC BLOOD PRESSURE: 89 MMHG

## 2025-02-18 DIAGNOSIS — C76.0 MALIGNANT NEOPLASM OF HEAD, FACE AND NECK: ICD-10-CM

## 2025-02-18 PROCEDURE — 99213 OFFICE O/P EST LOW 20 MIN: CPT

## 2025-02-18 RX ORDER — SULFAMETHOXAZOLE AND TRIMETHOPRIM 800; 160 MG/1; MG/1
800-160 TABLET ORAL TWICE DAILY
Qty: 28 | Refills: 0 | Status: ACTIVE | COMMUNITY
Start: 2025-02-18 | End: 1900-01-01

## 2025-02-19 ENCOUNTER — INPATIENT (INPATIENT)
Facility: HOSPITAL | Age: 60
LOS: 5 days | Discharge: HOME | End: 2025-02-25
Attending: PLASTIC SURGERY | Admitting: PLASTIC SURGERY
Payer: MEDICARE

## 2025-02-19 VITALS
RESPIRATION RATE: 16 BRPM | WEIGHT: 115.08 LBS | SYSTOLIC BLOOD PRESSURE: 168 MMHG | OXYGEN SATURATION: 100 % | TEMPERATURE: 98 F | DIASTOLIC BLOOD PRESSURE: 98 MMHG | HEART RATE: 92 BPM

## 2025-02-19 DIAGNOSIS — C44.92 SQUAMOUS CELL CARCINOMA OF SKIN, UNSPECIFIED: Chronic | ICD-10-CM

## 2025-02-19 DIAGNOSIS — Z90.711 ACQUIRED ABSENCE OF UTERUS WITH REMAINING CERVICAL STUMP: Chronic | ICD-10-CM

## 2025-02-19 DIAGNOSIS — L02.91 CUTANEOUS ABSCESS, UNSPECIFIED: ICD-10-CM

## 2025-02-19 DIAGNOSIS — Z98.890 OTHER SPECIFIED POSTPROCEDURAL STATES: Chronic | ICD-10-CM

## 2025-02-19 DIAGNOSIS — Z96.642 PRESENCE OF LEFT ARTIFICIAL HIP JOINT: Chronic | ICD-10-CM

## 2025-02-19 DIAGNOSIS — Z98.891 HISTORY OF UTERINE SCAR FROM PREVIOUS SURGERY: Chronic | ICD-10-CM

## 2025-02-19 LAB
ALBUMIN SERPL ELPH-MCNC: 3.9 G/DL — SIGNIFICANT CHANGE UP (ref 3.3–5)
ALP SERPL-CCNC: 83 U/L — SIGNIFICANT CHANGE UP (ref 40–120)
ALT FLD-CCNC: 6 U/L — SIGNIFICANT CHANGE UP (ref 4–33)
ANION GAP SERPL CALC-SCNC: 12 MMOL/L — SIGNIFICANT CHANGE UP (ref 7–14)
AST SERPL-CCNC: 10 U/L — SIGNIFICANT CHANGE UP (ref 4–32)
BASOPHILS # BLD AUTO: 0.02 K/UL — SIGNIFICANT CHANGE UP (ref 0–0.2)
BASOPHILS NFR BLD AUTO: 0.3 % — SIGNIFICANT CHANGE UP (ref 0–2)
BILIRUB SERPL-MCNC: <0.2 MG/DL — SIGNIFICANT CHANGE UP (ref 0.2–1.2)
BLOOD GAS VENOUS COMPREHENSIVE RESULT: SIGNIFICANT CHANGE UP
BUN SERPL-MCNC: 25 MG/DL — HIGH (ref 7–23)
CALCIUM SERPL-MCNC: 9.5 MG/DL — SIGNIFICANT CHANGE UP (ref 8.4–10.5)
CHLORIDE SERPL-SCNC: 98 MMOL/L — SIGNIFICANT CHANGE UP (ref 98–107)
CO2 SERPL-SCNC: 24 MMOL/L — SIGNIFICANT CHANGE UP (ref 22–31)
CREAT SERPL-MCNC: 1.23 MG/DL — SIGNIFICANT CHANGE UP (ref 0.5–1.3)
EGFR: 50 ML/MIN/1.73M2 — LOW
EOSINOPHIL # BLD AUTO: 0.11 K/UL — SIGNIFICANT CHANGE UP (ref 0–0.5)
EOSINOPHIL NFR BLD AUTO: 1.4 % — SIGNIFICANT CHANGE UP (ref 0–6)
GLUCOSE SERPL-MCNC: 81 MG/DL — SIGNIFICANT CHANGE UP (ref 70–99)
GRAM STN FLD: ABNORMAL
GRAM STN FLD: ABNORMAL
HCT VFR BLD CALC: 35.3 % — SIGNIFICANT CHANGE UP (ref 34.5–45)
HGB BLD-MCNC: 11.2 G/DL — LOW (ref 11.5–15.5)
IANC: 6.61 K/UL — SIGNIFICANT CHANGE UP (ref 1.8–7.4)
IMM GRANULOCYTES NFR BLD AUTO: 0.3 % — SIGNIFICANT CHANGE UP (ref 0–0.9)
LYMPHOCYTES # BLD AUTO: 0.47 K/UL — LOW (ref 1–3.3)
LYMPHOCYTES # BLD AUTO: 6 % — LOW (ref 13–44)
MCHC RBC-ENTMCNC: 26.8 PG — LOW (ref 27–34)
MCHC RBC-ENTMCNC: 31.7 G/DL — LOW (ref 32–36)
MCV RBC AUTO: 84.4 FL — SIGNIFICANT CHANGE UP (ref 80–100)
MONOCYTES # BLD AUTO: 0.59 K/UL — SIGNIFICANT CHANGE UP (ref 0–0.9)
MONOCYTES NFR BLD AUTO: 7.5 % — SIGNIFICANT CHANGE UP (ref 2–14)
NEUTROPHILS # BLD AUTO: 6.61 K/UL — SIGNIFICANT CHANGE UP (ref 1.8–7.4)
NEUTROPHILS NFR BLD AUTO: 84.5 % — HIGH (ref 43–77)
NRBC # BLD AUTO: 0 K/UL — SIGNIFICANT CHANGE UP (ref 0–0)
NRBC # FLD: 0 K/UL — SIGNIFICANT CHANGE UP (ref 0–0)
NRBC BLD AUTO-RTO: 0 /100 WBCS — SIGNIFICANT CHANGE UP (ref 0–0)
PLATELET # BLD AUTO: 220 K/UL — SIGNIFICANT CHANGE UP (ref 150–400)
POTASSIUM SERPL-MCNC: 4.6 MMOL/L — SIGNIFICANT CHANGE UP (ref 3.5–5.3)
POTASSIUM SERPL-SCNC: 4.6 MMOL/L — SIGNIFICANT CHANGE UP (ref 3.5–5.3)
PROT SERPL-MCNC: 7.1 G/DL — SIGNIFICANT CHANGE UP (ref 6–8.3)
RBC # BLD: 4.18 M/UL — SIGNIFICANT CHANGE UP (ref 3.8–5.2)
RBC # FLD: 14.6 % — HIGH (ref 10.3–14.5)
SODIUM SERPL-SCNC: 134 MMOL/L — LOW (ref 135–145)
SPECIMEN SOURCE: SIGNIFICANT CHANGE UP
SPECIMEN SOURCE: SIGNIFICANT CHANGE UP
WBC # BLD: 7.82 K/UL — SIGNIFICANT CHANGE UP (ref 3.8–10.5)
WBC # FLD AUTO: 7.82 K/UL — SIGNIFICANT CHANGE UP (ref 3.8–10.5)

## 2025-02-19 PROCEDURE — 70491 CT SOFT TISSUE NECK W/DYE: CPT | Mod: 26

## 2025-02-19 PROCEDURE — 99285 EMERGENCY DEPT VISIT HI MDM: CPT

## 2025-02-19 RX ORDER — OXYCODONE HYDROCHLORIDE 30 MG/1
5 TABLET ORAL ONCE
Refills: 0 | Status: DISCONTINUED | OUTPATIENT
Start: 2025-02-19 | End: 2025-02-19

## 2025-02-19 RX ORDER — AMPICILLIN SODIUM AND SULBACTAM SODIUM 1; .5 G/1; G/1
3 INJECTION, POWDER, FOR SOLUTION INTRAMUSCULAR; INTRAVENOUS EVERY 6 HOURS
Refills: 0 | Status: DISCONTINUED | OUTPATIENT
Start: 2025-02-19 | End: 2025-02-22

## 2025-02-19 RX ORDER — SIMETHICONE 80 MG
80 TABLET,CHEWABLE ORAL EVERY 6 HOURS
Refills: 0 | Status: DISCONTINUED | OUTPATIENT
Start: 2025-02-19 | End: 2025-02-25

## 2025-02-19 RX ORDER — ACETAMINOPHEN 500 MG/5ML
975 LIQUID (ML) ORAL EVERY 6 HOURS
Refills: 0 | Status: DISCONTINUED | OUTPATIENT
Start: 2025-02-19 | End: 2025-02-25

## 2025-02-19 RX ORDER — AMPICILLIN SODIUM AND SULBACTAM SODIUM 1; .5 G/1; G/1
3 INJECTION, POWDER, FOR SOLUTION INTRAMUSCULAR; INTRAVENOUS ONCE
Refills: 0 | Status: COMPLETED | OUTPATIENT
Start: 2025-02-19 | End: 2025-02-19

## 2025-02-19 RX ORDER — ONDANSETRON HCL/PF 4 MG/2 ML
4 VIAL (ML) INJECTION EVERY 6 HOURS
Refills: 0 | Status: DISCONTINUED | OUTPATIENT
Start: 2025-02-19 | End: 2025-02-25

## 2025-02-19 RX ORDER — SENNA 187 MG
2 TABLET ORAL AT BEDTIME
Refills: 0 | Status: DISCONTINUED | OUTPATIENT
Start: 2025-02-19 | End: 2025-02-25

## 2025-02-19 RX ORDER — CALCIUM CARBONATE 750 MG/1
1 TABLET ORAL EVERY 4 HOURS
Refills: 0 | Status: DISCONTINUED | OUTPATIENT
Start: 2025-02-19 | End: 2025-02-25

## 2025-02-19 RX ORDER — MELATONIN 5 MG
3 TABLET ORAL AT BEDTIME
Refills: 0 | Status: DISCONTINUED | OUTPATIENT
Start: 2025-02-19 | End: 2025-02-25

## 2025-02-19 RX ORDER — ENOXAPARIN SODIUM 100 MG/ML
40 INJECTION SUBCUTANEOUS EVERY 24 HOURS
Refills: 0 | Status: DISCONTINUED | OUTPATIENT
Start: 2025-02-19 | End: 2025-02-25

## 2025-02-19 RX ORDER — METOCLOPRAMIDE HCL 10 MG
10 TABLET ORAL EVERY 8 HOURS
Refills: 0 | Status: DISCONTINUED | OUTPATIENT
Start: 2025-02-19 | End: 2025-02-25

## 2025-02-19 RX ORDER — LIDOCAINE HCL/PF 10 MG/ML
10 VIAL (ML) INJECTION ONCE
Refills: 0 | Status: COMPLETED | OUTPATIENT
Start: 2025-02-19 | End: 2025-02-19

## 2025-02-19 RX ORDER — OXYCODONE HYDROCHLORIDE 30 MG/1
10 TABLET ORAL EVERY 4 HOURS
Refills: 0 | Status: DISCONTINUED | OUTPATIENT
Start: 2025-02-19 | End: 2025-02-25

## 2025-02-19 RX ORDER — AMPICILLIN SODIUM AND SULBACTAM SODIUM 1; .5 G/1; G/1
INJECTION, POWDER, FOR SOLUTION INTRAMUSCULAR; INTRAVENOUS
Refills: 0 | Status: DISCONTINUED | OUTPATIENT
Start: 2025-02-19 | End: 2025-02-22

## 2025-02-19 RX ORDER — OXYCODONE HYDROCHLORIDE 30 MG/1
5 TABLET ORAL EVERY 4 HOURS
Refills: 0 | Status: DISCONTINUED | OUTPATIENT
Start: 2025-02-19 | End: 2025-02-25

## 2025-02-19 RX ORDER — IBUPROFEN 200 MG
400 TABLET ORAL EVERY 6 HOURS
Refills: 0 | Status: DISCONTINUED | OUTPATIENT
Start: 2025-02-19 | End: 2025-02-25

## 2025-02-19 RX ORDER — KETOROLAC TROMETHAMINE 30 MG/ML
15 INJECTION, SOLUTION INTRAMUSCULAR; INTRAVENOUS ONCE
Refills: 0 | Status: DISCONTINUED | OUTPATIENT
Start: 2025-02-19 | End: 2025-02-19

## 2025-02-19 RX ADMIN — AMPICILLIN SODIUM AND SULBACTAM SODIUM 200 GRAM(S): 1; .5 INJECTION, POWDER, FOR SOLUTION INTRAMUSCULAR; INTRAVENOUS at 21:18

## 2025-02-19 RX ADMIN — OXYCODONE HYDROCHLORIDE 5 MILLIGRAM(S): 30 TABLET ORAL at 12:07

## 2025-02-19 RX ADMIN — AMPICILLIN SODIUM AND SULBACTAM SODIUM 200 GRAM(S): 1; .5 INJECTION, POWDER, FOR SOLUTION INTRAMUSCULAR; INTRAVENOUS at 15:36

## 2025-02-19 RX ADMIN — KETOROLAC TROMETHAMINE 15 MILLIGRAM(S): 30 INJECTION, SOLUTION INTRAMUSCULAR; INTRAVENOUS at 12:06

## 2025-02-19 RX ADMIN — OXYCODONE HYDROCHLORIDE 5 MILLIGRAM(S): 30 TABLET ORAL at 15:35

## 2025-02-19 RX ADMIN — ENOXAPARIN SODIUM 40 MILLIGRAM(S): 100 INJECTION SUBCUTANEOUS at 21:18

## 2025-02-19 RX ADMIN — Medication 10 MILLIGRAM(S): at 21:17

## 2025-02-19 RX ADMIN — Medication 2 TABLET(S): at 21:16

## 2025-02-19 RX ADMIN — OXYCODONE HYDROCHLORIDE 10 MILLIGRAM(S): 30 TABLET ORAL at 21:16

## 2025-02-19 RX ADMIN — Medication 1000 MILLILITER(S): at 12:09

## 2025-02-19 RX ADMIN — Medication 4 MILLIGRAM(S): at 17:13

## 2025-02-19 RX ADMIN — Medication 10 MILLILITER(S): at 16:04

## 2025-02-19 RX ADMIN — OXYCODONE HYDROCHLORIDE 10 MILLIGRAM(S): 30 TABLET ORAL at 22:16

## 2025-02-19 NOTE — ED ADULT NURSE NOTE - OBJECTIVE STATEMENT
ER pt sent by PMD for right side neck/flap swelling since yest.  pt stated she noted slightly yest and today much more with increase pain to area, pt stated she similar episodes x 3  in past which at time it opens up itself with lots of blood.   Denies diff. swallowing, no diff. with words, no SOB,  no dizziness.   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   + smoking at times at present time.   Labs sent, IV to left AC 20g angio cath place,   Medicated as ordered.        Rozina Armas RN

## 2025-02-19 NOTE — H&P ADULT - NSHPPHYSICALEXAM_GEN_ALL_CORE
-- CONSTITUTIONAL: NAD, lying in bed  -- NEURO: Awake, alert  -- HEENT: NC/AT, mucous membranes moist. No apparent intraoral wound, mucosa intact. Flap over chin/anterior neck warm and well perfused, swollen-appearing with fluctuance at R inferolateral border.   -- NECK: +Cellulitis with erythema and fluctuance in R lateral neck underneath flap, otherwise soft  -- PULM: Non-labored respirations, equal chest rise bilaterally  -- ABDOMEN: Nondistended  -- EXTREMITIES: Warm and well perfused. L donor site in thigh well healed  -- PSYCH: Affect normal, A&Ox3

## 2025-02-19 NOTE — ED PROVIDER NOTE - OBJECTIVE STATEMENT
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 Pt reports that the area is always more swollen since the surgery was done there but 2 days ago she noticed significant additional swelling. Also with pain with swallowing. Saw plastics yesterday and was started on PO abx (cannot remember the name). This morning called back and they recommended she come to the ED for IV abx and admission. No fever, chills, SOB. Tolerating secretions without difficulty.

## 2025-02-19 NOTE — ED ADULT TRIAGE NOTE - CHIEF COMPLAINT QUOTE
c/o pain upon swallowing, lower jaw abscess. NAD. no drooling observed and is talking in full sentences. sent by MD for IV antibiotics. HX of jaw CA status post chemo/radiation

## 2025-02-19 NOTE — ED ADULT NURSE NOTE - NSFALLUNIVINTERV_ED_ALL_ED
Bed/Stretcher in lowest position, wheels locked, appropriate side rails in place/Call bell, personal items and telephone in reach/Instruct patient to call for assistance before getting out of bed/chair/stretcher/Non-slip footwear applied when patient is off stretcher/[place] to call system/Physically safe environment - no spills, clutter or unnecessary equipment/Purposeful proactive rounding/Room/bathroom lighting operational, light cord in reach

## 2025-02-19 NOTE — ED PROVIDER NOTE - BIRTH SEX
Female Bed/Stretcher in lowest position, wheels locked, appropriate side rails in place/Call bell, personal items and telephone in reach/Instruct patient to call for assistance before getting out of bed/chair/stretcher/Non-slip footwear applied when patient is off stretcher/[place] to call system/Physically safe environment - no spills, clutter or unnecessary equipment/Purposeful proactive rounding/Room/bathroom lighting operational, light cord in reach

## 2025-02-19 NOTE — ED PROVIDER NOTE - CLINICAL SUMMARY MEDICAL DECISION MAKING FREE TEXT BOX
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 Pt is breathing comfortably, no stridor or drooling. Concern for possible infection- plan for labs, CT neck soft tissue, plastics c/s

## 2025-02-19 NOTE — H&P ADULT - ASSESSMENT
ASSESSMENT/PLAN:   PARUL HERNANDEZ is a 59yFemale with complicated past surgical history who is now most recently s/p debridement of non-viable skin, removal of large mandibular plate w/ reconstruction to anterior mandible with ALT from L thigh to neck 7/30/24. Patient presenting with R neck abscess, now s/p I&D and packing at bedside in ED.     - Unasyn while cultures pending from I&D  - Tylenol/ibuprofen/oxycode for pain control  - Routine vitals/I&Os  - Aquacel Ag ribbon packing to neck by PRS team  - Soft/bite sized diet  - Ambulate as tolerated  - Dispo: inpatient with IV abx    Plastic Surgery   [facility]: [id number]  [facility]: [phone]

## 2025-02-19 NOTE — ED ADULT TRIAGE NOTE - PATIENT ON (OXYGEN DELIVERY METHOD)
room air
16yo male with no pmhx presents to ED c/o L pinky pain. Pt states he was playing basketball and went to catch the ball when he jammed his pinky finger into the ball. Pt states that he came straight here. Pt denies any numbness or tingling to the finger. Pt states he cannot bend the finger now. Pt denies any other complaints at this time. XR showing dislocation of distal phalanx of L ken. Dislocation manually reduced without complication. Pt able to bend and move finger after reduction. No parasthesias. Repeat XR showing successful joint reduction. Pt placed in finger splint and instructed to follow up outpatient with ortho provided within 1 week. Pt and family member agrees and understands plan for discharge. All questions answered. Return precautions discussed with patient.    I, Yehuda Washington, performed the initial face to face bedside interview with this patient regarding history of present illness, review of symptoms and relevant past medical, social and family history.  I completed an independent physical examination.  I was the initial provider who evaluated this patient. I have signed out the follow up of any pending tests (i.e. labs, radiological studies) to the ACP.  I have communicated the patient’s plan of care and disposition with the ACP.

## 2025-02-19 NOTE — H&P ADULT - NSHPLABSRESULTS_GEN_ALL_CORE
11.2   7.82  )-----------( 220      ( 19 Feb 2025 11:50 )             35.3       02-19    134[L]  |  98  |  25[H]  ----------------------------<  81  4.6   |  24  |  1.23    Ca    9.5      19 Feb 2025 11:50    TPro  7.1  /  Alb  3.9  /  TBili  <0.2  /  DBili  x   /  AST  10  /  ALT  6   /  AlkPhos  83  02-19              Urinalysis Basic - ( 19 Feb 2025 11:50 )    Color: x / Appearance: x / SG: x / pH: x  Gluc: 81 mg/dL / Ketone: x  / Bili: x / Urobili: x   Blood: x / Protein: x / Nitrite: x   Leuk Esterase: x / RBC: x / WBC x   Sq Epi: x / Non Sq Epi: x / Bacteria: x            Lactate Trend            CAPILLARY BLOOD GLUCOSE

## 2025-02-19 NOTE — ED ADULT NURSE NOTE - NS ED NURSE LEVEL OF CONSCIOUSNESS SPEECH
"              After Visit Summary   2/12/2018    Bebeto Sommers    MRN: [id number]           Patient Information     Date Of Birth          2018        Visit Information        Provider Department      2/12/2018 9:40 AM Tamiko Cortez MD [facility]        Today's Diagnoses     Encounter for routine child health examination w/o abnormal findings    -  1      Care Instructions      Second flu shot > 28 days after 2/12/2018  Also 9 mo check    Preventive Care at the 6 Month Visit  Growth Measurements & Percentiles  Head Circumference: 17.72\" (45 cm) (91 %, Source: WHO (Boys, 0-2 years)) 91 %ile based on WHO (Boys, 0-2 years) head circumference-for-age data using vitals from 2/12/2018.   Weight: 19 lbs 7 oz / 8.82 kg (actual weight) 83 %ile based on WHO (Boys, 0-2 years) weight-for-age data using vitals from 2/12/2018.   Length: 2' 4.74\" / 73 cm >99 %ile based on WHO (Boys, 0-2 years) length-for-age data using vitals from 2/12/2018.   Weight for length: 36 %ile based on WHO (Boys, 0-2 years) weight-for-recumbent length data using vitals from 2/12/2018.    Your baby s next Preventive Check-up will be at 9 months of age    Development  At this age, your baby may:    roll over    sit with support or lean forward on his hands in a sitting position    put some weight on his legs when held up    play with his feet    laugh, squeal, blow bubbles, imitate sounds like a cough or a  raspberry  and try to make sounds    show signs of anxiety around strangers or if a parent leaves    be upset if a toy is taken away or lost.    Feeding Tips    Give your baby breast milk or formula until his first birthday.    If you have not already, you may introduce solid baby foods: cereal, fruits, vegetables and meats.  Avoid added sugar and salt.  Infants do not need juice, however, if you provide juice, offer no more than 4 oz per day using a cup.    Avoid cow milk and honey until 12 months " of age.    You may need to give your baby a fluoride supplement if you have well water or a water softener.    To reduce your child's chance of developing peanut allergy, you can start introducing peanut-containing foods in small amounts around 6 months of age.  If your child has severe eczema, egg allergy or both, consult with your doctor first about possible allergy-testing and introduction of small amounts of peanut-containing foods at 4-6 months old.  Teething    While getting teeth, your baby may drool and chew a lot. A teething ring can give comfort.    Gently clean your baby s gums and teeth after meals. Use a soft toothbrush or cloth with water or small amount of fluoridated tooth and gum cleanser.    Stools    Your baby s bowel movements may change.  They may occur less often, have a strong odor or become a different color if he is eating solid foods.    Sleep    Your baby may sleep about 10-14 hours a day.    Put your baby to bed while awake. Give your baby the same safe toy or blanket. This is called a  transition object.  Do not play with or have a lot of contact with your baby at nighttime.    Continue to put your baby to sleep on his back, even if he is able to roll over on his own.    At this age, some, but not all, babies are sleeping for longer stretches at night (6-8 hours), awakening 0-2 times at night.    If you put your baby to sleep with a pacifier, take the pacifier out after your baby falls asleep.    Your goal is to help your child learn to fall asleep without your aid--both at the beginning of the night and if he wakes during the night.  Try to decrease and eliminate any sleep-associations your child might have (breast feeding for comfort when not hungry, rocking the child to sleep in your arms).  Put your child down drowsy, but awake, and work to leave him in the crib when he wakes during the night.  All children wake during night sleep.  He will eventually be able to fall back to sleep  alone.    Safety    Keep your baby out of the sun. If your baby is outside, use sunscreen with a SPF of more than 15. Try to put your baby under shade or an umbrella and put a hat on his or her head.    Do not use infant walkers. They can cause serious accidents and serve no useful purpose.    Childproof your house now, since your baby will soon scoot and crawl.  Put plugs in the outlets; cover any sharp furniture corners; take care of dangling cords (including window blinds), tablecloths and hot liquids; and put mcdonnell on all stairways.    Do not let your baby get small objects such as toys, nuts, coins, etc. These items may cause choking.    Never leave your baby alone, not even for a few seconds.    Use a playpen or crib to keep your baby safe.    Do not hold your child while you are drinking or cooking with hot liquids.    Turn your hot water heater to less than 120 degrees Fahrenheit.    Keep all medicines, cleaning supplies, and poisons out of your baby s reach.    Call the poison control center (1-331.481.1176) if your baby swallows poison.    What to Know About Television    The first two years of life are critical during the growth and development of your child s brain. Your child needs positive contact with other children and adults. Too much television can have a negative effect on your child s brain development. This is especially true when your child is learning to talk and play with others. The American Academy of Pediatrics recommends no television for children age 2 or younger.    What Your Baby Needs    Play games such as  peek-a-faust  and  so big  with your baby.    Talk to your baby and respond to his sounds. This will help stimulate speech.    Give your baby age-appropriate toys.    Read to your baby every night.    Your baby may have separation anxiety. This means he may get upset when a parent leaves. This is normal. Take some time to get out of the house occasionally.    Your baby does not  understand the meaning of  no.  You will have to remove him from unsafe situations.    Babies fuss or cry because of a need or frustration. He is not crying to upset you or to be naughty.    Dental Care    Your pediatric provider will speak with you regarding the need for regular dental appointments for cleanings and check-ups after your child s first tooth appears.    Starting with the first tooth, you can brush with a small amount of fluoridated toothpaste (no more than pea size) once daily.    (Your child may need a fluoride supplement if you have well water.)        INTRODUCING COMPLEMENTARY FOODS    THE ONLY RULES:  1) NO HONEY before age 1  2) NO GLASS OF COW'S MILK (but yogurt and cheese ok)  3) Enjoy!    NUTRITIONAL CONSIDERATIONS  1) Vitamin D 400 IU/day  2) Iron rich foods by 6 months old  3) Peanut product and eggs around 6 months    Here are some tips to enjoy starting foods with your baby:  Start when your child asks:   It is often between 4-6 months that child starts watching you eat intently and then mouthing or grabbing for food.  Follow their cues to start and stop eating.    Make it a FAMILY meal  Bring your baby as close to your table as possible and share some of the same food. Start a family tradition of enjoying food together.  Give REAL FOOD  Ideas are soft avocado or sweet potato (cooked until soft). Let your baby handle and smell the food first. Then mash some up and enjoy together. You can add some breast milk (or formula) to thin your baby s portion. Whole grain porridges, such as oatmeal or brown rice cereal have also been used for generations as first foods for babies.   Give your baby a broad variety of taste experiences.  Now is the time to introduce lots of healthy flavors (including healthy herbs and spices) that you want your child to enjoy later.  Your child has already tried these if they have had breast milk.      Don t delay foods to avoid allergies.  There is no good evidence  "that delaying any food beyond 4-6 months decreases allergy risk - and there is some evidence that the opposite may be true.  Don t give up.  It takes an average of 6 to 10 tries before a baby likes an unfamiliar food.   Let your child \"dig in\"  Let your child play with their food and get messy (e.g. soft avacado chunks).  Give Water   As you start with foods, give a sippy cup of water or help your child to drink from a cup.  Follow your child's cues to know whether they are thirsty.  Schedule:  One need not follow this strictly, the WHO suggests giving food initially 2-3 times a day between 6-8 months, increasing to 3-4 times daily between 9-11 months and 12-24 months with additional nutritious snacks offered 1-2 times per day, as desired.  Remember - if choosing, breastmilk and formula are overall more nutritious than complimentary foods so should take precedence.   Consistency:  How chunky can the food be? If your baby is not gagging & choking on the food, then the texture (table foods, etc.) is fine. Watch carefully with new foods and always supervise your child when she is eating finger foods.  Avoid choking foods: hot dogs, nuts and seeds, chunks of meat or cheese, whole grapes, hard, gooey, or sticky candy, popcorn, large chunks of peanut butter, raw hard vegetables (carrots).    Peanuts and Eggs:   Recent studies have shown less allergies when these foods are introduced around 6 months old.  Experts suggest giving about 1-2 teaspoons peanut butter (can mix with water or breast milk/formula) once weekly (other products such as wendie or powder fine to give about 3grams peanut protein/week).     Nutrition  VITAMIN D:   If child is breast fed or takes in < 32oz/day formula give 400 IU/day of vit D.      IRON:  Give your child that foods provide good iron sources, particularly if they are breast-fed Examples are iron-fortified whole grain cereals or pastas, meats (liver!), beans, leafy green vegetables, prune " "juice, eggs, blackstrap molasses or lay's yeast.  Mix any of these with a vitamin C source (many fruits and veges) and your child will absorb even more.    A 4-12 mo old baby generally needs about 11 mg/day of iron.  A breast fed baby and obtains about 5 mg/day from breastfeeding about 34oz/day - so requires about 6 mg/day iron from foods.  A formula fed baby take about 34 oz/day receives about 10mg/day iron from formula.  This is a complicated area, but if your child is not ingesting iron-rich foods, we can discuss whether an iron-supplement is necessary.  It is standard to test your child's hemoglobin at age 12 months which provides an indication of iron level.    See How Much Iron is in 1 Tablespoon of the following common baby foods:  (there are approximately 14 grams in 1 Tablespoon)  Compiled from the[facility] Nutrient Database  Baby Rice or oatmeal Cereal 1mg  Broccoli 0.1 mg  Sweet Potato 0.1 mg  Spinach 0.4mg  Rasins 0.2mg  Bread fortified 1 slice 1mg  Instant \"adult\" (not baby) Oatmeal fortified 0.6 mg  Beans 0.25-0.45mg (various types)  Blackstrap Molasses 3.5 mg (only for > 12 months old)  Tofu 0.45 mg  Beef 0.4 mg   Chicken 0.15 mg (light meat)  Chicken 0.2 mg (dark meat)  Turkey 0.3 mg (dark meat)  Turkey 0.2 mg (light meat)   Liver 1.8 mg  Egg Yolk 0.4 mg  Brewers yeast 0.5mg    Ground flaxseed 0.4mg  Seeds: pumpkin, sunflower, sesame, flax (could grind these)  A few more iron rich foods: prune juice, mushrooms, sea vegetables (arame, dulse), algaes (spirulina), kelp, greens (spinach, chard, dandelion, beet, nettle, parsley, watercress), yellow dock root, grains (millet, brown rice, amaranth, quinoa, breads with these grains), lay s yeast, dried fruit (figs, apricots, prunes, raisins - can soak these in water to get them soft), shellfish (clams, oysters, shrimp)     SLEEP IS A KEY ELEMENT FOR HEALTHY AND HAPPY KIDS!    SAFE SLEEP   (especially ages 0-6mo)  Do sleep on BACK (not side or stomach)  Do " "have a FIRM FLAT surface  Do room-share with baby in their own bed (bassinet, crib etc.)   Do breastfeed  Do give baby standard immunizations  NO soft bedding or other items in bed (free blankets, stuffed animals)    NO Smoking/vaping  NO falling asleep w baby on couch/chair    BASIC SLEEP PRINCIPALS    KEEP A SCHEDULE Children thrive with routine.  The following are guidelines.  Every child is different and all parents choose various ways to work on sleep.  Schedule becomes more important around 4-6 months and beyond.    KEEP A ROUTINE  Your child will start to depend on this routine to \"know\" it's time to go to bed.  A routine can be simple (lights off, wrap up and rock) or complex (massage, bath, story etc.) and should be geared to the child's age.  This is most important beyond 4-6 months.    HELP YOUR CHILD LEARN TO FALL ASLEEP ON THEIR OWN  This is important for all ages.  Common examples include: TRY to put a young child (start working on this diligently around 3 months) down in the crib \"drowsy but awake\" and do no let them fall asleep on the breast or bottle.  Another example is a child who needs a parent to lay with them to fall asleep - parents can use various techniques to eliminate this such as moving further away every night (lay on floor, then sit by door etc.).  Children ALL wake during the night and this will help them know how to put themselves back to sleep on their own.      2-4 months   - During the day babies want to go back to sleep after being awake for 1-3 hours.   - Gradually pull the bedtime back during this period (most will go from 9-11pm at 2 months to 7-8:30 pm at 4 months).    - First morning nap (about 1 hours after waking) becomes somewhat reliable (you can practice trying to nap in the crib!).    - most 4 mo old babies can sleep with 2 night wakings (one 6-8 hours unbroken stretch)  - be aware that the longest stretch awake will be before bed.  Start trying for no napping about " "3-3.5 hours prior to bedtime.    4-6 months:  - KEY time for sleep habits to form!    - Goals are to have your child eventually fall asleep on their own (see below) and sleep in a quiet (or with sound machine) and dark area with no motion (such as the child's crib).    - You should see a napping schedule evolve that is 2-3 naps/day.    - You may use the 2 hour rule (put down for a nap 2 hours after waking from last nap).  -  - 6 mo old typically can sleep from 7-8:30pm until 6-7am with 0-1 feedings (often one early feeding around 4-5am but go back to sleep).     Sample schedule evolving at 4-6 months old:  7-8:30 pm to bed, 6-7 am waking (one unbroken piece of sleep 6-8 hours)  Around 3 naps (9am, noon and 3:30pm)  Aim for no sleeping after 5pm until bedtime    6-12 months: Most children are now on a set routine with 2 daytime naps (many children take naps at 9am and 12:30 and 7-8pm bedtime).  The later-in-the-day 3rd \"cat nap\" is typically dropped between 6-8 mo old.      15-18 months: most typical time to move from 2 to 1 nap/day    3 years: most typical time to \"drop\" the daily nap (range of dropping this is 2-4 years).    WEBSITES:  Dr. Isaiah Thomas at [email or web address]  Dr. Josh Guillen at [email or web address]     BOOKS:  Most sleep books rely on the same sleep principals so most all books are very helpful.    Good night sleep tight by [facility] Sleep Habits Happy Child    AVERAGE HOURS OF DAYTIME AND NIGHTTIME SLEEP   1 month old 15-16 hours  3 month old 15 hours  6 month old 14-15 hours  9 month old 14 hours  12 month old 13-14 hours  2 years 13 hours  3 years 12 hours  4 years 11.5 hours  5 years 11 hours    NOTES ON SLEEP TRAINING  1) It is best to use a \"layered approach\" - figure out where your problems lie and then tackle them one by one.  \"Cold turkey\" may work but is more likely to fail (parents have trouble listening to the child scream for hours).    2) Your goal is to " "eliminate sleep associations.    3) If baby is waking MORE often then typical (see above schedules) then consider removing sleep crutches in a sequence.  First you might stop feeding at every waking, but still ROCK the child back to sleep (done by someone other than mom who is breastfeeding).  THEN, once feedings are eliminated down to a \"regular feeding schedule\" slowly pull back on less and less rocking/soothing, perhaps moving to patting while laying in the crib.  FINALLY, you can put your child down more and more awake and he can finally learn to fall asleep on his own.                Follow-ups after your visit        Who to contact     If you have questions or need follow up information about today's clinic visit or your schedule please contact [facility] directly at [phone].  Normal or non-critical lab and imaging results will be communicated to you by [employer], letter or phone within 4 business days after the clinic has received the results. If you do not hear from us within 7 days, please contact the clinic through [employer] or phone. If you have a critical or abnormal lab result, we will notify you by phone as soon as possible.  Submit refill requests through [employer] or call your pharmacy and they will forward the refill request to us. Please allow 3 business days for your refill to be completed.          Additional Information About Your Visit        Health Benefits Direct Information     [employer] Benefits Direct gives you secure access to your electronic health record. If you see a primary care provider, you can also send messages to your care team and make appointments. If you have questions, please call your primary care clinic.  If you do not have a primary care provider, please call [phone] and they will assist you.        Care EveryWhere ID     This is your Care EveryWhere ID. This could be used by other organizations to access your [place] medical records  [id number]        Your Vitals Were  " "   Pulse Temperature Height Head Circumference BMI (Body Mass Index)       106 98.7  F (37.1  C) (Rectal) 2' 4.74\" (0.73 m) 17.72\" (45 cm) 16.55 kg/m2        Blood Pressure from Last 3 Encounters:   No data found for BP    Weight from Last 3 Encounters:   02/12/18 19 lb 7 oz (8.817 kg) (83 %)*   12/11/17 17 lb 1.5 oz (7.754 kg) (82 %)*   12/03/17 17 lb 3.7 oz (7.815 kg) (88 %)*     * Growth percentiles are based on WHO (Boys, 0-2 years) data.              We Performed the Following     DTAP - HIB - IPV VACCINE, IM USE (Pentacel) [13392]     FLU VAC, SPLIT VIRUS IM, 6-35 MO (QUADRIVALENT) [21307]     HEPATITIS B VACCINE,PED/ADOL,IM [21838]     PNEUMOCOCCAL CONJ VACCINE 13 VALENT IM [12984]     Screening Questionnaire for Immunizations     VACCINE ADMINISTRATION, EACH ADDITIONAL     VACCINE ADMINISTRATION, INITIAL        Primary Care Provider Office Phone # Fax #    Tamiko Cortez [phone] [address] [id number]        Equal Access to Services     ABIGAIL FONTANEZ AH: Hadii zulma nicoleo Soginetteali, waaxda luqadaha, qaybta kaalmada adeegyada, moon rios. So [facility] [phone].    ATENCIÓN: Si habla español, tiene a rodriguez disposición servicios gratuitos de asistencia lingüística. Llame al [phone].    We comply with applicable federal civil rights laws and [place] laws. We do not discriminate on the basis of race, color, national origin, age, disability, sex, sexual orientation, or gender identity.            Thank you!     Thank you for choosing [facility]  for your care. Our goal is always to provide you with excellent care. Hearing back from our patients is one way we can continue to improve our services. Please take a few minutes to complete the written survey that you may receive in the mail after your visit with us. Thank you!             Your Updated Medication List - Protect others around you: Learn " how to safely use, store and throw away your medicines at www.disposemymeds.org.          This list is accurate as of 2/12/18 10:23 AM.  Always use your most recent med list.                   Brand Name Dispense Instructions for use Diagnosis    mupirocin 2 % ointment    BACTROBAN    22 g    Apply topically 3 times daily    Diaper rash          Speaking Coherently

## 2025-02-19 NOTE — ED PROVIDER NOTE - PROGRESS NOTE DETAILS
SONDRA COLLINS: Received call from radiologist, state there's a large abscess in right submandibular area. spoke with Dr. Evans's office, left message w/ call back #. MD Amara: Plastic has seen pt, recommending admission to plastics, IV unasyn, and IR c/s

## 2025-02-19 NOTE — ED PROVIDER NOTE - ED STEMI HIDDEN
Admission Status; Secondary Review Determination  Under the authority of the Utilization Management Committee, the utilization review process indicated a secondary review on the above patient. The review outcome is based on review of the medical records, discussions with staff, and applying clinical experience noted on the date of the review.   (x) Inpatient Status Appropriate - This patient's medical care is consistent with medical management for inpatient care and reasonable inpatient medical practice.   RATIONALE FOR DETERMINATION   Ms. Mora is a 35 yo female who presents to the ED with weakness, SOB and sore throat.  COVID, flu, RSV testing negative.  CXR with left basilar infiltrate.  Clinical concern for pneumonia with early sepsis( leukocytosis, hypotension, tachycardia, tachypnea and acute hypoxia).  She continue on IVF and IV abx for infectious pneumonia.  HR improved but still tachypneic and hypotension this am.  She is requiring ongoing inpatient treatment and monitoring.   At the time of admission with the information available to the attending physician more than 2 nights Hospital complex care was anticipated, based on patient risk of adverse outcome if treated as outpatient and complex care required. Inpatient admission is appropriate based on the Medicare guidelines.   The information on this document is developed by the utilization review team in order for the business office to ensure compliance. This only denotes the appropriateness of proper admission status and does not reflect the quality of care rendered.   The definitions of Inpatient Status and Observation Status used in making the determination above are those provided in the CMS Coverage Manual, Chapter 1 and Chapter 6, section 70.4.     Sincerely,     Cary Collado, DO  Utilization Review  Physician Advisor    hide

## 2025-02-19 NOTE — H&P ADULT - HISTORY OF PRESENT ILLNESS
Pt is a 59 year female s/p debridement of non-viable skin, removal of large mandibular plate w/ ALT from L thigh to neck 7/30/24. Pt presents today c/o pain and redness along right neck area for 2 days. Reported noticing flap to be "getting bigger and swollen" accompanied by pain while swallowing. Pt seen in office yesterday and started on PO Bactrim. Denies cp, sob, subjective fever, chills, nausea/vomiting.

## 2025-02-19 NOTE — ED PROVIDER NOTE - ENMT, MLM
Airway patent, Nasal mucosa clear. Mouth with normal mucosa. Throat has no vesicles, no oropharyngeal exudates and uvula is midline.  Significant swelling of R side of neck, on the bottom lateral aspect of the area of swelling there is an area of erythema

## 2025-02-20 LAB
ANION GAP SERPL CALC-SCNC: 14 MMOL/L — SIGNIFICANT CHANGE UP (ref 7–14)
BUN SERPL-MCNC: 23 MG/DL — SIGNIFICANT CHANGE UP (ref 7–23)
CALCIUM SERPL-MCNC: 8.8 MG/DL — SIGNIFICANT CHANGE UP (ref 8.4–10.5)
CHLORIDE SERPL-SCNC: 101 MMOL/L — SIGNIFICANT CHANGE UP (ref 98–107)
CO2 SERPL-SCNC: 20 MMOL/L — LOW (ref 22–31)
CREAT SERPL-MCNC: 1.24 MG/DL — SIGNIFICANT CHANGE UP (ref 0.5–1.3)
EGFR: 50 ML/MIN/1.73M2 — LOW
GLUCOSE SERPL-MCNC: 77 MG/DL — SIGNIFICANT CHANGE UP (ref 70–99)
HCT VFR BLD CALC: 32.7 % — LOW (ref 34.5–45)
HGB BLD-MCNC: 10.2 G/DL — LOW (ref 11.5–15.5)
MCHC RBC-ENTMCNC: 27 PG — SIGNIFICANT CHANGE UP (ref 27–34)
MCHC RBC-ENTMCNC: 31.2 G/DL — LOW (ref 32–36)
MCV RBC AUTO: 86.5 FL — SIGNIFICANT CHANGE UP (ref 80–100)
NRBC # BLD AUTO: 0 K/UL — SIGNIFICANT CHANGE UP (ref 0–0)
NRBC # FLD: 0 K/UL — SIGNIFICANT CHANGE UP (ref 0–0)
NRBC BLD AUTO-RTO: 0 /100 WBCS — SIGNIFICANT CHANGE UP (ref 0–0)
PLATELET # BLD AUTO: 211 K/UL — SIGNIFICANT CHANGE UP (ref 150–400)
POTASSIUM SERPL-MCNC: 4.8 MMOL/L — SIGNIFICANT CHANGE UP (ref 3.5–5.3)
POTASSIUM SERPL-SCNC: 4.8 MMOL/L — SIGNIFICANT CHANGE UP (ref 3.5–5.3)
RBC # BLD: 3.78 M/UL — LOW (ref 3.8–5.2)
RBC # FLD: 14.8 % — HIGH (ref 10.3–14.5)
SODIUM SERPL-SCNC: 135 MMOL/L — SIGNIFICANT CHANGE UP (ref 135–145)
WBC # BLD: 7.03 K/UL — SIGNIFICANT CHANGE UP (ref 3.8–10.5)
WBC # FLD AUTO: 7.03 K/UL — SIGNIFICANT CHANGE UP (ref 3.8–10.5)

## 2025-02-20 RX ORDER — HYPROMELLOSE 0.4 %
21 DROPS OPHTHALMIC (EYE)
Refills: 0 | Status: DISCONTINUED | OUTPATIENT
Start: 2025-02-20 | End: 2025-02-20

## 2025-02-20 RX ORDER — HYPROMELLOSE 0.4 %
2 DROPS OPHTHALMIC (EYE)
Refills: 0 | Status: DISCONTINUED | OUTPATIENT
Start: 2025-02-20 | End: 2025-02-25

## 2025-02-20 RX ADMIN — Medication 4 MILLIGRAM(S): at 02:46

## 2025-02-20 RX ADMIN — OXYCODONE HYDROCHLORIDE 10 MILLIGRAM(S): 30 TABLET ORAL at 03:50

## 2025-02-20 RX ADMIN — Medication 10 MILLIGRAM(S): at 10:15

## 2025-02-20 RX ADMIN — AMPICILLIN SODIUM AND SULBACTAM SODIUM 200 GRAM(S): 1; .5 INJECTION, POWDER, FOR SOLUTION INTRAMUSCULAR; INTRAVENOUS at 21:14

## 2025-02-20 RX ADMIN — OXYCODONE HYDROCHLORIDE 10 MILLIGRAM(S): 30 TABLET ORAL at 12:56

## 2025-02-20 RX ADMIN — OXYCODONE HYDROCHLORIDE 10 MILLIGRAM(S): 30 TABLET ORAL at 02:50

## 2025-02-20 RX ADMIN — OXYCODONE HYDROCHLORIDE 10 MILLIGRAM(S): 30 TABLET ORAL at 13:56

## 2025-02-20 RX ADMIN — Medication 4 MILLIGRAM(S): at 18:12

## 2025-02-20 RX ADMIN — AMPICILLIN SODIUM AND SULBACTAM SODIUM 200 GRAM(S): 1; .5 INJECTION, POWDER, FOR SOLUTION INTRAMUSCULAR; INTRAVENOUS at 14:48

## 2025-02-20 RX ADMIN — AMPICILLIN SODIUM AND SULBACTAM SODIUM 200 GRAM(S): 1; .5 INJECTION, POWDER, FOR SOLUTION INTRAMUSCULAR; INTRAVENOUS at 09:35

## 2025-02-20 RX ADMIN — ENOXAPARIN SODIUM 40 MILLIGRAM(S): 100 INJECTION SUBCUTANEOUS at 21:15

## 2025-02-20 RX ADMIN — AMPICILLIN SODIUM AND SULBACTAM SODIUM 200 GRAM(S): 1; .5 INJECTION, POWDER, FOR SOLUTION INTRAMUSCULAR; INTRAVENOUS at 02:46

## 2025-02-20 NOTE — DIETITIAN INITIAL EVALUATION ADULT - ADD RECOMMEND
- Continue current diet order, which remains appropriate at this time. Defer food and fluid consistency to SLP/Team.   - Recommend swallow evaluation to assess appropriateness of current diet consistency possible upgrade.   - Recommend Ensure Plus High Protein 1x daily (provides 350 kcal, 20 gm protein per 8 oz serving).  - Monitor weights, diet tolerance, skin integrity, BMs, pertinent labs.   - Encourage and assist Pt with meals, Monitor PO diet tolerance.   - RDN services remain available as needed.

## 2025-02-20 NOTE — DIETITIAN INITIAL EVALUATION ADULT - OTHER CALCULATIONS
Defer fluid needs to MD/Team.   Ideal Body Weight: 120lbs / 54.4kg +/-10%  Wt hx as per Ck HIE:?52.2kg (dosing wt 2/19), 59.9kg (2/18), 56.2kg (10/4), 55.6kg (7/10), 48.5kg (6/4), 48.5kg (5/11), 48.1kg (4/12), 46.7kg (3/10), 42kg (1/22). Questionable dosing wt accuracy. Recommend to obtain new weight via tared bed scale. Per weight hx patient with weight trending up.

## 2025-02-20 NOTE — DIETITIAN INITIAL EVALUATION ADULT - PERTINENT MEDS FT
MEDICATIONS  (STANDING):  ampicillin/sulbactam  IVPB      ampicillin/sulbactam  IVPB 3 Gram(s) IV Intermittent every 6 hours  enoxaparin Injectable 40 milliGRAM(s) SubCutaneous every 24 hours  senna 2 Tablet(s) Oral at bedtime    MEDICATIONS  (PRN):  acetaminophen     Tablet .. 975 milliGRAM(s) Oral every 6 hours PRN Mild Pain (1 - 3)  calcium carbonate    500 mG (Tums) Chewable 1 Tablet(s) Chew every 4 hours PRN Dyspepsia  ibuprofen  Tablet. 400 milliGRAM(s) Oral every 6 hours PRN Temp greater or equal to 38C (100.4F), Mild Pain (1 - 3)  melatonin 3 milliGRAM(s) Oral at bedtime PRN Sleep  metoclopramide Injectable 10 milliGRAM(s) IV Push every 8 hours PRN Nausea and/or Vomiting  ondansetron Injectable 4 milliGRAM(s) IV Push every 6 hours PRN Nausea and/or Vomiting  oxyCODONE    IR 5 milliGRAM(s) Oral every 4 hours PRN Moderate Pain (4 - 6)  oxyCODONE    IR 10 milliGRAM(s) Oral every 4 hours PRN Severe Pain (7 - 10)  simethicone 80 milliGRAM(s) Chew every 6 hours PRN Gas

## 2025-02-20 NOTE — DIETITIAN INITIAL EVALUATION ADULT - WEIGHT (KG)
[FreeTextEntry1] : 3/27/24: 66 year old male, referred by Dr. Liu for urinary retention and to consider HoLEP. Prostate was 391 grams on MRI in 4/2022, 490 grams on more recent transabomindal ultrasound.  Prior to catheter placement, had been having increasing LUTS, though not bothersome enough to warrant intervention.   Currently taking flomax, had failed void trial this week.   MRI prostate 4/2022 391.3cc PIRADS 1  PSA 12.10, 9/2023 13.8, 3/2023 12.3, 3/2022  4/19/24: Returns for pre-op appointment. Surgery scheduled 4/26/24. Urine sample obtained from catheter. Patient preferred not to change catheter today understanding slightly higher risk of infection.  54.4

## 2025-02-20 NOTE — DIETITIAN INITIAL EVALUATION ADULT - CALCULATED TO (CAL/KG)
DISCHARGE INSTRUCTIONS FOR INJECTIONS     You underwent thoracic radiofrequency ablation today    Aftermost injections, it is recommended that you relax and limit your activity for the remainder of the day unless you have been told otherwise by your pain physician.  You should not drive a car, operate machinery, or make important legal decisions unless otherwise directed by your pain physician.  You may resume your normal activity, including exercise, tomorrow.      Keep a written pain diary of how much pain relief you experienced following the injection procedure and the length of time of pain relief you experienced pain relief. Following diagnostic injections like medial branch nerve blocks, sacroiliac joint blocks, stellate ganglion injections and other blocks, it is very important you record the specific amount of pain relief you experienced immediately after the injectionand how long it lasted. Your doctor will ask you for this information at your follow up visit.     For all injections, please keep the injection site dry and inspect the site for a couple of days. You may remove the Band-Aid the day of the injection at any time.     Some discomfort, bruising or slight swelling may occur at the injection site. This is not abnormal if it occurs.  If needed you may:    -Take over the counter medication such as Tylenol or Motrin.   -Apply an ice pack for 30 minutes, 2 to 3 times a day for the first 24 hours.     You may shower today; no soaking baths, hot tubs, whirlpools or swimming pools for two days.      If you are given steroids in your injection, it may take 3-5 days for the steroid medication to take effect. You may notice a worsening of your symptoms for 1-2 days after the injection. This is not abnormal.  You may use acetaminophen, ibuprofen, or prescription medication that your doctor may have prescribed for you if you need to do so.     A few common side effects of steroids include facial flushing,  sweating, restlessness, irritability,difficulty sleeping, increase in blood sugar, and increased blood pressure. If you have diabetes, please monitor your blood sugar at least once a day for at least 5 days. If you have poorly controlled high blood pressure, monitoryour blood pressure for at least 2 days and contact your primary care physician if these numbers are unusually high for you.      If you take aspirin or non-steroidal anti-inflammatory drugs (examples are Motrin, Advil, ibuprofen, Naprosyn, Voltaren, Relafen, etc.) you may restart these this evening, but stop taking it 3 days before your next appointment, unless instructed otherwiseby your physician.      You do not need to discontinue non-aspirin-containing pain medications prior to an injection (examples: Celebrex, tramadol, hydrocodone and acetaminophen).      If you take a blood thinning medication (Coumadin, Lovenox, Fragmin,Ticlid, Plavix, Pradaxa, etc.), please discuss this with your primary care physician/cardiologist and your pain physician. These medications MUST be discontinued before you can have an injection safely, without the risk of uncontrolled bleeding. If these medications are not discontinued for an appropriate period of time, you will not be able to receivean injection.      If you are taking Coumadin, please have your INR checked the morning of your procedure and bringthe result to your appointment unless otherwise instructed. If your INR is over 1.2, your injection may need to be rescheduled to avoid uncontrolled bleeding from the needle placement.     Call [facility] at [phone] between 8am-4pm Monday - Friday if you are experiencing the following:    If you received an epidural or spinal injection:    -Headache that doesnot go away with medicine, is worse when sitting or standing up, and is greatly relieved upon lying down.   -Severe pain worse than or different than your baseline pain.   -Chills or fever  (101º F or greater).   -Drainage or signs of infection at the injection site     Go directly to the Emergency Department if you are experiencing the following and received an epidural or spinal injection:   -Abrupt weakness or progressive weakness in your legs that starts after you leave the clinic.   -Abrupt severe or worsening numbness in your legs.   -Inability to urinate after the injection or loss of bowel or bladder control without the urge to defecate or urinate.     If you have a clinical question that cannot wait until your next appointment, please call [phone] between 8am-4pm Monday - Friday or send a [employer] message. We do our best to return all non-emergency messages within 24 hours, Monday - Friday. A nurse or physician will return your message.      If you need to cancel an appointment, please call the scheduling staff at [phone] during normal business hours or leave a message at least 24 hours in advance.     If you are going to be sedated for your next procedure, you MUST have responsible adult who can legally drive accompany you home. You cannot eat or drink for eight hours prior to the planned procedure if you are going to receive sedation. You may take your non-blood thinning medications with a small sip of water.      1904

## 2025-02-20 NOTE — DIETITIAN INITIAL EVALUATION ADULT - FLUID ACCUMULATION
"Nursing Notes:   Brittni Hutson LPN  10/3/2018  2:14 PM  Signed  Patient comes in to the clinic today to evaluate a possible tick bite on his left thigh.    Brittni Hutson LPN  10/3/2018  2:14 PM  Signed  Chief Complaint   Patient presents with     Tick Bite       Initial /80  Pulse 68  Temp 98.5  F (36.9  C)  Resp 16  Ht 5' 9.5\" (1.765 m)  Wt 156 lb (70.8 kg)  BMI 22.71 kg/m2 Estimated body mass index is 22.71 kg/(m^2) as calculated from the following:    Height as of this encounter: 5' 9.5\" (1.765 m).    Weight as of this encounter: 156 lb (70.8 kg).  Medication Reconciliation: complete    Brittni Hutson LPN        SUBJECTIVE:  Cordell Rae is a 41 year old male who comes in with a bug bite on his inner left thigh.  He first noticed the lesion when it became itchy about 2 days ago.  He reports he was in the [place] and [place] a lot on Saturday and Sunday but did not have a known tick bite and did not remove a tick.  He noticed some increasing erythema yesterday that seems to be stable today.    He has not had any fevers or chills.  No cardiac or neurologic symptoms.    Current Outpatient Prescriptions   Medication Sig Dispense Refill     doxycycline (VIBRAMYCIN) 100 MG capsule Take 1 capsule (100 mg) by mouth 2 times daily 28 capsule 0     nitroGLYcerin (NITROSTAT) 0.4 MG sublingual tablet Place 0.4 mg under the tongue every 5 minutes as needed for chest pain         /80  Pulse 68  Temp 98.5  F (36.9  C)  Resp 16  Ht 5' 9.5\" (1.765 m)  Wt 156 lb (70.8 kg)  BMI 22.71 kg/m2    Exam:  Cardiovascular: Regular rate and rhythm with no murmurs rubs or gallops  Neurologic: No deficits.  Skin: 1 mm ulceration with 3 x 4 cm area of faint erythema without any central clearing.  No warmth.  No fluctuance.      ASSESSMENT/Plan :        No images are attached to the encounter or orders placed in the encounter.      1. Tick bite, initial encounter  Discussed with patient options.  This looks like " a bug bite that has local irritation without definite cellulitis and currently with no central clearing consistent with a EM rash however it is endemic in the area and he was in the woods.  I gave patient 2 options.  One was to initiate treatment now the other was to monitor for a couple of days and consider treatment if the erythema worsened or he develop pain signaling potential cellulitis or develop central clearing concerning for EM rash.  He could then hold off on treatment if symptoms completely resolved over the next couple of days.  He chose the latter option.    He was recently tested for tickborne disease which was negative.  I discussed with patient that we could certainly retest but he wanted to hold off on this at this time and will come back in for testing with any further concerns.    Prescription was written in the event he had any interval worsening and he will remain in close contact with clinic.    - doxycycline (VIBRAMYCIN) 100 MG capsule; Take 1 capsule (100 mg) by mouth 2 times daily  Dispense: 28 capsule; Refill: 0          There are no Patient Instructions on file for this visit.    Neymar Foster    This document was created using computer generated templates and voiceactivated software.        As per RN flowsheet no edema noted at this time.

## 2025-02-20 NOTE — DIETITIAN INITIAL EVALUATION ADULT - PERTINENT LABORATORY DATA
02-20    135  |  101  |  23  ----------------------------<  77  4.8   |  20[L]  |  1.24    Ca    8.8      20 Feb 2025 03:42    TPro  7.1  /  Alb  3.9  /  TBili  <0.2  /  DBili  x   /  AST  10  /  ALT  6   /  AlkPhos  83  02-19

## 2025-02-20 NOTE — DIETITIAN INITIAL EVALUATION ADULT - OTHER INFO
Pt seen at bedside. Pt is ordered for PO diet. Pt dislike current diet consistency. Pt denies chewing or swallowing difficulties. Observed patient edentulous. Will recommend swallow evaluation to assess appropriateness of current diet consistency. No intake reported per RN flowsheets. Continue to monitor and document PO in flowsheets. Pt anable to oral nutritional supplement to enhance PO intakes and optimize nutritional status. Encouraged PO intakes. Food preferences obtained and noted  No GI distress reported i.e. nausea, vomiting, diarrhea. Unknown last BM. Bowel regimen in placed. No edema, no PI/DTI noted, per RN flowsheet. RDN answered questions/concerns related to diet. RDN remains available and will follow-up per protocol.

## 2025-02-20 NOTE — PROGRESS NOTE ADULT - SUBJECTIVE AND OBJECTIVE BOX
Plastic Surgery Progress Note (pg [facility]: [id number], [facility]: [phone])    SUBJECTIVE  The patient was seen and examined. No acute events overnight. Pain controlled, afebrile w/ stable vitals. Pain improved after I&D yesterday, denies f/c, difficulty talking, eating, or breathing        OBJECTIVE  ___________________________________________________  VITAL SIGNS / I&O's   Vital Signs Last 24 Hrs  T(C): 36.8 (20 Feb 2025 05:01), Max: 36.8 (19 Feb 2025 16:53)  T(F): 98.2 (20 Feb 2025 05:01), Max: 98.2 (19 Feb 2025 16:53)  HR: 76 (20 Feb 2025 05:01) (76 - 92)  BP: 107/57 (20 Feb 2025 05:01) (99/61 - 168/98)  BP(mean): --  RR: 17 (20 Feb 2025 05:01) (16 - 18)  SpO2: 95% (20 Feb 2025 05:01) (95% - 100%)    Parameters below as of 20 Feb 2025 05:01  Patient On (Oxygen Delivery Method): room air          19 Feb 2025 07:01  -  20 Feb 2025 07:00  --------------------------------------------------------  IN:  Total IN: 0 mL    OUT:    Oral Fluid: 0 mL    Voided (mL): 0 mL  Total OUT: 0 mL    Total NET: 0 mL        ___________________________________________________  PHYSICAL EXAM  -- CONSTITUTIONAL: NAD, lying in bed  -- HEENT: NC/AT, mucous membranes moist. No apparent intraoral wound, mucosa intact. Flap over chin/anterior neck warm and well perfused, swollen-appearing with fluctuance at R inferolateral border.   -- NECK: +Cellulitis with reduced erythema and fluctuance in R lateral neck after I&D underneath flap, otherwise soft  -- PULM: Non-labored respirations, equal chest rise bilaterally    ___________________________________________________  LABS                        10.2   7.03  )-----------( 211      ( 20 Feb 2025 03:42 )             32.7     20 Feb 2025 03:42    135    |  101    |  23     ----------------------------<  77     4.8     |  20     |  1.24     Ca    8.8        20 Feb 2025 03:42    TPro  7.1    /  Alb  3.9    /  TBili  <0.2   /  DBili  x      /  AST  10     /  ALT  6      /  AlkPhos  83     19 Feb 2025 11:50      CAPILLARY BLOOD GLUCOSE            Urinalysis Basic - ( 20 Feb 2025 03:42 )    Color: x / Appearance: x / SG: x / pH: x  Gluc: 77 mg/dL / Ketone: x  / Bili: x / Urobili: x   Blood: x / Protein: x / Nitrite: x   Leuk Esterase: x / RBC: x / WBC x   Sq Epi: x / Non Sq Epi: x / Bacteria: x      ___________________________________________________  MICRO  Recent Cultures:  Specimen Source: Abscess neck, 02-19 @ 17:29; Results --; Gram Stain:   Moderate polymorphonuclear leukocytes per low power field  Few Gram positive cocci in pairs per oil power field[!]; Organism: --    ___________________________________________________  MEDICATIONS  (STANDING):  ampicillin/sulbactam  IVPB      ampicillin/sulbactam  IVPB 3 Gram(s) IV Intermittent every 6 hours  enoxaparin Injectable 40 milliGRAM(s) SubCutaneous every 24 hours  senna 2 Tablet(s) Oral at bedtime    MEDICATIONS  (PRN):  acetaminophen     Tablet .. 975 milliGRAM(s) Oral every 6 hours PRN Mild Pain (1 - 3)  calcium carbonate    500 mG (Tums) Chewable 1 Tablet(s) Chew every 4 hours PRN Dyspepsia  ibuprofen  Tablet. 400 milliGRAM(s) Oral every 6 hours PRN Temp greater or equal to 38C (100.4F), Mild Pain (1 - 3)  melatonin 3 milliGRAM(s) Oral at bedtime PRN Sleep  metoclopramide Injectable 10 milliGRAM(s) IV Push every 8 hours PRN Nausea and/or Vomiting  ondansetron Injectable 4 milliGRAM(s) IV Push every 6 hours PRN Nausea and/or Vomiting  oxyCODONE    IR 5 milliGRAM(s) Oral every 4 hours PRN Moderate Pain (4 - 6)  oxyCODONE    IR 10 milliGRAM(s) Oral every 4 hours PRN Severe Pain (7 - 10)  simethicone 80 milliGRAM(s) Chew every 6 hours PRN Gas

## 2025-02-20 NOTE — DIETITIAN INITIAL EVALUATION ADULT - ORAL INTAKE PTA/DIET HISTORY
Patient reports generally good appetite/PO intake PTA, consumes a regular diet at baseline. Pt confirms NKFA, food intolerance. Stated height 64 inches. Pt reported gradual weight gain for the past year.

## 2025-02-20 NOTE — DIETITIAN INITIAL EVALUATION ADULT - REASON FOR ADMISSION
Per chart, Pt is  59yFemale with complicated past surgical history who is now most recently s/p debridement of non-viable skin, removal of large mandibular plate w/ reconstruction to anterior mandible with ALT from L thigh to neck 7/30/24. Patient presenting with R neck abscess, now s/p I&D and packing at bedside in ED.

## 2025-02-21 RX ADMIN — OXYCODONE HYDROCHLORIDE 10 MILLIGRAM(S): 30 TABLET ORAL at 07:53

## 2025-02-21 RX ADMIN — AMPICILLIN SODIUM AND SULBACTAM SODIUM 200 GRAM(S): 1; .5 INJECTION, POWDER, FOR SOLUTION INTRAMUSCULAR; INTRAVENOUS at 02:56

## 2025-02-21 RX ADMIN — OXYCODONE HYDROCHLORIDE 10 MILLIGRAM(S): 30 TABLET ORAL at 18:10

## 2025-02-21 RX ADMIN — OXYCODONE HYDROCHLORIDE 10 MILLIGRAM(S): 30 TABLET ORAL at 09:00

## 2025-02-21 RX ADMIN — OXYCODONE HYDROCHLORIDE 10 MILLIGRAM(S): 30 TABLET ORAL at 23:08

## 2025-02-21 RX ADMIN — CALCIUM CARBONATE 1 TABLET(S): 750 TABLET ORAL at 21:27

## 2025-02-21 RX ADMIN — Medication 2 TABLET(S): at 22:01

## 2025-02-21 RX ADMIN — AMPICILLIN SODIUM AND SULBACTAM SODIUM 200 GRAM(S): 1; .5 INJECTION, POWDER, FOR SOLUTION INTRAMUSCULAR; INTRAVENOUS at 08:38

## 2025-02-21 RX ADMIN — ENOXAPARIN SODIUM 40 MILLIGRAM(S): 100 INJECTION SUBCUTANEOUS at 22:01

## 2025-02-21 RX ADMIN — OXYCODONE HYDROCHLORIDE 10 MILLIGRAM(S): 30 TABLET ORAL at 17:34

## 2025-02-21 RX ADMIN — OXYCODONE HYDROCHLORIDE 10 MILLIGRAM(S): 30 TABLET ORAL at 22:08

## 2025-02-21 RX ADMIN — AMPICILLIN SODIUM AND SULBACTAM SODIUM 200 GRAM(S): 1; .5 INJECTION, POWDER, FOR SOLUTION INTRAMUSCULAR; INTRAVENOUS at 14:41

## 2025-02-21 RX ADMIN — AMPICILLIN SODIUM AND SULBACTAM SODIUM 200 GRAM(S): 1; .5 INJECTION, POWDER, FOR SOLUTION INTRAMUSCULAR; INTRAVENOUS at 22:04

## 2025-02-21 NOTE — DISCHARGE NOTE PROVIDER - NSDCHHHOMEBOUNDOTHER_GEN_ALL_CORE_FT
s/p multiple H&N surgeries with open infected wound needing BID packing changes s/p multiple H&N surgeries with open infected wound needing daily packing changes with Aquacel AG 1/2"-3/4" strips

## 2025-02-21 NOTE — DISCHARGE NOTE PROVIDER - NSDCMRMEDTOKEN_GEN_ALL_CORE_FT
acetaminophen 650 mg oral tablet, extended release: 2 tab(s) orally every 6 hours  amoxicillin-clavulanate 875 mg-125 mg oral tablet: 875 milligram(s) orally every 12 hours  amoxicillin-clavulanate 875 mg-125 mg oral tablet: 875 milligram(s) orally every 12 hours  ibuprofen 600 mg oral tablet: 1 tab(s) orally every 6 hours  linezolid 600 mg oral tablet: 1 tab(s) orally every 12 hours  Physical Therapy: Physical Therapy 2-3x/week  Rolling Walker: Kvng Matias   acetaminophen 650 mg oral tablet, extended release: 2 tab(s) orally every 6 hours  amoxicillin-clavulanate 875 mg-125 mg oral tablet: 875 milligram(s) orally every 12 hours  amoxicillin-clavulanate 875 mg-125 mg oral tablet: 875 milligram(s) orally every 12 hours  ibuprofen 600 mg oral tablet: 1 tab(s) orally every 6 hours  linezolid 600 mg oral tablet: 1 tab(s) orally every 12 hours   acetaminophen 650 mg oral tablet, extended release: 2 tab(s) orally every 6 hours  ibuprofen 600 mg oral tablet: 1 tab(s) orally every 6 hours  sulfamethoxazole-trimethoprim 800 mg-160 mg oral tablet: 1 tab(s) orally every 12 hours MDD: 2

## 2025-02-21 NOTE — DISCHARGE NOTE PROVIDER - PROVIDER TOKENS
PROVIDER:[TOKEN:[[id number]],FOLLOWUP:[1 week]],PROVIDER:[TOKEN:[[id number]],FOLLOWUP:[1 week]]

## 2025-02-21 NOTE — DISCHARGE NOTE PROVIDER - CARE PROVIDER_API CALL
Neo Evans  Plastic Surgery  [address] [place] [address] [address]  [place] [address]  Phone: [phone]  Fax: [phone]  Follow Up Time: 1 week    Vic So  Head/Neck Surgery  [address] [place] [id number]  Phone: [phone]  Fax: [phone]  Follow Up Time: 1 week

## 2025-02-21 NOTE — DISCHARGE NOTE PROVIDER - HOSPITAL COURSE
Pt is a 59 year female s/p debridement of non-viable skin, removal of large mandibular plate w/ ALT from L thigh to neck 7/30/24. Pt presents today c/o pain and redness along right neck area for 2 days. Reported noticing flap to be "getting bigger and swollen" accompanied by pain while swallowing. Pt seen in office yesterday and started on PO Bactrim. Denies cp, sob, subjective fever, chills, nausea/vomiting.   A bedside I&D was performed in ED and 50cc of purulent fluid was expressed and was packed, cultures from it showing staph aureus. The patient was started on Unasyn and her exam and pain improved markably but some continued purulence was noted with packing changes.     She was eating, breathing, speaking well on day of discharge with plan for PO antibiotics and pain meds prn, and to f/u with Dr. Evans in clinic    Pt is a 59 year female s/p debridement of non-viable skin, removal of large mandibular plate w/ ALT from L thigh to neck 7/30/24. Pt presents today c/o pain and redness along right neck area for 2 days. Reported noticing flap to be "getting bigger and swollen" accompanied by pain while swallowing. Pt seen in office yesterday and started on PO Bactrim. Denies cp, sob, subjective fever, chills, nausea/vomiting.   A bedside I&D was performed in ED and 50cc of purulent fluid was expressed and was packed, cultures from it showing MSSA. The patient was started on Unasyn and her exam and pain improved but some continued purulence was noted with each packing change. She was started on bactrim on HD4 and packing changes continued with purulent fluid still noted on the packing each time it was removed. She was to be set up with [facility] for BID packing changes and continue on 2w course of bactrim    She was eating, breathing, speaking well on day of discharge with plan for PO antibiotics and pain meds prn, and to f/u with Dr. Evans in clinic    Pt is a 59 year female s/p debridement of non-viable skin, removal of large mandibular plate w/ ALT from L thigh to neck 7/30/24. Pt presents today c/o pain and redness along right neck area for 2 days. Reported noticing flap to be "getting bigger and swollen" accompanied by pain while swallowing. Pt seen in office yesterday and started on PO Bactrim. Denies cp, sob, subjective fever, chills, nausea/vomiting.   A bedside I&D was performed in ED and 50cc of purulent fluid was expressed and was packed, cultures from it showing MSSA. The patient was started on Unasyn and her exam and pain improved but some continued purulence was noted with each packing change. She was started on bactrim on HD4 and packing changes with Aquacel AG 1/2"-3/4" strips continued with purulent fluid still noted on the packing each time it was removed. She was to be set up with [facility] for teaching for daily packing changes with Aquacel AG 1/2"-3/4" strips and continue on 2w course of bactrim through 3/8    She was eating, breathing, speaking well on day of discharge with plan for packing changes daily, PO antibiotics and pain meds prn, and to f/u with Dr. Evans in clinic

## 2025-02-21 NOTE — DISCHARGE NOTE PROVIDER - NSDCFUADDINST_GEN_ALL_CORE_FT
FOLLOW UP: Please follow up with your surgeon - you have an appt with Dr. Evans and Dr. So on 2/25.   DIET: You may resume your regular diet.  WOUND CARE:  Please keep incisions clean and dry. Please do not scrub or rub incisions. Do not use lotion or powder on incisions.  BATHING: Keep your surgical area clean and dry. You may shower or use sponge bath to bathe.  Do not submerge wound underwater.   PAIN CONTROL:  Alternate between taking Ibuprofen and Tylenol so you are taking pain medication every 3 to 4 hours.   MEDICATIONS: Take all medications as prescribed.   ACTIVITY: No heavy lifting or straining for 2 weeks. Otherwise, you may return to your usual level of physical activity.   NOTIFY YOUR SURGEON IF: You have any bleeding that does not stop, any worsening of pus draining from your wound(s), any fever (over 101 F) or chills, persistent nausea/vomiting, persistent diarrhea, or if your pain is not controlled on your discharge pain medications.   FOLLOW UP: Please follow up with your surgeons Dr. Evans and Dr. So  DIET: You may resume your regular diet.  WOUND CARE:  Please keep incisions clean and dry. Please do not scrub or rub incisions. Do not use lotion or powder on incisions. The visiting nurse will be performing packing changes twice a day as we have here in the hospital, they will pack the right and left side as we have here and place gauze and tape over it.  BATHING: Keep your surgical area clean and dry. You may shower or use sponge bath to bathe but do not get the dressings or packing wet.  Do not submerge wound underwater.   PAIN CONTROL:  Alternate between taking Ibuprofen and Tylenol so you are taking pain medication every 3 to 4 hours.   MEDICATIONS: Take all medications as prescribed. You will be taking antibiotics, please take all of these pills as scheduled until they are all gone.   ACTIVITY: No heavy lifting or straining for 2 weeks. Otherwise, you may return to your usual level of physical activity.   NOTIFY YOUR SURGEON IF: You have any bleeding that does not stop, any worsening of pus draining from your wound(s), any difficulty breathing, speaking, or eating, any fever (over 101 F) or chills, persistent nausea/vomiting, persistent diarrhea, or if your pain is not controlled on your discharge pain medications.   FOLLOW UP: Please follow up with your surgeons Dr. Evans and Dr. So  DIET: You may resume your regular diet.  WOUND CARE:  Please keep incisions clean and dry. Please do not scrub or rub incisions. Do not use lotion or powder on incisions. The visiting nurse will teach you how to perform the daily packing changes with Aquacel AG 1/2"-3/4" strips as we have here in the hospital. Please pack the right and left side as we have here and place gauze and tape over it daily.  BATHING: Keep your surgical area clean and dry. You may shower or use sponge bath to bathe but do not get the dressings or packing wet.  Do not submerge wound underwater.   PAIN CONTROL:  Alternate between taking Ibuprofen and Tylenol so you are taking pain medication every 3 to 4 hours.   MEDICATIONS: Take all medications as prescribed. You will be taking antibiotics, please take all of these pills as scheduled until they are all gone.   ACTIVITY: No heavy lifting or straining for 2 weeks. Otherwise, you may return to your usual level of physical activity.   NOTIFY YOUR SURGEON IF: You have any bleeding that does not stop, any worsening of pus draining from your wound(s), any difficulty breathing, speaking, or eating, any fever (over 101 F) or chills, persistent nausea/vomiting, persistent diarrhea, or if your pain is not controlled on your discharge pain medications.

## 2025-02-21 NOTE — DISCHARGE NOTE PROVIDER - NSDCFUSCHEDAPPT_GEN_ALL_CORE_FT
Neo Evans  [facility] includes the [facility]  PLASTICSUR [address] [place] [address]  Scheduled Appointment: 02/25/2025    Vic So  [facility]ician [facility] includes the [facility]  OTOLARYNG [address] [place] [address]  Scheduled Appointment: 02/25/2025

## 2025-02-21 NOTE — DISCHARGE NOTE PROVIDER - CARE PROVIDERS DIRECT ADDRESSES
[email or web address]

## 2025-02-21 NOTE — PROGRESS NOTE ADULT - SUBJECTIVE AND OBJECTIVE BOX
Plastic Surgery Progress Note (pg [facility]: [id number], [place]: [phone])    SUBJECTIVE  The patient was seen and examined. No acute events overnight. Pain controlled, afebrile w/ stable vitals. Denies difficulty breathing, speaking, eating. Denies f/c. notes some continued pain around incision site        OBJECTIVE  ___________________________________________________  VITAL SIGNS / I&O's   Vital Signs Last 24 Hrs  T(C): 36.8 (21 Feb 2025 05:00), Max: 37 (20 Feb 2025 13:30)  T(F): 98.3 (21 Feb 2025 05:00), Max: 98.6 (20 Feb 2025 13:30)  HR: 73 (21 Feb 2025 05:00) (72 - 84)  BP: 102/54 (21 Feb 2025 05:00) (97/53 - 125/69)  BP(mean): --  RR: 17 (21 Feb 2025 05:00) (17 - 18)  SpO2: 99% (21 Feb 2025 05:00) (96% - 100%)    Parameters below as of 21 Feb 2025 05:00  Patient On (Oxygen Delivery Method): nasal cannula  O2 Flow (L/min): 2        20 Feb 2025 07:01  -  21 Feb 2025 07:00  --------------------------------------------------------  IN:    Oral Fluid: 760 mL  Total IN: 760 mL    OUT:    Voided (mL): 1700 mL  Total OUT: 1700 mL    Total NET: -940 mL        ___________________________________________________  PHYSICAL EXAM  -- CONSTITUTIONAL: NAD, lying in bed  -- HEENT: NC/AT, mucous membranes moist. No apparent intraoral wound, mucosa intact. Flap over chin/anterior neck warm and well perfused, swollen-appearing with fluctuance at R inferolateral border.   -- NECK: +Cellulitis with reduced erythema and fluctuance in R lateral neck after I&D underneath flap, otherwise soft  -- PULM: Non-labored respirations, equal chest rise bilaterally    ___________________________________________________  LABS                        10.2   7.03  )-----------( 211      ( 20 Feb 2025 03:42 )             32.7     20 Feb 2025 03:42    135    |  101    |  23     ----------------------------<  77     4.8     |  20     |  1.24     Ca    8.8        20 Feb 2025 03:42    TPro  7.1    /  Alb  3.9    /  TBili  <0.2   /  DBili  x      /  AST  10     /  ALT  6      /  AlkPhos  83     19 Feb 2025 11:50      CAPILLARY BLOOD GLUCOSE            Urinalysis Basic - ( 20 Feb 2025 03:42 )    Color: x / Appearance: x / SG: x / pH: x  Gluc: 77 mg/dL / Ketone: x  / Bili: x / Urobili: x   Blood: x / Protein: x / Nitrite: x   Leuk Esterase: x / RBC: x / WBC x   Sq Epi: x / Non Sq Epi: x / Bacteria: x      ___________________________________________________  MICRO  Recent Cultures:  Specimen Source: Abscess neck, 02-19 @ 17:29; Results   Moderate Staphylococcus aureus[!]; Gram Stain:   Moderate polymorphonuclear leukocytes per low power field  Few Gram positive cocci in pairs per oil power field[!]; Organism: --  Specimen Source: .Blood Blood-Peripheral, 02-19 @ 11:55; Results   No growth at 24 hours; Gram Stain: --; Organism: --  Specimen Source: .Blood Blood-Peripheral, 02-19 @ 11:50; Results   No growth at 24 hours; Gram Stain: --; Organism: --    ___________________________________________________  MEDICATIONS  (STANDING):  ampicillin/sulbactam  IVPB      ampicillin/sulbactam  IVPB 3 Gram(s) IV Intermittent every 6 hours  enoxaparin Injectable 40 milliGRAM(s) SubCutaneous every 24 hours  senna 2 Tablet(s) Oral at bedtime    MEDICATIONS  (PRN):  acetaminophen     Tablet .. 975 milliGRAM(s) Oral every 6 hours PRN Mild Pain (1 - 3)  artificial  tears Solution 2 Drop(s) Both EYES every 1 hour PRN Dry Eyes  benzocaine/menthol Lozenge 1 Lozenge Oral every 3 hours PRN Sore Throat  calcium carbonate    500 mG (Tums) Chewable 1 Tablet(s) Chew every 4 hours PRN Dyspepsia  ibuprofen  Tablet. 400 milliGRAM(s) Oral every 6 hours PRN Temp greater or equal to 38C (100.4F), Mild Pain (1 - 3)  melatonin 3 milliGRAM(s) Oral at bedtime PRN Sleep  metoclopramide Injectable 10 milliGRAM(s) IV Push every 8 hours PRN Nausea and/or Vomiting  ondansetron Injectable 4 milliGRAM(s) IV Push every 6 hours PRN Nausea and/or Vomiting  oxyCODONE    IR 5 milliGRAM(s) Oral every 4 hours PRN Moderate Pain (4 - 6)  oxyCODONE    IR 10 milliGRAM(s) Oral every 4 hours PRN Severe Pain (7 - 10)  simethicone 80 milliGRAM(s) Chew every 6 hours PRN Gas

## 2025-02-22 LAB
-  CLINDAMYCIN: SIGNIFICANT CHANGE UP
-  CLINDAMYCIN: SIGNIFICANT CHANGE UP
-  ERYTHROMYCIN: SIGNIFICANT CHANGE UP
-  ERYTHROMYCIN: SIGNIFICANT CHANGE UP
-  GENTAMICIN: SIGNIFICANT CHANGE UP
-  GENTAMICIN: SIGNIFICANT CHANGE UP
-  OXACILLIN: SIGNIFICANT CHANGE UP
-  OXACILLIN: SIGNIFICANT CHANGE UP
-  PENICILLIN: SIGNIFICANT CHANGE UP
-  PENICILLIN: SIGNIFICANT CHANGE UP
-  RIFAMPIN: SIGNIFICANT CHANGE UP
-  RIFAMPIN: SIGNIFICANT CHANGE UP
-  TETRACYCLINE: SIGNIFICANT CHANGE UP
-  TETRACYCLINE: SIGNIFICANT CHANGE UP
-  TRIMETHOPRIM/SULFAMETHOXAZOLE: SIGNIFICANT CHANGE UP
-  TRIMETHOPRIM/SULFAMETHOXAZOLE: SIGNIFICANT CHANGE UP
-  VANCOMYCIN: SIGNIFICANT CHANGE UP
-  VANCOMYCIN: SIGNIFICANT CHANGE UP
METHOD TYPE: SIGNIFICANT CHANGE UP
METHOD TYPE: SIGNIFICANT CHANGE UP

## 2025-02-22 RX ORDER — SULFAMETHOXAZOLE/TRIMETHOPRIM 800-160 MG
1 TABLET ORAL EVERY 12 HOURS
Refills: 0 | Status: DISCONTINUED | OUTPATIENT
Start: 2025-02-22 | End: 2025-02-25

## 2025-02-22 RX ADMIN — OXYCODONE HYDROCHLORIDE 10 MILLIGRAM(S): 30 TABLET ORAL at 15:00

## 2025-02-22 RX ADMIN — Medication 400 MILLIGRAM(S): at 04:29

## 2025-02-22 RX ADMIN — ENOXAPARIN SODIUM 40 MILLIGRAM(S): 100 INJECTION SUBCUTANEOUS at 21:04

## 2025-02-22 RX ADMIN — Medication 400 MILLIGRAM(S): at 05:29

## 2025-02-22 RX ADMIN — Medication 10 MILLIGRAM(S): at 04:31

## 2025-02-22 RX ADMIN — Medication 20 MILLIGRAM(S): at 18:12

## 2025-02-22 RX ADMIN — OXYCODONE HYDROCHLORIDE 10 MILLIGRAM(S): 30 TABLET ORAL at 21:04

## 2025-02-22 RX ADMIN — OXYCODONE HYDROCHLORIDE 10 MILLIGRAM(S): 30 TABLET ORAL at 22:04

## 2025-02-22 RX ADMIN — AMPICILLIN SODIUM AND SULBACTAM SODIUM 200 GRAM(S): 1; .5 INJECTION, POWDER, FOR SOLUTION INTRAMUSCULAR; INTRAVENOUS at 04:28

## 2025-02-22 RX ADMIN — Medication 1 TABLET(S): at 17:28

## 2025-02-22 RX ADMIN — OXYCODONE HYDROCHLORIDE 10 MILLIGRAM(S): 30 TABLET ORAL at 14:00

## 2025-02-22 RX ADMIN — Medication 2 TABLET(S): at 21:04

## 2025-02-22 NOTE — PROGRESS NOTE ADULT - SUBJECTIVE AND OBJECTIVE BOX
Plastic Surgery Progress Note (pg [facility]: [id number], [facility]: [phone])    SUBJECTIVE  The patient was seen and examined. No acute events overnight. Pain controlled, afebrile w/ stable vitals.     OBJECTIVE  ___________________________________________________  VITAL SIGNS / I&O's   Vital Signs Last 24 Hrs  T(C): 36.5 (22 Feb 2025 04:44), Max: 36.9 (21 Feb 2025 17:35)  T(F): 97.7 (22 Feb 2025 04:44), Max: 98.5 (21 Feb 2025 17:35)  HR: 77 (22 Feb 2025 04:44) (69 - 84)  BP: 138/75 (22 Feb 2025 04:44) (114/69 - 138/75)  BP(mean): --  RR: 18 (22 Feb 2025 04:44) (18 - 18)  SpO2: 98% (22 Feb 2025 04:44) (96% - 99%)    Parameters below as of 22 Feb 2025 04:44  Patient On (Oxygen Delivery Method): nasal cannula  O2 Flow (L/min): 2        21 Feb 2025 07:01  -  22 Feb 2025 07:00  --------------------------------------------------------  IN:    IV PiggyBack: 200 mL    Oral Fluid: 1260 mL  Total IN: 1460 mL    OUT:    Voided (mL): 1300 mL  Total OUT: 1300 mL    Total NET: 160 mL        ___________________________________________________  PHYSICAL EXAM    -- CONSTITUTIONAL: NAD, lying in bed  -- HEENT: NC/AT, mucous membranes moist. No apparent intraoral wound, mucosa intact. Flap over chin/anterior neck warm and well perfused, swollen-appearing with fluctuance at R inferolateral border.   -- NECK: +Cellulitis with reduced erythema and fluctuance in R lateral neck after I&D underneath flap, otherwise soft. Opening in superior aspect of flap w/ small amount of purulent drainage, packed with Aquacel.   -- PULM: Non-labored respirations, equal chest rise bilaterally    ___________________________________________________  LABS            CAPILLARY BLOOD GLUCOSE              ___________________________________________________  MICRO  Recent Cultures:  Specimen Source: Abscess neck, 02-19 @ 17:29; Results   Moderate Staphylococcus aureus[!]; Gram Stain:   Moderate polymorphonuclear leukocytes per low power field  Few Gram positive cocci in pairs per oil power field[!]; Organism: Staphylococcus aureus[!]  Specimen Source: .Blood Blood-Peripheral, 02-19 @ 11:55; Results   No growth at 48 Hours; Gram Stain: --; Organism: --  Specimen Source: .Blood Blood-Peripheral, 02-19 @ 11:50; Results   No growth at 48 Hours; Gram Stain: --; Organism: --    ___________________________________________________  MEDICATIONS  (STANDING):  ampicillin/sulbactam  IVPB      ampicillin/sulbactam  IVPB 3 Gram(s) IV Intermittent every 6 hours  enoxaparin Injectable 40 milliGRAM(s) SubCutaneous every 24 hours  senna 2 Tablet(s) Oral at bedtime    MEDICATIONS  (PRN):  acetaminophen     Tablet .. 975 milliGRAM(s) Oral every 6 hours PRN Mild Pain (1 - 3)  artificial  tears Solution 2 Drop(s) Both EYES every 1 hour PRN Dry Eyes  benzocaine/menthol Lozenge 1 Lozenge Oral every 3 hours PRN Sore Throat  calcium carbonate    500 mG (Tums) Chewable 1 Tablet(s) Chew every 4 hours PRN Dyspepsia  ibuprofen  Tablet. 400 milliGRAM(s) Oral every 6 hours PRN Temp greater or equal to 38C (100.4F), Mild Pain (1 - 3)  melatonin 3 milliGRAM(s) Oral at bedtime PRN Sleep  metoclopramide Injectable 10 milliGRAM(s) IV Push every 8 hours PRN Nausea and/or Vomiting  ondansetron Injectable 4 milliGRAM(s) IV Push every 6 hours PRN Nausea and/or Vomiting  oxyCODONE    IR 10 milliGRAM(s) Oral every 4 hours PRN Severe Pain (7 - 10)  oxyCODONE    IR 5 milliGRAM(s) Oral every 4 hours PRN Moderate Pain (4 - 6)  simethicone 80 milliGRAM(s) Chew every 6 hours PRN Gas

## 2025-02-23 RX ADMIN — OXYCODONE HYDROCHLORIDE 10 MILLIGRAM(S): 30 TABLET ORAL at 19:21

## 2025-02-23 RX ADMIN — Medication 40 MILLIGRAM(S): at 12:35

## 2025-02-23 RX ADMIN — Medication 400 MILLIGRAM(S): at 15:48

## 2025-02-23 RX ADMIN — Medication 1 TABLET(S): at 05:49

## 2025-02-23 RX ADMIN — Medication 2 TABLET(S): at 21:11

## 2025-02-23 RX ADMIN — Medication 400 MILLIGRAM(S): at 16:48

## 2025-02-23 RX ADMIN — OXYCODONE HYDROCHLORIDE 10 MILLIGRAM(S): 30 TABLET ORAL at 13:39

## 2025-02-23 RX ADMIN — OXYCODONE HYDROCHLORIDE 10 MILLIGRAM(S): 30 TABLET ORAL at 08:22

## 2025-02-23 RX ADMIN — OXYCODONE HYDROCHLORIDE 10 MILLIGRAM(S): 30 TABLET ORAL at 07:22

## 2025-02-23 RX ADMIN — OXYCODONE HYDROCHLORIDE 10 MILLIGRAM(S): 30 TABLET ORAL at 12:39

## 2025-02-23 RX ADMIN — ENOXAPARIN SODIUM 40 MILLIGRAM(S): 100 INJECTION SUBCUTANEOUS at 21:11

## 2025-02-23 RX ADMIN — OXYCODONE HYDROCHLORIDE 10 MILLIGRAM(S): 30 TABLET ORAL at 20:21

## 2025-02-23 RX ADMIN — Medication 1 TABLET(S): at 17:40

## 2025-02-23 NOTE — PROGRESS NOTE ADULT - SUBJECTIVE AND OBJECTIVE BOX
Plastic Surgery Progress Note (pg [facility]: [id number], [facility]: [phone])    SUBJECTIVE  The patient was seen and examined. No acute events overnight. Pain controlled, afebrile w/ stable vitals. Tolerating packing changes. Denies f/c, difficulty breathing or swallowing, voice changes.      OBJECTIVE  ___________________________________________________  VITAL SIGNS / I&O's   Vital Signs Last 24 Hrs  T(C): 36.3 (23 Feb 2025 05:48), Max: 37 (22 Feb 2025 13:30)  T(F): 97.4 (23 Feb 2025 05:48), Max: 98.6 (22 Feb 2025 13:30)  HR: 66 (23 Feb 2025 05:48) (66 - 85)  BP: 123/69 (23 Feb 2025 05:48) (113/58 - 135/67)  BP(mean): --  RR: 17 (23 Feb 2025 05:48) (17 - 18)  SpO2: 98% (23 Feb 2025 05:48) (96% - 100%)    Parameters below as of 23 Feb 2025 05:48  Patient On (Oxygen Delivery Method): room air          22 Feb 2025 07:01  -  23 Feb 2025 07:00  --------------------------------------------------------  IN:    Oral Fluid: 1080 mL  Total IN: 1080 mL    OUT:    Voided (mL): 900 mL  Total OUT: 900 mL    Total NET: 180 mL        ___________________________________________________  PHYSICAL EXAM  -- CONSTITUTIONAL: NAD, lying in bed  -- HEENT: NC/AT, mucous membranes moist. No apparent intraoral wound, mucosa intact. Flap over chin/anterior neck warm and well perfused, swollen-appearing with fluctuance at R inferolateral border.   -- NECK: +Cellulitis with reduced erythema and fluctuance in R lateral neck after I&D underneath flap, otherwise soft. Opening in superior aspect of flap w/ small amount of purulent drainage, packed with Aquacel.   -- PULM: Non-labored respirations, equal chest rise bilaterally    ___________________________________________________  LABS            CAPILLARY BLOOD GLUCOSE              ___________________________________________________  MICRO  Recent Cultures:  Specimen Source: Abscess neck, 02-19 @ 17:29; Results   Moderate Staphylococcus aureus[!]; Gram Stain:   Moderate polymorphonuclear leukocytes per low power field  Few Gram positive cocci in pairs per oil power field[!]; Organism: Staphylococcus aureus[!]  Specimen Source: .Blood Blood-Peripheral, 02-19 @ 11:55; Results   No growth at 72 Hours; Gram Stain: --; Organism: --  Specimen Source: .Blood Blood-Peripheral, 02-19 @ 11:50; Results   No growth at 72 Hours; Gram Stain: --; Organism: --    ___________________________________________________  MEDICATIONS  (STANDING):  enoxaparin Injectable 40 milliGRAM(s) SubCutaneous every 24 hours  pantoprazole  Injectable 40 milliGRAM(s) IV Push daily  senna 2 Tablet(s) Oral at bedtime  trimethoprim  160 mG/sulfamethoxazole 800 mG 1 Tablet(s) Oral every 12 hours    MEDICATIONS  (PRN):  acetaminophen     Tablet .. 975 milliGRAM(s) Oral every 6 hours PRN Mild Pain (1 - 3)  artificial  tears Solution 2 Drop(s) Both EYES every 1 hour PRN Dry Eyes  benzocaine/menthol Lozenge 1 Lozenge Oral every 3 hours PRN Sore Throat  calcium carbonate    500 mG (Tums) Chewable 1 Tablet(s) Chew every 4 hours PRN Dyspepsia  ibuprofen  Tablet. 400 milliGRAM(s) Oral every 6 hours PRN Temp greater or equal to 38C (100.4F), Mild Pain (1 - 3)  melatonin 3 milliGRAM(s) Oral at bedtime PRN Sleep  metoclopramide Injectable 10 milliGRAM(s) IV Push every 8 hours PRN Nausea and/or Vomiting  ondansetron Injectable 4 milliGRAM(s) IV Push every 6 hours PRN Nausea and/or Vomiting  oxyCODONE    IR 10 milliGRAM(s) Oral every 4 hours PRN Severe Pain (7 - 10)  oxyCODONE    IR 5 milliGRAM(s) Oral every 4 hours PRN Moderate Pain (4 - 6)  simethicone 80 milliGRAM(s) Chew every 6 hours PRN Gas   Plastic Surgery Progress Note (pg [facility]: [id number], NS: [phone])    SUBJECTIVE  The patient was seen and examined. No acute events overnight. Pain controlled, afebrile w/ stable vitals. Tolerating packing changes. Denies f/c, difficulty breathing or swallowing, voice changes.      OBJECTIVE  ___________________________________________________  VITAL SIGNS / I&O's   Vital Signs Last 24 Hrs  T(C): 36.3 (23 Feb 2025 05:48), Max: 37 (22 Feb 2025 13:30)  T(F): 97.4 (23 Feb 2025 05:48), Max: 98.6 (22 Feb 2025 13:30)  HR: 66 (23 Feb 2025 05:48) (66 - 85)  BP: 123/69 (23 Feb 2025 05:48) (113/58 - 135/67)  BP(mean): --  RR: 17 (23 Feb 2025 05:48) (17 - 18)  SpO2: 98% (23 Feb 2025 05:48) (96% - 100%)    Parameters below as of 23 Feb 2025 05:48  Patient On (Oxygen Delivery Method): room air          22 Feb 2025 07:01  -  23 Feb 2025 07:00  --------------------------------------------------------  IN:    Oral Fluid: 1080 mL  Total IN: 1080 mL    OUT:    Voided (mL): 900 mL  Total OUT: 900 mL    Total NET: 180 mL        ___________________________________________________  PHYSICAL EXAM  -- CONSTITUTIONAL: NAD, lying in bed  -- HEENT: NC/AT, mucous membranes moist. No apparent intraoral wound, mucosa intact. Flap over chin/anterior neck warm and well perfused, swollen-appearing with fluctuance at R inferolateral border.   -- NECK: +Cellulitis with reduced erythema and fluctuance in R lateral neck after I&D underneath flap, otherwise soft. Opening in superior aspect of flap w/ small amount of purulent drainage, packed with Aquacel. New skin opening on L side of neck with minimal SS output, tracks about 1cm, also packed. Two new openings near lips with SS output, not packed  -- PULM: Non-labored respirations, equal chest rise bilaterally    ___________________________________________________  LABS            CAPILLARY BLOOD GLUCOSE              ___________________________________________________  MICRO  Recent Cultures:  Specimen Source: Abscess neck, 02-19 @ 17:29; Results   Moderate Staphylococcus aureus[!]; Gram Stain:   Moderate polymorphonuclear leukocytes per low power field  Few Gram positive cocci in pairs per oil power field[!]; Organism: Staphylococcus aureus[!]  Specimen Source: .Blood Blood-Peripheral, 02-19 @ 11:55; Results   No growth at 72 Hours; Gram Stain: --; Organism: --  Specimen Source: .Blood Blood-Peripheral, 02-19 @ 11:50; Results   No growth at 72 Hours; Gram Stain: --; Organism: --    ___________________________________________________  MEDICATIONS  (STANDING):  enoxaparin Injectable 40 milliGRAM(s) SubCutaneous every 24 hours  pantoprazole  Injectable 40 milliGRAM(s) IV Push daily  senna 2 Tablet(s) Oral at bedtime  trimethoprim  160 mG/sulfamethoxazole 800 mG 1 Tablet(s) Oral every 12 hours    MEDICATIONS  (PRN):  acetaminophen     Tablet .. 975 milliGRAM(s) Oral every 6 hours PRN Mild Pain (1 - 3)  artificial  tears Solution 2 Drop(s) Both EYES every 1 hour PRN Dry Eyes  benzocaine/menthol Lozenge 1 Lozenge Oral every 3 hours PRN Sore Throat  calcium carbonate    500 mG (Tums) Chewable 1 Tablet(s) Chew every 4 hours PRN Dyspepsia  ibuprofen  Tablet. 400 milliGRAM(s) Oral every 6 hours PRN Temp greater or equal to 38C (100.4F), Mild Pain (1 - 3)  melatonin 3 milliGRAM(s) Oral at bedtime PRN Sleep  metoclopramide Injectable 10 milliGRAM(s) IV Push every 8 hours PRN Nausea and/or Vomiting  ondansetron Injectable 4 milliGRAM(s) IV Push every 6 hours PRN Nausea and/or Vomiting  oxyCODONE    IR 10 milliGRAM(s) Oral every 4 hours PRN Severe Pain (7 - 10)  oxyCODONE    IR 5 milliGRAM(s) Oral every 4 hours PRN Moderate Pain (4 - 6)  simethicone 80 milliGRAM(s) Chew every 6 hours PRN Gas

## 2025-02-24 ENCOUNTER — TRANSCRIPTION ENCOUNTER (OUTPATIENT)
Age: 60
End: 2025-02-24

## 2025-02-24 LAB
CULTURE RESULTS: ABNORMAL
CULTURE RESULTS: SIGNIFICANT CHANGE UP
CULTURE RESULTS: SIGNIFICANT CHANGE UP
ORGANISM # SPEC MICROSCOPIC CNT: ABNORMAL
ORGANISM # SPEC MICROSCOPIC CNT: ABNORMAL
SPECIMEN SOURCE: SIGNIFICANT CHANGE UP

## 2025-02-24 RX ADMIN — OXYCODONE HYDROCHLORIDE 10 MILLIGRAM(S): 30 TABLET ORAL at 20:07

## 2025-02-24 RX ADMIN — OXYCODONE HYDROCHLORIDE 5 MILLIGRAM(S): 30 TABLET ORAL at 02:16

## 2025-02-24 RX ADMIN — Medication 1 TABLET(S): at 18:56

## 2025-02-24 RX ADMIN — Medication 40 MILLIGRAM(S): at 11:00

## 2025-02-24 RX ADMIN — ENOXAPARIN SODIUM 40 MILLIGRAM(S): 100 INJECTION SUBCUTANEOUS at 21:22

## 2025-02-24 RX ADMIN — OXYCODONE HYDROCHLORIDE 10 MILLIGRAM(S): 30 TABLET ORAL at 19:07

## 2025-02-24 RX ADMIN — OXYCODONE HYDROCHLORIDE 10 MILLIGRAM(S): 30 TABLET ORAL at 08:11

## 2025-02-24 RX ADMIN — OXYCODONE HYDROCHLORIDE 10 MILLIGRAM(S): 30 TABLET ORAL at 09:10

## 2025-02-24 RX ADMIN — Medication 2 TABLET(S): at 21:23

## 2025-02-24 RX ADMIN — Medication 1 TABLET(S): at 05:11

## 2025-02-24 RX ADMIN — OXYCODONE HYDROCHLORIDE 5 MILLIGRAM(S): 30 TABLET ORAL at 03:16

## 2025-02-24 NOTE — PROGRESS NOTE ADULT - SUBJECTIVE AND OBJECTIVE BOX
Plastic Surgery Progress Note (pg [facility]: [id number], [facility]: [phone])    SUBJECTIVE  The patient was seen and examined. No acute events overnight. Pain controlled, afebrile w/ stable vitals. Went on NC overnight but denies any problems breathing, eating, swallowing, speaking      OBJECTIVE  ___________________________________________________  VITAL SIGNS / I&O's   Vital Signs Last 24 Hrs  T(C): 36.4 (24 Feb 2025 04:52), Max: 36.8 (23 Feb 2025 09:48)  T(F): 97.5 (24 Feb 2025 04:52), Max: 98.3 (23 Feb 2025 17:29)  HR: 63 (24 Feb 2025 04:52) (60 - 77)  BP: 125/66 (24 Feb 2025 04:52) (101/57 - 126/71)  BP(mean): --  RR: 18 (24 Feb 2025 04:52) (16 - 18)  SpO2: 99% (24 Feb 2025 04:52) (90% - 99%)    Parameters below as of 24 Feb 2025 04:52  Patient On (Oxygen Delivery Method): nasal cannula  O2 Flow (L/min): 2        22 Feb 2025 07:01  -  23 Feb 2025 07:00  --------------------------------------------------------  IN:    Oral Fluid: 1080 mL  Total IN: 1080 mL    OUT:    Voided (mL): 900 mL  Total OUT: 900 mL    Total NET: 180 mL      23 Feb 2025 07:01  -  24 Feb 2025 06:51  --------------------------------------------------------  IN:    Oral Fluid: 480 mL  Total IN: 480 mL    OUT:    IV PiggyBack: 0 mL    Voided (mL): 900 mL  Total OUT: 900 mL    Total NET: -420 mL        ___________________________________________________  PHYSICAL EXAM  -- CONSTITUTIONAL: NAD, lying in bed  -- HEENT: NC/AT, mucous membranes moist. No apparent intraoral wound, mucosa intact. Flap over chin/anterior neck warm and well perfused, swollen-appearing with fluctuance at R inferolateral border.   -- NECK: +Cellulitis with reduced erythema and fluctuance in R lateral neck after I&D underneath flap, otherwise soft. Opening in superior aspect of flap w/ small amount of purulent drainage, packed with Aquacel. New skin opening on L side of neck with minimal SS output, tracks about 1cm, also packed. Two new openings near lips with SS output, not packed  -- PULM: Non-labored respirations, equal chest rise bilaterally    ___________________________________________________  LABS            CAPILLARY BLOOD GLUCOSE              ___________________________________________________  MICRO  Recent Cultures:  Specimen Source: Abscess neck, 02-19 @ 17:29; Results   Moderate Staphylococcus aureus[!]; Gram Stain:   Moderate polymorphonuclear leukocytes per low power field  Few Gram positive cocci in pairs per oil power field[!]; Organism: Staphylococcus aureus[!]  Specimen Source: .Blood Blood-Peripheral, 02-19 @ 11:55; Results   No growth at 4 days; Gram Stain: --; Organism: --  Specimen Source: .Blood Blood-Peripheral, 02-19 @ 11:50; Results   No growth at 4 days; Gram Stain: --; Organism: --    ___________________________________________________  MEDICATIONS  (STANDING):  enoxaparin Injectable 40 milliGRAM(s) SubCutaneous every 24 hours  pantoprazole  Injectable 40 milliGRAM(s) IV Push daily  senna 2 Tablet(s) Oral at bedtime  trimethoprim  160 mG/sulfamethoxazole 800 mG 1 Tablet(s) Oral every 12 hours    MEDICATIONS  (PRN):  acetaminophen     Tablet .. 975 milliGRAM(s) Oral every 6 hours PRN Mild Pain (1 - 3)  artificial  tears Solution 2 Drop(s) Both EYES every 1 hour PRN Dry Eyes  benzocaine/menthol Lozenge 1 Lozenge Oral every 3 hours PRN Sore Throat  calcium carbonate    500 mG (Tums) Chewable 1 Tablet(s) Chew every 4 hours PRN Dyspepsia  ibuprofen  Tablet. 400 milliGRAM(s) Oral every 6 hours PRN Temp greater or equal to 38C (100.4F), Mild Pain (1 - 3)  melatonin 3 milliGRAM(s) Oral at bedtime PRN Sleep  metoclopramide Injectable 10 milliGRAM(s) IV Push every 8 hours PRN Nausea and/or Vomiting  ondansetron Injectable 4 milliGRAM(s) IV Push every 6 hours PRN Nausea and/or Vomiting  oxyCODONE    IR 5 milliGRAM(s) Oral every 4 hours PRN Moderate Pain (4 - 6)  oxyCODONE    IR 10 milliGRAM(s) Oral every 4 hours PRN Severe Pain (7 - 10)  simethicone 80 milliGRAM(s) Chew every 6 hours PRN Gas

## 2025-02-24 NOTE — DISCHARGE NOTE NURSING/CASE MANAGEMENT/SOCIAL WORK - NSDCPECAREGIVERED_GEN_ALL_CORE
pt ambulating, eating, voiding without difficulty. iv discontinued. no distress noted. patient given supplies, verbalized understanding of dressing changes to face.

## 2025-02-24 NOTE — DISCHARGE NOTE NURSING/CASE MANAGEMENT/SOCIAL WORK - NSDCPNINST_GEN_ALL_CORE
call md for sign of infection (temp greater than 100.4f, redness at incision, pain not relieved by meds). call md for follow up appt. drink 9-13 eight oz. glasses of fluid daily. patient given supplies.

## 2025-02-24 NOTE — DISCHARGE NOTE NURSING/CASE MANAGEMENT/SOCIAL WORK - PATIENT PORTAL LINK FT
You can access the FollowMyHealth Patient Portal offered by [facility] by registering at the following website: [email or web address]. By joining [employer]’s FollowMyHealth portal, you will also be able to view your health information using other applications (apps) compatible with our system.

## 2025-02-25 ENCOUNTER — APPOINTMENT (OUTPATIENT)
Dept: OTOLARYNGOLOGY | Facility: CLINIC | Age: 60
End: 2025-02-25

## 2025-02-25 ENCOUNTER — APPOINTMENT (OUTPATIENT)
Dept: PLASTIC SURGERY | Facility: CLINIC | Age: 60
End: 2025-02-25

## 2025-02-25 VITALS
TEMPERATURE: 98 F | HEART RATE: 70 BPM | RESPIRATION RATE: 17 BRPM | SYSTOLIC BLOOD PRESSURE: 110 MMHG | DIASTOLIC BLOOD PRESSURE: 64 MMHG | OXYGEN SATURATION: 98 %

## 2025-02-25 RX ORDER — SULFAMETHOXAZOLE/TRIMETHOPRIM 800-160 MG
1 TABLET ORAL
Qty: 22 | Refills: 0
Start: 2025-02-25 | End: 2025-03-07

## 2025-02-25 RX ADMIN — Medication 40 MILLIGRAM(S): at 05:20

## 2025-02-25 RX ADMIN — Medication 1 TABLET(S): at 05:19

## 2025-02-25 RX ADMIN — OXYCODONE HYDROCHLORIDE 10 MILLIGRAM(S): 30 TABLET ORAL at 00:43

## 2025-02-25 RX ADMIN — OXYCODONE HYDROCHLORIDE 10 MILLIGRAM(S): 30 TABLET ORAL at 01:43

## 2025-02-25 RX ADMIN — OXYCODONE HYDROCHLORIDE 10 MILLIGRAM(S): 30 TABLET ORAL at 13:42

## 2025-02-25 RX ADMIN — OXYCODONE HYDROCHLORIDE 10 MILLIGRAM(S): 30 TABLET ORAL at 05:19

## 2025-02-25 RX ADMIN — OXYCODONE HYDROCHLORIDE 10 MILLIGRAM(S): 30 TABLET ORAL at 06:19

## 2025-02-25 NOTE — PROGRESS NOTE ADULT - ASSESSMENT
PARUL HERNANDEZ is a 59yFemale with complicated past surgical history who is now most recently s/p debridement of non-viable skin, removal of large mandibular plate w/ reconstruction to anterior mandible with ALT from L thigh to neck 7/30/24. Patient presenting with R neck abscess, now s/p I&D and packing at bedside in ED.     - cultures with MSSA sensitive to Bactrim - will transition to PO bactrim  - Tylenol/ibuprofen/oxycode for pain control  - Routine vitals/I&Os  - Aquacel Ag ribbon packing to neck and superior aspect of flap by PRS team BID  - Soft/bite sized diet  - Ambulate as tolerated  - Dispo: inpatient with packing changes    Plastic Surgery [id number]  
PARUL HERNANDEZ is a 59yFemale with complicated past surgical history who is now most recently s/p debridement of non-viable skin, removal of large mandibular plate w/ reconstruction to anterior mandible with ALT from L thigh to neck 7/30/24. Patient presenting with R neck abscess, now s/p I&D and packing at bedside in ED.     Plan:  - cultures with MSSA, on bactrim now through 3/8  - Tylenol/ibuprofen/oxycodone for pain control  - Routine vitals/I&Os  - Aquacel Ag ribbon packing to neck and superior aspect of flap by PRS team BID     - will need VNS for packing changes  - reg diet  - Ambulate as tolerated  - Dispo: home with VNS for packing changes    Plastic Surgery [id number]
PARUL HERNANDEZ is a 59yFemale with complicated past surgical history who is now most recently s/p debridement of non-viable skin, removal of large mandibular plate w/ reconstruction to anterior mandible with ALT from L thigh to neck 7/30/24. Patient presenting with R neck abscess, now s/p I&D and packing at bedside in ED.     - Unasyn while cultures pending from I&D - gram stain with GPCs  - Tylenol/ibuprofen/oxycode for pain control  - Routine vitals/I&Os  - Aquacel Ag ribbon packing to neck by PRS team BID  - Soft/bite sized diet  - Ambulate as tolerated  - Dispo: inpatient with IV abx    Plastic Surgery   [facility]: [id number]  [facility]: [phone]
PARUL HERNANDEZ is a 59yFemale with complicated past surgical history who is now most recently s/p debridement of non-viable skin, removal of large mandibular plate w/ reconstruction to anterior mandible with ALT from L thigh to neck 7/30/24. Patient presenting with R neck abscess, now s/p I&D and packing at bedside in ED.     - Unasyn while cultures pending from I&D - +staph aureus, sensitivity pending   - Tylenol/ibuprofen/oxycode for pain control  - Routine vitals/I&Os  - Aquacel Ag ribbon packing to neck by PRS team BID  - Soft/bite sized diet  - Ambulate as tolerated  - Dispo: inpatient with IV abx    Plastic Surgery [id number]  
PARUL HERNANDEZ is a 59yFemale with complicated past surgical history who is now most recently s/p debridement of non-viable skin, removal of large mandibular plate w/ reconstruction to anterior mandible with ALT from L thigh to neck 7/30/24. Patient presenting with R neck abscess, now s/p I&D and packing at bedside in ED.     - cultures with MSSA, on bactrim now  - Tylenol/ibuprofen/oxycodone for pain control  - Routine vitals/I&Os  - Aquacel Ag ribbon packing to neck and superior aspect of flap by PRS team BID     - will need VNS for packing changes  - Soft/bite sized diet  - Ambulate as tolerated  - Dispo: inpatient with packing changes    Plastic Surgery [id number]
PARUL HERNANDEZ is a 59yFemale with complicated past surgical history who is now most recently s/p debridement of non-viable skin, removal of large mandibular plate w/ reconstruction to anterior mandible with ALT from L thigh to neck 7/30/24. Patient presenting with R neck abscess, now s/p I&D and packing at bedside in ED.     Plan:  - cultures with MSSA, on bactrim now through 3/8  - Tylenol/ibuprofen/oxycodone for pain control  - Routine vitals/I&Os  - Aquacel Ag ribbon packing to neck and superior aspect of flap by PRS team BID in house     - plan for packing changes daily at home  - reg diet  - Ambulate as tolerated  - Dispo: home with VNS for packing change teaching    Plastic Surgery [id number]

## 2025-02-25 NOTE — PROGRESS NOTE ADULT - SUBJECTIVE AND OBJECTIVE BOX
Plastic Surgery Progress Note (pg [facility]: [id number], [facility]: [phone])    SUBJECTIVE  The patient was seen and examined. No acute events overnight. Pain controlled, afebrile w/ stable vitals. Agreeable to learning packing on her own so she can go home      OBJECTIVE  ___________________________________________________  VITAL SIGNS / I&O's   Vital Signs Last 24 Hrs  T(C): 36.9 (25 Feb 2025 09:05), Max: 36.9 (25 Feb 2025 09:05)  T(F): 98.4 (25 Feb 2025 09:05), Max: 98.4 (25 Feb 2025 09:05)  HR: 70 (25 Feb 2025 09:05) (53 - 97)  BP: 122/70 (25 Feb 2025 09:05) (103/52 - 123/66)  BP(mean): --  RR: 17 (25 Feb 2025 09:05) (17 - 18)  SpO2: 97% (25 Feb 2025 09:05) (94% - 100%)    Parameters below as of 25 Feb 2025 09:05  Patient On (Oxygen Delivery Method): room air          24 Feb 2025 07:01  -  25 Feb 2025 07:00  --------------------------------------------------------  IN:    Oral Fluid: 1520 mL  Total IN: 1520 mL    OUT:    IV PiggyBack: 0 mL    Voided (mL): 1000 mL  Total OUT: 1000 mL    Total NET: 520 mL        ___________________________________________________  PHYSICAL EXAM  -- CONSTITUTIONAL: NAD, lying in bed  -- HEENT: NC/AT, mucous membranes moist. No apparent intraoral wound, mucosa intact. Flap over chin/anterior neck warm and well perfused, swollen-appearing with fluctuance at R inferolateral border.   -- NECK: +Cellulitis with reduced erythema and fluctuance in R lateral neck after I&D underneath flap, otherwise soft. Opening in superior aspect of flap w/ small amount of purulent drainage, packed with Aquacel. New skin opening on L side of neck with minimal SS output, tracks about 1cm, also packed. Two new openings near lips with SS output, not packed  -- PULM: Non-labored respirations, equal chest rise bilaterally    ___________________________________________________  LABS        CAPILLARY BLOOD GLUCOSE        ___________________________________________________  MICRO  Recent Cultures:  Specimen Source: Abscess neck, 02-19 @ 17:29; Results   Moderate Staphylococcus aureus[!]; Gram Stain:   Moderate polymorphonuclear leukocytes per low power field  Few Gram positive cocci in pairs per oil power field[!]; Organism: Staphylococcus aureus[!]  Specimen Source: .Blood Blood-Peripheral, 02-19 @ 11:55; Results   No growth at 5 days; Gram Stain: --; Organism: --  Specimen Source: .Blood Blood-Peripheral, 02-19 @ 11:50; Results   No growth at 5 days; Gram Stain: --; Organism: --    ___________________________________________________  MEDICATIONS  (STANDING):  enoxaparin Injectable 40 milliGRAM(s) SubCutaneous every 24 hours  pantoprazole  Injectable 40 milliGRAM(s) IV Push daily  senna 2 Tablet(s) Oral at bedtime  trimethoprim  160 mG/sulfamethoxazole 800 mG 1 Tablet(s) Oral every 12 hours    MEDICATIONS  (PRN):  acetaminophen     Tablet .. 975 milliGRAM(s) Oral every 6 hours PRN Mild Pain (1 - 3)  artificial  tears Solution 2 Drop(s) Both EYES every 1 hour PRN Dry Eyes  benzocaine/menthol Lozenge 1 Lozenge Oral every 3 hours PRN Sore Throat  calcium carbonate    500 mG (Tums) Chewable 1 Tablet(s) Chew every 4 hours PRN Dyspepsia  ibuprofen  Tablet. 400 milliGRAM(s) Oral every 6 hours PRN Temp greater or equal to 38C (100.4F), Mild Pain (1 - 3)  melatonin 3 milliGRAM(s) Oral at bedtime PRN Sleep  metoclopramide Injectable 10 milliGRAM(s) IV Push every 8 hours PRN Nausea and/or Vomiting  ondansetron Injectable 4 milliGRAM(s) IV Push every 6 hours PRN Nausea and/or Vomiting  oxyCODONE    IR 5 milliGRAM(s) Oral every 4 hours PRN Moderate Pain (4 - 6)  oxyCODONE    IR 10 milliGRAM(s) Oral every 4 hours PRN Severe Pain (7 - 10)  simethicone 80 milliGRAM(s) Chew every 6 hours PRN Gas

## 2025-02-27 LAB
CULTURE RESULTS: ABNORMAL
ORGANISM # SPEC MICROSCOPIC CNT: ABNORMAL
ORGANISM # SPEC MICROSCOPIC CNT: ABNORMAL
SPECIMEN SOURCE: SIGNIFICANT CHANGE UP

## 2025-02-27 NOTE — DISCHARGE NOTE PROVIDER - NSRESEARCHGRANT_OVERRIDEREC_GEN_A_CORE
-S/p TTE after last visit given systolic murmur heard: Mild grade 1 diastolic dysfunction, aortic valve mild regurgitation, EF: 70%  -euvolemic, will monitor   This is a surgical and/or non-medical patient.

## 2025-03-04 ENCOUNTER — APPOINTMENT (OUTPATIENT)
Dept: PLASTIC SURGERY | Facility: CLINIC | Age: 60
End: 2025-03-04
Payer: MEDICARE

## 2025-03-04 VITALS
HEART RATE: 93 BPM | SYSTOLIC BLOOD PRESSURE: 149 MMHG | BODY MASS INDEX: 22.53 KG/M2 | HEIGHT: 64 IN | OXYGEN SATURATION: 96 % | TEMPERATURE: 98.5 F | WEIGHT: 132 LBS | DIASTOLIC BLOOD PRESSURE: 93 MMHG

## 2025-03-04 PROCEDURE — 99024 POSTOP FOLLOW-UP VISIT: CPT

## 2025-03-05 ENCOUNTER — APPOINTMENT (OUTPATIENT)
Dept: OTOLARYNGOLOGY | Facility: CLINIC | Age: 60
End: 2025-03-05
Payer: MEDICARE

## 2025-03-05 VITALS
DIASTOLIC BLOOD PRESSURE: 83 MMHG | HEART RATE: 82 BPM | OXYGEN SATURATION: 97 % | RESPIRATION RATE: 16 BRPM | SYSTOLIC BLOOD PRESSURE: 134 MMHG

## 2025-03-05 PROCEDURE — 99204 OFFICE O/P NEW MOD 45 MIN: CPT

## 2025-03-12 ENCOUNTER — APPOINTMENT (OUTPATIENT)
Dept: PLASTIC SURGERY | Facility: CLINIC | Age: 60
End: 2025-03-12
Payer: MEDICARE

## 2025-03-12 ENCOUNTER — APPOINTMENT (OUTPATIENT)
Facility: CLINIC | Age: 60
End: 2025-03-12

## 2025-03-12 VITALS
HEIGHT: 64 IN | SYSTOLIC BLOOD PRESSURE: 132 MMHG | BODY MASS INDEX: 23.05 KG/M2 | WEIGHT: 135 LBS | HEART RATE: 96 BPM | OXYGEN SATURATION: 98 % | DIASTOLIC BLOOD PRESSURE: 84 MMHG

## 2025-03-12 PROCEDURE — 99024 POSTOP FOLLOW-UP VISIT: CPT

## 2025-03-19 ENCOUNTER — APPOINTMENT (OUTPATIENT)
Facility: CLINIC | Age: 60
End: 2025-03-19
Payer: MEDICARE

## 2025-03-19 VITALS
BODY MASS INDEX: 23.05 KG/M2 | OXYGEN SATURATION: 99 % | HEART RATE: 79 BPM | WEIGHT: 135 LBS | SYSTOLIC BLOOD PRESSURE: 142 MMHG | DIASTOLIC BLOOD PRESSURE: 92 MMHG | HEIGHT: 64 IN

## 2025-03-19 DIAGNOSIS — C76.0 MALIGNANT NEOPLASM OF HEAD, FACE AND NECK: ICD-10-CM

## 2025-03-19 PROCEDURE — 99024 POSTOP FOLLOW-UP VISIT: CPT

## 2025-04-09 ENCOUNTER — APPOINTMENT (OUTPATIENT)
Facility: CLINIC | Age: 60
End: 2025-04-09
Payer: MEDICARE

## 2025-04-09 VITALS
BODY MASS INDEX: 22.24 KG/M2 | SYSTOLIC BLOOD PRESSURE: 145 MMHG | HEIGHT: 64 IN | HEART RATE: 81 BPM | OXYGEN SATURATION: 98 % | DIASTOLIC BLOOD PRESSURE: 92 MMHG | WEIGHT: 130.29 LBS

## 2025-04-09 DIAGNOSIS — C76.0 MALIGNANT NEOPLASM OF HEAD, FACE AND NECK: ICD-10-CM

## 2025-04-09 PROCEDURE — 99213 OFFICE O/P EST LOW 20 MIN: CPT

## 2025-04-09 RX ORDER — MUPIROCIN 20 MG/G
2 OINTMENT TOPICAL
Qty: 1 | Refills: 2 | Status: ACTIVE | COMMUNITY
Start: 2025-04-09 | End: 1900-01-01

## 2025-04-10 NOTE — H&P PST ADULT - CIGARETTES, NUMBER OF YRS
[facility] EMERGENCY DEPARTMENT  [address] [address]  Dept: [phone]  Loc: [phone]  eMERGENCYdEPARTMENT eNCOUnter      Pt Name: ALEXANDRE Salter  MRN: [id number]  Birthdate 2004  Date of evaluation: 4/10/2025  Provider:CHARITO Mcdonnell CNP      Independently seen and evaluated by the advanced practice provider  CHIEF COMPLAINT       Chief Complaint   Patient presents with    Miscarriage     Pt presents to the ER with the complaint of possible miscarriage. Pt estimates that she is approx 1 month gestation. Pt states vaginal bleeding started today with clots present. Pt also is complaining of abd cramping.        CRITICAL CARE TIME         HISTORY OF PRESENT ILLNESS  (Location/Symptom, Timing/Onset, Context/Setting, Quality, Duration,Modifying Factors, Severity.)   ALEXANDRE Salter is a 20 y.o. female who presents to the emergency department PMHx ADHD, bipolar, poor borderline personality disorder    Lives at home with spouse or significant other and family  Anticoagulation therapy none  CODE STATUS full  Social determinants: Is employed, no housing or food disparity and no assistive devices    HPI provided by the patient    Patient presenting to the emergency department complaining of vaginal bleeding and heavy pelvic and lower abdominal cramping since noon today while at work.  She is tearful and anxious.  LMP February 16 - 27th  OB/GYN: G4, P1 Ab2    Waiting to have an appointment with her [facility].  She has had 5 or 6 positive home pregnancy test.  She does have a history of spontaneous miscarriage.    Denies trauma.  She has changed her pad 1 time since noon.  She has taken Motrin for the pain    Nursing Notes were reviewedand agreed with or any disagreements were addressed in the HPI.    REVIEW OF SYSTEMS    (2-9 systems for level 4, 10 or more for level 5)     Review of Systems   Constitutional: Negative.    HENT: Negative.    20

## 2025-04-23 ENCOUNTER — APPOINTMENT (OUTPATIENT)
Facility: CLINIC | Age: 60
End: 2025-04-23
Payer: MEDICARE

## 2025-04-23 VITALS
HEIGHT: 64 IN | WEIGHT: 130 LBS | SYSTOLIC BLOOD PRESSURE: 135 MMHG | BODY MASS INDEX: 22.2 KG/M2 | DIASTOLIC BLOOD PRESSURE: 93 MMHG | HEART RATE: 80 BPM | OXYGEN SATURATION: 97 %

## 2025-04-23 DIAGNOSIS — C76.0 MALIGNANT NEOPLASM OF HEAD, FACE AND NECK: ICD-10-CM

## 2025-04-23 PROCEDURE — 99213 OFFICE O/P EST LOW 20 MIN: CPT

## 2025-04-23 RX ORDER — DOXYCYCLINE HYCLATE 100 MG/1
100 TABLET ORAL TWICE DAILY
Qty: 20 | Refills: 0 | Status: ACTIVE | COMMUNITY
Start: 2025-04-23 | End: 1900-01-01

## 2025-05-07 ENCOUNTER — APPOINTMENT (OUTPATIENT)
Facility: CLINIC | Age: 60
End: 2025-05-07
Payer: MEDICARE

## 2025-05-07 VITALS
SYSTOLIC BLOOD PRESSURE: 142 MMHG | WEIGHT: 130 LBS | HEIGHT: 64 IN | OXYGEN SATURATION: 97 % | BODY MASS INDEX: 22.2 KG/M2 | DIASTOLIC BLOOD PRESSURE: 85 MMHG | HEART RATE: 77 BPM

## 2025-05-07 DIAGNOSIS — C76.0 MALIGNANT NEOPLASM OF HEAD, FACE AND NECK: ICD-10-CM

## 2025-05-07 PROCEDURE — 99024 POSTOP FOLLOW-UP VISIT: CPT

## 2025-05-08 ENCOUNTER — APPOINTMENT (OUTPATIENT)
Dept: PHYSICAL MEDICINE AND REHAB | Facility: CLINIC | Age: 60
End: 2025-05-08

## 2025-05-08 VITALS
RESPIRATION RATE: 14 BRPM | BODY MASS INDEX: 23.22 KG/M2 | HEIGHT: 64 IN | SYSTOLIC BLOOD PRESSURE: 150 MMHG | HEART RATE: 83 BPM | WEIGHT: 136 LBS | DIASTOLIC BLOOD PRESSURE: 90 MMHG

## 2025-05-08 DIAGNOSIS — R25.2 CRAMP AND SPASM: ICD-10-CM

## 2025-05-08 DIAGNOSIS — M79.2 NEURALGIA AND NEURITIS, UNSPECIFIED: ICD-10-CM

## 2025-05-08 DIAGNOSIS — M25.511 PAIN IN RIGHT SHOULDER: ICD-10-CM

## 2025-05-08 PROCEDURE — 99204 OFFICE O/P NEW MOD 45 MIN: CPT

## 2025-05-08 RX ORDER — GABAPENTIN 300 MG/1
300 CAPSULE ORAL 3 TIMES DAILY
Qty: 90 | Refills: 2 | Status: ACTIVE | COMMUNITY
Start: 2025-05-08 | End: 1900-01-01

## 2025-05-14 ENCOUNTER — APPOINTMENT (OUTPATIENT)
Dept: RADIOLOGY | Facility: CLINIC | Age: 60
End: 2025-05-14
Payer: MEDICARE

## 2025-05-14 ENCOUNTER — RESULT REVIEW (OUTPATIENT)
Age: 60
End: 2025-05-14

## 2025-05-14 ENCOUNTER — OUTPATIENT (OUTPATIENT)
Dept: OUTPATIENT SERVICES | Facility: HOSPITAL | Age: 60
LOS: 1 days | End: 2025-05-14
Payer: COMMERCIAL

## 2025-05-14 DIAGNOSIS — C44.92 SQUAMOUS CELL CARCINOMA OF SKIN, UNSPECIFIED: Chronic | ICD-10-CM

## 2025-05-14 DIAGNOSIS — Z98.890 OTHER SPECIFIED POSTPROCEDURAL STATES: Chronic | ICD-10-CM

## 2025-05-14 DIAGNOSIS — Z90.711 ACQUIRED ABSENCE OF UTERUS WITH REMAINING CERVICAL STUMP: Chronic | ICD-10-CM

## 2025-05-14 DIAGNOSIS — Z98.891 HISTORY OF UTERINE SCAR FROM PREVIOUS SURGERY: Chronic | ICD-10-CM

## 2025-05-14 DIAGNOSIS — M25.511 PAIN IN RIGHT SHOULDER: ICD-10-CM

## 2025-05-14 DIAGNOSIS — Z96.642 PRESENCE OF LEFT ARTIFICIAL HIP JOINT: Chronic | ICD-10-CM

## 2025-05-14 PROCEDURE — 73030 X-RAY EXAM OF SHOULDER: CPT | Mod: 26,RT

## 2025-05-14 PROCEDURE — 73030 X-RAY EXAM OF SHOULDER: CPT

## 2025-06-10 ENCOUNTER — APPOINTMENT (OUTPATIENT)
Dept: PHYSICAL MEDICINE AND REHAB | Facility: CLINIC | Age: 60
End: 2025-06-10

## 2025-06-24 ENCOUNTER — APPOINTMENT (OUTPATIENT)
Dept: PHYSICAL MEDICINE AND REHAB | Facility: CLINIC | Age: 60
End: 2025-06-24
Payer: MEDICARE

## 2025-06-24 VITALS
WEIGHT: 147 LBS | HEART RATE: 85 BPM | RESPIRATION RATE: 14 BRPM | HEIGHT: 64 IN | DIASTOLIC BLOOD PRESSURE: 88 MMHG | BODY MASS INDEX: 25.1 KG/M2 | SYSTOLIC BLOOD PRESSURE: 146 MMHG

## 2025-06-24 PROCEDURE — 99214 OFFICE O/P EST MOD 30 MIN: CPT

## 2025-06-24 RX ORDER — GABAPENTIN 600 MG/1
600 TABLET, COATED ORAL 3 TIMES DAILY
Qty: 90 | Refills: 3 | Status: ACTIVE | COMMUNITY
Start: 2025-06-24 | End: 1900-01-01

## 2025-06-29 ENCOUNTER — OUTPATIENT (OUTPATIENT)
Dept: OUTPATIENT SERVICES | Facility: HOSPITAL | Age: 60
LOS: 1 days | Discharge: HOME | End: 2025-06-29

## 2025-06-29 DIAGNOSIS — C03.9 MALIGNANT NEOPLASM OF GUM, UNSPECIFIED: ICD-10-CM

## 2025-06-29 DIAGNOSIS — C44.92 SQUAMOUS CELL CARCINOMA OF SKIN, UNSPECIFIED: Chronic | ICD-10-CM

## 2025-06-29 DIAGNOSIS — C76.0 MALIGNANT NEOPLASM OF HEAD, FACE AND NECK: ICD-10-CM

## 2025-06-29 DIAGNOSIS — Z98.891 HISTORY OF UTERINE SCAR FROM PREVIOUS SURGERY: Chronic | ICD-10-CM

## 2025-06-29 DIAGNOSIS — Z98.890 OTHER SPECIFIED POSTPROCEDURAL STATES: Chronic | ICD-10-CM

## 2025-06-29 DIAGNOSIS — Z96.642 PRESENCE OF LEFT ARTIFICIAL HIP JOINT: Chronic | ICD-10-CM

## 2025-06-29 DIAGNOSIS — Z90.711 ACQUIRED ABSENCE OF UTERUS WITH REMAINING CERVICAL STUMP: Chronic | ICD-10-CM

## 2025-06-29 DIAGNOSIS — C03.0 MALIGNANT NEOPLASM OF UPPER GUM: ICD-10-CM

## 2025-07-02 ENCOUNTER — APPOINTMENT (OUTPATIENT)
Facility: CLINIC | Age: 60
End: 2025-07-02
Payer: MEDICARE

## 2025-07-02 VITALS
HEIGHT: 64 IN | HEART RATE: 72 BPM | WEIGHT: 145 LBS | SYSTOLIC BLOOD PRESSURE: 147 MMHG | BODY MASS INDEX: 24.75 KG/M2 | DIASTOLIC BLOOD PRESSURE: 85 MMHG | OXYGEN SATURATION: 98 %

## 2025-07-02 PROCEDURE — 99214 OFFICE O/P EST MOD 30 MIN: CPT

## 2025-07-07 ENCOUNTER — RESULT REVIEW (OUTPATIENT)
Age: 60
End: 2025-07-07

## 2025-07-07 ENCOUNTER — APPOINTMENT (OUTPATIENT)
Dept: HEMATOLOGY ONCOLOGY | Facility: CLINIC | Age: 60
End: 2025-07-07
Payer: MEDICARE

## 2025-07-07 VITALS
TEMPERATURE: 97.3 F | HEIGHT: 64 IN | HEART RATE: 78 BPM | DIASTOLIC BLOOD PRESSURE: 85 MMHG | WEIGHT: 152.23 LBS | BODY MASS INDEX: 25.99 KG/M2 | OXYGEN SATURATION: 96 % | SYSTOLIC BLOOD PRESSURE: 150 MMHG

## 2025-07-07 LAB
ALBUMIN SERPL ELPH-MCNC: 4.2 G/DL
ALP BLD-CCNC: 84 U/L
ALT SERPL-CCNC: 10 U/L
ANION GAP SERPL CALC-SCNC: 12 MMOL/L
AST SERPL-CCNC: 15 U/L
BASOPHILS # BLD AUTO: 0.06 K/UL — SIGNIFICANT CHANGE UP (ref 0–0.2)
BASOPHILS NFR BLD AUTO: 1.2 % — SIGNIFICANT CHANGE UP (ref 0–2)
BILIRUB SERPL-MCNC: 0.2 MG/DL
BUN SERPL-MCNC: 32 MG/DL
CALCIUM SERPL-MCNC: 9.3 MG/DL
CHLORIDE SERPL-SCNC: 104 MMOL/L
CO2 SERPL-SCNC: 24 MMOL/L
CREAT SERPL-MCNC: 1.41 MG/DL
EGFRCR SERPLBLD CKD-EPI 2021: 42 ML/MIN/1.73M2
EOSINOPHIL # BLD AUTO: 0.29 K/UL — SIGNIFICANT CHANGE UP (ref 0–0.5)
EOSINOPHIL NFR BLD AUTO: 5.6 % — SIGNIFICANT CHANGE UP (ref 0–6)
GLUCOSE SERPL-MCNC: 83 MG/DL
HCT VFR BLD CALC: 39.7 % — SIGNIFICANT CHANGE UP (ref 34.5–45)
HGB BLD-MCNC: 12.4 G/DL — SIGNIFICANT CHANGE UP (ref 11.5–15.5)
IMM GRANULOCYTES # BLD AUTO: 0.01 K/UL — SIGNIFICANT CHANGE UP (ref 0–0.07)
IMM GRANULOCYTES NFR BLD AUTO: 0.2 % — SIGNIFICANT CHANGE UP (ref 0–0.9)
LYMPHOCYTES # BLD AUTO: 0.99 K/UL — LOW (ref 1–3.3)
LYMPHOCYTES NFR BLD AUTO: 19 % — SIGNIFICANT CHANGE UP (ref 13–44)
MAGNESIUM SERPL-MCNC: 2 MG/DL
MCHC RBC-ENTMCNC: 27.3 PG — SIGNIFICANT CHANGE UP (ref 27–34)
MCHC RBC-ENTMCNC: 31.2 G/DL — LOW (ref 32–36)
MCV RBC AUTO: 87.4 FL — SIGNIFICANT CHANGE UP (ref 80–100)
MONOCYTES # BLD AUTO: 0.39 K/UL — SIGNIFICANT CHANGE UP (ref 0–0.9)
MONOCYTES NFR BLD AUTO: 7.5 % — SIGNIFICANT CHANGE UP (ref 2–14)
NEUTROPHILS # BLD AUTO: 3.46 K/UL — SIGNIFICANT CHANGE UP (ref 1.8–7.4)
NEUTROPHILS NFR BLD AUTO: 66.5 % — SIGNIFICANT CHANGE UP (ref 43–77)
NRBC # BLD AUTO: 0 K/UL — SIGNIFICANT CHANGE UP (ref 0–0)
NRBC # FLD: 0 K/UL — SIGNIFICANT CHANGE UP (ref 0–0)
NRBC BLD AUTO-RTO: 0 /100 WBCS — SIGNIFICANT CHANGE UP (ref 0–0)
PLATELET # BLD AUTO: 193 K/UL — SIGNIFICANT CHANGE UP (ref 150–400)
PMV BLD: 10.1 FL — SIGNIFICANT CHANGE UP (ref 7–13)
POTASSIUM SERPL-SCNC: 5.4 MMOL/L
PROT SERPL-MCNC: 6.5 G/DL
RBC # BLD: 4.54 M/UL — SIGNIFICANT CHANGE UP (ref 3.8–5.2)
RBC # FLD: 14.3 % — SIGNIFICANT CHANGE UP (ref 10.3–14.5)
SODIUM SERPL-SCNC: 140 MMOL/L
WBC # BLD: 5.2 K/UL — SIGNIFICANT CHANGE UP (ref 3.8–10.5)
WBC # FLD AUTO: 5.2 K/UL — SIGNIFICANT CHANGE UP (ref 3.8–10.5)

## 2025-07-07 PROCEDURE — 99205 OFFICE O/P NEW HI 60 MIN: CPT

## 2025-07-07 PROCEDURE — G2211 COMPLEX E/M VISIT ADD ON: CPT

## 2025-07-24 ENCOUNTER — OUTPATIENT (OUTPATIENT)
Dept: OUTPATIENT SERVICES | Facility: HOSPITAL | Age: 60
LOS: 1 days | End: 2025-07-24

## 2025-07-24 VITALS
SYSTOLIC BLOOD PRESSURE: 150 MMHG | RESPIRATION RATE: 16 BRPM | HEIGHT: 63 IN | OXYGEN SATURATION: 97 % | HEART RATE: 74 BPM | DIASTOLIC BLOOD PRESSURE: 81 MMHG | TEMPERATURE: 98 F | WEIGHT: 154.98 LBS

## 2025-07-24 DIAGNOSIS — R20.0 ANESTHESIA OF SKIN: ICD-10-CM

## 2025-07-24 DIAGNOSIS — Z96.642 PRESENCE OF LEFT ARTIFICIAL HIP JOINT: Chronic | ICD-10-CM

## 2025-07-24 DIAGNOSIS — Z98.890 OTHER SPECIFIED POSTPROCEDURAL STATES: Chronic | ICD-10-CM

## 2025-07-24 DIAGNOSIS — Z98.891 HISTORY OF UTERINE SCAR FROM PREVIOUS SURGERY: Chronic | ICD-10-CM

## 2025-07-24 DIAGNOSIS — C76.0 MALIGNANT NEOPLASM OF HEAD, FACE AND NECK: ICD-10-CM

## 2025-07-24 DIAGNOSIS — Z90.711 ACQUIRED ABSENCE OF UTERUS WITH REMAINING CERVICAL STUMP: Chronic | ICD-10-CM

## 2025-07-24 DIAGNOSIS — C44.92 SQUAMOUS CELL CARCINOMA OF SKIN, UNSPECIFIED: Chronic | ICD-10-CM

## 2025-07-24 LAB
BASOPHILS # BLD AUTO: 0.05 K/UL — SIGNIFICANT CHANGE UP (ref 0–0.2)
BASOPHILS NFR BLD AUTO: 1.1 % — SIGNIFICANT CHANGE UP (ref 0–2)
BLD GP AB SCN SERPL QL: NEGATIVE — SIGNIFICANT CHANGE UP
EOSINOPHIL # BLD AUTO: 0.25 K/UL — SIGNIFICANT CHANGE UP (ref 0–0.5)
EOSINOPHIL NFR BLD AUTO: 5.6 % — SIGNIFICANT CHANGE UP (ref 0–6)
HCT VFR BLD CALC: 37.7 % — SIGNIFICANT CHANGE UP (ref 34.5–45)
HGB BLD-MCNC: 12.1 G/DL — SIGNIFICANT CHANGE UP (ref 11.5–15.5)
IMM GRANULOCYTES NFR BLD AUTO: 0.2 % — SIGNIFICANT CHANGE UP (ref 0–0.9)
LYMPHOCYTES # BLD AUTO: 1 K/UL — SIGNIFICANT CHANGE UP (ref 1–3.3)
LYMPHOCYTES # BLD AUTO: 22.4 % — SIGNIFICANT CHANGE UP (ref 13–44)
MCHC RBC-ENTMCNC: 28.1 PG — SIGNIFICANT CHANGE UP (ref 27–34)
MCHC RBC-ENTMCNC: 32.1 G/DL — SIGNIFICANT CHANGE UP (ref 32–36)
MCV RBC AUTO: 87.5 FL — SIGNIFICANT CHANGE UP (ref 80–100)
MONOCYTES # BLD AUTO: 0.47 K/UL — SIGNIFICANT CHANGE UP (ref 0–0.9)
MONOCYTES NFR BLD AUTO: 10.5 % — SIGNIFICANT CHANGE UP (ref 2–14)
NEUTROPHILS # BLD AUTO: 2.69 K/UL — SIGNIFICANT CHANGE UP (ref 1.8–7.4)
NEUTROPHILS NFR BLD AUTO: 60.2 % — SIGNIFICANT CHANGE UP (ref 43–77)
PLATELET # BLD AUTO: 197 K/UL — SIGNIFICANT CHANGE UP (ref 150–400)
RBC # BLD: 4.31 M/UL — SIGNIFICANT CHANGE UP (ref 3.8–5.2)
RBC # FLD: 14.4 % — SIGNIFICANT CHANGE UP (ref 10.3–14.5)
RH IG SCN BLD-IMP: POSITIVE — SIGNIFICANT CHANGE UP
RH IG SCN BLD-IMP: POSITIVE — SIGNIFICANT CHANGE UP
WBC # BLD: 4.47 K/UL — SIGNIFICANT CHANGE UP (ref 3.8–10.5)
WBC # FLD AUTO: 4.47 K/UL — SIGNIFICANT CHANGE UP (ref 3.8–10.5)

## 2025-07-24 RX ORDER — SODIUM CHLORIDE 9 G/1000ML
1000 INJECTION, SOLUTION INTRAVENOUS
Refills: 0 | Status: DISCONTINUED | OUTPATIENT
Start: 2025-07-30 | End: 2025-08-13

## 2025-07-29 ENCOUNTER — APPOINTMENT (OUTPATIENT)
Dept: PHYSICAL MEDICINE AND REHAB | Facility: CLINIC | Age: 60
End: 2025-07-29

## 2025-07-30 ENCOUNTER — TRANSCRIPTION ENCOUNTER (OUTPATIENT)
Age: 60
End: 2025-07-30

## 2025-07-30 ENCOUNTER — APPOINTMENT (OUTPATIENT)
Dept: PLASTIC SURGERY | Facility: HOSPITAL | Age: 60
End: 2025-07-30
Payer: MEDICARE

## 2025-07-30 ENCOUNTER — RESULT REVIEW (OUTPATIENT)
Age: 60
End: 2025-07-30

## 2025-07-30 ENCOUNTER — OUTPATIENT (OUTPATIENT)
Dept: INPATIENT UNIT | Facility: HOSPITAL | Age: 60
LOS: 1 days | End: 2025-07-30
Payer: MEDICARE

## 2025-07-30 VITALS
OXYGEN SATURATION: 97 % | DIASTOLIC BLOOD PRESSURE: 83 MMHG | RESPIRATION RATE: 16 BRPM | WEIGHT: 154.98 LBS | TEMPERATURE: 98 F | HEIGHT: 63 IN | HEART RATE: 74 BPM | SYSTOLIC BLOOD PRESSURE: 152 MMHG

## 2025-07-30 VITALS
HEART RATE: 64 BPM | RESPIRATION RATE: 15 BRPM | OXYGEN SATURATION: 92 % | SYSTOLIC BLOOD PRESSURE: 109 MMHG | DIASTOLIC BLOOD PRESSURE: 71 MMHG

## 2025-07-30 DIAGNOSIS — C44.92 SQUAMOUS CELL CARCINOMA OF SKIN, UNSPECIFIED: Chronic | ICD-10-CM

## 2025-07-30 DIAGNOSIS — Z98.890 OTHER SPECIFIED POSTPROCEDURAL STATES: Chronic | ICD-10-CM

## 2025-07-30 DIAGNOSIS — Z98.891 HISTORY OF UTERINE SCAR FROM PREVIOUS SURGERY: Chronic | ICD-10-CM

## 2025-07-30 DIAGNOSIS — C76.0 MALIGNANT NEOPLASM OF HEAD, FACE AND NECK: ICD-10-CM

## 2025-07-30 DIAGNOSIS — Z96.642 PRESENCE OF LEFT ARTIFICIAL HIP JOINT: Chronic | ICD-10-CM

## 2025-07-30 DIAGNOSIS — Z90.711 ACQUIRED ABSENCE OF UTERUS WITH REMAINING CERVICAL STUMP: Chronic | ICD-10-CM

## 2025-07-30 PROCEDURE — 20680 REMOVAL OF IMPLANT DEEP: CPT

## 2025-07-30 PROCEDURE — 88300 SURGICAL PATH GROSS: CPT | Mod: 26

## 2025-07-30 PROCEDURE — 15876 SUCTION LIPECTOMY HEAD&NECK: CPT

## 2025-07-30 RX ORDER — HYDROMORPHONE/SOD CHLOR,ISO/PF 2 MG/10 ML
0.5 SYRINGE (ML) INJECTION ONCE
Refills: 0 | Status: DISCONTINUED | OUTPATIENT
Start: 2025-07-30 | End: 2025-07-30

## 2025-07-30 RX ORDER — HYDROMORPHONE/SOD CHLOR,ISO/PF 2 MG/10 ML
0.2 SYRINGE (ML) INJECTION
Refills: 0 | Status: DISCONTINUED | OUTPATIENT
Start: 2025-07-30 | End: 2025-07-30

## 2025-07-30 RX ORDER — OXYCODONE HYDROCHLORIDE 30 MG/1
5 TABLET ORAL ONCE
Refills: 0 | Status: DISCONTINUED | OUTPATIENT
Start: 2025-07-30 | End: 2025-07-30

## 2025-07-30 RX ORDER — IBUPROFEN 200 MG
2 TABLET ORAL
Refills: 0 | DISCHARGE

## 2025-07-30 RX ORDER — AMOXICILLIN AND CLAVULANATE POTASSIUM 500; 125 MG/1; MG/1
1 TABLET, FILM COATED ORAL
Qty: 14 | Refills: 0
Start: 2025-07-30 | End: 2025-08-05

## 2025-07-30 RX ORDER — GABAPENTIN 400 MG/1
1 CAPSULE ORAL
Refills: 0 | DISCHARGE

## 2025-07-30 RX ORDER — ONDANSETRON HCL/PF 4 MG/2 ML
4 VIAL (ML) INJECTION ONCE
Refills: 0 | Status: COMPLETED | OUTPATIENT
Start: 2025-07-30 | End: 2025-07-30

## 2025-07-30 RX ADMIN — OXYCODONE HYDROCHLORIDE 5 MILLIGRAM(S): 30 TABLET ORAL at 12:38

## 2025-07-30 RX ADMIN — Medication 0.5 MILLIGRAM(S): at 11:09

## 2025-07-30 RX ADMIN — OXYCODONE HYDROCHLORIDE 5 MILLIGRAM(S): 30 TABLET ORAL at 12:08

## 2025-07-30 RX ADMIN — Medication 4 MILLIGRAM(S): at 10:16

## 2025-07-30 RX ADMIN — Medication 0.5 MILLIGRAM(S): at 10:54

## 2025-07-31 PROBLEM — J45.909 UNSPECIFIED ASTHMA, UNCOMPLICATED: Chronic | Status: ACTIVE | Noted: 2025-07-24

## 2025-08-01 PROBLEM — M79.2 NEURALGIA AND NEURITIS, UNSPECIFIED: Chronic | Status: ACTIVE | Noted: 2025-07-24

## 2025-08-05 ENCOUNTER — APPOINTMENT (OUTPATIENT)
Dept: PLASTIC SURGERY | Facility: CLINIC | Age: 60
End: 2025-08-05
Payer: MEDICARE

## 2025-08-05 VITALS
HEART RATE: 82 BPM | SYSTOLIC BLOOD PRESSURE: 130 MMHG | BODY MASS INDEX: 25.95 KG/M2 | TEMPERATURE: 97.9 F | WEIGHT: 152 LBS | DIASTOLIC BLOOD PRESSURE: 84 MMHG | OXYGEN SATURATION: 95 % | HEIGHT: 64 IN

## 2025-08-05 PROCEDURE — 99024 POSTOP FOLLOW-UP VISIT: CPT

## 2025-08-06 LAB — SURGICAL PATHOLOGY STUDY: SIGNIFICANT CHANGE UP

## 2025-08-08 RX ORDER — AMOXICILLIN AND CLAVULANATE POTASSIUM 875; 125 MG/1; MG/1
875-125 TABLET, COATED ORAL TWICE DAILY
Qty: 14 | Refills: 0 | Status: ACTIVE | COMMUNITY
Start: 2025-08-08 | End: 1900-01-01

## 2025-08-12 ENCOUNTER — APPOINTMENT (OUTPATIENT)
Dept: PLASTIC SURGERY | Facility: CLINIC | Age: 60
End: 2025-08-12
Payer: MEDICARE

## 2025-08-12 VITALS
OXYGEN SATURATION: 94 % | HEART RATE: 77 BPM | HEIGHT: 64 IN | BODY MASS INDEX: 26.29 KG/M2 | SYSTOLIC BLOOD PRESSURE: 155 MMHG | RESPIRATION RATE: 16 BRPM | TEMPERATURE: 97.3 F | DIASTOLIC BLOOD PRESSURE: 93 MMHG | WEIGHT: 154 LBS

## 2025-08-12 DIAGNOSIS — C76.0 MALIGNANT NEOPLASM OF HEAD, FACE AND NECK: ICD-10-CM

## 2025-08-12 PROCEDURE — 99024 POSTOP FOLLOW-UP VISIT: CPT

## 2025-08-26 ENCOUNTER — APPOINTMENT (OUTPATIENT)
Dept: PLASTIC SURGERY | Facility: CLINIC | Age: 60
End: 2025-08-26
Payer: MEDICARE

## 2025-08-26 VITALS
DIASTOLIC BLOOD PRESSURE: 98 MMHG | SYSTOLIC BLOOD PRESSURE: 161 MMHG | HEIGHT: 66 IN | WEIGHT: 155 LBS | HEART RATE: 77 BPM | BODY MASS INDEX: 24.91 KG/M2 | OXYGEN SATURATION: 96 % | TEMPERATURE: 97.6 F

## 2025-08-26 DIAGNOSIS — C76.0 MALIGNANT NEOPLASM OF HEAD, FACE AND NECK: ICD-10-CM

## 2025-08-26 PROCEDURE — 99024 POSTOP FOLLOW-UP VISIT: CPT

## 2025-09-10 ENCOUNTER — APPOINTMENT (OUTPATIENT)
Dept: OTOLARYNGOLOGY | Facility: CLINIC | Age: 60
End: 2025-09-10

## 2025-09-12 ENCOUNTER — APPOINTMENT (OUTPATIENT)
Dept: PLASTIC SURGERY | Facility: CLINIC | Age: 60
End: 2025-09-12
Payer: MEDICARE

## 2025-09-12 VITALS
HEART RATE: 82 BPM | TEMPERATURE: 97.3 F | BODY MASS INDEX: 26.12 KG/M2 | DIASTOLIC BLOOD PRESSURE: 95 MMHG | WEIGHT: 153 LBS | HEIGHT: 64 IN | RESPIRATION RATE: 16 BRPM | OXYGEN SATURATION: 97 % | SYSTOLIC BLOOD PRESSURE: 163 MMHG

## 2025-09-12 DIAGNOSIS — C76.0 MALIGNANT NEOPLASM OF HEAD, FACE AND NECK: ICD-10-CM

## 2025-09-12 PROCEDURE — 99024 POSTOP FOLLOW-UP VISIT: CPT

## (undated) DEVICE — SUT ETHILON 6-0 18" PS-3

## (undated) DEVICE — SAFETY PIN

## (undated) DEVICE — DRSG BENZOIN 0.6CC

## (undated) DEVICE — LIJ-ESU BOVIE MACHINE - ROBOTIC 11469375: Type: DURABLE MEDICAL EQUIPMENT

## (undated) DEVICE — DRAIN BLAKE 10FR ROUND

## (undated) DEVICE — VAGINAL PACKING 2"

## (undated) DEVICE — FEEDING TUBE 12FR BULLET TIP

## (undated) DEVICE — DOPPLER PROBE  CABLE

## (undated) DEVICE — POSITIONER FOAM EGG CRATE ULNAR 2PCS (PINK)

## (undated) DEVICE — VENODYNE/SCD SLEEVE CALF MEDIUM

## (undated) DEVICE — RUBBER BANDS STERILE

## (undated) DEVICE — PREP CHLORAPREP HI-LITE ORANGE 26ML

## (undated) DEVICE — LABELS BLANK W PEN

## (undated) DEVICE — DRSG ACE BANDAGE 4" NS

## (undated) DEVICE — SUT ETHILON 8-0 5" BV130-5

## (undated) DEVICE — SUT ETHILON 9-0 5" BV100-4

## (undated) DEVICE — BASIN SET DOUBLE

## (undated) DEVICE — SUT VICRYL 4-0 27" RB-1 UNDYED

## (undated) DEVICE — SUT SILK 2-0 18" FS

## (undated) DEVICE — WARMING BLANKET FULL UNDERBODY

## (undated) DEVICE — DRAPE ARMATEC HANDLE 5" X 10"

## (undated) DEVICE — DRAIN RESERVOIR FOR JACKSON PRATT 100CC CARDINAL

## (undated) DEVICE — DRSG WEBRIL 6"

## (undated) DEVICE — SUT QUILL MONODERM 2-0 30CM 19MM

## (undated) DEVICE — DRSG XEROFORM 5 X 9"

## (undated) DEVICE — CLAMP MICROVASCULAR SINGLE  1-2MM

## (undated) DEVICE — DRSG STERISTRIPS 0.5 X 4"

## (undated) DEVICE — PACK FREE FLAP

## (undated) DEVICE — SAW BLADE STRYKER PRECISION EXT THIN 0.38X27MM

## (undated) DEVICE — DRSG STOCKINETTE IMPERVIOUS LG

## (undated) DEVICE — SYR LUER LOK 5CC

## (undated) DEVICE — CANISTER SUCTION LID GUARD 3000CC

## (undated) DEVICE — DRAPE SPLIT SHEET 77" X 108"

## (undated) DEVICE — GLV 8.5 PROTEXIS ORTHO (BROWN)

## (undated) DEVICE — SUT SILK 2-0 30" SH

## (undated) DEVICE — SUT PLAIN GUT FAST ABSORBING 5-0 PC-1

## (undated) DEVICE — PACK DENTAL MINOR

## (undated) DEVICE — TRACH TIE MEDIUM

## (undated) DEVICE — SUT PROLENE 5-0 36" RB-1

## (undated) DEVICE — SUT SILK 3-0 18" TIES

## (undated) DEVICE — SYR LUER LOK 20CC

## (undated) DEVICE — DRAPE FLUID WARMER 44 X 66"

## (undated) DEVICE — LIJ-ZIMMER MESHGRAFTER: Type: DURABLE MEDICAL EQUIPMENT

## (undated) DEVICE — POSITIONER STRAP ARMBOARD VELCRO TS-30

## (undated) DEVICE — FOLEY TRAY 16FR 5CC LF UMETER CLOSED

## (undated) DEVICE — PREP BETADINE SPONGE STICKS

## (undated) DEVICE — ULTRASOUND GEL 0.25L

## (undated) DEVICE — TUBING IRR SET FOR CYSTOSCOPY 77"

## (undated) DEVICE — TOURNIQUET CUFF 24" DUAL PORT SINGLE BLADDER LUER LOCK  (BLACK)

## (undated) DEVICE — SYR LUER LOK 10CC

## (undated) DEVICE — GLV 5.5 PROTEXIS (WHITE)

## (undated) DEVICE — BIPOLAR FORCEP CORD WECK STANDARD 12FT

## (undated) DEVICE — PROTECTOR HEEL / ELBOW

## (undated) DEVICE — GLV 6.5 PROTEXIS (WHITE)

## (undated) DEVICE — SPEAR SURG WECKCEL

## (undated) DEVICE — ELCTR BOVIE TIP BLADE INSULATED 2.75" EDGE

## (undated) DEVICE — KLS MARTIN MODEL CUSTOM HALF IPS

## (undated) DEVICE — SYR LUER LOK 3CC

## (undated) DEVICE — TWIST DRILL 1.5MM DIA X 50MM W/NOTCH SGL USE ONLY

## (undated) DEVICE — Device

## (undated) DEVICE — SPEAR SURG EYE WECK-CELL CELOS

## (undated) DEVICE — DRSG VAC GRANUFOAM SMALL (BLACK)

## (undated) DEVICE — SOL INJ NS 0.9% 1000ML

## (undated) DEVICE — STAPLER SKIN VISI-STAT 35 WIDE

## (undated) DEVICE — SOL IRR POUR NS 0.9% 1500ML

## (undated) DEVICE — IMP CUSTOM IPS 6.0MM

## (undated) DEVICE — WARMING BLANKET LOWER ADULT

## (undated) DEVICE — SAW BLADE STRYKER RECIPROCATING 27MMX0.38MM

## (undated) DEVICE — SAW BLADE STRYKER RECIPROCATING 22.5MMX0.38MM

## (undated) DEVICE — PACK MAJOR ABDOMINAL WITH LAP

## (undated) DEVICE — WARMING BLANKET FULL ADULT

## (undated) DEVICE — CANISTER DISPOSABLE THIN WALL 3000CC

## (undated) DEVICE — DRAPE SPLIT SHEET 77" X 120"

## (undated) DEVICE — MERCIAN VISABILITY BACKROUND YELLOW

## (undated) DEVICE — SUT CHROMIC 3-0 27" RB-1

## (undated) DEVICE — CANISTER SUCTION 3000ML

## (undated) DEVICE — PACK HEAD & NECK

## (undated) DEVICE — NDL COUNTER FOAM AND MAGNET 20-40

## (undated) DEVICE — PACK MINOR W TRANS LAP

## (undated) DEVICE — GEL ULTRASOUND 0.25L

## (undated) DEVICE — DRSG CURITY GAUZE SPONGE 4 X 4" 12-PLY

## (undated) DEVICE — TOURNIQUET ESMARK 6"

## (undated) DEVICE — SOL IRR POUR NS 0.9% 500ML

## (undated) DEVICE — BLADE SURGICAL #11 CARBON

## (undated) DEVICE — FEEDING TUBE NG 12FR 36"

## (undated) DEVICE — DRSG KERLIX ROLL 4.5"

## (undated) DEVICE — ZIMMER BLADE DERMATONE

## (undated) DEVICE — PACK BATTERY SINGLE USE

## (undated) DEVICE — GLV 8 PROTEXIS (CREAM) NEU-THERA

## (undated) DEVICE — GLV 8 PROTEXIS (CREAM) MICRO

## (undated) DEVICE — DRAPE FLUID WARMER 44 X 44"

## (undated) DEVICE — NDL HYPO REGULAR BEVEL 25G X 1.5" (BLUE)

## (undated) DEVICE — DRSG ALLEVYN LIFE 4 X 8 (PINK)

## (undated) DEVICE — TOURNIQUET CUFF 18" DUAL PORT SINGLE BLADDER LUER LOCK (BLACK)

## (undated) DEVICE — GLV 7 PROTEXIS (CREAM) MICRO

## (undated) DEVICE — GLV 8 PROTEXIS (WHITE)

## (undated) DEVICE — ELCTR GROUNDING PAD ADULT COVIDIEN

## (undated) DEVICE — CANISTER W/GEL INFOVAC 1000ML

## (undated) DEVICE — DRAPE 3/4 SHEET 52X76"

## (undated) DEVICE — GOWN SMARTGOWN RAGLAN XLG

## (undated) DEVICE — SUCTION MICROMAT 3FR

## (undated) DEVICE — TOURNIQUET CUFF 34" SINGLE PORT W PLC (BLACK)

## (undated) DEVICE — DRSG DERMABOND PRINEO 42CM

## (undated) DEVICE — LIJ-STRYKER REMB ORAL DRILLS: Type: DURABLE MEDICAL EQUIPMENT

## (undated) DEVICE — DRAIN PENROSE .25" X 18" LATEX

## (undated) DEVICE — PREP BETADINE KIT

## (undated) DEVICE — LIGASURE EXACT DISSECTOR

## (undated) DEVICE — ELCTR BOVIE PENCIL SMOKE EVACUATION

## (undated) DEVICE — DRAPE TOWEL BLUE 17" X 24"

## (undated) DEVICE — BUR STRYKER CROSS CUT FISSURE 2.1MM

## (undated) DEVICE — BRA JAW

## (undated) DEVICE — PACK DENTAL

## (undated) DEVICE — CANNULA ANT CHMBR 27GX22MM

## (undated) DEVICE — TUBING INFILTRATION

## (undated) DEVICE — SUT SILK 2-0 18" TIES

## (undated) DEVICE — GLV 8.5 PROTEXIS

## (undated) DEVICE — DRAPE MAGNETIC INSTRUMENT MEDIUM

## (undated) DEVICE — GLV 8.5 PROTEXIS ORTHO (CREAM)

## (undated) DEVICE — BUR LINVATEC OVAL MEDIUM 4MM CARBIDE

## (undated) DEVICE — MODEL IPS VERIFICATION

## (undated) DEVICE — GOWN LG

## (undated) DEVICE — BUR STRYKER OVAL CARBIDE 4MM

## (undated) DEVICE — DRSG TELFA 3 X 8

## (undated) DEVICE — ELCTR BOVIE TIP BLADE INSULATED 4" EDGE

## (undated) DEVICE — BUR LINVATEC CARBIDE SIDECUTTING 0.8MM 4.9MM

## (undated) DEVICE — BIPOLAR FORCEP KIRWAN JEWELERS STR 4" X 0.4MM W 12FT CORD (GREEN)

## (undated) DEVICE — TUBING LIPOSUCTION HI-VAC PSI-TEC

## (undated) DEVICE — LUBRICATING JELLY ONESHOT 1.25OZ

## (undated) DEVICE — DRSG STERISTRIPS 0.25 X 4"

## (undated) DEVICE — GLV 7.5 PROTEXIS (WHITE)

## (undated) DEVICE — GLV 9 PROTEXIS ORTHO (BROWN)

## (undated) DEVICE — DRILL TWIST HEX 24X50MM

## (undated) DEVICE — TOURNIQUET CUFF 24" DUAL PORT DUAL BLADDER W PLC (BLACK)

## (undated) DEVICE — DRAIN JACKSON PRATT 7FR ROUND END NO TROCAR

## (undated) DEVICE — SUT CHROMIC 4-0 27" SH

## (undated) DEVICE — CLAMP MICROVASCULAR DOUBLE  1-2MM

## (undated) DEVICE — VALVE ENDOSCOPE DEFENDO SINGLE USE

## (undated) DEVICE — SUT CHROMIC 4-0 27" RB-1

## (undated) DEVICE — PACK BATTERY SINGLE USE SD-2000

## (undated) DEVICE — SOL IRR POUR H2O 500ML

## (undated) DEVICE — PROTECTOR HEEL / ELBOW FLUFFY

## (undated) DEVICE — TUBING SUCTION NONCONDUCTIVE 6MM X 12FT

## (undated) DEVICE — TOURNIQUET CUFF 34" DUAL PORT W PLC

## (undated) DEVICE — LIJ/LIA-ESU VALLEYLAB FORCE TRIAD T2D28932EX: Type: DURABLE MEDICAL EQUIPMENT

## (undated) DEVICE — SUT VICRYL 3-0 18" SH UNDYED (POP-OFF)

## (undated) DEVICE — LAP PAD W RING 18 X 18"

## (undated) DEVICE — SUT VICRYL 2-0 27" SH UNDYED

## (undated) DEVICE — DRAPE INSTRUMENT POUCH 6.75" X 11"

## (undated) DEVICE — DRAPE SURGICAL #1010

## (undated) DEVICE — NDL HYPO SAFE 22G X 1" (BLACK)

## (undated) DEVICE — SOL IRR POUR H2O 1500ML

## (undated) DEVICE — WOUND IRR IRRISEPT W 0.5 CHG

## (undated) DEVICE — SUT VICRYL 3-0 27" SH UNDYED

## (undated) DEVICE — DRILL BIT KLS MARTIN TWIST 1.5MM W 7MM STOP

## (undated) DEVICE — SUT MONOCRYL 4-0 18" P-3 UNDYED

## (undated) DEVICE — GLV 7 PROTEXIS (WHITE)